# Patient Record
Sex: MALE | Race: WHITE | NOT HISPANIC OR LATINO | Employment: OTHER | URBAN - METROPOLITAN AREA
[De-identification: names, ages, dates, MRNs, and addresses within clinical notes are randomized per-mention and may not be internally consistent; named-entity substitution may affect disease eponyms.]

---

## 2017-02-06 ENCOUNTER — TELEPHONE (OUTPATIENT)
Dept: OPHTHALMOLOGY | Facility: CLINIC | Age: 67
End: 2017-02-06

## 2017-03-06 ENCOUNTER — INITIAL CONSULT (OUTPATIENT)
Dept: OPHTHALMOLOGY | Facility: CLINIC | Age: 67
End: 2017-03-06
Payer: MEDICARE

## 2017-03-06 DIAGNOSIS — H35.379: ICD-10-CM

## 2017-03-06 DIAGNOSIS — H43.813 POSTERIOR VITREOUS DETACHMENT, BOTH EYES: ICD-10-CM

## 2017-03-06 DIAGNOSIS — H35.89 RETINAL NERVE FIBER BUNDLE DEFECTS: ICD-10-CM

## 2017-03-06 DIAGNOSIS — H35.373 EPIRETINAL MEMBRANE, BOTH EYES: Primary | ICD-10-CM

## 2017-03-06 PROCEDURE — 92225 PR SPECIAL EYE EXAM, INITIAL: CPT | Mod: 50,S$GLB,, | Performed by: OPHTHALMOLOGY

## 2017-03-06 PROCEDURE — 99999 PR PBB SHADOW E&M-EST. PATIENT-LVL I: CPT | Mod: PBBFAC,,, | Performed by: OPHTHALMOLOGY

## 2017-03-06 PROCEDURE — 92014 COMPRE OPH EXAM EST PT 1/>: CPT | Mod: S$GLB,,, | Performed by: OPHTHALMOLOGY

## 2017-03-06 PROCEDURE — 92134 CPTRZ OPH DX IMG PST SGM RTA: CPT | Mod: S$GLB,,, | Performed by: OPHTHALMOLOGY

## 2017-03-06 NOTE — MR AVS SNAPSHOT
[place] - Ophthalmology  [address] [facility] [address]  [facility] [address]  Phone: [phone]  Fax: [phone]                  Zeenat Mancia   3/6/2017 9:30 AM   Initial consult    Description:  Male : 1950   Provider:  DANAY Fuentes MD   Department:  [place] - Ophthalmology           Reason for Visit     Eye Problem           Diagnoses this Visit        Comments    Epiretinal membrane, both eyes    -  Primary     Macular puckering of retina, unspecified laterality         Retinal nerve fiber bundle defects         Posterior vitreous detachment, both eyes                To Do List           Goals (5 Years of Data)     None      Follow-Up and Disposition     Return in about 1 year (around 3/6/2018).      [facility]     [facility] Nurse Care Line -  Assistance  Registered nurses in the [facility] Center provide clinical advisement, health education, appointment booking, and other advisory services.  Call for this free service at [phone].             Medications           Message regarding Medications     Verify the changes and/or additions to your medication regime listed below are the same as discussed with your clinician today.  If any of these changes or additions are incorrect, please notify your healthcare provider.             Verify that the below list of medications is an accurate representation of the medications you are currently taking.  If none reported, the list may be blank. If incorrect, please contact your healthcare provider. Carry this list with you in case of emergency.           Current Medications     amlodipine (NORVASC) 5 MG tablet Take 1 tablet (5 mg total) by mouth once daily.    aspirin 81 MG Chew Take 81 mg by mouth. 1 Tablet, Chewable Oral Every day    atenolol (TENORMIN) 100 MG tablet Take 1 tablet (100 mg total) by mouth once daily.    benazepril (LOTENSIN) 40 MG tablet Take 1 tablet (40 mg total) by mouth once daily.    doxazosin (CARDURA) 2  MG tablet Take 1 tablet (2 mg total) by mouth every evening.    finasteride (PROSCAR) 5 mg tablet Take 1 tablet (5 mg total) by mouth once daily.    hydrochlorothiazide (HYDRODIURIL) 25 MG tablet Take 1 tablet (25 mg total) by mouth once daily.    multivitamin-minerals-lutein (CENTRUM SILVER) Tab Take by mouth. 1 Tablet Oral Every day    naproxen (NAPROSYN) 500 MG tablet TAKE 1 TABLET ONE TIME DAILY    tadalafil (CIALIS) 20 MG Tab Use one tablet every 36 hours           Clinical Reference Information           Allergies as of 3/6/2017     No Known Allergies      Immunizations Administered on Date of Encounter - 3/6/2017     None      Orders Placed During Today's Visit      Normal Orders This Visit    Posterior Segment OCT Retina-Both eyes       Language Assistance Services     ATTENTION: Language assistance services are available, free of charge. Please call [phone].      ATENCIÓN: Si lesvia chavez, tiene a diane disposición servicios gratuitos de asistencia lingüística. Llame al [phone].     DIMPLE Ý: N?u b?n nói Ti?ng Vi?t, có các d?ch v? h? tr? ngôn ng? mi?n phí dành cho b?n. G?i s? [phone].         [facility] complies with applicable Federal civil rights laws and does not discriminate on the basis of race, color, national origin, age, disability, or sex.

## 2017-03-06 NOTE — LETTER
March 6, 2017      YUE Chandler, OD  [address] [facility] [address] [place] [address]           [place] - Ophthalmology  [address] [facility] [address] [place] [address]  Phone: [phone]  Fax: [phone]          Patient: Zeenat Mancia   MR Number: [id number]   YOB: 1950   Date of Visit: 3/6/2017       Dear Dr. YUE Chandler:    Thank you for referring Zeenat Mancia to me for evaluation. Attached you will find relevant portions of my assessment and plan of care.    If you have questions, please do not hesitate to call me. I look forward to following Zeenat Mancia along with you.    Sincerely,    DANAY Fuentes MD    Enclosure  CC:  No Recipients    If you would like to receive this communication electronically, please contact [email or web address] or [phone] to request more information on [employer] Link access.    For providers and/or their staff who would like to refer a patient to [facility], please contact us through our one-stop-shop provider referral line, [facility], at [phone].    If you feel you have received this communication in error or would no longer like to receive these types of communications, please e-mail [email or web address]

## 2017-03-06 NOTE — PROGRESS NOTES
Pt states he notices a blurry spot centrally OD since before  the cataract   surgery.  Was also having headaches, but that has resolved.         S/P PC IOL OD 5/15 Dr. Dangelo (distance)  S/P PC IOL OS 7 yrs ago DANAY Roberson  (near)        OCT - OD mild MP ERM  OS - non central ERM      A/P    1. ERM OU  Mild - non central    2. PVD OU    3. PCIOL OU      1 yr OCT

## 2017-04-24 ENCOUNTER — PATIENT MESSAGE (OUTPATIENT)
Dept: FAMILY MEDICINE | Facility: CLINIC | Age: 67
End: 2017-04-24

## 2017-05-18 ENCOUNTER — OFFICE VISIT (OUTPATIENT)
Dept: FAMILY MEDICINE | Facility: CLINIC | Age: 67
End: 2017-05-18
Payer: MEDICARE

## 2017-05-18 ENCOUNTER — HOSPITAL ENCOUNTER (OUTPATIENT)
Dept: RADIOLOGY | Facility: HOSPITAL | Age: 67
Discharge: HOME | End: 2017-05-18
Attending: FAMILY MEDICINE
Payer: MEDICARE

## 2017-05-18 VITALS
OXYGEN SATURATION: 97 % | DIASTOLIC BLOOD PRESSURE: 60 MMHG | BODY MASS INDEX: 38.37 KG/M2 | TEMPERATURE: 98 F | SYSTOLIC BLOOD PRESSURE: 111 MMHG | HEART RATE: 62 BPM | WEIGHT: 298.94 LBS | HEIGHT: 74 IN

## 2017-05-18 DIAGNOSIS — J44.1 CHRONIC OBSTRUCTIVE PULMONARY DISEASE WITH ACUTE EXACERBATION: ICD-10-CM

## 2017-05-18 DIAGNOSIS — J40 BRONCHITIS: Primary | ICD-10-CM

## 2017-05-18 DIAGNOSIS — J40 BRONCHITIS: ICD-10-CM

## 2017-05-18 DIAGNOSIS — J18.9 PNEUMONIA OF RIGHT LOWER LOBE DUE TO INFECTIOUS ORGANISM: ICD-10-CM

## 2017-05-18 PROCEDURE — 99214 OFFICE O/P EST MOD 30 MIN: CPT | Mod: S$GLB,,, | Performed by: PHYSICIAN ASSISTANT

## 2017-05-18 PROCEDURE — 3074F SYST BP LT 130 MM HG: CPT | Mod: S$GLB,,, | Performed by: PHYSICIAN ASSISTANT

## 2017-05-18 PROCEDURE — 71020 XR CHEST PA AND LATERAL: CPT | Mod: 26,,, | Performed by: RADIOLOGY

## 2017-05-18 PROCEDURE — 71020 XR CHEST PA AND LATERAL: CPT | Mod: TC,PO

## 2017-05-18 PROCEDURE — 99999 PR PBB SHADOW E&M-EST. PATIENT-LVL IV: CPT | Mod: PBBFAC,,, | Performed by: PHYSICIAN ASSISTANT

## 2017-05-18 PROCEDURE — 1126F AMNT PAIN NOTED NONE PRSNT: CPT | Mod: S$GLB,,, | Performed by: PHYSICIAN ASSISTANT

## 2017-05-18 PROCEDURE — 1159F MED LIST DOCD IN RCRD: CPT | Mod: S$GLB,,, | Performed by: PHYSICIAN ASSISTANT

## 2017-05-18 PROCEDURE — 1160F RVW MEDS BY RX/DR IN RCRD: CPT | Mod: S$GLB,,, | Performed by: PHYSICIAN ASSISTANT

## 2017-05-18 PROCEDURE — 3078F DIAST BP <80 MM HG: CPT | Mod: S$GLB,,, | Performed by: PHYSICIAN ASSISTANT

## 2017-05-18 PROCEDURE — 99499 UNLISTED E&M SERVICE: CPT | Mod: S$GLB,,, | Performed by: PHYSICIAN ASSISTANT

## 2017-05-18 RX ORDER — ALBUTEROL SULFATE 90 UG/1
2 AEROSOL, METERED RESPIRATORY (INHALATION) EVERY 4 HOURS PRN
Qty: 1 INHALER | Refills: 5 | Status: SHIPPED | OUTPATIENT
Start: 2017-05-18 | End: 2017-07-26

## 2017-05-18 RX ORDER — DOXYCYCLINE 100 MG/1
100 CAPSULE ORAL 2 TIMES DAILY
Qty: 20 CAPSULE | Refills: 0 | Status: SHIPPED | OUTPATIENT
Start: 2017-05-18 | End: 2017-05-28

## 2017-05-18 NOTE — PROGRESS NOTES
Subjective:       Patient ID: Zeenat Mancia is a 67 y.o. male.    Chief Complaint: Cough (2 wks)    HPI   Cough and wheeze x 3 wks  Cough is productive with purulent phlegm  No fever  Pt has not smoked in many yrs.  Review of Systems   Constitutional: Positive for fatigue. Negative for activity change, appetite change, chills, diaphoresis, fever and unexpected weight change.   HENT: Positive for congestion and postnasal drip. Negative for sinus pressure and sore throat.    Eyes: Negative.    Respiratory: Positive for cough and wheezing. Negative for shortness of breath.    Cardiovascular: Negative.  Negative for chest pain, palpitations and leg swelling.   Gastrointestinal: Negative.    Endocrine: Negative.    Genitourinary: Negative.    Musculoskeletal: Negative.    Skin: Negative.  Negative for rash.       Objective:      Physical Exam   Constitutional: He appears well-developed and well-nourished. No distress.   HENT:   Head: Normocephalic and atraumatic.   Right Ear: External ear normal.   Left Ear: External ear normal.   Nose: Nose normal.   Mouth/Throat: Oropharynx is clear and moist. No oropharyngeal exudate.   Sinuses non tender  Mucus clear   Eyes: Conjunctivae are normal. No scleral icterus.   Neck: Normal range of motion. Neck supple. No tracheal deviation present. No thyromegaly present.   Cardiovascular: Normal rate, regular rhythm, normal heart sounds and intact distal pulses.  Exam reveals no gallop and no friction rub.    No murmur heard.  Pulmonary/Chest: Effort normal. No respiratory distress. He has wheezes. He has no rales.   Few crackles RLL with occ. wheeze   Musculoskeletal: He exhibits edema.   Lymphadenopathy:     He has no cervical adenopathy.   Skin: Skin is warm and dry. No rash noted.   Vitals reviewed.      Assessment:         Plan:       Zeenat was seen today for cough.    Diagnoses and all orders for this visit:    Bronchitis  -     X-Ray Chest PA And Lateral; Future  -     doxycycline  (MONODOX) 100 MG capsule; Take 1 capsule (100 mg total) by mouth 2 (two) times daily.  -     albuterol 90 mcg/actuation inhaler; Inhale 2 puffs into the lungs every 4 (four) hours as needed for Wheezing.    Pneumonia of right lower lobe due to infectious organism  -     X-Ray Chest PA And Lateral; Future  -     doxycycline (MONODOX) 100 MG capsule; Take 1 capsule (100 mg total) by mouth 2 (two) times daily.  -     albuterol 90 mcg/actuation inhaler; Inhale 2 puffs into the lungs every 4 (four) hours as needed for Wheezing.    Chronic obstructive pulmonary disease with acute exacerbation  -     X-Ray Chest PA And Lateral; Future  -     doxycycline (MONODOX) 100 MG capsule; Take 1 capsule (100 mg total) by mouth 2 (two) times daily.  -     albuterol 90 mcg/actuation inhaler; Inhale 2 puffs into the lungs every 4 (four) hours as needed for Wheezing.    discussed otc's

## 2017-05-18 NOTE — MR AVS SNAPSHOT
[facility]  [address]  [facility] [address]  Phone: [phone]  Fax: [phone]                  Zeenat Mancia   2017 8:40 AM   Office Visit    Description:  Male : 1950   Provider:  Slava Clemons PA-C   Department:  [facility]           Reason for Visit     Cough           Diagnoses this Visit        Comments    Bronchitis    -  Primary     Pneumonia of right lower lobe due to infectious organism         Chronic obstructive pulmonary disease with acute exacerbation                To Do List           Goals (5 Years of Data)     None       These Medications        Disp Refills Start End    doxycycline (MONODOX) 100 MG capsule 20 capsule 0 2017    Take 1 capsule (100 mg total) by mouth 2 (two) times daily. - Oral    Pharmacy: [facility] [address] [place] [address] [address] AT [facility] [place] [address] & [facility] Ph #: [phone]       albuterol 90 mcg/actuation inhaler 1 Inhaler 5 2017     Inhale 2 puffs into the lungs every 4 (four) hours as needed for Wheezing. - Inhalation    Pharmacy: [facility] [address] [place] [address] [address] AT [place] [address] & [facility] Ph #: [phone]         [facility] On Call     [facility] Care Line - 24/ Assistance  Unless otherwise directed by your provider, please contact [facility], our nurse care line that is available for 24/ assistance.     Registered nurses in the [facility] provide: appointment scheduling, clinical advisement, health education, and other advisory services.  Call: [phone] (toll free)               Medications           Message regarding Medications     Verify the changes and/or additions to your medication regime listed below are the same as discussed with your clinician today.  If any of these changes or additions are incorrect, please notify your healthcare provider.        START taking these NEW  "medications        Refills    doxycycline (MONODOX) 100 MG capsule 0    Sig: Take 1 capsule (100 mg total) by mouth 2 (two) times daily.    Class: Normal    Route: Oral    albuterol 90 mcg/actuation inhaler 5    Sig: Inhale 2 puffs into the lungs every 4 (four) hours as needed for Wheezing.    Class: Normal    Route: Inhalation           Verify that the below list of medications is an accurate representation of the medications you are currently taking.  If none reported, the list may be blank. If incorrect, please contact your healthcare provider. Carry this list with you in case of emergency.           Current Medications     amlodipine (NORVASC) 5 MG tablet Take 1 tablet (5 mg total) by mouth once daily.    aspirin 81 MG Chew Take 81 mg by mouth. 1 Tablet, Chewable Oral Every day    atenolol (TENORMIN) 100 MG tablet Take 1 tablet (100 mg total) by mouth once daily.    benazepril (LOTENSIN) 40 MG tablet Take 1 tablet (40 mg total) by mouth once daily.    doxazosin (CARDURA) 2 MG tablet Take 1 tablet (2 mg total) by mouth every evening.    finasteride (PROSCAR) 5 mg tablet Take 1 tablet (5 mg total) by mouth once daily.    hydrochlorothiazide (HYDRODIURIL) 25 MG tablet Take 1 tablet (25 mg total) by mouth once daily.    multivitamin-minerals-lutein (CENTRUM SILVER) Tab Take by mouth. 1 Tablet Oral Every day    naproxen (NAPROSYN) 500 MG tablet TAKE 1 TABLET ONE TIME DAILY    tadalafil (CIALIS) 20 MG Tab Use one tablet every 36 hours    albuterol 90 mcg/actuation inhaler Inhale 2 puffs into the lungs every 4 (four) hours as needed for Wheezing.    doxycycline (MONODOX) 100 MG capsule Take 1 capsule (100 mg total) by mouth 2 (two) times daily.           Clinical Reference Information           Your Vitals Were     BP Pulse Temp Height Weight SpO2    111/60 62 97.8 °F (36.6 °C) (Oral) 6' 2" (1.88 m) 135.6 kg (298 lb 15.1 oz) 97%    BMI                38.38 kg/m2          Blood Pressure          Most Recent Value    BP  " 111/60      Allergies as of 5/18/2017     No Known Allergies      Immunizations Administered on Date of Encounter - 5/18/2017     None      Orders Placed During Today's Visit     Future Labs/Procedures Expected by Expires    X-Ray Chest PA And Lateral  5/18/2017 5/18/2018      Language Assistance Services     ATTENTION: Language assistance services are available, free of charge. Please call [phone].      ATENCIÓN: Si habla español, tiene a diane disposición servicios gratuitos de asistencia lingüística. Llame al [phone].     CHÚ Ý: N?u b?n nói Ti?ng Vi?t, có các d?ch v? h? tr? ngôn ng? mi?n phí dành cho b?n. G?i s? [phone].         [facility] complies with applicable Federal civil rights laws and does not discriminate on the basis of race, color, national origin, age, disability, or sex.

## 2017-05-31 ENCOUNTER — HOSPITAL ENCOUNTER (OUTPATIENT)
Dept: RADIOLOGY | Facility: HOSPITAL | Age: 67
Discharge: HOME | End: 2017-05-31
Attending: FAMILY MEDICINE
Payer: MEDICARE

## 2017-05-31 ENCOUNTER — TELEPHONE (OUTPATIENT)
Dept: FAMILY MEDICINE | Facility: CLINIC | Age: 67
End: 2017-05-31

## 2017-05-31 ENCOUNTER — OFFICE VISIT (OUTPATIENT)
Dept: FAMILY MEDICINE | Facility: CLINIC | Age: 67
End: 2017-05-31
Payer: MEDICARE

## 2017-05-31 VITALS
RESPIRATION RATE: 22 BRPM | HEIGHT: 74 IN | OXYGEN SATURATION: 96 % | BODY MASS INDEX: 38.73 KG/M2 | WEIGHT: 301.81 LBS | DIASTOLIC BLOOD PRESSURE: 70 MMHG | SYSTOLIC BLOOD PRESSURE: 118 MMHG | HEART RATE: 61 BPM

## 2017-05-31 DIAGNOSIS — J42 CHRONIC BRONCHITIS, UNSPECIFIED CHRONIC BRONCHITIS TYPE: Primary | ICD-10-CM

## 2017-05-31 DIAGNOSIS — J42 CHRONIC BRONCHITIS, UNSPECIFIED CHRONIC BRONCHITIS TYPE: ICD-10-CM

## 2017-05-31 PROCEDURE — 1126F AMNT PAIN NOTED NONE PRSNT: CPT | Mod: S$GLB,,, | Performed by: FAMILY MEDICINE

## 2017-05-31 PROCEDURE — 71020 XR CHEST PA AND LATERAL: CPT | Mod: 26,,, | Performed by: RADIOLOGY

## 2017-05-31 PROCEDURE — 71020 XR CHEST PA AND LATERAL: CPT | Mod: TC,PO

## 2017-05-31 PROCEDURE — 1159F MED LIST DOCD IN RCRD: CPT | Mod: S$GLB,,, | Performed by: FAMILY MEDICINE

## 2017-05-31 PROCEDURE — 99999 PR PBB SHADOW E&M-EST. PATIENT-LVL III: CPT | Mod: PBBFAC,,, | Performed by: FAMILY MEDICINE

## 2017-05-31 PROCEDURE — 99499 UNLISTED E&M SERVICE: CPT | Mod: S$GLB,,, | Performed by: FAMILY MEDICINE

## 2017-05-31 PROCEDURE — 99214 OFFICE O/P EST MOD 30 MIN: CPT | Mod: S$GLB,,, | Performed by: FAMILY MEDICINE

## 2017-05-31 RX ORDER — AZITHROMYCIN 250 MG/1
TABLET, FILM COATED ORAL
Qty: 6 TABLET | Refills: 0 | Status: SHIPPED | OUTPATIENT
Start: 2017-05-31 | End: 2017-07-26 | Stop reason: ALTCHOICE

## 2017-05-31 RX ORDER — PREDNISONE 10 MG/1
TABLET ORAL
Qty: 20 TABLET | Refills: 0 | Status: SHIPPED | OUTPATIENT
Start: 2017-05-31 | End: 2017-07-26 | Stop reason: ALTCHOICE

## 2017-05-31 NOTE — TELEPHONE ENCOUNTER
----- Message from Shauna Overton RT sent at 5/30/2017  8:17 AM CDT -----  Contact: pt    pt , requesting a call back soon, he would like to explain how is not feeling better even though he was on antibiotics and breathing treatment (still coughing) , thanks.

## 2017-05-31 NOTE — PROGRESS NOTES
Subjective:       Patient ID: Zeenat Mancia is a 67 y.o. male.    Chief Complaint: Cough (productive with green sputum); Posttussive emesis; and Fatigue    HPI     Reports cold like sxs 1 month ago and now c/o persistent productive cough with green phlegm, wheeze and fatigue x 3 weeks. No fever. Saw np approx 2 weeks ago and given doxy and proair. cxray 2 weeks ago was wnl.       Review of Systems      Review of Systems   Constitutional: Negative for fever and chills.   HENT: Negative for hearing loss and neck stiffness.    Eyes: Negative for redness and itching.   Respiratory: Negative for  choking.    Cardiovascular: Negative for chest pain and leg swelling.  Abdomen: Negative for abdominal pain and blood in stool.   Genitourinary: Negative for dysuria and flank pain.   Musculoskeletal: Negative for back pain and gait problem.   Neurological: Negative for light-headedness and headaches.   Hematological: Negative for adenopathy.   Psychiatric/Behavioral: Negative for behavioral problems.     Objective:      Physical Exam   HENT:   Head: Atraumatic.   Eyes: Conjunctivae are normal. Pupils are equal, round, and reactive to light.   Neck: Normal range of motion.   Cardiovascular: Normal rate and regular rhythm.    No murmur heard.  Pulmonary/Chest: Effort normal. He has wheezes.   Lymphadenopathy:     He has no cervical adenopathy.       Assessment:       1. Chronic bronchitis, unspecified chronic bronchitis type        Plan:       Chronic bronchitis, unspecified chronic bronchitis type  -     Culture, Respiratory with Gram Stain; Future; Expected date: 05/31/2017  -     X-Ray Chest PA And Lateral; Future; Expected date: 05/31/2017    Other orders  -     azithromycin (ZITHROMAX Z-MAURI) 250 MG tablet; Take 2 tabs daily for first day, then 1 tab daily for next 4 days.  Dispense: 6 tablet; Refill: 0  -     predniSONE (DELTASONE) 10 MG tablet; Take 4 tabs daily for 2 days then Take 3 tabs daily for 2 days thenTake 2 tabs  daily for 2 days then Take 1 tab daily for 2 days then stop  Dispense: 20 tablet; Refill: 0      Plan:  See orders  Start zpak and prednisone taper

## 2017-05-31 NOTE — TELEPHONE ENCOUNTER
Spoke to patient, he informed me that he has an appointment to come in today for his cough and congestion.

## 2017-06-12 ENCOUNTER — PATIENT MESSAGE (OUTPATIENT)
Dept: FAMILY MEDICINE | Facility: CLINIC | Age: 67
End: 2017-06-12

## 2017-07-25 ENCOUNTER — PATIENT MESSAGE (OUTPATIENT)
Dept: FAMILY MEDICINE | Facility: CLINIC | Age: 67
End: 2017-07-25

## 2017-07-25 DIAGNOSIS — Z12.5 ENCOUNTER FOR SCREENING FOR MALIGNANT NEOPLASM OF PROSTATE: ICD-10-CM

## 2017-07-25 DIAGNOSIS — Z13.6 ENCOUNTER FOR LIPID SCREENING FOR CARDIOVASCULAR DISEASE: ICD-10-CM

## 2017-07-25 DIAGNOSIS — Z13.220 ENCOUNTER FOR LIPID SCREENING FOR CARDIOVASCULAR DISEASE: ICD-10-CM

## 2017-07-25 DIAGNOSIS — Z00.00 ROUTINE HEALTH MAINTENANCE: Primary | ICD-10-CM

## 2017-07-26 ENCOUNTER — OFFICE VISIT (OUTPATIENT)
Dept: FAMILY MEDICINE | Facility: CLINIC | Age: 67
End: 2017-07-26
Payer: MEDICARE

## 2017-07-26 VITALS
DIASTOLIC BLOOD PRESSURE: 76 MMHG | HEART RATE: 57 BPM | HEIGHT: 74 IN | WEIGHT: 305.56 LBS | BODY MASS INDEX: 39.21 KG/M2 | SYSTOLIC BLOOD PRESSURE: 133 MMHG

## 2017-07-26 DIAGNOSIS — G47.33 OSA (OBSTRUCTIVE SLEEP APNEA): ICD-10-CM

## 2017-07-26 DIAGNOSIS — Z96.1 PSEUDOPHAKIA OF BOTH EYES: ICD-10-CM

## 2017-07-26 DIAGNOSIS — N52.9 ERECTILE DYSFUNCTION, UNSPECIFIED ERECTILE DYSFUNCTION TYPE: ICD-10-CM

## 2017-07-26 DIAGNOSIS — E78.5 HYPERLIPIDEMIA, UNSPECIFIED HYPERLIPIDEMIA TYPE: ICD-10-CM

## 2017-07-26 DIAGNOSIS — H43.813 POSTERIOR VITREOUS DETACHMENT, BOTH EYES: ICD-10-CM

## 2017-07-26 DIAGNOSIS — Z00.00 ENCOUNTER FOR PREVENTIVE HEALTH EXAMINATION: Primary | ICD-10-CM

## 2017-07-26 DIAGNOSIS — I27.9 PULMONARY HEART DISEASE: ICD-10-CM

## 2017-07-26 DIAGNOSIS — I77.1 TORTUOUS AORTA: ICD-10-CM

## 2017-07-26 DIAGNOSIS — E66.01 MORBID OBESITY DUE TO EXCESS CALORIES: ICD-10-CM

## 2017-07-26 DIAGNOSIS — I10 ESSENTIAL HYPERTENSION: ICD-10-CM

## 2017-07-26 DIAGNOSIS — N40.1 BENIGN NON-NODULAR PROSTATIC HYPERPLASIA WITH LOWER URINARY TRACT SYMPTOMS: ICD-10-CM

## 2017-07-26 DIAGNOSIS — H52.7 REFRACTIVE ERROR: ICD-10-CM

## 2017-07-26 DIAGNOSIS — I77.9 LEFT-SIDED CAROTID ARTERY DISEASE: ICD-10-CM

## 2017-07-26 DIAGNOSIS — H35.373 EPIRETINAL MEMBRANE, BOTH EYES: ICD-10-CM

## 2017-07-26 PROCEDURE — G0439 PPPS, SUBSEQ VISIT: HCPCS | Mod: S$GLB,,, | Performed by: NURSE PRACTITIONER

## 2017-07-26 PROCEDURE — 99499 UNLISTED E&M SERVICE: CPT | Mod: S$GLB,,, | Performed by: NURSE PRACTITIONER

## 2017-07-26 PROCEDURE — 99999 PR PBB SHADOW E&M-EST. PATIENT-LVL IV: CPT | Mod: PBBFAC,,, | Performed by: NURSE PRACTITIONER

## 2017-07-26 NOTE — PROGRESS NOTES
"Zeenat Mancia presented for a  Medicare AWV and comprehensive Health Risk Assessment today. The following components were reviewed and updated:    · Medical history  · Family History  · Social history  · Allergies and Current Medications  · Health Risk Assessment  · Health Maintenance  · Care Team     ** See Completed Assessments for Annual Wellness Visit within the encounter summary.**       The following assessments were completed:  · Living Situation  · CAGE  · Depression Screening  · Timed Get Up and Go  · Whisper Test  · Cognitive Function Screening  · Nutrition Screening  · ADL Screening  · PAQ Screening    Vitals:    07/26/17 1416   BP: 133/76   BP Location: Left arm   Patient Position: Sitting   BP Method: Automatic   Pulse: (!) 57   Weight: (!) 138.6 kg (305 lb 8.9 oz)   Height: 6' 2" (1.88 m)     Body mass index is 39.23 kg/m².  Physical Exam   Constitutional: He appears well-developed and well-nourished. No distress.   HENT:   Head: Normocephalic and atraumatic.   Eyes: Conjunctivae are normal.   Cardiovascular: Normal rate.    Pulmonary/Chest: Effort normal.   Neurological: He is alert.   Skin: Skin is warm and dry.   Psychiatric: He has a normal mood and affect. His behavior is normal. Judgment and thought content normal.         Diagnoses and health risks identified today and associated recommendations/orders:    1. Encounter for preventive health examination  Recommend yearly HRA visit    2. Tortuous aorta  BP controlled  Followed by Leonel Johnson MD .   CXR 5/18/17    3. Left-sided carotid artery disease  Recommend f/u carotid US  Followed by Leonel Johnson MD .   [facility] 12/29/14    4. Pulmonary heart disease  Continue to monitor with echo  Followed by Leonel Johnson MD .   PA pressure of 39 Echo 7/26/11    5. JACKELYN (obstructive sleep apnea)  Stable.   Wears cpap at night  Followed by Leonel Johnson MD .       6. Essential hypertension  Stable.   Taking acei, ccb  Followed by Leonel BUENO" MD Elizabeth .       7. Benign non-nodular prostatic hyperplasia with lower urinary tract symptoms  Taking doxazosin and proscar  Followed by Leonel Johnson MD .       8. Hyperlipidemia, unspecified hyperlipidemia type  Stable. Currently wnl  Continue to monitor  Followed by Leonel Johnson MD .       9. Morbid obesity due to excess calories  Encourage increase in daily activity, engage in daily exercise.  Recommend healthy food choices and smaller portions.   Followed by Leonel Johnson MD .       10. Epiretinal membrane, both eyes  Stable.     Followed by Ramesh.       11. Posterior vitreous detachment, both eyes  Stable.     Followed by Ramesh.       12. Pseudophakia of both eyes  Stable.     Followed by Ramesh.       13. Refractive error  Stable.     Followed by Ramesh.       14. Erectile dysfunction, unspecified erectile dysfunction type  Stable.   Controlled on current medications.  Followed by Leonel Johnson MD .       Written rx for tdap provided.   Provided Zeenat with a 5-10 year written screening schedule and personal prevention plan. Recommendations were developed using the USPSTF age appropriate recommendations. Education, counseling, and referrals were provided as needed. After Visit Summary printed and given to patient which includes a list of additional screenings\tests needed.    Return in about 1 year (around 7/26/2018).    Galilea West NP

## 2017-07-26 NOTE — PATIENT INSTRUCTIONS
Counseling and Referral of Other Preventative  (Italic type indicates deductible and co-insurance are waived)    Patient Name: Zeenat Mancia  Today's Date: 7/26/2017      SERVICE LIMITATIONS RECOMMENDATION    Vaccines    · Pneumococcal (once after 65)    · Influenza (annually)    · Hepatitis B (if medium/high risk)    · Prevnar 13      Hepatitis B medium/high risk factors:       - End-stage renal disease       - Hemophiliacs who received Factor VII or         IX concentrates       - Clients of institutions for the mentally             retarded       - Persons who live in the same house as          a HepB carrier       - Homosexual men       - Illicit injectable drug abusers     Pneumococcal: Done, no repeat necessary     Influenza: N/A     Hepatitis B: N/A     Prevnar 13: Done, no repeat necessary    Prostate cancer screening (annually to age 75)     Prostate specific antigen (PSA) Shared decision making with Provider. Sometimes a co-pay may be required if the patient decides to have this test. The USPSTF no longer recommends prostate cancer screening routinely in medicine: not to follow    Colorectal cancer screening (to age 75)    · Fecal occult blood test (annual)  · Flexible sigmoidoscopy (5y)  · Screening colonoscopy (10y)  · Barium enema   Last done 2009, recommend to repeat every 10  years    Diabetes self-management training (no USPSTF recommendations)  Requires referral by treating physician for patient with diabetes or renal disease. 10 hours of initial DSMT sessions of no less than 30 minutes each in a continuous 12-month period. 2 hours of follow-up DSMT in subsequent years.  N/A    Glaucoma screening (no USPSTF recommendation)  Diabetes mellitus, family history   , age 50 or over    American, age 65 or over  Last done 2016, recommend to repeat every 1  years    Medical nutrition therapy for diabetes or renal disease (no recommended schedule)  Requires referral by treating  physician for patient with diabetes or renal disease or kidney transplant within the past 3 years.  Can be provided in same year as diabetes self-management training (DSMT), and CMS recommends medical nutrition therapy take place after DSMT. Up to 3 hours for initial year and 2 hours in subsequent years.  N/A    Cardiovascular screening blood tests (every 5 years)  · Fasting lipid panel  Order as a panel if possible  Done this year, repeat every year    Diabetes screening tests (at least every 3 years, Medicare covers annually or at 6-month intervals for prediabetic patients)  · Fasting blood sugar (FBS) or glucose tolerance test (GTT)  Patient must be diagnosed with one of the following:       - Hypertension       - Dyslipidemia       - Obesity (BMI 30kg/m2)       - Previous elevated impaired FBS or GTT       ... or any two of the following:       - Overweight (BMI 25 but <30)       - Family history of diabetes       - Age 65 or older       - History of gestational diabetes or birth of baby weighing more than 9 pounds  Done this year, repeat every year    Abdominal aortic aneurysm screening (once)  · Sonogram   Limited to patients who meet one of the following criteria:       - Men who are 65-75 years old and have smoked more than 100 cigarette in their lifetime       - Anyone with a family history of abdominal aortic aneurysm       - Anyone recommended for screening by the USPSTF  N/A done      HIV screening (annually for increased risk patients)  · HIV-1 and HIV-2 by EIA, or SUSAN, rapid antibody test or oral mucosa transudate  Patients must be at increased risk for HIV infection per USPSTF guidelines or pregnant. Tests covered annually for patient at increased risk or as requested by the patient. Pregnant patients may receive up to 3 tests during pregnancy.  Risks discussed, screening is not recommended    Smoking cessation counseling (up to 8 sessions per year)  Patients must be asymptomatic of tobacco-related  conditions to receive as a preventative service.  Non-smoker    Subsequent annual wellness visit  At least 12 months since last AWV  Return in one year     The following information is provided to all patients.  This information is to help you find resources for any of the problems found today that may be affecting your health:                Living healthy guide: www.[facility].[place].[facility]      Understanding Diabetes: www.diabetes.org      Eating healthy: www.cdc.gov/healthyweight      CDC home safety checklist: www.cdc.gov/steadi/patient.html      Agency on Aging: www.goea.[place].[facility]      Alcoholics anonymous (AA): www.aa.org      Physical Activity: www.krystian.nih.gov/ie2joio      Tobacco use: www.quitwithusla.org

## 2017-08-11 ENCOUNTER — PATIENT MESSAGE (OUTPATIENT)
Dept: FAMILY MEDICINE | Facility: CLINIC | Age: 67
End: 2017-08-11

## 2017-08-11 DIAGNOSIS — G47.33 OSA (OBSTRUCTIVE SLEEP APNEA): Primary | ICD-10-CM

## 2017-08-14 ENCOUNTER — PATIENT MESSAGE (OUTPATIENT)
Dept: FAMILY MEDICINE | Facility: CLINIC | Age: 67
End: 2017-08-14

## 2017-08-21 ENCOUNTER — LAB VISIT (OUTPATIENT)
Dept: LAB | Facility: HOSPITAL | Age: 67
End: 2017-08-21
Attending: FAMILY MEDICINE
Payer: MEDICARE

## 2017-08-21 DIAGNOSIS — Z12.5 ENCOUNTER FOR SCREENING FOR MALIGNANT NEOPLASM OF PROSTATE: ICD-10-CM

## 2017-08-21 DIAGNOSIS — Z00.00 ROUTINE HEALTH MAINTENANCE: ICD-10-CM

## 2017-08-21 DIAGNOSIS — Z13.220 ENCOUNTER FOR LIPID SCREENING FOR CARDIOVASCULAR DISEASE: ICD-10-CM

## 2017-08-21 DIAGNOSIS — Z13.6 ENCOUNTER FOR LIPID SCREENING FOR CARDIOVASCULAR DISEASE: ICD-10-CM

## 2017-08-21 LAB
ALBUMIN SERPL BCP-MCNC: 3.9 G/DL
ALP SERPL-CCNC: 56 U/L
ALT SERPL W/O P-5'-P-CCNC: 17 U/L
ANION GAP SERPL CALC-SCNC: 10 MMOL/L
AST SERPL-CCNC: 15 U/L
BILIRUB SERPL-MCNC: 0.6 MG/DL
BUN SERPL-MCNC: 19 MG/DL
CALCIUM SERPL-MCNC: 9.8 MG/DL
CHLORIDE SERPL-SCNC: 102 MMOL/L
CHOLEST/HDLC SERPL: 5.1 {RATIO}
CO2 SERPL-SCNC: 27 MMOL/L
COMPLEXED PSA SERPL-MCNC: 0.44 NG/ML
CREAT SERPL-MCNC: 1.4 MG/DL
EST. GFR  (AFRICAN AMERICAN): 59.7 ML/MIN/1.73 M^2
EST. GFR  (NON AFRICAN AMERICAN): 51.6 ML/MIN/1.73 M^2
GLUCOSE SERPL-MCNC: 93 MG/DL
HDL/CHOLESTEROL RATIO: 19.7 %
HDLC SERPL-MCNC: 183 MG/DL
HDLC SERPL-MCNC: 36 MG/DL
LDLC SERPL CALC-MCNC: 109 MG/DL
NONHDLC SERPL-MCNC: 147 MG/DL
POTASSIUM SERPL-SCNC: 4.8 MMOL/L
PROT SERPL-MCNC: 7.5 G/DL
SODIUM SERPL-SCNC: 139 MMOL/L
TRIGL SERPL-MCNC: 190 MG/DL

## 2017-08-21 PROCEDURE — 80053 COMPREHEN METABOLIC PANEL: CPT

## 2017-08-21 PROCEDURE — 84153 ASSAY OF PSA TOTAL: CPT

## 2017-08-21 PROCEDURE — 80061 LIPID PANEL: CPT

## 2017-08-21 PROCEDURE — 36415 COLL VENOUS BLD VENIPUNCTURE: CPT | Mod: PO

## 2017-09-01 ENCOUNTER — OFFICE VISIT (OUTPATIENT)
Dept: FAMILY MEDICINE | Facility: CLINIC | Age: 67
End: 2017-09-01
Payer: MEDICARE

## 2017-09-01 ENCOUNTER — HOSPITAL ENCOUNTER (OUTPATIENT)
Dept: RADIOLOGY | Facility: HOSPITAL | Age: 67
Discharge: HOME | End: 2017-09-01
Attending: FAMILY MEDICINE
Payer: MEDICARE

## 2017-09-01 VITALS
SYSTOLIC BLOOD PRESSURE: 110 MMHG | WEIGHT: 298.31 LBS | RESPIRATION RATE: 18 BRPM | HEART RATE: 62 BPM | HEIGHT: 74 IN | BODY MASS INDEX: 38.28 KG/M2 | DIASTOLIC BLOOD PRESSURE: 68 MMHG

## 2017-09-01 DIAGNOSIS — G47.33 OSA (OBSTRUCTIVE SLEEP APNEA): ICD-10-CM

## 2017-09-01 DIAGNOSIS — R06.2 WHEEZING: ICD-10-CM

## 2017-09-01 DIAGNOSIS — I10 ESSENTIAL HYPERTENSION: ICD-10-CM

## 2017-09-01 DIAGNOSIS — E78.5 HYPERLIPIDEMIA, UNSPECIFIED HYPERLIPIDEMIA TYPE: ICD-10-CM

## 2017-09-01 DIAGNOSIS — Z00.00 ROUTINE HEALTH MAINTENANCE: Primary | ICD-10-CM

## 2017-09-01 DIAGNOSIS — E66.01 MORBID OBESITY DUE TO EXCESS CALORIES: ICD-10-CM

## 2017-09-01 PROCEDURE — 71020 XR CHEST PA AND LATERAL: CPT | Mod: TC,PO

## 2017-09-01 PROCEDURE — 99999 PR PBB SHADOW E&M-EST. PATIENT-LVL III: CPT | Mod: PBBFAC,,, | Performed by: FAMILY MEDICINE

## 2017-09-01 PROCEDURE — 99499 UNLISTED E&M SERVICE: CPT | Mod: S$GLB,,, | Performed by: FAMILY MEDICINE

## 2017-09-01 PROCEDURE — 99397 PER PM REEVAL EST PAT 65+ YR: CPT | Mod: S$GLB,,, | Performed by: FAMILY MEDICINE

## 2017-09-01 PROCEDURE — 71020 XR CHEST PA AND LATERAL: CPT | Mod: 26,,, | Performed by: RADIOLOGY

## 2017-09-01 RX ORDER — ATENOLOL 100 MG/1
100 TABLET ORAL DAILY
Qty: 90 TABLET | Refills: 3 | Status: SHIPPED | OUTPATIENT
Start: 2017-09-01 | End: 2017-11-09 | Stop reason: SDUPTHER

## 2017-09-01 RX ORDER — HYDROCHLOROTHIAZIDE 12.5 MG/1
12.5 TABLET ORAL DAILY
Qty: 90 TABLET | Refills: 3 | Status: SHIPPED | OUTPATIENT
Start: 2017-09-01 | End: 2018-01-09 | Stop reason: SDUPTHER

## 2017-09-01 NOTE — PROGRESS NOTES
HPI  Zeenat Mancia is a 67 y.o. male with multiple medical diagnoses as listed in the medical history and problem list that presents for Annual Exam (hm due: tetanus, flu)  .      HPI  Here today for routine health maintenance.    PAST MEDICAL HISTORY:  Past Medical History:   Diagnosis Date    Arthritis     Dyslipidemia     Hyperlipidemia     Hypertension     Obesity     Sleep apnea     Unspecified disorder of kidney and ureter     kidney stones       PAST SURGICAL HISTORY:  Past Surgical History:   Procedure Laterality Date    APPENDECTOMY      CATARACT EXTRACTION  2009    OS    CATARACT EXTRACTION W/  INTRAOCULAR LENS IMPLANT Right 6/3/2015    sn60wf 14.5d//    EYE SURGERY      cataract    TONSILLECTOMY         SOCIAL HISTORY:  Social History     Social History    Marital status:      Spouse name: N/A    Number of children: N/A    Years of education: N/A     Occupational History    Not on file.     Social History Main Topics    Smoking status: Former Smoker    Smokeless tobacco: Never Used    Alcohol use Yes      Comment: 1 beer a week    Drug use: No    Sexual activity: Not on file     Other Topics Concern    Not on file     Social History Narrative    No narrative on file       FAMILY HISTORY:  Family History   Problem Relation Age of Onset    Arthritis Mother     Heart disease Mother     Blindness Mother      OS due to injury    Coronary artery disease Father     Heart disease Father     Cataracts Father     Amblyopia Neg Hx     Cancer Neg Hx     Diabetes Neg Hx     Glaucoma Neg Hx     Hypertension Neg Hx     Macular degeneration Neg Hx     Retinal detachment Neg Hx     Strabismus Neg Hx     Stroke Neg Hx     Thyroid disease Neg Hx        ALLERGIES AND MEDICATIONS: updated and reviewed.  Review of patient's allergies indicates:  No Known Allergies  Current Outpatient Prescriptions   Medication Sig Dispense Refill    amlodipine (NORVASC) 5 MG tablet Take 1 tablet  "(5 mg total) by mouth once daily. 90 tablet 3    aspirin 81 MG Chew Take 81 mg by mouth. 1 Tablet, Chewable Oral Every day      atenolol (TENORMIN) 100 MG tablet Take 1 tablet (100 mg total) by mouth once daily. 90 tablet 3    benazepril (LOTENSIN) 40 MG tablet Take 1 tablet (40 mg total) by mouth once daily. 90 tablet 3    doxazosin (CARDURA) 2 MG tablet Take 1 tablet (2 mg total) by mouth every evening. 90 tablet 3    finasteride (PROSCAR) 5 mg tablet Take 1 tablet (5 mg total) by mouth once daily. 90 tablet 3    hydrochlorothiazide (HYDRODIURIL) 12.5 MG Tab Take 1 tablet (12.5 mg total) by mouth once daily. 90 tablet 3    multivitamin-minerals-lutein (CENTRUM SILVER) Tab Take by mouth. 1 Tablet Oral Every day      tadalafil (CIALIS) 20 MG Tab Use one tablet every 36 hours 10 tablet 11     No current facility-administered medications for this visit.        ROS  Review of Systems   Constitutional: Negative for activity change and unexpected weight change.   HENT: Negative for hearing loss, rhinorrhea and trouble swallowing.    Eyes: Negative for discharge and visual disturbance.   Respiratory: Positive for wheezing. Negative for chest tightness.    Cardiovascular: Negative for chest pain and palpitations.   Gastrointestinal: Negative for blood in stool, constipation, diarrhea and vomiting.   Endocrine: Negative for polydipsia and polyuria.   Genitourinary: Negative for difficulty urinating, hematuria and urgency.   Musculoskeletal: Negative for arthralgias, joint swelling and neck pain.   Neurological: Positive for weakness. Negative for headaches.   Psychiatric/Behavioral: Negative for dysphoric mood.       Physical Exam  Vitals:    09/01/17 0739   BP: 110/68   Pulse: 62   Resp: 18    Body mass index is 38.3 kg/m².  Weight: 135.3 kg (298 lb 4.5 oz)   Height: 6' 2" (188 cm)     Physical Exam   Constitutional: He is oriented to person, place, and time. He appears well-developed and well-nourished. No " distress.   HENT:   Head: Normocephalic and atraumatic.   Right Ear: External ear normal.   Left Ear: External ear normal.   Eyes: Conjunctivae and EOM are normal. Pupils are equal, round, and reactive to light. No scleral icterus.   Neck: Normal range of motion. Neck supple. No JVD present. No thyromegaly present.   Cardiovascular: Normal rate, regular rhythm and intact distal pulses.  Exam reveals no gallop and no friction rub.    No murmur heard.  Pulmonary/Chest: Effort normal. He has wheezes. He has no rales.   Abdominal: Soft. Bowel sounds are normal. He exhibits no distension and no mass. There is no tenderness.   Musculoskeletal: Normal range of motion. He exhibits no edema or tenderness.   Lymphadenopathy:     He has no cervical adenopathy.   Neurological: He is alert and oriented to person, place, and time. No cranial nerve deficit. Coordination normal.   Skin: Skin is warm and dry. No rash noted.   Psychiatric: He has a normal mood and affect.   Vitals reviewed.    Results for orders placed or performed in visit on 08/21/17   Lipid panel   Result Value Ref Range    Cholesterol 183 120 - 199 mg/dL    Triglycerides 190 (H) 30 - 150 mg/dL    HDL 36 (L) 40 - 75 mg/dL    LDL Cholesterol 109.0 63.0 - 159.0 mg/dL    HDL/Chol Ratio 19.7 (L) 20.0 - 50.0 %    Total Cholesterol/HDL Ratio 5.1 (H) 2.0 - 5.0    Non-HDL Cholesterol 147 mg/dL   Comprehensive metabolic panel   Result Value Ref Range    Sodium 139 136 - 145 mmol/L    Potassium 4.8 3.5 - 5.1 mmol/L    Chloride 102 95 - 110 mmol/L    CO2 27 23 - 29 mmol/L    Glucose 93 70 - 110 mg/dL    BUN, Bld 19 8 - 23 mg/dL    Creatinine 1.4 0.5 - 1.4 mg/dL    Calcium 9.8 8.7 - 10.5 mg/dL    Total Protein 7.5 6.0 - 8.4 g/dL    Albumin 3.9 3.5 - 5.2 g/dL    Total Bilirubin 0.6 0.1 - 1.0 mg/dL    Alkaline Phosphatase 56 55 - 135 U/L    AST 15 10 - 40 U/L    ALT 17 10 - 44 U/L    Anion Gap 10 8 - 16 mmol/L    eGFR if African American 59.7 (A) >60 mL/min/1.73 m^2    eGFR if  non  51.6 (A) >60 mL/min/1.73 m^2   PSA, Screening   Result Value Ref Range    PSA, SCREEN 0.44 0.00 - 4.00 ng/mL      Health Maintenance       Date Due Completion Date    TETANUS VACCINE 05/06/1968 ---    Influenza Vaccine 08/01/2017 11/18/2015    Colonoscopy 06/11/2019 6/11/2009 (Done)    Override on 6/11/2009: Done    Lipid Panel 08/21/2022 8/21/2017          Assessment & Plan    Routine health maintenance  -     Case request GI: COLONOSCOPY  - Health maintenance reviewed  - Diet and exercise education.    Morbid obesity due to excess calories with Essential hypertension and Hyperlipidemia, unspecified hyperlipidemia type  -     DECREASE hydrochlorothiazide (HYDRODIURIL) 12.5 MG Tab; Take 1 tablet (12.5 mg total) by mouth once daily.  Dispense: 90 tablet; Refill: 3  - Continue current therapy  - Serial blood pressure monitoring  - Diet and exercise education.    JACKELYN (obstructive sleep apnea)  - Continue current therapy    Wheezing  -     Complete PFT with bronchodilator; Future  -     PULSE OXIMETRY WITH REST - PULM; Future  -     X-Ray Chest PA And Lateral; Future; Expected date: 09/01/2017    Other orders  -     atenolol (TENORMIN) 100 MG tablet; Take 1 tablet (100 mg total) by mouth once daily.  Dispense: 90 tablet; Refill: 3        Return in about 1 year (around 9/1/2018).

## 2017-09-14 RX ORDER — BENAZEPRIL HYDROCHLORIDE 40 MG/1
TABLET ORAL
Qty: 90 TABLET | Refills: 3 | Status: SHIPPED | OUTPATIENT
Start: 2017-09-14 | End: 2018-01-09 | Stop reason: SDUPTHER

## 2017-09-14 RX ORDER — FINASTERIDE 5 MG/1
TABLET, FILM COATED ORAL
Qty: 90 TABLET | Refills: 3 | Status: SHIPPED | OUTPATIENT
Start: 2017-09-14 | End: 2018-04-11 | Stop reason: SDUPTHER

## 2017-10-10 ENCOUNTER — TELEPHONE (OUTPATIENT)
Dept: CARDIOLOGY | Facility: CLINIC | Age: 67
End: 2017-10-10

## 2017-10-10 ENCOUNTER — ANESTHESIA EVENT (OUTPATIENT)
Dept: ENDOSCOPY | Facility: HOSPITAL | Age: 67
End: 2017-10-10
Payer: MEDICARE

## 2017-10-10 ENCOUNTER — ANESTHESIA (OUTPATIENT)
Dept: ENDOSCOPY | Facility: HOSPITAL | Age: 67
End: 2017-10-10
Payer: MEDICARE

## 2017-10-10 ENCOUNTER — SURGERY (OUTPATIENT)
Age: 67
End: 2017-10-10

## 2017-10-10 ENCOUNTER — HOSPITAL ENCOUNTER (OUTPATIENT)
Facility: HOSPITAL | Age: 67
Discharge: HOME | End: 2017-10-10
Attending: FAMILY MEDICINE | Admitting: FAMILY MEDICINE
Payer: MEDICARE

## 2017-10-10 VITALS
SYSTOLIC BLOOD PRESSURE: 127 MMHG | HEART RATE: 60 BPM | OXYGEN SATURATION: 100 % | BODY MASS INDEX: 37.86 KG/M2 | TEMPERATURE: 97 F | HEIGHT: 74 IN | RESPIRATION RATE: 16 BRPM | DIASTOLIC BLOOD PRESSURE: 85 MMHG | WEIGHT: 295 LBS

## 2017-10-10 VITALS — RESPIRATION RATE: 15 BRPM

## 2017-10-10 DIAGNOSIS — Z12.11 COLON CANCER SCREENING: ICD-10-CM

## 2017-10-10 PROCEDURE — 45385 COLONOSCOPY W/LESION REMOVAL: CPT | Mod: PT,,, | Performed by: INTERNAL MEDICINE

## 2017-10-10 PROCEDURE — D9220A PRA ANESTHESIA: Mod: ANES,,, | Performed by: ANESTHESIOLOGY

## 2017-10-10 PROCEDURE — 37000009 HC ANESTHESIA EA ADD 15 MINS: Mod: PO | Performed by: INTERNAL MEDICINE

## 2017-10-10 PROCEDURE — 88305 TISSUE EXAM BY PATHOLOGIST: CPT | Performed by: PATHOLOGY

## 2017-10-10 PROCEDURE — 27201089 HC SNARE, DISP (ANY): Mod: PO | Performed by: INTERNAL MEDICINE

## 2017-10-10 PROCEDURE — 45385 COLONOSCOPY W/LESION REMOVAL: CPT | Mod: PO | Performed by: INTERNAL MEDICINE

## 2017-10-10 PROCEDURE — 27201028 HC NEEDLE, SCLERO: Mod: PO | Performed by: INTERNAL MEDICINE

## 2017-10-10 PROCEDURE — 45381 COLONOSCOPY SUBMUCOUS NJX: CPT | Mod: PO | Performed by: INTERNAL MEDICINE

## 2017-10-10 PROCEDURE — 88305 TISSUE EXAM BY PATHOLOGIST: CPT | Mod: 26,,, | Performed by: PATHOLOGY

## 2017-10-10 PROCEDURE — 37000008 HC ANESTHESIA 1ST 15 MINUTES: Mod: PO | Performed by: INTERNAL MEDICINE

## 2017-10-10 PROCEDURE — D9220A PRA ANESTHESIA: Mod: CRNA,,, | Performed by: NURSE ANESTHETIST, CERTIFIED REGISTERED

## 2017-10-10 PROCEDURE — 63600175 PHARM REV CODE 636 W HCPCS: Mod: PO | Performed by: NURSE ANESTHETIST, CERTIFIED REGISTERED

## 2017-10-10 PROCEDURE — 45381 COLONOSCOPY SUBMUCOUS NJX: CPT | Mod: ,,, | Performed by: INTERNAL MEDICINE

## 2017-10-10 PROCEDURE — 25000003 PHARM REV CODE 250: Mod: PO | Performed by: INTERNAL MEDICINE

## 2017-10-10 RX ORDER — PROPOFOL 10 MG/ML
VIAL (ML) INTRAVENOUS
Status: DISCONTINUED | OUTPATIENT
Start: 2017-10-10 | End: 2017-10-10

## 2017-10-10 RX ORDER — SODIUM CHLORIDE, SODIUM LACTATE, POTASSIUM CHLORIDE, CALCIUM CHLORIDE 600; 310; 30; 20 MG/100ML; MG/100ML; MG/100ML; MG/100ML
INJECTION, SOLUTION INTRAVENOUS CONTINUOUS
Status: DISCONTINUED | OUTPATIENT
Start: 2017-10-11 | End: 2017-10-10 | Stop reason: HOSPADM

## 2017-10-10 RX ORDER — LIDOCAINE HCL/PF 100 MG/5ML
SYRINGE (ML) INTRAVENOUS
Status: DISCONTINUED | OUTPATIENT
Start: 2017-10-10 | End: 2017-10-10

## 2017-10-10 RX ADMIN — PROPOFOL 25 MG: 10 INJECTION, EMULSION INTRAVENOUS at 08:10

## 2017-10-10 RX ADMIN — PROPOFOL 50 MG: 10 INJECTION, EMULSION INTRAVENOUS at 08:10

## 2017-10-10 RX ADMIN — PROPOFOL 100 MG: 10 INJECTION, EMULSION INTRAVENOUS at 08:10

## 2017-10-10 RX ADMIN — SODIUM CHLORIDE, SODIUM LACTATE, POTASSIUM CHLORIDE, AND CALCIUM CHLORIDE: .6; .31; .03; .02 INJECTION, SOLUTION INTRAVENOUS at 08:10

## 2017-10-10 RX ADMIN — LIDOCAINE HYDROCHLORIDE 100 MG: 20 INJECTION, SOLUTION INTRAVENOUS at 08:10

## 2017-10-10 NOTE — PROGRESS NOTES
During the procedure the patient developed a-fib, no history of a-fib per patient. ECG from 10/14 shows sinus bradycardia. 12 lead today shows a-fib with slow rate, 45-60 bpm. Determined this is new onset a-fib, consulted cardiology, plan is to send patient home with appointment in the am.  Ok, to discharge from PACU.

## 2017-10-10 NOTE — H&P
History & Physical - Short Stay  Gastroenterology      SUBJECTIVE:     Procedure: Colonoscopy    Chief Complaint/Indication for Procedure: Screening    PTA Medications   Medication Sig    amlodipine (NORVASC) 5 MG tablet Take 1 tablet (5 mg total) by mouth once daily.    aspirin 81 MG Chew Take 81 mg by mouth. 1 Tablet, Chewable Oral Every day    atenolol (TENORMIN) 100 MG tablet Take 1 tablet (100 mg total) by mouth once daily.    benazepril (LOTENSIN) 40 MG tablet TAKE 1 TABLET ONE TIME DAILY    doxazosin (CARDURA) 2 MG tablet Take 1 tablet (2 mg total) by mouth every evening.    finasteride (PROSCAR) 5 mg tablet TAKE 1 TABLET ONE TIME DAILY    hydrochlorothiazide (HYDRODIURIL) 12.5 MG Tab Take 1 tablet (12.5 mg total) by mouth once daily.    multivitamin-minerals-lutein (CENTRUM SILVER) Tab Take by mouth. 1 Tablet Oral Every day    tadalafil (CIALIS) 20 MG Tab Use one tablet every 36 hours       Review of patient's allergies indicates:  No Known Allergies     Past Medical History:   Diagnosis Date    Arthritis     Dyslipidemia     Hyperlipidemia     Hypertension     Obesity     Sleep apnea     uses CPAP    Unspecified disorder of kidney and ureter     kidney stones     Past Surgical History:   Procedure Laterality Date    APPENDECTOMY      CATARACT EXTRACTION  2009    OS    CATARACT EXTRACTION W/  INTRAOCULAR LENS IMPLANT Right 6/3/2015    sn60wf 14.5d//    EYE SURGERY      cataract    TONSILLECTOMY       Family History   Problem Relation Age of Onset    Arthritis Mother     Heart disease Mother     Blindness Mother      OS due to injury    Coronary artery disease Father     Heart disease Father     Cataracts Father     Amblyopia Neg Hx     Cancer Neg Hx     Diabetes Neg Hx     Glaucoma Neg Hx     Hypertension Neg Hx     Macular degeneration Neg Hx     Retinal detachment Neg Hx     Strabismus Neg Hx     Stroke Neg Hx     Thyroid disease Neg Hx      Social History   Substance  Use Topics    Smoking status: Former Smoker     Start date: 10/5/1997    Smokeless tobacco: Never Used    Alcohol use Yes      Comment: 1 beer a week         OBJECTIVE:     Vital Signs (Most Recent)       Physical Exam                                                        GENERAL:  Comfortable, in no acute distress.                                 HEENT EXAM:  Nonicteric.  No adenopathy.  Oropharynx is clear.               NECK:  Supple.                                                               LUNGS:  Clear.                                                               CARDIAC:  Regular rate and rhythm.  S1, S2.  No murmur.                      ABDOMEN:  Soft, positive bowel sounds, nontender.  No hepatosplenomegaly or masses.  No rebound or guarding.                                             EXTREMITIES:  No edema.     MENTAL STATUS:  Normal, alert and oriented.      ASSESSMENT/PLAN:     Assessment: Colorectal cancer screening    Plan: Colonoscopy    Anesthesia Plan: General    ASA Grade: ASA 2 - Patient with mild systemic disease with no functional limitations    MALLAMPATI SCORE:  I (soft palate, uvula, fauces, and tonsillar pillars visible)

## 2017-10-10 NOTE — ANESTHESIA POSTPROCEDURE EVALUATION
"Anesthesia Post Evaluation    Patient: Zeenat Mancia    Procedure(s) Performed: Procedure(s) (LRB):  COLONOSCOPY (N/A)    Final Anesthesia Type: general  Patient location during evaluation: PACU  Patient participation: Yes- Able to Participate  Level of consciousness: awake and alert and oriented  Post-procedure vital signs: reviewed and stable  Pain management: adequate  Airway patency: patent  PONV status at discharge: No PONV  Anesthetic complications: no      Cardiovascular status: blood pressure returned to baseline  Respiratory status: unassisted, spontaneous ventilation and room air  Hydration status: euvolemic  Follow-up not needed.        Visit Vitals  /85   Pulse 60   Temp 36.2 °C (97.2 °F) (Skin)   Resp 16   Ht 6' 2" (1.88 m)   Wt 133.8 kg (295 lb)   SpO2 100%   BMI 37.88 kg/m²       Pain/Jose Score: Pain Assessment Performed: Yes (10/10/2017 10:00 AM)  Presence of Pain: denies (10/10/2017  9:25 AM)  Jose Score: 10 (10/10/2017 10:00 AM)      "

## 2017-10-10 NOTE — TRANSFER OF CARE
"Anesthesia Transfer of Care Note    Patient: Zeenat Mancia    Procedure(s) Performed: Procedure(s) (LRB):  COLONOSCOPY (N/A)    Patient location: PACU    Anesthesia Type: general    Transport from OR: Transported from OR on room air with adequate spontaneous ventilation    Post pain: adequate analgesia    Post assessment: no apparent anesthetic complications    Post vital signs: stable    Level of consciousness: sedated    Nausea/Vomiting: no nausea/vomiting    Complications: none    Transfer of care protocol was followed      Last vitals:   Visit Vitals  /74 (BP Location: Right arm, Patient Position: Lying)   Pulse (!) 55   Temp 36.2 °C (97.2 °F) (Skin)   Resp 18   Ht 6' 2" (1.88 m)   Wt 133.8 kg (295 lb)   SpO2 96%   BMI 37.88 kg/m²     "

## 2017-10-10 NOTE — DISCHARGE INSTRUCTIONS
Recovery After Procedural Sedation (Adult)  You have been given medicine by vein to make you sleep during your surgery. This may have included both a pain medicine and sleeping medicine. Most of the effects have worn off. But you may still have some drowsiness for the next 6 to 8 hours.  Home care  Follow these guidelines when you get home:  · For the next 8 hours, you should be watched by a responsible adult. This person should make sure your condition is not getting worse.  · Don't drink any alcohol for the next 24 hours.  · Don't drive, operate dangerous machinery, or make important business or personal decisions during the next 24 hours.  Note: Your healthcare provider may tell you not to take any medicine by mouth for pain or sleep in the next 4 hours. These medicines may react with the medicines you were given in the hospital. This could cause a much stronger response than usual.  Follow-up care  Follow up with your healthcare provider if you are not alert and back to your usual level of activity within 12 hours.  When to seek medical advice  Call your healthcare provider right away if any of these occur:  · Drowsiness gets worse  · Weakness or dizziness gets worse  · Repeated vomiting  · You can't be awakened   Date Last Reviewed: 10/18/2016  © 0176-4281 [employer]. [address] [place] [address], [place] [address]. All rights reserved. This information is not intended as a substitute for professional medical care. Always follow your healthcare professional's instructions.        Understanding Atrial Fibrillation    An arrhythmia is any problem with the speed or pattern of the heartbeat. Atrial fibrillation (AFib) is a common type of arrhythmia. It causes fast, chaotic electrical signals in the atria. This leads to poor functioning of the heart. It also affects how much blood your heart can pump out to the body.  Afib may occur once in a while and go away on its own. Or it may continue for longer  periods and need treatment.  AFib can lead to serious problems, such as stroke. Your healthcare provider will need to monitor and manage it.  What happens during atrial fibrillation?   The heart has an electrical system that sends signals to control the heartbeat. As signals move through the heart, they tell the hearts upper chambers (atria) and lower chambers (ventricles) when to squeeze (contract) and relax. This lets blood move through the heart and out to the body and lungs.  With AFib, the atria receive abnormal signals. This causes them to contract in a fast and irregular way, and out of sync with the ventricles. When this happens, the atria also have a harder time moving blood into the ventricles. Blood may then pool in the atria, which increases the risk for blood clots and stroke. The ventricles also may contract too quickly and irregularly. As a result, they may not pump blood to the body and lungs as well as they should. This can weaken the heart muscle over time and cause heart failure.  What causes atrial fibrillation?  AFib is more common in older adults. It has many possible causes including:  · Coronary artery disease  · Heart valve disease  · Heart attack  · Heart surgery  · High blood pressure  · Thyroid disease  · Diabetes  · Lung disease  · Sleep apnea  · Heavy alcohol use  In some cases of AFib, doctors do not know the cause.  What are the symptoms of atrial fibrillation?  AFib may or may not cause symptoms. If symptoms do occur, they may include:  · A fast, pounding, irregular heartbeat  · Shortness of breath  · Tiredness  · Dizziness or fainting  · Chest pain  How is atrial fibrillation treated?  Treatments for AFib can include any of the options below.  · Medicines. You may be prescribed:  ¨ Heart rate medicines to help slow down the heartbeat  ¨ Heart rhythm medicines to help the heart beat more regularly  ¨ Anti-clotting medicines to help reduce the risk for blood clots and  stroke  · Electrical cardioversion. Your healthcare provider uses special pads or paddles to send one or more brief electrical shocks to the heart. This can help reset the heartbeat to normal.  · Ablation. Long, thin tubes called catheters are threaded through a blood vessel to the heart. There, the catheters send out hot or cold energy to the areas causing the abnormal signals. This energy destroys the problem tissue or cells. This improves the chances that your heart will stay in normal rhythm without using medicines. If your heart rate and rhythm cant be controlled, you may need ablation and a pacemaker. These will help control the heart rate and regularity of the heartbeat.  · Surgery. During surgery, your healthcare provider may use different methods to create scar tissue in the areas of the heart causing the abnormal signals. The scar tissue disrupts the abnormal signals and may stop AFib from occurring.  What are the complications of atrial fibrillation?  These can include:  · Blood clots  · Stroke  · Heart failure. This problem occurs when the heart muscle weakens so much that it can no longer pump blood well.  When should I call my healthcare provider?  Call your healthcare provider right away if you have any of these:  · Symptoms that dont get better with treatment, or get worse  · New symptoms   Date Last Reviewed: 5/1/2016  © 4101-9922 [employer]. [address] [place] [address], [place] [address]. All rights reserved. This information is not intended as a substitute for professional medical care. Always follow your healthcare professional's instructions.        What Is Atrial Flutter/Atrial Fibrillation?      The heart has its own electrical system. This system makes the signals that start each heartbeat. The heartbeat begins in 1 of the 2 upper chambers of the heart (atria). A problem can make the atria beat faster than normal. The atria may beat fast but still evenly. This problem is called  atrial flutter. If the atria beat very fast and also unevenly, it is called atrial fibrillation (AFib).  Causes of Atrial Flutter and Atrial Fibrillation  Causes of these problems can include:  · Previous heart attack  · High blood pressure  · Thyroid problems  In many cases, the cause is unknown.  When the Atria Beat Too Fast  The atria may beat fast only once in a while. This is called a paroxysmal heart rhythm problem. If they beat fast all the time, it is a chronic problem.  Atrial Flutter  With atrial flutter, electrical signals travel around and around inside the atria. These circling signals make the atria beat too fast:  · Atrial flutter can cause symptoms similar to AFib. It can also lead to the even faster, uneven rhythms of AFib.  Atrial Fibrillation (AFib)  With AFib, cells in the atria send extra electrical signals. These extra signals make the atria beat very fast. They also beat unevenly:  · The atria beat so fast and unevenly that they may quiver instead of charmaine. If the atria dont contract, they dont move enough blood into the 2 lower chambers of the heart (ventricles). This can cause you to feel dizzy or weak.  · Blood that doesnt keep moving can pool and form clots in the atria. These clots can move into other parts of the body and cause serious problems such as a stroke.   Symptoms of Atrial Flutter and AFib  These symptoms include the following:  · Palpitations (a fluttering, fast heartbeat)  · Weakness or tiredness  · Shortness of breath  · Chest pain or tightness  · Dizziness or lightheadedness  · Fainting spells   Date Last Reviewed: 2/26/2014  © 9213-6905 [employer]. [address] [place] [address], [place] [address]. All rights reserved. This information is not intended as a substitute for professional medical care. Always follow your healthcare professional's instructions.

## 2017-10-10 NOTE — DISCHARGE SUMMARY
Discharge Note  Short Stay      SUMMARY     Admit Date: 10/10/2017    Attending Physician: Leonel Johnson MD     Discharge Physician: Leonel Johnson MD    Discharge Date: 10/10/2017 8:57 AM    Final Diagnosis: Routine health maintenance [Z00.00]    Disposition: HOME OR SELF CARE    Patient Instructions:   Current Discharge Medication List      CONTINUE these medications which have NOT CHANGED    Details   amlodipine (NORVASC) 5 MG tablet Take 1 tablet (5 mg total) by mouth once daily.  Qty: 90 tablet, Refills: 3      aspirin 81 MG Chew Take 81 mg by mouth. 1 Tablet, Chewable Oral Every day      atenolol (TENORMIN) 100 MG tablet Take 1 tablet (100 mg total) by mouth once daily.  Qty: 90 tablet, Refills: 3      benazepril (LOTENSIN) 40 MG tablet TAKE 1 TABLET ONE TIME DAILY  Qty: 90 tablet, Refills: 3      doxazosin (CARDURA) 2 MG tablet Take 1 tablet (2 mg total) by mouth every evening.  Qty: 90 tablet, Refills: 3      finasteride (PROSCAR) 5 mg tablet TAKE 1 TABLET ONE TIME DAILY  Qty: 90 tablet, Refills: 3      hydrochlorothiazide (HYDRODIURIL) 12.5 MG Tab Take 1 tablet (12.5 mg total) by mouth once daily.  Qty: 90 tablet, Refills: 3    Associated Diagnoses: Essential hypertension      multivitamin-minerals-lutein (CENTRUM SILVER) Tab Take by mouth. 1 Tablet Oral Every day      tadalafil (CIALIS) 20 MG Tab Use one tablet every 36 hours  Qty: 10 tablet, Refills: 11             Discharge Procedure Orders (must include Diet, Follow-up, Activity)    Follow Up:  Follow up with PCP as previously scheduled  Resume routine diet.  Activity as tolerated.    No driving day of procedure.

## 2017-10-10 NOTE — ANESTHESIA PREPROCEDURE EVALUATION
10/10/2017  Zeenat Mancia is a 67 y.o., male.    Pre-op Assessment    I have reviewed the Patient Summary Reports.     I have reviewed the Nursing Notes.   I have reviewed the Medications.     Review of Systems  Anesthesia Hx:  No problems with previous Anesthesia    Social:  Non-Smoker Ex tobacco    Hematology/Oncology:     Oncology Normal     EENT/Dental:EENT/Dental Normal   Cardiovascular:   Hypertension, well controlled    Pulmonary:   Sleep Apnea, CPAP    Renal/:  Renal/ Normal     Hepatic/GI:  Hepatic/GI Normal    Musculoskeletal:  Musculoskeletal Normal    Neurological:  Neurology Normal    Endocrine:  Endocrine Normal    Dermatological:  Skin Normal    Psych:  Psychiatric Normal           Physical Exam  General:  Morbid Obesity    Airway/Jaw/Neck:  Airway Findings: Mouth Opening: Normal Tongue: Large  General Airway Assessment: Adult, Average, Possible difficult intubation  Mallampati: III  Improves to II with phonation.  TM Distance: 4 - 6 cm        Eyes/Ears/Nose:  Eyes/Ears/Nose Findings:    Dental:  DENTAL FINDINGS: Normal   Chest/Lungs:  Chest/Lungs Findings: Clear to auscultation, Normal Respiratory Rate     Heart/Vascular:  Heart Findings: Normal Heart murmur: negative Vascular Findings: Normal    Abdomen:  Abdomen Findings: Normal    Musculoskeletal:  Musculoskeletal Findings: Normal   Skin:  Skin Findings: Normal    Mental Status:  Mental Status Findings:  Alert and Oriented, Cooperative         Anesthesia Plan  Type of Anesthesia, risks & benefits discussed:  Anesthesia Type:  MAC  Patient's Preference:   Intra-op Monitoring Plan:   Intra-op Monitoring Plan Comments:   Post Op Pain Control Plan:   Post Op Pain Control Plan Comments:   Induction:   IV  Beta Blocker:  Patient is on a Beta-Blocker and has received one dose within the past 24 hours (No further documentation required).        Informed Consent: Patient understands risks and agrees with Anesthesia plan.  Questions answered. Anesthesia consent signed with patient.  ASA Score: 3     Day of Surgery Review of History & Physical: I have interviewed and examined the patient. I have reviewed the patient's H&P dated:  There are no significant changes.  H&P update referred to the surgeon.         Ready For Surgery From Anesthesia Perspective.

## 2017-10-10 NOTE — PROGRESS NOTES
12 lead EKG done and given to cardiology. Dr. Fuentes staff (Mikie) called and spoke with me. Pt can be discharged home if stable. Pt denies chest pain, SOB. I reviewed this information  with Dr. Fulton. Pt discharged with wife and educated about going to ER if sudden chest pain, SOB or any new symptoms.

## 2017-10-10 NOTE — PLAN OF CARE
Vss, raissa po fluids, denies pain, ambulates easily. IV removed, catheter intact. Discharge instructions provided and states understanding. States ready to go home.  Discharged from facility .

## 2017-10-10 NOTE — TELEPHONE ENCOUNTER
Natanael from out patient surgery [email or web address], stated patient needed a cardiology to review patient ekg and advise. Dr. Fuentes reviewed patient EKG and stated if patient feeling ok he can go home and return tomorrow for a visit to consult. Spoke to Jennie JON Stated patient not having any symptoms Informed her of Dr. Fuentes advice and made patient an appointment to see Dr. Fuentes for 10:00 am tomorrow, informed her to give patient instructions to go to the ER if patient starts to get symptoms of SOB, CHEST PAINS/PRESSURE, or any symptoms of a heart attack. Jennie verbalized understanding.

## 2017-10-10 NOTE — TRANSFER OF CARE
"Anesthesia Transfer of Care Note    Patient: Zeenat Mancia    Procedure(s) Performed: Procedure(s) (LRB):  COLONOSCOPY (N/A)    Patient location: PACU    Anesthesia Type: general    Transport from OR: Transported from OR on room air with adequate spontaneous ventilation    Post pain: adequate analgesia    Post assessment: no apparent anesthetic complications    Post vital signs: stable    Level of consciousness: awake    Complications: none    Transfer of care protocol was followed      Last vitals:   Visit Vitals  /74 (BP Location: Right arm, Patient Position: Lying)   Pulse (!) 55   Temp 36.2 °C (97.2 °F) (Skin)   Resp 18   Ht 6' 2" (1.88 m)   Wt 133.8 kg (295 lb)   SpO2 96%   BMI 37.88 kg/m²     "

## 2017-10-11 ENCOUNTER — OFFICE VISIT (OUTPATIENT)
Dept: CARDIOLOGY | Facility: CLINIC | Age: 67
End: 2017-10-11
Payer: MEDICARE

## 2017-10-11 ENCOUNTER — LAB VISIT (OUTPATIENT)
Dept: LAB | Facility: HOSPITAL | Age: 67
End: 2017-10-11
Attending: INTERNAL MEDICINE
Payer: MEDICARE

## 2017-10-11 ENCOUNTER — PATIENT MESSAGE (OUTPATIENT)
Dept: FAMILY MEDICINE | Facility: CLINIC | Age: 67
End: 2017-10-11

## 2017-10-11 VITALS
WEIGHT: 305.75 LBS | SYSTOLIC BLOOD PRESSURE: 138 MMHG | HEART RATE: 71 BPM | DIASTOLIC BLOOD PRESSURE: 81 MMHG | BODY MASS INDEX: 39.24 KG/M2 | HEIGHT: 74 IN

## 2017-10-11 DIAGNOSIS — I48.19 PERSISTENT ATRIAL FIBRILLATION: ICD-10-CM

## 2017-10-11 DIAGNOSIS — E78.5 HYPERLIPIDEMIA, UNSPECIFIED HYPERLIPIDEMIA TYPE: ICD-10-CM

## 2017-10-11 DIAGNOSIS — I10 ESSENTIAL HYPERTENSION: ICD-10-CM

## 2017-10-11 DIAGNOSIS — G47.33 OSA (OBSTRUCTIVE SLEEP APNEA): ICD-10-CM

## 2017-10-11 DIAGNOSIS — E66.01 MORBID OBESITY DUE TO EXCESS CALORIES: ICD-10-CM

## 2017-10-11 DIAGNOSIS — I27.9 PULMONARY HEART DISEASE: ICD-10-CM

## 2017-10-11 LAB — TSH SERPL DL<=0.005 MIU/L-ACNC: 1.22 UIU/ML

## 2017-10-11 PROCEDURE — 99499 UNLISTED E&M SERVICE: CPT | Mod: S$GLB,,, | Performed by: INTERNAL MEDICINE

## 2017-10-11 PROCEDURE — 99204 OFFICE O/P NEW MOD 45 MIN: CPT | Mod: S$GLB,,, | Performed by: INTERNAL MEDICINE

## 2017-10-11 PROCEDURE — 99999 PR PBB SHADOW E&M-EST. PATIENT-LVL III: CPT | Mod: PBBFAC,,, | Performed by: INTERNAL MEDICINE

## 2017-10-11 PROCEDURE — 36415 COLL VENOUS BLD VENIPUNCTURE: CPT | Mod: PO

## 2017-10-11 PROCEDURE — 84443 ASSAY THYROID STIM HORMONE: CPT

## 2017-10-11 NOTE — PROGRESS NOTES
Subjective:    Patient ID:  Zeenat Mancia is a 67 y.o. male who presents for evaluation of Atrial Fibrillation (onset a fib during colonoscopy); Hypertension; Hyperlipidemia; and Sleep Apnea      Pt with several medical issues had a colonoscopy yesterday and was found to be in atrial fibrillation. Pt was and is unaware of an irregular rhythm. He reports that for the past several months to a year he has been more fatigued, SOB and had been attributing his gradual decline to age and weight gain. He has sleep apnea and uses CPAP but gets poor sleep due to needing to get up several times to urinate. He denies chest pain.         Review of Systems   Constitution: Positive for malaise/fatigue. Negative for weight gain and weight loss.   HENT: Negative.    Eyes: Negative.    Cardiovascular: Positive for dyspnea on exertion. Negative for chest pain, claudication, cyanosis, irregular heartbeat, leg swelling, near-syncope, orthopnea (no PND), palpitations and syncope.   Respiratory: Positive for shortness of breath. Negative for cough, hemoptysis and snoring.    Endocrine: Negative.    Skin: Negative.    Musculoskeletal: Negative for joint pain, muscle cramps, muscle weakness and myalgias.   Gastrointestinal: Negative for diarrhea, hematemesis, nausea and vomiting.   Genitourinary: Negative.    Neurological: Negative for dizziness, focal weakness, light-headedness, loss of balance, numbness, paresthesias and seizures.   Psychiatric/Behavioral: Negative.         Objective:    Physical Exam   Constitutional: He is oriented to person, place, and time. He appears well-developed and well-nourished.   HENT:   Mouth/Throat: Oropharynx is clear and moist.   Eyes: Pupils are equal, round, and reactive to light.   Neck: Normal range of motion. No thyromegaly present.   Cardiovascular: Normal rate, S1 normal, S2 normal, normal heart sounds, intact distal pulses and normal pulses.  An irregularly irregular rhythm present.  No  extrasystoles are present. PMI is not displaced.  Exam reveals no friction rub.    No murmur heard.  Pulmonary/Chest: Effort normal and breath sounds normal. He has no wheezes. He has no rales. He exhibits no tenderness.   Abdominal: Soft. Bowel sounds are normal. He exhibits no distension and no mass. There is no tenderness.   Musculoskeletal: Normal range of motion. He exhibits edema (trace).   Neurological: He is alert and oriented to person, place, and time.   Skin: Skin is warm and dry.   Vitals reviewed.      Test(s) Reviewed  I have reviewed the following in detail:  [] Stress test   [] Angiography   [] Echocardiogram   [x] Labs   [x] Other:  ECG       Assessment:       1. Persistent atrial fibrillation    2. Essential hypertension    3. Hyperlipidemia, unspecified hyperlipidemia type    4. Morbid obesity due to excess calories    5. JACKELYN (obstructive sleep apnea)    6. Pulmonary heart disease         Plan:       We had a long discussion as concerns the nature and causes of A-fib, current Meds and the need for anticoagulation for stroke prevention. Will discuss strategy of rhythm control vs continued rate control with chronic anticoagulation next visit.   Add eliquis 5 mg bid  Echo  SPECT stress  TSH  F/u 1 month

## 2017-10-12 ENCOUNTER — PATIENT MESSAGE (OUTPATIENT)
Dept: CARDIOLOGY | Facility: CLINIC | Age: 67
End: 2017-10-12

## 2017-10-17 ENCOUNTER — CLINICAL SUPPORT (OUTPATIENT)
Dept: CARDIOLOGY | Facility: CLINIC | Age: 67
End: 2017-10-17
Payer: MEDICARE

## 2017-10-17 DIAGNOSIS — I10 ESSENTIAL HYPERTENSION: ICD-10-CM

## 2017-10-17 DIAGNOSIS — I10 HYPERTENSION, UNSPECIFIED TYPE: ICD-10-CM

## 2017-10-17 DIAGNOSIS — G47.33 OSA (OBSTRUCTIVE SLEEP APNEA): ICD-10-CM

## 2017-10-17 DIAGNOSIS — I48.19 PERSISTENT ATRIAL FIBRILLATION: Primary | ICD-10-CM

## 2017-10-17 DIAGNOSIS — I27.9 PULMONARY HEART DISEASE: ICD-10-CM

## 2017-10-17 DIAGNOSIS — E66.01 MORBID OBESITY DUE TO EXCESS CALORIES: ICD-10-CM

## 2017-10-17 DIAGNOSIS — E78.5 HYPERLIPIDEMIA, UNSPECIFIED HYPERLIPIDEMIA TYPE: ICD-10-CM

## 2017-10-17 DIAGNOSIS — I48.19 PERSISTENT ATRIAL FIBRILLATION: ICD-10-CM

## 2017-10-17 LAB
DIASTOLIC DYSFUNCTION: NO
ESTIMATED PA SYSTOLIC PRESSURE: 26.23
MITRAL VALVE REGURGITATION: NORMAL
RETIRED EF AND QEF - SEE NOTES: 65 (ref 55–65)
TRICUSPID VALVE REGURGITATION: NORMAL

## 2017-10-17 PROCEDURE — 93325 DOPPLER ECHO COLOR FLOW MAPG: CPT | Mod: ,,, | Performed by: INTERNAL MEDICINE

## 2017-10-17 PROCEDURE — 93308 TTE F-UP OR LMTD: CPT | Mod: S$GLB,,, | Performed by: INTERNAL MEDICINE

## 2017-10-18 ENCOUNTER — TELEPHONE (OUTPATIENT)
Dept: CARDIOLOGY | Facility: CLINIC | Age: 67
End: 2017-10-18

## 2017-10-18 NOTE — TELEPHONE ENCOUNTER
Test(s) Reviewed  I have reviewed the following in detail:  [] Stress test   [] Angiography   [x] Echocardiogram   [x] Labs   [] Other:       Call Pt and tell him tests are ok  We can discuss in detail at next appt.

## 2017-10-19 ENCOUNTER — PATIENT MESSAGE (OUTPATIENT)
Dept: CARDIOLOGY | Facility: CLINIC | Age: 67
End: 2017-10-19

## 2017-10-21 ENCOUNTER — PATIENT MESSAGE (OUTPATIENT)
Dept: FAMILY MEDICINE | Facility: CLINIC | Age: 67
End: 2017-10-21

## 2017-10-21 ENCOUNTER — PATIENT MESSAGE (OUTPATIENT)
Dept: CARDIOLOGY | Facility: CLINIC | Age: 67
End: 2017-10-21

## 2017-10-23 RX ORDER — DOXAZOSIN 2 MG/1
TABLET ORAL
Qty: 90 TABLET | Refills: 3 | Status: SHIPPED | OUTPATIENT
Start: 2017-10-23 | End: 2017-11-09 | Stop reason: SDUPTHER

## 2017-10-23 NOTE — TELEPHONE ENCOUNTER
Is patient a candidate in best practice?  Please order if appropriate for patient; nurse can not order.

## 2017-11-03 ENCOUNTER — HOSPITAL ENCOUNTER (OUTPATIENT)
Dept: RADIOLOGY | Facility: HOSPITAL | Age: 67
Discharge: HOME | End: 2017-11-03
Attending: INTERNAL MEDICINE
Payer: MEDICARE

## 2017-11-03 ENCOUNTER — CLINICAL SUPPORT (OUTPATIENT)
Dept: CARDIOLOGY | Facility: CLINIC | Age: 67
End: 2017-11-03
Payer: MEDICARE

## 2017-11-03 DIAGNOSIS — I27.9 PULMONARY HEART DISEASE: ICD-10-CM

## 2017-11-03 DIAGNOSIS — I10 ESSENTIAL HYPERTENSION: ICD-10-CM

## 2017-11-03 DIAGNOSIS — E66.01 MORBID OBESITY DUE TO EXCESS CALORIES: ICD-10-CM

## 2017-11-03 DIAGNOSIS — G47.33 OSA (OBSTRUCTIVE SLEEP APNEA): ICD-10-CM

## 2017-11-03 DIAGNOSIS — E78.5 HYPERLIPIDEMIA, UNSPECIFIED HYPERLIPIDEMIA TYPE: ICD-10-CM

## 2017-11-03 DIAGNOSIS — I48.19 PERSISTENT ATRIAL FIBRILLATION: ICD-10-CM

## 2017-11-03 LAB — DIASTOLIC DYSFUNCTION: NO

## 2017-11-03 PROCEDURE — 93015 CV STRESS TEST SUPVJ I&R: CPT | Mod: S$GLB,,, | Performed by: INTERNAL MEDICINE

## 2017-11-03 PROCEDURE — 78452 HT MUSCLE IMAGE SPECT MULT: CPT | Mod: 26,,, | Performed by: INTERNAL MEDICINE

## 2017-11-06 ENCOUNTER — TELEPHONE (OUTPATIENT)
Dept: CARDIOLOGY | Facility: CLINIC | Age: 67
End: 2017-11-06

## 2017-11-06 NOTE — TELEPHONE ENCOUNTER
Test(s) Reviewed  I have reviewed the following in detail:  [x] Stress test   [] Angiography   [] Echocardiogram   [] Labs   [] Other:       SPECT stress is normal  Has appt. 11/07

## 2017-11-07 ENCOUNTER — OFFICE VISIT (OUTPATIENT)
Dept: CARDIOLOGY | Facility: CLINIC | Age: 67
End: 2017-11-07
Payer: MEDICARE

## 2017-11-07 VITALS
HEIGHT: 74 IN | SYSTOLIC BLOOD PRESSURE: 130 MMHG | DIASTOLIC BLOOD PRESSURE: 86 MMHG | WEIGHT: 307.13 LBS | BODY MASS INDEX: 39.42 KG/M2 | HEART RATE: 68 BPM

## 2017-11-07 DIAGNOSIS — E66.01 MORBID OBESITY DUE TO EXCESS CALORIES: ICD-10-CM

## 2017-11-07 DIAGNOSIS — I10 ESSENTIAL HYPERTENSION: ICD-10-CM

## 2017-11-07 DIAGNOSIS — G47.33 OSA (OBSTRUCTIVE SLEEP APNEA): ICD-10-CM

## 2017-11-07 DIAGNOSIS — I48.19 PERSISTENT ATRIAL FIBRILLATION: ICD-10-CM

## 2017-11-07 PROCEDURE — 99499 UNLISTED E&M SERVICE: CPT | Mod: S$GLB,,, | Performed by: INTERNAL MEDICINE

## 2017-11-07 PROCEDURE — 99999 PR PBB SHADOW E&M-EST. PATIENT-LVL III: CPT | Mod: PBBFAC,,, | Performed by: INTERNAL MEDICINE

## 2017-11-07 PROCEDURE — 99214 OFFICE O/P EST MOD 30 MIN: CPT | Mod: S$GLB,,, | Performed by: INTERNAL MEDICINE

## 2017-11-07 NOTE — PROGRESS NOTES
Subjective:    Patient ID:  Zeenat Mancia is a 67 y.o. male who presents for follow-up of Hyperlipidemia (follow up ); Hypertension; and Atrial Fibrillation      Pt reports no new issues since last visit. Does not feel the AF. He is here to review tests.         Review of Systems   Constitution: Negative for weight gain and weight loss.   HENT: Negative.    Eyes: Negative.    Cardiovascular: Negative for chest pain, claudication, cyanosis, dyspnea on exertion, irregular heartbeat, leg swelling, near-syncope, orthopnea (no PND), palpitations and syncope.   Respiratory: Negative for cough, hemoptysis, shortness of breath and snoring.    Endocrine: Negative.    Skin: Negative.    Musculoskeletal: Negative for joint pain, muscle cramps, muscle weakness and myalgias.   Gastrointestinal: Negative for diarrhea, hematemesis, nausea and vomiting.   Genitourinary: Negative.    Neurological: Negative for dizziness, focal weakness, light-headedness, loss of balance, numbness, paresthesias and seizures.   Psychiatric/Behavioral: Negative.         Objective:    Physical Exam   Constitutional: He is oriented to person, place, and time. He appears well-developed and well-nourished.   HENT:   Mouth/Throat: Oropharynx is clear and moist.   Eyes: Pupils are equal, round, and reactive to light.   Neck: Normal range of motion. No thyromegaly present.   Cardiovascular: Normal rate, S1 normal, S2 normal, normal heart sounds, intact distal pulses and normal pulses.  An irregularly irregular rhythm present.  No extrasystoles are present. PMI is not displaced.  Exam reveals no friction rub.    No murmur heard.  Pulmonary/Chest: Effort normal and breath sounds normal. He has no wheezes. He has no rales. He exhibits no tenderness.   Abdominal: Soft. Bowel sounds are normal. He exhibits no distension and no mass. There is no tenderness.   Musculoskeletal: Normal range of motion. He exhibits no edema.   Neurological: He is alert and oriented to  person, place, and time.   Skin: Skin is warm and dry.   Vitals reviewed.      Test(s) Reviewed  I have reviewed the following in detail:  [x] Stress test   [] Angiography   [x] Echocardiogram   [x] Labs   [] Other:         Assessment:       1. Persistent atrial fibrillation    2. Essential hypertension    3. Morbid obesity due to excess calories    4. JACKELYN (obstructive sleep apnea)         Plan:       Echo, SPECT Stress unremarkable.  We discussed rhythm vs rate control strategy and as he is presently doing well and tolerating his meds, he wishes to continue with rate control and anticoagulation.  We discussed getting back to exercising  If he decides to want an attempt at DCCV and sinus rhythm he will let us know  F/u 3 months

## 2017-11-09 RX ORDER — AMLODIPINE BESYLATE 5 MG/1
TABLET ORAL
Qty: 90 TABLET | Refills: 3 | Status: SHIPPED | OUTPATIENT
Start: 2017-11-09 | End: 2018-01-09 | Stop reason: SDUPTHER

## 2017-11-09 RX ORDER — ATENOLOL 100 MG/1
TABLET ORAL
Qty: 90 TABLET | Refills: 3 | Status: SHIPPED | OUTPATIENT
Start: 2017-11-09 | End: 2018-01-09 | Stop reason: SDUPTHER

## 2017-11-09 RX ORDER — DOXAZOSIN 2 MG/1
TABLET ORAL
Qty: 90 TABLET | Refills: 3 | Status: SHIPPED | OUTPATIENT
Start: 2017-11-09 | End: 2018-04-11 | Stop reason: SDUPTHER

## 2017-11-30 ENCOUNTER — OFFICE VISIT (OUTPATIENT)
Dept: FAMILY MEDICINE | Facility: CLINIC | Age: 67
End: 2017-11-30
Payer: MEDICARE

## 2017-11-30 ENCOUNTER — PATIENT MESSAGE (OUTPATIENT)
Dept: FAMILY MEDICINE | Facility: CLINIC | Age: 67
End: 2017-11-30

## 2017-11-30 VITALS
DIASTOLIC BLOOD PRESSURE: 76 MMHG | BODY MASS INDEX: 39.81 KG/M2 | TEMPERATURE: 98 F | HEIGHT: 74 IN | HEART RATE: 63 BPM | SYSTOLIC BLOOD PRESSURE: 122 MMHG | OXYGEN SATURATION: 97 % | WEIGHT: 310.19 LBS | RESPIRATION RATE: 19 BRPM

## 2017-11-30 DIAGNOSIS — J40 BRONCHITIS: Primary | ICD-10-CM

## 2017-11-30 DIAGNOSIS — J45.21 MILD INTERMITTENT REACTIVE AIRWAY DISEASE WITH ACUTE EXACERBATION: ICD-10-CM

## 2017-11-30 PROCEDURE — 99214 OFFICE O/P EST MOD 30 MIN: CPT | Mod: S$GLB,,, | Performed by: PHYSICIAN ASSISTANT

## 2017-11-30 PROCEDURE — 99499 UNLISTED E&M SERVICE: CPT | Mod: S$GLB,,, | Performed by: PHYSICIAN ASSISTANT

## 2017-11-30 PROCEDURE — 99999 PR PBB SHADOW E&M-EST. PATIENT-LVL IV: CPT | Mod: PBBFAC,,, | Performed by: PHYSICIAN ASSISTANT

## 2017-11-30 RX ORDER — AZITHROMYCIN 250 MG/1
TABLET, FILM COATED ORAL
Qty: 6 TABLET | Refills: 0 | Status: SHIPPED | OUTPATIENT
Start: 2017-11-30 | End: 2017-12-05

## 2017-11-30 RX ORDER — METHYLPREDNISOLONE 4 MG/1
TABLET ORAL
Qty: 1 PACKAGE | Refills: 0 | Status: SHIPPED | OUTPATIENT
Start: 2017-11-30 | End: 2018-02-06

## 2017-11-30 RX ORDER — ALBUTEROL SULFATE 90 UG/1
2 AEROSOL, METERED RESPIRATORY (INHALATION) EVERY 4 HOURS PRN
Qty: 1 INHALER | Refills: 5 | Status: SHIPPED | OUTPATIENT
Start: 2017-11-30 | End: 2018-02-06 | Stop reason: CLARIF

## 2017-11-30 NOTE — PROGRESS NOTES
Subjective:       Patient ID: Zeenat Mancia is a 67 y.o. male.    Chief Complaint: Cough; Chest Congestion; Wheezing; and Sore Throat    HPI   Sx x 2 wks  Past hx of bronchitis  Review of Systems   Constitutional: Positive for activity change and fatigue. Negative for appetite change, chills, diaphoresis, fever and unexpected weight change.   HENT: Positive for congestion, postnasal drip and sore throat. Negative for sinus pain and sinus pressure.    Eyes: Negative.    Respiratory: Positive for cough and wheezing. Negative for shortness of breath.    Cardiovascular: Negative.  Negative for chest pain and leg swelling.   Gastrointestinal: Negative.    Endocrine: Negative.    Genitourinary: Negative.    Musculoskeletal: Negative.    Skin: Negative.  Negative for rash.       Objective:      Physical Exam   Constitutional: He appears well-developed and well-nourished. No distress.   HENT:   Head: Normocephalic and atraumatic.   Right Ear: External ear normal.   Left Ear: External ear normal.   Nose: Nose normal.   Mouth/Throat: Oropharynx is clear and moist. No oropharyngeal exudate.   Sinuses non tender  Mucus clear   Eyes: Conjunctivae are normal. No scleral icterus.   Neck: Normal range of motion. Neck supple. No tracheal deviation present. No thyromegaly present.   Cardiovascular: Normal rate, regular rhythm, normal heart sounds and intact distal pulses.  Exam reveals no gallop and no friction rub.    No murmur heard.  Pulmonary/Chest: Effort normal. No respiratory distress. He has wheezes. He has no rales.   Scattered wheeze and rhonchi bilat   Musculoskeletal: He exhibits no edema.   Lymphadenopathy:     He has no cervical adenopathy.   Skin: Skin is warm and dry. No rash noted.   Vitals reviewed.      Assessment:       1. Bronchitis    2. Mild intermittent reactive airway disease with acute exacerbation        Plan:       Zeenat was seen today for cough, chest congestion, wheezing and sore throat.    Diagnoses and  all orders for this visit:    Bronchitis  -     albuterol 90 mcg/actuation inhaler; Inhale 2 puffs into the lungs every 4 (four) hours as needed for Wheezing.  -     azithromycin (Z-MAURI) 250 MG tablet; Take 2 tablets by mouth on day 1; Take 1 tablet by mouth on days 2-5  -     methylPREDNISolone (MEDROL DOSEPACK) 4 mg tablet; use as directed    Mild intermittent reactive airway disease with acute exacerbation  -     albuterol 90 mcg/actuation inhaler; Inhale 2 puffs into the lungs every 4 (four) hours as needed for Wheezing.  -     azithromycin (Z-MAURI) 250 MG tablet; Take 2 tablets by mouth on day 1; Take 1 tablet by mouth on days 2-5  -     methylPREDNISolone (MEDROL DOSEPACK) 4 mg tablet; use as directed    discusses otc's

## 2017-12-27 ENCOUNTER — PATIENT MESSAGE (OUTPATIENT)
Dept: FAMILY MEDICINE | Facility: CLINIC | Age: 67
End: 2017-12-27

## 2017-12-28 ENCOUNTER — PATIENT MESSAGE (OUTPATIENT)
Dept: FAMILY MEDICINE | Facility: CLINIC | Age: 67
End: 2017-12-28

## 2018-01-09 DIAGNOSIS — I10 ESSENTIAL HYPERTENSION: ICD-10-CM

## 2018-01-10 RX ORDER — HYDROCHLOROTHIAZIDE 12.5 MG/1
12.5 TABLET ORAL DAILY
Qty: 90 TABLET | Refills: 2 | Status: ON HOLD | OUTPATIENT
Start: 2018-01-10 | End: 2018-05-17

## 2018-01-10 RX ORDER — AMLODIPINE BESYLATE 5 MG/1
TABLET ORAL
Qty: 90 TABLET | Refills: 2 | Status: ON HOLD | OUTPATIENT
Start: 2018-01-10 | End: 2018-05-17

## 2018-01-10 RX ORDER — BENAZEPRIL HYDROCHLORIDE 40 MG/1
TABLET ORAL
Qty: 90 TABLET | Refills: 2 | Status: ON HOLD | OUTPATIENT
Start: 2018-01-10 | End: 2018-05-17

## 2018-01-10 RX ORDER — ATENOLOL 100 MG/1
TABLET ORAL
Qty: 90 TABLET | Refills: 2 | Status: ON HOLD | OUTPATIENT
Start: 2018-01-10 | End: 2018-05-17

## 2018-01-29 ENCOUNTER — PATIENT MESSAGE (OUTPATIENT)
Dept: FAMILY MEDICINE | Facility: CLINIC | Age: 68
End: 2018-01-29

## 2018-02-03 ENCOUNTER — PATIENT MESSAGE (OUTPATIENT)
Dept: FAMILY MEDICINE | Facility: CLINIC | Age: 68
End: 2018-02-03

## 2018-02-06 ENCOUNTER — OFFICE VISIT (OUTPATIENT)
Dept: CARDIOLOGY | Facility: CLINIC | Age: 68
End: 2018-02-06
Payer: MEDICARE

## 2018-02-06 VITALS — WEIGHT: 307.13 LBS | BODY MASS INDEX: 39.42 KG/M2 | HEIGHT: 74 IN

## 2018-02-06 DIAGNOSIS — G47.33 OSA (OBSTRUCTIVE SLEEP APNEA): ICD-10-CM

## 2018-02-06 DIAGNOSIS — I48.20 CHRONIC ATRIAL FIBRILLATION: Primary | ICD-10-CM

## 2018-02-06 DIAGNOSIS — E78.5 HYPERLIPIDEMIA, UNSPECIFIED HYPERLIPIDEMIA TYPE: ICD-10-CM

## 2018-02-06 DIAGNOSIS — I10 ESSENTIAL HYPERTENSION: ICD-10-CM

## 2018-02-06 DIAGNOSIS — E66.01 MORBID OBESITY DUE TO EXCESS CALORIES: ICD-10-CM

## 2018-02-06 PROBLEM — I27.9 PULMONARY HEART DISEASE: Status: RESOLVED | Noted: 2017-07-26 | Resolved: 2018-02-06

## 2018-02-06 PROCEDURE — 1159F MED LIST DOCD IN RCRD: CPT | Mod: S$GLB,,, | Performed by: INTERNAL MEDICINE

## 2018-02-06 PROCEDURE — 1126F AMNT PAIN NOTED NONE PRSNT: CPT | Mod: S$GLB,,, | Performed by: INTERNAL MEDICINE

## 2018-02-06 PROCEDURE — 99213 OFFICE O/P EST LOW 20 MIN: CPT | Mod: S$GLB,,, | Performed by: INTERNAL MEDICINE

## 2018-02-06 PROCEDURE — 99499 UNLISTED E&M SERVICE: CPT | Mod: S$GLB,,, | Performed by: INTERNAL MEDICINE

## 2018-02-06 PROCEDURE — 99999 PR PBB SHADOW E&M-EST. PATIENT-LVL II: CPT | Mod: PBBFAC,,, | Performed by: INTERNAL MEDICINE

## 2018-02-06 PROCEDURE — 3008F BODY MASS INDEX DOCD: CPT | Mod: S$GLB,,, | Performed by: INTERNAL MEDICINE

## 2018-02-06 NOTE — PROGRESS NOTES
Subjective:    Patient ID:  Zeenat Mancia is a 67 y.o. male who presents for follow-up of Atrial Fibrillation (3 month follow up); Hyperlipidemia; and Hypertension      Mr. Mancia reports since last visit is doing well with no new problems or new symptoms. He denies any chest pain, SOB, PND, orthopnea. He denies any palpitations, dizziness, syncope or pre-syncope. He denies rapid or sustained rhythms. He denies claudication, lower extremity edema. He denies exertional symptoms. Still does not feel the irregular rhythm.        Review of Systems   Constitution: Negative for weight gain and weight loss.   HENT: Negative.    Eyes: Negative.    Cardiovascular: Negative for chest pain, claudication, cyanosis, dyspnea on exertion, irregular heartbeat, leg swelling, near-syncope, orthopnea (no PND), palpitations and syncope.   Respiratory: Negative for cough, hemoptysis, shortness of breath and snoring.    Endocrine: Negative.    Skin: Negative.    Musculoskeletal: Negative for joint pain, muscle cramps, muscle weakness and myalgias.   Gastrointestinal: Negative for diarrhea, hematemesis, nausea and vomiting.   Genitourinary: Negative.    Neurological: Negative for dizziness, focal weakness, light-headedness, loss of balance, numbness, paresthesias and seizures.   Psychiatric/Behavioral: Negative.         Objective:    Physical Exam   Constitutional: He is oriented to person, place, and time. He appears well-developed and well-nourished.   HENT:   Mouth/Throat: Oropharynx is clear and moist.   Eyes: Pupils are equal, round, and reactive to light.   Neck: Normal range of motion. No thyromegaly present.   Cardiovascular: Normal rate, regular rhythm, S1 normal, S2 normal, normal heart sounds, intact distal pulses and normal pulses.   No extrasystoles are present. PMI is not displaced.  Exam reveals no friction rub.    No murmur heard.  Pulmonary/Chest: Effort normal and breath sounds normal. He has no wheezes. He has no rales.  He exhibits no tenderness.   Abdominal: Soft. Bowel sounds are normal. He exhibits no distension and no mass. There is no tenderness.   Musculoskeletal: Normal range of motion. He exhibits no edema.   Neurological: He is alert and oriented to person, place, and time.   Skin: Skin is warm and dry.   Vitals reviewed.      Test(s) Reviewed  I have reviewed the following in detail:  [x] Stress test   [] Angiography   [x] Echocardiogram   [] Labs   [] Other:         Assessment:       1. Chronic atrial fibrillation    2. Essential hypertension    3. Hyperlipidemia, unspecified hyperlipidemia type    4. JACKELYN (obstructive sleep apnea)    5. Morbid obesity due to excess calories         Plan:       Pt doing well  Cardiac status stable  Continue present Rx  F/u 6 months

## 2018-03-29 ENCOUNTER — TELEPHONE (OUTPATIENT)
Dept: FAMILY MEDICINE | Facility: CLINIC | Age: 68
End: 2018-03-29

## 2018-03-29 NOTE — TELEPHONE ENCOUNTER
----- Message from Gabi Reza sent at 3/29/2018 12:42 PM CDT -----  Contact: [phone]  Patient is returning nurse's phone call.  Please call patient back at [phone].

## 2018-04-11 ENCOUNTER — PATIENT MESSAGE (OUTPATIENT)
Dept: CARDIOLOGY | Facility: CLINIC | Age: 68
End: 2018-04-11

## 2018-04-11 RX ORDER — DOXAZOSIN 2 MG/1
2 TABLET ORAL NIGHTLY
Qty: 90 TABLET | Refills: 2 | Status: SHIPPED | OUTPATIENT
Start: 2018-04-11 | End: 2018-11-26 | Stop reason: SDUPTHER

## 2018-04-11 RX ORDER — FINASTERIDE 5 MG/1
5 TABLET, FILM COATED ORAL DAILY
Qty: 90 TABLET | Refills: 2 | Status: SHIPPED | OUTPATIENT
Start: 2018-04-11 | End: 2018-11-26 | Stop reason: SDUPTHER

## 2018-05-14 ENCOUNTER — PATIENT MESSAGE (OUTPATIENT)
Dept: FAMILY MEDICINE | Facility: CLINIC | Age: 68
End: 2018-05-14

## 2018-05-14 PROBLEM — K92.2 GASTROINTESTINAL HEMORRHAGE: Status: ACTIVE | Noted: 2018-05-14

## 2018-05-14 NOTE — TELEPHONE ENCOUNTER
Spoke to pt. Pt states for the past few days, he has noticed a good amount of blood in his stool and lack of energy. Pt states that it is more blood than when he had hemorrhoids. Advised pt to go to the ER so that they can run the necessary tests to make sure nothing major is going on. Pt verbalized understanding.

## 2018-05-15 ENCOUNTER — PATIENT MESSAGE (OUTPATIENT)
Dept: CARDIOLOGY | Facility: CLINIC | Age: 68
End: 2018-05-15

## 2018-05-15 ENCOUNTER — PATIENT MESSAGE (OUTPATIENT)
Dept: FAMILY MEDICINE | Facility: CLINIC | Age: 68
End: 2018-05-15

## 2018-05-17 ENCOUNTER — PATIENT MESSAGE (OUTPATIENT)
Dept: GASTROENTEROLOGY | Facility: CLINIC | Age: 68
End: 2018-05-17

## 2018-05-17 DIAGNOSIS — K21.9 GASTROESOPHAGEAL REFLUX DISEASE, ESOPHAGITIS PRESENCE NOT SPECIFIED: Primary | ICD-10-CM

## 2018-05-17 RX ORDER — PANTOPRAZOLE SODIUM 40 MG/1
40 TABLET, DELAYED RELEASE ORAL DAILY
Qty: 90 TABLET | Refills: 3 | Status: SHIPPED | OUTPATIENT
Start: 2018-05-17 | End: 2018-08-09 | Stop reason: SDUPTHER

## 2018-05-23 ENCOUNTER — PATIENT MESSAGE (OUTPATIENT)
Dept: GASTROENTEROLOGY | Facility: CLINIC | Age: 68
End: 2018-05-23

## 2018-05-23 DIAGNOSIS — K92.2 GASTROINTESTINAL HEMORRHAGE, UNSPECIFIED GASTROINTESTINAL HEMORRHAGE TYPE: Primary | ICD-10-CM

## 2018-05-24 ENCOUNTER — LAB VISIT (OUTPATIENT)
Dept: LAB | Facility: HOSPITAL | Age: 68
End: 2018-05-24
Attending: INTERNAL MEDICINE
Payer: MEDICARE

## 2018-05-24 DIAGNOSIS — K92.2 GASTROINTESTINAL HEMORRHAGE, UNSPECIFIED GASTROINTESTINAL HEMORRHAGE TYPE: ICD-10-CM

## 2018-05-24 LAB
BASOPHILS # BLD AUTO: 0.04 K/UL
BASOPHILS NFR BLD: 0.7 %
DIFFERENTIAL METHOD: ABNORMAL
EOSINOPHIL # BLD AUTO: 0.8 K/UL
EOSINOPHIL NFR BLD: 13.7 %
ERYTHROCYTE [DISTWIDTH] IN BLOOD BY AUTOMATED COUNT: 14.4 %
HCT VFR BLD AUTO: 36 %
HGB BLD-MCNC: 11.5 G/DL
IMM GRANULOCYTES # BLD AUTO: 0.02 K/UL
IMM GRANULOCYTES NFR BLD AUTO: 0.3 %
LYMPHOCYTES # BLD AUTO: 1.3 K/UL
LYMPHOCYTES NFR BLD: 20.9 %
MCH RBC QN AUTO: 29.9 PG
MCHC RBC AUTO-ENTMCNC: 31.9 G/DL
MCV RBC AUTO: 94 FL
MONOCYTES # BLD AUTO: 0.8 K/UL
MONOCYTES NFR BLD: 13.3 %
NEUTROPHILS # BLD AUTO: 3.1 K/UL
NEUTROPHILS NFR BLD: 51.1 %
NRBC BLD-RTO: 0 /100 WBC
PLATELET # BLD AUTO: 213 K/UL
PMV BLD AUTO: 10 FL
RBC # BLD AUTO: 3.85 M/UL
WBC # BLD AUTO: 6.08 K/UL

## 2018-05-24 PROCEDURE — 36415 COLL VENOUS BLD VENIPUNCTURE: CPT | Mod: PO

## 2018-05-24 PROCEDURE — 85025 COMPLETE CBC W/AUTO DIFF WBC: CPT

## 2018-05-25 ENCOUNTER — PATIENT MESSAGE (OUTPATIENT)
Dept: GASTROENTEROLOGY | Facility: CLINIC | Age: 68
End: 2018-05-25

## 2018-05-29 ENCOUNTER — OFFICE VISIT (OUTPATIENT)
Dept: CARDIOLOGY | Facility: CLINIC | Age: 68
End: 2018-05-29
Payer: MEDICARE

## 2018-05-29 VITALS
HEIGHT: 74 IN | SYSTOLIC BLOOD PRESSURE: 122 MMHG | DIASTOLIC BLOOD PRESSURE: 81 MMHG | WEIGHT: 302.69 LBS | BODY MASS INDEX: 38.85 KG/M2 | HEART RATE: 82 BPM

## 2018-05-29 DIAGNOSIS — I10 ESSENTIAL HYPERTENSION: ICD-10-CM

## 2018-05-29 DIAGNOSIS — K62.5 RECTAL BLEED: ICD-10-CM

## 2018-05-29 DIAGNOSIS — I48.20 CHRONIC ATRIAL FIBRILLATION: Primary | ICD-10-CM

## 2018-05-29 PROCEDURE — 99214 OFFICE O/P EST MOD 30 MIN: CPT | Mod: S$GLB,,, | Performed by: INTERNAL MEDICINE

## 2018-05-29 PROCEDURE — 99999 PR PBB SHADOW E&M-EST. PATIENT-LVL III: CPT | Mod: PBBFAC,,, | Performed by: INTERNAL MEDICINE

## 2018-05-29 PROCEDURE — 3074F SYST BP LT 130 MM HG: CPT | Mod: S$GLB,,, | Performed by: INTERNAL MEDICINE

## 2018-05-29 PROCEDURE — 3079F DIAST BP 80-89 MM HG: CPT | Mod: S$GLB,,, | Performed by: INTERNAL MEDICINE

## 2018-05-29 NOTE — PROGRESS NOTES
Subjective:    Patient ID:  Zeenat Mancia is a 68 y.o. male who presents for follow-up of Atrial Fibrillation (hosp dc- anemia- hold bp meds sbp<120); Hypertension; and Hyperlipidemia      Pt here for hospital f/u after an episode of rectal bleeding. No active source found but diverticula seen. eliquis held for about 5 days. He has not had a recurrence. He sees GI next week for f/u. His BP has been a bit low and a few meds have been held for now.         Review of Systems   Constitution: Positive for weakness and malaise/fatigue. Negative for weight gain and weight loss.   HENT: Negative.    Eyes: Negative.    Cardiovascular: Negative for chest pain, claudication, cyanosis, dyspnea on exertion, irregular heartbeat, leg swelling, near-syncope, orthopnea (no PND), palpitations and syncope.   Respiratory: Negative for cough, hemoptysis, shortness of breath and snoring.    Endocrine: Negative.    Skin: Negative.    Musculoskeletal: Negative for joint pain, muscle cramps, muscle weakness and myalgias.   Gastrointestinal: Negative for diarrhea, hematemesis, nausea and vomiting.   Genitourinary: Negative.    Neurological: Positive for dizziness (occasional). Negative for focal weakness, light-headedness, loss of balance, numbness, paresthesias and seizures.   Psychiatric/Behavioral: Negative.         Objective:    Physical Exam   Constitutional: He is oriented to person, place, and time. He appears well-developed and well-nourished.   HENT:   Mouth/Throat: Oropharynx is clear and moist.   Eyes: Pupils are equal, round, and reactive to light.   Neck: Normal range of motion. No thyromegaly present.   Cardiovascular: Normal rate, S1 normal, S2 normal, normal heart sounds, intact distal pulses and normal pulses.  An irregularly irregular rhythm present.  No extrasystoles are present. PMI is not displaced.  Exam reveals no friction rub.    No murmur heard.  Pulmonary/Chest: Effort normal and breath sounds normal. He has no  wheezes. He has no rales. He exhibits no tenderness.   Abdominal: Soft. Bowel sounds are normal. He exhibits no distension and no mass. There is no tenderness.   Musculoskeletal: Normal range of motion. He exhibits no edema.   Neurological: He is alert and oriented to person, place, and time.   Skin: Skin is warm and dry.   Vitals reviewed.        Assessment:       1. Chronic atrial fibrillation    2. Essential hypertension    3. Rectal bleed         Plan:       May hold the asa  Continue holding amlodipine and benazepril for low BP  F/u with GI  F/u here in 1 month

## 2018-05-31 ENCOUNTER — OFFICE VISIT (OUTPATIENT)
Dept: GASTROENTEROLOGY | Facility: CLINIC | Age: 68
End: 2018-05-31
Payer: MEDICARE

## 2018-05-31 ENCOUNTER — LAB VISIT (OUTPATIENT)
Dept: LAB | Facility: HOSPITAL | Age: 68
End: 2018-05-31
Attending: NURSE PRACTITIONER
Payer: MEDICARE

## 2018-05-31 ENCOUNTER — TELEPHONE (OUTPATIENT)
Dept: GASTROENTEROLOGY | Facility: CLINIC | Age: 68
End: 2018-05-31

## 2018-05-31 VITALS
HEART RATE: 81 BPM | WEIGHT: 302.69 LBS | DIASTOLIC BLOOD PRESSURE: 71 MMHG | HEIGHT: 74 IN | SYSTOLIC BLOOD PRESSURE: 112 MMHG | BODY MASS INDEX: 38.85 KG/M2

## 2018-05-31 DIAGNOSIS — Z87.11 HISTORY OF GASTRIC ULCER: ICD-10-CM

## 2018-05-31 DIAGNOSIS — D64.9 ANEMIA, UNSPECIFIED TYPE: Primary | ICD-10-CM

## 2018-05-31 DIAGNOSIS — Z87.19 HISTORY OF RECTAL BLEEDING: ICD-10-CM

## 2018-05-31 DIAGNOSIS — Z87.19 HISTORY OF DIVERTICULOSIS: ICD-10-CM

## 2018-05-31 DIAGNOSIS — D64.9 ANEMIA, UNSPECIFIED TYPE: ICD-10-CM

## 2018-05-31 LAB
BASOPHILS # BLD AUTO: 0.05 K/UL
BASOPHILS NFR BLD: 0.7 %
DIFFERENTIAL METHOD: ABNORMAL
EOSINOPHIL # BLD AUTO: 0.8 K/UL
EOSINOPHIL NFR BLD: 11.2 %
ERYTHROCYTE [DISTWIDTH] IN BLOOD BY AUTOMATED COUNT: 14.4 %
FERRITIN SERPL-MCNC: 13 NG/ML
HCT VFR BLD AUTO: 38.6 %
HGB BLD-MCNC: 11.8 G/DL
IMM GRANULOCYTES # BLD AUTO: 0.02 K/UL
IMM GRANULOCYTES NFR BLD AUTO: 0.3 %
IRON SERPL-MCNC: 23 UG/DL
LYMPHOCYTES # BLD AUTO: 1.2 K/UL
LYMPHOCYTES NFR BLD: 17.3 %
MCH RBC QN AUTO: 28.9 PG
MCHC RBC AUTO-ENTMCNC: 30.6 G/DL
MCV RBC AUTO: 94 FL
MONOCYTES # BLD AUTO: 0.7 K/UL
MONOCYTES NFR BLD: 10.8 %
NEUTROPHILS # BLD AUTO: 4 K/UL
NEUTROPHILS NFR BLD: 59.7 %
NRBC BLD-RTO: 0 /100 WBC
PLATELET # BLD AUTO: 267 K/UL
PMV BLD AUTO: 10 FL
RBC # BLD AUTO: 4.09 M/UL
SATURATED IRON: 6 %
TOTAL IRON BINDING CAPACITY: 414 UG/DL
TRANSFERRIN SERPL-MCNC: 280 MG/DL
WBC # BLD AUTO: 6.76 K/UL

## 2018-05-31 PROCEDURE — 85025 COMPLETE CBC W/AUTO DIFF WBC: CPT

## 2018-05-31 PROCEDURE — 3078F DIAST BP <80 MM HG: CPT | Mod: S$GLB,,, | Performed by: NURSE PRACTITIONER

## 2018-05-31 PROCEDURE — 83540 ASSAY OF IRON: CPT

## 2018-05-31 PROCEDURE — 99999 PR PBB SHADOW E&M-EST. PATIENT-LVL IV: CPT | Mod: PBBFAC,,, | Performed by: NURSE PRACTITIONER

## 2018-05-31 PROCEDURE — 82728 ASSAY OF FERRITIN: CPT

## 2018-05-31 PROCEDURE — 36415 COLL VENOUS BLD VENIPUNCTURE: CPT | Mod: PO

## 2018-05-31 PROCEDURE — 99214 OFFICE O/P EST MOD 30 MIN: CPT | Mod: S$GLB,,, | Performed by: NURSE PRACTITIONER

## 2018-05-31 PROCEDURE — 3074F SYST BP LT 130 MM HG: CPT | Mod: S$GLB,,, | Performed by: NURSE PRACTITIONER

## 2018-05-31 NOTE — PATIENT INSTRUCTIONS
Anemia  Anemia is a condition that occurs when your body does not have enough healthy red blood cells (RBCs). RBCs are the parts of your blood that carry oxygen throughout your body. A protein called hemoglobin allows your RBCs to absorb and release oxygen. Without enough RBCs or hemoglobin, your body doesn't get enough oxygen. Symptoms of anemia may then occur.    What are the symptoms of anemia?  Some people with anemia have no symptoms. But most people have symptoms that range from mild to severe. These can include:  · Tiredness (fatigue)  · Weakness  · Pale skin  · Shortness of breath  · Dizziness or fainting  · Rapid heartbeat  · Trouble doing normal amounts of activity  · Jaundice (yellowing of your eyes, skin, or mouth; dark urine)  What causes anemia?  Anemia can occur when your body:  · Loses too much blood  · Does not make enough RBCs  · Destroys your RBCs at a faster rate than it can replace them  · Does not make a normal amount of hemoglobin in your RBCs  These problems can occur for many reasons, including:  · A condition that you are born with (congenital or inherited), such as sickle cell disease or thalassemia  · Heavy bleeding for any reason, including injury, surgery, childbirth, or even heavy menstrual periods  · Being low in certain nutrients, such as iron, folate, or vitamin B12, possibly from a poor diet or a condition like celiac disease or Crohn's disease  · Certain chronic conditions like diabetes, arthritis, or kidney disease  · Certain chronic infections like tuberculosis or HIV  · Exposure to certain medicines, such as those used for chemotherapy  There are different types of anemia. Your healthcare provider can tell you more about the type of anemia you have and what may have caused it.  How is anemia diagnosed?  To diagnose anemia, your healthcare provider orders blood tests. These can include:  · Complete blood cell count (CBC). This test measures the amounts of the different types  of blood cells.  · Blood smear. This test checks the size and shape of your blood cells. To do the test, a drop of your blood is viewed under a microscope. A stain is used to make the blood cells easier to see.  · Iron studies. These tests measure the amount of iron in your blood. Your body needs iron to make hemoglobin in your RBCs.  · Vitamin B12 and folate studies. These tests check for some of the components that help give RBCs a normal size and shape.  · Reticulocyte count. This test measures the amount of new RBCs that your bone marrow makes.  · Hemoglobin electrophoresis. This test checks for problems with your hemoglobin in RBCs.  How is anemia treated?  Treatment for anemia is based on the type of anemia, its cause, and the severity of your symptoms. Treatments may include:  · Diet changes. This involves increasing the amount of certain nutrients in your diet, such as iron, vitamin B12, or folate. Your healthcare provider may also prescribe nutrient supplements.  · Medicines. Certain medicines treat the cause of your anemia. Others help build new RBCs or relieve symptoms. If a medicine is the cause of your anemia, you may need to stop or change it.  · Blood transfusions. Replacing some of your blood can increase the number of healthy RBCs in your body.  · Surgery. In some cases, your doctor may do surgery to treat the underlying cause of anemia. If you need surgery, your healthcare provider will explain the procedure and outline the risks and benefits for you.  What are the long-term concerns?  If you have a certain type of anemia, you can expect a full recovery after treatment. If you have other types of anemia (especially a type you're born with), you will need to manage it for life. Your doctor can tell you more.  Date Last Reviewed: 12/1/2016  © 3251-9959 [employer]. [address] [place] [address], [place] [address]. All rights reserved. This information is not intended as a substitute for  professional medical care. Always follow your healthcare professional's instructions.        When You Have Gastrointestinal (GI) Bleeding    Blood in your vomit or stool can be a sign of gastrointestinal (GI) bleeding. GI bleeding can be scary. But the cause may not be serious. You should always see a doctor if GI bleeding occurs.  The GI tract  The GI tract is the path through which food travels in the body. Food passes from the mouth down the esophagus (the tube from the mouth to the stomach). Food begins to break down in the stomach. It then moves through the duodenum, the first part of the small intestine. Nutrients are absorbed as food travels through the small intestine. What is left passes into the colon (large intestine) as waste. The colon removes water from the waste. Waste continues from the colon to the rectum (where stool is stored). Waste then leaves the body through the anus.  Causes of GI bleeding  GI bleeding can be caused by many different problems. Some of the more common causes include:  · Swollen veins in the anus (hemorrhoids)  · Swollen veins in the esophagus (varices)  · Sore on the lining of the GI tract (ulcer)  · Cuts or scrapes in the mouth or throat  · Infection caused by germs such as bacteria or parasites  · Food allergies, such as milk allergy in young children  · Medicines  · Inflammation of the GI tract (gastritis or esophagitis)  · Colitis (Crohn's disease or ulcerative colitis)  · Cancer (tumors or polyps)  · Abnormal pouches in the colon (diverticula)  · Tears in the esophagus or anus  · Nosebleed  · Abnormal blood vessels in the GI tract (angiodysplasia)  Diagnosing the cause of blood in stool  If blood is coming out in your stool, you may have a lower GI tract problem or a very fast upper GI tract bleed. Bleeding from the GI tract can be bright red. Or it may look dark and tarry. Tests may also find blood in your stool that cant be seen with the eye (occult blood). To find out  the cause, tests that may be ordered include:  · Blood tests. A blood sample is taken and sent to a lab for exam.  · Hemoccult test. Checks a stool sample for blood.  · Stool culture. Checks a stool sample for bacteria or parasites.  · X-ray, ultrasound, or CT scan. Imaging tests that take pictures of the digestive tract.  · Colonoscopy or sigmoidoscopy. This test uses a flexible tube with a tiny camera. The tube is inserted through your anus into your rectum to see the inside of your colon. Your provider can also take a tiny tissue sample (biopsy) and treat a bleeding source  Diagnosing the cause of blood in vomit  If you are vomiting blood or something that looks like coffee grounds, you may have an upper GI tract problem. To find the cause, tests that may be done include:  · Upper Endoscopy. A flexible tube with a tiny camera is inserted through your mouth and throat to see inside your upper GI tract. This lets your provider take a tiny tissue sample (biopsy) and treat a bleeding source.  · Nasogastric lavage. This can tell if you have upper GI or lower GI bleeding.  · X-ray, ultrasound, or CT scan. Imaging tests that take pictures of your digestive tract.  · Upper GI series. X-rays of the upper part of your GI tract taken from inside your body.  · Enteroscopy. This sends a flexible tube or a small, swallowed capsule camera into your small intestine.  When to call your healthcare provider  Call your healthcare provider right away if you have any of the following:  · Bleeding from your mouth or anus that can't be stopped  · Fever of 100.4°F (38.0°) or higher  · Bleeding along with feeling lightheaded or dizzy  · Signs of fluid loss (dehydration). These include a dry, sticky mouth, decreased urine output; and very dark urine.  · Belly (abdominal) pain   Date Last Reviewed: 7/1/2016  © 0156-9756 [employer]. [address] [place] [address], [place] [address]. All rights reserved. This information is not  intended as a substitute for professional medical care. Always follow your healthcare professional's instructions.        Diverticulosis    Diverticulosis means that small pouches have formed in the wall of your large intestine (colon). Most often, this problem causes no symptoms and is common as people age. But the pouches in the colon are at risk of becoming infected. When this happens, the condition is called diverticulitis. Although most people with diverticulosis never develop diverticulitis, it is still not uncommon. Rectal bleeding can also occur and in less common situations, a type of colon inflammation called colitis.  While most people do not have symptoms, some people with diverticulosis may have:  · Abdominal cramps and pain  · Bloating  · Constipation  · Change in bowel habits  Causes  The exact cause of diverticulosis (and diverticulitis) has not been proved, but a few things are associated with the condition:  · Low-fiber diet  · Constipation  · Lack of exercise  Your healthcare provider will talk with you about how to manage your condition. Diet changes may be all that are needed to help control diverticulosis and prevent progression to diverticulitis. If you develop diverticulitis, you will likely need other treatments.  Home care  You may be told to take fiber supplements daily. Fiber adds bulk to the stool so that it passes through the colon more easily. Stool softeners may be recommended. You may also be given medications for pain relief. Be sure to take all medications as directed.  In the past, people were told to avoid corn, nuts, and seeds. This is no longer necessary.  Follow these guidelines when caring for yourself at home:  · Eat unprocessed foods that are high in fiber. Whole grains, fruits, and vegetables are good choices.  · Drink 6 to 8 glasses of water every day unless your healthcare provider has you limit how much fluid you should have.  · Watch for changes in your bowel  movements. Tell your provider if you notice any changes.  · Begin an exercise program. Ask your provider how to get started. Generally, walking is the best.  · Get plenty of rest and sleep.  Follow-up care  Follow up with your healthcare provider, or as advised. Regular visits may be needed to check on your health. Sometimes special procedures such as colonoscopy, are needed after an episode of diverticulitis or blooding. Be sure to keep all your appointments.  If a stool sample was taken, or cultures were done, you should be told if they are positive, or if your treatment needs to be changed. You can call as directed for the results.  If X-rays were done, a radiologist will look at them. You will be told if there is a change in your treatment.  If antibiotics were prescribed, be sure to finish them all.  When to seek medical advice  Call your healthcare provider right away if any of these occur:  · Fever of 100.4°F (38°C) or higher, or as directed by your healthcare provider  · Severe cramps in the lower left side of the abdomen or pain that is getting worse  · Tenderness in the lower left side of the abdomen or worsening pain throughout the abdomen  · Diarrhea or constipation that doesn't get better within 24 hours  · Nausea and vomiting  · Bleeding from the rectum  Call 911  Call emergency services if any of the following occur:  · Trouble breathing  · Confusion  · Very drowsy or trouble awakening  · Fainting or loss of consciousness  · Rapid heart rate  · Chest pain  Date Last Reviewed: 12/30/2015  © 0363-7298 [employer]. [address] [place] [address], [place] [id number]. All rights reserved. This information is not intended as a substitute for professional medical care. Always follow your healthcare professional's instructions.

## 2018-05-31 NOTE — TELEPHONE ENCOUNTER
Pt scheduled for Egd 07/02/18 at 8:30, will need to stop Eliquis for 3 days prior. Asking if this is OK?

## 2018-05-31 NOTE — PROGRESS NOTES
"Subjective:       Patient ID: Zeenat Mancia is a 68 y.o. male Body mass index is 38.86 kg/m².    Chief Complaint: Follow-up (doing OK)    This patient is new to me.  Established patient of Dr. Houston.    Reviewed hospital discharge summary: "Admission Date: 5/14/2018  Hospital Length of Stay: 0 days  Discharge Date and Time: 5/17/2018 12:02 PM  Attending Physician: No att. providers found   Discharging Provider: Haven Chapin MD  Primary Care Provider: Leonel Johnson MD        HPI:   Mr. Adilene Mancia is a 68-year-old obese  male with PMH significant for HTN, atrial fibrillation on Eliquis, BPH presents to the ED complaining of rectal bleed for the past 2 days, associated with a mild dizziness. Denies abdominal pain. Colonoscopy done last year, was unremarkable per patient. Denies hematemesis. In the ED hemoglobin 14.5, hematocrit 44.1. Electrolytes are unremarkable. Blood pressure stable at 112/71. CT abdomen shows no evidence of colitis or diverticulitis. Stool hemoccult was positive in the ED. Patient started on Protonix. Patient is on naproxen 500 mg by mouth daily. Patient admitted as observation basis for trending H&H and GI evaluation.      Procedure(s) (LRB):  COLONOSCOPY (N/A)  ESOPHAGOGASTRODUODENOSCOPY (EGD) (N/A)       Hospital Course:   Patient with intermittent episodes of bloody stools. Eliquis on hold. EGD on 5/17 shows erosive gastritis. Colonoscopy showed no significant source of bleeding however did show some fresh blood. After discussion with GI, further observation as inpatient was warranted given evidence of blood without a source. Hemoglobin remained stable overnight and patient was able to be discharged home following morning. GI recommended holding Eliquis for a total of 5 days. He can resume aspirin therapy tomorrow. Recommended to follow a high-fiber diet for diverticulosis. Protonix added as he had erosive gastritis on EGD. He'll need to follow-up biopsies in GI clinic. " "Blood pressure was marginal however improving throughout his stay. He is instructed to continue atenolol and given BP parameters for remaining blood pressure medications. Recommended to hold HCTZ for a week."      Anemia   Presents for initial (initial to me) visit. Symptoms include bruises/bleeds easily (on anticoagulants), light-headedness (occasional, especially if he stands up too quick) and malaise/fatigue. There has been no abdominal pain, anorexia, fever, leg swelling, pallor, palpitations, pica or weight loss. Signs of blood loss that are present include hematochezia (bright red blood a few days after hospital discharge, has resolved over the past week). Signs of blood loss that are not present include hematemesis and melena. There is no history of alcohol abuse (reports rare alcohol use), cancer, chronic liver disease, chronic renal disease, clotting disorder, heart failure, hypothyroidism, inflammatory bowel disease, recent illness, recent surgery or recent trauma. (History peptic ulcers) Procedure history includes colonoscopy, EGD and FOBT.     Review of Systems   Constitutional: Positive for malaise/fatigue. Negative for appetite change, chills, fatigue, fever, unexpected weight change and weight loss.   HENT: Negative for sore throat and trouble swallowing.    Respiratory: Negative for cough, choking and shortness of breath.    Cardiovascular: Negative for chest pain and palpitations.   Gastrointestinal: Positive for anal bleeding (has resolved) and hematochezia (bright red blood a few days after hospital discharge, has resolved over the past week). Negative for abdominal pain, anorexia, blood in stool, constipation, diarrhea, hematemesis, melena, nausea, rectal pain and vomiting.   Genitourinary: Negative for difficulty urinating, dysuria, flank pain and hematuria.   Skin: Negative for pallor.   Neurological: Positive for light-headedness (occasional, especially if he stands up too quick). Negative for " weakness.   Hematological: Bruises/bleeds easily (on anticoagulants).       Past Medical History:   Diagnosis Date    Arthritis     Colon polyp     Diverticulosis     Dyslipidemia     Hepatic steatosis     Hyperlipidemia     Hypertension     Obesity     Sleep apnea     uses CPAP    Unspecified disorder of kidney and ureter     kidney stones     Past Surgical History:   Procedure Laterality Date    APPENDECTOMY      CATARACT EXTRACTION  2009    OS    CATARACT EXTRACTION W/  INTRAOCULAR LENS IMPLANT Right 6/3/2015    [id number] 14.5d//    COLONOSCOPY N/A 10/10/2017    Procedure: COLONOSCOPY;  Surgeon: Jameson Houston MD;  Location: [facility] ENDO;  Service: Endoscopy;  Laterality: N/A; repeat in 5 years for surveillance    COLONOSCOPY N/A 5/16/2018    Procedure: COLONOSCOPY;  Surgeon: Jameson Houston MD;  Location: [facility] ENDO;  Service: Endoscopy;  Laterality: N/A;    EYE SURGERY      cataract    TONSILLECTOMY      UPPER GASTROINTESTINAL ENDOSCOPY  05/16/2018    Dr. Houston     Family History   Problem Relation Age of Onset    Arthritis Mother     Heart disease Mother     Blindness Mother         OS due to injury    Coronary artery disease Father     Heart disease Father     Cataracts Father     Amblyopia Neg Hx     Cancer Neg Hx     Diabetes Neg Hx     Glaucoma Neg Hx     Hypertension Neg Hx     Macular degeneration Neg Hx     Retinal detachment Neg Hx     Strabismus Neg Hx     Stroke Neg Hx     Thyroid disease Neg Hx     Colon cancer Neg Hx     Colon polyps Neg Hx     Crohn's disease Neg Hx     Ulcerative colitis Neg Hx     Esophageal cancer Neg Hx     Stomach cancer Neg Hx      Wt Readings from Last 10 Encounters:   05/31/18 (!) 137.3 kg (302 lb 11.1 oz)   05/29/18 (!) 137.3 kg (302 lb 11.1 oz)   05/17/18 135.6 kg (298 lb 15.1 oz)   02/06/18 (!) 139.3 kg (307 lb 1.6 oz)   11/30/17 (!) 140.7 kg (310 lb 3 oz)   11/07/17 (!) 139.3 kg (307 lb 1.6 oz)   10/11/17 (!) 138.7 kg (305  lb 12.5 oz)   10/05/17 133.8 kg (295 lb)   09/01/17 135.3 kg (298 lb 4.5 oz)   07/26/17 (!) 138.6 kg (305 lb 8.9 oz)     Lab Results   Component Value Date    WBC 6.08 05/24/2018    HGB 11.5 (L) 05/24/2018    HCT 36.0 (L) 05/24/2018    MCV 94 05/24/2018     05/24/2018     Lab Results   Component Value Date    OCCULTBLOOD Positive (A) 05/14/2018     CMP  Sodium   Date Value Ref Range Status   05/15/2018 141 136 - 145 mmol/L Final     Potassium   Date Value Ref Range Status   05/15/2018 3.8 3.5 - 5.1 mmol/L Final     Chloride   Date Value Ref Range Status   05/15/2018 103 95 - 110 mmol/L Final     CO2   Date Value Ref Range Status   05/15/2018 29 22 - 31 mmol/L Final     Glucose   Date Value Ref Range Status   05/15/2018 104 70 - 110 mg/dL Final     Comment:     The ADA recommends the following guidelines for fasting glucose:  Normal:       less than 100 mg/dL  Prediabetes:  100 mg/dL to 125 mg/dL  Diabetes:     126 mg/dL or higher       BUN, Bld   Date Value Ref Range Status   05/15/2018 19 9 - 21 mg/dL Final     Creatinine   Date Value Ref Range Status   05/15/2018 0.79 0.50 - 1.40 mg/dL Final     Calcium   Date Value Ref Range Status   05/15/2018 8.3 (L) 8.4 - 10.2 mg/dL Final     Total Protein   Date Value Ref Range Status   05/14/2018 6.4 6.0 - 8.4 g/dL Final     Albumin   Date Value Ref Range Status   05/14/2018 3.9 3.5 - 5.2 g/dL Final     Total Bilirubin   Date Value Ref Range Status   05/14/2018 0.4 0.2 - 1.3 mg/dL Final     Alkaline Phosphatase   Date Value Ref Range Status   05/14/2018 42 38 - 145 U/L Final     AST   Date Value Ref Range Status   05/14/2018 22 17 - 59 U/L Final     ALT   Date Value Ref Range Status   05/14/2018 35 10 - 44 U/L Final     Anion Gap   Date Value Ref Range Status   05/15/2018 9 8 - 16 mmol/L Final     eGFR if    Date Value Ref Range Status   05/15/2018 >60 >60 mL/min/1.73 m^2 Final     eGFR if non    Date Value Ref Range Status   05/15/2018  ">60 >60 mL/min/1.73 m^2 Final     Comment:     Calculation used to obtain the estimated glomerular filtration  rate (eGFR) is the CKD-EPI equation.        Lab Results   Component Value Date    TSH 1.219 10/11/2017     Reviewed prior medical records including radiology report of 5/14/18 ct abdomen pelvis & endoscopy history (see surgical history).    5/16/18 EGD was reviewed and procedure report states:   " Findings:       The examined esophagus was normal.       Few non-bleeding superficial small gastric ulcers with no stigmata        of bleeding were found in the gastric antrum.       Inflammation with erosions was found in the gastric antrum. Biopsies        were taken with a cold forceps for histology.       Inflammation was found in the duodenal bulb.  Impression:           - Normal esophagus.                        - Non-bleeding gastric ulcers with no stigmata of                         bleeding.                        - Erosive gastritis. Biopsied.                        - Duodenitis.  Recommendation:       - Await pathology results.                        - [facility]. ".  Biopsy results:   "STOMACH BIOPSY:  -  NO PATHOLOGIC DIAGNOSIS.     MICROSCOPIC EXAMINATION:  Sections of gastric mucosa are morphologically unremarkable. There is   no evidence of Helicobacter organisms.  "    5/16/18 Colonoscopy was reviewed and procedure report states:   " Findings:       Multiple diverticula were found in the sigmoid colon and descending        colon. Fresh blood throughout colon. No active bleeding.       External hemorrhoids were found.       The exam was otherwise without abnormality to cecum.  Impression:           - Preparation of the colon was fair.                        - Diverticulosis in the sigmoid colon and in the                         descending colon.                        - External hemorrhoids.                        - The examination was otherwise normal. No " "active                         bleeding site visualized.                        - No specimens collected.  Recommendation:       - Repeat colonoscopy in 5 years for surveillance.                        - Clear liquid diet.                        - If evidence of significant re-bleed, suggest                         tagged RBC scan.                        - Return patient to [facility] for ongoing care. ".    Objective:      Physical Exam   Constitutional: He is oriented to person, place, and time. He appears well-developed and well-nourished. No distress.   HENT:   Mouth/Throat: Oropharynx is clear and moist and mucous membranes are normal. No oral lesions. No oropharyngeal exudate.   Eyes: Conjunctivae are normal. Pupils are equal, round, and reactive to light. No scleral icterus.   Cardiovascular: Normal rate.    Pulmonary/Chest: Effort normal and breath sounds normal. No respiratory distress. He has no wheezes.   Abdominal: Soft. Normal appearance and bowel sounds are normal. He exhibits no distension, no abdominal bruit and no mass. There is no hepatosplenomegaly. There is no tenderness. There is no rigidity, no rebound, no guarding, no tenderness at McBurney's point and negative Ware's sign. No hernia.   Neurological: He is alert and oriented to person, place, and time.   Skin: Skin is warm and dry. No rash noted. He is not diaphoretic. No erythema. No pallor.   Non-jaundiced   Psychiatric: He has a normal mood and affect. His behavior is normal. Judgment and thought content normal.   Nursing note and vitals reviewed.      Assessment:       1. Anemia, unspecified type    2. History of gastric ulcer    3. History of diverticulosis    4. History of rectal bleeding        Plan:       Anemia, unspecified type  -     CBC auto differential; Future; Expected date: 05/31/2018  -     Iron and TIBC; Future; Expected date: 05/31/2018  -     Ferritin; Future; Expected date: 05/31/2018  - schedule EGD to be done on or " after 6/27/18, discussed procedure with patient, patient verbalized understanding  - discussed with patient the different ways that anemia occurs: blood loss (such as from the gi tract), the body is not making enough, or the body is breaking down the rbcs too quickly; recommend EGD to further evaluate gi tract for possible blood loss and pending results of endoscopies, possible UGI with Small Bowel Follow Through/video capsule study  -follow-up with PCP and/or hematology for continued evaluation and management    History of gastric ulcer  - schedule EGD to be done on or after 6/27/18, discussed procedure with patient, patient verbalized understanding  - continue protonix 40 mg once daily as directed, - take in the morning 30-60 minutes before breakfast, discussed about possible long term use of medication (prefer to use lowest effective dose or discontinuing if possible) and discussed the risks & benefits with taking a reflux medication long term, and to take OTC calcium and vitamin d supplements as directed (such as Citracal +D), pt verbalized understanding  -discussed about the different types of medications used to treat reflux and how to use them, antacids can be used PRN for breakthrough heartburn symptoms by reducing stomach acid that is already produced, H2 blockers (zantac) work by limiting the amount acid production, & PPI's work to block acid production and are taken daily, patient verbalized understanding.  -Educated patient on lifestyle modifications to help control/reduce reflux/abdominal pain including: avoid large meals, avoid eating within 2-3 hours of bedtime (avoid late night eating & lying down soon after eating), elevate head of bed if nocturnal symptoms are present, smoking cessation (if current smoker), & weight loss (if overweight).   -Educated to avoid known foods which trigger reflux symptoms & to minimize/avoid high-fat foods, chocolate, caffeine, citrus, alcohol, & tomato  products.  -Advised to avoid use of NSAID's, since they can cause GI upset, bleeding, and/or ulcers.     History of diverticulosis  - continue metamucil as directed  - discussed the diagnosis of diverticulosis and diverticulitis, & to prevent diverticulitis, high fiber diet is recommended.  -Recommended high fiber diet. Recommended daily exercise, adequate water intake (six 8-oz glasses of water daily), and high fiber diet. OTC fiber supplements are recommended if diet does not reach daily fiber goal (25 grams daily), such as Metamucil, Citrucel, or FiberCon (take as directed, separate from other oral medications by >2 hours).  - Advised to avoid/minimize popcorn, corn, seeds, and nuts.    History of rectal bleeding  -     CBC auto differential; Future; Expected date: 05/31/2018  -     Iron and TIBC; Future; Expected date: 05/31/2018  -     Ferritin; Future; Expected date: 05/31/2018    Follow-up in about 1 month (around 6/30/2018), or if symptoms worsen or fail to improve.      If no improvement in symptoms or symptoms worsen, call/follow-up at clinic or go to ER.

## 2018-06-01 ENCOUNTER — PATIENT MESSAGE (OUTPATIENT)
Dept: GASTROENTEROLOGY | Facility: CLINIC | Age: 68
End: 2018-06-01

## 2018-06-03 ENCOUNTER — PATIENT MESSAGE (OUTPATIENT)
Dept: FAMILY MEDICINE | Facility: CLINIC | Age: 68
End: 2018-06-03

## 2018-06-04 ENCOUNTER — TELEPHONE (OUTPATIENT)
Dept: GASTROENTEROLOGY | Facility: CLINIC | Age: 68
End: 2018-06-04

## 2018-06-04 ENCOUNTER — PATIENT MESSAGE (OUTPATIENT)
Dept: FAMILY MEDICINE | Facility: CLINIC | Age: 68
End: 2018-06-04

## 2018-06-04 DIAGNOSIS — D50.9 IRON DEFICIENCY ANEMIA, UNSPECIFIED IRON DEFICIENCY ANEMIA TYPE: Primary | ICD-10-CM

## 2018-06-04 DIAGNOSIS — E61.1 LOW IRON: Primary | ICD-10-CM

## 2018-06-04 RX ORDER — FERROUS SULFATE 325(65) MG
325 TABLET, DELAYED RELEASE (ENTERIC COATED) ORAL 2 TIMES DAILY
Qty: 60 TABLET | Refills: 0 | COMMUNITY
Start: 2018-06-04 | End: 2018-07-04

## 2018-06-04 NOTE — TELEPHONE ENCOUNTER
Please call to inform & review the results with the patient- the blood work showed mild anemia remains with slight improvement. Iron levels were low. Recommend taking OTC iron supplement, such as ferrous sulfate 325 mg (65 FE) BID and follow-up with PCP/hematology for continued evaluation and management of this finding.  Continue with previous recommendations. If no improvement in symptoms or symptoms worsen, call/follow-up at clinic or go to ER.  Please release results to patient's mychart once you have discussed results and recommendations with patient.  Thanks,  Makenna YAP

## 2018-06-08 ENCOUNTER — TELEPHONE (OUTPATIENT)
Dept: INFUSION THERAPY | Facility: HOSPITAL | Age: 68
End: 2018-06-08

## 2018-06-08 ENCOUNTER — TELEPHONE (OUTPATIENT)
Dept: GASTROENTEROLOGY | Facility: CLINIC | Age: 68
End: 2018-06-08

## 2018-06-08 ENCOUNTER — OFFICE VISIT (OUTPATIENT)
Dept: HEMATOLOGY/ONCOLOGY | Facility: CLINIC | Age: 68
End: 2018-06-08
Payer: MEDICARE

## 2018-06-08 VITALS
HEIGHT: 74 IN | DIASTOLIC BLOOD PRESSURE: 73 MMHG | RESPIRATION RATE: 20 BRPM | HEART RATE: 62 BPM | OXYGEN SATURATION: 98 % | SYSTOLIC BLOOD PRESSURE: 131 MMHG | BODY MASS INDEX: 39.75 KG/M2 | TEMPERATURE: 98 F | WEIGHT: 309.75 LBS

## 2018-06-08 DIAGNOSIS — D62 ACUTE BLOOD LOSS ANEMIA: ICD-10-CM

## 2018-06-08 DIAGNOSIS — D50.0 IRON DEFICIENCY ANEMIA DUE TO CHRONIC BLOOD LOSS: Primary | ICD-10-CM

## 2018-06-08 PROCEDURE — 99204 OFFICE O/P NEW MOD 45 MIN: CPT | Mod: S$GLB,,, | Performed by: NURSE PRACTITIONER

## 2018-06-08 PROCEDURE — 3075F SYST BP GE 130 - 139MM HG: CPT | Mod: S$GLB,,, | Performed by: NURSE PRACTITIONER

## 2018-06-08 PROCEDURE — 3078F DIAST BP <80 MM HG: CPT | Mod: S$GLB,,, | Performed by: NURSE PRACTITIONER

## 2018-06-08 PROCEDURE — 99999 PR PBB SHADOW E&M-EST. PATIENT-LVL IV: CPT | Mod: PBBFAC,,, | Performed by: NURSE PRACTITIONER

## 2018-06-08 RX ORDER — HEPARIN 100 UNIT/ML
5 SYRINGE INTRAVENOUS
Status: CANCELLED | OUTPATIENT
Start: 2018-06-15

## 2018-06-08 RX ORDER — SODIUM CHLORIDE 9 MG/ML
INJECTION, SOLUTION INTRAVENOUS CONTINUOUS
Status: CANCELLED | OUTPATIENT
Start: 2018-06-15

## 2018-06-08 RX ORDER — SODIUM CHLORIDE 0.9 % (FLUSH) 0.9 %
10 SYRINGE (ML) INJECTION
Status: CANCELLED | OUTPATIENT
Start: 2018-06-15

## 2018-06-08 NOTE — TELEPHONE ENCOUNTER
Pt scheduled for EGD on 07/02/18. Needs to stop Eliquis for 2 days prior to procedure, is this OK?

## 2018-06-08 NOTE — PROGRESS NOTES
NEW PATIENT NOTE    INITIAL DIAGNOSIS:  Iron deficiency anemia related to chronic blood loss.    SECONDARY DIAGNOSES:  1.  Rectal bleeding.  2.  Nonbleeding gastric ulcers.  3.  Erosive gastritis.  4.  Diverticulosis.  5.  External hemorrhoids.  6.  Atrial fibrillation.  7.  Dyslipidemia:  8.  Arthritis.  9.  Hypertension.  10.  Sleep apnea requiring CPAP.  11.  Obesity.    HISTORY OF PRESENT ILLNESS:  This is a 68-year-old white gentleman known to   Dr. Johnson in Family Medicine for a diagnosis of iron deficiency anemia related to   recent GI blood loss.  The patient was also recently diagnosed with atrial fib   and placed on Eliquis.  The patient presents to the clinic today with complaints   of fatigue and dizziness.  He denies any headaches, blurred vision, chest pain,   irregular heartbeat, bleeding, cold hands or feet, pica, etc.  Recent labs   indicate the patient was at a normal hemoglobin (15 ) prior to his bleed and is   presently at 11.8.  Iron studies revealed a low serum iron, saturated iron, and   ferritin.  The patient was placed on oral iron by GI.  Due to the patient's   symptomatic condition and diagnosis of gastric ulcers and PPI use, we will   proceed with IV iron for treatment.  No other new complaints or pertinent   findings on a 14-point review of systems.    SURGERIES:  Cataract surgery, tonsillectomy, and appendectomy.    ALLERGIES:  No known allergies.    MEDICATIONS:  Norvasc 5 mg daily, hold for systolic pressure less than 120,   apixaban 5 mg b.i.d., atenolol 100 mg daily, Lotensin 40 mg daily, Cardura 2 mg   q.  evening, ferrous sulfate 325 b.i.d., Proscar 5 mg daily, HCTZ 12.5 mg daily,   multivitamin one daily, Protonix 40 mg daily, Metamucil p.r.n.    FAMILY AND SOCIAL HISTORY:  The patient is  and lives with his wife in   [place], [place].  He is retired.  His parents are .  Mother had a   history of arthritis and heart disease.  Father had a history of cataracts,    coronary artery disease.  The patient is a former smoker, uses alcohol socially   and denies the use of illicit drugs.    PHYSICAL EXAMINATION:  GENERAL:  Well-developed, well-nourished, obese, white gentleman in no acute   distress.  Alert and oriented x4.  VITAL SIGNS:  Height 74 inches, weight 309.7 pounds, /73, pulse 62 and   regular, respirations 20, and temp 97.6.  HEENT:  Normocephalic, atraumatic.  Oral mucosa pink and moist.  Lips without   lesions.  Tongue midline.  Oropharynx clear.  Nonicteric sclerae.   NECK:  Supple, no adenopathy.  No carotid bruits, thyromegaly or thyroid nodule.  HEART:  Regular rate and rhythm without murmur, gallop or rub.                LUNGS:  Clear to auscultation bilaterally.  Normal respiratory effort.       ABDOMEN:  Soft, nontender, nondistended with positive normoactive bowel sounds,   no hepatosplenomegaly.    EXTREMITIES:  No cyanosis, clubbing or edema.  Distal pulses are intact.          AXILLAE AND GROIN:  No palpable pathologic lymphadenopathy is appreciated.        SKIN:  Intact/turgor normal                                                       NEUROLOGIC:  Cranial nerves II-XII grossly intact.  Motor:  Good muscle bulk and   tone.  Strength/sensory 5/5 throughout.  Gait stable.     LABORATORY:  CBC dated 05/31/2018, WBCs 6.76, hemoglobin 11.8, hematocrit 38.6,   MCV 94, platelets 267, differential remarkable for 17.3% lymphs, 11.2%   eosinophils, serum iron 23, transferrin 280, TIBC 414, saturated iron 6, and   ferritin 13.    IMPRESSION:  Iron deficiency anemia due to blood loss.    PLAN:  1.  As noted, we will proceed with Injectafer weekly x2 and have the patient   return in three weeks post-second infusion with interval CBC for reevaluation.  2.  The patient will receive Injectafer #1 on 06/18/2018 at 2:30 and #2 on   06/25/2018 at 2:30.  3.  The patient will call the office for any worsening signs and symptoms of   anemia.  4.  The patient may  discontinue oral iron.    Assessment/plan reviewed and approved by Dr. Austin.      SIMRAN/NORMA  dd: 06/08/2018 15:12:31 (CDT)  td: 06/09/2018 09:44:41 (CDT)  Doc ID   #[id number]  Job ID #[id number]    CC:

## 2018-06-08 NOTE — LETTER
June 8, 2018      Leonel Johnson MD  [address] [facility] [address]  [facility] [address]           [facility] [address]  [facility] [address]  Phone: [phone]  Fax: [phone]          Patient: Zeenat Mancia   MR Number: [id number]   YOB: 1950   Date of Visit: 6/8/2018       Dear Dr. Leonel Johnson:    Thank you for referring Zeenat Mancia to me for evaluation. Attached you will find relevant portions of my assessment and plan of care.    If you have questions, please do not hesitate to call me. I look forward to following Zeenat Mancia along with you.    Sincerely,    Dallin Alva, NP    Enclosure  CC:  No Recipients    If you would like to receive this communication electronically, please contact [email or web address] or [phone] to request more information on [employer] access.    For providers and/or their staff who would like to refer a patient to [facility], please contact us through our one-stop-shop provider referral line, [facility], at [phone].    If you feel you have received this communication in error or would no longer like to receive these types of communications, please e-mail [email or web address]

## 2018-06-08 NOTE — TELEPHONE ENCOUNTER
[6/8/2018 2:45 PM] Dallin Alva:   I have a patient, mr. Mancia MR#[id number] who needs dates for Injectafer; has [employer]BS [place] Medicare/[place]bs of [place] [employer] [facility]  [6/8/2018 2:49 PM] Kinza Brown:    06/18/18 2:30 and 06/25/18 2:30

## 2018-06-09 ENCOUNTER — PATIENT MESSAGE (OUTPATIENT)
Dept: HEMATOLOGY/ONCOLOGY | Facility: CLINIC | Age: 68
End: 2018-06-09

## 2018-06-11 ENCOUNTER — TELEPHONE (OUTPATIENT)
Dept: CARDIOLOGY | Facility: CLINIC | Age: 68
End: 2018-06-11

## 2018-06-15 ENCOUNTER — TELEPHONE (OUTPATIENT)
Dept: FAMILY MEDICINE | Facility: CLINIC | Age: 68
End: 2018-06-15

## 2018-06-15 ENCOUNTER — PATIENT MESSAGE (OUTPATIENT)
Dept: HEMATOLOGY/ONCOLOGY | Facility: CLINIC | Age: 68
End: 2018-06-15

## 2018-06-15 ENCOUNTER — TELEPHONE (OUTPATIENT)
Dept: HEMATOLOGY/ONCOLOGY | Facility: CLINIC | Age: 68
End: 2018-06-15

## 2018-06-15 DIAGNOSIS — D50.0 IRON DEFICIENCY ANEMIA DUE TO CHRONIC BLOOD LOSS: Primary | ICD-10-CM

## 2018-06-15 RX ORDER — IRON,CARBONYL/ASCORBIC ACID 100-250 MG
TABLET ORAL
Qty: 90 TABLET | Refills: 0 | Status: SHIPPED | OUTPATIENT
Start: 2018-06-15 | End: 2018-07-18 | Stop reason: ALTCHOICE

## 2018-06-15 NOTE — TELEPHONE ENCOUNTER
----- Message from Saloni Mancia sent at 6/15/2018  1:41 PM CDT -----  Asking to discuss iron infusion / advised by [facility] that his insurance denied ... Please call [employer] / [place] / [phone] ext  [phone] for an appeal / pt is feeling worse/ more run down ... Call [phone] to advise

## 2018-06-15 NOTE — PROGRESS NOTES
Notified by Elizabeth Coyne RN that patient's insurance will not cover IV iron infusions.  Placed call to patient to inform him of status.  ICAR-C ordered & sent to [facility] in [place] per patient request.  Patient disappointed as he feels very exhausted.  F/U in 3 months with CBC.

## 2018-06-15 NOTE — TELEPHONE ENCOUNTER
----- Message from Renée White sent at 6/15/2018 12:48 PM CDT -----  Contact: Patient  Type:  Patient Returning Call    Who Called: Patient  Who Left Message for Patient:  EDGARDO  Does the patient know what this is regarding?: EDGARDO  Best Call Back Number:   Additional Information: Calling to speak with the Nurse about a procedure not covered by his Insurance.

## 2018-06-18 ENCOUNTER — INFUSION (OUTPATIENT)
Dept: INFUSION THERAPY | Facility: HOSPITAL | Age: 68
End: 2018-06-18
Attending: NURSE PRACTITIONER
Payer: MEDICARE

## 2018-06-18 VITALS
BODY MASS INDEX: 39.8 KG/M2 | HEART RATE: 77 BPM | HEIGHT: 74 IN | OXYGEN SATURATION: 99 % | RESPIRATION RATE: 18 BRPM | DIASTOLIC BLOOD PRESSURE: 79 MMHG | WEIGHT: 310.13 LBS | SYSTOLIC BLOOD PRESSURE: 140 MMHG | TEMPERATURE: 98 F

## 2018-06-18 DIAGNOSIS — D50.0 IRON DEFICIENCY ANEMIA DUE TO CHRONIC BLOOD LOSS: Primary | ICD-10-CM

## 2018-06-18 PROCEDURE — 63600175 PHARM REV CODE 636 W HCPCS: Mod: TB,PN | Performed by: NURSE PRACTITIONER

## 2018-06-18 PROCEDURE — 96365 THER/PROPH/DIAG IV INF INIT: CPT | Mod: PN

## 2018-06-18 PROCEDURE — 25000003 PHARM REV CODE 250: Mod: PN | Performed by: NURSE PRACTITIONER

## 2018-06-18 RX ORDER — HEPARIN 100 UNIT/ML
5 SYRINGE INTRAVENOUS
Status: CANCELLED | OUTPATIENT
Start: 2018-06-18

## 2018-06-18 RX ORDER — SODIUM CHLORIDE 9 MG/ML
INJECTION, SOLUTION INTRAVENOUS
Status: COMPLETED | OUTPATIENT
Start: 2018-06-18 | End: 2018-06-18

## 2018-06-18 RX ORDER — SODIUM CHLORIDE 0.9 % (FLUSH) 0.9 %
10 SYRINGE (ML) INJECTION
Status: CANCELLED | OUTPATIENT
Start: 2018-06-18

## 2018-06-18 RX ORDER — SODIUM CHLORIDE 0.9 % (FLUSH) 0.9 %
10 SYRINGE (ML) INJECTION
Status: DISCONTINUED | OUTPATIENT
Start: 2018-06-18 | End: 2018-06-18 | Stop reason: HOSPADM

## 2018-06-18 RX ORDER — HEPARIN 100 UNIT/ML
5 SYRINGE INTRAVENOUS
Status: DISCONTINUED | OUTPATIENT
Start: 2018-06-18 | End: 2018-06-18 | Stop reason: HOSPADM

## 2018-06-18 RX ORDER — SODIUM CHLORIDE 9 MG/ML
INJECTION, SOLUTION INTRAVENOUS CONTINUOUS
Status: CANCELLED | OUTPATIENT
Start: 2018-06-18

## 2018-06-18 RX ADMIN — FERRIC CARBOXYMALTOSE INJECTION 750 MG: 50 INJECTION, SOLUTION INTRAVENOUS at 03:06

## 2018-06-18 RX ADMIN — SODIUM CHLORIDE: 900 INJECTION, SOLUTION INTRAVENOUS at 03:06

## 2018-06-25 ENCOUNTER — PATIENT MESSAGE (OUTPATIENT)
Dept: HEMATOLOGY/ONCOLOGY | Facility: CLINIC | Age: 68
End: 2018-06-25

## 2018-06-25 ENCOUNTER — INFUSION (OUTPATIENT)
Dept: INFUSION THERAPY | Facility: HOSPITAL | Age: 68
End: 2018-06-25
Attending: NURSE PRACTITIONER
Payer: MEDICARE

## 2018-06-25 VITALS
OXYGEN SATURATION: 99 % | SYSTOLIC BLOOD PRESSURE: 121 MMHG | RESPIRATION RATE: 16 BRPM | WEIGHT: 309.5 LBS | BODY MASS INDEX: 39.72 KG/M2 | DIASTOLIC BLOOD PRESSURE: 79 MMHG | HEART RATE: 69 BPM | HEIGHT: 74 IN | TEMPERATURE: 98 F

## 2018-06-25 DIAGNOSIS — D50.0 IRON DEFICIENCY ANEMIA DUE TO CHRONIC BLOOD LOSS: Primary | ICD-10-CM

## 2018-06-25 PROCEDURE — 63600175 PHARM REV CODE 636 W HCPCS: Mod: TB,PN | Performed by: NURSE PRACTITIONER

## 2018-06-25 PROCEDURE — 96365 THER/PROPH/DIAG IV INF INIT: CPT | Mod: PN

## 2018-06-25 PROCEDURE — 25000003 PHARM REV CODE 250: Mod: PN | Performed by: NURSE PRACTITIONER

## 2018-06-25 PROCEDURE — A4216 STERILE WATER/SALINE, 10 ML: HCPCS | Mod: PN | Performed by: NURSE PRACTITIONER

## 2018-06-25 RX ORDER — SODIUM CHLORIDE 9 MG/ML
INJECTION, SOLUTION INTRAVENOUS CONTINUOUS
Status: CANCELLED | OUTPATIENT
Start: 2018-06-25

## 2018-06-25 RX ORDER — SODIUM CHLORIDE 0.9 % (FLUSH) 0.9 %
10 SYRINGE (ML) INJECTION
Status: DISCONTINUED | OUTPATIENT
Start: 2018-06-25 | End: 2018-06-25 | Stop reason: HOSPADM

## 2018-06-25 RX ORDER — HEPARIN 100 UNIT/ML
5 SYRINGE INTRAVENOUS
Status: CANCELLED | OUTPATIENT
Start: 2018-06-25

## 2018-06-25 RX ORDER — SODIUM CHLORIDE 0.9 % (FLUSH) 0.9 %
10 SYRINGE (ML) INJECTION
Status: CANCELLED | OUTPATIENT
Start: 2018-06-25

## 2018-06-25 RX ADMIN — Medication 10 ML: at 04:06

## 2018-06-25 RX ADMIN — Medication 10 ML: at 03:06

## 2018-06-25 RX ADMIN — SODIUM CHLORIDE: 9 INJECTION, SOLUTION INTRAVENOUS at 03:06

## 2018-06-25 RX ADMIN — FERRIC CARBOXYMALTOSE INJECTION 750 MG: 50 INJECTION, SOLUTION INTRAVENOUS at 03:06

## 2018-06-26 ENCOUNTER — PATIENT MESSAGE (OUTPATIENT)
Dept: ENDOSCOPY | Facility: HOSPITAL | Age: 68
End: 2018-06-26

## 2018-06-26 NOTE — PLAN OF CARE
Problem: Patient Care Overview  Goal: Plan of Care Review  Outcome: Ongoing (interventions implemented as appropriate)  Adequate for discharge.   Pt tolerated Injectafer infusion without noted distress.  Reviewed upcoming appointments.  All questions answered. Advised to call MD with any questions or concerns.   Ambulated from infusion center independently with wife.

## 2018-06-29 ENCOUNTER — ANESTHESIA EVENT (OUTPATIENT)
Dept: ENDOSCOPY | Facility: HOSPITAL | Age: 68
End: 2018-06-29
Payer: MEDICARE

## 2018-06-29 ENCOUNTER — PATIENT MESSAGE (OUTPATIENT)
Dept: ADMINISTRATIVE | Facility: OTHER | Age: 68
End: 2018-06-29

## 2018-06-29 NOTE — OR NURSING
Jaspreet notified that pt took Eliquis this am.  Instructed pt to hold Eliquis 6/30 and 7/1.  Pt agreed. NM

## 2018-06-30 ENCOUNTER — PATIENT MESSAGE (OUTPATIENT)
Dept: ADMINISTRATIVE | Facility: OTHER | Age: 68
End: 2018-06-30

## 2018-07-02 ENCOUNTER — HOSPITAL ENCOUNTER (OUTPATIENT)
Facility: HOSPITAL | Age: 68
Discharge: HOME | End: 2018-07-02
Attending: INTERNAL MEDICINE | Admitting: INTERNAL MEDICINE
Payer: MEDICARE

## 2018-07-02 ENCOUNTER — ANESTHESIA (OUTPATIENT)
Dept: ENDOSCOPY | Facility: HOSPITAL | Age: 68
End: 2018-07-02
Payer: MEDICARE

## 2018-07-02 ENCOUNTER — SURGERY (OUTPATIENT)
Age: 68
End: 2018-07-02

## 2018-07-02 VITALS
RESPIRATION RATE: 16 BRPM | WEIGHT: 300 LBS | BODY MASS INDEX: 38.5 KG/M2 | SYSTOLIC BLOOD PRESSURE: 130 MMHG | OXYGEN SATURATION: 98 % | HEART RATE: 70 BPM | DIASTOLIC BLOOD PRESSURE: 80 MMHG | TEMPERATURE: 98 F | HEIGHT: 74 IN

## 2018-07-02 DIAGNOSIS — D50.9 IDA (IRON DEFICIENCY ANEMIA): ICD-10-CM

## 2018-07-02 PROCEDURE — 37000009 HC ANESTHESIA EA ADD 15 MINS: Mod: PO | Performed by: INTERNAL MEDICINE

## 2018-07-02 PROCEDURE — 43239 EGD BIOPSY SINGLE/MULTIPLE: CPT | Mod: PO | Performed by: INTERNAL MEDICINE

## 2018-07-02 PROCEDURE — 88305 TISSUE EXAM BY PATHOLOGIST: CPT | Mod: 26,,, | Performed by: PATHOLOGY

## 2018-07-02 PROCEDURE — D9220A PRA ANESTHESIA: Mod: ANES,,, | Performed by: ANESTHESIOLOGY

## 2018-07-02 PROCEDURE — 37000008 HC ANESTHESIA 1ST 15 MINUTES: Mod: PO | Performed by: INTERNAL MEDICINE

## 2018-07-02 PROCEDURE — D9220A PRA ANESTHESIA: Mod: CRNA,,, | Performed by: NURSE ANESTHETIST, CERTIFIED REGISTERED

## 2018-07-02 PROCEDURE — 27201012 HC FORCEPS, HOT/COLD, DISP: Mod: PO | Performed by: INTERNAL MEDICINE

## 2018-07-02 PROCEDURE — 63600175 PHARM REV CODE 636 W HCPCS: Mod: PO | Performed by: NURSE ANESTHETIST, CERTIFIED REGISTERED

## 2018-07-02 PROCEDURE — 88305 TISSUE EXAM BY PATHOLOGIST: CPT | Mod: 59 | Performed by: PATHOLOGY

## 2018-07-02 PROCEDURE — 43239 EGD BIOPSY SINGLE/MULTIPLE: CPT | Mod: ,,, | Performed by: INTERNAL MEDICINE

## 2018-07-02 PROCEDURE — 25000003 PHARM REV CODE 250: Mod: PO | Performed by: INTERNAL MEDICINE

## 2018-07-02 RX ORDER — SODIUM CHLORIDE, SODIUM LACTATE, POTASSIUM CHLORIDE, CALCIUM CHLORIDE 600; 310; 30; 20 MG/100ML; MG/100ML; MG/100ML; MG/100ML
INJECTION, SOLUTION INTRAVENOUS CONTINUOUS
Status: DISCONTINUED | OUTPATIENT
Start: 2018-07-02 | End: 2018-07-02 | Stop reason: HOSPADM

## 2018-07-02 RX ORDER — LIDOCAINE HCL/PF 100 MG/5ML
SYRINGE (ML) INTRAVENOUS
Status: DISCONTINUED | OUTPATIENT
Start: 2018-07-02 | End: 2018-07-02

## 2018-07-02 RX ORDER — LIDOCAINE HYDROCHLORIDE 10 MG/ML
1 INJECTION, SOLUTION EPIDURAL; INFILTRATION; INTRACAUDAL; PERINEURAL ONCE
Status: DISCONTINUED | OUTPATIENT
Start: 2018-07-02 | End: 2018-07-02 | Stop reason: HOSPADM

## 2018-07-02 RX ORDER — PROPOFOL 10 MG/ML
VIAL (ML) INTRAVENOUS
Status: DISCONTINUED | OUTPATIENT
Start: 2018-07-02 | End: 2018-07-02

## 2018-07-02 RX ADMIN — SODIUM CHLORIDE, SODIUM LACTATE, POTASSIUM CHLORIDE, AND CALCIUM CHLORIDE: 600; 310; 30; 20 INJECTION, SOLUTION INTRAVENOUS at 08:07

## 2018-07-02 RX ADMIN — PROPOFOL 50 MG: 10 INJECTION, EMULSION INTRAVENOUS at 09:07

## 2018-07-02 RX ADMIN — LIDOCAINE HYDROCHLORIDE 100 MG: 20 INJECTION, SOLUTION INTRAVENOUS at 09:07

## 2018-07-02 RX ADMIN — PROPOFOL 25 MG: 10 INJECTION, EMULSION INTRAVENOUS at 09:07

## 2018-07-02 RX ADMIN — PROPOFOL 150 MG: 10 INJECTION, EMULSION INTRAVENOUS at 09:07

## 2018-07-02 NOTE — OR NURSING
Pt. Had half cup of black coffee @ 6-6:30. Per Dr. Sexton pt. Cannot go to procedure until 8:30. Pt. Aware of plan of care.

## 2018-07-02 NOTE — DISCHARGE SUMMARY
Discharge Note  Short Stay      SUMMARY     Admit Date: 7/2/2018    Attending Physician: Jameson Houston MD     Discharge Physician: Jameson Houston MD    Discharge Date: 7/2/2018 9:13 AM    Final Diagnosis: Anemia, unspecified type [D64.9]  Rectal bleed [K62.5]    Disposition: HOME OR SELF CARE    Patient Instructions:   Current Discharge Medication List      CONTINUE these medications which have NOT CHANGED    Details   amLODIPine (NORVASC) 5 MG tablet TAKE 1 TABLET ONE TIME DAILY. Hold for systolic blood pressure less than 120  Qty: 90 tablet, Refills: 2      atenolol (TENORMIN) 100 MG tablet TAKE 1 TABLET ONE TIME DAILY.  Qty: 90 tablet, Refills: 2      benazepril (LOTENSIN) 40 MG tablet TAKE 1 TABLET ONE TIME DAILY. Hold for systolic blood pressure less than 120  Qty: 90 tablet, Refills: 2      doxazosin (CARDURA) 2 MG tablet Take 1 tablet (2 mg total) by mouth every evening.  Qty: 90 tablet, Refills: 2      ferrous sulfate 325 (65 FE) MG EC tablet Take 1 tablet (325 mg total) by mouth 2 (two) times daily.  Qty: 60 tablet, Refills: 0    Associated Diagnoses: Low iron      finasteride (PROSCAR) 5 mg tablet Take 1 tablet (5 mg total) by mouth once daily.  Qty: 90 tablet, Refills: 2      hydroCHLOROthiazide (HYDRODIURIL) 12.5 MG Tab Take 1 tablet (12.5 mg total) by mouth once daily.  Qty: 90 tablet, Refills: 2    Associated Diagnoses: Essential hypertension      iron-vitamin C 100-250 mg, ICAR-C, 100-250 mg Tab Take one tablet in the morning with an acidic drink before breakfast.  Qty: 90 tablet, Refills: 0    Associated Diagnoses: Iron deficiency anemia due to chronic blood loss      multivitamin-minerals-lutein (CENTRUM SILVER) Tab Take by mouth. 1 Tablet Oral Every day      pantoprazole (PROTONIX) 40 MG tablet Take 1 tablet (40 mg total) by mouth once daily.  Qty: 90 tablet, Refills: 3    Associated Diagnoses: Gastroesophageal reflux disease, esophagitis presence not specified      psyllium (METAMUCIL)  powder Take 1 packet by mouth once daily.      apixaban 5 mg Tab Take 1 tablet (5 mg total) by mouth 2 (two) times daily.  Qty: 180 tablet, Refills: 3             Discharge Procedure Orders (must include Diet, Follow-up, Activity)    Follow Up:  Follow up with PCP as previously scheduled  Resume routine diet.  Activity as tolerated.    No driving day of procedure.

## 2018-07-02 NOTE — H&P
History & Physical - Short Stay  Gastroenterology      SUBJECTIVE:     Procedure: EGD    Chief Complaint/Indication for Procedure: Iron Deficiency Anemia and Gastric ulcer    PTA Medications   Medication Sig    amLODIPine (NORVASC) 5 MG tablet TAKE 1 TABLET ONE TIME DAILY. Hold for systolic blood pressure less than 120    atenolol (TENORMIN) 100 MG tablet TAKE 1 TABLET ONE TIME DAILY.    benazepril (LOTENSIN) 40 MG tablet TAKE 1 TABLET ONE TIME DAILY. Hold for systolic blood pressure less than 120    doxazosin (CARDURA) 2 MG tablet Take 1 tablet (2 mg total) by mouth every evening.    ferrous sulfate 325 (65 FE) MG EC tablet Take 1 tablet (325 mg total) by mouth 2 (two) times daily.    finasteride (PROSCAR) 5 mg tablet Take 1 tablet (5 mg total) by mouth once daily.    hydroCHLOROthiazide (HYDRODIURIL) 12.5 MG Tab Take 1 tablet (12.5 mg total) by mouth once daily.    iron-vitamin C 100-250 mg, ICAR-C, 100-250 mg Tab Take one tablet in the morning with an acidic drink before breakfast.    multivitamin-minerals-lutein (CENTRUM SILVER) Tab Take by mouth. 1 Tablet Oral Every day    pantoprazole (PROTONIX) 40 MG tablet Take 1 tablet (40 mg total) by mouth once daily.    psyllium (METAMUCIL) powder Take 1 packet by mouth once daily.    apixaban 5 mg Tab Take 1 tablet (5 mg total) by mouth 2 (two) times daily.       Review of patient's allergies indicates:  No Known Allergies     Past Medical History:   Diagnosis Date    A-fib     Arthritis     Colon polyp     Diverticulosis     Dyslipidemia     GERD (gastroesophageal reflux disease)     on Protonix    Hepatic steatosis     Hyperlipidemia     Hypertension     Irregular heart beat     Multiple gastric ulcers 05/16/2018    gi scope per Dr. Houston    Obesity     Sleep apnea     uses CPAP    Unspecified disorder of kidney and ureter     kidney stones     Past Surgical History:   Procedure Laterality Date    APPENDECTOMY      CATARACT EXTRACTION   2009    OS    CATARACT EXTRACTION W/  INTRAOCULAR LENS IMPLANT Right 6/3/2015    [id number] 14.5d//    COLONOSCOPY N/A 10/10/2017    Procedure: COLONOSCOPY;  Surgeon: Jameson Houston MD;  Location: [facility] ENDO;  Service: Endoscopy;  Laterality: N/A; repeat in 5 years for surveillance    COLONOSCOPY N/A 5/16/2018    Procedure: COLONOSCOPY;  Surgeon: Jameson Houston MD;  [email or web address];  Service: Endoscopy;  Laterality: N/A;    EYE SURGERY      cataract    TONSILLECTOMY      UPPER GASTROINTESTINAL ENDOSCOPY  05/16/2018    Dr. Houston     Family History   Problem Relation Age of Onset    Arthritis Mother     Heart disease Mother     Blindness Mother         OS due to injury    Coronary artery disease Father     Heart disease Father     Cataracts Father     Amblyopia Neg Hx     Cancer Neg Hx     Diabetes Neg Hx     Glaucoma Neg Hx     Hypertension Neg Hx     Macular degeneration Neg Hx     Retinal detachment Neg Hx     Strabismus Neg Hx     Stroke Neg Hx     Thyroid disease Neg Hx     Colon cancer Neg Hx     Colon polyps Neg Hx     Crohn's disease Neg Hx     Ulcerative colitis Neg Hx     Esophageal cancer Neg Hx     Stomach cancer Neg Hx      Social History   Substance Use Topics    Smoking status: Former Smoker     Start date: 10/5/1997    Smokeless tobacco: Never Used    Alcohol use Yes      Comment: 1 beer every 2 weeks         OBJECTIVE:     Vital Signs (Most Recent)       Physical Exam:                                                       GENERAL:  Comfortable, in no acute distress.                                 HEENT EXAM:  Nonicteric.  No adenopathy.  Oropharynx is clear.               NECK:  Supple.                                                               LUNGS:  Clear.                                                               CARDIAC:  Regular rate and rhythm.  S1, S2.  No murmur.                      ABDOMEN:  Soft, positive bowel sounds, nontender.  No  hepatosplenomegaly or masses.  No rebound or guarding.                                             EXTREMITIES:  No edema.     MENTAL STATUS:  Normal, alert and oriented.      ASSESSMENT/PLAN:     Assessment: Iron Deficiency Anemia and Gastric ulcer    Plan: EGD    Anesthesia Plan: General    ASA Grade: ASA 2 - Patient with mild systemic disease with no functional limitations    MALLAMPATI SCORE:  I (soft palate, uvula, fauces, and tonsillar pillars visible)

## 2018-07-02 NOTE — TRANSFER OF CARE
"Anesthesia Transfer of Care Note    Patient: Zeenat Mancia    Procedure(s) Performed: Procedure(s) (LRB):  EGD (ESOPHAGOGASTRODUODENOSCOPY) (N/A)    Patient location: PACU    Anesthesia Type: general    Transport from OR: Transported from OR on room air with adequate spontaneous ventilation    Post pain: adequate analgesia    Post assessment: no apparent anesthetic complications    Post vital signs: stable    Level of consciousness: awake    Nausea/Vomiting: no nausea/vomiting    Complications: none    Transfer of care protocol was followed      Last vitals:   Visit Vitals  BP (!) 154/82 (BP Location: Right arm, Patient Position: Lying)   Pulse (!) 59   Temp 36.7 °C (98.1 °F) (Skin)   Resp 17   Ht 6' 2" (1.88 m)   Wt 136.1 kg (300 lb)   SpO2 98%   BMI 38.52 kg/m²     "

## 2018-07-02 NOTE — PLAN OF CARE
Vss, raissa po fluids, denies pain, ambulates easily. IV removed, catheter intact. Discharge instructions provided and states understanding. States ready to go home.  Discharged from facility with family.

## 2018-07-02 NOTE — ANESTHESIA POSTPROCEDURE EVALUATION
"Anesthesia Post Evaluation    Patient: Zeenat Mancia    Procedure(s) Performed: Procedure(s) (LRB):  EGD (ESOPHAGOGASTRODUODENOSCOPY) (N/A)    Final Anesthesia Type: general  Patient location during evaluation: PACU  Patient participation: Yes- Able to Participate  Level of consciousness: awake and alert and oriented  Post-procedure vital signs: reviewed and stable  Pain management: adequate  Airway patency: patent  PONV status at discharge: No PONV  Anesthetic complications: no      Cardiovascular status: blood pressure returned to baseline and stable  Respiratory status: unassisted and spontaneous ventilation  Hydration status: euvolemic  Follow-up not needed.        Visit Vitals  /80   Pulse 70   Temp 36.7 °C (98.1 °F) (Skin)   Resp 16   Ht 6' 2" (1.88 m)   Wt 136.1 kg (300 lb)   SpO2 98%   BMI 38.52 kg/m²       Pain/Jose Score: Pain Assessment Performed: Yes (7/2/2018  9:15 AM)  Presence of Pain: denies (7/2/2018  8:20 AM)  Jose Score: 10 (7/2/2018  9:46 AM)      "

## 2018-07-02 NOTE — [employer] PATIENT INSTRUCTIONS
Discharge Summary/Instructions after an Endoscopic Procedure  Patient Name: Zeenat aMncia  Patient MRN: [id number]  Patient YOB: 1950 Monday, July 02, 2018  Jameson Houston MD  RESTRICTIONS:  During your procedure today, you received medications for sedation.  These   medications may affect your judgment, balance and coordination.  Therefore,   for 24 hours, you have the following restrictions:   - DO NOT drive a car, operate machinery, make legal/financial decisions,   sign important papers or drink alcohol.    ACTIVITY:  Today: no heavy lifting, straining or running due to procedural   sedation/anesthesia.  The following day: return to full activity including work.  DIET:  Eat and drink normally unless instructed otherwise.     TREATMENT FOR COMMON SIDE EFFECTS:  - Mild abdominal pain, nausea, belching, bloating or excessive gas:  rest,   eat lightly and use a heating pad.  - Sore Throat: treat with throat lozenges and/or gargle with warm salt   water.  - Because air was used during the procedure, expelling large amounts of air   from your rectum or belching is normal.  - If a bowel prep was taken, you may not have a bowel movement for 1-3 days.    This is normal.  SYMPTOMS TO WATCH FOR AND REPORT TO YOUR PHYSICIAN:  1. Abdominal pain or bloating, other than gas cramps.  2. Chest pain.  3. Back pain.  4. Signs of infection such as: chills or fever occurring within 24 hours   after the procedure.  5. Rectal bleeding, which would show as bright red, maroon, or black stools.   (A tablespoon of blood from the rectum is not serious, especially if   hemorrhoids are present.)  6. Vomiting.  7. Weakness or dizziness.  GO DIRECTLY TO THE NEAREST EMERGENCY ROOM IF YOU HAVE ANY OF THE FOLLOWING:      Difficulty breathing              Chills and/or fever over 101 F   Persistent vomiting and/or vomiting blood   Severe abdominal pain   Severe chest pain   Black, tarry stools   Bleeding- more than one  tablespoon   Any other symptom or condition that you feel may need urgent attention  Your doctor recommends these additional instructions:  If any biopsies were taken, your doctors clinic will contact you in 1 to 2   weeks with any results.  - Await pathology results.   - Continue present medications.   - Discharge patient to home (ambulatory).  For questions, problems or results please call your physician - Jameson Houston MD at Work: [phone].  EMERGENCY PHONE NUMBER: [phone], LAB RESULTS: [phone]  IF A COMPLICATION OR EMERGENCY SITUATION ARISES AND YOU ARE UNABLE TO REACH   YOUR PHYSICIAN - GO DIRECTLY TO THE EMERGENCY ROOM.  ___________________________________________  Nurse Signature  ___________________________________________  Patient/Designated Responsible Party Signature  Jameson Houston MD  7/2/2018 9:12:37 AM  This report has been verified and signed electronically.  [employer]

## 2018-07-02 NOTE — ANESTHESIA PREPROCEDURE EVALUATION
07/02/2018  Zeenat Mancia is a 68 y.o., male.    Anesthesia Evaluation    I have reviewed the Patient Summary Reports.    I have reviewed the Nursing Notes.   I have reviewed the Medications.     Review of Systems  Anesthesia Hx:  No problems with previous Anesthesia    Social:  Former Smoker    Cardiovascular:   Hypertension, well controlled Dysrhythmias (on Eliquis) atrial fibrillation hyperlipidemia    Pulmonary:   Sleep Apnea    Renal/:   Chronic Renal Disease    Hepatic/GI:   PUD, GERD, well controlled Liver Disease,    Musculoskeletal:   Arthritis     Neurological:  Neurology Normal    Endocrine:  Endocrine Normal        Physical Exam  General:  Well nourished    Airway/Jaw/Neck:  Airway Findings: Mouth Opening: Normal Tongue: Normal  General Airway Assessment: Adult  Oropharynx Findings:  Mallampati: II  Jaw/Neck Findings:  Neck ROM: Normal ROM     Eyes/Ears/Nose:  Eyes/Ears/Nose Findings:    Dental:  Dental Findings:   Chest/Lungs:  Chest/Lungs Findings: Normal Respiratory Rate     Heart/Vascular:  Heart Findings: Rate: Normal  Rhythm: Regular Rhythm        Mental Status:  Mental Status Findings:  Cooperative, Alert and Oriented         Anesthesia Plan  Type of Anesthesia, risks & benefits discussed:  Anesthesia Type:  general  Patient's Preference:   Intra-op Monitoring Plan:   Intra-op Monitoring Plan Comments:   Post Op Pain Control Plan: multimodal analgesia  Post Op Pain Control Plan Comments:   Induction:   IV  Beta Blocker:  Patient is on a Beta-Blocker and has received one dose within the past 24 hours (No further documentation required).       Informed Consent: Patient understands risks and agrees with Anesthesia plan.  Questions answered. Anesthesia consent signed with patient.  ASA Score: 4     Day of Surgery Review of History & Physical:  There are no significant changes.   H&P completed  by Anesthesiologist.   Anesthesia Plan Notes: Pt NPO since 6:00 - 6:30 - black coffee small amount.        Ready For Surgery From Anesthesia Perspective.        none

## 2018-07-03 ENCOUNTER — PATIENT MESSAGE (OUTPATIENT)
Dept: ADMINISTRATIVE | Facility: OTHER | Age: 68
End: 2018-07-03

## 2018-07-07 ENCOUNTER — PATIENT MESSAGE (OUTPATIENT)
Dept: FAMILY MEDICINE | Facility: CLINIC | Age: 68
End: 2018-07-07

## 2018-07-10 ENCOUNTER — PATIENT OUTREACH (OUTPATIENT)
Dept: OTHER | Facility: OTHER | Age: 68
End: 2018-07-10

## 2018-07-10 NOTE — LETTER
Iona Beach PharmD  [address] [place] [address]     Dear Zeenat Mancia,    Welcome to the [facility]!           My name is Iona Beach PharmD and I am your dedicated Digital Medicine clinician.  As an expert in medication management, I will help ensure that the medications you are taking continue to provide you with the intended benefits.        I am Sanchez Cueto and I will be your health  for the duration of the program.  My  job is to help you identify lifestyle changes to improve your blood pressure control.  We will talk about nutrition, exercise, and other ways that you may be able to adjust your current habits to better your health. Together, we will work to improve your overall health and encourage you to meet your goals for a healthier lifestyle.    What we expect from YOU:    You will need to take blood pressure readings multiple times a week and no less than one reading per week.   It is important that you take your measurements at different times during the day, when possible.     What you should expect from your Digital Medicine Care Team:   We will provide you with education about high blood pressure, including lifestyle changes that could help you to control your blood pressure.   We will review your weekly readings and provide you with monthly blood pressure progress reports after you have been in the program for more than 30 days.   We will send monthly progress reports on your blood pressure control to your physician so they can follow along with your progress as well.    You will be able to reach me by phone at [phone] or through your [facility] account by clicking my name under Care Team on the right side of the home screen.    I look forward to working with you to achieve your blood pressure goals!    Sincerely,    Iona Beach PharmD  Your personal clinician    Please visit [email or web address] to  learn more about high blood pressure and what you can do lower your blood pressure.                                                                                           Zeenat Mancia  [address] [address] [address]

## 2018-07-10 NOTE — PROGRESS NOTES
Last 5 Patient Entered Readings                                      Current 30 Day Average: 135/84     Recent Readings 7/10/2018 7/9/2018 7/8/2018 7/7/2018 7/6/2018    SBP (mmHg) 122 128 142 139 134    DBP (mmHg) 86 78 90 86 75    Pulse 58 54 55 58 49        Mr. Zeenat Mancia is a 68 y.o. male who is newly enrolled in the [facility]pert[facility].     The following information was reviewed/updated:  Preferred pharmacy   [id number] - [facility] [address] [address] AT [facility] [address] [facility] [address]  [address] [address] [address]  Phone: [phone] Fax: [phone]    [facility] - [place] [id number] [facility]  [address] [facility] [address]  Phone: [phone] Fax: [phone]    [facility]  [address] [facility] [address]  Phone: [phone] Fax: [phone]    [facility] - [place], [place] - [address] AT [facility] [address]  [address]  [facility] [address]  Phone: [phone] Fax: [phone]      Patient prefers a 90 days supply    HYPERTENSION    Explained that we expect patient to obtain several blood pressures per week at random times of day.   Our goal is to get  BP to consistently below 130/80mmHg and make the process convenient so patient can avoid extra trips to the office. Getting your blood pressure below 130/80mmHg (definition of control) will reduce your risk for heart attack, kidney failure, stroke and death (as well as kidney failure, eye disease, & dementia).     Patient is not meeting the goal already. Current BP average 135/84 mmHg.  When asked what the patient thinks is causing BP to be elevated, he states: uses salt, lack of energy    Discussed appropriate BP measuring technique:  Before taking your blood pressure, find a quiet place. You will need to listen for your heartbeat.  Roll up the sleeve on your left arm or remove any  tight-sleeved clothing, if needed. (It's best to take your blood pressure from your left arm if you are right-handed.You can use the other arm if you have been told by your health care provider to do so.)  Rest in a chair next to a table for 5 to 10 minutes. (Your left arm should rest comfortably at heart level.)  Sit up straight with your back against the chair, legs uncrossed and on the ground.  Rest your forearm on the table with the palm of your hand facing up.  You should not talk, read the newspaper, or watch television during this process.    Last 5 Patient Entered Readings                                      Current 30 Day Average: 135/84     Recent Readings 7/10/2018 7/9/2018 7/8/2018 7/7/2018 7/6/2018    SBP (mmHg) 122 128 142 139 134    DBP (mmHg) 86 78 90 86 75    Pulse 58 54 55 58 49           Instructed patient not to allow anyone else to use phone and BP cuff or glucometer.   I'm not available for emergencies. Patient will call [facility] on-call ([phone] or [phone]) or [phone] if needed.     Patient and I agreed that he will continue to monitor blood pressure, blood glucose, and sodium intake and continue to remain adherent to medications.     I will plan to follow-up with the patient in 2 weeks.   Emailed [facility] to [facility] webpages and my contact information in case he has any questions.        Lifestyle Assessment:    Diet: Recommended <2,000mg of sodium per day.  Patient admits to salting his food.  Patient states that his wife does not cook with salt, so he finds himself salting his food.  Informed patient of salt-free seasonings.  Patient expressed interest in resource being sent to email.  Will email salt free seasonings.     Exercise: Patient states that he walks his dog twice a day for 30 minutes each time.  Patient states that he walks about a mile a day.  Patient states that he recently purchased a recumbent bike for his house.  Patient states that he  has low iron levels and reports no energy for the past couple of weeks.  Patient states he received infusions and reports that he is getting his energy back.    Alcohol/Tobacco: Patient reports that he rarely drinks, maybe one beer every couple of weeks.  Patient reports no tobacco use.    Medication: Patient reports no complaints with medication.    Patient states that he was sitting in his recliner taking his readings.  Informed patient about proper technique.  Encouraged patient to find kitchen table chair where he can plant both feet on the group and rest his back against the chair.  Informed patient to rest his arm on the table with palm facing up and rest for 5-10 minutes before taking a reading.

## 2018-07-12 ENCOUNTER — TELEPHONE (OUTPATIENT)
Dept: HEMATOLOGY/ONCOLOGY | Facility: CLINIC | Age: 68
End: 2018-07-12

## 2018-07-12 NOTE — TELEPHONE ENCOUNTER
Needed afternoon appt for infusion patient on 7/18/18,called Mr. Mancia and he gladly changed from afternoon lab and follow up to the morning to 8:15 am lab and 9 am follow up. Mr. Mancia voiced understanding and appreciation.

## 2018-07-18 ENCOUNTER — LAB VISIT (OUTPATIENT)
Dept: LAB | Facility: HOSPITAL | Age: 68
End: 2018-07-18
Attending: NURSE PRACTITIONER
Payer: MEDICARE

## 2018-07-18 ENCOUNTER — OFFICE VISIT (OUTPATIENT)
Dept: HEMATOLOGY/ONCOLOGY | Facility: CLINIC | Age: 68
End: 2018-07-18
Payer: MEDICARE

## 2018-07-18 VITALS
DIASTOLIC BLOOD PRESSURE: 71 MMHG | WEIGHT: 302.5 LBS | RESPIRATION RATE: 20 BRPM | TEMPERATURE: 98 F | HEIGHT: 74 IN | HEART RATE: 56 BPM | SYSTOLIC BLOOD PRESSURE: 113 MMHG | BODY MASS INDEX: 38.82 KG/M2

## 2018-07-18 DIAGNOSIS — D50.0 IRON DEFICIENCY ANEMIA DUE TO CHRONIC BLOOD LOSS: ICD-10-CM

## 2018-07-18 DIAGNOSIS — D50.9 IRON DEFICIENCY ANEMIA, UNSPECIFIED IRON DEFICIENCY ANEMIA TYPE: Primary | ICD-10-CM

## 2018-07-18 LAB
BASOPHILS # BLD AUTO: 0.05 K/UL
BASOPHILS NFR BLD: 0.7 %
DIFFERENTIAL METHOD: ABNORMAL
EOSINOPHIL # BLD AUTO: 0.4 K/UL
EOSINOPHIL NFR BLD: 6.4 %
ERYTHROCYTE [DISTWIDTH] IN BLOOD BY AUTOMATED COUNT: 20 %
HCT VFR BLD AUTO: 51.3 %
HGB BLD-MCNC: 16.4 G/DL
LYMPHOCYTES # BLD AUTO: 1 K/UL
LYMPHOCYTES NFR BLD: 15.4 %
MCH RBC QN AUTO: 28.5 PG
MCHC RBC AUTO-ENTMCNC: 32 G/DL
MCV RBC AUTO: 89 FL
MONOCYTES # BLD AUTO: 0.8 K/UL
MONOCYTES NFR BLD: 11.6 %
NEUTROPHILS # BLD AUTO: 4.4 K/UL
NEUTROPHILS NFR BLD: 65.9 %
NRBC BLD-RTO: 0 /100 WBC
PLATELET # BLD AUTO: 166 K/UL
PMV BLD AUTO: 9.9 FL
RBC # BLD AUTO: 5.76 M/UL
WBC # BLD AUTO: 6.71 K/UL

## 2018-07-18 PROCEDURE — 85025 COMPLETE CBC W/AUTO DIFF WBC: CPT

## 2018-07-18 PROCEDURE — 36415 COLL VENOUS BLD VENIPUNCTURE: CPT | Mod: PN

## 2018-07-18 PROCEDURE — 99213 OFFICE O/P EST LOW 20 MIN: CPT | Mod: S$GLB,,, | Performed by: NURSE PRACTITIONER

## 2018-07-18 PROCEDURE — 99999 PR PBB SHADOW E&M-EST. PATIENT-LVL IV: CPT | Mod: PBBFAC,,, | Performed by: NURSE PRACTITIONER

## 2018-07-18 PROCEDURE — 3078F DIAST BP <80 MM HG: CPT | Mod: S$GLB,,, | Performed by: NURSE PRACTITIONER

## 2018-07-18 PROCEDURE — 85025 COMPLETE CBC W/AUTO DIFF WBC: CPT | Mod: PN

## 2018-07-18 PROCEDURE — 3074F SYST BP LT 130 MM HG: CPT | Mod: S$GLB,,, | Performed by: NURSE PRACTITIONER

## 2018-07-18 NOTE — PROGRESS NOTES
HISTORY OF PRESENT ILLNESS:  This is a 68-year-old white gentleman known to   Dr. Johnson in Family Medicine for a diagnosis of iron deficiency anemia related to   recent GI blood loss.  The patient was also recently diagnosed with atrial fib   and placed on Eliquis.  The patient presented with complaints of fatigue and dizziness.    He received 2 infusions of Injectafer and presents to the clinic 3 weeks later for   reevaluation.  He denies any headaches, blurred vision, fatigue, dizziniess, chest pain,   irregular heartbeat, bleeding, cold hands or feet, pica, etc. No other new complaints   or pertinent findings on a 10-point review of systems.    PHYSICAL EXAMINATION:  GENERAL:  Well-developed, well-nourished, obese, white gentleman in no acute   distress.  Alert and oriented x4.  VITAL SIGNS:  Weight:  Loss of 7 pounds in 5 weeks  Wt Readings from Last 3 Encounters:   07/18/18 (!) 137.2 kg (302 lb 7.5 oz)   06/29/18 136.1 kg (300 lb)   06/25/18 (!) 140.4 kg (309 lb 8 oz)     Temp Readings from Last 3 Encounters:   07/18/18 97.9 °F (36.6 °C) (Oral)   07/02/18 98.1 °F (36.7 °C) (Skin)   06/25/18 98.2 °F (36.8 °C)     BP Readings from Last 3 Encounters:   07/18/18 113/71   07/02/18 130/80   06/25/18 121/79     Pulse Readings from Last 3 Encounters:   07/18/18 (!) 56   07/02/18 70   06/25/18 69       HEENT:  Normocephalic, atraumatic.  Oral mucosa pink and moist.  Lips without   lesions.  Tongue midline.  Oropharynx clear.  Nonicteric sclerae.   NECK:  Supple, no adenopathy.  No carotid bruits, thyromegaly or thyroid nodule.  HEART:  Regular rate and rhythm without murmur, gallop or rub.                LUNGS:  Clear to auscultation bilaterally.  Normal respiratory effort.       ABDOMEN:  Soft, nontender, nondistended with positive normoactive bowel sounds,   no hepatosplenomegaly.    EXTREMITIES:  No cyanosis, clubbing or edema.  Distal pulses are intact.            LABORATORY:    Lab Results   Component Value Date     WBC 6.71 07/18/2018    HGB 16.4 07/18/2018    HCT 51.3 07/18/2018    MCV 89 07/18/2018     07/18/2018     Hgb improved from 11.8 to 16.4    IMPRESSION:  Iron deficiency anemia due to blood loss - S/P Injectafer with excellent results    PLAN:  1.  Return in three months with interval CBC for reevaluation.  2.  The patient will call the office for any worsening signs and symptoms of anemia.      Assessment/plan reviewed and approved by Dr. Austin.

## 2018-07-26 ENCOUNTER — PATIENT OUTREACH (OUTPATIENT)
Dept: OTHER | Facility: OTHER | Age: 68
End: 2018-07-26

## 2018-07-26 NOTE — PROGRESS NOTES
"Last 5 Patient Entered Readings                                      Current 30 Day Average: 131/86     Recent Readings 7/26/2018 7/26/2018 7/23/2018 7/23/2018 7/22/2018    SBP (mmHg) 133 141 133 147 129    DBP (mmHg) 87 86 94 96 82    Pulse 70 75 86 83 60        Patient returned call.    Digital Medicine: Health  Follow Up    Lifestyle Modifications:    1.Dietary Modifications (Sodium intake <2,000mg/day, food labels, dining out): Patient states that he and his wife bought the salt-free seasonings and said they were ok.  States he is a "salt fanatic" but said the salt free seasonings were good.  Asked patient if he would be interested in receiving recipes.  Patient agreed.  Emailed quick and easy low sodium recipes.    2.Physical Activity: deferred    3.Medication Therapy: Patient has been compliant with the medication regimen.    4.Patient has the following medication side effects/concerns: none.  (Frequency/Alleviating factors/Precipitating factors, etc.)     Follow up with Mr. Zeenat Mancia completed. No further questions or concerns. Will continue follow up to achieve health goals.    "

## 2018-07-26 NOTE — PROGRESS NOTES
Last 5 Patient Entered Readings                                      Current 30 Day Average: 131/86     Recent Readings 7/26/2018 7/26/2018 7/23/2018 7/23/2018 7/22/2018    SBP (mmHg) 133 141 133 147 129    DBP (mmHg) 87 86 94 96 82    Pulse 70 75 86 83 60          07/26: LVM.  Will call back on 08/02

## 2018-07-30 ENCOUNTER — PATIENT MESSAGE (OUTPATIENT)
Dept: GASTROENTEROLOGY | Facility: CLINIC | Age: 68
End: 2018-07-30

## 2018-07-30 ENCOUNTER — PATIENT OUTREACH (OUTPATIENT)
Dept: OTHER | Facility: OTHER | Age: 68
End: 2018-07-30

## 2018-07-30 RX ORDER — CETIRIZINE HYDROCHLORIDE 10 MG/1
10 TABLET ORAL DAILY
COMMUNITY
End: 2019-04-18

## 2018-07-30 NOTE — PROGRESS NOTES
MrAnge Mancia is a 68 y.o. male who is newly enrolled in the [facility].     The following information was reviewed/updated:  Preferred pharmacy   [facility] - [facility] [address]  [address]  [facility] [address]  Phone: [phone] Fax: [phone]    [facility] - [facility] [address]  [address] [address]  Phone: [phone] Fax: [phone]    [facility] [place] [address] [place] [address]  Phone: [phone] Fax: [phone]    [facility] - [place], [place] - [address] AT [facility] [address]  [address]  [facility] [address]  Phone: [phone] Fax: [phone]    Patient prefers a 90 days supply  Review of patient's allergies indicates:  No Known Allergies  Current Outpatient Prescriptions on File Prior to Visit   Medication Sig Dispense Refill    amLODIPine (NORVASC) 5 MG tablet TAKE 1 TABLET ONE TIME DAILY. Hold for systolic blood pressure less than 120 90 tablet 2    apixaban 5 mg Tab Take 1 tablet (5 mg total) by mouth 2 (two) times daily. 180 tablet 3    atenolol (TENORMIN) 100 MG tablet TAKE 1 TABLET ONE TIME DAILY. 90 tablet 2    benazepril (LOTENSIN) 40 MG tablet TAKE 1 TABLET ONE TIME DAILY. Hold for systolic blood pressure less than 120 90 tablet 2    doxazosin (CARDURA) 2 MG tablet Take 1 tablet (2 mg total) by mouth every evening. 90 tablet 2    finasteride (PROSCAR) 5 mg tablet Take 1 tablet (5 mg total) by mouth once daily. 90 tablet 2    hydroCHLOROthiazide (HYDRODIURIL) 12.5 MG Tab Take 1 tablet (12.5 mg total) by mouth once daily. 90 tablet 2    multivitamin-minerals-lutein (CENTRUM SILVER) Tab Take by mouth. 1 Tablet Oral Every day      pantoprazole (PROTONIX) 40 MG tablet Take 1 tablet (40 mg total) by mouth once daily. 90 tablet 3    psyllium (METAMUCIL)  "powder Take 1 packet by mouth once daily.       No current facility-administered medications on file prior to visit.        IF DEPRESSED    Responses to the depression screening suggest patient is having depressive symptoms. Patient is not followed for this and is not interested in referral. States that it was at the time of the questionnaire. Fatigued however, it was due to iron anemia. Received iron infusions and fatigue and "low mood" has resolved.     Leonel Johnson MD indicated he would like to see patient for further evaluation.     IF JACKELYN screen positive    JACKELYN screening results for this patient suggest a high likelihood of sleep apnea, which can contribute to hypertension. Patient has been previously diagnosed with sleep apnea and is not interested in referral at this time. Mr. Mancia reports consistent CPAP use at least 8 per night. JACKELYN is managed effectively with CPAP use.     Leonel Johnson MD indicated he would like to have patient  referred to a specialist for further evaluation.        Explained that we expect patient to obtain several blood pressures per week at random times of day.   Our goal is to get  BP to consistently below 130/80 mmHg and make the process convenient so patient can avoid extra trips to the office. Getting your blood pressure below 130/80 mmHg (definition of control) will reduce your risk for heart attack, kidney failure, stroke and death (as well as kidney failure, eye disease, & dementia).     Patient is not meeting the goal already.   When asked what the patient thinks is causing BP to be elevated, he states: it depends on the time of day. He endorses adherence to to his medications every morning at 5 or 6 AM and denies missed doses.     Last 5 Patient Entered Readings                                      Current 30 Day Average: 131/85     Recent Readings 7/29/2018 7/29/2018 7/28/2018 7/27/2018 7/26/2018    SBP (mmHg) 137 138 121 122 133    DBP (mmHg) 87 87 83 66 87    " Pulse 61 60 62 59 70        Instructed patient not to allow anyone else to use phone and BP cuff.   I'm not available for emergencies. Patient will call [facility] on-call ([phone] or [phone]) or [phone] if needed.     Discussed appropriate BP measuring technique:  Before taking your blood pressure  Find a quiet place. You will need to listen for your heartbeat.  Roll up the sleeve on your left arm or remove any tight-sleeved clothing, if needed. (It's best to take your blood pressure from your left arm if you are right-handed.You can use the other arm if you have been told by your health care provider to do so.)  Rest in a chair next to a table for 5 to 10 minutes. (Your left arm should rest comfortably at heart level.)  Sit up straight with your back against the chair, legs uncrossed and on the ground.  Rest your forearm on the table with the palm of your hand facing up.  You should not talk, read the newspaper, or watch television during this process.      Patient and I agreed that he will continue to monitor blood pressure and sodium intake, and continue to remain adherent to medications.     I will plan to follow-up with the patient in 2 to 3 weeks.   Emailed patient link to [facility] webpage and my contact information in case he has any questions.     Completed clinical pharmacist introduction with Mr. Mancia. Per 30-day average patient's blood pressure is slightly above goal at 131/85 mmHg. Patient is not fully following a low sodium diet however, he is slowly working to implement lower sodium items (see HC note). Endorses adherence to medications in the morning and most spikes later in the afternoon/evening. Patient has corrected technique as discussed with HC. Switch amlodipine to evening and continue reducing sodium intake. If blood pressure elevated at next follow-up will consider dose adjustment. Patient agreed with plan.

## 2018-07-31 ENCOUNTER — PATIENT MESSAGE (OUTPATIENT)
Dept: GASTROENTEROLOGY | Facility: CLINIC | Age: 68
End: 2018-07-31

## 2018-07-31 DIAGNOSIS — K59.00 CONSTIPATION, UNSPECIFIED CONSTIPATION TYPE: Primary | ICD-10-CM

## 2018-07-31 RX ORDER — POLYETHYLENE GLYCOL 3350 17 G/17G
17 POWDER, FOR SOLUTION ORAL DAILY
COMMUNITY
End: 2018-07-31 | Stop reason: SDUPTHER

## 2018-07-31 RX ORDER — POLYETHYLENE GLYCOL 3350 17 G/17G
17 POWDER, FOR SOLUTION ORAL DAILY
Qty: 1530 G | Refills: 3 | Status: SHIPPED | OUTPATIENT
Start: 2018-07-31 | End: 2018-12-21

## 2018-08-08 ENCOUNTER — PES CALL (OUTPATIENT)
Dept: ADMINISTRATIVE | Facility: CLINIC | Age: 68
End: 2018-08-08

## 2018-08-09 ENCOUNTER — PATIENT MESSAGE (OUTPATIENT)
Dept: GASTROENTEROLOGY | Facility: CLINIC | Age: 68
End: 2018-08-09

## 2018-08-09 DIAGNOSIS — K21.9 GASTROESOPHAGEAL REFLUX DISEASE, ESOPHAGITIS PRESENCE NOT SPECIFIED: ICD-10-CM

## 2018-08-09 RX ORDER — PANTOPRAZOLE SODIUM 40 MG/1
40 TABLET, DELAYED RELEASE ORAL DAILY
Qty: 90 TABLET | Refills: 3 | Status: SHIPPED | OUTPATIENT
Start: 2018-08-09 | End: 2019-07-03 | Stop reason: SDUPTHER

## 2018-08-27 ENCOUNTER — PATIENT OUTREACH (OUTPATIENT)
Dept: OTHER | Facility: OTHER | Age: 68
End: 2018-08-27

## 2018-08-27 NOTE — PROGRESS NOTES
Last 5 Patient Entered Readings                                      Current 30 Day Average: 129/82     Recent Readings 8/26/2018 8/26/2018 8/22/2018 8/20/2018 8/19/2018    SBP (mmHg) 138 142 124 116 135    DBP (mmHg) 82 82 84 81 72    Pulse 56 57 49 60 71          08/27: LVM.  Will call back on 09/04

## 2018-09-05 NOTE — PROGRESS NOTES
Last 5 Patient Entered Readings                                      Current 30 Day Average: 131/83     Recent Readings 9/3/2018 9/2/2018 9/2/2018 9/1/2018 9/1/2018    SBP (mmHg) 158 139 143 140 152    DBP (mmHg) 98 89 87 85 92    Pulse 54 55 58 57 57          09/05: LVM.  Sent MyChart.  Will follow up in two weeks.

## 2018-09-19 NOTE — PROGRESS NOTES
Last 5 Patient Entered Readings                                      Current 30 Day Average: 130/84     Recent Readings 9/18/2018 9/17/2018 9/15/2018 9/14/2018 9/12/2018    SBP (mmHg) 114 115 120 138 130    DBP (mmHg) 79 73 79 86 93    Pulse 64 56 57 57 59          Digital Medicine: Health  Follow Up    Lifestyle Modifications:    1.Dietary Modifications (Sodium intake <2,000mg/day, food labels, dining out): Reports no change in diet.    2.Physical Activity: Reports no change in PA.    3.Medication Therapy: Patient has been compliant with the medication regimen.    4.Patient has the following medication side effects/concerns: none.  (Frequency/Alleviating factors/Precipitating factors, etc.)     Follow up with . Zeenat Mancia completed. No further questions or concerns. Will continue to follow up to achieve health goals.    Reports he has noticed pressure is lower in the afternoons than the mornings.

## 2018-09-26 ENCOUNTER — PATIENT OUTREACH (OUTPATIENT)
Dept: OTHER | Facility: OTHER | Age: 68
End: 2018-09-26

## 2018-09-26 NOTE — PROGRESS NOTES
HPI:   Called patient for follow-up. States that he feels well about blood pressure readings since last encounter. He endorses adherence to medication regimen as prescribed and has no complaints at this time.     Last 5 Patient Entered Readings                                      Current 30 Day Average: 121/81     Recent Readings 10/25/2018 10/24/2018 10/23/2018 10/22/2018 10/22/2018    SBP (mmHg) 118 121 122 141 141    DBP (mmHg) 72 86 79 88 81    Pulse 56 46 59 64 66        Patient denies s/s of hypotension (lightheadedness, dizziness, nausea, fatigue) associated with low readings. Instructed patient to inform me if this occurs, patient confirms understanding.    Patient denies s/s of hypertension (SOB, CP, severe headaches, changes in vision) associated with high readings. Instructed patient to go to the ED if BP >180/110 and accompanied by hypertensive s/s, patient confirms understanding.    Assessment:  Per 30-day average blood pressure is slightly above goal (DBP +1) however, overall reading are much improved since last encounter.     Plan:  Continue current medication regimen.   Patients health , Sanchez Cueto, will be following up every 3-4 weeks.   I will continue to monitor regularly and will follow-up in 12 weeks, sooner if blood pressure begins to trend upward or downward.     Current medication regimen:  Hypertension Medications             amLODIPine (NORVASC) 5 MG tablet TAKE 1 TABLET ONE TIME DAILY. Hold for systolic blood pressure less than 120    atenolol (TENORMIN) 100 MG tablet TAKE 1 TABLET ONE TIME DAILY.    benazepril (LOTENSIN) 40 MG tablet TAKE 1 TABLET ONE TIME DAILY. Hold for systolic blood pressure less than 120    doxazosin (CARDURA) 2 MG tablet Take 1 tablet (2 mg total) by mouth every evening.    hydroCHLOROthiazide (HYDRODIURIL) 12.5 MG Tab Take 1 tablet (12.5 mg total) by mouth once daily.        Patient has my contact information and knows to call with any concerns or clinical  changes.

## 2018-10-17 NOTE — PROGRESS NOTES
Last 5 Patient Entered Readings                                      Current 30 Day Average: 123/80     Recent Readings 10/16/2018 10/13/2018 10/11/2018 10/11/2018 10/10/2018    SBP (mmHg) 126 116 106 105 143    DBP (mmHg) 83 88 72 72 84    Pulse 57 55 63 55 79          10/17: Pharmacist has open encounter.  Will push call back 2 weeks.  Patient at goal.

## 2018-10-18 ENCOUNTER — OFFICE VISIT (OUTPATIENT)
Dept: HEMATOLOGY/ONCOLOGY | Facility: CLINIC | Age: 68
End: 2018-10-18
Payer: MEDICARE

## 2018-10-18 ENCOUNTER — LAB VISIT (OUTPATIENT)
Dept: LAB | Facility: HOSPITAL | Age: 68
End: 2018-10-18
Attending: NURSE PRACTITIONER
Payer: MEDICARE

## 2018-10-18 VITALS
BODY MASS INDEX: 36.42 KG/M2 | WEIGHT: 283.75 LBS | RESPIRATION RATE: 18 BRPM | HEART RATE: 57 BPM | SYSTOLIC BLOOD PRESSURE: 96 MMHG | TEMPERATURE: 99 F | DIASTOLIC BLOOD PRESSURE: 67 MMHG | HEIGHT: 74 IN

## 2018-10-18 DIAGNOSIS — D50.0 IRON DEFICIENCY ANEMIA DUE TO CHRONIC BLOOD LOSS: Primary | ICD-10-CM

## 2018-10-18 DIAGNOSIS — D50.9 IRON DEFICIENCY ANEMIA, UNSPECIFIED IRON DEFICIENCY ANEMIA TYPE: ICD-10-CM

## 2018-10-18 DIAGNOSIS — I48.20 CHRONIC ATRIAL FIBRILLATION: ICD-10-CM

## 2018-10-18 LAB
BASOPHILS # BLD AUTO: 0.03 K/UL
BASOPHILS NFR BLD: 0.5 %
DIFFERENTIAL METHOD: ABNORMAL
EOSINOPHIL # BLD AUTO: 0.3 K/UL
EOSINOPHIL NFR BLD: 4.2 %
ERYTHROCYTE [DISTWIDTH] IN BLOOD BY AUTOMATED COUNT: 15.1 %
HCT VFR BLD AUTO: 53.7 %
HGB BLD-MCNC: 18 G/DL
LYMPHOCYTES # BLD AUTO: 1 K/UL
LYMPHOCYTES NFR BLD: 16.1 %
MCH RBC QN AUTO: 30 PG
MCHC RBC AUTO-ENTMCNC: 33.5 G/DL
MCV RBC AUTO: 89 FL
MONOCYTES # BLD AUTO: 0.7 K/UL
MONOCYTES NFR BLD: 11.4 %
NEUTROPHILS # BLD AUTO: 4.2 K/UL
NEUTROPHILS NFR BLD: 67.8 %
NRBC BLD-RTO: 0 /100 WBC
PLATELET # BLD AUTO: 175 K/UL
PMV BLD AUTO: 9.8 FL
RBC # BLD AUTO: 6.01 M/UL
WBC # BLD AUTO: 6.21 K/UL

## 2018-10-18 PROCEDURE — 99214 OFFICE O/P EST MOD 30 MIN: CPT | Mod: PBBFAC,PN | Performed by: NURSE PRACTITIONER

## 2018-10-18 PROCEDURE — 99999 PR PBB SHADOW E&M-EST. PATIENT-LVL IV: CPT | Mod: PBBFAC,,, | Performed by: NURSE PRACTITIONER

## 2018-10-18 PROCEDURE — 3074F SYST BP LT 130 MM HG: CPT | Mod: ,,, | Performed by: NURSE PRACTITIONER

## 2018-10-18 PROCEDURE — 1101F PT FALLS ASSESS-DOCD LE1/YR: CPT | Mod: ,,, | Performed by: NURSE PRACTITIONER

## 2018-10-18 PROCEDURE — 85025 COMPLETE CBC W/AUTO DIFF WBC: CPT | Mod: PN

## 2018-10-18 PROCEDURE — 85025 COMPLETE CBC W/AUTO DIFF WBC: CPT

## 2018-10-18 PROCEDURE — 99213 OFFICE O/P EST LOW 20 MIN: CPT | Mod: S$PBB,,, | Performed by: NURSE PRACTITIONER

## 2018-10-18 PROCEDURE — 3078F DIAST BP <80 MM HG: CPT | Mod: ,,, | Performed by: NURSE PRACTITIONER

## 2018-10-18 PROCEDURE — 36415 COLL VENOUS BLD VENIPUNCTURE: CPT | Mod: PN

## 2018-11-01 ENCOUNTER — PATIENT OUTREACH (OUTPATIENT)
Dept: OTHER | Facility: OTHER | Age: 68
End: 2018-11-01

## 2018-11-01 NOTE — PROGRESS NOTES
Last 5 Patient Entered Readings                                      Current 30 Day Average: 122/81     Recent Readings 10/31/2018 10/30/2018 10/29/2018 10/28/2018 10/26/2018    SBP (mmHg) 123 124 113 136 140    DBP (mmHg) 84 79 79 87 92    Pulse 62 57 58 60 66          11/1: LVM.  Will call in 2 weeks.  Patient close to goal.

## 2018-11-07 ENCOUNTER — PES CALL (OUTPATIENT)
Dept: ADMINISTRATIVE | Facility: CLINIC | Age: 68
End: 2018-11-07

## 2018-11-15 NOTE — PROGRESS NOTES
Last 5 Patient Entered Readings                                      Current 30 Day Average: 125/82     Recent Readings 11/14/2018 11/13/2018 11/12/2018 11/12/2018 11/11/2018    SBP (mmHg) 130 142 138 151 144    DBP (mmHg) 87 89 84 93 79    Pulse 62 75 63 71 72          Digital Medicine: Health  Follow Up    Lifestyle Modifications:    1.Dietary Modifications (Sodium intake <2,000mg/day, food labels, dining out): Reports he went on vacation last week.    2.Physical Activity: deferred    3.Medication Therapy: Patient has been compliant with the medication regimen.    4.Patient has the following medication side effects/concerns: none  (Frequency/Alleviating factors/Precipitating factors, etc.)     Follow up with Mr. Epperson LEXX Mancia completed. No further questions or concerns. Will continue to follow up to achieve health goals.    States he has a cold. Taking mucinex because that is what the doctor recommended he take last time he was feeling like this.

## 2018-11-26 DIAGNOSIS — I10 ESSENTIAL HYPERTENSION: ICD-10-CM

## 2018-11-26 RX ORDER — BENAZEPRIL HYDROCHLORIDE 40 MG/1
TABLET ORAL
Qty: 90 TABLET | Refills: 0 | Status: SHIPPED | OUTPATIENT
Start: 2018-11-26 | End: 2019-01-23 | Stop reason: ALTCHOICE

## 2018-11-26 RX ORDER — DOXAZOSIN 2 MG/1
TABLET ORAL
Qty: 90 TABLET | Refills: 0 | Status: SHIPPED | OUTPATIENT
Start: 2018-11-26 | End: 2019-04-27 | Stop reason: SDUPTHER

## 2018-11-26 RX ORDER — FINASTERIDE 5 MG/1
TABLET, FILM COATED ORAL
Qty: 90 TABLET | Refills: 0 | Status: SHIPPED | OUTPATIENT
Start: 2018-11-26 | End: 2019-04-27 | Stop reason: SDUPTHER

## 2018-11-26 RX ORDER — AMLODIPINE BESYLATE 5 MG/1
TABLET ORAL
Qty: 90 TABLET | Refills: 0 | Status: SHIPPED | OUTPATIENT
Start: 2018-11-26 | End: 2019-03-07 | Stop reason: SDUPTHER

## 2018-11-26 RX ORDER — ATENOLOL 100 MG/1
TABLET ORAL
Qty: 90 TABLET | Refills: 0 | Status: SHIPPED | OUTPATIENT
Start: 2018-11-26 | End: 2019-03-22 | Stop reason: SDUPTHER

## 2018-11-26 RX ORDER — HYDROCHLOROTHIAZIDE 12.5 MG/1
TABLET ORAL
Qty: 90 TABLET | Refills: 0 | Status: SHIPPED | OUTPATIENT
Start: 2018-11-26 | End: 2019-04-27 | Stop reason: SDUPTHER

## 2018-12-14 ENCOUNTER — PATIENT OUTREACH (OUTPATIENT)
Dept: OTHER | Facility: OTHER | Age: 68
End: 2018-12-14

## 2018-12-14 ENCOUNTER — PATIENT MESSAGE (OUTPATIENT)
Dept: FAMILY MEDICINE | Facility: CLINIC | Age: 68
End: 2018-12-14

## 2018-12-14 DIAGNOSIS — I10 HYPERTENSION, UNSPECIFIED TYPE: Primary | ICD-10-CM

## 2018-12-14 NOTE — PROGRESS NOTES
Last 5 Patient Entered Readings                                      Current 30 Day Average: 128/84     Recent Readings 12/11/2018 12/10/2018 12/8/2018 12/8/2018 12/5/2018    SBP (mmHg) 122 124 121 140 126    DBP (mmHg) 79 76 78 86 73    Pulse 69 60 62 62 59          12/14: Patient reports he is happy with readings.  States he is not doing anything different, no change in diet or exercise.  No questions or concerns at this time.  Patient on downward trend.

## 2018-12-20 ENCOUNTER — LAB VISIT (OUTPATIENT)
Dept: LAB | Facility: HOSPITAL | Age: 68
End: 2018-12-20
Attending: FAMILY MEDICINE
Payer: MEDICARE

## 2018-12-20 DIAGNOSIS — I10 HYPERTENSION, UNSPECIFIED TYPE: ICD-10-CM

## 2018-12-20 LAB
ALBUMIN SERPL BCP-MCNC: 4.1 G/DL
ALP SERPL-CCNC: 77 U/L
ALT SERPL W/O P-5'-P-CCNC: 19 U/L
ANION GAP SERPL CALC-SCNC: 10 MMOL/L
AST SERPL-CCNC: 16 U/L
BILIRUB SERPL-MCNC: 1.1 MG/DL
BUN SERPL-MCNC: 18 MG/DL
CALCIUM SERPL-MCNC: 9.7 MG/DL
CHLORIDE SERPL-SCNC: 102 MMOL/L
CHOLEST SERPL-MCNC: 199 MG/DL
CHOLEST/HDLC SERPL: 4.2 {RATIO}
CO2 SERPL-SCNC: 30 MMOL/L
CREAT SERPL-MCNC: 1.1 MG/DL
EST. GFR  (AFRICAN AMERICAN): >60 ML/MIN/1.73 M^2
EST. GFR  (NON AFRICAN AMERICAN): >60 ML/MIN/1.73 M^2
GLUCOSE SERPL-MCNC: 103 MG/DL
HDLC SERPL-MCNC: 47 MG/DL
HDLC SERPL: 23.6 %
LDLC SERPL CALC-MCNC: 125.6 MG/DL
NONHDLC SERPL-MCNC: 152 MG/DL
POTASSIUM SERPL-SCNC: 4.2 MMOL/L
PROT SERPL-MCNC: 7.7 G/DL
SODIUM SERPL-SCNC: 142 MMOL/L
TRIGL SERPL-MCNC: 132 MG/DL

## 2018-12-20 PROCEDURE — 36415 COLL VENOUS BLD VENIPUNCTURE: CPT | Mod: PO

## 2018-12-20 PROCEDURE — 80061 LIPID PANEL: CPT

## 2018-12-20 PROCEDURE — 80053 COMPREHEN METABOLIC PANEL: CPT

## 2018-12-21 ENCOUNTER — OFFICE VISIT (OUTPATIENT)
Dept: FAMILY MEDICINE | Facility: CLINIC | Age: 68
End: 2018-12-21
Payer: MEDICARE

## 2018-12-21 VITALS
DIASTOLIC BLOOD PRESSURE: 62 MMHG | WEIGHT: 288.38 LBS | BODY MASS INDEX: 37.01 KG/M2 | SYSTOLIC BLOOD PRESSURE: 108 MMHG | TEMPERATURE: 99 F | HEART RATE: 57 BPM | RESPIRATION RATE: 16 BRPM | OXYGEN SATURATION: 97 % | HEIGHT: 74 IN

## 2018-12-21 DIAGNOSIS — I48.20 CHRONIC ATRIAL FIBRILLATION: ICD-10-CM

## 2018-12-21 DIAGNOSIS — I77.1 TORTUOUS AORTA: ICD-10-CM

## 2018-12-21 DIAGNOSIS — Z00.00 ROUTINE HEALTH MAINTENANCE: Primary | ICD-10-CM

## 2018-12-21 DIAGNOSIS — I10 HYPERTENSION, UNSPECIFIED TYPE: ICD-10-CM

## 2018-12-21 PROCEDURE — 99397 PER PM REEVAL EST PAT 65+ YR: CPT | Mod: S$GLB,,, | Performed by: FAMILY MEDICINE

## 2018-12-21 PROCEDURE — 99999 PR PBB SHADOW E&M-EST. PATIENT-LVL III: CPT | Mod: PBBFAC,,, | Performed by: FAMILY MEDICINE

## 2018-12-21 PROCEDURE — 3078F DIAST BP <80 MM HG: CPT | Mod: S$GLB,,, | Performed by: FAMILY MEDICINE

## 2018-12-21 PROCEDURE — 3074F SYST BP LT 130 MM HG: CPT | Mod: S$GLB,,, | Performed by: FAMILY MEDICINE

## 2018-12-21 NOTE — PROGRESS NOTES
HPI  Zeenat Mancia is a 68 y.o. male with multiple medical diagnoses as listed in the medical history and problem list that presents for Annual Exam  .      HPI  Here today for routine health maintenance.    PAST MEDICAL HISTORY:  Past Medical History:   Diagnosis Date    A-fib     Arthritis     Colon polyp     Diverticulosis     Dyslipidemia     GERD (gastroesophageal reflux disease)     on Protonix    Hepatic steatosis     Hyperlipidemia     Hypertension     Irregular heart beat     Multiple gastric ulcers 05/16/2018    gi scope per Dr. Houston    Obesity     Sleep apnea     uses CPAP    Unspecified disorder of kidney and ureter     kidney stones       PAST SURGICAL HISTORY:  Past Surgical History:   Procedure Laterality Date    APPENDECTOMY      CATARACT EXTRACTION  2009    OS    CATARACT EXTRACTION W/  INTRAOCULAR LENS IMPLANT Right 6/3/2015    [id number] 14.5d//    COLONOSCOPY N/A 5/16/2018    [facility]    COLONOSCOPY N/A 10/10/2017    [facility]    EGD (ESOPHAGOGASTRODUODENOSCOPY) N/A 7/2/2018    [facility]    ESOPHAGOGASTRODUODENOSCOPY (EGD) N/A 5/16/2018    [facility]    EYE SURGERY      cataract    phaco/pciol Right 6/3/2015    [facility]    TONSILLECTOMY      UPPER GASTROINTESTINAL ENDOSCOPY  05/16/2018    Dr. Houston       SOCIAL HISTORY:  Social History     Socioeconomic History    Marital status:      Spouse name: Not on file    Number of children: Not on file    Years of education: Not on file    Highest education level: Not on file   Social Needs    Financial resource strain: Not on file    Food insecurity - worry: Not on file    Food insecurity - inability: Not on file    Transportation needs - medical: Not on file    Transportation needs - non-medical: Not on file    Occupational History    Not on file   Tobacco Use    Smoking status: Former Smoker     Start date: 10/5/1997    Smokeless tobacco: Never Used   Substance and Sexual Activity    Alcohol use: Yes     Comment: 1 beer every 2 weeks    Drug use: No    Sexual activity: Not on file   Other Topics Concern    Not on file   Social History Narrative    Not on file       FAMILY HISTORY:  Family History   Problem Relation Age of Onset    Arthritis Mother     Heart disease Mother     Blindness Mother         OS due to injury    Coronary artery disease Father     Heart disease Father     Cataracts Father     Amblyopia Neg Hx     Cancer Neg Hx     Diabetes Neg Hx     Glaucoma Neg Hx     Hypertension Neg Hx     Macular degeneration Neg Hx     Retinal detachment Neg Hx     Strabismus Neg Hx     Stroke Neg Hx     Thyroid disease Neg Hx     Colon cancer Neg Hx     Colon polyps Neg Hx     Crohn's disease Neg Hx     Ulcerative colitis Neg Hx     Esophageal cancer Neg Hx     Stomach cancer Neg Hx        ALLERGIES AND MEDICATIONS: updated and reviewed.  Review of patient's allergies indicates:  No Known Allergies  Current Outpatient Medications   Medication Sig Dispense Refill    amLODIPine (NORVASC) 5 MG tablet TAKE 1 TABLET DAILY 90 tablet 0    apixaban 5 mg Tab Take 1 tablet (5 mg total) by mouth 2 (two) times daily. 180 tablet 3    atenolol (TENORMIN) 100 MG tablet TAKE 1 TABLET DAILY 90 tablet 0    benazepril (LOTENSIN) 40 MG tablet TAKE 1 TABLET DAILY 90 tablet 0    cetirizine (ZYRTEC) 10 MG tablet Take 10 mg by mouth once daily.      doxazosin (CARDURA) 2 MG tablet TAKE 1 TABLET EVERY EVENING 90 tablet 0    finasteride (PROSCAR) 5 mg tablet TAKE 1 TABLET DAILY 90 tablet 0    hydroCHLOROthiazide (HYDRODIURIL) 12.5 MG Tab TAKE 1 TABLET DAILY 90 tablet 0    multivitamin-minerals-lutein (CENTRUM SILVER) Tab Take by mouth. 1 Tablet Oral Every day      pantoprazole (PROTONIX) 40 MG tablet Take 1  "tablet (40 mg total) by mouth once daily. 90 tablet 3    psyllium (METAMUCIL) powder Take 1 packet by mouth once daily.       No current facility-administered medications for this visit.        ROS  Review of Systems   Constitutional: Negative for appetite change and fatigue.   HENT: Negative for ear pain and hearing loss.    Eyes: Negative for visual disturbance.   Respiratory: Negative for cough, chest tightness and shortness of breath.    Cardiovascular: Negative for chest pain and palpitations (seeing Cardiology).   Gastrointestinal: Positive for constipation (using Metamucil). Negative for abdominal pain, blood in stool, diarrhea, nausea and vomiting.   Genitourinary: Negative for difficulty urinating.   Musculoskeletal: Negative for back pain, joint swelling, neck pain and neck stiffness.   Skin: Negative.    Neurological: Negative for weakness and headaches.   Psychiatric/Behavioral: Negative for dysphoric mood.       Physical Exam  Vitals:    12/21/18 0931   BP: 108/62   Pulse: (!) 57   Resp: 16   Temp: 98.6 °F (37 °C)    Body mass index is 37.02 kg/m².  Weight: 130.8 kg (288 lb 5.8 oz)   Height: 6' 2" (188 cm)     Physical Exam   Constitutional: He is oriented to person, place, and time. He appears well-developed and well-nourished. No distress.   HENT:   Head: Normocephalic and atraumatic.   Right Ear: External ear normal.   Left Ear: External ear normal.   Eyes: Conjunctivae and EOM are normal. Pupils are equal, round, and reactive to light. No scleral icterus.   Neck: Normal range of motion. Neck supple. No JVD present. No thyromegaly present.   Cardiovascular: Normal rate, regular rhythm and intact distal pulses. Exam reveals no gallop and no friction rub.   No murmur heard.  Pulmonary/Chest: Effort normal and breath sounds normal. He has no wheezes. He has no rales.   Abdominal: Soft. Bowel sounds are normal. He exhibits no distension and no mass. There is no tenderness.   Musculoskeletal: Normal " range of motion. He exhibits no edema or tenderness.   Lymphadenopathy:     He has no cervical adenopathy.   Neurological: He is alert and oriented to person, place, and time. No cranial nerve deficit. Coordination normal.   Skin: Skin is warm and dry. No rash noted.   Psychiatric: He has a normal mood and affect.   Vitals reviewed.    Results for orders placed or performed in visit on 12/20/18   Comprehensive metabolic panel   Result Value Ref Range    Sodium 142 136 - 145 mmol/L    Potassium 4.2 3.5 - 5.1 mmol/L    Chloride 102 95 - 110 mmol/L    CO2 30 (H) 23 - 29 mmol/L    Glucose 103 70 - 110 mg/dL    BUN, Bld 18 8 - 23 mg/dL    Creatinine 1.1 0.5 - 1.4 mg/dL    Calcium 9.7 8.7 - 10.5 mg/dL    Total Protein 7.7 6.0 - 8.4 g/dL    Albumin 4.1 3.5 - 5.2 g/dL    Total Bilirubin 1.1 (H) 0.1 - 1.0 mg/dL    Alkaline Phosphatase 77 55 - 135 U/L    AST 16 10 - 40 U/L    ALT 19 10 - 44 U/L    Anion Gap 10 8 - 16 mmol/L    eGFR if African American >60.0 >60 mL/min/1.73 m^2    eGFR if non African American >60.0 >60 mL/min/1.73 m^2   Lipid panel   Result Value Ref Range    Cholesterol 199 120 - 199 mg/dL    Triglycerides 132 30 - 150 mg/dL    HDL 47 40 - 75 mg/dL    LDL Cholesterol 125.6 63.0 - 159.0 mg/dL    HDL/Chol Ratio 23.6 20.0 - 50.0 %    Total Cholesterol/HDL Ratio 4.2 2.0 - 5.0    Non-HDL Cholesterol 152 mg/dL         Health Maintenance       Date Due Completion Date    TETANUS VACCINE 05/06/1968 ---    Lipid Panel 12/20/2023 12/20/2018    Colonoscopy 05/16/2028 5/16/2018    Override on 7/15/2008: Done          Assessment & Plan    Routine health maintenance  - Health maintenance reviewed  - Diet and exercise education.     Hypertension, unspecified type with Chronic atrial fibrillation and Tortuous aorta  - Continue current therapy  - Serial blood pressure monitoring  - Diet and exercise education.  - Continue Cardiology      Follow-up in about 6 months (around 6/21/2019).

## 2019-01-03 ENCOUNTER — PATIENT MESSAGE (OUTPATIENT)
Dept: FAMILY MEDICINE | Facility: CLINIC | Age: 69
End: 2019-01-03

## 2019-01-03 DIAGNOSIS — G47.30 SLEEP APNEA, UNSPECIFIED TYPE: Primary | ICD-10-CM

## 2019-01-07 DIAGNOSIS — G47.33 OSA (OBSTRUCTIVE SLEEP APNEA): Primary | ICD-10-CM

## 2019-01-15 ENCOUNTER — OFFICE VISIT (OUTPATIENT)
Dept: FAMILY MEDICINE | Facility: CLINIC | Age: 69
End: 2019-01-15
Payer: MEDICARE

## 2019-01-15 VITALS
BODY MASS INDEX: 37.83 KG/M2 | DIASTOLIC BLOOD PRESSURE: 86 MMHG | TEMPERATURE: 98 F | SYSTOLIC BLOOD PRESSURE: 138 MMHG | HEART RATE: 70 BPM | WEIGHT: 294.75 LBS | HEIGHT: 74 IN

## 2019-01-15 DIAGNOSIS — R10.11 RUQ ABDOMINAL PAIN: Primary | ICD-10-CM

## 2019-01-15 PROCEDURE — 3079F PR MOST RECENT DIASTOLIC BLOOD PRESSURE 80-89 MM HG: ICD-10-PCS | Mod: S$GLB,,, | Performed by: PHYSICIAN ASSISTANT

## 2019-01-15 PROCEDURE — 99999 PR PBB SHADOW E&M-EST. PATIENT-LVL IV: CPT | Mod: PBBFAC,,, | Performed by: PHYSICIAN ASSISTANT

## 2019-01-15 PROCEDURE — 1101F PT FALLS ASSESS-DOCD LE1/YR: CPT | Mod: S$GLB,,, | Performed by: PHYSICIAN ASSISTANT

## 2019-01-15 PROCEDURE — 3075F SYST BP GE 130 - 139MM HG: CPT | Mod: S$GLB,,, | Performed by: PHYSICIAN ASSISTANT

## 2019-01-15 PROCEDURE — 1101F PR PT FALLS ASSESS DOC 0-1 FALLS W/OUT INJ PAST YR: ICD-10-PCS | Mod: S$GLB,,, | Performed by: PHYSICIAN ASSISTANT

## 2019-01-15 PROCEDURE — 99214 OFFICE O/P EST MOD 30 MIN: CPT | Mod: S$GLB,,, | Performed by: PHYSICIAN ASSISTANT

## 2019-01-15 PROCEDURE — 3079F DIAST BP 80-89 MM HG: CPT | Mod: S$GLB,,, | Performed by: PHYSICIAN ASSISTANT

## 2019-01-15 PROCEDURE — 99214 PR OFFICE/OUTPT VISIT, EST, LEVL IV, 30-39 MIN: ICD-10-PCS | Mod: S$GLB,,, | Performed by: PHYSICIAN ASSISTANT

## 2019-01-15 PROCEDURE — 99999 PR PBB SHADOW E&M-EST. PATIENT-LVL IV: ICD-10-PCS | Mod: PBBFAC,,, | Performed by: PHYSICIAN ASSISTANT

## 2019-01-15 PROCEDURE — 3075F PR MOST RECENT SYSTOLIC BLOOD PRESS GE 130-139MM HG: ICD-10-PCS | Mod: S$GLB,,, | Performed by: PHYSICIAN ASSISTANT

## 2019-01-15 NOTE — PROGRESS NOTES
Subjective:       Patient ID: Zeenat Mancia is a 68 y.o. male    Chief Complaint: Abdominal Pain (R side that radiates to back since yesterday) and Fatigue    HPI  The patient is new to me, PCP is Dr. Johnson.  He presents today complaining of right-sided abdominal pain that radiates to the back and is associated with fatigue and belching.  He denies any fever or chills, the pain began after he ate pizza yesterday and has gotten gradually better the pain is intermittent and when it comes on it is a constant pain.  He was able to eat some chicken noodle soup for lunch around 11 without any increase in pain.  He has a history of kidney stones, appendectomy as a child and peptic ulcer disease that is currently treated with Protonix 40 mg daily.    Review of Systems   Constitutional: Negative for chills and fever.   HENT: Negative for congestion.    Eyes: Negative for visual disturbance.   Gastrointestinal: Positive for abdominal pain. Negative for diarrhea, nausea and vomiting.        Belching   Genitourinary: Negative for decreased urine volume, difficulty urinating, discharge, dysuria, flank pain, frequency, hematuria and testicular pain.   Musculoskeletal: Negative for arthralgias.   Skin: Negative for rash.   Neurological: Negative for headaches.   Psychiatric/Behavioral: Negative for sleep disturbance.        Objective:   Physical Exam   Constitutional: He is oriented to person, place, and time. He appears well-developed and well-nourished. No distress.   HENT:   Head: Normocephalic and atraumatic.   Right Ear: External ear normal.   Left Ear: External ear normal.   Nose: Nose normal.   Mouth/Throat: Oropharynx is clear and moist.   Eyes: Conjunctivae and EOM are normal. Pupils are equal, round, and reactive to light.   Neck: Normal range of motion. Neck supple. No JVD present.   Cardiovascular: Normal rate, regular rhythm and normal heart sounds. Exam reveals no gallop and no friction rub.   No murmur  heard.  Pulmonary/Chest: Effort normal and breath sounds normal. No respiratory distress. He has no wheezes. He has no rales.   Abdominal: Soft. Bowel sounds are normal. He exhibits no distension and no mass. There is tenderness (Right upper quadrant tenderness, no guarding). There is no rebound and no guarding. No hernia.   No CVA tenderness bilaterally   Musculoskeletal: Normal range of motion. He exhibits no edema.   Neurological: He is alert and oriented to person, place, and time.   Skin: Skin is warm and dry.   Psychiatric: He has a normal mood and affect. His behavior is normal. Judgment and thought content normal.        Assessment:       1. RUQ abdominal pain  Amylase    Lipase    US Abdomen Limited    CBC auto differential    Comprehensive metabolic panel    Urinalysis        Plan:       RUQ abdominal pain  -     Amylase; Future; Expected date: 01/15/2019  -     Lipase; Future; Expected date: 01/15/2019  -     US Abdomen Limited; Future; Expected date: 01/15/2019  -     CBC auto differential; Future; Expected date: 01/15/2019  -     Comprehensive metabolic panel; Future; Expected date: 01/15/2019  -     Urinalysis; Future; Expected date: 01/15/2019  - advised patient to eat a low-fat diet

## 2019-01-17 ENCOUNTER — HOSPITAL ENCOUNTER (OUTPATIENT)
Dept: RADIOLOGY | Facility: HOSPITAL | Age: 69
Discharge: HOME | End: 2019-01-17
Attending: PHYSICIAN ASSISTANT
Payer: MEDICARE

## 2019-01-17 DIAGNOSIS — R10.11 RUQ ABDOMINAL PAIN: ICD-10-CM

## 2019-01-17 PROCEDURE — 76705 US ABDOMEN LIMITED: ICD-10-PCS | Mod: 26,,, | Performed by: RADIOLOGY

## 2019-01-17 PROCEDURE — 76705 ECHO EXAM OF ABDOMEN: CPT | Mod: TC,PO

## 2019-01-17 PROCEDURE — 76705 ECHO EXAM OF ABDOMEN: CPT | Mod: 26,,, | Performed by: RADIOLOGY

## 2019-01-18 ENCOUNTER — PATIENT MESSAGE (OUTPATIENT)
Dept: FAMILY MEDICINE | Facility: CLINIC | Age: 69
End: 2019-01-18

## 2019-01-18 DIAGNOSIS — K82.4 GALLBLADDER POLYP: Primary | ICD-10-CM

## 2019-01-21 ENCOUNTER — PATIENT MESSAGE (OUTPATIENT)
Dept: FAMILY MEDICINE | Facility: CLINIC | Age: 69
End: 2019-01-21

## 2019-01-22 ENCOUNTER — PATIENT MESSAGE (OUTPATIENT)
Dept: ADMINISTRATIVE | Facility: OTHER | Age: 69
End: 2019-01-22

## 2019-01-22 ENCOUNTER — PATIENT OUTREACH (OUTPATIENT)
Dept: OTHER | Facility: OTHER | Age: 69
End: 2019-01-22

## 2019-01-22 ENCOUNTER — PATIENT MESSAGE (OUTPATIENT)
Dept: FAMILY MEDICINE | Facility: CLINIC | Age: 69
End: 2019-01-22

## 2019-01-22 DIAGNOSIS — I10 ESSENTIAL HYPERTENSION: Primary | ICD-10-CM

## 2019-01-22 NOTE — PROGRESS NOTES
HPI:  Called patient for follow-up. He endorses adherence to medication regimen with no complaints. Patient denies any changes in medication or diet and is unsure if why blood pressure has trended upward.    Last 5 Patient Entered Readings                                      Current 30 Day Average: 134/89     Recent Readings 1/21/2019 1/18/2019 1/15/2019 1/12/2019 1/12/2019    SBP (mmHg) 140 137 127 147 143    DBP (mmHg) 95 94 78 87 107    Pulse 57 63 67 60 60        Patient denies s/s of hypotension (lightheadedness, dizziness, nausea, fatigue) associated with low readings. Instructed patient to inform me if this occurs, patient confirms understanding.    Patient denies s/s of hypertension (SOB, CP, severe headaches, changes in vision) associated with high readings. Instructed patient to go to the ED if BP >180/110 and accompanied by hypertensive s/s, patient confirms understanding.    Assessment:  Patient's current 30-day average is not goal of <130/80 mmHg based on ACC/AHA HTN guidelines. Recommend changing ACEI due to possible increased risk of developing lung cancer however, patient refused. He would prefer to discuss with PCP.     Plan:  Continue current regimen.  Note summarizing conversation and recommendation sent to patient via [employer] as requested.   Patients health , Sanchez Cueto, will be following up every 3-4 weeks.   I will continue to monitor regularly and will follow-up in 4 weeks, sooner if blood pressure begins to trend upward or downward.     Current medication regimen:  Hypertension Medications             amLODIPine (NORVASC) 5 MG tablet TAKE 1 TABLET DAILY    atenolol (TENORMIN) 100 MG tablet TAKE 1 TABLET DAILY    benazepril (LOTENSIN) 40 MG tablet TAKE 1 TABLET DAILY    doxazosin (CARDURA) 2 MG tablet TAKE 1 TABLET EVERY EVENING    hydroCHLOROthiazide (HYDRODIURIL) 12.5 MG Tab TAKE 1 TABLET DAILY        Patient has my contact information and knows to call with any concerns or clinical  changes.

## 2019-01-23 ENCOUNTER — PATIENT MESSAGE (OUTPATIENT)
Dept: OTHER | Facility: OTHER | Age: 69
End: 2019-01-23

## 2019-01-23 RX ORDER — IRBESARTAN 150 MG/1
150 TABLET ORAL DAILY
Qty: 90 TABLET | Refills: 1 | Status: SHIPPED | OUTPATIENT
Start: 2019-01-23 | End: 2019-02-13 | Stop reason: ALTCHOICE

## 2019-01-23 NOTE — PROGRESS NOTES
Last 5 Patient Entered Readings                                      Current 30 Day Average: 134/89     Recent Readings 1/22/2019 1/22/2019 1/21/2019 1/18/2019 1/15/2019    SBP (mmHg) 129 153 140 137 127    DBP (mmHg) 94 100 95 94 78    Pulse 61 67 57 63 67          1/23/19: Patient sent message requesting alternative therapy to ACEI. Sent prescription for irbesartan 150 mg once daily to patient's preferred pharmacy.

## 2019-01-30 ENCOUNTER — PATIENT OUTREACH (OUTPATIENT)
Dept: OTHER | Facility: OTHER | Age: 69
End: 2019-01-30

## 2019-01-30 ENCOUNTER — OFFICE VISIT (OUTPATIENT)
Dept: SURGERY | Facility: CLINIC | Age: 69
End: 2019-01-30
Payer: MEDICARE

## 2019-01-30 VITALS
HEART RATE: 80 BPM | WEIGHT: 301.5 LBS | TEMPERATURE: 97 F | SYSTOLIC BLOOD PRESSURE: 122 MMHG | HEIGHT: 74 IN | DIASTOLIC BLOOD PRESSURE: 80 MMHG | BODY MASS INDEX: 38.69 KG/M2

## 2019-01-30 DIAGNOSIS — K82.4 GALLBLADDER POLYP: Primary | ICD-10-CM

## 2019-01-30 PROCEDURE — 99203 PR OFFICE/OUTPT VISIT, NEW, LEVL III, 30-44 MIN: ICD-10-PCS | Mod: S$GLB,,, | Performed by: SURGERY

## 2019-01-30 PROCEDURE — 3074F SYST BP LT 130 MM HG: CPT | Mod: S$GLB,,, | Performed by: SURGERY

## 2019-01-30 PROCEDURE — 99203 OFFICE O/P NEW LOW 30 MIN: CPT | Mod: S$GLB,,, | Performed by: SURGERY

## 2019-01-30 PROCEDURE — 1101F PR PT FALLS ASSESS DOC 0-1 FALLS W/OUT INJ PAST YR: ICD-10-PCS | Mod: S$GLB,,, | Performed by: SURGERY

## 2019-01-30 PROCEDURE — 3074F PR MOST RECENT SYSTOLIC BLOOD PRESSURE < 130 MM HG: ICD-10-PCS | Mod: S$GLB,,, | Performed by: SURGERY

## 2019-01-30 PROCEDURE — 3079F PR MOST RECENT DIASTOLIC BLOOD PRESSURE 80-89 MM HG: ICD-10-PCS | Mod: S$GLB,,, | Performed by: SURGERY

## 2019-01-30 PROCEDURE — 1101F PT FALLS ASSESS-DOCD LE1/YR: CPT | Mod: S$GLB,,, | Performed by: SURGERY

## 2019-01-30 PROCEDURE — 99999 PR PBB SHADOW E&M-EST. PATIENT-LVL III: CPT | Mod: PBBFAC,,, | Performed by: SURGERY

## 2019-01-30 PROCEDURE — 99999 PR PBB SHADOW E&M-EST. PATIENT-LVL III: ICD-10-PCS | Mod: PBBFAC,,, | Performed by: SURGERY

## 2019-01-30 PROCEDURE — 3079F DIAST BP 80-89 MM HG: CPT | Mod: S$GLB,,, | Performed by: SURGERY

## 2019-01-30 NOTE — LETTER
January 31, 2019      Leonel Johnson MD  [address] [facility] [address]  [facility] [address]           [facility]  [address] [facility] [address] [place] [address]  Phone: [phone]          Patient: Zeenat Mancia   MR Number: [id number]   YOB: 1950   Date of Visit: 1/30/2019       Dear Dr. Leonel Johnson:    Thank you for referring Zeenat Mancia to me for evaluation. Attached you will find relevant portions of my assessment and plan of care.    If you have questions, please do not hesitate to call me. I look forward to following Zeenat Mancia along with you.    Sincerely,    Linus Hernandez MD    Enclosure  CC:  No Recipients    If you would like to receive this communication electronically, please contact [email or web address] or [phone] to request more information on [employer] access.    For providers and/or their staff who would like to refer a patient to [facility], please contact us through our one-stop-shop provider referral line, Ivanna Luna, at [phone].    If you feel you have received this communication in error or would no longer like to receive these types of communications, please e-mail [email or web address]

## 2019-01-30 NOTE — PROGRESS NOTES
"Last 5 Patient Entered Readings                                      Current 30 Day Average: 137/91     Recent Readings 1/29/2019 1/25/2019 1/24/2019 1/23/2019 1/23/2019    SBP (mmHg) 133 130 161 145 143    DBP (mmHg) 84 80 103 92 100    Pulse 59 59 78 63 62          Digital Medicine: Health  Follow Up    Lifestyle Modifications:    1.Dietary Modifications (Sodium intake <2,000mg/day, food labels, dining out): Patient reports he has gotten off his diet recently.  States he knows "the right things to do, but just has to do them."    2.Physical Activity: deferred    3.Medication Therapy: Patient has been compliant with the medication regimen.    4.Patient has the following medication side effects/concerns: none  (Frequency/Alleviating factors/Precipitating factors, etc.)     Follow up with Mr. Zeenat Mancia completed. No further questions or concerns. Will continue to follow up to achieve health goals.    "

## 2019-01-31 NOTE — PROGRESS NOTES
Subjective:       Patient ID: Zeenat Mancia is a 68 y.o. male.    Chief Complaint: Consult (Gallbladder polyp)    HPI   67 yo M presented referred to me for evaluation of gallbladder polyp.  Pt notes that he was having some mild abdominal pain for a few days.  He notes that pain was minimal and did radiate around to his back.  He denies any association with dietary intake.  No fever/chills.  No n/v.  Pt had never had pain of this nature.  Pt had an US secondary to this demonstrating a small gallbladder polyp.  Pt has had no further pain or discomfort.   Pt has had no significant abdominal surgery.  PMhsx is significant for sleep apnea, HTN  Review of Systems   Constitutional: Negative for activity change, appetite change, diaphoresis, fever and unexpected weight change.   HENT: Negative for congestion and ear pain.    Respiratory: Negative for cough, chest tightness and wheezing.    Cardiovascular: Negative for chest pain and palpitations.   Gastrointestinal: Negative for abdominal distention, abdominal pain, anal bleeding, blood in stool, constipation, diarrhea, nausea, rectal pain and vomiting.   Genitourinary: Negative for difficulty urinating, dysuria and hematuria.   Musculoskeletal: Negative for back pain and gait problem.   Skin: Negative for color change and wound.   Neurological: Negative for tremors and seizures.   Hematological: Negative for adenopathy. Does not bruise/bleed easily.   Psychiatric/Behavioral: Negative for agitation and decreased concentration.       Objective:      Physical Exam   Constitutional: He is oriented to person, place, and time. He appears well-developed and well-nourished.   HENT:   Head: Normocephalic and atraumatic.   Eyes: Pupils are equal, round, and reactive to light.   Neck: Normal range of motion. No tracheal deviation present. No thyromegaly present.   Cardiovascular: Normal rate, regular rhythm and normal heart sounds. Exam reveals no gallop and no friction rub.   No  murmur heard.  Pulmonary/Chest: Effort normal and breath sounds normal. He has no wheezes. He exhibits no tenderness.   Abdominal: He exhibits no distension and no mass. There is no tenderness. There is no rebound and no guarding.   Musculoskeletal: Normal range of motion.   Neurological: He is alert and oriented to person, place, and time. Coordination normal.   Skin: Skin is warm.   Psychiatric: He has a normal mood and affect.   Vitals reviewed.        Lab Results   Component Value Date    WBC 9.33 01/17/2019    HGB 16.0 01/17/2019    HCT 51.1 01/17/2019    MCV 94 01/17/2019     01/17/2019     CMP  Sodium   Date Value Ref Range Status   01/17/2019 141 136 - 145 mmol/L Final     Potassium   Date Value Ref Range Status   01/17/2019 3.4 (L) 3.5 - 5.1 mmol/L Final     Chloride   Date Value Ref Range Status   01/17/2019 107 95 - 110 mmol/L Final     CO2   Date Value Ref Range Status   01/17/2019 25 23 - 29 mmol/L Final     Glucose   Date Value Ref Range Status   01/17/2019 101 70 - 110 mg/dL Final     BUN, Bld   Date Value Ref Range Status   01/17/2019 19 8 - 23 mg/dL Final     Creatinine   Date Value Ref Range Status   01/17/2019 1.0 0.5 - 1.4 mg/dL Final     Calcium   Date Value Ref Range Status   01/17/2019 9.6 8.7 - 10.5 mg/dL Final     Total Protein   Date Value Ref Range Status   01/17/2019 7.2 6.0 - 8.4 g/dL Final     Albumin   Date Value Ref Range Status   01/17/2019 3.5 3.5 - 5.2 g/dL Final     Total Bilirubin   Date Value Ref Range Status   01/17/2019 1.0 0.1 - 1.0 mg/dL Final     Comment:     For infants and newborns, interpretation of results should be based  on gestational age, weight and in agreement with clinical  observations.  Premature Infant recommended reference ranges:  Up to 24 hours.............<8.0 mg/dL  Up to 48 hours............<12.0 mg/dL  3-5 days..................<15.0 mg/dL  6-29 days.................<15.0 mg/dL       Alkaline Phosphatase   Date Value Ref Range Status   01/17/2019  56 55 - 135 U/L Final     AST   Date Value Ref Range Status   01/17/2019 15 10 - 40 U/L Final     ALT   Date Value Ref Range Status   01/17/2019 16 10 - 44 U/L Final     Anion Gap   Date Value Ref Range Status   01/17/2019 9 8 - 16 mmol/L Final     eGFR if    Date Value Ref Range Status   01/17/2019 >60.0 >60 mL/min/1.73 m^2 Final     eGFR if non    Date Value Ref Range Status   01/17/2019 >60.0 >60 mL/min/1.73 m^2 Final     Comment:     Calculation used to obtain the estimated glomerular filtration  rate (eGFR) is the CKD-EPI equation.          US: 1/15    The liver is normal in size and overall echogenicity.  A simple cyst measuring 3.9 cm is present in the lateral segment of the left hepatic lobe which is unchanged.  No solid hepatic masses are present.  A 2 mm gallbladder polyp is present.  The bile ducts are not dilated.  Common bile duct diameter is 4 mm.  Doppler of the main portal vein confirms a normal waveform and direction of flow.  The pancreas is normal in size and appearance.  Two simple cysts are present in the mid right kidney; including a 3.7 cm cyst and a 2.2 cm cyst.  These are unchanged.  The right kidney is otherwise unremarkable and measures 15.1 cm in length.  No ascites is present.  Assessment:     Gallbladder polyp  No diagnosis found.    Plan:       D/w pt. I have explained to pt that in absence of symptoms and given small size of polyp I would recommend conservative mgmt.  D/w pt to RTC should he have pain or discomfort.  Pt wishes to avoid surgery if possible he will RTC prn

## 2019-02-13 ENCOUNTER — PATIENT MESSAGE (OUTPATIENT)
Dept: OTHER | Facility: OTHER | Age: 69
End: 2019-02-13

## 2019-02-13 DIAGNOSIS — I77.1 TORTUOUS AORTA: Primary | ICD-10-CM

## 2019-02-13 DIAGNOSIS — I10 ESSENTIAL HYPERTENSION: ICD-10-CM

## 2019-02-13 RX ORDER — VALSARTAN 160 MG/1
160 TABLET ORAL DAILY
Qty: 90 TABLET | Refills: 1 | Status: SHIPPED | OUTPATIENT
Start: 2019-02-13 | End: 2019-06-07 | Stop reason: DRUGHIGH

## 2019-02-19 ENCOUNTER — PATIENT OUTREACH (OUTPATIENT)
Dept: OTHER | Facility: OTHER | Age: 69
End: 2019-02-19

## 2019-02-19 NOTE — PROGRESS NOTES
HPI:  Called patient for follow-up. Patient has not started valsartan and continues to take benazepril daily. States that valsartan has been mailed however, it has not arrived.      Last 5 Patient Entered Readings                                      Current 30 Day Average: 137/91     Recent Readings 2/19/2019 2/14/2019 2/12/2019 2/8/2019 2/6/2019    SBP (mmHg) 123 137 128 136 126    DBP (mmHg) 88 87 82 90 84    Pulse 57 61 58 64 58        Patient denies s/s of hypotension (lightheadedness, dizziness, nausea, fatigue) associated with low readings. Instructed patient to inform me if this occurs, patient confirms understanding.    Patient denies s/s of hypertension (SOB, CP, severe headaches, changes in vision) associated with high readings. Instructed patient to go to the ED if BP >180/110 and accompanied by hypertensive s/s, patient confirms understanding.    Assessment:  Patient's current 30-day average is slightly above goal of <130/80 mmHg.     Plan:  Stop benazepril and start valsartan when medication arrives.   Patients health , Sanchez Cueto, will be following up every 3-4 weeks.   I will continue to monitor regularly and will follow-up in 3 weeks, sooner if blood pressure begins to trend upward or downward.     Current medication regimen:  Hypertension Medications             amLODIPine (NORVASC) 5 MG tablet TAKE 1 TABLET DAILY    atenolol (TENORMIN) 100 MG tablet TAKE 1 TABLET DAILY    doxazosin (CARDURA) 2 MG tablet TAKE 1 TABLET EVERY EVENING    hydroCHLOROthiazide (HYDRODIURIL) 12.5 MG Tab TAKE 1 TABLET DAILY    valsartan (DIOVAN) 160 MG tablet Take 1 tablet (160 mg total) by mouth once daily.        Patient has my contact information and knows to call with any concerns or clinical changes.

## 2019-02-27 ENCOUNTER — PATIENT OUTREACH (OUTPATIENT)
Dept: OTHER | Facility: OTHER | Age: 69
End: 2019-02-27

## 2019-02-27 NOTE — PROGRESS NOTES
Last 5 Patient Entered Readings                                      Current 30 Day Average: 133/88     Recent Readings 2/26/2019 2/26/2019 2/21/2019 2/20/2019 2/20/2019    SBP (mmHg) 120 136 140 118 126    DBP (mmHg) 80 90 97 83 91    Pulse 80 56 61 73 72        2/27: LVM.  Will call in 2 weeks.

## 2019-03-08 RX ORDER — AMLODIPINE BESYLATE 5 MG/1
TABLET ORAL
Qty: 90 TABLET | Refills: 3 | Status: SHIPPED | OUTPATIENT
Start: 2019-03-08 | End: 2020-03-09

## 2019-03-13 NOTE — PROGRESS NOTES
Last 5 Patient Entered Readings                                      Current 30 Day Average: 131/86     Recent Readings 3/11/2019 3/7/2019 3/7/2019 3/7/2019 3/4/2019    SBP (mmHg) 126 128 122 136 159    DBP (mmHg) 84 83 87 83 85    Pulse 70 59 72 66 51        3/13: LVM.  Will call in 2 weeks.

## 2019-03-14 ENCOUNTER — PATIENT OUTREACH (OUTPATIENT)
Dept: OTHER | Facility: OTHER | Age: 69
End: 2019-03-14

## 2019-03-14 NOTE — PROGRESS NOTES
"HPI:  Called Mr. Zeenat Mancia for hypertension follow-up. Patient started valsartan last month however, he is unsure of date. Tolerating well and feels that his blood pressure is "pretty good most of the time". Asked patient about multiple elevated readings above 150/90 mmHg and he is unsure of cause. States that he has made no changes in diet or lifestyle and reports feeling fine. He repeated readings on some days and they were much closer to goal. It has been awhile since he charged his blood pressure cuff and I advised his to charge it tonight and every 3 weeks going forward.     Last 5 Patient Entered Readings                                      Current 30 Day Average: 135/86     Recent Readings 4/5/2019 4/4/2019 4/3/2019 4/2/2019 4/2/2019    SBP (mmHg) 125 135 132 152 163    DBP (mmHg) 82 83 87 95 95    Pulse 62 62 81 58 59        Patient denies s/s of hypotension (lightheadedness, dizziness, nausea, fatigue) associated with low readings. Instructed patient to inform me if this occurs, patient confirms understanding.    Patient denies s/s of hypertension (SOB, CP, severe headaches, changes in vision) associated with high readings. Instructed patient to go to the ED if BP >180/110 and accompanied by hypertensive s/s, patient confirms understanding.    Assessment:  Patient's current 30-day average is not at goal of <130/80 mmHg however, readings appear to be improving.     Plan:  Continue current regimen.  Patients health , Sanchez Cueto, will be following up every 3-4 weeks.   I will continue to monitor regularly and will follow-up in 4 weeks, sooner if blood pressure begins to trend upward or downward.     Current medication regimen:  Hypertension Medications             amLODIPine (NORVASC) 5 MG tablet TAKE 1 TABLET DAILY    atenolol (TENORMIN) 100 MG tablet TAKE 1 TABLET DAILY    doxazosin (CARDURA) 2 MG tablet TAKE 1 TABLET EVERY EVENING    hydroCHLOROthiazide (HYDRODIURIL) 12.5 MG Tab TAKE 1 TABLET " DAILY    valsartan (DIOVAN) 160 MG tablet Take 1 tablet (160 mg total) by mouth once daily.        Patient has my contact information and knows to call with any concerns or clinical changes.

## 2019-03-24 RX ORDER — ATENOLOL 100 MG/1
TABLET ORAL
Qty: 90 TABLET | Refills: 2 | Status: SHIPPED | OUTPATIENT
Start: 2019-03-24 | End: 2019-11-27 | Stop reason: SDUPTHER

## 2019-03-29 NOTE — PROGRESS NOTES
Last 5 Patient Entered Readings                                      Current 30 Day Average: 137/86     Recent Readings 3/22/2019 3/20/2019 3/20/2019 3/20/2019 3/16/2019    SBP (mmHg) 118 135 142 150 163    DBP (mmHg) 72 95 91 80 90    Pulse 63 59 57 53 72          3/29: LVM.  Sent MyChart.  Will call in 2 weeks.

## 2019-04-12 NOTE — PROGRESS NOTES
Last 5 Patient Entered Readings                                      Current 30 Day Average: 138/90     Recent Readings 4/11/2019 4/11/2019 4/11/2019 4/10/2019 4/10/2019    SBP (mmHg) 141 136 149 148 158    DBP (mmHg) 98 88 98 90 101    Pulse 72 76 69 72 76        Digital Medicine: Health  Follow Up    Lifestyle Modifications:    1.Dietary Modifications (Sodium intake <2,000mg/day, food labels, dining out): deferred    2.Physical Activity:  States he has not gotten back to exercise but plans to get started.  Rep[place] [facility]  Goal is to go 2-3x/week.  Reports he likes the [employer] circuit machines.  Reports his wife does the silver sneakers classes, so he may join her.  Set [employer]     3.Medication Therapy: Patient has been compliant with the medication regimen.def    4.Patient has the following medication side effects/concerns: none  (Frequency/Alleviating factors/Precipitating factors, etc.)     Follow up with Mr. Zeenat Mancia completed. No further questions or concerns. Will continue to follow up to achieve health goals.    Occasionally higher,

## 2019-04-18 ENCOUNTER — LAB VISIT (OUTPATIENT)
Dept: LAB | Facility: HOSPITAL | Age: 69
End: 2019-04-18
Attending: NURSE PRACTITIONER
Payer: MEDICARE

## 2019-04-18 ENCOUNTER — OFFICE VISIT (OUTPATIENT)
Dept: HEMATOLOGY/ONCOLOGY | Facility: CLINIC | Age: 69
End: 2019-04-18
Payer: MEDICARE

## 2019-04-18 VITALS
HEIGHT: 74 IN | BODY MASS INDEX: 39.66 KG/M2 | DIASTOLIC BLOOD PRESSURE: 89 MMHG | SYSTOLIC BLOOD PRESSURE: 139 MMHG | RESPIRATION RATE: 18 BRPM | HEART RATE: 49 BPM | WEIGHT: 309.06 LBS | TEMPERATURE: 98 F

## 2019-04-18 DIAGNOSIS — D50.9 IRON DEFICIENCY ANEMIA, UNSPECIFIED IRON DEFICIENCY ANEMIA TYPE: Primary | ICD-10-CM

## 2019-04-18 DIAGNOSIS — D50.0 IRON DEFICIENCY ANEMIA DUE TO CHRONIC BLOOD LOSS: ICD-10-CM

## 2019-04-18 LAB
BASOPHILS # BLD AUTO: 0.03 K/UL (ref 0–0.2)
BASOPHILS NFR BLD: 0.4 % (ref 0–1.9)
DIFFERENTIAL METHOD: ABNORMAL
EOSINOPHIL # BLD AUTO: 0.4 K/UL (ref 0–0.5)
EOSINOPHIL NFR BLD: 6 % (ref 0–8)
ERYTHROCYTE [DISTWIDTH] IN BLOOD BY AUTOMATED COUNT: 14.1 % (ref 11.5–14.5)
HCT VFR BLD AUTO: 53.8 % (ref 40–54)
HGB BLD-MCNC: 17.6 G/DL (ref 14–18)
IMM GRANULOCYTES # BLD AUTO: 0.05 K/UL (ref 0–0.04)
IMM GRANULOCYTES NFR BLD AUTO: 0.7 % (ref 0–0.5)
LYMPHOCYTES # BLD AUTO: 1.3 K/UL (ref 1–4.8)
LYMPHOCYTES NFR BLD: 17.5 % (ref 18–48)
MCH RBC QN AUTO: 30.5 PG (ref 27–31)
MCHC RBC AUTO-ENTMCNC: 32.7 G/DL (ref 32–36)
MCV RBC AUTO: 93 FL (ref 82–98)
MONOCYTES # BLD AUTO: 0.9 K/UL (ref 0.3–1)
MONOCYTES NFR BLD: 11.8 % (ref 4–15)
NEUTROPHILS # BLD AUTO: 4.6 K/UL (ref 1.8–7.7)
NEUTROPHILS NFR BLD: 63.6 % (ref 38–73)
NRBC BLD-RTO: 0 /100 WBC
PLATELET # BLD AUTO: 182 K/UL (ref 150–350)
PMV BLD AUTO: 9.7 FL (ref 9.2–12.9)
RBC # BLD AUTO: 5.77 M/UL (ref 4.6–6.2)
WBC # BLD AUTO: 7.19 K/UL (ref 3.9–12.7)

## 2019-04-18 PROCEDURE — 99499 UNLISTED E&M SERVICE: CPT | Mod: S$GLB,,, | Performed by: NURSE PRACTITIONER

## 2019-04-18 PROCEDURE — 99999 PR PBB SHADOW E&M-EST. PATIENT-LVL IV: CPT | Mod: PBBFAC,,, | Performed by: NURSE PRACTITIONER

## 2019-04-18 PROCEDURE — 99213 PR OFFICE/OUTPT VISIT, EST, LEVL III, 20-29 MIN: ICD-10-PCS | Mod: S$GLB,,, | Performed by: NURSE PRACTITIONER

## 2019-04-18 PROCEDURE — 3075F SYST BP GE 130 - 139MM HG: CPT | Mod: S$GLB,,, | Performed by: NURSE PRACTITIONER

## 2019-04-18 PROCEDURE — 99999 PR PBB SHADOW E&M-EST. PATIENT-LVL IV: ICD-10-PCS | Mod: PBBFAC,,, | Performed by: NURSE PRACTITIONER

## 2019-04-18 PROCEDURE — 3079F DIAST BP 80-89 MM HG: CPT | Mod: S$GLB,,, | Performed by: NURSE PRACTITIONER

## 2019-04-18 PROCEDURE — 36415 COLL VENOUS BLD VENIPUNCTURE: CPT | Mod: PN

## 2019-04-18 PROCEDURE — 3075F PR MOST RECENT SYSTOLIC BLOOD PRESS GE 130-139MM HG: ICD-10-PCS | Mod: S$GLB,,, | Performed by: NURSE PRACTITIONER

## 2019-04-18 PROCEDURE — 99213 OFFICE O/P EST LOW 20 MIN: CPT | Mod: S$GLB,,, | Performed by: NURSE PRACTITIONER

## 2019-04-18 PROCEDURE — 3079F PR MOST RECENT DIASTOLIC BLOOD PRESSURE 80-89 MM HG: ICD-10-PCS | Mod: S$GLB,,, | Performed by: NURSE PRACTITIONER

## 2019-04-18 PROCEDURE — 1101F PT FALLS ASSESS-DOCD LE1/YR: CPT | Mod: S$GLB,,, | Performed by: NURSE PRACTITIONER

## 2019-04-18 PROCEDURE — 1101F PR PT FALLS ASSESS DOC 0-1 FALLS W/OUT INJ PAST YR: ICD-10-PCS | Mod: S$GLB,,, | Performed by: NURSE PRACTITIONER

## 2019-04-18 PROCEDURE — 85025 COMPLETE CBC W/AUTO DIFF WBC: CPT | Mod: PN

## 2019-04-18 PROCEDURE — 99499 RISK ADDL DX/OHS AUDIT: ICD-10-PCS | Mod: S$GLB,,, | Performed by: NURSE PRACTITIONER

## 2019-04-18 PROCEDURE — 85025 COMPLETE CBC W/AUTO DIFF WBC: CPT

## 2019-04-18 NOTE — PROGRESS NOTES
HISTORY OF PRESENT ILLNESS:  This is a 68-year-old white gentleman known to   Dr. Johnson in Family Medicine for a diagnosis of iron deficiency anemia related to   recent GI blood loss.  The patient was also recently diagnosed with atrial fib   and placed on Eliquis.  The patient presented with complaints of fatigue and dizziness.    He received 2 infusions of Injectafer in June/2018.      He presents to the clinic 6 months post evaluation.  He denies any headaches,   blurred vision, dizziniess, chest pain, bleeding, cold hands or feet, pica, etc.   He states he becomes SOB with exertion.  No other new complaints or pertinent   findings on a 10-point review of systems.    PHYSICAL EXAMINATION:  GENERAL:  Well-developed, well-nourished, obese, white gentleman in no acute   distress.  Alert and oriented x4.  VITAL SIGNS:  Weight:  Gain of 25 pounds in 6 months   Wt Readings from Last 3 Encounters:   04/18/19 (!) 140.2 kg (309 lb 1.4 oz)   01/30/19 (!) 136.8 kg (301 lb 8 oz)   01/15/19 133.7 kg (294 lb 12.1 oz)     Temp Readings from Last 3 Encounters:   04/18/19 98.2 °F (36.8 °C)   01/30/19 96.9 °F (36.1 °C) (Oral)   01/15/19 98.3 °F (36.8 °C) (Oral)     BP Readings from Last 3 Encounters:   04/18/19 139/89   01/30/19 122/80   01/15/19 138/86     Pulse Readings from Last 3 Encounters:   04/18/19 (!) 49   01/30/19 80   01/15/19 70     HEENT:  Normocephalic, atraumatic.  Oral mucosa pink and moist.  Lips without   lesions.  Tongue midline.  Oropharynx clear.  Nonicteric sclerae. Cyanotic lips & nose.  NECK:  Supple, no adenopathy.  No carotid bruits, thyromegaly or thyroid nodule.  HEART:  Irregular rate and rhythm without murmur, gallop or rub.                LUNGS:  Clear to auscultation bilaterally.  Normal respiratory effort.       ABDOMEN:  Soft, nontender, nondistended with positive normoactive bowel sounds,   no hepatosplenomegaly.    EXTREMITIES:  No cyanosis, clubbing or edema.  Distal pulses are intact.             LABORATORY:    Lab Results   Component Value Date    WBC 7.19 04/18/2019    HGB 17.6 04/18/2019    HCT 53.8 04/18/2019    MCV 93 04/18/2019     04/18/2019     Hgb improved from 17.6 (18.0, 16.4, 11.8)    IMPRESSION:    1.  Iron deficiency anemia due to blood loss - S/P Injectafer, stable.  2.  AFib - on Eliquis  3.  Cardiomegaly  4.  Hx of smoking    PLAN:  1.  Return in 9 months with interval CBC for reevaluation.  2.  The patient will call the office for any worsening signs and symptoms of anemia.      Assessment/plan reviewed and approved by Dr. Luna.

## 2019-04-26 NOTE — TELEPHONE ENCOUNTER
BG goal 110-140    Continue IV insulin infusion protocol  Requires intensive BG monitoring while on protocol     Discharge planning: JOSÉ MANUEL       I attempted to reach pt again to schedule his HRA appt. No answer; left message.

## 2019-04-27 DIAGNOSIS — I10 ESSENTIAL HYPERTENSION: ICD-10-CM

## 2019-04-28 RX ORDER — DOXAZOSIN 2 MG/1
TABLET ORAL
Qty: 90 TABLET | Refills: 2 | Status: SHIPPED | OUTPATIENT
Start: 2019-04-28 | End: 2020-03-04 | Stop reason: SDUPTHER

## 2019-04-28 RX ORDER — HYDROCHLOROTHIAZIDE 12.5 MG/1
TABLET ORAL
Qty: 90 TABLET | Refills: 2 | Status: SHIPPED | OUTPATIENT
Start: 2019-04-28 | End: 2019-08-23 | Stop reason: ALTCHOICE

## 2019-04-28 RX ORDER — FINASTERIDE 5 MG/1
TABLET, FILM COATED ORAL
Qty: 90 TABLET | Refills: 2 | Status: SHIPPED | OUTPATIENT
Start: 2019-04-28 | End: 2020-01-28

## 2019-05-10 ENCOUNTER — PATIENT OUTREACH (OUTPATIENT)
Dept: OTHER | Facility: OTHER | Age: 69
End: 2019-05-10

## 2019-05-10 DIAGNOSIS — I10 ESSENTIAL HYPERTENSION: Primary | ICD-10-CM

## 2019-05-10 NOTE — PROGRESS NOTES
HPI:  Called Mr. Zeenat Mancia for hypertension follow-up. Patient reports adherence to medication regimen with no complaints. He feels that readings are higher secondary to medication adjustment.m     Last 5 Patient Entered Readings                                      Current 30 Day Average: 138/89     Recent Readings 6/6/2019 6/4/2019 6/4/2019 6/2/2019 6/1/2019    SBP (mmHg) 126 138 152 137 152    DBP (mmHg) 76 89 95 85 83    Pulse 59 60 79 57 58          Patient denies s/s of hypotension (lightheadedness, dizziness, nausea, fatigue) associated with low readings. Instructed patient to inform me if this occurs, patient confirms understanding.    Patient denies s/s of hypertension (SOB, CP, severe headaches, changes in vision) associated with high readings. Instructed patient to go to the ED if BP >180/110 and accompanied by hypertensive s/s, patient confirms understanding.    Assessment:  Patient's current 30-day average is not at goal of <130/80 mmHg.    Plan:  Increase valsartan and continue other medications as prescribed.   Patients health , Sanchez Cueto, will be following up every 3-4 weeks.   I will continue to monitor regularly and will follow-up in 2 to 3 weeks, sooner if blood pressure begins to trend upward or downward.     Current medication regimen:  Hypertension Medications             amLODIPine (NORVASC) 5 MG tablet TAKE 1 TABLET DAILY    atenolol (TENORMIN) 100 MG tablet TAKE 1 TABLET DAILY    doxazosin (CARDURA) 2 MG tablet TAKE 1 TABLET EVERY EVENING    hydroCHLOROthiazide (HYDRODIURIL) 12.5 MG Tab TAKE 1 TABLET DAILY    valsartan (DIOVAN) 320 MG tablet Take 1 tablet (320 mg total) by mouth once daily.          Patient has my contact information and knows to call with any concerns or clinical changes.

## 2019-05-30 ENCOUNTER — TELEPHONE (OUTPATIENT)
Dept: FAMILY MEDICINE | Facility: CLINIC | Age: 69
End: 2019-05-30

## 2019-05-30 NOTE — TELEPHONE ENCOUNTER
----- Message from Saloni Mancia sent at 5/30/2019  9:43 AM CDT -----  Type:  Sooner Apoointment Request    Caller is requesting a sooner appointment.     Name of Caller:  Self When is the first available appointment?  N/a   Symptoms:  Pt is trying to reschedule an appt to follow up with Dr Johnson / will be out of town the week of 06/26 th   Best Call Back Number:  [phone] (home)     Additional Information:  States Dr wants to see him

## 2019-05-31 ENCOUNTER — PATIENT OUTREACH (OUTPATIENT)
Dept: OTHER | Facility: OTHER | Age: 69
End: 2019-05-31

## 2019-05-31 NOTE — PROGRESS NOTES
"Last 5 Patient Entered Readings                                      Current 30 Day Average: 137/90     Recent Readings 5/30/2019 5/30/2019 5/28/2019 5/28/2019 5/23/2019    SBP (mmHg) 148 137 143 148 140    DBP (mmHg) 87 90 81 101 86    Pulse 60 65 58 71 61        Patient reports his readings have been higher than normal.  Reports he is unsure if it is change in medication or recent weight gain.  Encouraged patient to continue focusing on lifestyle goals, like going to the [employer].    Digital Medicine: Health  Follow Up    Lifestyle Modifications:    1.Dietary Modifications (Sodium intake <2,000mg/day, food labels, dining out): deferred    2.Physical Activity: Reports he and his wife joined the [employer], but have not been going like they should.  States they have a lot of "commitments" coming up, but will try to go.  Reminded patient of [facility] of going 2-3x/week.    3.Medication Therapy: Patient has been compliant with the medication regimen.    4.Patient has the following medication side effects/concerns: none  (Frequency/Alleviating factors/Precipitating factors, etc.)     Follow up with Mr. Zeenat Mancia completed. No further questions or concerns. Will continue to follow up to achieve health goals.  "

## 2019-06-03 RX ORDER — APIXABAN 5 MG/1
TABLET, FILM COATED ORAL
Qty: 180 TABLET | Refills: 3 | Status: SHIPPED | OUTPATIENT
Start: 2019-06-03 | End: 2020-03-10 | Stop reason: SDUPTHER

## 2019-06-07 RX ORDER — VALSARTAN 320 MG/1
320 TABLET ORAL DAILY
Qty: 90 TABLET | Refills: 1 | Status: SHIPPED | OUTPATIENT
Start: 2019-06-07 | End: 2019-07-08 | Stop reason: SDUPTHER

## 2019-06-28 ENCOUNTER — PATIENT OUTREACH (OUTPATIENT)
Dept: OTHER | Facility: OTHER | Age: 69
End: 2019-06-28

## 2019-06-28 NOTE — PROGRESS NOTES
HPI:  Called Mr. Zeenat Mancia for hypertension digital medicine follow-up. Encounter very brief as patient is traveling. Tolerating increased dose with no complaints however, he does not see difference in readings. Requested call back after next week to review in detail.     Last 5 Patient Entered Readings                                      Current 30 Day Average: 140/87     Recent Readings 6/23/2019 6/19/2019 6/18/2019 6/18/2019 6/17/2019    SBP (mmHg) 147 131 140 163 133    DBP (mmHg) 93 86 85 90 86    Pulse 63 50 66 61 57          Patient denies s/s of hypotension (lightheadedness, dizziness, nausea, fatigue) associated with low readings. Instructed patient to inform me if this occurs, patient confirms understanding.    Patient denies s/s of hypertension (SOB, CP, severe headaches, changes in vision) associated with high readings. Instructed patient to go to the ED if BP >180/110 and accompanied by hypertensive s/s, patient confirms understanding.    Assessment:  Patient's current 30-day average is not at goal of <130/80 mmHg.       Plan:  Continue current regimen.  Will place patient on Hiatus for 1 week.   Patients health , Sanchez Cueto, will follow-up as scheduled.    I will continue to monitor regularly and will follow-up in 2 weeks, sooner if blood pressure begins to trend upward or downward.     Current medication regimen:  Hypertension Medications             amLODIPine (NORVASC) 5 MG tablet TAKE 1 TABLET DAILY    atenolol (TENORMIN) 100 MG tablet TAKE 1 TABLET DAILY    doxazosin (CARDURA) 2 MG tablet TAKE 1 TABLET EVERY EVENING    hydroCHLOROthiazide (HYDRODIURIL) 12.5 MG Tab TAKE 1 TABLET DAILY    valsartan (DIOVAN) 320 MG tablet Take 1 tablet (320 mg total) by mouth once daily.        Patient has my contact information and knows to call with any concerns or clinical changes.

## 2019-07-03 DIAGNOSIS — K21.9 GASTROESOPHAGEAL REFLUX DISEASE, ESOPHAGITIS PRESENCE NOT SPECIFIED: ICD-10-CM

## 2019-07-03 RX ORDER — PANTOPRAZOLE SODIUM 40 MG/1
TABLET, DELAYED RELEASE ORAL
Qty: 90 TABLET | Refills: 3 | Status: SHIPPED | OUTPATIENT
Start: 2019-07-03 | End: 2020-03-10 | Stop reason: SDUPTHER

## 2019-07-08 ENCOUNTER — PATIENT MESSAGE (OUTPATIENT)
Dept: ADMINISTRATIVE | Facility: OTHER | Age: 69
End: 2019-07-08

## 2019-07-08 ENCOUNTER — PATIENT MESSAGE (OUTPATIENT)
Dept: FAMILY MEDICINE | Facility: CLINIC | Age: 69
End: 2019-07-08

## 2019-07-08 DIAGNOSIS — I10 HYPERTENSION, UNSPECIFIED TYPE: ICD-10-CM

## 2019-07-08 DIAGNOSIS — E78.5 HYPERLIPIDEMIA, UNSPECIFIED HYPERLIPIDEMIA TYPE: Primary | ICD-10-CM

## 2019-07-08 DIAGNOSIS — I10 ESSENTIAL HYPERTENSION: ICD-10-CM

## 2019-07-08 RX ORDER — VALSARTAN 320 MG/1
320 TABLET ORAL DAILY
Qty: 90 TABLET | Refills: 1 | Status: SHIPPED | OUTPATIENT
Start: 2019-07-08 | End: 2019-07-08 | Stop reason: SDUPTHER

## 2019-07-08 RX ORDER — VALSARTAN 320 MG/1
320 TABLET ORAL DAILY
Qty: 90 TABLET | Refills: 1 | Status: SHIPPED | OUTPATIENT
Start: 2019-07-08 | End: 2019-07-22 | Stop reason: SDUPTHER

## 2019-07-08 NOTE — PROGRESS NOTES
Last 5 Patient Entered Readings                                      Current 30 Day Average: 135/87     Recent Readings 7/8/2019 7/8/2019 7/2/2019 7/2/2019 6/30/2019    SBP (mmHg) 150 147 134 149 133    DBP (mmHg) 101 101 84 101 93    Pulse 77 64 70 85 79          7/8: Patient messaged PharmD this week.  Will push call back 2 weeks.

## 2019-07-08 NOTE — TELEPHONE ENCOUNTER
Patient would like to know if you would like additional labs prior to upcoming appointment on July 17th.       Please advise.

## 2019-07-11 ENCOUNTER — PATIENT OUTREACH (OUTPATIENT)
Dept: OTHER | Facility: OTHER | Age: 69
End: 2019-07-11

## 2019-07-11 DIAGNOSIS — I10 ESSENTIAL HYPERTENSION: ICD-10-CM

## 2019-07-11 NOTE — PROGRESS NOTES
HPI:  Called Mr. Zeenat Mancia for hypertension digital medicine follow-up. Taking increased dose of valsartan however, concerned that BP has not improved. Reviewed technique for taking readings and no discrepancies however, patient has not charged device.     Last 5 Patient Entered Readings                                      Current 30 Day Average: 138/90     Recent Readings 7/8/2019 7/8/2019 7/2/2019 7/2/2019 6/30/2019    SBP (mmHg) 150 147 134 149 133    DBP (mmHg) 101 101 84 101 93    Pulse 77 64 70 85 79          Patient denies s/s of hypotension (lightheadedness, dizziness, nausea, fatigue) associated with low readings. Instructed patient to inform me if this occurs, patient confirms understanding.    Patient denies s/s of hypertension (SOB, CP, severe headaches, changes in vision) associated with high readings. Instructed patient to go to the ED if BP >180/110 and accompanied by hypertensive s/s, patient confirms understanding.    Assessment:  Patient's current 30-day average is not at goal of <130/80 mmHg.       Plan:  Continue current regimen.  Charge BP cuff and continue frequent monitoring.  Will increase thiazide if readings remain above goal.   Patients health , Sanchez Cueto, will follow-up as scheduled.    I will continue to monitor regularly and will follow-up in 3 weeks, sooner if blood pressure begins to trend upward or downward.     Current medication regimen:  Hypertension Medications             amLODIPine (NORVASC) 5 MG tablet TAKE 1 TABLET DAILY    atenolol (TENORMIN) 100 MG tablet TAKE 1 TABLET DAILY    doxazosin (CARDURA) 2 MG tablet TAKE 1 TABLET EVERY EVENING    hydroCHLOROthiazide (HYDRODIURIL) 12.5 MG Tab TAKE 1 TABLET DAILY    valsartan (DIOVAN) 320 MG tablet Take 1 tablet (320 mg total) by mouth once daily.        Patient has my contact information and knows to call with any concerns or clinical changes.

## 2019-07-14 ENCOUNTER — PATIENT MESSAGE (OUTPATIENT)
Dept: ADMINISTRATIVE | Facility: OTHER | Age: 69
End: 2019-07-14

## 2019-07-14 DIAGNOSIS — I10 ESSENTIAL HYPERTENSION: Primary | ICD-10-CM

## 2019-07-15 DIAGNOSIS — I10 ESSENTIAL HYPERTENSION: ICD-10-CM

## 2019-07-15 RX ORDER — VALSARTAN 320 MG/1
320 TABLET ORAL DAILY
Qty: 14 TABLET | Refills: 0 | Status: SHIPPED | OUTPATIENT
Start: 2019-07-15 | End: 2019-07-15

## 2019-07-15 RX ORDER — VALSARTAN 320 MG/1
320 TABLET ORAL DAILY
Qty: 14 TABLET | Refills: 0 | Status: SHIPPED | OUTPATIENT
Start: 2019-07-15 | End: 2019-07-18

## 2019-07-16 ENCOUNTER — LAB VISIT (OUTPATIENT)
Dept: LAB | Facility: HOSPITAL | Age: 69
End: 2019-07-16
Attending: FAMILY MEDICINE
Payer: MEDICARE

## 2019-07-16 DIAGNOSIS — E78.5 HYPERLIPIDEMIA, UNSPECIFIED HYPERLIPIDEMIA TYPE: ICD-10-CM

## 2019-07-16 DIAGNOSIS — I10 HYPERTENSION, UNSPECIFIED TYPE: ICD-10-CM

## 2019-07-16 LAB
ALBUMIN SERPL BCP-MCNC: 4 G/DL (ref 3.5–5.2)
ALP SERPL-CCNC: 68 U/L (ref 55–135)
ALT SERPL W/O P-5'-P-CCNC: 17 U/L (ref 10–44)
ANION GAP SERPL CALC-SCNC: 10 MMOL/L (ref 8–16)
AST SERPL-CCNC: 15 U/L (ref 10–40)
BILIRUB SERPL-MCNC: 0.7 MG/DL (ref 0.1–1)
BUN SERPL-MCNC: 21 MG/DL (ref 8–23)
CALCIUM SERPL-MCNC: 9.8 MG/DL (ref 8.7–10.5)
CHLORIDE SERPL-SCNC: 104 MMOL/L (ref 95–110)
CHOLEST SERPL-MCNC: 178 MG/DL (ref 120–199)
CHOLEST/HDLC SERPL: 4 {RATIO} (ref 2–5)
CO2 SERPL-SCNC: 31 MMOL/L (ref 23–29)
CREAT SERPL-MCNC: 1.2 MG/DL (ref 0.5–1.4)
EST. GFR  (AFRICAN AMERICAN): >60 ML/MIN/1.73 M^2
EST. GFR  (NON AFRICAN AMERICAN): >60 ML/MIN/1.73 M^2
GLUCOSE SERPL-MCNC: 110 MG/DL (ref 70–110)
HDLC SERPL-MCNC: 44 MG/DL (ref 40–75)
HDLC SERPL: 24.7 % (ref 20–50)
LDLC SERPL CALC-MCNC: 107.6 MG/DL (ref 63–159)
NONHDLC SERPL-MCNC: 134 MG/DL
POTASSIUM SERPL-SCNC: 4.4 MMOL/L (ref 3.5–5.1)
PROT SERPL-MCNC: 7.5 G/DL (ref 6–8.4)
SODIUM SERPL-SCNC: 145 MMOL/L (ref 136–145)
TRIGL SERPL-MCNC: 132 MG/DL (ref 30–150)

## 2019-07-16 PROCEDURE — 80061 LIPID PANEL: CPT

## 2019-07-16 PROCEDURE — 36415 COLL VENOUS BLD VENIPUNCTURE: CPT | Mod: PO

## 2019-07-16 PROCEDURE — 80053 COMPREHEN METABOLIC PANEL: CPT

## 2019-07-17 ENCOUNTER — OFFICE VISIT (OUTPATIENT)
Dept: FAMILY MEDICINE | Facility: CLINIC | Age: 69
End: 2019-07-17
Payer: MEDICARE

## 2019-07-17 VITALS
WEIGHT: 315 LBS | BODY MASS INDEX: 40.43 KG/M2 | HEIGHT: 74 IN | SYSTOLIC BLOOD PRESSURE: 130 MMHG | HEART RATE: 58 BPM | DIASTOLIC BLOOD PRESSURE: 92 MMHG | OXYGEN SATURATION: 95 %

## 2019-07-17 DIAGNOSIS — I77.1 TORTUOUS AORTA: ICD-10-CM

## 2019-07-17 DIAGNOSIS — E66.01 MORBID OBESITY DUE TO EXCESS CALORIES: ICD-10-CM

## 2019-07-17 DIAGNOSIS — I48.20 CHRONIC ATRIAL FIBRILLATION: ICD-10-CM

## 2019-07-17 DIAGNOSIS — I10 ESSENTIAL HYPERTENSION: Primary | ICD-10-CM

## 2019-07-17 PROCEDURE — 3080F PR MOST RECENT DIASTOLIC BLOOD PRESSURE >= 90 MM HG: ICD-10-PCS | Mod: S$GLB,,, | Performed by: FAMILY MEDICINE

## 2019-07-17 PROCEDURE — 99999 PR PBB SHADOW E&M-EST. PATIENT-LVL III: CPT | Mod: PBBFAC,,, | Performed by: FAMILY MEDICINE

## 2019-07-17 PROCEDURE — 99214 OFFICE O/P EST MOD 30 MIN: CPT | Mod: S$GLB,,, | Performed by: FAMILY MEDICINE

## 2019-07-17 PROCEDURE — 99999 PR PBB SHADOW E&M-EST. PATIENT-LVL III: ICD-10-PCS | Mod: PBBFAC,,, | Performed by: FAMILY MEDICINE

## 2019-07-17 PROCEDURE — 99499 RISK ADDL DX/OHS AUDIT: ICD-10-PCS | Mod: S$GLB,,, | Performed by: FAMILY MEDICINE

## 2019-07-17 PROCEDURE — 99214 PR OFFICE/OUTPT VISIT, EST, LEVL IV, 30-39 MIN: ICD-10-PCS | Mod: S$GLB,,, | Performed by: FAMILY MEDICINE

## 2019-07-17 PROCEDURE — 3075F PR MOST RECENT SYSTOLIC BLOOD PRESS GE 130-139MM HG: ICD-10-PCS | Mod: S$GLB,,, | Performed by: FAMILY MEDICINE

## 2019-07-17 PROCEDURE — 1101F PR PT FALLS ASSESS DOC 0-1 FALLS W/OUT INJ PAST YR: ICD-10-PCS | Mod: S$GLB,,, | Performed by: FAMILY MEDICINE

## 2019-07-17 PROCEDURE — 3075F SYST BP GE 130 - 139MM HG: CPT | Mod: S$GLB,,, | Performed by: FAMILY MEDICINE

## 2019-07-17 PROCEDURE — 99499 UNLISTED E&M SERVICE: CPT | Mod: S$GLB,,, | Performed by: FAMILY MEDICINE

## 2019-07-17 PROCEDURE — 1101F PT FALLS ASSESS-DOCD LE1/YR: CPT | Mod: S$GLB,,, | Performed by: FAMILY MEDICINE

## 2019-07-17 PROCEDURE — 3080F DIAST BP >= 90 MM HG: CPT | Mod: S$GLB,,, | Performed by: FAMILY MEDICINE

## 2019-07-17 NOTE — PROGRESS NOTES
Subjective:       Patient ID: Zeenat Mancia is a 69 y.o. male    Chief Complaint: Hypertension; Fatigue; and Dizziness    Hypertension   This is a chronic problem. The problem is uncontrolled. Pertinent negatives include no chest pain, headaches, neck pain, orthopnea, palpitations, PND or shortness of breath. (Occasional lightheadedness when standing.  Associated fatigue) Past treatments include angiotensin blockers, alpha 1 blockers, beta blockers, calcium channel blockers and diuretics. The current treatment provides moderate improvement. There are no compliance problems.        Review of Systems   Constitutional: Positive for fatigue and unexpected weight change (increased ). Negative for activity change.   HENT: Negative for hearing loss, rhinorrhea and trouble swallowing.    Eyes: Negative for discharge and visual disturbance.   Respiratory: Positive for wheezing. Negative for chest tightness and shortness of breath.    Cardiovascular: Negative for chest pain, palpitations, orthopnea and PND.   Gastrointestinal: Positive for constipation. Negative for blood in stool, diarrhea and vomiting.   Endocrine: Positive for polyuria. Negative for polydipsia.   Genitourinary: Positive for urgency. Negative for difficulty urinating and hematuria.   Musculoskeletal: Positive for arthralgias. Negative for joint swelling and neck pain.   Neurological: Negative for weakness and headaches.   Psychiatric/Behavioral: Negative for confusion and dysphoric mood.        Objective:   Physical Exam   Constitutional: He is oriented to person, place, and time. He appears well-developed and well-nourished. No distress.   HENT:   Head: Normocephalic and atraumatic.   Eyes: Pupils are equal, round, and reactive to light. EOM are normal.   Neck: Neck supple.   Cardiovascular: Normal rate, regular rhythm and normal heart sounds. Exam reveals no gallop and no friction rub.   No murmur heard.  Pulmonary/Chest: Effort normal and breath sounds  normal. He has no wheezes. He has no rales.   Neurological: He is alert and oriented to person, place, and time.   Vitals reviewed.    Results for orders placed or performed in visit on 07/16/19   Comprehensive metabolic panel   Result Value Ref Range    Sodium 145 136 - 145 mmol/L    Potassium 4.4 3.5 - 5.1 mmol/L    Chloride 104 95 - 110 mmol/L    CO2 31 (H) 23 - 29 mmol/L    Glucose 110 70 - 110 mg/dL    BUN, Bld 21 8 - 23 mg/dL    Creatinine 1.2 0.5 - 1.4 mg/dL    Calcium 9.8 8.7 - 10.5 mg/dL    Total Protein 7.5 6.0 - 8.4 g/dL    Albumin 4.0 3.5 - 5.2 g/dL    Total Bilirubin 0.7 0.1 - 1.0 mg/dL    Alkaline Phosphatase 68 55 - 135 U/L    AST 15 10 - 40 U/L    ALT 17 10 - 44 U/L    Anion Gap 10 8 - 16 mmol/L    eGFR if African American >60.0 >60 mL/min/1.73 m^2    eGFR if non African American >60.0 >60 mL/min/1.73 m^2   Lipid panel   Result Value Ref Range    Cholesterol 178 120 - 199 mg/dL    Triglycerides 132 30 - 150 mg/dL    HDL 44 40 - 75 mg/dL    LDL Cholesterol 107.6 63.0 - 159.0 mg/dL    Hdl/Cholesterol Ratio 24.7 20.0 - 50.0 %    Total Cholesterol/HDL Ratio 4.0 2.0 - 5.0    Non-HDL Cholesterol 134 mg/dL         Assessment:       1. Essential hypertension     2. Tortuous aorta     3. Chronic atrial fibrillation  Ambulatory consult to Cardiology   4. Morbid obesity due to excess calories          Plan:       Essential hypertension with Tortuous aorta  - Continue current therapy  - Serial blood pressure monitoring  - Diet and exercise education.    Chronic atrial fibrillation with Morbid obesity due to excess calories  -     Ambulatory consult to Cardiology  - Continue current therapy

## 2019-07-18 ENCOUNTER — OFFICE VISIT (OUTPATIENT)
Dept: CARDIOLOGY | Facility: CLINIC | Age: 69
End: 2019-07-18
Payer: MEDICARE

## 2019-07-18 VITALS
HEART RATE: 70 BPM | WEIGHT: 315 LBS | HEIGHT: 74 IN | DIASTOLIC BLOOD PRESSURE: 78 MMHG | BODY MASS INDEX: 40.43 KG/M2 | SYSTOLIC BLOOD PRESSURE: 132 MMHG

## 2019-07-18 DIAGNOSIS — E66.01 MORBID OBESITY DUE TO EXCESS CALORIES: ICD-10-CM

## 2019-07-18 DIAGNOSIS — I10 ESSENTIAL HYPERTENSION: ICD-10-CM

## 2019-07-18 DIAGNOSIS — I48.20 CHRONIC ATRIAL FIBRILLATION: Primary | ICD-10-CM

## 2019-07-18 DIAGNOSIS — I65.22 STENOSIS OF LEFT CAROTID ARTERY: ICD-10-CM

## 2019-07-18 PROCEDURE — 3075F PR MOST RECENT SYSTOLIC BLOOD PRESS GE 130-139MM HG: ICD-10-PCS | Mod: S$GLB,,, | Performed by: INTERNAL MEDICINE

## 2019-07-18 PROCEDURE — 99214 OFFICE O/P EST MOD 30 MIN: CPT | Mod: S$GLB,,, | Performed by: INTERNAL MEDICINE

## 2019-07-18 PROCEDURE — 1100F PR PT FALLS ASSESS DOC 2+ FALLS/FALL W/INJURY/YR: ICD-10-PCS | Mod: S$GLB,,, | Performed by: INTERNAL MEDICINE

## 2019-07-18 PROCEDURE — 3288F PR FALLS RISK ASSESSMENT DOCUMENTED: ICD-10-PCS | Mod: S$GLB,,, | Performed by: INTERNAL MEDICINE

## 2019-07-18 PROCEDURE — 3075F SYST BP GE 130 - 139MM HG: CPT | Mod: S$GLB,,, | Performed by: INTERNAL MEDICINE

## 2019-07-18 PROCEDURE — 3078F PR MOST RECENT DIASTOLIC BLOOD PRESSURE < 80 MM HG: ICD-10-PCS | Mod: S$GLB,,, | Performed by: INTERNAL MEDICINE

## 2019-07-18 PROCEDURE — 3288F FALL RISK ASSESSMENT DOCD: CPT | Mod: S$GLB,,, | Performed by: INTERNAL MEDICINE

## 2019-07-18 PROCEDURE — 3078F DIAST BP <80 MM HG: CPT | Mod: S$GLB,,, | Performed by: INTERNAL MEDICINE

## 2019-07-18 PROCEDURE — 99999 PR PBB SHADOW E&M-EST. PATIENT-LVL III: ICD-10-PCS | Mod: PBBFAC,,, | Performed by: INTERNAL MEDICINE

## 2019-07-18 PROCEDURE — 1100F PTFALLS ASSESS-DOCD GE2>/YR: CPT | Mod: S$GLB,,, | Performed by: INTERNAL MEDICINE

## 2019-07-18 PROCEDURE — 99999 PR PBB SHADOW E&M-EST. PATIENT-LVL III: CPT | Mod: PBBFAC,,, | Performed by: INTERNAL MEDICINE

## 2019-07-18 PROCEDURE — 99214 PR OFFICE/OUTPT VISIT, EST, LEVL IV, 30-39 MIN: ICD-10-PCS | Mod: S$GLB,,, | Performed by: INTERNAL MEDICINE

## 2019-07-18 NOTE — PROGRESS NOTES
Subjective:    Patient ID:  Zeenat Mancia is a 69 y.o. male who presents for follow-up of Atrial Fibrillation (follow up per Dr. Yost); Hyperlipidemia; Shortness of Breath; and Dizziness      Pt here for f/u. He had not seen us since 05/2018. Since last visit he has gained 15#. Reports occasional lightheadedness when standing. He denies any palpitations, syncope or pre-syncope. He denies rapid or sustained rhythms. Does not feel the AF. He denies any chest pain, SOB, PND, orthopnea.       Review of Systems   Constitution: Positive for weight gain. Negative for weight loss.   HENT: Negative.    Eyes: Negative.    Cardiovascular: Negative for chest pain, claudication, cyanosis, dyspnea on exertion, irregular heartbeat, leg swelling, near-syncope, orthopnea (no PND), palpitations and syncope.   Respiratory: Negative for cough, hemoptysis, shortness of breath and snoring.    Endocrine: Negative.    Skin: Negative.    Musculoskeletal: Negative for joint pain, muscle cramps, muscle weakness and myalgias.   Gastrointestinal: Negative for diarrhea, hematemesis, nausea and vomiting.   Genitourinary: Negative.    Neurological: Positive for light-headedness. Negative for dizziness, focal weakness, loss of balance, numbness, paresthesias and seizures.   Psychiatric/Behavioral: Negative.         Objective:    Physical Exam   Constitutional: He appears well-developed and well-nourished.   HENT:   Mouth/Throat: Oropharynx is clear and moist.   Eyes: Pupils are equal, round, and reactive to light.   Neck: Normal range of motion. No thyromegaly present.   Cardiovascular: Normal rate, S1 normal, S2 normal, normal heart sounds, intact distal pulses and normal pulses. An irregularly irregular rhythm present.  No extrasystoles are present. PMI is not displaced. Exam reveals no friction rub.   No murmur heard.  Pulmonary/Chest: Effort normal and breath sounds normal. He has no wheezes. He has no rales. He exhibits no tenderness.    Abdominal: Soft. Bowel sounds are normal. He exhibits no distension and no mass. There is no tenderness.   Musculoskeletal: Normal range of motion. He exhibits edema (1-2+).   Skin: Skin is warm and dry.   Vitals reviewed.      Test(s) Reviewed  I have reviewed the following in detail:  [x] Stress test   [] Angiography   [x] Echocardiogram   [x] Labs   [] Other:         Assessment:       1. Chronic atrial fibrillation    2. Essential hypertension    3. Stenosis of left carotid artery    4. Morbid obesity due to excess calories         Plan:       We discussed diet, exercise and weight loss  We discussed reducing sodium in the diet  Continue present Rx  Echo  Carotid US  F/u at least yearly

## 2019-07-18 NOTE — LETTER
July 18, 2019      Leonel Johnson MD  [address] [facility] [address] [place] [address]           [facility]  [address] [facility] [address] [place] [address]  Phone: [phone]          Patient: Zeenat Mancia   MR Number: [id number]   YOB: 1950   Date of Visit: 7/18/2019       Dear Dr. Leonel Johnson:    Thank you for referring Zeenat Mancia to me for evaluation. Attached you will find relevant portions of my assessment and plan of care.    If you have questions, please do not hesitate to call me. I look forward to following Zeenat Mancia along with you.    Sincerely,    Puneet Fuentse MD    Enclosure  CC:  No Recipients    If you would like to receive this communication electronically, please contact [email or web address] or [phone] to request more information on ReplySend Link access.    For providers and/or their staff who would like to refer a patient to [facility], please contact us through our one-stop-shop provider referral line, [facility], at [phone].    If you feel you have received this communication in error or would no longer like to receive these types of communications, please e-mail [email or web address]

## 2019-07-22 ENCOUNTER — PATIENT OUTREACH (OUTPATIENT)
Dept: OTHER | Facility: OTHER | Age: 69
End: 2019-07-22

## 2019-07-22 RX ORDER — VALSARTAN 320 MG/1
320 TABLET ORAL DAILY
Qty: 90 TABLET | Refills: 1 | Status: SHIPPED | OUTPATIENT
Start: 2019-07-22 | End: 2020-03-09 | Stop reason: SDUPTHER

## 2019-07-22 NOTE — PROGRESS NOTES
Last 5 Patient Entered Readings                                      Current 30 Day Average: 140/94     Recent Readings 7/20/2019 7/20/2019 7/20/2019 7/17/2019 7/17/2019    SBP (mmHg) 138 152 158 130 123    DBP (mmHg) 93 97 84 89 90    Pulse 61 60 66 58 79        Patient reports he already saw Dr. Johnson and Cardiologist last week.  States he has been feeling tired and dizzy so he is going to do some tests this week.  No questions or concerns at this time.    Digital Medicine: Health  Follow Up    Lifestyle Modifications:    1.Dietary Modifications (Sodium intake <2,000mg/day, food labels, dining out): deferred    2.Physical Activity: deferred    3.Medication Therapy: Patient has been compliant with the medication regimen.    4.Patient has the following medication side effects/concerns: none  (Frequency/Alleviating factors/Precipitating factors, etc.)     Follow up with Mr. Epperson LEXX Mancia completed. No further questions or concerns. Will continue to follow up to achieve health goals.

## 2019-07-31 ENCOUNTER — CLINICAL SUPPORT (OUTPATIENT)
Dept: CARDIOLOGY | Facility: CLINIC | Age: 69
End: 2019-07-31
Attending: INTERNAL MEDICINE
Payer: MEDICARE

## 2019-07-31 VITALS
DIASTOLIC BLOOD PRESSURE: 70 MMHG | HEART RATE: 65 BPM | SYSTOLIC BLOOD PRESSURE: 140 MMHG | WEIGHT: 315 LBS | BODY MASS INDEX: 40.43 KG/M2 | HEIGHT: 74 IN

## 2019-07-31 DIAGNOSIS — I48.20 CHRONIC ATRIAL FIBRILLATION: ICD-10-CM

## 2019-07-31 DIAGNOSIS — I65.22 STENOSIS OF LEFT CAROTID ARTERY: ICD-10-CM

## 2019-07-31 DIAGNOSIS — I10 ESSENTIAL HYPERTENSION: ICD-10-CM

## 2019-07-31 DIAGNOSIS — E66.01 MORBID OBESITY DUE TO EXCESS CALORIES: ICD-10-CM

## 2019-07-31 PROCEDURE — 99999 PR PBB SHADOW E&M-EST. PATIENT-LVL II: ICD-10-PCS | Mod: PBBFAC,,,

## 2019-07-31 PROCEDURE — 99999 PR PBB SHADOW E&M-EST. PATIENT-LVL II: CPT | Mod: PBBFAC,,,

## 2019-07-31 PROCEDURE — 93306 TRANSTHORACIC ECHO (TTE) COMPLETE (CUPID ONLY): ICD-10-PCS | Mod: S$GLB,,, | Performed by: INTERNAL MEDICINE

## 2019-07-31 PROCEDURE — 93880 CV US DOPPLER CAROTID (CUPID ONLY): ICD-10-PCS | Mod: S$GLB,,, | Performed by: INTERNAL MEDICINE

## 2019-07-31 PROCEDURE — 99999 PR PBB SHADOW E&M-EST. PATIENT-LVL I: ICD-10-PCS | Mod: PBBFAC,,,

## 2019-07-31 PROCEDURE — 93880 EXTRACRANIAL BILAT STUDY: CPT | Mod: S$GLB,,, | Performed by: INTERNAL MEDICINE

## 2019-07-31 PROCEDURE — 93306 TTE W/DOPPLER COMPLETE: CPT | Mod: S$GLB,,, | Performed by: INTERNAL MEDICINE

## 2019-07-31 PROCEDURE — 99999 PR PBB SHADOW E&M-EST. PATIENT-LVL I: CPT | Mod: PBBFAC,,,

## 2019-08-01 LAB
LEFT ARM DIASTOLIC BLOOD PRESSURE: 70 MMHG
LEFT ARM SYSTOLIC BLOOD PRESSURE: 140 MMHG
LEFT CBA DIAS: 10 CM/S
LEFT CBA SYS: 37 CM/S
LEFT CCA DIST DIAS: 14 CM/S
LEFT CCA DIST SYS: 43 CM/S
LEFT CCA MID DIAS: 12 CM/S
LEFT CCA MID SYS: 42 CM/S
LEFT CCA PROX DIAS: 11 CM/S
LEFT CCA PROX SYS: 65 CM/S
LEFT ECA DIAS: 9 CM/S
LEFT ECA SYS: 63 CM/S
LEFT ICA DIST DIAS: 12 CM/S
LEFT ICA DIST SYS: 37 CM/S
LEFT ICA MID DIAS: 14 CM/S
LEFT ICA MID SYS: 37 CM/S
LEFT ICA PROX DIAS: 7 CM/S
LEFT ICA PROX SYS: 29 CM/S
LEFT VERTEBRAL DIAS: 14 CM/S
LEFT VERTEBRAL SYS: 37 CM/S
OHS CV CAROTID RIGHT ICA EDV HIGHEST: 14
OHS CV CAROTID ULTRASOUND LEFT ICA/CCA RATIO: 0.57
OHS CV CAROTID ULTRASOUND RIGHT ICA/CCA RATIO: 1.04
OHS CV PV CAROTID LEFT HIGHEST CCA: 65
OHS CV PV CAROTID LEFT HIGHEST ICA: 37
OHS CV PV CAROTID RIGHT HIGHEST CCA: 46
OHS CV PV CAROTID RIGHT HIGHEST ICA: 48
OHS CV US CAROTID LEFT HIGHEST EDV: 14
RIGHT ARM DIASTOLIC BLOOD PRESSURE: 70 MMHG
RIGHT ARM SYSTOLIC BLOOD PRESSURE: 140 MMHG
RIGHT CBA DIAS: 6 CM/S
RIGHT CBA SYS: 28 CM/S
RIGHT CCA DIST DIAS: 6 CM/S
RIGHT CCA DIST SYS: 30 CM/S
RIGHT CCA MID DIAS: 8 CM/S
RIGHT CCA MID SYS: 45 CM/S
RIGHT CCA PROX DIAS: 3 CM/S
RIGHT CCA PROX SYS: 46 CM/S
RIGHT ECA DIAS: 4 CM/S
RIGHT ECA SYS: 47 CM/S
RIGHT ICA MID DIAS: 8 CM/S
RIGHT ICA MID SYS: 31 CM/S
RIGHT ICA PROX DIAS: 14 CM/S
RIGHT ICA PROX SYS: 48 CM/S
RIGHT VERTEBRAL DIAS: 8 CM/S
RIGHT VERTEBRAL SYS: 26 CM/S

## 2019-08-02 ENCOUNTER — PATIENT MESSAGE (OUTPATIENT)
Dept: CARDIOLOGY | Facility: CLINIC | Age: 69
End: 2019-08-02

## 2019-08-02 ENCOUNTER — TELEPHONE (OUTPATIENT)
Dept: CARDIOLOGY | Facility: CLINIC | Age: 69
End: 2019-08-02

## 2019-08-02 LAB
ASCENDING AORTA: 3.75 CM
AV INDEX (PROSTH): 0.77
AV MEAN GRADIENT: 5 MMHG
AV PEAK GRADIENT: 9 MMHG
AV VALVE AREA: 2.48 CM2
AV VELOCITY RATIO: 0.75
BSA FOR ECHO PROCEDURE: 2.74 M2
CV ECHO LV RWT: 0.51 CM
DOP CALC AO PEAK VEL: 1.47 M/S
DOP CALC AO VTI: 26.7 CM
DOP CALC LVOT AREA: 3.2 CM2
DOP CALC LVOT DIAMETER: 2.03 CM
DOP CALC LVOT PEAK VEL: 1.1 M/S
DOP CALC LVOT STROKE VOLUME: 66.32 CM3
DOP CALCLVOT PEAK VEL VTI: 20.5 CM
ECHO LV POSTERIOR WALL: 1.15 CM (ref 0.6–1.1)
FRACTIONAL SHORTENING: 33 % (ref 28–44)
INTERVENTRICULAR SEPTUM: 1.13 CM (ref 0.6–1.1)
IVRT: 0.06 MSEC
LA MAJOR: 5.51 CM
LA MINOR: 5.78 CM
LA WIDTH: 4.59 CM
LEFT ATRIUM SIZE: 4.67 CM
LEFT ATRIUM VOLUME INDEX: 38.9 ML/M2
LEFT ATRIUM VOLUME: 102.79 CM3
LEFT INTERNAL DIMENSION IN SYSTOLE: 3.01 CM (ref 2.1–4)
LEFT VENTRICLE DIASTOLIC VOLUME INDEX: 35.16 ML/M2
LEFT VENTRICLE DIASTOLIC VOLUME: 92.94 ML
LEFT VENTRICLE MASS INDEX: 70 G/M2
LEFT VENTRICLE SYSTOLIC VOLUME INDEX: 13.3 ML/M2
LEFT VENTRICLE SYSTOLIC VOLUME: 35.15 ML
LEFT VENTRICULAR INTERNAL DIMENSION IN DIASTOLE: 4.51 CM (ref 3.5–6)
LEFT VENTRICULAR MASS: 184.73 G
PISA TR MAX VEL: 2.67 M/S
RA MAJOR: 4.93 CM
RA PRESSURE: 3 MMHG
RA WIDTH: 2.83 CM
RIGHT VENTRICULAR END-DIASTOLIC DIMENSION: 3.96 CM
RV TISSUE DOPPLER FREE WALL SYSTOLIC VELOCITY 1 (APICAL 4 CHAMBER VIEW): 10.28 CM/S
SINUS: 3.67 CM
STJ: 3.73 CM
TR MAX PG: 29 MMHG
TRICUSPID ANNULAR PLANE SYSTOLIC EXCURSION: 2.04 CM
TV REST PULMONARY ARTERY PRESSURE: 32 MMHG

## 2019-08-02 NOTE — TELEPHONE ENCOUNTER
Test(s) Reviewed  I have reviewed the following in detail:  [] Stress test   [] Angiography   [x] Echocardiogram   [] Labs   [x] Other:  Carotid US     Called Pt with results  R carotid artery not able to be visualized adequately  We discussed doing a CTA of neck and he agrees    Will schedule and call with appt time

## 2019-08-05 ENCOUNTER — PATIENT OUTREACH (OUTPATIENT)
Dept: OTHER | Facility: OTHER | Age: 69
End: 2019-08-05

## 2019-08-05 DIAGNOSIS — I77.9 CAROTID ARTERY DISEASE, UNSPECIFIED LATERALITY, UNSPECIFIED TYPE: Primary | ICD-10-CM

## 2019-08-05 DIAGNOSIS — I10 ESSENTIAL HYPERTENSION: Primary | ICD-10-CM

## 2019-08-05 NOTE — PROGRESS NOTES
HPI:  Called Mr. Zeenat Mancia for hypertension digital medicine follow-up. Patient reports adherence to medication regimen with no complaints. Attributes elevated BP to increase in weight. Not readily exercising due to fatigue however, is willing to start to increase activity slowly.     Last 5 Patient Entered Readings                                      Current 30 Day Average: 142/92     Recent Readings 8/22/2019 8/22/2019 8/22/2019 8/12/2019 8/12/2019    SBP (mmHg) 157 154 149 124 122    DBP (mmHg) 92 100 95 80 80    Pulse 71 76 72 59 60          Patient denies s/s of hypotension (lightheadedness, dizziness, nausea, fatigue) associated with low readings. Instructed patient to inform me if this occurs, patient confirms understanding.    Patient denies s/s of hypertension (SOB, CP, severe headaches, changes in vision) associated with high readings. Instructed patient to go to the ED if BP >180/110 and accompanied by hypertensive s/s, patient confirms understanding.    Assessment:  Patient's current 30-day average is not at goal of <130/80 mmHg.       Plan:  Increase hydrochlorothiazide to 25 mg. Continue other medications as prescribed.   Patients health , Sanchez Cueto, will follow-up as scheduled.    I will continue to monitor regularly and will follow-up in 2 weeks, sooner if blood pressure begins to trend upward or downward.     Current medication regimen:  Hypertension Medications             amLODIPine (NORVASC) 5 MG tablet TAKE 1 TABLET DAILY    atenolol (TENORMIN) 100 MG tablet TAKE 1 TABLET DAILY    doxazosin (CARDURA) 2 MG tablet TAKE 1 TABLET EVERY EVENING    hydroCHLOROthiazide (HYDRODIURIL) 25 MG tablet Take 1 tablet (25 mg total) by mouth once daily.    valsartan (DIOVAN) 320 MG tablet Take 1 tablet (320 mg total) by mouth once daily.        Patient has my contact information and knows to call with any concerns or clinical changes.

## 2019-08-07 ENCOUNTER — PATIENT MESSAGE (OUTPATIENT)
Dept: CARDIOLOGY | Facility: CLINIC | Age: 69
End: 2019-08-07

## 2019-08-12 ENCOUNTER — HOSPITAL ENCOUNTER (OUTPATIENT)
Dept: RADIOLOGY | Facility: HOSPITAL | Age: 69
Discharge: HOME | End: 2019-08-12
Attending: INTERNAL MEDICINE
Payer: MEDICARE

## 2019-08-12 DIAGNOSIS — I77.9 CAROTID ARTERY DISEASE, UNSPECIFIED LATERALITY, UNSPECIFIED TYPE: ICD-10-CM

## 2019-08-12 PROCEDURE — 70498 CTA NECK: ICD-10-PCS | Mod: 26,,, | Performed by: RADIOLOGY

## 2019-08-12 PROCEDURE — 70498 CT ANGIOGRAPHY NECK: CPT | Mod: TC,PO

## 2019-08-12 PROCEDURE — 70498 CT ANGIOGRAPHY NECK: CPT | Mod: 26,,, | Performed by: RADIOLOGY

## 2019-08-12 PROCEDURE — 25500020 PHARM REV CODE 255: Mod: PO | Performed by: INTERNAL MEDICINE

## 2019-08-12 RX ADMIN — IOHEXOL 100 ML: 350 INJECTION, SOLUTION INTRAVENOUS at 02:08

## 2019-08-13 ENCOUNTER — PATIENT OUTREACH (OUTPATIENT)
Dept: OTHER | Facility: OTHER | Age: 69
End: 2019-08-13

## 2019-08-13 NOTE — PROGRESS NOTES
Last 5 Patient Entered Readings                                      Current 30 Day Average: 139/92     Recent Readings 8/12/2019 8/12/2019 8/12/2019 8/11/2019 8/11/2019    SBP (mmHg) 124 122 132 137 146    DBP (mmHg) 80 80 94 93 100    Pulse 59 60 62 59 69        Brief encounter.  Patient reports everything is fine.    Digital Medicine: Health  Follow Up    Lifestyle Modifications:    1.Dietary Modifications (Sodium intake <2,000mg/day, food labels, dining out): Reports no change in diet    2.Physical Activity: Reports no change in PA.    3.Medication Therapy: Patient has been compliant with the medication regimen.    4.Patient has the following medication side effects/concerns: none  (Frequency/Alleviating factors/Precipitating factors, etc.)     Follow up with Mr. Zeenat Mancia completed. No further questions or concerns. Will continue to follow up to achieve health goals.

## 2019-08-15 ENCOUNTER — PATIENT MESSAGE (OUTPATIENT)
Dept: CARDIOLOGY | Facility: CLINIC | Age: 69
End: 2019-08-15

## 2019-08-21 ENCOUNTER — PATIENT MESSAGE (OUTPATIENT)
Dept: CARDIOLOGY | Facility: CLINIC | Age: 69
End: 2019-08-21

## 2019-08-21 ENCOUNTER — TELEPHONE (OUTPATIENT)
Dept: CARDIOLOGY | Facility: CLINIC | Age: 69
End: 2019-08-21

## 2019-08-21 NOTE — TELEPHONE ENCOUNTER
Test(s) Reviewed  I have reviewed the following in detail:  [] Stress test   [] Angiography   [] Echocardiogram   [] Labs   [x] Other:  CTA neck     Call Pt and tell him tests are ok  No carotid blockage

## 2019-08-23 RX ORDER — HYDROCHLOROTHIAZIDE 25 MG/1
25 TABLET ORAL DAILY
Qty: 90 TABLET | Refills: 1 | Status: SHIPPED | OUTPATIENT
Start: 2019-08-23 | End: 2020-03-13

## 2019-08-26 ENCOUNTER — PATIENT MESSAGE (OUTPATIENT)
Dept: FAMILY MEDICINE | Facility: CLINIC | Age: 69
End: 2019-08-26

## 2019-08-29 ENCOUNTER — TELEPHONE (OUTPATIENT)
Dept: FAMILY MEDICINE | Facility: CLINIC | Age: 69
End: 2019-08-29

## 2019-08-29 NOTE — TELEPHONE ENCOUNTER
----- Message from Simin Vasquez sent at 8/29/2019 10:18 AM CDT -----  Type: Needs Medical Advice    Who Called:  Patient  Best Call Back Number: [phone]  Additional Information: Patient requesting to speak with nurse concerning TDAP vaccine and getting tested for Pertussis/has question/please call patient to advise.

## 2019-08-29 NOTE — TELEPHONE ENCOUNTER
As long as he is asymptomatic he should go ahead and get the vaccine.  If he starts to have any symptoms of illness such as fever, cough, body aches that he should wait until he is well to be vaccinated.

## 2019-08-29 NOTE — TELEPHONE ENCOUNTER
Contacted patient and informed of recommendation per Tracee Roach to receive vaccine if asymptomatic or wait if showing symptoms to receive vaccine. Patient verbalized understanding.

## 2019-08-29 NOTE — TELEPHONE ENCOUNTER
Patient states his daughter in law tested positive for pertussis. Wants to know if he should get tested before getting vaccine. Patient states he is not showing any symptoms. Please advise.

## 2019-09-06 ENCOUNTER — PATIENT OUTREACH (OUTPATIENT)
Dept: OTHER | Facility: OTHER | Age: 69
End: 2019-09-06

## 2019-09-06 DIAGNOSIS — I10 ESSENTIAL HYPERTENSION: Primary | ICD-10-CM

## 2019-09-06 NOTE — PROGRESS NOTES
"HPI:  Called Mr. Zeenat Mancia for hypertension digital medicine follow-up. Encounter very brief. Tolerating increased dose of hydrochlorothiazide. Patient states that he has not made any changes to sodium intake because he "loves salt" attempted to discuss further and patient had to take another call.     Last 5 Patient Entered Readings                                      Current 30 Day Average: 138/91     Recent Readings 8/31/2019 8/31/2019 8/29/2019 8/29/2019 8/29/2019    SBP (mmHg) 132 143 135 132 143    DBP (mmHg) 92 91 91 88 95    Pulse 83 83 76 79 80          Patient denies s/s of hypotension (lightheadedness, dizziness, nausea, fatigue) associated with low readings. Instructed patient to inform me if this occurs, patient confirms understanding.    Patient denies s/s of hypertension (SOB, CP, severe headaches, changes in vision) associated with high readings. Instructed patient to go to the ED if BP >180/110 and accompanied by hypertensive s/s, patient confirms understanding.    Assessment:  Patient's current 30-day average is not at goal of <130/80 mmHg.       Plan:  Continue current regimen.  Dietary changes deferred for next HC follow-up.  Repeat BMP.   Patients health , Sanchez Cueto, will follow-up as scheduled.    I will continue to monitor regularly and will follow-up in 3 weeks, sooner if blood pressure begins to trend upward or downward.     Current medication regimen:  Hypertension Medications             amLODIPine (NORVASC) 5 MG tablet TAKE 1 TABLET DAILY    atenolol (TENORMIN) 100 MG tablet TAKE 1 TABLET DAILY    doxazosin (CARDURA) 2 MG tablet TAKE 1 TABLET EVERY EVENING    hydroCHLOROthiazide (HYDRODIURIL) 25 MG tablet Take 1 tablet (25 mg total) by mouth once daily.    valsartan (DIOVAN) 320 MG tablet Take 1 tablet (320 mg total) by mouth once daily.        Patient has my contact information and knows to call with any concerns or clinical changes.      "

## 2019-09-13 ENCOUNTER — PATIENT OUTREACH (OUTPATIENT)
Dept: OTHER | Facility: OTHER | Age: 69
End: 2019-09-13

## 2019-09-13 NOTE — PROGRESS NOTES
Digital Medicine: Health  Follow-Up    Reports he is unsure why readings vary when he takes back to back readings.  Reviewed proper technique.  States he is working on portion control.    The history is provided by the patient.     Follow Up  Follow-up reason(s): reading review and routine education      Readings are trending down   Routine Education Topics: eating patterns            Diet:   Patient reports eating or drinking the following: fresh vegetables and meatHe has the following dietary restrictions: low carbHe has meals prepared by family.    Patient lets family grocery shop.      Reports he is working on cut back on food intake.  States his wife does most of the cooking at home.  Reports he is eating more protein and vegetables and cutting back on sweets.  Encouraged patient to continue monitoring sodium intake.    Intervention(s): portion control, low sodium diet education and reducing sodium intake    Assigning the following patient goals: reduce portions, reduce sugar and maintain low sodium diet      SDOH    INTERVENTION(S)  recommended diet modifications and reviewed monitoring technique    PLAN  patient verbalizes understanding      There are no preventive care reminders to display for this patient.    Last 5 Patient Entered Readings                                      Current 30 Day Average: 137/91     Recent Readings 9/10/2019 9/10/2019 9/9/2019 9/9/2019 9/9/2019    SBP (mmHg) 143 149 125 129 117    DBP (mmHg) 89 102 79 90 80    Pulse 56 62 70 60 63

## 2019-10-18 ENCOUNTER — PATIENT OUTREACH (OUTPATIENT)
Dept: OTHER | Facility: OTHER | Age: 69
End: 2019-10-18

## 2019-10-18 NOTE — PROGRESS NOTES
Digital Medicine: Health  Follow-Up    Patient reports they are traveling right now, so encounter was brief.    The history is provided by the patient.     Follow Up  Follow-up reason(s): reading review    Reports he is unsure about elevated BP reading on 10/14 of 140/104 mmHg.  States he took his BP again and it came back down to 132/84.  States he charged his BP cuff right after BP readings.      Assessment/Plan    SDOH    INTERVENTION(S)  reviewed monitoring technique and encouragement/support    PLAN  patient verbalizes understanding    Encouraged patient to rest 5-10min in between BP readings.      There are no preventive care reminders to display for this patient.    Last 5 Patient Entered Readings                                      Current 30 Day Average: 132/90     Recent Readings 10/14/2019 10/14/2019 10/14/2019 10/10/2019 10/4/2019    SBP (mmHg) 132 138 140 127 135    DBP (mmHg) 84 85 104 89 83    Pulse 60 72 68 58 77

## 2019-11-15 ENCOUNTER — PATIENT OUTREACH (OUTPATIENT)
Dept: OTHER | Facility: OTHER | Age: 69
End: 2019-11-15

## 2019-11-15 ENCOUNTER — PES CALL (OUTPATIENT)
Dept: ADMINISTRATIVE | Facility: CLINIC | Age: 69
End: 2019-11-15

## 2019-11-27 RX ORDER — ATENOLOL 100 MG/1
TABLET ORAL
Qty: 90 TABLET | Refills: 2 | Status: SHIPPED | OUTPATIENT
Start: 2019-11-27 | End: 2020-03-09 | Stop reason: SDUPTHER

## 2019-11-27 NOTE — PROGRESS NOTES
Refill Authorization Note     is requesting a refill authorization.    Brief assessment and rationale for refill: APPROVE: prr   Name and strength of medication: ATENOLOL TABS 100MG            Medication reconciliation completed: No              How patient will take medication: t1t po qd           Blood Pressure Readings: <139/89mmHg 12 months Heart Rate: > 50bpm (BB/NDHP-CCBS)   BP Readings from Last 3 Encounters:   07/31/19 (!) 140/70   07/18/19 132/78   07/17/19 (!) 130/92    Pulse Readings from Last 3 Encounters:   07/31/19 65   07/18/19 70   07/17/19 (!) 58        Hypertension Digital Medicine Data Values will display if enrolled in Digital Medicine   Last 5 Patient Entered Readings                                      Current 30 Day Average: 130/90     Recent Readings 11/21/2019 11/14/2019 11/11/2019 11/11/2019 11/7/2019    SBP (mmHg) 135 120 131 137 133    DBP (mmHg) 76 89 82 93 84    Pulse 83 55 64 64 59           Appointments     Date Provider   Last Visit   7/17/2019 Leonel Johnson MD   Next Visit   Visit date not found Leonel Johnson MD

## 2019-11-29 ENCOUNTER — PES CALL (OUTPATIENT)
Dept: ADMINISTRATIVE | Facility: CLINIC | Age: 69
End: 2019-11-29

## 2019-12-02 ENCOUNTER — OFFICE VISIT (OUTPATIENT)
Dept: FAMILY MEDICINE | Facility: CLINIC | Age: 69
End: 2019-12-02
Payer: MEDICARE

## 2019-12-02 VITALS
HEIGHT: 74 IN | OXYGEN SATURATION: 95 % | DIASTOLIC BLOOD PRESSURE: 86 MMHG | SYSTOLIC BLOOD PRESSURE: 130 MMHG | BODY MASS INDEX: 40.43 KG/M2 | HEART RATE: 60 BPM | WEIGHT: 315 LBS

## 2019-12-02 DIAGNOSIS — I48.20 CHRONIC ATRIAL FIBRILLATION: ICD-10-CM

## 2019-12-02 DIAGNOSIS — I65.22 STENOSIS OF LEFT CAROTID ARTERY: ICD-10-CM

## 2019-12-02 DIAGNOSIS — I77.1 TORTUOUS AORTA: ICD-10-CM

## 2019-12-02 DIAGNOSIS — Z00.00 ENCOUNTER FOR PREVENTIVE HEALTH EXAMINATION: Primary | ICD-10-CM

## 2019-12-02 DIAGNOSIS — I10 ESSENTIAL HYPERTENSION: ICD-10-CM

## 2019-12-02 DIAGNOSIS — E78.5 HYPERLIPIDEMIA, UNSPECIFIED HYPERLIPIDEMIA TYPE: ICD-10-CM

## 2019-12-02 DIAGNOSIS — E66.01 MORBID OBESITY DUE TO EXCESS CALORIES: ICD-10-CM

## 2019-12-02 PROBLEM — K92.2 GASTROINTESTINAL HEMORRHAGE: Status: RESOLVED | Noted: 2018-05-14 | Resolved: 2019-12-02

## 2019-12-02 PROCEDURE — 3075F SYST BP GE 130 - 139MM HG: CPT | Mod: S$GLB,,, | Performed by: NURSE PRACTITIONER

## 2019-12-02 PROCEDURE — G0439 PR MEDICARE ANNUAL WELLNESS SUBSEQUENT VISIT: ICD-10-PCS | Mod: S$GLB,,, | Performed by: NURSE PRACTITIONER

## 2019-12-02 PROCEDURE — G0439 PPPS, SUBSEQ VISIT: HCPCS | Mod: S$GLB,,, | Performed by: NURSE PRACTITIONER

## 2019-12-02 PROCEDURE — 99999 PR PBB SHADOW E&M-EST. PATIENT-LVL IV: ICD-10-PCS | Mod: PBBFAC,,, | Performed by: NURSE PRACTITIONER

## 2019-12-02 PROCEDURE — 3079F PR MOST RECENT DIASTOLIC BLOOD PRESSURE 80-89 MM HG: ICD-10-PCS | Mod: S$GLB,,, | Performed by: NURSE PRACTITIONER

## 2019-12-02 PROCEDURE — 3075F PR MOST RECENT SYSTOLIC BLOOD PRESS GE 130-139MM HG: ICD-10-PCS | Mod: S$GLB,,, | Performed by: NURSE PRACTITIONER

## 2019-12-02 PROCEDURE — 3079F DIAST BP 80-89 MM HG: CPT | Mod: S$GLB,,, | Performed by: NURSE PRACTITIONER

## 2019-12-02 PROCEDURE — 99999 PR PBB SHADOW E&M-EST. PATIENT-LVL IV: CPT | Mod: PBBFAC,,, | Performed by: NURSE PRACTITIONER

## 2019-12-02 NOTE — PATIENT INSTRUCTIONS
Counseling and Referral of Other Preventative  (Italic type indicates deductible and co-insurance are waived)    Patient Name: Zeenat Mancia  Today's Date: 12/2/2019    Health Maintenance       Date Due Completion Date    TETANUS VACCINE 05/06/1968 ---    Shingles Vaccine (2 of 3) 10/12/2015 8/17/2015    Lipid Panel 07/16/2024 7/16/2019    Colonoscopy 05/16/2028 5/16/2018    Override on 7/15/2008: Done        No orders of the defined types were placed in this encounter.    The following information is provided to all patients.  This information is to help you find resources for any of the problems found today that may be affecting your health:                Living healthy guide: www.[facility].[place].gov      Understanding Diabetes: www.diabetes.org      Eating healthy: www.cdc.gov/healthyweight      CDC home safety checklist: www.cdc.gov/steadi/patient.html      Agency on Aging: www.goea.[place].[facility]      Alcoholics anonymous (AA): www.aa.org      Physical Activity: www.krystian.nih.gov/ep9nzes      Tobacco use: www.quitwithusla.org

## 2019-12-02 NOTE — PROGRESS NOTES
"Zeenat Mancia presented for a  Medicare AWV and comprehensive Health Risk Assessment today. The following components were reviewed and updated:    · Medical history  · Family History  · Social history  · Allergies and Current Medications  · Health Risk Assessment  · Health Maintenance  · Care Team     ** See Completed Assessments for Annual Wellness Visit within the encounter summary.**       The following assessments were completed:  · Living Situation  · CAGE  · Depression Screening  · Timed Get Up and Go  · Whisper Test  · Cognitive Function Screening          · Nutrition Screening  · ADL Screening  · PAQ Screening    Vitals:    12/02/19 0913   BP: 130/86   BP Location: Left arm   Patient Position: Sitting   BP Method: Large (Manual)   Pulse: 60   SpO2: 95%   Weight: (!) 148.6 kg (327 lb 9.7 oz)   Height: 6' 2" (1.88 m)     Body mass index is 42.06 kg/m².  Physical Exam   Constitutional: He is oriented to person, place, and time. He appears well-developed and well-nourished. No distress.   HENT:   Head: Normocephalic and atraumatic.   Eyes: Pupils are equal, round, and reactive to light. EOM are normal.   Neck: Neck supple.   Cardiovascular: Normal rate, regular rhythm and normal heart sounds. Exam reveals no gallop and no friction rub.   No murmur heard.  Pulmonary/Chest: Effort normal and breath sounds normal. He has no wheezes. He has no rales.   Neurological: He is alert and oriented to person, place, and time.   Vitals reviewed.        Diagnoses and health risks identified today and associated recommendations/orders:    1. Encounter for preventive health examination  Reviewed health maintenance and provided recommendations   Written rx for shingrix provided    2. Hyperlipidemia, unspecified hyperlipidemia type  Continue to monitor  Followed by Leonel Johnson MD .      3. Essential hypertension  Continue to monitor  Followed by Leonel Johnson MD .      4. Tortuous aorta  Continue to monitor  Followed by " Leonel Johnson MD   BP controlled.      5. Stenosis of left carotid artery  Continue to monitor  Followed by Gina.      6. Chronic atrial fibrillation  Continue to monitor  Followed by Gina  .Taking eliquis      7. Morbid obesity due to excess calories  Continue to monitor  Followed by Leonel Johnson MD   Encourage healthy food choices and exercise.        Provided Zeenat with a 5-10 year written screening schedule and personal prevention plan. Recommendations were developed using the USPSTF age appropriate recommendations. Education, counseling, and referrals were provided as needed. After Visit Summary printed and given to patient which includes a list of additional screenings\tests needed.    Follow up in about 1 year (around 12/2/2020).    Galilea West NP  I offered to discuss end of life issues, including information on how to make advance directives that the patient could use to name someone who would make medical decisions on their behalf if they became too ill to make themselves.    ___Patient declined  _X_Patient is interested, I provided paper work and offered to discuss.

## 2019-12-12 ENCOUNTER — PATIENT OUTREACH (OUTPATIENT)
Dept: OTHER | Facility: OTHER | Age: 69
End: 2019-12-12

## 2019-12-12 NOTE — PROGRESS NOTES
Digital Medicine: Clinician Follow-Up    Called patient for digital medicine follow-up. Says that he is doing well and the he knows that his blood pressure is a little high. Attributes elevated blood pressure to his weight gain and says that he is working to getting the weight off. Asked patient about increasing amlodipine but he refused.     The history is provided by the patient. No  was used.     Follow Up  Follow-up reason(s): reading review              Assessment:  Patient's current 30-day average (135/85 mmHg) is slightly above goal of <130/80 mmHg.       Plan:  Continue current regimen.  Patients health , Sanchez Cueto, will follow-up as scheduled.    I will continue to monitor regularly and will follow-up in 1-2 months, sooner if blood pressure begins to trend upward or downward.      Patient has my contact information and knows to call with any concerns or clinical changes.            There are no preventive care reminders to display for this patient.    Last 5 Patient Entered Readings                                      Current 30 Day Average: 135/85     Recent Readings 12/10/2019 12/10/2019 11/28/2019 11/21/2019 11/14/2019    SBP (mmHg) 148 152 138 135 120    DBP (mmHg) 86 92 81 76 89    Pulse 63 65 58 83 55             Hypertension Medications             amLODIPine (NORVASC) 5 MG tablet TAKE 1 TABLET DAILY    atenolol (TENORMIN) 100 MG tablet TAKE 1 TABLET DAILY    doxazosin (CARDURA) 2 MG tablet TAKE 1 TABLET EVERY EVENING    hydroCHLOROthiazide (HYDRODIURIL) 25 MG tablet Take 1 tablet (25 mg total) by mouth once daily.    valsartan (DIOVAN) 320 MG tablet Take 1 tablet (320 mg total) by mouth once daily.                 Screenings

## 2019-12-13 ENCOUNTER — PATIENT MESSAGE (OUTPATIENT)
Dept: FAMILY MEDICINE | Facility: CLINIC | Age: 69
End: 2019-12-13

## 2020-01-07 ENCOUNTER — PATIENT MESSAGE (OUTPATIENT)
Dept: FAMILY MEDICINE | Facility: CLINIC | Age: 70
End: 2020-01-07

## 2020-01-27 NOTE — PROGRESS NOTES
"Digital Medicine: Health  Follow-Up    Patient reports more fatigue lately.  States he is working with sleep medicine to find a mask that works for him.  States he is unsure about decline in BP.  No changes.  States he has gained weight, so he is "surprised" his BP is trending down.  States he can work on increasing water intake, as he does not drink much now.  States he feels a little dizzy when get goes to stand up.  Patient is going on a cruise for 2 weeks from Feb 9th-23rd.  Placed on hiatus.    The history is provided by the patient.     Follow Up  Follow-up reason(s): reading review      Readings are trending down       INTERVENTION(S)  recommended diet modifications, encouragement/support and denied questions    PLAN  patient verbalizes understanding    Encouraged patient to contact sleep medicine about a different mask.      There are no preventive care reminders to display for this patient.    Last 5 Patient Entered Readings                                      Current 30 Day Average: 138/88     Recent Readings 1/24/2020 1/18/2020 1/18/2020 1/11/2020 1/3/2020    SBP (mmHg) 128 133 127 143 150    DBP (mmHg) 85 84 91 95 90    Pulse 61 63 59 82 76                      Diet Screening       Intervention(s): increasing water intake      SDOH  " no known allergies

## 2020-01-28 RX ORDER — FINASTERIDE 5 MG/1
TABLET, FILM COATED ORAL
Qty: 90 TABLET | Refills: 1 | Status: SHIPPED | OUTPATIENT
Start: 2020-01-28 | End: 2020-03-09 | Stop reason: SDUPTHER

## 2020-01-28 NOTE — PROGRESS NOTES
Refill Authorization Note     is requesting a refill authorization.    Brief assessment and rationale for refill: APPROVE: prr                                         Comments:     Requested Prescriptions   Signed Prescriptions Disp Refills    finasteride (PROSCAR) 5 mg tablet 90 tablet 1     Sig: TAKE 1 TABLET DAILY       Urology: 5-alpha Reductase Inhibitors Passed - 1/27/2020  9:01 AM        Passed - Patient is at least 18 years old        Passed - Office visit in past 12 months or future 90 days     Recent Outpatient Visits            1 month ago Encounter for preventive health examination    [facility] Galilea West NP    6 months ago Chronic atrial fibrillation    [facility] Puneet Fuentes MD    6 months ago Essential hypertension    [facility] Leonel Johnson MD    9 months ago Iron deficiency anemia, unspecified iron deficiency anemia type    [facility] Dallin Alva NP    12 months ago Gallbladder polyp    [facility] Linus Hernandez MD

## 2020-01-30 ENCOUNTER — PATIENT MESSAGE (OUTPATIENT)
Dept: HEMATOLOGY/ONCOLOGY | Facility: CLINIC | Age: 70
End: 2020-01-30

## 2020-02-02 ENCOUNTER — PATIENT MESSAGE (OUTPATIENT)
Dept: ADMINISTRATIVE | Facility: OTHER | Age: 70
End: 2020-02-02

## 2020-02-03 ENCOUNTER — LAB VISIT (OUTPATIENT)
Dept: LAB | Facility: HOSPITAL | Age: 70
End: 2020-02-03
Attending: NURSE PRACTITIONER
Payer: MEDICARE

## 2020-02-03 ENCOUNTER — OFFICE VISIT (OUTPATIENT)
Dept: HEMATOLOGY/ONCOLOGY | Facility: CLINIC | Age: 70
End: 2020-02-03
Payer: MEDICARE

## 2020-02-03 VITALS
SYSTOLIC BLOOD PRESSURE: 142 MMHG | HEIGHT: 74 IN | DIASTOLIC BLOOD PRESSURE: 85 MMHG | HEART RATE: 60 BPM | WEIGHT: 315 LBS | BODY MASS INDEX: 40.43 KG/M2 | RESPIRATION RATE: 18 BRPM | TEMPERATURE: 99 F

## 2020-02-03 DIAGNOSIS — D50.9 IRON DEFICIENCY ANEMIA, UNSPECIFIED IRON DEFICIENCY ANEMIA TYPE: ICD-10-CM

## 2020-02-03 DIAGNOSIS — D50.0 IRON DEFICIENCY ANEMIA DUE TO CHRONIC BLOOD LOSS: Primary | ICD-10-CM

## 2020-02-03 LAB
BASOPHILS # BLD AUTO: 0.04 K/UL (ref 0–0.2)
BASOPHILS NFR BLD: 0.4 % (ref 0–1.9)
DIFFERENTIAL METHOD: ABNORMAL
EOSINOPHIL # BLD AUTO: 0.3 K/UL (ref 0–0.5)
EOSINOPHIL NFR BLD: 3.1 % (ref 0–8)
ERYTHROCYTE [DISTWIDTH] IN BLOOD BY AUTOMATED COUNT: 13.5 % (ref 11.5–14.5)
HCT VFR BLD AUTO: 53.8 % (ref 40–54)
HGB BLD-MCNC: 17.2 G/DL (ref 14–18)
IMM GRANULOCYTES # BLD AUTO: 0.02 K/UL (ref 0–0.04)
IMM GRANULOCYTES NFR BLD AUTO: 0.2 % (ref 0–0.5)
LYMPHOCYTES # BLD AUTO: 1.5 K/UL (ref 1–4.8)
LYMPHOCYTES NFR BLD: 16 % (ref 18–48)
MCH RBC QN AUTO: 29.4 PG (ref 27–31)
MCHC RBC AUTO-ENTMCNC: 32 G/DL (ref 32–36)
MCV RBC AUTO: 92 FL (ref 82–98)
MONOCYTES # BLD AUTO: 1.1 K/UL (ref 0.3–1)
MONOCYTES NFR BLD: 11.5 % (ref 4–15)
NEUTROPHILS # BLD AUTO: 6.4 K/UL (ref 1.8–7.7)
NEUTROPHILS NFR BLD: 68.8 % (ref 38–73)
NRBC BLD-RTO: 0 /100 WBC
PLATELET # BLD AUTO: 192 K/UL (ref 150–350)
PMV BLD AUTO: 9.8 FL (ref 9.2–12.9)
RBC # BLD AUTO: 5.85 M/UL (ref 4.6–6.2)
WBC # BLD AUTO: 9.33 K/UL (ref 3.9–12.7)

## 2020-02-03 PROCEDURE — 36415 COLL VENOUS BLD VENIPUNCTURE: CPT | Mod: PN

## 2020-02-03 PROCEDURE — 99999 PR PBB SHADOW E&M-EST. PATIENT-LVL IV: ICD-10-PCS | Mod: PBBFAC,,, | Performed by: NURSE PRACTITIONER

## 2020-02-03 PROCEDURE — 99213 OFFICE O/P EST LOW 20 MIN: CPT | Mod: S$GLB,,, | Performed by: NURSE PRACTITIONER

## 2020-02-03 PROCEDURE — 1159F PR MEDICATION LIST DOCUMENTED IN MEDICAL RECORD: ICD-10-PCS | Mod: S$GLB,,, | Performed by: NURSE PRACTITIONER

## 2020-02-03 PROCEDURE — 1159F MED LIST DOCD IN RCRD: CPT | Mod: S$GLB,,, | Performed by: NURSE PRACTITIONER

## 2020-02-03 PROCEDURE — 3288F PR FALLS RISK ASSESSMENT DOCUMENTED: ICD-10-PCS | Mod: S$GLB,,, | Performed by: NURSE PRACTITIONER

## 2020-02-03 PROCEDURE — 3077F SYST BP >= 140 MM HG: CPT | Mod: S$GLB,,, | Performed by: NURSE PRACTITIONER

## 2020-02-03 PROCEDURE — 85025 COMPLETE CBC W/AUTO DIFF WBC: CPT | Mod: PN

## 2020-02-03 PROCEDURE — 3079F DIAST BP 80-89 MM HG: CPT | Mod: S$GLB,,, | Performed by: NURSE PRACTITIONER

## 2020-02-03 PROCEDURE — 99499 UNLISTED E&M SERVICE: CPT | Mod: S$GLB,,, | Performed by: NURSE PRACTITIONER

## 2020-02-03 PROCEDURE — 3079F PR MOST RECENT DIASTOLIC BLOOD PRESSURE 80-89 MM HG: ICD-10-PCS | Mod: S$GLB,,, | Performed by: NURSE PRACTITIONER

## 2020-02-03 PROCEDURE — 85025 COMPLETE CBC W/AUTO DIFF WBC: CPT

## 2020-02-03 PROCEDURE — 3288F FALL RISK ASSESSMENT DOCD: CPT | Mod: S$GLB,,, | Performed by: NURSE PRACTITIONER

## 2020-02-03 PROCEDURE — 99213 PR OFFICE/OUTPT VISIT, EST, LEVL III, 20-29 MIN: ICD-10-PCS | Mod: S$GLB,,, | Performed by: NURSE PRACTITIONER

## 2020-02-03 PROCEDURE — 1126F PR PAIN SEVERITY QUANTIFIED, NO PAIN PRESENT: ICD-10-PCS | Mod: S$GLB,,, | Performed by: NURSE PRACTITIONER

## 2020-02-03 PROCEDURE — 1126F AMNT PAIN NOTED NONE PRSNT: CPT | Mod: S$GLB,,, | Performed by: NURSE PRACTITIONER

## 2020-02-03 PROCEDURE — 1100F PTFALLS ASSESS-DOCD GE2>/YR: CPT | Mod: S$GLB,,, | Performed by: NURSE PRACTITIONER

## 2020-02-03 PROCEDURE — 3077F PR MOST RECENT SYSTOLIC BLOOD PRESSURE >= 140 MM HG: ICD-10-PCS | Mod: S$GLB,,, | Performed by: NURSE PRACTITIONER

## 2020-02-03 PROCEDURE — 99499 RISK ADDL DX/OHS AUDIT: ICD-10-PCS | Mod: S$GLB,,, | Performed by: NURSE PRACTITIONER

## 2020-02-03 PROCEDURE — 1100F PR PT FALLS ASSESS DOC 2+ FALLS/FALL W/INJURY/YR: ICD-10-PCS | Mod: S$GLB,,, | Performed by: NURSE PRACTITIONER

## 2020-02-03 PROCEDURE — 99999 PR PBB SHADOW E&M-EST. PATIENT-LVL IV: CPT | Mod: PBBFAC,,, | Performed by: NURSE PRACTITIONER

## 2020-02-03 RX ORDER — ESCITALOPRAM OXALATE 10 MG/1
10 TABLET ORAL DAILY
Status: ON HOLD | COMMUNITY
End: 2021-04-19 | Stop reason: CLARIF

## 2020-02-03 NOTE — PROGRESS NOTES
HISTORY OF PRESENT ILLNESS:  This is a 69-year-old white gentleman known to Dr. Johnson in Family Medicine for a diagnosis of iron deficiency anemia related to recent GI blood loss.  He received 2 infusions of Injectafer in June/2018.      He presents to the clinic for interval evaluation.  He denies any headaches, blurred vision, dizziniess, chest pain, bleeding, cold hands or feet, pica, etc.  No other new complaints or pertinent findings on a 10-point review of systems.    PHYSICAL EXAMINATION:  GENERAL:  Well-developed, well-nourished, obese, white gentleman in no acute distress.  Alert and oriented x4.  VITAL SIGNS:  Weight:  Gain of 18 pounds in 9 months   Wt Readings from Last 3 Encounters:   02/03/20 (!) 148.4 kg (327 lb 2.6 oz)   12/02/19 (!) 148.6 kg (327 lb 9.7 oz)   07/31/19 (!) 143.8 kg (317 lb)     Temp Readings from Last 3 Encounters:   02/03/20 98.5 °F (36.9 °C)   04/18/19 98.2 °F (36.8 °C)   01/30/19 96.9 °F (36.1 °C) (Oral)     BP Readings from Last 3 Encounters:   02/03/20 (!) 142/85   12/02/19 130/86   07/31/19 (!) 140/70     Pulse Readings from Last 3 Encounters:   02/03/20 60   12/02/19 60   07/31/19 65     HEENT:  Normocephalic, atraumatic.  Oral mucosa pink and moist.  Lips without lesions.  Tongue midline.  Oropharynx clear.  Nonicteric sclerae.   NECK:  Supple, no adenopathy.  No carotid bruits, thyromegaly or thyroid nodule.  HEART:  Irregular rate and rhythm without murmur, gallop or rub.                LUNGS:  Clear to auscultation bilaterally.  Normal respiratory effort.       ABDOMEN:  Soft, nontender, nondistended with positive normoactive bowel sounds, no hepatosplenomegaly.    EXTREMITIES:  No cyanosis, clubbing or edema.  Distal pulses are intact.            LABORATORY:    Lab Results   Component Value Date    WBC 9.33 02/03/2020    RBC 5.85 02/03/2020    HGB 17.2 02/03/2020    HCT 53.8 02/03/2020    MCV 92 02/03/2020    MCH 29.4 02/03/2020    MCHC 32.0 02/03/2020    RDW 13.5  02/03/2020     02/03/2020    MPV 9.8 02/03/2020    GRAN 6.4 02/03/2020    GRAN 68.8 02/03/2020    LYMPH 1.5 02/03/2020    LYMPH 16.0 (L) 02/03/2020    MONO 1.1 (H) 02/03/2020    MONO 11.5 02/03/2020    EOS 0.3 02/03/2020    BASO 0.04 02/03/2020    EOSINOPHIL 3.1 02/03/2020    BASOPHIL 0.4 02/03/2020     Hgb improved from 17.2 (17.6, 18.0, 16.4, 11.8)    IMPRESSION:    1.  Iron deficiency anemia due to blood loss - S/P Injectafer, stable.  2.  AFib - on Eliquis  3.  Cardiomegaly  4.  Hx of smoking    PLAN:  1.  Return to clinic prn.  2.  Call the office for any worsening signs and symptoms of anemia.      Assessment/plan reviewed and approved by Dr. Munroe.

## 2020-02-05 ENCOUNTER — PATIENT OUTREACH (OUTPATIENT)
Dept: OTHER | Facility: OTHER | Age: 70
End: 2020-02-05

## 2020-02-05 DIAGNOSIS — I10 ESSENTIAL HYPERTENSION: Primary | ICD-10-CM

## 2020-03-04 ENCOUNTER — PATIENT MESSAGE (OUTPATIENT)
Dept: ADMINISTRATIVE | Facility: OTHER | Age: 70
End: 2020-03-04

## 2020-03-04 ENCOUNTER — PATIENT MESSAGE (OUTPATIENT)
Dept: FAMILY MEDICINE | Facility: CLINIC | Age: 70
End: 2020-03-04

## 2020-03-04 RX ORDER — DOXAZOSIN 2 MG/1
2 TABLET ORAL NIGHTLY
Qty: 90 TABLET | Refills: 1 | Status: SHIPPED | OUTPATIENT
Start: 2020-03-04 | End: 2020-03-09 | Stop reason: SDUPTHER

## 2020-03-04 NOTE — TELEPHONE ENCOUNTER
Please see mychart message   Problem: Falls - Risk of:  Goal: Will remain free from falls  Will remain free from falls   Outcome: Met This Shift    Goal: Absence of physical injury  Absence of physical injury   Outcome: Met This Shift      Problem: Pain:  Goal: Pain level will decrease  Pain level will decrease   Outcome: Met This Shift    Goal: Control of acute pain  Control of acute pain   Outcome: Met This Shift    Goal: Control of chronic pain  Control of chronic pain   Outcome: Met This Shift

## 2020-03-09 DIAGNOSIS — I10 ESSENTIAL HYPERTENSION: ICD-10-CM

## 2020-03-09 NOTE — PROGRESS NOTES
Refill Authorization Note     is requesting a refill authorization.    Brief assessment and rationale for refill: REVIEW: hospital encounter     Medication-related problems identified: Non-adherence - intentional    Medication Therapy Plan: Hospital encounter(08/19-Radiology, carotid artery disease); 37% adherence; Appt. today with Dr. Johnson, review    Medication reconciliation completed: No   Pharmacist Review Requested: Yes                     Comments:   Requested Prescriptions   Pending Prescriptions Disp Refills    amLODIPine (NORVASC) 5 MG tablet [Pharmacy Med Name: AMLODIPINE BESYLATE TABS 5MG] 90 tablet 1     Sig: TAKE 1 TABLET DAILY       Cardiovascular:  Calcium Channel Blockers Failed - 3/9/2020 10:50 AM        Failed - Last BP in normal range within 360 days.     BP Readings from Last 3 Encounters:   02/03/20 (!) 142/85   12/02/19 130/86   07/31/19 (!) 140/70              Passed - Patient is at least 18 years old        Passed - Office visit in past 12 months or future 90 days.     Recent Outpatient Visits            1 month ago Iron deficiency anemia due to chronic blood loss    [facility] Dallin Alva NP    3 months ago Encounter for preventive health examination    [facility] Galilea West NP    7 months ago Chronic atrial fibrillation    [facility] Puneet Fuentes MD    7 months ago Essential hypertension    [facility] Leonel Johnson MD    10 months ago Iron deficiency anemia, unspecified iron deficiency anemia type    [facility] Dallin Alva NP                     Appointments  past 12m or future 3m with PCP    Date Provider   Last Visit   7/17/2019 Leonel Johnson MD   Next Visit   3/9/2020 Leonel Johnson MD   .  ED visits in past 90 days: 0       Note composed:10:57 AM 03/09/2020

## 2020-03-10 DIAGNOSIS — K21.9 GASTROESOPHAGEAL REFLUX DISEASE, ESOPHAGITIS PRESENCE NOT SPECIFIED: ICD-10-CM

## 2020-03-10 DIAGNOSIS — I10 ESSENTIAL HYPERTENSION: ICD-10-CM

## 2020-03-10 RX ORDER — AMLODIPINE BESYLATE 5 MG/1
TABLET ORAL
Qty: 90 TABLET | Refills: 1 | Status: SHIPPED | OUTPATIENT
Start: 2020-03-10 | End: 2020-03-13 | Stop reason: SDUPTHER

## 2020-03-10 RX ORDER — PANTOPRAZOLE SODIUM 40 MG/1
40 TABLET, DELAYED RELEASE ORAL DAILY
Qty: 90 TABLET | Refills: 3 | Status: SHIPPED | OUTPATIENT
Start: 2020-03-10 | End: 2021-03-05

## 2020-03-13 RX ORDER — DOXAZOSIN 2 MG/1
2 TABLET ORAL NIGHTLY
Qty: 90 TABLET | Refills: 1 | Status: SHIPPED | OUTPATIENT
Start: 2020-03-13 | End: 2020-06-05 | Stop reason: SDUPTHER

## 2020-03-13 RX ORDER — VALSARTAN 320 MG/1
TABLET ORAL
Qty: 90 TABLET | Refills: 3 | OUTPATIENT
Start: 2020-03-13

## 2020-03-13 RX ORDER — HYDROCHLOROTHIAZIDE 25 MG/1
25 TABLET ORAL DAILY
Qty: 90 TABLET | Refills: 0 | Status: SHIPPED | OUTPATIENT
Start: 2020-03-13 | End: 2020-03-13 | Stop reason: SDUPTHER

## 2020-03-13 RX ORDER — HYDROCHLOROTHIAZIDE 25 MG/1
25 TABLET ORAL DAILY
Qty: 90 TABLET | Refills: 0 | Status: SHIPPED | OUTPATIENT
Start: 2020-03-13 | End: 2020-06-29

## 2020-03-13 RX ORDER — VALSARTAN 320 MG/1
320 TABLET ORAL DAILY
Qty: 90 TABLET | Refills: 1 | Status: SHIPPED | OUTPATIENT
Start: 2020-03-13 | End: 2020-09-22

## 2020-03-13 RX ORDER — AMLODIPINE BESYLATE 5 MG/1
5 TABLET ORAL DAILY
Qty: 90 TABLET | Refills: 1 | Status: SHIPPED | OUTPATIENT
Start: 2020-03-13 | End: 2020-11-06

## 2020-03-13 RX ORDER — FINASTERIDE 5 MG/1
5 TABLET, FILM COATED ORAL DAILY
Qty: 90 TABLET | Refills: 1 | Status: SHIPPED | OUTPATIENT
Start: 2020-03-13 | End: 2020-06-28 | Stop reason: SDUPTHER

## 2020-03-13 RX ORDER — ATENOLOL 100 MG/1
100 TABLET ORAL DAILY
Qty: 90 TABLET | Refills: 1 | Status: SHIPPED | OUTPATIENT
Start: 2020-03-13 | End: 2021-04-07 | Stop reason: SDUPTHER

## 2020-03-13 NOTE — PROGRESS NOTES
Refill Authorization Note     is requesting a refill authorization.    Brief assessment and rationale for refill: Quick DC: handled in prev encounter                                         Comments:   Last Prescribed Info:      Ordering Encounter Report     Associated Reports   View Encounter            Note composed:4:24 PM 03/13/2020

## 2020-03-13 NOTE — TELEPHONE ENCOUNTER
Please schedule patient for Labs (BMP)    Please also check with your provider if any further labs need to be added and scheduled together.    Thanks !

## 2020-03-13 NOTE — PROGRESS NOTES
Refill Authorization Note     is requesting a refill authorization.    Brief assessment and rationale for refill: APPROVE: needs appt/labs      Medication-related problems identified:   Requires appointment  Requires labs    Medication Therapy Plan: bp elevated at LOV(2/20); needs appt(bp check); NTBS(BMP); approve 3 more     Medication reconciliation completed: No                         Comments:   Requested Prescriptions   Pending Prescriptions Disp Refills    hydroCHLOROthiazide (HYDRODIURIL) 25 MG tablet [Pharmacy Med Name: HYDROCHLOROTHIAZIDE TAB 25MG] 90 tablet 0     Sig: TAKE 1 TABLET DAILY (PLEASE DISCONTINUE HCTZ 12.5 MG)       Cardiovascular: Diuretics - Thiazide Failed - 3/9/2020  4:14 PM        Failed - Last BP in normal range within 360 days.     BP Readings from Last 3 Encounters:   02/03/20 (!) 142/85   12/02/19 130/86   07/31/19 (!) 140/70              Failed - Cr is 1.3 or below and within 180 days     Creatinine   Date Value Ref Range Status   07/16/2019 1.2 0.5 - 1.4 mg/dL Final   01/17/2019 1.0 0.5 - 1.4 mg/dL Final   12/20/2018 1.1 0.5 - 1.4 mg/dL Final              Failed - K in normal range and within 180 days     Potassium   Date Value Ref Range Status   07/16/2019 4.4 3.5 - 5.1 mmol/L Final   01/17/2019 3.4 (L) 3.5 - 5.1 mmol/L Final   12/20/2018 4.2 3.5 - 5.1 mmol/L Final              Failed - Na is between 130 and 148 and within 180 days     Sodium   Date Value Ref Range Status   07/16/2019 145 136 - 145 mmol/L Final   01/17/2019 141 136 - 145 mmol/L Final   12/20/2018 142 136 - 145 mmol/L Final              Failed - eGFR within 180 days     eGFR if non    Date Value Ref Range Status   07/16/2019 >60.0 >60 mL/min/1.73 m^2 Final     Comment:     Calculation used to obtain the estimated glomerular filtration  rate (eGFR) is the CKD-EPI equation.      01/17/2019 >60.0 >60 mL/min/1.73 m^2 Final     Comment:     Calculation used to obtain the estimated glomerular  filtration  rate (eGFR) is the CKD-EPI equation.      12/20/2018 >60.0 >60 mL/min/1.73 m^2 Final     Comment:     Calculation used to obtain the estimated glomerular filtration  rate (eGFR) is the CKD-EPI equation.        eGFR if    Date Value Ref Range Status   07/16/2019 >60.0 >60 mL/min/1.73 m^2 Final   01/17/2019 >60.0 >60 mL/min/1.73 m^2 Final   12/20/2018 >60.0 >60 mL/min/1.73 m^2 Final              Passed - Patient is at least 18 years old        Passed - Office visit in past 12 months or future 90 days.     Recent Outpatient Visits            1 month ago Iron deficiency anemia due to chronic blood loss    [facility] Dallin Alva NP    3 months ago Encounter for preventive health examination    [facility] Galilea West NP    7 months ago Chronic atrial fibrillation    [facility] Puneet Fuentes MD    8 months ago Essential hypertension    [facility] Leonel Johnson MD    11 months ago Iron deficiency anemia, unspecified iron deficiency anemia type    [facility] Dallin Alva NP                    Passed - Ca in normal range and within 360 days     Calcium   Date Value Ref Range Status   07/16/2019 9.8 8.7 - 10.5 mg/dL Final   01/17/2019 9.6 8.7 - 10.5 mg/dL Final   12/20/2018 9.7 8.7 - 10.5 mg/dL Final               Appointments  past 12m or future 3m with PCP    Date Provider   Last Visit   7/17/2019 Leonel Johnson MD   Next Visit   3/9/2020 Leonel Johnson MD   .  ED visits in past 90 days: 0       Note composed:7:05 AM 03/13/2020

## 2020-03-13 NOTE — PROGRESS NOTES
Refill Authorization Note     is requesting a refill authorization.    Brief assessment and rationale for refill: APPROVE: needs labs     Medication-related problems identified: Requires labs    Medication Therapy Plan: Alternate pharmacy requested; NTBS(BMP)                              Comments:     Requested Prescriptions   Signed Prescriptions Disp Refills    amLODIPine (NORVASC) 5 MG tablet 90 tablet 1     Sig: Take 1 tablet (5 mg total) by mouth once daily.       Cardiovascular:  Calcium Channel Blockers Failed - 3/9/2020  7:07 PM        Failed - Last BP in normal range within 360 days.     BP Readings from Last 3 Encounters:   02/03/20 (!) 142/85   12/02/19 130/86   07/31/19 (!) 140/70              Passed - Patient is at least 18 years old        Passed - Office visit in past 12 months or future 90 days.     Recent Outpatient Visits            1 month ago Iron deficiency anemia due to chronic blood loss    [facility] Dallin Alva NP    3 months ago Encounter for preventive health examination    [facility] Galilea West NP    7 months ago Chronic atrial fibrillation    [facility] Puneet Fuentes MD    8 months ago Essential hypertension    [facility] Leonel Johnson MD    11 months ago Iron deficiency anemia, unspecified iron deficiency anemia type    [facility] Dallin Alva NP                   valsartan (DIOVAN) 320 MG tablet 90 tablet 1     Sig: Take 1 tablet (320 mg total) by mouth once daily.       Cardiovascular:  Angiotensin Receptor Blockers Failed - 3/9/2020  7:07 PM        Failed - Last BP in normal range within 360 days.     BP Readings from Last 3 Encounters:   02/03/20 (!) 142/85   12/02/19 130/86   07/31/19 (!) 140/70              Passed - Patient is at least 18 years old        Passed - Office visit in past 12 months or future 90 days.     Recent Outpatient Visits             1 month ago Iron deficiency anemia due to chronic blood loss    [facility] Dallin Alva NP    3 months ago Encounter for preventive health examination    [facility] [place] NP    7 months ago Chronic atrial fibrillation    [facility] Puneet Fuentes MD    8 months ago Essential hypertension    [facility] Leonel Johnson MD    11 months ago Iron deficiency anemia, unspecified iron deficiency anemia type    [facility] Dallin Alva NP                    Passed - Cr is 1.3 or below and within 360 days     Creatinine   Date Value Ref Range Status   07/16/2019 1.2 0.5 - 1.4 mg/dL Final   01/17/2019 1.0 0.5 - 1.4 mg/dL Final   12/20/2018 1.1 0.5 - 1.4 mg/dL Final              Passed - K in normal range and within 360 days     Potassium   Date Value Ref Range Status   07/16/2019 4.4 3.5 - 5.1 mmol/L Final   01/17/2019 3.4 (L) 3.5 - 5.1 mmol/L Final   12/20/2018 4.2 3.5 - 5.1 mmol/L Final              Passed - eGFR within 360 days     eGFR if non    Date Value Ref Range Status   07/16/2019 >60.0 >60 mL/min/1.73 m^2 Final     Comment:     Calculation used to obtain the estimated glomerular filtration  rate (eGFR) is the CKD-EPI equation.      01/17/2019 >60.0 >60 mL/min/1.73 m^2 Final     Comment:     Calculation used to obtain the estimated glomerular filtration  rate (eGFR) is the CKD-EPI equation.      12/20/2018 >60.0 >60 mL/min/1.73 m^2 Final     Comment:     Calculation used to obtain the estimated glomerular filtration  rate (eGFR) is the CKD-EPI equation.        eGFR if    Date Value Ref Range Status   07/16/2019 >60.0 >60 mL/min/1.73 m^2 Final   01/17/2019 >60.0 >60 mL/min/1.73 m^2 Final   12/20/2018 >60.0 >60 mL/min/1.73 m^2 Final             hydroCHLOROthiazide (HYDRODIURIL) 25 MG tablet 90 tablet 0     Sig: Take 1 tablet (25 mg total) by mouth  once daily.       Cardiovascular: Diuretics - Thiazide Failed - 3/9/2020  7:07 PM        Failed - Last BP in normal range within 360 days.     BP Readings from Last 3 Encounters:   02/03/20 (!) 142/85   12/02/19 130/86   07/31/19 (!) 140/70              Failed - Cr is 1.3 or below and within 180 days     Creatinine   Date Value Ref Range Status   07/16/2019 1.2 0.5 - 1.4 mg/dL Final   01/17/2019 1.0 0.5 - 1.4 mg/dL Final   12/20/2018 1.1 0.5 - 1.4 mg/dL Final              Failed - K in normal range and within 180 days     Potassium   Date Value Ref Range Status   07/16/2019 4.4 3.5 - 5.1 mmol/L Final   01/17/2019 3.4 (L) 3.5 - 5.1 mmol/L Final   12/20/2018 4.2 3.5 - 5.1 mmol/L Final              Failed - Na is between 130 and 148 and within 180 days     Sodium   Date Value Ref Range Status   07/16/2019 145 136 - 145 mmol/L Final   01/17/2019 141 136 - 145 mmol/L Final   12/20/2018 142 136 - 145 mmol/L Final              Failed - eGFR within 180 days     eGFR if non    Date Value Ref Range Status   07/16/2019 >60.0 >60 mL/min/1.73 m^2 Final     Comment:     Calculation used to obtain the estimated glomerular filtration  rate (eGFR) is the CKD-EPI equation.      01/17/2019 >60.0 >60 mL/min/1.73 m^2 Final     Comment:     Calculation used to obtain the estimated glomerular filtration  rate (eGFR) is the CKD-EPI equation.      12/20/2018 >60.0 >60 mL/min/1.73 m^2 Final     Comment:     Calculation used to obtain the estimated glomerular filtration  rate (eGFR) is the CKD-EPI equation.        eGFR if    Date Value Ref Range Status   07/16/2019 >60.0 >60 mL/min/1.73 m^2 Final   01/17/2019 >60.0 >60 mL/min/1.73 m^2 Final   12/20/2018 >60.0 >60 mL/min/1.73 m^2 Final              Passed - Patient is at least 18 years old        Passed - Office visit in past 12 months or future 90 days.     Recent Outpatient Visits            1 month ago Iron deficiency anemia due to chronic blood loss     [facility] Dallin Alva NP    3 months ago Encounter for preventive health examination    [facility] [place] NP    7 months ago Chronic atrial fibrillation    [facility] Puneet Fuentes MD    8 months ago Essential hypertension    [facility] Leonel Johnson MD    11 months ago Iron deficiency anemia, unspecified iron deficiency anemia type    [facility] Dallin Alva NP                    Passed - Ca in normal range and within 360 days     Calcium   Date Value Ref Range Status   07/16/2019 9.8 8.7 - 10.5 mg/dL Final   01/17/2019 9.6 8.7 - 10.5 mg/dL Final   12/20/2018 9.7 8.7 - 10.5 mg/dL Final             atenoloL (TENORMIN) 100 MG tablet 90 tablet 1     Sig: Take 1 tablet (100 mg total) by mouth once daily.       Cardiovascular:  Beta Blockers Failed - 3/9/2020  7:07 PM        Failed - Last BP in normal range within 360 days.     BP Readings from Last 3 Encounters:   02/03/20 (!) 142/85   12/02/19 130/86   07/31/19 (!) 140/70              Passed - Patient is at least 18 years old        Passed - Last Heart Rate in normal range within 360 days.     Pulse Readings from Last 3 Encounters:   02/03/20 60   12/02/19 60   07/31/19 65             Passed - Office visit in past 12 months or future 90 days.     Recent Outpatient Visits            1 month ago Iron deficiency anemia due to chronic blood loss    [facility] Dallin Alva NP    3 months ago Encounter for preventive health examination    [facility] [place] NP    7 months ago Chronic atrial fibrillation    [facility] Puneet Fuentes MD    8 months ago Essential hypertension    [facility] Leonel Johnson MD    11 months ago Iron deficiency anemia, unspecified iron deficiency anemia type    [facility] Dallin MCADAMS  KATIE Alva                   finasteride (PROSCAR) 5 mg tablet 90 tablet 1     Sig: Take 1 tablet (5 mg total) by mouth once daily.       Urology: 5-alpha Reductase Inhibitors Passed - 3/9/2020  7:07 PM        Passed - Patient is at least 18 years old        Passed - Office visit in past 12 months or future 90 days.     Recent Outpatient Visits            1 month ago Iron deficiency anemia due to chronic blood loss    [facility] Dallin Alva NP    3 months ago Encounter for preventive health examination    [facility] Galilea West NP    7 months ago Chronic atrial fibrillation    [facility] Puneet Fuentes MD    8 months ago Essential hypertension    [facility] Leonel Johnson MD    11 months ago Iron deficiency anemia, unspecified iron deficiency anemia type    [facility] Dallin Alva NP                    Passed - Prostate Cancer is not on problem list       doxazosin (CARDURA) 2 MG tablet 90 tablet 1     Sig: Take 1 tablet (2 mg total) by mouth every evening.       Cardiovascular:  Alpha Blockers Failed - 3/9/2020  7:07 PM        Failed - Last BP in normal range within 360 days.     BP Readings from Last 3 Encounters:   02/03/20 (!) 142/85   12/02/19 130/86   07/31/19 (!) 140/70              Passed - Patient is at least 18 years old        Passed - Office visit in past 12 months or future 90 days.     Recent Outpatient Visits            1 month ago Iron deficiency anemia due to chronic blood loss    [facility] Dallin Alva NP    3 months ago Encounter for preventive health examination    [facility] [place]a [place] NP    7 months ago Chronic atrial fibrillation    [facility] Puneet Fuentes MD    8 months ago Essential hypertension    [facility] Leonel Johnson MD    11 months ago Iron deficiency  anemia, unspecified iron deficiency anemia type    [facility] Dallin Alva NP                     Appointments  past 12m or future 3m with PCP    Date Provider   Last Visit   7/17/2019 Leonel Johnson MD   Next Visit   3/10/2020 Leonel Johnson MD   .  ED visits in past 90 days: 0       Note composed:11:04 AM 03/13/2020

## 2020-03-16 ENCOUNTER — PATIENT MESSAGE (OUTPATIENT)
Dept: ADMINISTRATIVE | Facility: OTHER | Age: 70
End: 2020-03-16

## 2020-03-18 DIAGNOSIS — I10 ESSENTIAL HYPERTENSION: ICD-10-CM

## 2020-03-20 NOTE — PROGRESS NOTES
Digital Medicine: Clinician Follow-Up    Called Mr. Zeenat Mancia for hypertension digital medicine follow-up. Blood pressure is trending down and patient says that he is more active at home. Out of valsartan and waiting for medication to be sent through mail order.    Patient denies s/s of hypotension (lightheadedness, dizziness, nausea, fatigue) associated with low readings. Instructed patient to inform me if this occurs, patient confirms understanding.    Patient denies s/s of hypertension (SOB, CP, severe headaches, changes in vision) associated with high readings. Instructed patient to go to the ED if BP >180/110 and accompanied by hypertensive s/s, patient confirms understanding.        The history is provided by the patient. No  was used.     Follow Up  Follow-up reason(s): reading review              Assessment:  Patient's current 30-day average is not at goal of <130/80 mmHg but, trending down.       Plan:  Restart valsartan as soon as possible.   Patient will contact me if medication does not arrive over the weekend as he does not want to pay cash.  Patient's health , Sanchez Cueto, will follow-up as scheduled.    I will continue to monitor regularly and will follow-up in 6 weeks, sooner if blood pressure begins to trend upward or downward.     Patient has my contact information and knows to call with any concerns or clinical changes.              There are no preventive care reminders to display for this patient.    Last 5 Patient Entered Readings                                      Current 30 Day Average: 137/87     Recent Readings 3/13/2020 3/13/2020 3/5/2020 3/5/2020 3/1/2020    SBP (mmHg) 130 120 135 145 142    DBP (mmHg) 77 80 93 92 85    Pulse 60 53 66 59 60             Hypertension Medications             amLODIPine (NORVASC) 5 MG tablet Take 1 tablet (5 mg total) by mouth once daily.    atenoloL (TENORMIN) 100 MG tablet Take 1 tablet (100 mg total) by mouth once daily.     doxazosin (CARDURA) 2 MG tablet Take 1 tablet (2 mg total) by mouth every evening.    hydroCHLOROthiazide (HYDRODIURIL) 25 MG tablet Take 1 tablet (25 mg total) by mouth once daily.    valsartan (DIOVAN) 320 MG tablet Take 1 tablet (320 mg total) by mouth once daily.                 Screenings

## 2020-03-21 ENCOUNTER — PATIENT MESSAGE (OUTPATIENT)
Dept: ADMINISTRATIVE | Facility: OTHER | Age: 70
End: 2020-03-21

## 2020-03-22 DIAGNOSIS — I10 ESSENTIAL HYPERTENSION: ICD-10-CM

## 2020-03-22 RX ORDER — HYDROCHLOROTHIAZIDE 25 MG/1
25 TABLET ORAL DAILY
Qty: 90 TABLET | Refills: 0 | Status: CANCELLED | OUTPATIENT
Start: 2020-03-22 | End: 2021-03-22

## 2020-03-25 RX ORDER — DOXAZOSIN 2 MG/1
2 TABLET ORAL NIGHTLY
Qty: 90 TABLET | Refills: 1 | OUTPATIENT
Start: 2020-03-25

## 2020-03-26 NOTE — PROGRESS NOTES
Refill Authorization Note     is requesting a refill authorization.    Brief assessment and rationale for refill: QUICK DC: Duplicate                                         Comments:   Pended Medication(s)   Requested Prescriptions     Pending Prescriptions Disp Refills    doxazosin (CARDURA) 2 MG tablet 90 tablet 1     Sig: Take 1 tablet (2 mg total) by mouth every evening.        Duplicate Pended Encounter(s)/ Last Prescribed Details:    Ordering User:  YULISA SULLIVAN            Diagnosis Association: Essential hypertension (I10)      Original Order:  doxazosin (CARDURA) 2 MG tablet [[id number]]      Pharmacy:  [facility] - [address] JULIANA #:  --     Pharmacy Comments:  --          Fill quantity remaining:  -- Fill quantity used:  -- Next fill due: --       Outpatient Medication Detail      Disp Refills Start End    doxazosin (CARDURA) 2 MG tablet 90 tablet 1 3/13/2020     Sig - Route: Take 1 tablet (2 mg total) by mouth every evening. - Oral    Sent to pharmacy as: doxazosin (CARDURA) 2 MG tablet    Notes to Pharmacy: Please inactivate all prior scripts with same name and strength including on holds.     E-Prescribing Status: Receipt confirmed by pharmacy (3/13/2020 11:03 AM CDT)    Ordering Encounter Report     Associated Reports   View Encounter          Note composed:9:48 PM 03/25/2020

## 2020-04-07 ENCOUNTER — PATIENT OUTREACH (OUTPATIENT)
Dept: OTHER | Facility: OTHER | Age: 70
End: 2020-04-07

## 2020-04-07 NOTE — PROGRESS NOTES
Digital Medicine: Health  Follow-Up    The history is provided by the patient.     Follow Up  Follow-up reason(s): routine education      Routine Education Topics: physical activity        INTERVENTION(S)  recommend physical activity, encouragement/support and denied questions    PLAN  patient verbalizes understanding and continue monitoring      There are no preventive care reminders to display for this patient.    Last 5 Patient Entered Readings                                      Current 30 Day Average: 132/82     Recent Readings 4/4/2020 4/3/2020 3/27/2020 3/21/2020 3/13/2020    SBP (mmHg) 133 136 129 130 130    DBP (mmHg) 78 91 79 88 77    Pulse 62 72 79 78 60                      Physical Activity Screening   When asked if exercising, patient responded: yes    Patient participates in the following activities: walking    Reports [place]  States their family has come over to visit, but still keeping their distance due to COVID 19.    Intervention(s): exercise ideas       SDOH

## 2020-04-13 ENCOUNTER — PATIENT MESSAGE (OUTPATIENT)
Dept: FAMILY MEDICINE | Facility: CLINIC | Age: 70
End: 2020-04-13

## 2020-04-13 DIAGNOSIS — G47.33 OSA (OBSTRUCTIVE SLEEP APNEA): Primary | ICD-10-CM

## 2020-05-06 ENCOUNTER — PATIENT MESSAGE (OUTPATIENT)
Dept: ADMINISTRATIVE | Facility: HOSPITAL | Age: 70
End: 2020-05-06

## 2020-05-24 ENCOUNTER — PATIENT MESSAGE (OUTPATIENT)
Dept: INTERNAL MEDICINE | Facility: CLINIC | Age: 70
End: 2020-05-24

## 2020-05-26 ENCOUNTER — PATIENT MESSAGE (OUTPATIENT)
Dept: INTERNAL MEDICINE | Facility: CLINIC | Age: 70
End: 2020-05-26

## 2020-06-02 ENCOUNTER — PATIENT OUTREACH (OUTPATIENT)
Dept: OTHER | Facility: OTHER | Age: 70
End: 2020-06-02

## 2020-06-02 NOTE — PROGRESS NOTES
Digital Medicine: Health  Follow-Up    Brief encounter due to patient leaving the house.  Reports everything is going well.  No questions or concerns today.  Reports he has a doctor appt on Friday with a new doctor since he moved to [place].    The history is provided by the patient.     Follow Up  Follow-up reason(s): routine education          INTERVENTION(S)  encouragement/support and denied questions    PLAN  patient verbalizes understanding and continue monitoring      There are no preventive care reminders to display for this patient.    Last 5 Patient Entered Readings                                      Current 30 Day Average: 132/84     Recent Readings 5/26/2020 5/18/2020 5/16/2020 5/11/2020 4/28/2020    SBP (mmHg) 131 136 132 127 127    DBP (mmHg) 77 88 86 85 84    Pulse 57 60 75 65 66                  Screenings    SDOH

## 2020-06-04 NOTE — PROGRESS NOTES
Subjective:       Patient ID: Zeenat Mancia is a 70 y.o. male.    Chief Complaint:   Annual Exam and Establish Care      HPI    #Last visit/Previous Provider  This patient is new to me  Previously seen by Leonel Johnson MD  Last visit was 4/13/2020  Last CPE was         #Interim Hx    No urgent care or ED/hospitalization    #Concerns Today      Vertigo/Dizziness-Notes intermittent episodes from sitting to standing last a few seconds.         #Chronic Problems      HTN  Complication: no CVA/CKD/CAD  Last labs :   Medication: adherent to   - amlodine 5mg daily  - Atenolol 100mg dialy  - HCTZ 25 mg   - Valsartan 320   Home Bps;   Last 5 Patient Entered Readings                                      Current 30 Day Average: 132/84     Recent Readings 6/4/2020 5/26/2020 5/18/2020 5/16/2020 5/11/2020    SBP (mmHg) 134 131 136 132 127    DBP (mmHg) 86 77 88 86 85    Pulse 62 57 60 75 65        Symptoms: denies CP, SOB, changes in urination, sudden weakness, numbness    HLD- not on choleterol med     JCAKELYN- on cpap     BPH - on doxazosin    Afib#[id number]   On eliquids  Followed by cards       Hematochezia   Every 3day will have BRBPR  Noted in Last couple of months    colonscoy in 5/2018 showed diverticulosis and external hemorrid   Recall in 5 years     CLIFFORD  Followed by heme /onc   Previously on Eloxx Maintenance   Topic Date Due    TETANUS VACCINE  05/06/1968    Colonoscopy  05/16/2023    Lipid Panel  06/05/2025    Hepatitis C Screening  Completed    Pneumococcal Vaccine (65+ Low/Medium Risk)  Completed    Abdominal Aortic Aneurysm Screening  Completed           Review of Systems   Constitutional: Positive for activity change and unexpected weight change.   HENT: Negative for hearing loss, rhinorrhea and trouble swallowing.    Eyes: Negative for discharge and visual disturbance.   Respiratory: Positive for wheezing. Negative for chest tightness.    Cardiovascular: Negative for chest pain and  "palpitations.   Gastrointestinal: Positive for blood in stool. Negative for constipation, diarrhea and vomiting.   Endocrine: Negative for polydipsia and polyuria.   Genitourinary: Positive for urgency. Negative for difficulty urinating and hematuria.   Musculoskeletal: Positive for neck pain. Negative for joint swelling.   Neurological: Negative for headaches.   Psychiatric/Behavioral: Negative for confusion and dysphoric mood.       Objective:   /74 (BP Location: Left arm, Patient Position: Sitting, BP Method: Medium (Manual))   Pulse 64   Temp 98 °F (36.7 °C) (Temporal)   Ht 6' 2" (1.88 m)   Wt (!) 145.2 kg (320 lb 1.7 oz)   SpO2 98%   BMI 41.10 kg/m²          Physical Exam   Constitutional: He appears well-developed and well-nourished. No distress.   HENT:   Head: Normocephalic.   Mouth/Throat: Oropharynx is clear and moist. No oropharyngeal exudate.   Eyes: Pupils are equal, round, and reactive to light. Conjunctivae are normal. Right eye exhibits no discharge. Left eye exhibits no discharge.   Cardiovascular: Normal rate, regular rhythm and normal heart sounds. Exam reveals no gallop and no friction rub.   No murmur heard.  Pulmonary/Chest: Effort normal. No respiratory distress.   Abdominal: Soft. He exhibits no distension.   Genitourinary:   Genitourinary Comments: Obvious mildly prolapse internl hemrroid nonbleeding    Musculoskeletal:   Tace edema of  Lower extremity    Neurological: He is alert.   Skin: Skin is warm. He is not diaphoretic.   Psychiatric: He has a normal mood and affect.   Nursing note and vitals reviewed.            Lab Results   Component Value Date    TSH 1.219 10/11/2017         STD    Lab Results   Component Value Date    HEPCAB Negative 08/26/2015             PSA  PSA, SCREEN (ng/mL)   Date Value   08/21/2017 0.44     Results for orders placed in visit on 07/31/19   Transthoracic echo (TTE) 2D with Color Flow    Narrative · Normal left ventricular systolic function. The " estimated ejection   fraction is 65%  · Normal LV diastolic function.  · Mild left atrial enlargement.  · Normal right ventricular systolic function.  · Mild mitral regurgitation.  · Mild tricuspid regurgitation.  · Normal central venous pressure (3 mm Hg).  · The estimated PA systolic pressure is 32 mm Hg            Assessment:       1. Hematochezia    2. Iron deficiency anemia due to chronic blood loss    3. Essential hypertension    4. Hyperlipidemia, unspecified hyperlipidemia type    5. Leg swelling          New: stable  New: Uncontrolled  New : worsening  New: Improved    Chronic stable  Chonic : uncontrolled  Chronic : worsening  Chronic : improved    Plan:             Zeenat was seen today for annual exam and establish care.    Diagnoses and all orders for this visit:    Hematochezia  New: Uncontrolled  Signs, symptoms consistent with hemrroids  Recent CRC screening that negative  Will check fe studies and consider GI for EGD/COlon if decrease  Otherwise likely refer to CRS   I provided instruction on supportive care measures    reviewed signs and symptoms that should prompt return to provider or evaluation in the ED  -     Cancel: Ambulatory referral/consult to Gastroenterology; Future    Iron deficiency anemia due to chronic blood loss  As above  -     CBC auto differential; Future  -     Iron and TIBC; Future  -     Ferritin; Future  -     Reticulocytes; Future    Essential hypertension  New: controlled  -- Patient is at goal today  -- Labs are reviewed and wnl  -- antihypertensive regimen   -     doxazosin (CARDURA) 4 MG tablet; Take 1 tablet (4 mg total) by mouth every evening.  -     Basic metabolic panel; Future  -     Hepatic function panel; Future    Hyperlipidemia, unspecified hyperlipidemia type  -     Lipid Panel; Future    Leg swelling  New ;uncontrolled  -     Brain Natriuretic Peptide; Future  -     URINALYSIS  -     Protein / creatinine ratio, urine  -     Protein / creatinine ratio, urine;  Future  -     URINALYSIS; Future          Future Appointments   Date Time Provider Department Center   9/8/2020 10:20 AM Lewis Cantor III, MD [facility]           Medication List with Changes/Refills   Current Medications    AMLODIPINE (NORVASC) 5 MG TABLET    Take 1 tablet (5 mg total) by mouth once daily.    APIXABAN (ELIQUIS) 5 MG TAB    Take 1 tablet (5 mg total) by mouth 2 (two) times daily.    ATENOLOL (TENORMIN) 100 MG TABLET    Take 1 tablet (100 mg total) by mouth once daily.    ESCITALOPRAM OXALATE (LEXAPRO) 10 MG TABLET    Take 10 mg by mouth once daily.    ESZOPICLONE (LUNESTA) 2 MG TAB    eszopiclone 2 mg tablet   Take 1 tablet every day by oral route.    FINASTERIDE (PROSCAR) 5 MG TABLET    Take 1 tablet (5 mg total) by mouth once daily.    HYDROCHLOROTHIAZIDE (HYDRODIURIL) 25 MG TABLET    Take 1 tablet (25 mg total) by mouth once daily.    MULTIVITAMIN-MINERALS-LUTEIN (CENTRUM SILVER) TAB    Take by mouth. 1 Tablet Oral Every day    PANTOPRAZOLE (PROTONIX) 40 MG TABLET    Take 1 tablet (40 mg total) by mouth once daily.    VALSARTAN (DIOVAN) 320 MG TABLET    Take 1 tablet (320 mg total) by mouth once daily.   Changed and/or Refilled Medications    Modified Medication Previous Medication    DOXAZOSIN (CARDURA) 4 MG TABLET doxazosin (CARDURA) 2 MG tablet       Take 1 tablet (4 mg total) by mouth every evening.    Take 1 tablet (2 mg total) by mouth every evening.     Disclaimer:  This note has been generated using voice-recognition software. There may be grammatical or spelling errors that have been missed during proof-reading

## 2020-06-05 ENCOUNTER — LAB VISIT (OUTPATIENT)
Dept: LAB | Facility: HOSPITAL | Age: 70
End: 2020-06-05
Attending: INTERNAL MEDICINE
Payer: MEDICARE

## 2020-06-05 ENCOUNTER — OFFICE VISIT (OUTPATIENT)
Dept: INTERNAL MEDICINE | Facility: CLINIC | Age: 70
End: 2020-06-05
Payer: MEDICARE

## 2020-06-05 ENCOUNTER — PATIENT MESSAGE (OUTPATIENT)
Dept: PHARMACY | Facility: CLINIC | Age: 70
End: 2020-06-05

## 2020-06-05 VITALS
HEIGHT: 74 IN | DIASTOLIC BLOOD PRESSURE: 74 MMHG | WEIGHT: 315 LBS | BODY MASS INDEX: 40.43 KG/M2 | TEMPERATURE: 98 F | SYSTOLIC BLOOD PRESSURE: 128 MMHG | OXYGEN SATURATION: 98 % | HEART RATE: 64 BPM

## 2020-06-05 DIAGNOSIS — I48.20 CHRONIC ATRIAL FIBRILLATION: ICD-10-CM

## 2020-06-05 DIAGNOSIS — M79.89 LEG SWELLING: ICD-10-CM

## 2020-06-05 DIAGNOSIS — E78.5 HYPERLIPIDEMIA, UNSPECIFIED HYPERLIPIDEMIA TYPE: ICD-10-CM

## 2020-06-05 DIAGNOSIS — D50.0 IRON DEFICIENCY ANEMIA DUE TO CHRONIC BLOOD LOSS: ICD-10-CM

## 2020-06-05 DIAGNOSIS — K92.1 HEMATOCHEZIA: Primary | ICD-10-CM

## 2020-06-05 DIAGNOSIS — I10 ESSENTIAL HYPERTENSION: ICD-10-CM

## 2020-06-05 LAB
BILIRUB UR QL STRIP: NEGATIVE
CLARITY UR REFRACT.AUTO: CLEAR
COLOR UR AUTO: YELLOW
GLUCOSE UR QL STRIP: NEGATIVE
HGB UR QL STRIP: NEGATIVE
KETONES UR QL STRIP: NEGATIVE
LEUKOCYTE ESTERASE UR QL STRIP: NEGATIVE
NITRITE UR QL STRIP: NEGATIVE
PH UR STRIP: 5 [PH] (ref 5–8)
PROT UR QL STRIP: NEGATIVE
SP GR UR STRIP: 1.01 (ref 1–1.03)
URN SPEC COLLECT METH UR: NORMAL

## 2020-06-05 PROCEDURE — 1100F PR PT FALLS ASSESS DOC 2+ FALLS/FALL W/INJURY/YR: ICD-10-PCS | Mod: S$GLB,,, | Performed by: INTERNAL MEDICINE

## 2020-06-05 PROCEDURE — 3078F PR MOST RECENT DIASTOLIC BLOOD PRESSURE < 80 MM HG: ICD-10-PCS | Mod: S$GLB,,, | Performed by: INTERNAL MEDICINE

## 2020-06-05 PROCEDURE — 3074F SYST BP LT 130 MM HG: CPT | Mod: S$GLB,,, | Performed by: INTERNAL MEDICINE

## 2020-06-05 PROCEDURE — 81003 URINALYSIS AUTO W/O SCOPE: CPT

## 2020-06-05 PROCEDURE — 82570 ASSAY OF URINE CREATININE: CPT

## 2020-06-05 PROCEDURE — 99214 PR OFFICE/OUTPT VISIT, EST, LEVL IV, 30-39 MIN: ICD-10-PCS | Mod: S$GLB,,, | Performed by: INTERNAL MEDICINE

## 2020-06-05 PROCEDURE — 1126F PR PAIN SEVERITY QUANTIFIED, NO PAIN PRESENT: ICD-10-PCS | Mod: S$GLB,,, | Performed by: INTERNAL MEDICINE

## 2020-06-05 PROCEDURE — 1126F AMNT PAIN NOTED NONE PRSNT: CPT | Mod: S$GLB,,, | Performed by: INTERNAL MEDICINE

## 2020-06-05 PROCEDURE — 3288F PR FALLS RISK ASSESSMENT DOCUMENTED: ICD-10-PCS | Mod: S$GLB,,, | Performed by: INTERNAL MEDICINE

## 2020-06-05 PROCEDURE — 3288F FALL RISK ASSESSMENT DOCD: CPT | Mod: S$GLB,,, | Performed by: INTERNAL MEDICINE

## 2020-06-05 PROCEDURE — 3074F PR MOST RECENT SYSTOLIC BLOOD PRESSURE < 130 MM HG: ICD-10-PCS | Mod: S$GLB,,, | Performed by: INTERNAL MEDICINE

## 2020-06-05 PROCEDURE — 1159F PR MEDICATION LIST DOCUMENTED IN MEDICAL RECORD: ICD-10-PCS | Mod: S$GLB,,, | Performed by: INTERNAL MEDICINE

## 2020-06-05 PROCEDURE — 99214 OFFICE O/P EST MOD 30 MIN: CPT | Mod: S$GLB,,, | Performed by: INTERNAL MEDICINE

## 2020-06-05 PROCEDURE — 99999 PR PBB SHADOW E&M-EST. PATIENT-LVL IV: CPT | Mod: PBBFAC,,, | Performed by: INTERNAL MEDICINE

## 2020-06-05 PROCEDURE — 1159F MED LIST DOCD IN RCRD: CPT | Mod: S$GLB,,, | Performed by: INTERNAL MEDICINE

## 2020-06-05 PROCEDURE — 3078F DIAST BP <80 MM HG: CPT | Mod: S$GLB,,, | Performed by: INTERNAL MEDICINE

## 2020-06-05 PROCEDURE — 1100F PTFALLS ASSESS-DOCD GE2>/YR: CPT | Mod: S$GLB,,, | Performed by: INTERNAL MEDICINE

## 2020-06-05 PROCEDURE — 99999 PR PBB SHADOW E&M-EST. PATIENT-LVL IV: ICD-10-PCS | Mod: PBBFAC,,, | Performed by: INTERNAL MEDICINE

## 2020-06-05 RX ORDER — DOXAZOSIN 4 MG/1
4 TABLET ORAL NIGHTLY
Qty: 90 TABLET | Refills: 0 | Status: SHIPPED | OUTPATIENT
Start: 2020-06-05 | End: 2020-07-16 | Stop reason: SDUPTHER

## 2020-06-05 RX ORDER — ESZOPICLONE 2 MG/1
TABLET, FILM COATED ORAL
COMMUNITY
End: 2021-02-03 | Stop reason: RX

## 2020-06-06 LAB
CREAT UR-MCNC: 124 MG/DL (ref 23–375)
PROT UR-MCNC: 10 MG/DL (ref 0–15)
PROT/CREAT UR: 0.08 MG/G{CREAT} (ref 0–0.2)

## 2020-06-11 ENCOUNTER — PATIENT MESSAGE (OUTPATIENT)
Dept: INTERNAL MEDICINE | Facility: CLINIC | Age: 70
End: 2020-06-11

## 2020-06-16 ENCOUNTER — PATIENT MESSAGE (OUTPATIENT)
Dept: FAMILY MEDICINE | Facility: CLINIC | Age: 70
End: 2020-06-16

## 2020-06-16 DIAGNOSIS — K64.9 HEMORRHOIDS, UNSPECIFIED HEMORRHOID TYPE: Primary | ICD-10-CM

## 2020-06-19 ENCOUNTER — TELEPHONE (OUTPATIENT)
Dept: ADMINISTRATIVE | Facility: OTHER | Age: 70
End: 2020-06-19

## 2020-06-28 DIAGNOSIS — I10 ESSENTIAL HYPERTENSION: ICD-10-CM

## 2020-06-28 RX ORDER — ATENOLOL 100 MG/1
100 TABLET ORAL DAILY
Qty: 90 TABLET | Refills: 1 | Status: CANCELLED | OUTPATIENT
Start: 2020-06-28

## 2020-06-28 RX ORDER — VALSARTAN 320 MG/1
320 TABLET ORAL DAILY
Qty: 90 TABLET | Refills: 1 | Status: CANCELLED | OUTPATIENT
Start: 2020-06-28 | End: 2021-06-28

## 2020-06-28 RX ORDER — AMLODIPINE BESYLATE 5 MG/1
5 TABLET ORAL DAILY
Qty: 90 TABLET | Refills: 1 | Status: CANCELLED | OUTPATIENT
Start: 2020-06-28

## 2020-06-29 RX ORDER — HYDROCHLOROTHIAZIDE 25 MG/1
TABLET ORAL
Qty: 90 TABLET | Refills: 0 | Status: SHIPPED | OUTPATIENT
Start: 2020-06-29 | End: 2020-06-29

## 2020-06-29 RX ORDER — FINASTERIDE 5 MG/1
5 TABLET, FILM COATED ORAL DAILY
Qty: 90 TABLET | Refills: 1 | Status: SHIPPED | OUTPATIENT
Start: 2020-06-29 | End: 2020-12-23

## 2020-06-29 RX ORDER — HYDROCHLOROTHIAZIDE 25 MG/1
25 TABLET ORAL DAILY
Qty: 90 TABLET | Refills: 0 | Status: SHIPPED | OUTPATIENT
Start: 2020-06-29 | End: 2020-09-10

## 2020-06-29 NOTE — PROGRESS NOTES
Refill Routing Note   Medication(s) are not appropriate for processing by [facility]:       - Non-participating provider           Medication reconciliation completed: No      Automatic Epic Generated Protocol Data:    Requested Prescriptions   Pending Prescriptions Disp Refills    hydroCHLOROthiazide (HYDRODIURIL) 25 MG tablet [Pharmacy Med Name: HYDROCHLOROTHIAZIDE TAB 25MG] 90 tablet 3     Sig: TAKE 1 TABLET DAILY       Cardiovascular: Diuretics - Thiazide Passed - 6/28/2020  6:38 AM        Passed - Patient is at least 18 years old        Passed - Last BP in normal range within 360 days.     BP Readings from Last 3 Encounters:   06/05/20 128/74   02/03/20 (!) 142/85   12/02/19 130/86              Passed - Office visit in past 12 months or future 90 days.     Recent Outpatient Visits            3 weeks ago Hematochezia    [facility] Lewis Cantor III, MD    4 months ago Iron deficiency anemia due to chronic blood loss    [facility] Dallin Alva NP    7 months ago Encounter for preventive health examination    [facility] Galilea West NP    11 months ago Chronic atrial fibrillation    [facility] Puneet Fuentes MD    11 months ago Essential hypertension    [facility] Leonel Johnson MD          Future Appointments              In 2 months Lewis Cantor III, MD [facility], [place]                Passed - Ca in normal range and within 360 days     Calcium   Date Value Ref Range Status   06/05/2020 9.6 8.7 - 10.5 mg/dL Final   07/16/2019 9.8 8.7 - 10.5 mg/dL Final   01/17/2019 9.6 8.7 - 10.5 mg/dL Final              Passed - Cr is 1.3 or below and within 180 days     Creatinine   Date Value Ref Range Status   06/05/2020 1.2 0.5 - 1.4 mg/dL Final   07/16/2019 1.2 0.5 - 1.4 mg/dL Final   01/17/2019 1.0 0.5 - 1.4 mg/dL Final              Passed - K in normal range and within  180 days     Potassium   Date Value Ref Range Status   06/05/2020 4.0 3.5 - 5.1 mmol/L Final   07/16/2019 4.4 3.5 - 5.1 mmol/L Final   01/17/2019 3.4 (L) 3.5 - 5.1 mmol/L Final              Passed - Na is between 130 and 148 and within 180 days     Sodium   Date Value Ref Range Status   06/05/2020 143 136 - 145 mmol/L Final   07/16/2019 145 136 - 145 mmol/L Final   01/17/2019 141 136 - 145 mmol/L Final              Passed - eGFR within 180 days     eGFR if non    Date Value Ref Range Status   06/05/2020 >60.0 >60 mL/min/1.73 m^2 Final     Comment:     Calculation used to obtain the estimated glomerular filtration  rate (eGFR) is the CKD-EPI equation.      07/16/2019 >60.0 >60 mL/min/1.73 m^2 Final     Comment:     Calculation used to obtain the estimated glomerular filtration  rate (eGFR) is the CKD-EPI equation.      01/17/2019 >60.0 >60 mL/min/1.73 m^2 Final     Comment:     Calculation used to obtain the estimated glomerular filtration  rate (eGFR) is the CKD-EPI equation.        eGFR if    Date Value Ref Range Status   06/05/2020 >60.0 >60 mL/min/1.73 m^2 Final   07/16/2019 >60.0 >60 mL/min/1.73 m^2 Final   01/17/2019 >60.0 >60 mL/min/1.73 m^2 Final                    Appointments  past 12m or future 3m with PCP    Date Provider   Last Visit   6/5/2020 Lewis Cantor III, MD   Next Visit   6/28/2020 Lewis Cantor III, MD   ED visits in past 90 days: 0     Note composed:4:13 PM 06/29/2020

## 2020-06-29 NOTE — PROGRESS NOTES
Refill Routing Note   Medication(s) are not appropriate for processing by [facility]:       - Non-participating provider           Medication reconciliation completed: No      Automatic Epic Generated Protocol Data:    Requested Prescriptions   Pending Prescriptions Disp Refills    hydroCHLOROthiazide (HYDRODIURIL) 25 MG tablet 90 tablet 0     Sig: Take 1 tablet (25 mg total) by mouth once daily.       Cardiovascular: Diuretics - Thiazide Passed - 6/29/2020 12:00 PM        Passed - Patient is at least 18 years old        Passed - Last BP in normal range within 360 days.     BP Readings from Last 3 Encounters:   06/05/20 128/74   02/03/20 (!) 142/85   12/02/19 130/86              Passed - Office visit in past 12 months or future 90 days.     Recent Outpatient Visits            3 weeks ago Hematochezia    [facility] Lewis Cantor III, MD    4 months ago Iron deficiency anemia due to chronic blood loss    [facility] Dallin Alva NP    7 months ago Encounter for preventive health examination    [facility] [place]a [place] NP    11 months ago Chronic atrial fibrillation    [place] [facility]iology Puneet Fuentes MD    11 months ago Essential hypertension    [facility] Leonel Johnson MD          Future Appointments              In 2 months Lewis Cantor III, MD [facility], [place]                Passed - Ca in normal range and within 360 days     Calcium   Date Value Ref Range Status   06/05/2020 9.6 8.7 - 10.5 mg/dL Final   07/16/2019 9.8 8.7 - 10.5 mg/dL Final   01/17/2019 9.6 8.7 - 10.5 mg/dL Final              Passed - Cr is 1.3 or below and within 180 days     Creatinine   Date Value Ref Range Status   06/05/2020 1.2 0.5 - 1.4 mg/dL Final   07/16/2019 1.2 0.5 - 1.4 mg/dL Final   01/17/2019 1.0 0.5 - 1.4 mg/dL Final              Passed - K in normal range and within 180 days     Potassium    Date Value Ref Range Status   06/05/2020 4.0 3.5 - 5.1 mmol/L Final   07/16/2019 4.4 3.5 - 5.1 mmol/L Final   01/17/2019 3.4 (L) 3.5 - 5.1 mmol/L Final              Passed - Na is between 130 and 148 and within 180 days     Sodium   Date Value Ref Range Status   06/05/2020 143 136 - 145 mmol/L Final   07/16/2019 145 136 - 145 mmol/L Final   01/17/2019 141 136 - 145 mmol/L Final              Passed - eGFR within 180 days     eGFR if non    Date Value Ref Range Status   06/05/2020 >60.0 >60 mL/min/1.73 m^2 Final     Comment:     Calculation used to obtain the estimated glomerular filtration  rate (eGFR) is the CKD-EPI equation.      07/16/2019 >60.0 >60 mL/min/1.73 m^2 Final     Comment:     Calculation used to obtain the estimated glomerular filtration  rate (eGFR) is the CKD-EPI equation.      01/17/2019 >60.0 >60 mL/min/1.73 m^2 Final     Comment:     Calculation used to obtain the estimated glomerular filtration  rate (eGFR) is the CKD-EPI equation.        eGFR if    Date Value Ref Range Status   06/05/2020 >60.0 >60 mL/min/1.73 m^2 Final   07/16/2019 >60.0 >60 mL/min/1.73 m^2 Final   01/17/2019 >60.0 >60 mL/min/1.73 m^2 Final                finasteride (PROSCAR) 5 mg tablet 90 tablet 1     Sig: Take 1 tablet (5 mg total) by mouth once daily.       Urology: 5-alpha Reductase Inhibitors Passed - 6/29/2020 12:00 PM        Passed - Patient is at least 18 years old        Passed - Office visit in past 12 months or future 90 days.     Recent Outpatient Visits            3 weeks ago Hematochezia    [place] [facility] Lewis Cantor III, MD    4 months ago Iron deficiency anemia due to chronic blood loss    [facility] Dallin Alva NP    7 months ago Encounter for preventive health examination    [facility] Galilea West NP    11 months ago Chronic atrial fibrillation    [facility] Puneet Fuentes MD    11  months ago Essential hypertension    [place] [facility] Leonel Johnson MD          Future Appointments              In 2 months MD Taisha Archuleta III - Internal Medicine, [place]                Passed - Prostate Cancer is not on problem list              Appointments  past 12m or future 3m with PCP    Date Provider   Last Visit   6/5/2020 Lewis Cantor III, MD   Next Visit   6/28/2020 Lewis Cantor III, MD   ED visits in past 90 days: 0     Note composed:4:15 PM 06/29/2020

## 2020-07-16 DIAGNOSIS — I10 ESSENTIAL HYPERTENSION: ICD-10-CM

## 2020-07-16 RX ORDER — DOXAZOSIN 4 MG/1
4 TABLET ORAL NIGHTLY
Qty: 90 TABLET | Refills: 0 | Status: SHIPPED | OUTPATIENT
Start: 2020-07-16 | End: 2020-09-21 | Stop reason: SDUPTHER

## 2020-07-16 NOTE — TELEPHONE ENCOUNTER
----- Message from Muna Bui sent at 7/16/2020 10:18 AM CDT -----  Regarding: refill  Patient is following up on a refill request for doxazosin (CARDURA) 4 MG tablet. [facility], Abdoulaye Mcghee. He says Dr. Cantor increased his dosage. He also needs his lab results. Please call and advise.

## 2020-07-23 ENCOUNTER — PATIENT OUTREACH (OUTPATIENT)
Dept: OTHER | Facility: OTHER | Age: 70
End: 2020-07-23

## 2020-07-23 NOTE — PROGRESS NOTES
Digital Medicine: Health  Follow-Up    The history is provided by the patient.             Reason for review: Blood pressure not at goal        Topics Covered on Call: meal planning, physical activity and Diet          Diet-Change      Dietary Improvements:    Dietary Indiscretions:Patient reports he has gained weight since COVID 19 quarantine. States he has been sitting at home and eating. Reports his wife bakes a lot, so states she needs to cut back on that. States they are trying to cut back on portions.        Intervention(s): portion control and meal planning    Additional diet details: Encouraged patient to try to use smaller plates to reduce portion size.    Physical Activity-no change to routine  No change to exercise routine.       Additional physical activity details: States he has not been walking as much. Reports he walks his dogs 2x/day.      Medication Adherence-Medication Adherence not addressed.      Substance, Sleep, Stress-Not assessed    PLAN  Provided patient education:    Patient verbalizes understanding. Patient did not express questions or concerns and patient has contact information if needed.    Explained the importance of self-monitoring and medication adherence. Encouraged the patient to communicate with their health  for lifestyle modifications to help improve or maintain a healthy lifestyle.        There are no preventive care reminders to display for this patient.    Last 5 Patient Entered Readings                                      Current 30 Day Average: 132/85     Recent Readings 7/18/2020 7/11/2020 7/9/2020 7/6/2020 7/6/2020    SBP (mmHg) 126 137 144 138 146    DBP (mmHg) 83 88 91 79 80    Pulse 63 64 48 50 62

## 2020-08-18 ENCOUNTER — LAB VISIT (OUTPATIENT)
Dept: LAB | Facility: HOSPITAL | Age: 70
End: 2020-08-18
Payer: MEDICARE

## 2020-08-18 ENCOUNTER — OFFICE VISIT (OUTPATIENT)
Dept: SURGERY | Facility: CLINIC | Age: 70
End: 2020-08-18
Payer: MEDICARE

## 2020-08-18 ENCOUNTER — PATIENT OUTREACH (OUTPATIENT)
Dept: ADMINISTRATIVE | Facility: OTHER | Age: 70
End: 2020-08-18

## 2020-08-18 VITALS
WEIGHT: 315 LBS | HEART RATE: 64 BPM | DIASTOLIC BLOOD PRESSURE: 79 MMHG | BODY MASS INDEX: 40.43 KG/M2 | SYSTOLIC BLOOD PRESSURE: 125 MMHG | HEIGHT: 74 IN

## 2020-08-18 DIAGNOSIS — E66.01 MORBID OBESITY DUE TO EXCESS CALORIES: ICD-10-CM

## 2020-08-18 DIAGNOSIS — K64.8 BLEEDING INTERNAL HEMORRHOIDS: Primary | ICD-10-CM

## 2020-08-18 DIAGNOSIS — K64.8 BLEEDING INTERNAL HEMORRHOIDS: ICD-10-CM

## 2020-08-18 DIAGNOSIS — I48.20 CHRONIC ATRIAL FIBRILLATION: ICD-10-CM

## 2020-08-18 LAB
BASOPHILS # BLD AUTO: 0.04 K/UL (ref 0–0.2)
BASOPHILS NFR BLD: 0.5 % (ref 0–1.9)
DIFFERENTIAL METHOD: ABNORMAL
EOSINOPHIL # BLD AUTO: 0.4 K/UL (ref 0–0.5)
EOSINOPHIL NFR BLD: 4.7 % (ref 0–8)
ERYTHROCYTE [DISTWIDTH] IN BLOOD BY AUTOMATED COUNT: 14.2 % (ref 11.5–14.5)
HCT VFR BLD AUTO: 49 % (ref 40–54)
HGB BLD-MCNC: 15.8 G/DL (ref 14–18)
IMM GRANULOCYTES # BLD AUTO: 0.02 K/UL (ref 0–0.04)
IMM GRANULOCYTES NFR BLD AUTO: 0.2 % (ref 0–0.5)
LYMPHOCYTES # BLD AUTO: 1.4 K/UL (ref 1–4.8)
LYMPHOCYTES NFR BLD: 17.5 % (ref 18–48)
MCH RBC QN AUTO: 28.8 PG (ref 27–31)
MCHC RBC AUTO-ENTMCNC: 32.2 G/DL (ref 32–36)
MCV RBC AUTO: 89 FL (ref 82–98)
MONOCYTES # BLD AUTO: 0.9 K/UL (ref 0.3–1)
MONOCYTES NFR BLD: 11.2 % (ref 4–15)
NEUTROPHILS # BLD AUTO: 5.3 K/UL (ref 1.8–7.7)
NEUTROPHILS NFR BLD: 65.9 % (ref 38–73)
NRBC BLD-RTO: 0 /100 WBC
PLATELET # BLD AUTO: 212 K/UL (ref 150–350)
PMV BLD AUTO: 10 FL (ref 9.2–12.9)
RBC # BLD AUTO: 5.48 M/UL (ref 4.6–6.2)
WBC # BLD AUTO: 8.1 K/UL (ref 3.9–12.7)

## 2020-08-18 PROCEDURE — 99999 PR PBB SHADOW E&M-EST. PATIENT-LVL IV: CPT | Mod: PBBFAC,,, | Performed by: NURSE PRACTITIONER

## 2020-08-18 PROCEDURE — 1101F PT FALLS ASSESS-DOCD LE1/YR: CPT | Mod: S$GLB,,, | Performed by: NURSE PRACTITIONER

## 2020-08-18 PROCEDURE — 36415 COLL VENOUS BLD VENIPUNCTURE: CPT

## 2020-08-18 PROCEDURE — 3074F PR MOST RECENT SYSTOLIC BLOOD PRESSURE < 130 MM HG: ICD-10-PCS | Mod: S$GLB,,, | Performed by: NURSE PRACTITIONER

## 2020-08-18 PROCEDURE — 3078F PR MOST RECENT DIASTOLIC BLOOD PRESSURE < 80 MM HG: ICD-10-PCS | Mod: S$GLB,,, | Performed by: NURSE PRACTITIONER

## 2020-08-18 PROCEDURE — 1159F MED LIST DOCD IN RCRD: CPT | Mod: S$GLB,,, | Performed by: NURSE PRACTITIONER

## 2020-08-18 PROCEDURE — 1126F AMNT PAIN NOTED NONE PRSNT: CPT | Mod: S$GLB,,, | Performed by: NURSE PRACTITIONER

## 2020-08-18 PROCEDURE — 99999 PR PBB SHADOW E&M-EST. PATIENT-LVL IV: ICD-10-PCS | Mod: PBBFAC,,, | Performed by: NURSE PRACTITIONER

## 2020-08-18 PROCEDURE — 46600 PR DIAG2STIC A2SCOPY: ICD-10-PCS | Mod: S$GLB,,, | Performed by: NURSE PRACTITIONER

## 2020-08-18 PROCEDURE — 46600 DIAGNOSTIC ANOSCOPY SPX: CPT | Mod: S$GLB,,, | Performed by: NURSE PRACTITIONER

## 2020-08-18 PROCEDURE — 3078F DIAST BP <80 MM HG: CPT | Mod: S$GLB,,, | Performed by: NURSE PRACTITIONER

## 2020-08-18 PROCEDURE — 3008F PR BODY MASS INDEX (BMI) DOCUMENTED: ICD-10-PCS | Mod: S$GLB,,, | Performed by: NURSE PRACTITIONER

## 2020-08-18 PROCEDURE — 3074F SYST BP LT 130 MM HG: CPT | Mod: S$GLB,,, | Performed by: NURSE PRACTITIONER

## 2020-08-18 PROCEDURE — 85025 COMPLETE CBC W/AUTO DIFF WBC: CPT

## 2020-08-18 PROCEDURE — 3008F BODY MASS INDEX DOCD: CPT | Mod: S$GLB,,, | Performed by: NURSE PRACTITIONER

## 2020-08-18 PROCEDURE — 99204 PR OFFICE/OUTPT VISIT, NEW, LEVL IV, 45-59 MIN: ICD-10-PCS | Mod: 25,S$GLB,, | Performed by: NURSE PRACTITIONER

## 2020-08-18 PROCEDURE — 1159F PR MEDICATION LIST DOCUMENTED IN MEDICAL RECORD: ICD-10-PCS | Mod: S$GLB,,, | Performed by: NURSE PRACTITIONER

## 2020-08-18 PROCEDURE — 1101F PR PT FALLS ASSESS DOC 0-1 FALLS W/OUT INJ PAST YR: ICD-10-PCS | Mod: S$GLB,,, | Performed by: NURSE PRACTITIONER

## 2020-08-18 PROCEDURE — 1126F PR PAIN SEVERITY QUANTIFIED, NO PAIN PRESENT: ICD-10-PCS | Mod: S$GLB,,, | Performed by: NURSE PRACTITIONER

## 2020-08-18 PROCEDURE — 99204 OFFICE O/P NEW MOD 45 MIN: CPT | Mod: 25,S$GLB,, | Performed by: NURSE PRACTITIONER

## 2020-08-18 RX ORDER — HYDROCORTISONE ACETATE 25 MG/1
25 SUPPOSITORY RECTAL 2 TIMES DAILY
Qty: 20 SUPPOSITORY | Refills: 0 | Status: SHIPPED | OUTPATIENT
Start: 2020-08-18 | End: 2020-08-28

## 2020-08-18 NOTE — PROGRESS NOTES
LINKS immunization registry updated  Care Everywhere updated  Health Maintenance updated  Chart reviewed for overdue Proactive [facility] ([facility]) health maintenance testing (CRS, Breast Ca, Diabetic Eye Exam)   Orders entered:N/A

## 2020-08-18 NOTE — PROGRESS NOTES
Patient ID:  Zeenat Mancia is a 70 y.o. male     Chief Complaint: No chief complaint on file.       HPI:  Zeenat Mancia presents to colon and rectal surgery with a complaint of rectal bleeding intermittently for several months. Bright red blood during and after bowel movements. He denies rectal pain, abdominal pain, unexplained weight loss, change in bowel habits.  He has a daily soft BM. Taking fiber and stool softeners daily. He does feel more fatigued and SOB. He attributes this to recently gaining weight after a cruise then quarantine.     He was admitted in 2018 for rectal bleeding and anemia.  Does not appear that the source was identified at that time    On eliquis for afib    [id number]  Findings:        Multiple diverticula were found in the sigmoid colon and descending        colon. Fresh blood throughout colon. No active bleeding.        External hemorrhoids were found.        The exam was otherwise without abnormality to cecum.   Impression:           - Preparation of the colon was fair.                         - Diverticulosis in the sigmoid colon and in the                         descending colon.                         - External hemorrhoids.                         - The examination was otherwise normal. No active                         bleeding site visualized.                         - No specimens collected.   Recommendation:       - Repeat colonoscopy in 5 years for surveillance    ROS:     Review of Systems   Constitutional: Negative for appetite change, chills, fatigue, fever and unexpected weight change.   Respiratory: Negative for shortness of breath.    Cardiovascular: Negative for chest pain.   Gastrointestinal: Positive for blood in stool. Negative for abdominal distention, abdominal pain, anal bleeding, constipation, diarrhea, nausea, rectal pain and vomiting.       Review of patient's allergies indicates:  No Known Allergies  Past Medical History:   Diagnosis Date    A-fib      Arthritis     Colon polyp     Diverticulosis     Dyslipidemia     GERD (gastroesophageal reflux disease)     on Protonix    Hepatic steatosis     Hyperlipidemia     Hypertension     Irregular heart beat     Multiple gastric ulcers 05/16/2018    gi scope per Dr. Houston    Obesity     Sleep apnea     uses CPAP    Unspecified disorder of kidney and ureter     kidney stones     Past Surgical History:   Procedure Laterality Date    APPENDECTOMY      CATARACT EXTRACTION  2009    OS    CATARACT EXTRACTION W/  INTRAOCULAR LENS IMPLANT Right 6/3/2015    [id number] 14.5d//    COLONOSCOPY N/A 10/10/2017    Procedure: COLONOSCOPY;  Surgeon: Jameson Houston MD;  Location: [facility];  Service: Endoscopy;  Laterality: N/A; repeat in 5 years for surveillance    COLONOSCOPY N/A 5/16/2018    Procedure: COLONOSCOPY;  Surgeon: Jameson Houston MD;  Location: [facility];  Service: Endoscopy;  Laterality: N/A;    ESOPHAGOGASTRODUODENOSCOPY N/A 7/2/2018    Procedure: EGD (ESOPHAGOGASTRODUODENOSCOPY);  Surgeon: Jameson Houston MD;  Location: [facility];  Service: Endoscopy;  Laterality: N/A;    EYE SURGERY      cataract    TONSILLECTOMY      UPPER GASTROINTESTINAL ENDOSCOPY  05/16/2018    Dr. Houston     Family History   Problem Relation Age of Onset    Arthritis Mother     Heart disease Mother     Blindness Mother         OS due to injury    Coronary artery disease Father     Heart disease Father     Cataracts Father     Amblyopia Neg Hx     Cancer Neg Hx     Diabetes Neg Hx     Glaucoma Neg Hx     Hypertension Neg Hx     Macular degeneration Neg Hx     Retinal detachment Neg Hx     Strabismus Neg Hx     Stroke Neg Hx     Thyroid disease Neg Hx     Colon cancer Neg Hx     Colon polyps Neg Hx     Crohn's disease Neg Hx     Ulcerative colitis Neg Hx     Esophageal cancer Neg Hx     Stomach cancer Neg Hx      Current Outpatient Medications on File Prior to Visit   Medication Sig     amLODIPine (NORVASC) 5 MG tablet Take 1 tablet (5 mg total) by mouth once daily.    apixaban (ELIQUIS) 5 mg Tab Take 1 tablet (5 mg total) by mouth 2 (two) times daily.    atenoloL (TENORMIN) 100 MG tablet Take 1 tablet (100 mg total) by mouth once daily.    doxazosin (CARDURA) 4 MG tablet Take 1 tablet (4 mg total) by mouth every evening.    escitalopram oxalate (LEXAPRO) 10 MG tablet Take 10 mg by mouth once daily.    eszopiclone (LUNESTA) 2 MG Tab eszopiclone 2 mg tablet   Take 1 tablet every day by oral route.    finasteride (PROSCAR) 5 mg tablet Take 1 tablet (5 mg total) by mouth once daily.    hydroCHLOROthiazide (HYDRODIURIL) 25 MG tablet Take 1 tablet (25 mg total) by mouth once daily.    multivitamin-minerals-lutein (CENTRUM SILVER) Tab Take by mouth. 1 Tablet Oral Every day    pantoprazole (PROTONIX) 40 MG tablet Take 1 tablet (40 mg total) by mouth once daily.    valsartan (DIOVAN) 320 MG tablet Take 1 tablet (320 mg total) by mouth once daily.     No current facility-administered medications on file prior to visit.        PE:  Vitals:    08/18/20 1004   BP: 125/79   Pulse: 64     Physical Exam   Constitutional: He is oriented to person, place, and time and well-developed, well-nourished, and in no distress. No distress.   HENT:   Head: Normocephalic and atraumatic.   Pulmonary/Chest: Effort normal. No respiratory distress.   Abdominal: Soft. He exhibits no distension. There is no abdominal tenderness.   Genitourinary:    Genitourinary Comments: Normal perianal skin. eversion of anus revealed no abnormality or fissure, ROLF revealed no masses, blood or stool in vault, normal sphincter tone, anoscopy revealed grade II internal hemorrhoids with stigmata of bleeding     Musculoskeletal: Normal range of motion.   Neurological: He is alert and oriented to person, place, and time. GCS score is 15.   Skin: Skin is warm and dry. No rash noted. No erythema.   Psychiatric: Affect normal.        Impression:  Encounter Diagnosis   Name Primary?    Bleeding internal hemorrhoids        PLAN:  Diagnoses and all orders for this visit:    Bleeding internal hemorrhoids  -     Ambulatory referral/consult to Colorectal Surgery  -     CBC auto differential; Future    Other orders  -     hydrocortisone (ANUSOL-HC) 25 mg suppository; Place 1 suppository (25 mg total) rectally 2 (two) times daily. for 10 days    unable to band hemorrhoids due to anticoagulation  continue fiber supplement plus stool softener  CBC today due to fatigue/SOB  anusol supp  RTC 4 weeks. If bleeding still on going would consider touching base with cards about holding anticoagulation for rubber band ligation.

## 2020-08-18 NOTE — LETTER
August 18, 2020      Lewis Cantor III, MD  [address] [place] [address]           [facility] [address]th Fl  [address]  [facility] [address]  Phone: [phone]          Patient: Zeenat Mancia   MR Number: [id number]   YOB: 1950   Date of Visit: 8/18/2020       Dear Dr. Lewis Cantor III:    Thank you for referring Zeenat Mancia to me for evaluation. Attached you will find relevant portions of my assessment and plan of care.    If you have questions, please do not hesitate to call me. I look forward to following Zeenat Mancia along with you.    Sincerely,    Lore Cates, NP    Enclosure  CC:  No Recipients    If you would like to receive this communication electronically, please contact [email or web address] or [phone] to request more information on [employer] access.    For providers and/or their staff who would like to refer a patient to [facility], please contact us through our one-stop-shop provider referral line, Ivanna Luna, at [phone].    If you feel you have received this communication in error or would no longer like to receive these types of communications, please e-mail [email or web address]

## 2020-09-08 ENCOUNTER — OFFICE VISIT (OUTPATIENT)
Dept: INTERNAL MEDICINE | Facility: CLINIC | Age: 70
End: 2020-09-08
Payer: MEDICARE

## 2020-09-08 VITALS
OXYGEN SATURATION: 97 % | WEIGHT: 315 LBS | DIASTOLIC BLOOD PRESSURE: 68 MMHG | HEIGHT: 74 IN | HEART RATE: 63 BPM | SYSTOLIC BLOOD PRESSURE: 122 MMHG | BODY MASS INDEX: 40.43 KG/M2 | TEMPERATURE: 97 F

## 2020-09-08 DIAGNOSIS — G47.33 OSA (OBSTRUCTIVE SLEEP APNEA): ICD-10-CM

## 2020-09-08 DIAGNOSIS — I10 ESSENTIAL HYPERTENSION: Primary | ICD-10-CM

## 2020-09-08 DIAGNOSIS — R53.83 FATIGUE, UNSPECIFIED TYPE: ICD-10-CM

## 2020-09-08 DIAGNOSIS — Z23 NEED FOR VACCINATION: ICD-10-CM

## 2020-09-08 DIAGNOSIS — K92.1 HEMATOCHEZIA: ICD-10-CM

## 2020-09-08 PROCEDURE — 3074F PR MOST RECENT SYSTOLIC BLOOD PRESSURE < 130 MM HG: ICD-10-PCS | Mod: S$GLB,,, | Performed by: INTERNAL MEDICINE

## 2020-09-08 PROCEDURE — G0008 ADMIN INFLUENZA VIRUS VAC: HCPCS | Mod: S$GLB,,, | Performed by: INTERNAL MEDICINE

## 2020-09-08 PROCEDURE — 90694 FLU VACCINE - QUADRIVALENT - ADJUVANTED: ICD-10-PCS | Mod: S$GLB,,, | Performed by: INTERNAL MEDICINE

## 2020-09-08 PROCEDURE — 1159F PR MEDICATION LIST DOCUMENTED IN MEDICAL RECORD: ICD-10-PCS | Mod: S$GLB,,, | Performed by: INTERNAL MEDICINE

## 2020-09-08 PROCEDURE — 99214 PR OFFICE/OUTPT VISIT, EST, LEVL IV, 30-39 MIN: ICD-10-PCS | Mod: 25,S$GLB,, | Performed by: INTERNAL MEDICINE

## 2020-09-08 PROCEDURE — 3288F FALL RISK ASSESSMENT DOCD: CPT | Mod: S$GLB,,, | Performed by: INTERNAL MEDICINE

## 2020-09-08 PROCEDURE — 1126F AMNT PAIN NOTED NONE PRSNT: CPT | Mod: S$GLB,,, | Performed by: INTERNAL MEDICINE

## 2020-09-08 PROCEDURE — 1100F PR PT FALLS ASSESS DOC 2+ FALLS/FALL W/INJURY/YR: ICD-10-PCS | Mod: S$GLB,,, | Performed by: INTERNAL MEDICINE

## 2020-09-08 PROCEDURE — 3078F DIAST BP <80 MM HG: CPT | Mod: S$GLB,,, | Performed by: INTERNAL MEDICINE

## 2020-09-08 PROCEDURE — 1100F PTFALLS ASSESS-DOCD GE2>/YR: CPT | Mod: S$GLB,,, | Performed by: INTERNAL MEDICINE

## 2020-09-08 PROCEDURE — 1159F MED LIST DOCD IN RCRD: CPT | Mod: S$GLB,,, | Performed by: INTERNAL MEDICINE

## 2020-09-08 PROCEDURE — 3008F PR BODY MASS INDEX (BMI) DOCUMENTED: ICD-10-PCS | Mod: S$GLB,,, | Performed by: INTERNAL MEDICINE

## 2020-09-08 PROCEDURE — 1126F PR PAIN SEVERITY QUANTIFIED, NO PAIN PRESENT: ICD-10-PCS | Mod: S$GLB,,, | Performed by: INTERNAL MEDICINE

## 2020-09-08 PROCEDURE — 3288F PR FALLS RISK ASSESSMENT DOCUMENTED: ICD-10-PCS | Mod: S$GLB,,, | Performed by: INTERNAL MEDICINE

## 2020-09-08 PROCEDURE — 3008F BODY MASS INDEX DOCD: CPT | Mod: S$GLB,,, | Performed by: INTERNAL MEDICINE

## 2020-09-08 PROCEDURE — 3074F SYST BP LT 130 MM HG: CPT | Mod: S$GLB,,, | Performed by: INTERNAL MEDICINE

## 2020-09-08 PROCEDURE — 3078F PR MOST RECENT DIASTOLIC BLOOD PRESSURE < 80 MM HG: ICD-10-PCS | Mod: S$GLB,,, | Performed by: INTERNAL MEDICINE

## 2020-09-08 PROCEDURE — 99999 PR PBB SHADOW E&M-EST. PATIENT-LVL V: ICD-10-PCS | Mod: PBBFAC,,, | Performed by: INTERNAL MEDICINE

## 2020-09-08 PROCEDURE — 90694 VACC AIIV4 NO PRSRV 0.5ML IM: CPT | Mod: S$GLB,,, | Performed by: INTERNAL MEDICINE

## 2020-09-08 PROCEDURE — 99214 OFFICE O/P EST MOD 30 MIN: CPT | Mod: 25,S$GLB,, | Performed by: INTERNAL MEDICINE

## 2020-09-08 PROCEDURE — 99999 PR PBB SHADOW E&M-EST. PATIENT-LVL V: CPT | Mod: PBBFAC,,, | Performed by: INTERNAL MEDICINE

## 2020-09-08 PROCEDURE — G0008 PR ADMIN INFLUENZA VIRUS VAC: ICD-10-PCS | Mod: S$GLB,,, | Performed by: INTERNAL MEDICINE

## 2020-09-08 NOTE — PROGRESS NOTES
Subjective:       Patient ID: Zeenat Mancia is a 70 y.o. male.    Chief Complaint:   Follow-up and Hypertension      HPI      #Interim Hx    8/18/20-  CRS- unable to band b/c of AC - RTC 4 weeks    #Concerns Today    Patient continues to have some fatigue symptoms.  He reports that he has had fatigue symptoms for the last 3 months or so.  He has noted increasing symptoms since moving from the [place] to the [facility].  He states he just does not have any energy.  He denies any chest pain or shortness of breath.  He does not have any numbness or weakness of the extremities.  He reports that the rectal bleeding has stopped.  He has not had any fevers chills or night sweats.  He is up-to-date on his cancer screening.  His screening for anemia and and heart failure were negative.        #Chronic Problems      HTN  Complication: no CVA/CKD/CAD  Last labs :   Medication: adherent to   - amlodine 5mg daily  - Atenolol 100mg dialy  - HCTZ 25 mg   - Valsartan 320   Home Bps;     Last 5 Patient Entered Readings                                      Current 30 Day Average: 128/79     Recent Readings 8/31/2020 8/26/2020 8/26/2020 8/17/2020 8/13/2020    SBP (mmHg) 118 119 127 137 136    DBP (mmHg) 73 76 87 74 86    Pulse 54 70 69 82 86                HLD- not on choleterol med     JACKELYN- on cpap     BPH - on doxazosin    Afib#[email or web address]   On eliquids  Followed by cards       Hematochezia   Providers; seen by CRS  Plan; medical management if persistent d/w cards holding AC for banding  Every 3day will have BRBPR  Noted in Last couple of months    colonscoy in 5/2018 showed diverticulosis and external hemorrid   Recall in 5 years     CLIFFORD  Followed by heme /onc   Previously on injectafer  Lab Results   Component Value Date    WBC 8.10 08/18/2020    HGB 15.8 08/18/2020    HCT 49.0 08/18/2020    MCV 89 08/18/2020     08/18/2020         Health Maintenance Due   Topic Date Due    Shingles Vaccine (2 of 3) 10/12/2015    Influenza Vaccine  "(1) 08/01/2020     Health Maintenance Topics with due status: Not Due       Topic Last Completion Date    Colorectal Cancer Screening 05/16/2018    TETANUS VACCINE 08/29/2019    Lipid Panel 06/05/2020               Review of Systems   Constitutional: Positive for activity change and unexpected weight change.   HENT: Negative for hearing loss, rhinorrhea and trouble swallowing.    Eyes: Negative for discharge and visual disturbance.   Respiratory: Positive for wheezing. Negative for chest tightness.    Cardiovascular: Negative for chest pain and palpitations.   Gastrointestinal: Positive for blood in stool. Negative for constipation, diarrhea and vomiting.   Endocrine: Negative for polydipsia and polyuria.   Genitourinary: Positive for urgency. Negative for difficulty urinating and hematuria.   Musculoskeletal: Positive for neck pain. Negative for joint swelling.   Neurological: Negative for headaches.   Psychiatric/Behavioral: Negative for confusion and dysphoric mood.       Objective:   /68 (BP Location: Right arm, Patient Position: Sitting, BP Method: Large (Manual))   Pulse 63   Temp 97 °F (36.1 °C) (Temporal)   Ht 6' 2" (1.88 m)   Wt (!) 147.6 kg (325 lb 6.4 oz)   SpO2 97%   BMI 41.78 kg/m²          Physical Exam  Vitals signs and nursing note reviewed.   Constitutional:       General: He is not in acute distress.     Appearance: He is well-developed. He is not diaphoretic.   HENT:      Head: Normocephalic.      Mouth/Throat:      Pharynx: No oropharyngeal exudate.   Eyes:      General:         Right eye: No discharge.         Left eye: No discharge.      Conjunctiva/sclera: Conjunctivae normal.      Pupils: Pupils are equal, round, and reactive to light.   Cardiovascular:      Rate and Rhythm: Normal rate and regular rhythm.      Heart sounds: Normal heart sounds. No murmur. No friction rub. No gallop.    Pulmonary:      Effort: Pulmonary effort is normal. No respiratory distress.   Abdominal:      " General: There is no distension.      Palpations: Abdomen is soft.   Genitourinary:     Comments: Obvious mildly prolapse internl hemrroid nonbleeding   Musculoskeletal:      Comments: Tace edema of  Lower extremity    Skin:     General: Skin is warm.   Neurological:      Mental Status: He is alert.             ASCVD  The 10-year ASCVD risk score (Ish AUSTIN Jr., et al., 2013) is: 18.7%    Values used to calculate the score:      Age: 70 years      Sex: Male      Is Non- : No      Diabetic: No      Tobacco smoker: No      Systolic Blood Pressure: 122 mmHg      Is BP treated: Yes      HDL Cholesterol: 38 mg/dL      Total Cholesterol: 146 mg/dL    Basic Labs  CMP  Sodium   Date Value Ref Range Status   06/05/2020 143 136 - 145 mmol/L Final     Potassium   Date Value Ref Range Status   06/05/2020 4.0 3.5 - 5.1 mmol/L Final     Chloride   Date Value Ref Range Status   06/05/2020 105 95 - 110 mmol/L Final     CO2   Date Value Ref Range Status   06/05/2020 30 (H) 23 - 29 mmol/L Final     Glucose   Date Value Ref Range Status   06/05/2020 102 70 - 110 mg/dL Final     BUN, Bld   Date Value Ref Range Status   06/05/2020 14 8 - 23 mg/dL Final     Creatinine   Date Value Ref Range Status   06/05/2020 1.2 0.5 - 1.4 mg/dL Final     Calcium   Date Value Ref Range Status   06/05/2020 9.6 8.7 - 10.5 mg/dL Final     Total Protein   Date Value Ref Range Status   06/05/2020 7.5 6.0 - 8.4 g/dL Final     Albumin   Date Value Ref Range Status   06/05/2020 4.3 3.5 - 5.2 g/dL Final     Total Bilirubin   Date Value Ref Range Status   06/05/2020 0.7 0.1 - 1.0 mg/dL Final     Comment:     For infants and newborns, interpretation of results should be based  on gestational age, weight and in agreement with clinical  observations.  Premature Infant recommended reference ranges:  Up to 24 hours.............<8.0 mg/dL  Up to 48 hours............<12.0 mg/dL  3-5 days..................<15.0 mg/dL  6-29 days.................<15.0  mg/dL       Alkaline Phosphatase   Date Value Ref Range Status   06/05/2020 62 55 - 135 U/L Final     AST   Date Value Ref Range Status   06/05/2020 23 10 - 40 U/L Final     ALT   Date Value Ref Range Status   06/05/2020 26 10 - 44 U/L Final     Anion Gap   Date Value Ref Range Status   06/05/2020 8 8 - 16 mmol/L Final     eGFR if    Date Value Ref Range Status   06/05/2020 >60.0 >60 mL/min/1.73 m^2 Final     eGFR if non    Date Value Ref Range Status   06/05/2020 >60.0 >60 mL/min/1.73 m^2 Final     Comment:     Calculation used to obtain the estimated glomerular filtration  rate (eGFR) is the CKD-EPI equation.        Lab Results   Component Value Date    WBC 8.10 08/18/2020    HGB 15.8 08/18/2020    HCT 49.0 08/18/2020    MCV 89 08/18/2020     08/18/2020       Lab Results   Component Value Date    TSH 1.219 10/11/2017         STD    Lab Results   Component Value Date    HEPCAB Negative 08/26/2015       Lipids  Lab Results   Component Value Date    CHOL 146 06/05/2020     Lab Results   Component Value Date    HDL 38 (L) 06/05/2020     Lab Results   Component Value Date    LDLCALC 79.8 06/05/2020     Lab Results   Component Value Date    TRIG 141 06/05/2020     Lab Results   Component Value Date    CHOLHDL 26.0 06/05/2020         PSA  PSA, SCREEN (ng/mL)   Date Value   08/21/2017 0.44         Results for orders placed in visit on 07/31/19   Transthoracic echo (TTE) 2D with Color Flow    Narrative · Normal left ventricular systolic function. The estimated ejection   fraction is 65%  · Normal LV diastolic function.  · Mild left atrial enlargement.  · Normal right ventricular systolic function.  · Mild mitral regurgitation.  · Mild tricuspid regurgitation.  · Normal central venous pressure (3 mm Hg).  · The estimated PA systolic pressure is 32 mm Hg            Assessment:       1. Need for vaccination    2. JACKELYN (obstructive sleep apnea)    3. Hematochezia    4. Essential  hypertension    5. Fatigue, unspecified type              Plan:             Zeenat was seen today for follow-up and hypertension.    Diagnoses and all orders for this visit:    Essential hypertension  Chronic; stable  -- Patient is at goal today  -- Labs are reviewed and wnl  -- antihypertensive regimen will cont as below  --  return for BP follow up 3 months  -     Basic metabolic panel; Future    JACKELYN (obstructive sleep apnea)  Chronic; stable  -     Ambulatory referral/consult to Sleep Disorders; Future    Need for vaccination  -- I reviewed the patient vaccine history and provided the risk benefits and side effects of the vaccine.   -- I provided the patient a VIS sheet on the vaccine.   -     Influenza - Quadrivalent (Adjuvanted)    Hematochezia  Chronic; resolved  Continue to monitor clincially    Fatigue, unspecified type  -     TSH; Future              Future Appointments   Date Time Provider Department Center   12/11/2020 10:00 AM Lewis Cantor III, MD [facility]           Medication List with Changes/Refills   Current Medications    AMLODIPINE (NORVASC) 5 MG TABLET    Take 1 tablet (5 mg total) by mouth once daily.    APIXABAN (ELIQUIS) 5 MG TAB    Take 1 tablet (5 mg total) by mouth 2 (two) times daily.    ATENOLOL (TENORMIN) 100 MG TABLET    Take 1 tablet (100 mg total) by mouth once daily.    DOXAZOSIN (CARDURA) 4 MG TABLET    Take 1 tablet (4 mg total) by mouth every evening.    ESCITALOPRAM OXALATE (LEXAPRO) 10 MG TABLET    Take 10 mg by mouth once daily.    ESZOPICLONE (LUNESTA) 2 MG TAB    eszopiclone 2 mg tablet   Take 1 tablet every day by oral route.    FINASTERIDE (PROSCAR) 5 MG TABLET    Take 1 tablet (5 mg total) by mouth once daily.    HYDROCHLOROTHIAZIDE (HYDRODIURIL) 25 MG TABLET    Take 1 tablet (25 mg total) by mouth once daily.    MULTIVITAMIN-MINERALS-LUTEIN (CENTRUM SILVER) TAB    Take by mouth. 1 Tablet Oral Every day    PANTOPRAZOLE (PROTONIX) 40 MG TABLET    Take 1 tablet (40  mg total) by mouth once daily.    VALSARTAN (DIOVAN) 320 MG TABLET    Take 1 tablet (320 mg total) by mouth once daily.     Disclaimer:  This note has been generated using voice-recognition software. There may be grammatical or spelling errors that have been missed during proof-reading

## 2020-09-09 DIAGNOSIS — I10 ESSENTIAL HYPERTENSION: ICD-10-CM

## 2020-09-10 ENCOUNTER — PATIENT OUTREACH (OUTPATIENT)
Dept: OTHER | Facility: OTHER | Age: 70
End: 2020-09-10

## 2020-09-10 RX ORDER — HYDROCHLOROTHIAZIDE 25 MG/1
TABLET ORAL
Qty: 90 TABLET | Refills: 1 | Status: SHIPPED | OUTPATIENT
Start: 2020-09-10 | End: 2021-03-08

## 2020-09-10 NOTE — PROGRESS NOTES
Refill Routing Note     Medication(s) are not appropriate for processing by [facility]:    Non Participating Provider in Refill Center     Appointments  past 12m or future 3m with PCP    Date Provider   Last Visit   9/8/2020 Lewis Cantor III, MD   Next Visit   12/11/2020 Lewis Cantor III, MD           Automatic Epic Protocol Generated Data:    Requested Prescriptions   Pending Prescriptions Disp Refills    hydroCHLOROthiazide (HYDRODIURIL) 25 MG tablet [Pharmacy Med Name: HYDROCHLOROTHIAZIDE TABS 25MG] 90 tablet 3     Sig: TAKE 1 TABLET DAILY       Diuretics Protocol Passed - 9/9/2020 12:03 PM        Passed - Visit with authorizing provider in past 12 months or upcoming 90 days         Passed - Blood Pressure below 139/89 on file in past 12 months      BP Readings from Last 3 Encounters:   09/08/20 122/68   08/18/20 125/79   06/05/20 128/74             Passed - Serum Potassium between 3.5 to 5.2 on file in the past 6 months     Lab Results   Component Value Date    K 4.0 06/05/2020    K 4.4 07/16/2019    K 3.4 (L) 01/17/2019                  Passed - Serum Creatinine less than 1.4 on file in the past 6 months     Lab Results   Component Value Date    CREATININE 1.2 06/05/2020    CREATININE 1.2 07/16/2019    CREATININE 1.0 01/17/2019     Lab Results   Component Value Date    EGFRNONAA >60.0 06/05/2020    EGFRNONAA >60.0 07/16/2019    EGFRNONAA >60.0 01/17/2019               Passed - Serum Sodium between 130 to 148 on file in the past 6 months      Lab Results   Component Value Date     06/05/2020                        Note composed:10:10 AM 09/10/2020

## 2020-09-11 ENCOUNTER — OFFICE VISIT (OUTPATIENT)
Dept: SLEEP MEDICINE | Facility: CLINIC | Age: 70
End: 2020-09-11
Payer: MEDICARE

## 2020-09-11 VITALS
DIASTOLIC BLOOD PRESSURE: 71 MMHG | WEIGHT: 315 LBS | HEART RATE: 65 BPM | SYSTOLIC BLOOD PRESSURE: 108 MMHG | BODY MASS INDEX: 40.43 KG/M2 | HEIGHT: 74 IN

## 2020-09-11 DIAGNOSIS — G47.33 OSA (OBSTRUCTIVE SLEEP APNEA): ICD-10-CM

## 2020-09-11 PROCEDURE — 1101F PT FALLS ASSESS-DOCD LE1/YR: CPT | Mod: S$GLB,,, | Performed by: NURSE PRACTITIONER

## 2020-09-11 PROCEDURE — 3074F SYST BP LT 130 MM HG: CPT | Mod: S$GLB,,, | Performed by: NURSE PRACTITIONER

## 2020-09-11 PROCEDURE — 99204 OFFICE O/P NEW MOD 45 MIN: CPT | Mod: S$GLB,,, | Performed by: NURSE PRACTITIONER

## 2020-09-11 PROCEDURE — 3008F PR BODY MASS INDEX (BMI) DOCUMENTED: ICD-10-PCS | Mod: S$GLB,,, | Performed by: NURSE PRACTITIONER

## 2020-09-11 PROCEDURE — 99999 PR PBB SHADOW E&M-EST. PATIENT-LVL IV: CPT | Mod: PBBFAC,,, | Performed by: NURSE PRACTITIONER

## 2020-09-11 PROCEDURE — 3074F PR MOST RECENT SYSTOLIC BLOOD PRESSURE < 130 MM HG: ICD-10-PCS | Mod: S$GLB,,, | Performed by: NURSE PRACTITIONER

## 2020-09-11 PROCEDURE — 1159F MED LIST DOCD IN RCRD: CPT | Mod: S$GLB,,, | Performed by: NURSE PRACTITIONER

## 2020-09-11 PROCEDURE — 3078F PR MOST RECENT DIASTOLIC BLOOD PRESSURE < 80 MM HG: ICD-10-PCS | Mod: S$GLB,,, | Performed by: NURSE PRACTITIONER

## 2020-09-11 PROCEDURE — 3008F BODY MASS INDEX DOCD: CPT | Mod: S$GLB,,, | Performed by: NURSE PRACTITIONER

## 2020-09-11 PROCEDURE — 1126F PR PAIN SEVERITY QUANTIFIED, NO PAIN PRESENT: ICD-10-PCS | Mod: S$GLB,,, | Performed by: NURSE PRACTITIONER

## 2020-09-11 PROCEDURE — 1101F PR PT FALLS ASSESS DOC 0-1 FALLS W/OUT INJ PAST YR: ICD-10-PCS | Mod: S$GLB,,, | Performed by: NURSE PRACTITIONER

## 2020-09-11 PROCEDURE — 99204 PR OFFICE/OUTPT VISIT, NEW, LEVL IV, 45-59 MIN: ICD-10-PCS | Mod: S$GLB,,, | Performed by: NURSE PRACTITIONER

## 2020-09-11 PROCEDURE — 3078F DIAST BP <80 MM HG: CPT | Mod: S$GLB,,, | Performed by: NURSE PRACTITIONER

## 2020-09-11 PROCEDURE — 99999 PR PBB SHADOW E&M-EST. PATIENT-LVL IV: ICD-10-PCS | Mod: PBBFAC,,, | Performed by: NURSE PRACTITIONER

## 2020-09-11 PROCEDURE — 1159F PR MEDICATION LIST DOCUMENTED IN MEDICAL RECORD: ICD-10-PCS | Mod: S$GLB,,, | Performed by: NURSE PRACTITIONER

## 2020-09-11 PROCEDURE — 1126F AMNT PAIN NOTED NONE PRSNT: CPT | Mod: S$GLB,,, | Performed by: NURSE PRACTITIONER

## 2020-09-11 NOTE — LETTER
September 11, 2020      Lewis Cantor III, MD  [address]  [place] [address]           [facility]  [address]  [place] [address]  Phone: [phone]  Fax: [phone]          Patient: Zeenat Mancia   MR Number: [id number]   YOB: 1950   Date of Visit: 9/11/2020       Dear Dr. Lewis Cantor III:    Thank you for referring Zeenat Mancia to me for evaluation. Attached you will find relevant portions of my assessment and plan of care.    If you have questions, please do not hesitate to call me. I look forward to following Zeenat Mancia along with you.    Sincerely,    Lore Greer, NP    Enclosure  CC:  No Recipients    If you would like to receive this communication electronically, please contact [email or web address] or [phone] to request more information on [employer] Link access.    For providers and/or their staff who would like to refer a patient to [facility], please contact us through our one-stop-shop provider referral line, [facility], at [phone].    If you feel you have received this communication in error or would no longer like to receive these types of communications, please e-mail [email or web address]

## 2020-09-11 NOTE — PROGRESS NOTES
Zeenat Mancia  was seen as a new patient, referred by Dr. Devaughn SALAZAR , for the management of obstructive sleep apnea.     CHIEF COMPLAINT: Snoring, excessive daytime sleepiness    HISTORY OF PRESENT ILLNESS:Zeenat Mancia a 70 y.o. male presents for the management of obstructive sleep apnea. Last seen by Dr. Busby [facility] 2013.  He was diagnosed with sleep apnea by study done 2011. After bipap titration study 2015 he has been using therapy better. Actually not suing bipap or autobipap at all but rather cpap 16cm. Mask leaks so has to cinch mask/disrupts his sleep. Been taking lunesta 2mg past several mos but still waking up.Tired/fatigue. has gained 50# this year/more sedentary. Using F20  mask Using qhs.   Interrogation- filter dirty dk blue on, ag 8.7h/nigth. 30/30d>4hr. 99% mask fit. AHI 3.1    REv'd his sleep studies and other doc sleep records with him today  AFib- on eliquis  BP stable    Denies symptoms of restless legs or kicking during sleep.       EPWORTH SLEEPINESS SCALE 9/11/2020   Sitting and reading 2   Watching TV 3   Sitting, inactive in a public place (e.g. a theatre or a meeting) 2   As a passenger in a car for an hour without a break 2   Lying down to rest in the afternoon when circumstances permit 3   Sitting and talking to someone 1   Sitting quietly after a lunch without alcohol 2   In a car, while stopped for a few minutes in traffic 0   Total score 15     Sleep Clinic New Patient 9/11/2020   What time do you go to bed on a week day? (Give a range) 7pm - 8pm   What time do you go to bed on a day off? (Give a range) 7pm- 8pm   How long does it take you to fall asleep? (Give a range) 1 hour   On average, how many times per night do you wake up? 3-4   How long does it take you to fall back into sleep? (Give a range) 1/2 hour - 1 hour   What time do you wake up to start your day on a week day? (Give a range) 4:30- 5:30   What time do you wake up to start your day on a day off? (Give a range) Same  "  What time do you get out of bed? (Give a range) Right away   On average, how many hours do you sleep? Not sure   On average, how many naps do you take per day? 1--recliner falls asleep   Rate your sleep quality from 0 to 5 (0-poor, 5-great). 1   Have you experienced:  Weight gain?   How much weight have you lost or gained (in lbs.) in the last year? Close to 50 lbs   On average, how many times per night do you go to the bathroom?  3-4   Have you ever had a sleep study/CPAP machine/surgery for sleep apnea? Yes   Have you ever had a CPAP machine for sleep apnea? Yes   Have you ever had surgery for sleep apnea? No     FAMILY HISTORY: No known sleep disorders.   SOCIAL HISTORY: , retired, selling home now in [place]    REVIEW OF SYSTEMS:  Sleep related symptoms as per [facility]  Sleep Clinic ROS  9/11/2020   Difficulty breathing through the nose?  Sometimes   Sore throat or dry mouth in the morning? Yes   Irregular or very fast heart beat?  Yes   Shortness of breath?  Sometimes   Acid reflux? Sometimes   Body aches and pains?  Yes   Morning headaches? No   Dizziness? Yes   Mood changes?  Yes   Do you exercise?  Sometimes   Do you feel like moving your legs a lot?  No     Otherwise, a balance of 10 systems reviewed is negative    PSG 8/6/11: +JACKELYN, AHI 58.9, low sat 88%, split, titrated to improvement at 11 cm in side position (incl REM), but through 16 cm while supine without effective pressure determined; wt 299 lbs  Titration 9/19/11: Titrated through 17 cm, mouth leak, required full mask, ongoing events at all pressures, but improvement at 17 cm.  Titration study 2015: 24/18cm effective/FFM (302#)      PHYSICAL EXAM:   /71 (BP Location: Left arm, Patient Position: Sitting, BP Method: Medium (Automatic))   Pulse 65   Ht 6' 2" (1.88 m)   Wt (!) 147.4 kg (325 lb)   BMI 41.73 kg/m²   GENERAL: morbid obese body habitus, well groomed HEENT: Conjunctivae are non-erythematous; Nose is symmetrical  SKIN: On " face and neck: No abrasions, no rashes, no lesions  RESPIRATORY: Normal chest expansion and non-labored breathing at rest.   NEURO/PSYCH: Oriented to time, place and person. Normal attention span and concentration. Affect is full. Mood is normal.       ASSESSMENT:     JACKELYN, severe. Remains adherent with cpap but disrupted sleep/has to cinch mask due to leaks using cpap 16cm.  AHI<5./   He has medical comorbidities of permanent atrial fibrillation, morbid obesity, hypertension. Warrants further investigation for untreated sleep apnea.     PLAN:   1.stop cpap and begin autobipap max ipap 16cm/min epap 11cm, ps 2-4cm. [facility] DME supplies  2. Discussed etiology of JACKELYN and potential ramifications of untreated JACKELYN, including stroke, heart disease, HTN.  3. The patient was advised to abstain from driving should he feel sleepy or drowsy.   4. Trial remfresh 1-2 caps, consider mask liner. Ok to continue lunesta 2mg but delay bedtime/help consolidate sleep  5. rtc 4 wks, notify me next week of ahi/90% tile using autobipap    Thank you for allowing me the opportunity to participate in the care of your patient

## 2020-09-21 DIAGNOSIS — I10 ESSENTIAL HYPERTENSION: ICD-10-CM

## 2020-09-21 RX ORDER — DOXAZOSIN 4 MG/1
4 TABLET ORAL NIGHTLY
Qty: 90 TABLET | Refills: 0 | Status: SHIPPED | OUTPATIENT
Start: 2020-09-21 | End: 2020-12-23

## 2020-09-21 NOTE — TELEPHONE ENCOUNTER
Encounter details (provider/department) have been updated by OR staff.   Of note, HF details may not display.     As of this time CDM: Does not populate or display     Updated: Provider    Of note, CDM will not display. PCP not live with [facility].    Will resend refill request encounter to  Centralized Refill Staff Pool.     [facility]     Note composed:11:02 AM 09/21/2020

## 2020-09-22 RX ORDER — VALSARTAN 320 MG/1
TABLET ORAL
Qty: 90 TABLET | Refills: 0 | Status: SHIPPED | OUTPATIENT
Start: 2020-09-22 | End: 2020-12-23 | Stop reason: SDUPTHER

## 2020-09-22 NOTE — PROGRESS NOTES
Refill Routing Note   Medication(s) are not appropriate for processing by [facility]:       - Non-participating provider           Medication reconciliation completed: No      Automatic Epic Generated Protocol Data:        Requested Prescriptions   Pending Prescriptions Disp Refills    valsartan (DIOVAN) 320 MG tablet [Pharmacy Med Name: VALSARTAN TABS 320MG] 90 tablet 3     Sig: TAKE 1 TABLET DAILY       ARB Protocol Passed - 9/21/2020 11:02 AM        Passed - Serum Creatinine less than 1.4 on file in the past 12 months     Lab Results   Component Value Date    CREATININE 1.2 06/05/2020    CREATININE 1.2 07/16/2019    CREATININE 1.0 01/17/2019     Lab Results   Component Value Date    EGFRNONAA >60.0 06/05/2020    EGFRNONAA >60.0 07/16/2019    EGFRNONAA >60.0 01/17/2019               Passed - Visit with authorizing provider in past 12 months or upcoming 90 days         Passed - Serum Potassium less than 5.2 on file in the past 12 months     Lab Results   Component Value Date    K 4.0 06/05/2020    K 4.4 07/16/2019    K 3.4 (L) 01/17/2019                  Passed - Blood Pressure below 139/89 on file in past 12 months      BP Readings from Last 3 Encounters:   09/11/20 108/71   09/08/20 122/68   08/18/20 125/79                   Appointments  past 12m or future 3m with PCP    Date Provider   Last Visit   9/8/2020 Lewis Cantor III, MD   Next Visit   9/21/2020 Lewis Cantor III, MD   ED visits in past 90 days: 0     Note composed:11:15 AM 09/22/2020

## 2020-10-10 ENCOUNTER — PATIENT MESSAGE (OUTPATIENT)
Dept: INTERNAL MEDICINE | Facility: CLINIC | Age: 70
End: 2020-10-10

## 2020-10-10 DIAGNOSIS — N40.0 BENIGN PROSTATIC HYPERPLASIA, UNSPECIFIED WHETHER LOWER URINARY TRACT SYMPTOMS PRESENT: Primary | ICD-10-CM

## 2020-10-12 ENCOUNTER — PATIENT MESSAGE (OUTPATIENT)
Dept: ADMINISTRATIVE | Facility: OTHER | Age: 70
End: 2020-10-12

## 2020-10-12 ENCOUNTER — PATIENT MESSAGE (OUTPATIENT)
Dept: INTERNAL MEDICINE | Facility: CLINIC | Age: 70
End: 2020-10-12

## 2020-10-15 NOTE — PROGRESS NOTES
"Digital Medicine: Health  Follow-Up    The history is provided by the patient.             Reason for review: Blood pressure not at goal        Topics Covered on Call: Diet    Additional Follow-up details: Following up about BP readings on downward trend.  Reports he has an appt coming up with Urologist since he wakes up in the middle of the night to use the restroom.            Diet-no change to diet    No change to diet.  Patient reports eating or drinking the following: Patient reports he is "overweight" and needs to "do something about it." States he knows what he needs to do. States he and his wife have been overeating due to COVID. Encouraged patient focus on small changes during this time.    Intervention(s): portion control      Physical Activity-Not assessed    Medication Adherence-Medication Adherence not addressed.      Substance, Sleep, Stress-Not assessed      Provided patient education.       Addressed patient questions and patient has my contact information if needed prior to next outreach. Patient verbalizes understanding.      Explained the importance of self-monitoring and medication adherence. Encouraged the patient to communicate with their health  for lifestyle modifications to help improve or maintain a healthy lifestyle.               There are no preventive care reminders to display for this patient.      Last 5 Patient Entered Readings                                      Current 30 Day Average: 133/83     Recent Readings 10/11/2020 10/4/2020 9/23/2020 9/23/2020 9/10/2020    SBP (mmHg) 122 136 142 146 120    DBP (mmHg) 83 79 85 85 80    Pulse 79 63 86 64 68               "

## 2020-10-26 ENCOUNTER — OFFICE VISIT (OUTPATIENT)
Dept: UROLOGY | Facility: CLINIC | Age: 70
End: 2020-10-26
Payer: MEDICARE

## 2020-10-26 VITALS
TEMPERATURE: 98 F | BODY MASS INDEX: 42.1 KG/M2 | SYSTOLIC BLOOD PRESSURE: 112 MMHG | WEIGHT: 315 LBS | HEART RATE: 77 BPM | DIASTOLIC BLOOD PRESSURE: 76 MMHG

## 2020-10-26 DIAGNOSIS — R35.1 NOCTURIA: ICD-10-CM

## 2020-10-26 DIAGNOSIS — R39.15 URINARY URGENCY: ICD-10-CM

## 2020-10-26 DIAGNOSIS — N40.0 BENIGN PROSTATIC HYPERPLASIA, UNSPECIFIED WHETHER LOWER URINARY TRACT SYMPTOMS PRESENT: Primary | ICD-10-CM

## 2020-10-26 LAB
BILIRUB SERPL-MCNC: NORMAL MG/DL
BLOOD URINE, POC: NORMAL
CLARITY, POC UA: NORMAL
COLOR, POC UA: YELLOW
GLUCOSE UR QL STRIP: NORMAL
KETONES UR QL STRIP: NORMAL
LEUKOCYTE ESTERASE URINE, POC: NORMAL
NITRITE, POC UA: NORMAL
PH, POC UA: 5
POC RESIDUAL URINE VOLUME: 0 ML (ref 0–100)
PROTEIN, POC: NORMAL
SPECIFIC GRAVITY, POC UA: 1.01
UROBILINOGEN, POC UA: NORMAL

## 2020-10-26 PROCEDURE — 81002 URINALYSIS NONAUTO W/O SCOPE: CPT | Mod: S$GLB,,, | Performed by: STUDENT IN AN ORGANIZED HEALTH CARE EDUCATION/TRAINING PROGRAM

## 2020-10-26 PROCEDURE — 99204 OFFICE O/P NEW MOD 45 MIN: CPT | Mod: 25,S$GLB,, | Performed by: STUDENT IN AN ORGANIZED HEALTH CARE EDUCATION/TRAINING PROGRAM

## 2020-10-26 PROCEDURE — 3078F PR MOST RECENT DIASTOLIC BLOOD PRESSURE < 80 MM HG: ICD-10-PCS | Mod: S$GLB,,, | Performed by: STUDENT IN AN ORGANIZED HEALTH CARE EDUCATION/TRAINING PROGRAM

## 2020-10-26 PROCEDURE — 1101F PR PT FALLS ASSESS DOC 0-1 FALLS W/OUT INJ PAST YR: ICD-10-PCS | Mod: S$GLB,,, | Performed by: STUDENT IN AN ORGANIZED HEALTH CARE EDUCATION/TRAINING PROGRAM

## 2020-10-26 PROCEDURE — 3074F PR MOST RECENT SYSTOLIC BLOOD PRESSURE < 130 MM HG: ICD-10-PCS | Mod: S$GLB,,, | Performed by: STUDENT IN AN ORGANIZED HEALTH CARE EDUCATION/TRAINING PROGRAM

## 2020-10-26 PROCEDURE — 3008F BODY MASS INDEX DOCD: CPT | Mod: S$GLB,,, | Performed by: STUDENT IN AN ORGANIZED HEALTH CARE EDUCATION/TRAINING PROGRAM

## 2020-10-26 PROCEDURE — 3008F PR BODY MASS INDEX (BMI) DOCUMENTED: ICD-10-PCS | Mod: S$GLB,,, | Performed by: STUDENT IN AN ORGANIZED HEALTH CARE EDUCATION/TRAINING PROGRAM

## 2020-10-26 PROCEDURE — 51798 US URINE CAPACITY MEASURE: CPT | Mod: S$GLB,,, | Performed by: STUDENT IN AN ORGANIZED HEALTH CARE EDUCATION/TRAINING PROGRAM

## 2020-10-26 PROCEDURE — 99999 PR PBB SHADOW E&M-EST. PATIENT-LVL V: CPT | Mod: PBBFAC,,, | Performed by: STUDENT IN AN ORGANIZED HEALTH CARE EDUCATION/TRAINING PROGRAM

## 2020-10-26 PROCEDURE — 1126F PR PAIN SEVERITY QUANTIFIED, NO PAIN PRESENT: ICD-10-PCS | Mod: S$GLB,,, | Performed by: STUDENT IN AN ORGANIZED HEALTH CARE EDUCATION/TRAINING PROGRAM

## 2020-10-26 PROCEDURE — 99999 PR PBB SHADOW E&M-EST. PATIENT-LVL V: ICD-10-PCS | Mod: PBBFAC,,, | Performed by: STUDENT IN AN ORGANIZED HEALTH CARE EDUCATION/TRAINING PROGRAM

## 2020-10-26 PROCEDURE — 3074F SYST BP LT 130 MM HG: CPT | Mod: S$GLB,,, | Performed by: STUDENT IN AN ORGANIZED HEALTH CARE EDUCATION/TRAINING PROGRAM

## 2020-10-26 PROCEDURE — 99204 PR OFFICE/OUTPT VISIT, NEW, LEVL IV, 45-59 MIN: ICD-10-PCS | Mod: 25,S$GLB,, | Performed by: STUDENT IN AN ORGANIZED HEALTH CARE EDUCATION/TRAINING PROGRAM

## 2020-10-26 PROCEDURE — 1159F MED LIST DOCD IN RCRD: CPT | Mod: S$GLB,,, | Performed by: STUDENT IN AN ORGANIZED HEALTH CARE EDUCATION/TRAINING PROGRAM

## 2020-10-26 PROCEDURE — 3078F DIAST BP <80 MM HG: CPT | Mod: S$GLB,,, | Performed by: STUDENT IN AN ORGANIZED HEALTH CARE EDUCATION/TRAINING PROGRAM

## 2020-10-26 PROCEDURE — 1126F AMNT PAIN NOTED NONE PRSNT: CPT | Mod: S$GLB,,, | Performed by: STUDENT IN AN ORGANIZED HEALTH CARE EDUCATION/TRAINING PROGRAM

## 2020-10-26 PROCEDURE — 1101F PT FALLS ASSESS-DOCD LE1/YR: CPT | Mod: S$GLB,,, | Performed by: STUDENT IN AN ORGANIZED HEALTH CARE EDUCATION/TRAINING PROGRAM

## 2020-10-26 PROCEDURE — 81002 POCT URINE DIPSTICK WITHOUT MICROSCOPE: ICD-10-PCS | Mod: S$GLB,,, | Performed by: STUDENT IN AN ORGANIZED HEALTH CARE EDUCATION/TRAINING PROGRAM

## 2020-10-26 PROCEDURE — 1159F PR MEDICATION LIST DOCUMENTED IN MEDICAL RECORD: ICD-10-PCS | Mod: S$GLB,,, | Performed by: STUDENT IN AN ORGANIZED HEALTH CARE EDUCATION/TRAINING PROGRAM

## 2020-10-26 PROCEDURE — 51798 POCT BLADDER SCAN: ICD-10-PCS | Mod: S$GLB,,, | Performed by: STUDENT IN AN ORGANIZED HEALTH CARE EDUCATION/TRAINING PROGRAM

## 2020-10-26 RX ORDER — OXYBUTYNIN CHLORIDE 5 MG/1
5 TABLET ORAL NIGHTLY
Qty: 30 TABLET | Refills: 2 | Status: SHIPPED | OUTPATIENT
Start: 2020-10-26 | End: 2020-11-16

## 2020-10-26 NOTE — LETTER
October 26, 2020      Lewis Cantor III, MD  [address]  [place] [address]           [facility]  [address]  [place] [address]  Phone: [phone]          Patient: Zeenat Mancia   MR Number: [id number]   YOB: 1950   Date of Visit: 10/26/2020       Dear Dr. Lewis Cantor III:    Thank you for referring Zeenat Mancia to me for evaluation. Attached you will find relevant portions of my assessment and plan of care.    If you have questions, please do not hesitate to call me. I look forward to following Zeenat Mancia along with you.    Sincerely,    Laura Aleman MD    Enclosure  CC:  No Recipients    If you would like to receive this communication electronically, please contact [email or web address] or [phone] to request more information on [employer] Link access.    For providers and/or their staff who would like to refer a patient to [facility], please contact us through our one-stop-shop provider referral line, [facility] , at [phone].    If you feel you have received this communication in error or would no longer like to receive these types of communications, please e-mail [email or web address]

## 2020-10-26 NOTE — PROGRESS NOTES
Subjective:       Patient ID: Zeenat Mancia is a 70 y.o. male.    Chief Complaint: Benign Prostatic Hypertrophy  This is a 70 y.o.  male patient that is new to me.  The patient is referred to me by PCP Dr. Cantor for BPH/nocturia. He takes doxazosin and finasteride. He also takes known diuretic medications - atenolol and HCTZ. He has been taking finasteride for years. He notes doxazosin he was taking for a few years also. Dr. Cantor increased doxazosin and no changes. He notes the stream is weaker than before.     DTF - every 3 hours goes to urinate.   NTF 3-4x/night; wakes up with urge to urinate.   Hydration- unsweet ice tea and water. Dinner is around 5pm, bedtime is around 7-8pm. With dinner, he usually has some unsweet tea - instant diet tea.   Bowel movements- usually regular with a daily BM. Takes a stool softener and fiber.   He uses a CPAP at night.   Leg swelling - does have leg swelling.     Urinalysis 6/5/20 benign.   Uroflow - 21cc  PVR - 0cc     LAST PSA  Lab Results   Component Value Date    PSA 0.44 08/21/2017    PSA 1.1 06/03/2014    PSA 1.01 06/11/2013    PSA 1.35 09/07/2012       Lab Results   Component Value Date    CREATININE 1.2 06/05/2020      ---  Past Medical History:   Diagnosis Date    A-fib     Arthritis     Colon polyp     Diverticulosis     Dyslipidemia     GERD (gastroesophageal reflux disease)     on Protonix    Hepatic steatosis     Hyperlipidemia     Hypertension     Irregular heart beat     Multiple gastric ulcers 05/16/2018    gi scope per Dr. Houston    Obesity     Sleep apnea     uses CPAP    Unspecified disorder of kidney and ureter     kidney stones       Past Surgical History:   Procedure Laterality Date    APPENDECTOMY      CATARACT EXTRACTION  2009    OS    CATARACT EXTRACTION W/  INTRAOCULAR LENS IMPLANT Right 6/3/2015    [id number] 14.5d//    COLONOSCOPY N/A 10/10/2017    Procedure: COLONOSCOPY;  Surgeon: Jameson Houston MD;  Location[facility];  Service:  Endoscopy;  Laterality: N/A; repeat in 5 years for surveillance    COLONOSCOPY N/A 5/16/2018    Procedure: COLONOSCOPY;  Surgeon: Jameson Houston MD;  Location: [facility] ENDO;  Service: Endoscopy;  Laterality: N/A;    ESOPHAGOGASTRODUODENOSCOPY N/A 7/2/2018    Procedure: EGD (ESOPHAGOGASTRODUODENOSCOPY);  Surgeon: Jameson Houston MD;  Location: [facility] ENDO;  Service: Endoscopy;  Laterality: N/A;    EYE SURGERY      cataract    TONSILLECTOMY      UPPER GASTROINTESTINAL ENDOSCOPY  05/16/2018    Dr. Houston       Family History   Problem Relation Age of Onset    Arthritis Mother     Heart disease Mother     Blindness Mother         OS due to injury    Coronary artery disease Father     Heart disease Father     Cataracts Father     Amblyopia Neg Hx     Cancer Neg Hx     Diabetes Neg Hx     Glaucoma Neg Hx     Hypertension Neg Hx     Macular degeneration Neg Hx     Retinal detachment Neg Hx     Strabismus Neg Hx     Stroke Neg Hx     Thyroid disease Neg Hx     Colon cancer Neg Hx     Colon polyps Neg Hx     Crohn's disease Neg Hx     Ulcerative colitis Neg Hx     Esophageal cancer Neg Hx     Stomach cancer Neg Hx        Social History     Tobacco Use    Smoking status: Former Smoker     Start date: 10/5/1997    Smokeless tobacco: Never Used    Tobacco comment: quit in 1980s    Substance Use Topics    Alcohol use: Yes     Frequency: Monthly or less     Drinks per session: 1 or 2     Binge frequency: Never     Comment: 1 beer every 2 weeks    Drug use: No       Current Outpatient Medications on File Prior to Visit   Medication Sig Dispense Refill    amLODIPine (NORVASC) 5 MG tablet Take 1 tablet (5 mg total) by mouth once daily. 90 tablet 1    apixaban (ELIQUIS) 5 mg Tab Take 1 tablet (5 mg total) by mouth 2 (two) times daily. 180 tablet 3    atenoloL (TENORMIN) 100 MG tablet Take 1 tablet (100 mg total) by mouth once daily. 90 tablet 1    doxazosin (CARDURA) 4 MG tablet Take 1 tablet  (4 mg total) by mouth every evening. 90 tablet 0    eszopiclone (LUNESTA) 2 MG Tab eszopiclone 2 mg tablet   Take 1 tablet every day by oral route.      finasteride (PROSCAR) 5 mg tablet Take 1 tablet (5 mg total) by mouth once daily. 90 tablet 1    hydroCHLOROthiazide (HYDRODIURIL) 25 MG tablet TAKE 1 TABLET DAILY 90 tablet 1    MEN'S MULTI-VITAMIN ORAL Take by mouth.      multivitamin-minerals-lutein (CENTRUM SILVER) Tab Take by mouth. 1 Tablet Oral Every day      pantoprazole (PROTONIX) 40 MG tablet Take 1 tablet (40 mg total) by mouth once daily. 90 tablet 3    valsartan (DIOVAN) 320 MG tablet TAKE 1 TABLET DAILY 90 tablet 0    escitalopram oxalate (LEXAPRO) 10 MG tablet Take 10 mg by mouth once daily.       No current facility-administered medications on file prior to visit.        Review of patient's allergies indicates:  No Known Allergies    Review of Systems   Constitutional: Negative for chills.   HENT: Negative for congestion.    Eyes: Negative for visual disturbance.   Respiratory: Negative for shortness of breath.    Cardiovascular: Negative for chest pain.   Gastrointestinal: Negative for abdominal distention.   Musculoskeletal: Negative for gait problem.   Skin: Negative for color change.   Neurological: Negative for dizziness.   Psychiatric/Behavioral: Negative for agitation.       Objective:      Physical Exam  Constitutional:       Appearance: He is well-developed.   HENT:      Head: Normocephalic.   Eyes:      Pupils: Pupils are equal, round, and reactive to light.   Neck:      Musculoskeletal: Normal range of motion.   Pulmonary:      Effort: Pulmonary effort is normal.   Abdominal:      Palpations: Abdomen is soft.   Musculoskeletal: Normal range of motion.   Skin:     General: Skin is warm and dry.   Neurological:      Mental Status: He is alert.         Assessment:       1. Benign prostatic hyperplasia, unspecified whether lower urinary tract symptoms present    2. Nocturia    3.  Urinary urgency        Plan:           Plan:   1. Recommend decrease hydration 3 hours before bedtime (as much as possible).   2. Avoid bladder irritants including but not limited to caffeine, alcohol, smoking, spicy foods, acidic foods, tomato-based products, citrus, artificial sweeteners, chocolate, coffee or tea. This can lead to more urinary urgency/frequency.   3. Counseled pt that he does take 2 diuretic BP mediations with known SE of urinary urgency/frequency - hydrochlorothiazide and atenolol.   4. Elevate legs throughout the day.   5. Will have pt switch doxazosin to AMs for 2 weeks before starting oxybutynin. If this helps then he does not need to start oxybutynin.  6. Oxybutynin 5mg nightly.   Of note right at the end of the visit, pt was asking me the same questions a few times in a row despite me just talking out loud while I was typing his AVS. Unclear if it was just confusion or short term memory loss. Will continue to monitor for this. Will CC Dr. Cantor.         Benign prostatic hyperplasia, unspecified whether lower urinary tract symptoms present  -     Ambulatory referral/consult to Urology  -     POCT Bladder Scan  -     POCT URINE DIPSTICK WITHOUT MICROSCOPE  -     Uroflowmetry; Future  -     oxybutynin (DITROPAN) 5 MG Tab; Take 1 tablet (5 mg total) by mouth every evening.  Dispense: 30 tablet; Refill: 2    Nocturia  -     POCT Bladder Scan  -     POCT URINE DIPSTICK WITHOUT MICROSCOPE  -     Uroflowmetry; Future  -     oxybutynin (DITROPAN) 5 MG Tab; Take 1 tablet (5 mg total) by mouth every evening.  Dispense: 30 tablet; Refill: 2    Urinary urgency  -     oxybutynin (DITROPAN) 5 MG Tab; Take 1 tablet (5 mg total) by mouth every evening.  Dispense: 30 tablet; Refill: 2

## 2020-10-26 NOTE — PATIENT INSTRUCTIONS
1. Recommend decrease hydration 3 hours before bedtime (as much as possible).   2. Avoid bladder irritants including but not limited to caffeine, alcohol, smoking, spicy foods, acidic foods, tomato-based products, citrus, artificial sweeteners, chocolate, coffee or tea. This can lead to more urinary urgency/frequency.   3. Counseled pt that he does take 2 diuretic BP mediations with known SE of urinary urgency/frequency - hydrochlorothiazide and atenolol.   4. Elevate legs throughout the day.   5. Can switch doxazosin to mornings try this for about 2 weeks before starting oxybutynin.   6. Oxybutynin is an overactive bladder medication, take nightly. Try to take for about a few weeks in a row.

## 2020-11-06 RX ORDER — AMLODIPINE BESYLATE 5 MG/1
TABLET ORAL
Qty: 90 TABLET | Refills: 3 | Status: SHIPPED | OUTPATIENT
Start: 2020-11-06 | End: 2021-03-22 | Stop reason: SDUPTHER

## 2020-11-06 NOTE — TELEPHONE ENCOUNTER
No new care gaps identified.  Powered by [employer]. Reference number: [id number]. 11/06/2020 5:42:26 PM   CST

## 2020-11-15 ENCOUNTER — PATIENT MESSAGE (OUTPATIENT)
Dept: UROLOGY | Facility: CLINIC | Age: 70
End: 2020-11-15

## 2020-11-16 ENCOUNTER — PATIENT MESSAGE (OUTPATIENT)
Dept: UROLOGY | Facility: CLINIC | Age: 70
End: 2020-11-16

## 2020-11-16 DIAGNOSIS — R35.0 URINARY FREQUENCY: ICD-10-CM

## 2020-11-16 DIAGNOSIS — R39.15 URINARY URGENCY: ICD-10-CM

## 2020-11-16 DIAGNOSIS — R35.1 NOCTURIA: ICD-10-CM

## 2020-11-16 DIAGNOSIS — R35.1 NOCTURIA: Primary | ICD-10-CM

## 2020-11-16 RX ORDER — OXYBUTYNIN CHLORIDE 10 MG/1
10 TABLET, EXTENDED RELEASE ORAL DAILY
Qty: 30 TABLET | Refills: 3 | Status: SHIPPED | OUTPATIENT
Start: 2020-11-16 | End: 2020-12-07

## 2020-11-16 RX ORDER — OXYBUTYNIN CHLORIDE 10 MG/1
10 TABLET, EXTENDED RELEASE ORAL DAILY
Qty: 30 TABLET | Refills: 3 | Status: SHIPPED | OUTPATIENT
Start: 2020-11-16 | End: 2020-11-16

## 2020-11-20 ENCOUNTER — PES CALL (OUTPATIENT)
Dept: ADMINISTRATIVE | Facility: CLINIC | Age: 70
End: 2020-11-20

## 2020-11-24 ENCOUNTER — PATIENT OUTREACH (OUTPATIENT)
Dept: OTHER | Facility: OTHER | Age: 70
End: 2020-11-24

## 2020-11-24 NOTE — PROGRESS NOTES
Digital Medicine: Clinician Follow-Up    Called patient for digital medicine follow up. He is doing well. Blood pressure has been stable and very close to goal based on home readings. In office readings are slightly lower and at goal. Patient is currently on vacation and will be out of town for the next 2 weeks.     The history is provided by the patient.   Follow-up reason(s): routine follow up.     Hypertension    Patient's blood pressure is stable.   Patient is not experiencing signs/symptoms of hypotension.  Patient is not experiencing signs/symptoms of hypertension.            Last 5 Patient Entered Readings                                      Current 30 Day Average: 132/82     Recent Readings 11/14/2020 10/31/2020 10/20/2020 10/20/2020 10/20/2020    SBP (mmHg) 130 134 126 134 124    DBP (mmHg) 88 75 79 86 73    Pulse 74 69 76 76 64                 Depression Screening  Did not address depression screening.    Sleep Apnea Screening    Did not address sleep apnea screening.     Medication Affordability Screening  Did not address medication affordability screening.     Medication Adherence-Medication adherence was assessed.          ASSESSMENT(S)  Patients BP average is 132/82 mmHg, which is above goal. Patient's BP goal is less than or equal to 130/80.     Overall average is very close to goal.       Hypertension Plan  Continue current therapy.  Patient placed on Hiatus until 12/8/2020.     Addressed patient questions and patient has my contact information if needed prior to next outreach. Patient verbalizes understanding.             There are no preventive care reminders to display for this patient.  There are no preventive care reminders to display for this patient.      Hypertension Medications             amLODIPine (NORVASC) 5 MG tablet TAKE 1 TABLET DAILY    atenoloL (TENORMIN) 100 MG tablet Take 1 tablet (100 mg total) by mouth once daily.    doxazosin (CARDURA) 4 MG tablet Take 1 tablet (4 mg total)  by mouth every evening.    hydroCHLOROthiazide (HYDRODIURIL) 25 MG tablet TAKE 1 TABLET DAILY    valsartan (DIOVAN) 320 MG tablet TAKE 1 TABLET DAILY

## 2020-11-25 ENCOUNTER — PATIENT MESSAGE (OUTPATIENT)
Dept: INTERNAL MEDICINE | Facility: CLINIC | Age: 70
End: 2020-11-25

## 2020-11-25 DIAGNOSIS — K80.20 CALCULUS OF GALLBLADDER WITHOUT CHOLECYSTITIS WITHOUT OBSTRUCTION: Primary | ICD-10-CM

## 2020-11-27 ENCOUNTER — TELEPHONE (OUTPATIENT)
Dept: ADMINISTRATIVE | Facility: OTHER | Age: 70
End: 2020-11-27

## 2020-12-01 ENCOUNTER — OFFICE VISIT (OUTPATIENT)
Dept: SURGERY | Facility: CLINIC | Age: 70
End: 2020-12-01
Payer: MEDICARE

## 2020-12-01 VITALS
SYSTOLIC BLOOD PRESSURE: 137 MMHG | WEIGHT: 315 LBS | BODY MASS INDEX: 40.43 KG/M2 | HEART RATE: 78 BPM | HEIGHT: 74 IN | DIASTOLIC BLOOD PRESSURE: 84 MMHG

## 2020-12-01 DIAGNOSIS — K80.20 CALCULUS OF GALLBLADDER WITHOUT CHOLECYSTITIS WITHOUT OBSTRUCTION: ICD-10-CM

## 2020-12-01 DIAGNOSIS — Z76.89 ENCOUNTER TO ESTABLISH CARE: Primary | ICD-10-CM

## 2020-12-01 PROCEDURE — 1159F PR MEDICATION LIST DOCUMENTED IN MEDICAL RECORD: ICD-10-PCS | Mod: S$GLB,,, | Performed by: STUDENT IN AN ORGANIZED HEALTH CARE EDUCATION/TRAINING PROGRAM

## 2020-12-01 PROCEDURE — 3075F SYST BP GE 130 - 139MM HG: CPT | Mod: S$GLB,,, | Performed by: STUDENT IN AN ORGANIZED HEALTH CARE EDUCATION/TRAINING PROGRAM

## 2020-12-01 PROCEDURE — 99213 OFFICE O/P EST LOW 20 MIN: CPT | Mod: S$GLB,,, | Performed by: STUDENT IN AN ORGANIZED HEALTH CARE EDUCATION/TRAINING PROGRAM

## 2020-12-01 PROCEDURE — 1101F PT FALLS ASSESS-DOCD LE1/YR: CPT | Mod: S$GLB,,, | Performed by: STUDENT IN AN ORGANIZED HEALTH CARE EDUCATION/TRAINING PROGRAM

## 2020-12-01 PROCEDURE — 3288F PR FALLS RISK ASSESSMENT DOCUMENTED: ICD-10-PCS | Mod: S$GLB,,, | Performed by: STUDENT IN AN ORGANIZED HEALTH CARE EDUCATION/TRAINING PROGRAM

## 2020-12-01 PROCEDURE — 3008F BODY MASS INDEX DOCD: CPT | Mod: S$GLB,,, | Performed by: STUDENT IN AN ORGANIZED HEALTH CARE EDUCATION/TRAINING PROGRAM

## 2020-12-01 PROCEDURE — 3075F PR MOST RECENT SYSTOLIC BLOOD PRESS GE 130-139MM HG: ICD-10-PCS | Mod: S$GLB,,, | Performed by: STUDENT IN AN ORGANIZED HEALTH CARE EDUCATION/TRAINING PROGRAM

## 2020-12-01 PROCEDURE — 1126F AMNT PAIN NOTED NONE PRSNT: CPT | Mod: S$GLB,,, | Performed by: STUDENT IN AN ORGANIZED HEALTH CARE EDUCATION/TRAINING PROGRAM

## 2020-12-01 PROCEDURE — 3288F FALL RISK ASSESSMENT DOCD: CPT | Mod: S$GLB,,, | Performed by: STUDENT IN AN ORGANIZED HEALTH CARE EDUCATION/TRAINING PROGRAM

## 2020-12-01 PROCEDURE — 1126F PR PAIN SEVERITY QUANTIFIED, NO PAIN PRESENT: ICD-10-PCS | Mod: S$GLB,,, | Performed by: STUDENT IN AN ORGANIZED HEALTH CARE EDUCATION/TRAINING PROGRAM

## 2020-12-01 PROCEDURE — 99213 PR OFFICE/OUTPT VISIT, EST, LEVL III, 20-29 MIN: ICD-10-PCS | Mod: S$GLB,,, | Performed by: STUDENT IN AN ORGANIZED HEALTH CARE EDUCATION/TRAINING PROGRAM

## 2020-12-01 PROCEDURE — 1101F PR PT FALLS ASSESS DOC 0-1 FALLS W/OUT INJ PAST YR: ICD-10-PCS | Mod: S$GLB,,, | Performed by: STUDENT IN AN ORGANIZED HEALTH CARE EDUCATION/TRAINING PROGRAM

## 2020-12-01 PROCEDURE — 3079F DIAST BP 80-89 MM HG: CPT | Mod: S$GLB,,, | Performed by: STUDENT IN AN ORGANIZED HEALTH CARE EDUCATION/TRAINING PROGRAM

## 2020-12-01 PROCEDURE — 1159F MED LIST DOCD IN RCRD: CPT | Mod: S$GLB,,, | Performed by: STUDENT IN AN ORGANIZED HEALTH CARE EDUCATION/TRAINING PROGRAM

## 2020-12-01 PROCEDURE — 99999 PR PBB SHADOW E&M-EST. PATIENT-LVL V: CPT | Mod: PBBFAC,,, | Performed by: STUDENT IN AN ORGANIZED HEALTH CARE EDUCATION/TRAINING PROGRAM

## 2020-12-01 PROCEDURE — 99999 PR PBB SHADOW E&M-EST. PATIENT-LVL V: ICD-10-PCS | Mod: PBBFAC,,, | Performed by: STUDENT IN AN ORGANIZED HEALTH CARE EDUCATION/TRAINING PROGRAM

## 2020-12-01 PROCEDURE — 3008F PR BODY MASS INDEX (BMI) DOCUMENTED: ICD-10-PCS | Mod: S$GLB,,, | Performed by: STUDENT IN AN ORGANIZED HEALTH CARE EDUCATION/TRAINING PROGRAM

## 2020-12-01 PROCEDURE — 3079F PR MOST RECENT DIASTOLIC BLOOD PRESSURE 80-89 MM HG: ICD-10-PCS | Mod: S$GLB,,, | Performed by: STUDENT IN AN ORGANIZED HEALTH CARE EDUCATION/TRAINING PROGRAM

## 2020-12-01 NOTE — LETTER
December 4, 2020      Lewis Cantor III, MD  [address] [facility]  [place] [address]           [facility]  [address]  [facility] [address]  Phone: [phone]          Patient: Zeenat Mancia   MR Number: [id number]   YOB: 1950   Date of Visit: 12/1/2020       Dear Dr. Lewis Cantor III:    Thank you for referring Zeenat Mancia to me for evaluation. Attached you will find relevant portions of my assessment and plan of care.    If you have questions, please do not hesitate to call me. I look forward to following eZenat Mancia along with you.    Sincerely,    Pal Cheney MD    Enclosure  CC:  No Recipients    If you would like to receive this communication electronically, please contact [email or web address] or [phone] to request more information on [employer] Link access.    For providers and/or their staff who would like to refer a patient to [facility], please contact us through our one-stop-shop provider referral line, [facility], at [phone].    If you feel you have received this communication in error or would no longer like to receive these types of communications, please e-mail [email or web address]

## 2020-12-02 NOTE — PROGRESS NOTES
Cardiology Clinic note    Subjective:   Patient ID:  Zeenat Mancia is a 70 y.o. male who has been referred by Dr Cheney for Afib    HPI:   Pre-Op Risk Assessment:  Procedure planned: GB removal  RCRI 1 (surgery)  METs <4    Afib: No palpitations, irregular heart beats, syncope or near syncope    HTN: On medications    Obesity: Working on weight loss    SH Tobacco Quit 1990  FH Father had CABG early 60s    Patient Active Problem List    Diagnosis Date Noted    CLIFFORD (iron deficiency anemia) 07/02/2018    Iron deficiency anemia due to chronic blood loss 06/08/2018    Rectal bleed     Acute blood loss anemia     Chronic atrial fibrillation 10/11/2017    Colon cancer screening 10/10/2017    Tortuous aorta 07/26/2017     CXR 5/18/17      Left-sided carotid artery disease 07/26/2017     Cspine 12/29/14      Epiretinal membrane, both eyes 03/06/2017    Posterior vitreous detachment, both eyes 03/06/2017    Essential hypertension 02/16/2016    Morbid obesity due to excess calories 02/16/2016    Benign non-nodular prostatic hyperplasia with lower urinary tract symptoms 02/16/2016    Pseudophakia of both eyes 02/16/2016    Refractive error 02/16/2016    JACKELYN (obstructive sleep apnea) 01/17/2013    Erectile dysfunction 09/12/2012    Hyperlipidemia        Patient's Medications   New Prescriptions    ATORVASTATIN (LIPITOR) 20 MG TABLET    Take 1 tablet (20 mg total) by mouth once daily.    OPW TEST CLAIM - DO NOT FILL    OPW test claim. Do not fill.   Previous Medications    AMLODIPINE (NORVASC) 5 MG TABLET    TAKE 1 TABLET DAILY    APIXABAN (ELIQUIS) 5 MG TAB    Take 1 tablet (5 mg total) by mouth 2 (two) times daily.    ATENOLOL (TENORMIN) 100 MG TABLET    Take 1 tablet (100 mg total) by mouth once daily.    DOXAZOSIN (CARDURA) 4 MG TABLET    Take 1 tablet (4 mg total) by mouth every evening.    ESCITALOPRAM OXALATE (LEXAPRO) 10 MG TABLET    Take 10 mg by mouth once daily.    ESZOPICLONE (LUNESTA) 2 MG TAB     eszopiclone 2 mg tablet   Take 1 tablet every day by oral route.    FINASTERIDE (PROSCAR) 5 MG TABLET    Take 1 tablet (5 mg total) by mouth once daily.    HYDROCHLOROTHIAZIDE (HYDRODIURIL) 25 MG TABLET    TAKE 1 TABLET DAILY    MEN'S MULTI-VITAMIN ORAL    Take by mouth.    MULTIVITAMIN-MINERALS-LUTEIN (CENTRUM SILVER) TAB    Take by mouth. 1 Tablet Oral Every day    OXYBUTYNIN (DITROPAN-XL) 10 MG 24 HR TABLET    Take 1 tablet (10 mg total) by mouth once daily.    PANTOPRAZOLE (PROTONIX) 40 MG TABLET    Take 1 tablet (40 mg total) by mouth once daily.    VALSARTAN (DIOVAN) 320 MG TABLET    TAKE 1 TABLET DAILY   Modified Medications    No medications on file   Discontinued Medications    No medications on file        Review of Systems   Constitution: Negative for fever.   HENT: Negative for nosebleeds.    Cardiovascular: Negative for chest pain, dyspnea on exertion, irregular heartbeat, near-syncope, orthopnea, palpitations and syncope. PND: Wears BiPAP for JACKELYN.        As above   Respiratory: Negative for hemoptysis.    Hematologic/Lymphatic: Negative for bleeding problem.   Musculoskeletal: Negative for arthritis.   Gastrointestinal: Negative for hematochezia (Has internal hemorrhoids).   Genitourinary: Negative for hematuria.   Neurological: Negative for seizures.   Allergic/Immunologic: Negative for environmental allergies.         Objective:   Vitals  Vitals:    12/03/20 0955   BP: 119/79   Pulse: 75   SpO2: 97%   Weight: (!) 152 kg (335 lb 1.6 oz)          Physical Exam   Constitutional: He appears well-developed. No distress.   Eyes: Conjunctivae are normal.   Neck: Normal range of motion. JVD: Unable to appreciate d/t habitus.   Cardiovascular: Normal rate. An irregularly irregular rhythm present. Exam reveals no gallop and no friction rub.   No murmur heard.  Pulmonary/Chest: Effort normal and breath sounds normal. No respiratory distress.   Abdominal: Soft. Bowel sounds are normal. There is no abdominal  "tenderness.   Musculoskeletal:         General: Edema: Mild LE edema b/l.   Neurological: He is alert.   Skin: Skin is warm. He is not diaphoretic.           Assessment:     1. Chronic atrial fibrillation    2. Essential hypertension    3. Morbid obesity    4. JACKELYN (obstructive sleep apnea)    5. Aortic atherosclerosis    6. Encounter to establish care    7. A-fib    8. Encounter for pre-operative cardiovascular clearance        Plan:   Zeenat Mancia is a 70 y.o. male h/o Afib, HTN, obesity, JACKELYN    - EKG personally reviewed. My interpretation:  12/3/20: Afib, LAD. Possible inferior and anterior infarct  - Echo 2019: LVEF 65%, normal diastology. Normal RVSF. Mild MR, TR. PASP 32, CVP 3. Mild LAE  - Reg Nuc Stress 2017: Negative for ischemia (EKG, Nuc)    Pre-Op Risk Assessment  Procedure planned: GB removal (potential)  RCRI 1 (surgery)  METs <4  PET stress    Atrial Fibrillation  - Never had CV  - CHADS2-VASc 2 (HTN, Age x1)  - HASBLED 1 (Age)  - Apixaban 5 mg bid (Age < 80 y, Wt > 60 kg, Cr <1.5)  Estimated body mass index is 43.02 kg/m² as calculated from the following:    Height as of 12/1/20: 6' 2" (1.88 m).    Weight as of this encounter: 152 kg (335 lb 1.6 oz).  70 y.o.  Lab Results   Component Value Date    CREATININE 1.2 06/05/2020    AST 23 06/05/2020    BILITOT 0.7 06/05/2020   - BB  - Ran charge for apixaban at [facility] on patient request $108.94  - Discussed CV, EP referral- deferred for now    HTN  BP well controlled  Amlodipine, atenolol, doxazosin, HCTZ, valsartan    Aortic atherosclerosis  Noted on CT Abd 2018  Recommend statin    HLD  Lab Results   Component Value Date    LDLCALC 79.8 06/05/2020   Statin as above    Obesity  Estimated body mass index is 43.02 kg/m² as calculated from the following:    Height as of 12/1/20: 6' 2" (1.88 m).    Weight as of this encounter: 152 kg (335 lb 1.6 oz).  Discussed diet and exercise    JACKELYN  On BiPAP    Continue with current medical plan and " lifestyle changes.    Orders Placed This Encounter   Procedures    Cardiac PET Scan Stress     Standing Status:   Future     Standing Expiration Date:   12/3/2021     Order Specific Question:   Which medicaton for the stress procedure?     Answer:   Dipyridamole    IN OFFICE EKG 12-LEAD (to Muse)     Order Specific Question:   Diagnosis     Answer:   A-fib [[id number]]       Follow up as scheduled  Return sooner for concerns or questions. If symptoms persist go to the ED    He expressed verbal understanding and agreed with the plan      Judy Jerome MD  Interventional Cardiology  [facility]  Phone: [phone]

## 2020-12-03 ENCOUNTER — OFFICE VISIT (OUTPATIENT)
Dept: CARDIOLOGY | Facility: CLINIC | Age: 70
End: 2020-12-03
Payer: MEDICARE

## 2020-12-03 ENCOUNTER — PATIENT MESSAGE (OUTPATIENT)
Dept: SURGERY | Facility: CLINIC | Age: 70
End: 2020-12-03

## 2020-12-03 VITALS
WEIGHT: 315 LBS | BODY MASS INDEX: 43.02 KG/M2 | SYSTOLIC BLOOD PRESSURE: 119 MMHG | DIASTOLIC BLOOD PRESSURE: 79 MMHG | HEART RATE: 75 BPM | OXYGEN SATURATION: 97 %

## 2020-12-03 DIAGNOSIS — G47.33 OSA (OBSTRUCTIVE SLEEP APNEA): ICD-10-CM

## 2020-12-03 DIAGNOSIS — I10 ESSENTIAL HYPERTENSION: ICD-10-CM

## 2020-12-03 DIAGNOSIS — Z76.89 ENCOUNTER TO ESTABLISH CARE: ICD-10-CM

## 2020-12-03 DIAGNOSIS — I70.0 AORTIC ATHEROSCLEROSIS: ICD-10-CM

## 2020-12-03 DIAGNOSIS — I48.91 A-FIB: ICD-10-CM

## 2020-12-03 DIAGNOSIS — Z01.810 ENCOUNTER FOR PRE-OPERATIVE CARDIOVASCULAR CLEARANCE: ICD-10-CM

## 2020-12-03 DIAGNOSIS — I48.20 CHRONIC ATRIAL FIBRILLATION: Primary | ICD-10-CM

## 2020-12-03 DIAGNOSIS — E66.01 MORBID OBESITY: ICD-10-CM

## 2020-12-03 PROCEDURE — 1159F MED LIST DOCD IN RCRD: CPT | Mod: S$GLB,,, | Performed by: INTERNAL MEDICINE

## 2020-12-03 PROCEDURE — 3008F PR BODY MASS INDEX (BMI) DOCUMENTED: ICD-10-PCS | Mod: S$GLB,,, | Performed by: INTERNAL MEDICINE

## 2020-12-03 PROCEDURE — 99999 PR PBB SHADOW E&M-EST. PATIENT-LVL IV: ICD-10-PCS | Mod: PBBFAC,,, | Performed by: INTERNAL MEDICINE

## 2020-12-03 PROCEDURE — 3078F DIAST BP <80 MM HG: CPT | Mod: S$GLB,,, | Performed by: INTERNAL MEDICINE

## 2020-12-03 PROCEDURE — 1159F PR MEDICATION LIST DOCUMENTED IN MEDICAL RECORD: ICD-10-PCS | Mod: S$GLB,,, | Performed by: INTERNAL MEDICINE

## 2020-12-03 PROCEDURE — 93010 EKG 12-LEAD: ICD-10-PCS | Mod: S$GLB,,, | Performed by: INTERNAL MEDICINE

## 2020-12-03 PROCEDURE — 99205 PR OFFICE/OUTPT VISIT, NEW, LEVL V, 60-74 MIN: ICD-10-PCS | Mod: S$GLB,,, | Performed by: INTERNAL MEDICINE

## 2020-12-03 PROCEDURE — 93010 ELECTROCARDIOGRAM REPORT: CPT | Mod: S$GLB,,, | Performed by: INTERNAL MEDICINE

## 2020-12-03 PROCEDURE — 99999 PR PBB SHADOW E&M-EST. PATIENT-LVL IV: CPT | Mod: PBBFAC,,, | Performed by: INTERNAL MEDICINE

## 2020-12-03 PROCEDURE — 93005 EKG 12-LEAD: ICD-10-PCS | Mod: S$GLB,,, | Performed by: INTERNAL MEDICINE

## 2020-12-03 PROCEDURE — 99205 OFFICE O/P NEW HI 60 MIN: CPT | Mod: S$GLB,,, | Performed by: INTERNAL MEDICINE

## 2020-12-03 PROCEDURE — 3008F BODY MASS INDEX DOCD: CPT | Mod: S$GLB,,, | Performed by: INTERNAL MEDICINE

## 2020-12-03 PROCEDURE — 1126F AMNT PAIN NOTED NONE PRSNT: CPT | Mod: S$GLB,,, | Performed by: INTERNAL MEDICINE

## 2020-12-03 PROCEDURE — 3074F PR MOST RECENT SYSTOLIC BLOOD PRESSURE < 130 MM HG: ICD-10-PCS | Mod: S$GLB,,, | Performed by: INTERNAL MEDICINE

## 2020-12-03 PROCEDURE — 1126F PR PAIN SEVERITY QUANTIFIED, NO PAIN PRESENT: ICD-10-PCS | Mod: S$GLB,,, | Performed by: INTERNAL MEDICINE

## 2020-12-03 PROCEDURE — 3074F SYST BP LT 130 MM HG: CPT | Mod: S$GLB,,, | Performed by: INTERNAL MEDICINE

## 2020-12-03 PROCEDURE — 3078F PR MOST RECENT DIASTOLIC BLOOD PRESSURE < 80 MM HG: ICD-10-PCS | Mod: S$GLB,,, | Performed by: INTERNAL MEDICINE

## 2020-12-03 PROCEDURE — 93005 ELECTROCARDIOGRAM TRACING: CPT | Mod: S$GLB,,, | Performed by: INTERNAL MEDICINE

## 2020-12-03 RX ORDER — ATORVASTATIN CALCIUM 20 MG/1
20 TABLET, FILM COATED ORAL DAILY
Qty: 90 TABLET | Refills: 3 | Status: SHIPPED | OUTPATIENT
Start: 2020-12-03 | End: 2021-01-08 | Stop reason: SDUPTHER

## 2020-12-03 NOTE — LETTER
December 3, 2020      Pal Cheney MD  [address]  [address]  [facility] [address]           [place] [facility] Card[facility]  [address]  [facility] [address]  Phone: [phone]          Patient: Zeenat Mancia   MR Number: [id number]   YOB: 1950   Date of Visit: 12/3/2020       Dear Dr. Pal Cheney:    Thank you for referring Zeenat Mancia to me for evaluation. Attached you will find relevant portions of my assessment and plan of care.    If you have questions, please do not hesitate to call me. I look forward to following Zeenat Mancia along with you.    Sincerely,    Judy Jerome MD    Enclosure  CC:  No Recipients    If you would like to receive this communication electronically, please contact [email or web address] or [phone] to request more information on [facility] Link access.    For providers and/or their staff who would like to refer a patient to [facility], please contact us through our one-stop-shop provider referral line, [facility], at [phone].    If you feel you have received this communication in error or would no longer like to receive these types of communications, please e-mail [email or web address]

## 2020-12-04 NOTE — PROGRESS NOTES
Patient ID: Zeenat Mancia is a 70 y.o. male.    Chief Complaint: No chief complaint on file.      HPI:  70M with RUQ pain, severe, radiated to back last week in [place]. Lasted for several hours and went to ED. CT showed gallstones. Pain improved and was discharged. Never had before that. Pain has resolved, denies pain now. On eliquis for afib for about a year. Was found to be anemic about 2 years ago by pcp. Underwent EGD which showed barretts, cscope showed sigmoid diverticulosis. After sedation was found to be in afib. Started on eliquis. Asymptomatic.     Review of Systems   Constitutional: Negative for chills, diaphoresis and fever.   HENT: Negative for trouble swallowing.    Respiratory: Negative for cough, shortness of breath, wheezing and stridor.    Cardiovascular: Negative for chest pain and palpitations.   Gastrointestinal: Positive for abdominal pain. Negative for abdominal distention, blood in stool, diarrhea, nausea and vomiting.   Endocrine: Negative for cold intolerance and heat intolerance.   Genitourinary: Negative for difficulty urinating.   Musculoskeletal: Negative for back pain.   Skin: Negative for rash.   Allergic/Immunologic: Negative for immunocompromised state.   Neurological: Negative for dizziness, syncope and numbness.   Hematological: Negative for adenopathy.   Psychiatric/Behavioral: Negative for agitation.       Current Outpatient Medications   Medication Sig Dispense Refill    amLODIPine (NORVASC) 5 MG tablet TAKE 1 TABLET DAILY 90 tablet 3    apixaban (ELIQUIS) 5 mg Tab Take 1 tablet (5 mg total) by mouth 2 (two) times daily. 180 tablet 3    atenoloL (TENORMIN) 100 MG tablet Take 1 tablet (100 mg total) by mouth once daily. 90 tablet 1    doxazosin (CARDURA) 4 MG tablet Take 1 tablet (4 mg total) by mouth every evening. 90 tablet 0    escitalopram oxalate (LEXAPRO) 10 MG tablet Take 10 mg by mouth once daily.      eszopiclone (LUNESTA) 2 MG Tab eszopiclone 2 mg tablet    Take 1 tablet every day by oral route.      finasteride (PROSCAR) 5 mg tablet Take 1 tablet (5 mg total) by mouth once daily. 90 tablet 1    hydroCHLOROthiazide (HYDRODIURIL) 25 MG tablet TAKE 1 TABLET DAILY 90 tablet 1    MEN'S MULTI-VITAMIN ORAL Take by mouth.      multivitamin-minerals-lutein (CENTRUM SILVER) Tab Take by mouth. 1 Tablet Oral Every day      oxybutynin (DITROPAN-XL) 10 MG 24 hr tablet Take 1 tablet (10 mg total) by mouth once daily. 30 tablet 3    pantoprazole (PROTONIX) 40 MG tablet Take 1 tablet (40 mg total) by mouth once daily. 90 tablet 3    valsartan (DIOVAN) 320 MG tablet TAKE 1 TABLET DAILY 90 tablet 0    OPW TEST CLAIM - DO NOT FILL OPW test claim. Do not fill. 30 tablet 0    atorvastatin (LIPITOR) 20 MG tablet Take 1 tablet (20 mg total) by mouth once daily. 90 tablet 3     No current facility-administered medications for this visit.        Review of patient's allergies indicates:  No Known Allergies    Past Medical History:   Diagnosis Date    A-fib     Arthritis     Colon polyp     Diverticulosis     Dyslipidemia     GERD (gastroesophageal reflux disease)     on Protonix    Hepatic steatosis     Hyperlipidemia     Hypertension     Irregular heart beat     Multiple gastric ulcers 05/16/2018    gi scope per Dr. Houston    Obesity     Sleep apnea     uses CPAP    Unspecified disorder of kidney and ureter     kidney stones       Past Surgical History:   Procedure Laterality Date    APPENDECTOMY      CATARACT EXTRACTION  2009    OS    CATARACT EXTRACTION W/  INTRAOCULAR LENS IMPLANT Right 6/3/2015    sn60wf 14.5d//    COLONOSCOPY N/A 10/10/2017    Procedure: COLONOSCOPY;  Surgeon: Jameson Houston MD;  Location: [facility];  Service: Endoscopy;  Laterality: N/A; repeat in 5 years for surveillance    COLONOSCOPY N/A 5/16/2018    Procedure: COLONOSCOPY;  Surgeon: Jameson Houston MD;  Location: [facility];  Service: Endoscopy;  Laterality: N/A;     ESOPHAGOGASTRODUODENOSCOPY N/A 7/2/2018    Procedure: EGD (ESOPHAGOGASTRODUODENOSCOPY);  Surgeon: Jameson Houston MD;  Location: [facility];  Service: Endoscopy;  Laterality: N/A;    EYE SURGERY      cataract    TONSILLECTOMY      UPPER GASTROINTESTINAL ENDOSCOPY  05/16/2018    Dr. Houston       Family History   Problem Relation Age of Onset    Arthritis Mother     Heart disease Mother     Blindness Mother         OS due to injury    Coronary artery disease Father     Heart disease Father     Cataracts Father     Amblyopia Neg Hx     Cancer Neg Hx     Diabetes Neg Hx     Glaucoma Neg Hx     Hypertension Neg Hx     Macular degeneration Neg Hx     Retinal detachment Neg Hx     Strabismus Neg Hx     Stroke Neg Hx     Thyroid disease Neg Hx     Colon cancer Neg Hx     Colon polyps Neg Hx     Crohn's disease Neg Hx     Ulcerative colitis Neg Hx     Esophageal cancer Neg Hx     Stomach cancer Neg Hx        Social History     Socioeconomic History    Marital status:      Spouse name: Not on file    Number of children: 3    Years of education: Not on file    Highest education level: Not on file   Occupational History    Not on file   Social Needs    Financial resource strain: Not very hard    Food insecurity     Worry: Never true     Inability: Never true    Transportation needs     Medical: No     Non-medical: No   Tobacco Use    Smoking status: Former Smoker     Start date: 10/5/1997    Smokeless tobacco: Never Used    Tobacco comment: quit in 1980s    Substance and Sexual Activity    Alcohol use: Yes     Frequency: Monthly or less     Drinks per session: 1 or 2     Binge frequency: Never     Comment: 1 beer every 2 weeks    Drug use: No    Sexual activity: Not on file   Lifestyle    Physical activity     Days per week: 7 days     Minutes per session: 30 min    Stress: Only a little   Relationships    Social connections     Talks on phone: Twice a week     Gets  together: Once a week     Attends Alevism service: Not on file     Active member of club or organization: Yes     Attends meetings of clubs or organizations: More than 4 times per year     Relationship status:    Other Topics Concern    Not on file   Social History Narrative    Patient is originally from [place]         School at         College/university ; [employer]        Working ; retired,  plants            47 years         Children    Son x3         Lives with  Kerri Ann/Exericse    Updates    9/8/20- moved from [facility]         Vitals:    12/01/20 1412   BP: 137/84   Pulse: 78       Physical Exam  Constitutional:       General: He is not in acute distress.  HENT:      Head: Normocephalic and atraumatic.   Eyes:      General: No scleral icterus.  Cardiovascular:      Rate and Rhythm: Normal rate.   Pulmonary:      Effort: Pulmonary effort is normal. No respiratory distress.      Breath sounds: No stridor.   Abdominal:      Palpations: Abdomen is soft.      Tenderness: There is no abdominal tenderness.   Lymphadenopathy:      Cervical: No cervical adenopathy.   Skin:     General: Skin is warm.      Findings: No erythema.   Neurological:      Mental Status: He is alert and oriented to person, place, and time.   Psychiatric:         Behavior: Behavior normal.     Body mass index is 43.26 kg/m².    US from jan 2019 shows 2mm gb polyp  CT unable to load imagines from [place].    Assessment & Plan:   70M with possible biliary colic  Needs cards follow up  Will try to load images. May repeat US if unable to get images. No report available either

## 2020-12-05 ENCOUNTER — PATIENT MESSAGE (OUTPATIENT)
Dept: INTERNAL MEDICINE | Facility: CLINIC | Age: 70
End: 2020-12-05

## 2020-12-07 ENCOUNTER — OFFICE VISIT (OUTPATIENT)
Dept: UROLOGY | Facility: CLINIC | Age: 70
End: 2020-12-07
Payer: MEDICARE

## 2020-12-07 VITALS — SYSTOLIC BLOOD PRESSURE: 132 MMHG | HEART RATE: 74 BPM | DIASTOLIC BLOOD PRESSURE: 79 MMHG

## 2020-12-07 DIAGNOSIS — N40.0 BENIGN PROSTATIC HYPERPLASIA, UNSPECIFIED WHETHER LOWER URINARY TRACT SYMPTOMS PRESENT: Primary | ICD-10-CM

## 2020-12-07 DIAGNOSIS — R39.15 URINARY URGENCY: ICD-10-CM

## 2020-12-07 DIAGNOSIS — R35.0 URINE FREQUENCY: ICD-10-CM

## 2020-12-07 DIAGNOSIS — R35.1 NOCTURIA: ICD-10-CM

## 2020-12-07 LAB — POC RESIDUAL URINE VOLUME: 100 ML (ref 0–100)

## 2020-12-07 PROCEDURE — 51798 POCT BLADDER SCAN: ICD-10-PCS | Mod: S$GLB,,, | Performed by: STUDENT IN AN ORGANIZED HEALTH CARE EDUCATION/TRAINING PROGRAM

## 2020-12-07 PROCEDURE — 3078F PR MOST RECENT DIASTOLIC BLOOD PRESSURE < 80 MM HG: ICD-10-PCS | Mod: S$GLB,,, | Performed by: STUDENT IN AN ORGANIZED HEALTH CARE EDUCATION/TRAINING PROGRAM

## 2020-12-07 PROCEDURE — 1159F PR MEDICATION LIST DOCUMENTED IN MEDICAL RECORD: ICD-10-PCS | Mod: S$GLB,,, | Performed by: STUDENT IN AN ORGANIZED HEALTH CARE EDUCATION/TRAINING PROGRAM

## 2020-12-07 PROCEDURE — 3075F PR MOST RECENT SYSTOLIC BLOOD PRESS GE 130-139MM HG: ICD-10-PCS | Mod: S$GLB,,, | Performed by: STUDENT IN AN ORGANIZED HEALTH CARE EDUCATION/TRAINING PROGRAM

## 2020-12-07 PROCEDURE — 1126F PR PAIN SEVERITY QUANTIFIED, NO PAIN PRESENT: ICD-10-PCS | Mod: S$GLB,,, | Performed by: STUDENT IN AN ORGANIZED HEALTH CARE EDUCATION/TRAINING PROGRAM

## 2020-12-07 PROCEDURE — 99214 PR OFFICE/OUTPT VISIT, EST, LEVL IV, 30-39 MIN: ICD-10-PCS | Mod: 25,S$GLB,, | Performed by: STUDENT IN AN ORGANIZED HEALTH CARE EDUCATION/TRAINING PROGRAM

## 2020-12-07 PROCEDURE — 99999 PR PBB SHADOW E&M-EST. PATIENT-LVL III: ICD-10-PCS | Mod: PBBFAC,,, | Performed by: STUDENT IN AN ORGANIZED HEALTH CARE EDUCATION/TRAINING PROGRAM

## 2020-12-07 PROCEDURE — 1159F MED LIST DOCD IN RCRD: CPT | Mod: S$GLB,,, | Performed by: STUDENT IN AN ORGANIZED HEALTH CARE EDUCATION/TRAINING PROGRAM

## 2020-12-07 PROCEDURE — 3075F SYST BP GE 130 - 139MM HG: CPT | Mod: S$GLB,,, | Performed by: STUDENT IN AN ORGANIZED HEALTH CARE EDUCATION/TRAINING PROGRAM

## 2020-12-07 PROCEDURE — 3078F DIAST BP <80 MM HG: CPT | Mod: S$GLB,,, | Performed by: STUDENT IN AN ORGANIZED HEALTH CARE EDUCATION/TRAINING PROGRAM

## 2020-12-07 PROCEDURE — 99999 PR PBB SHADOW E&M-EST. PATIENT-LVL III: CPT | Mod: PBBFAC,,, | Performed by: STUDENT IN AN ORGANIZED HEALTH CARE EDUCATION/TRAINING PROGRAM

## 2020-12-07 PROCEDURE — 1126F AMNT PAIN NOTED NONE PRSNT: CPT | Mod: S$GLB,,, | Performed by: STUDENT IN AN ORGANIZED HEALTH CARE EDUCATION/TRAINING PROGRAM

## 2020-12-07 PROCEDURE — 99214 OFFICE O/P EST MOD 30 MIN: CPT | Mod: 25,S$GLB,, | Performed by: STUDENT IN AN ORGANIZED HEALTH CARE EDUCATION/TRAINING PROGRAM

## 2020-12-07 PROCEDURE — 51798 US URINE CAPACITY MEASURE: CPT | Mod: S$GLB,,, | Performed by: STUDENT IN AN ORGANIZED HEALTH CARE EDUCATION/TRAINING PROGRAM

## 2020-12-07 RX ORDER — OXYBUTYNIN CHLORIDE 15 MG/1
15 TABLET, EXTENDED RELEASE ORAL DAILY
Qty: 30 TABLET | Refills: 11 | Status: SHIPPED | OUTPATIENT
Start: 2020-12-07 | End: 2021-01-06 | Stop reason: SDUPTHER

## 2020-12-07 NOTE — PROGRESS NOTES
Subjective:       Patient ID: Zeenat Mancia is a 70 y.o. male.    Chief Complaint: Follow-up  This is a 70 y.o.  male patient that is an established patient of mine.  The patient is referred to me by PCP Dr. Cantor for BPH/nocturia. He takes doxazosin and finasteride. He also takes known diuretic medications - atenolol and HCTZ. He has been taking finasteride for years. He notes doxazosin he was taking for a few years also. Dr. Cantor increased doxazosin and no changes. He notes the stream is weaker than before.     DTF - every 3 hours goes to urinate.   NTF 3-4x/night; wakes up with urge to urinate.   Hydration- unsweet ice tea and water. Dinner is around 5pm, bedtime is around 7-8pm. With dinner, he usually has some unsweet tea - instant diet tea.   Bowel movements- usually regular with a daily BM. Takes a stool softener and fiber.   He uses a CPAP at night.   Leg swelling - does have leg swelling.     Urinalysis 6/5/20 benign.   Uroflow - 21cc  PVR - 0cc    12/7/20  We continued doxazosin and finasteride. I started him on oxybutynin on 10/26/20, portal message from him on 11/16/20 and we increased to 10mg XL. Here today for f/u. He last urinated about 25 minutes ago, 100cc pvr. No significant change in urinary patterns. When he was on vacation in [place] experienced RUQ pain and was told it might be a gallbladder issue. No improvement in nocturia 3-4x while on oxybutynin 10mg XL.    LAST PSA  Lab Results   Component Value Date    PSA 0.44 08/21/2017    PSA 1.1 06/03/2014    PSA 1.01 06/11/2013    PSA 1.35 09/07/2012       Lab Results   Component Value Date    CREATININE 1.2 06/05/2020        Past Medical History:   Diagnosis Date    A-fib     Arthritis     Colon polyp     Diverticulosis     Dyslipidemia     GERD (gastroesophageal reflux disease)     on Protonix    Hepatic steatosis     Hyperlipidemia     Hypertension     Irregular heart beat     Multiple gastric ulcers 05/16/2018    gi scope per   Rosemarie    Obesity     Sleep apnea     uses CPAP    Unspecified disorder of kidney and ureter     kidney stones       Past Surgical History:   Procedure Laterality Date    APPENDECTOMY      CATARACT EXTRACTION  2009    OS    CATARACT EXTRACTION W/  INTRAOCULAR LENS IMPLANT Right 6/3/2015    sn60wf 14.5d//    COLONOSCOPY N/A 10/10/2017    Procedure: COLONOSCOPY;  Surgeon: Jameson Houston MD;  Location: [facility];  Service: Endoscopy;  Laterality: N/A; repeat in 5 years for surveillance    COLONOSCOPY N/A 5/16/2018    Procedure: COLONOSCOPY;  Surgeon: Jameson Houston MD;  Location[facility];  Service: Endoscopy;  Laterality: N/A;    ESOPHAGOGASTRODUODENOSCOPY N/A 7/2/2018    Procedure: EGD (ESOPHAGOGASTRODUODENOSCOPY);  Surgeon: Jameson Houston MD;  Location: [facility];  Service: Endoscopy;  Laterality: N/A;    EYE SURGERY      cataract    TONSILLECTOMY      UPPER GASTROINTESTINAL ENDOSCOPY  05/16/2018    Dr. Houston       Family History   Problem Relation Age of Onset    Arthritis Mother     Heart disease Mother     Blindness Mother         OS due to injury    Coronary artery disease Father     Heart disease Father     Cataracts Father     Amblyopia Neg Hx     Cancer Neg Hx     Diabetes Neg Hx     Glaucoma Neg Hx     Hypertension Neg Hx     Macular degeneration Neg Hx     Retinal detachment Neg Hx     Strabismus Neg Hx     Stroke Neg Hx     Thyroid disease Neg Hx     Colon cancer Neg Hx     Colon polyps Neg Hx     Crohn's disease Neg Hx     Ulcerative colitis Neg Hx     Esophageal cancer Neg Hx     Stomach cancer Neg Hx        Social History     Tobacco Use    Smoking status: Former Smoker     Start date: 10/5/1997    Smokeless tobacco: Never Used    Tobacco comment: quit in 1980s    Substance Use Topics    Alcohol use: Yes     Frequency: Monthly or less     Drinks per session: 1 or 2     Binge frequency: Never     Comment: 1 beer every 2 weeks    Drug use: No        Current Outpatient Medications on File Prior to Visit   Medication Sig Dispense Refill    amLODIPine (NORVASC) 5 MG tablet TAKE 1 TABLET DAILY 90 tablet 3    apixaban (ELIQUIS) 5 mg Tab Take 1 tablet (5 mg total) by mouth 2 (two) times daily. 180 tablet 3    atenoloL (TENORMIN) 100 MG tablet Take 1 tablet (100 mg total) by mouth once daily. 90 tablet 1    atorvastatin (LIPITOR) 20 MG tablet Take 1 tablet (20 mg total) by mouth once daily. 90 tablet 3    doxazosin (CARDURA) 4 MG tablet Take 1 tablet (4 mg total) by mouth every evening. 90 tablet 0    escitalopram oxalate (LEXAPRO) 10 MG tablet Take 10 mg by mouth once daily.      eszopiclone (LUNESTA) 2 MG Tab eszopiclone 2 mg tablet   Take 1 tablet every day by oral route.      finasteride (PROSCAR) 5 mg tablet Take 1 tablet (5 mg total) by mouth once daily. 90 tablet 1    hydroCHLOROthiazide (HYDRODIURIL) 25 MG tablet TAKE 1 TABLET DAILY 90 tablet 1    multivitamin-minerals-lutein (CENTRUM SILVER) Tab Take by mouth. 1 Tablet Oral Every day      pantoprazole (PROTONIX) 40 MG tablet Take 1 tablet (40 mg total) by mouth once daily. 90 tablet 3    valsartan (DIOVAN) 320 MG tablet TAKE 1 TABLET DAILY 90 tablet 0    [DISCONTINUED] oxybutynin (DITROPAN-XL) 10 MG 24 hr tablet Take 1 tablet (10 mg total) by mouth once daily. 30 tablet 3    OPW TEST CLAIM - DO NOT FILL OPW test claim. Do not fill. 30 tablet 0    MEN'S MULTI-VITAMIN ORAL Take by mouth.       No current facility-administered medications on file prior to visit.        Review of patient's allergies indicates:  No Known Allergies    Review of Systems   Constitutional: Negative for chills.   HENT: Negative for congestion.    Eyes: Negative for visual disturbance.   Respiratory: Negative for shortness of breath.    Cardiovascular: Negative for chest pain.   Gastrointestinal: Negative for abdominal distention.   Musculoskeletal: Negative for gait problem.   Skin: Negative for color change.    Neurological: Negative for dizziness.   Psychiatric/Behavioral: Negative for agitation.       Objective:      Physical Exam  Constitutional:       Appearance: He is well-developed.   HENT:      Head: Normocephalic.   Eyes:      Pupils: Pupils are equal, round, and reactive to light.   Neck:      Musculoskeletal: Normal range of motion.   Pulmonary:      Effort: Pulmonary effort is normal.   Abdominal:      Palpations: Abdomen is soft.   Musculoskeletal: Normal range of motion.   Skin:     General: Skin is warm and dry.   Neurological:      Mental Status: He is alert.         Assessment:       1. Benign prostatic hyperplasia, unspecified whether lower urinary tract symptoms present    2. Nocturia    3. Urinary urgency    4. Urine frequency        Plan:       Plan:  1. I will message Dr. Cheney if he can review Ct scan about gallbladder issues.  2. Increase oxybutynin to 15mg XL.  3. Elevate legs.  4. Pt will message me via portal in about 1 month, if no improvement can try to get another OAB medication approved.    Benign prostatic hyperplasia, unspecified whether lower urinary tract symptoms present  -     POCT Bladder Scan  -     oxybutynin (DITROPAN XL) 15 MG TR24; Take 1 tablet (15 mg total) by mouth once daily.  Dispense: 30 tablet; Refill: 11    Nocturia  -     oxybutynin (DITROPAN XL) 15 MG TR24; Take 1 tablet (15 mg total) by mouth once daily.  Dispense: 30 tablet; Refill: 11    Urinary urgency    Urine frequency

## 2020-12-09 ENCOUNTER — PATIENT MESSAGE (OUTPATIENT)
Dept: CARDIOLOGY | Facility: CLINIC | Age: 70
End: 2020-12-09

## 2020-12-11 ENCOUNTER — PATIENT MESSAGE (OUTPATIENT)
Dept: CARDIOLOGY | Facility: CLINIC | Age: 70
End: 2020-12-11

## 2020-12-11 ENCOUNTER — PATIENT MESSAGE (OUTPATIENT)
Dept: INTERNAL MEDICINE | Facility: CLINIC | Age: 70
End: 2020-12-11

## 2020-12-22 ENCOUNTER — TELEPHONE (OUTPATIENT)
Dept: CARDIOLOGY | Facility: CLINIC | Age: 70
End: 2020-12-22

## 2020-12-22 ENCOUNTER — PATIENT OUTREACH (OUTPATIENT)
Dept: OTHER | Facility: OTHER | Age: 70
End: 2020-12-22

## 2020-12-23 ENCOUNTER — PATIENT MESSAGE (OUTPATIENT)
Dept: CARDIOLOGY | Facility: CLINIC | Age: 70
End: 2020-12-23

## 2020-12-23 DIAGNOSIS — I10 ESSENTIAL HYPERTENSION: ICD-10-CM

## 2020-12-23 RX ORDER — VALSARTAN 320 MG/1
TABLET ORAL
Qty: 90 TABLET | Refills: 3 | Status: SHIPPED | OUTPATIENT
Start: 2020-12-23 | End: 2021-04-07 | Stop reason: SDUPTHER

## 2020-12-23 RX ORDER — ATENOLOL 100 MG/1
TABLET ORAL
Qty: 90 TABLET | Refills: 3 | Status: SHIPPED | OUTPATIENT
Start: 2020-12-23 | End: 2021-02-23 | Stop reason: SDUPTHER

## 2020-12-24 ENCOUNTER — PATIENT MESSAGE (OUTPATIENT)
Dept: CARDIOLOGY | Facility: CLINIC | Age: 70
End: 2020-12-24

## 2020-12-24 ENCOUNTER — HOSPITAL ENCOUNTER (OUTPATIENT)
Dept: CARDIOLOGY | Facility: HOSPITAL | Age: 70
Discharge: HOME | End: 2020-12-24
Attending: INTERNAL MEDICINE
Payer: MEDICARE

## 2020-12-24 VITALS — SYSTOLIC BLOOD PRESSURE: 132 MMHG | HEART RATE: 62 BPM | DIASTOLIC BLOOD PRESSURE: 62 MMHG

## 2020-12-24 DIAGNOSIS — Z01.810 ENCOUNTER FOR PRE-OPERATIVE CARDIOVASCULAR CLEARANCE: ICD-10-CM

## 2020-12-24 LAB
CFR FLOW - ANTERIOR: 1.27
CFR FLOW - INFERIOR: 1.13
CFR FLOW - LATERAL: 1.1
CFR FLOW - MAX: 1.88
CFR FLOW - MIN: 0.73
CFR FLOW - SEPTAL: 1.32
CFR FLOW - WHOLE HEART: 1.21
CV PHARM DOSE: 60 MG
CV STRESS BASE HR: 63 BPM
DIASTOLIC BLOOD PRESSURE: 53 MMHG
END DIASTOLIC INDEX-HIGH: 170 ML/M2
END SYSTOLIC INDEX-HIGH: 70 ML/M2
OHS CV CPX 85 PERCENT MAX PREDICTED HEART RATE MALE: 128
OHS CV CPX MAX PREDICTED HEART RATE: 150
OHS CV CPX PATIENT IS FEMALE: 0
OHS CV CPX PATIENT IS MALE: 1
OHS CV CPX PEAK DIASTOLIC BLOOD PRESSURE: 58 MMHG
OHS CV CPX PEAK HEAR RATE: 50 BPM
OHS CV CPX PEAK RATE PRESSURE PRODUCT: 5950
OHS CV CPX PEAK SYSTOLIC BLOOD PRESSURE: 119 MMHG
OHS CV CPX PERCENT MAX PREDICTED HEART RATE ACHIEVED: 33
OHS CV CPX RATE PRESSURE PRODUCT PRESENTING: 7308
REST FLOW - ANTERIOR: 0.52 CC/MIN/G
REST FLOW - INFERIOR: 0.48 CC/MIN/G
REST FLOW - LATERAL: 0.54 CC/MIN/G
REST FLOW - MAX: 0.68 CC/MIN/G
REST FLOW - MIN: 0.3 CC/MIN/G
REST FLOW - SEPTAL: 0.39 CC/MIN/G
REST FLOW - WHOLE HEART: 0.48 CC/MIN/G
RETIRED EF AND QEF - SEE NOTES: 51 %
STRESS FLOW - ANTERIOR: 0.65 CC/MIN/G
STRESS FLOW - INFERIOR: 0.53 CC/MIN/G
STRESS FLOW - LATERAL: 0.59 CC/MIN/G
STRESS FLOW - MAX: 0.89 CC/MIN/G
STRESS FLOW - MIN: 0.4 CC/MIN/G
STRESS FLOW - SEPTAL: 0.51 CC/MIN/G
STRESS FLOW - WHOLE HEART: 0.57 CC/MIN/G
SYSTOLIC BLOOD PRESSURE: 116 MMHG

## 2020-12-24 PROCEDURE — 93016 CARDIAC PET SCAN STRESS (CUPID ONLY): ICD-10-PCS | Mod: ,,, | Performed by: INTERNAL MEDICINE

## 2020-12-24 PROCEDURE — 93018 CARDIAC PET SCAN STRESS (CUPID ONLY): ICD-10-PCS | Mod: ,,, | Performed by: INTERNAL MEDICINE

## 2020-12-24 PROCEDURE — 78434 AQMBF PET REST & RX STRESS: CPT | Mod: 26,,, | Performed by: INTERNAL MEDICINE

## 2020-12-24 PROCEDURE — 78434 AQMBF PET REST & RX STRESS: CPT

## 2020-12-24 PROCEDURE — 63600175 PHARM REV CODE 636 W HCPCS: Performed by: INTERNAL MEDICINE

## 2020-12-24 PROCEDURE — 78492 MYOCRD IMG PET MLT RST&STRS: CPT | Mod: 26,,, | Performed by: INTERNAL MEDICINE

## 2020-12-24 PROCEDURE — 93016 CV STRESS TEST SUPVJ ONLY: CPT | Mod: ,,, | Performed by: INTERNAL MEDICINE

## 2020-12-24 PROCEDURE — 78492 CARDIAC PET SCAN STRESS (CUPID ONLY): ICD-10-PCS | Mod: 26,,, | Performed by: INTERNAL MEDICINE

## 2020-12-24 PROCEDURE — 78434 CARDIAC PET SCAN STRESS (CUPID ONLY): ICD-10-PCS | Mod: 26,,, | Performed by: INTERNAL MEDICINE

## 2020-12-24 PROCEDURE — 93018 CV STRESS TEST I&R ONLY: CPT | Mod: ,,, | Performed by: INTERNAL MEDICINE

## 2020-12-24 RX ORDER — DIPYRIDAMOLE 5 MG/ML
60 INJECTION INTRAVENOUS ONCE
Status: COMPLETED | OUTPATIENT
Start: 2020-12-24 | End: 2020-12-24

## 2020-12-24 RX ADMIN — DIPYRIDAMOLE 60 MG: 5 INJECTION INTRAVENOUS at 08:12

## 2020-12-29 ENCOUNTER — OFFICE VISIT (OUTPATIENT)
Dept: CARDIOLOGY | Facility: CLINIC | Age: 70
End: 2020-12-29
Payer: MEDICARE

## 2020-12-29 VITALS
SYSTOLIC BLOOD PRESSURE: 122 MMHG | DIASTOLIC BLOOD PRESSURE: 78 MMHG | HEART RATE: 62 BPM | BODY MASS INDEX: 41.89 KG/M2 | WEIGHT: 315 LBS | OXYGEN SATURATION: 98 %

## 2020-12-29 DIAGNOSIS — Z01.818 PRE-OP EVALUATION: Primary | ICD-10-CM

## 2020-12-29 DIAGNOSIS — R94.39 ABNORMAL CARDIOVASCULAR STRESS TEST: Primary | ICD-10-CM

## 2020-12-29 PROCEDURE — 99499 UNLISTED E&M SERVICE: CPT | Mod: S$GLB,,, | Performed by: INTERNAL MEDICINE

## 2020-12-29 PROCEDURE — 99999 PR PBB SHADOW E&M-EST. PATIENT-LVL III: ICD-10-PCS | Mod: PBBFAC,,, | Performed by: INTERNAL MEDICINE

## 2020-12-29 PROCEDURE — 3008F PR BODY MASS INDEX (BMI) DOCUMENTED: ICD-10-PCS | Mod: S$GLB,,, | Performed by: INTERNAL MEDICINE

## 2020-12-29 PROCEDURE — 99499 NO LOS: ICD-10-PCS | Mod: S$GLB,,, | Performed by: INTERNAL MEDICINE

## 2020-12-29 PROCEDURE — 3008F BODY MASS INDEX DOCD: CPT | Mod: S$GLB,,, | Performed by: INTERNAL MEDICINE

## 2020-12-29 PROCEDURE — 1126F PR PAIN SEVERITY QUANTIFIED, NO PAIN PRESENT: ICD-10-PCS | Mod: S$GLB,,, | Performed by: INTERNAL MEDICINE

## 2020-12-29 PROCEDURE — 99999 PR PBB SHADOW E&M-EST. PATIENT-LVL III: CPT | Mod: PBBFAC,,, | Performed by: INTERNAL MEDICINE

## 2020-12-29 PROCEDURE — 1126F AMNT PAIN NOTED NONE PRSNT: CPT | Mod: S$GLB,,, | Performed by: INTERNAL MEDICINE

## 2020-12-29 RX ORDER — SODIUM CHLORIDE 0.9 % (FLUSH) 0.9 %
10 SYRINGE (ML) INJECTION
Status: DISCONTINUED | OUTPATIENT
Start: 2020-12-29 | End: 2021-04-15

## 2020-12-29 NOTE — PROGRESS NOTES
Vitals:    12/29/20 1151   BP: 122/78   Pulse: 62     Discussed results of PET stress. Plan for coronary angiogram    Risks, benefits and alternatives of cardiac catheterization and possible intervention were discussed with the patient. All questions were answered and informed consent obtained.     No allergy to contrast.    Lab Results   Component Value Date    CREATININE 1.2 06/05/2020    INR 1.3 05/14/2018       Discussed with wife present during this visit

## 2021-01-01 ENCOUNTER — LAB VISIT (OUTPATIENT)
Dept: INTERNAL MEDICINE | Facility: CLINIC | Age: 71
End: 2021-01-01
Payer: MEDICARE

## 2021-01-01 DIAGNOSIS — Z01.818 PRE-OP EVALUATION: ICD-10-CM

## 2021-01-01 LAB — SARS-COV-2 RNA RESP QL NAA+PROBE: NOT DETECTED

## 2021-01-01 PROCEDURE — U0003 INFECTIOUS AGENT DETECTION BY NUCLEIC ACID (DNA OR RNA); SEVERE ACUTE RESPIRATORY SYNDROME CORONAVIRUS 2 (SARS-COV-2) (CORONAVIRUS DISEASE [COVID-19]), AMPLIFIED PROBE TECHNIQUE, MAKING USE OF HIGH THROUGHPUT TECHNOLOGIES AS DESCRIBED BY CMS-2020-01-R: HCPCS

## 2021-01-02 ENCOUNTER — LAB VISIT (OUTPATIENT)
Dept: LAB | Facility: HOSPITAL | Age: 71
End: 2021-01-02
Attending: INTERNAL MEDICINE
Payer: MEDICARE

## 2021-01-02 DIAGNOSIS — Z01.818 PRE-OP EVALUATION: ICD-10-CM

## 2021-01-02 LAB
ANION GAP SERPL CALC-SCNC: 9 MMOL/L (ref 8–16)
BUN SERPL-MCNC: 15 MG/DL (ref 8–23)
CALCIUM SERPL-MCNC: 8.8 MG/DL (ref 8.7–10.5)
CHLORIDE SERPL-SCNC: 105 MMOL/L (ref 95–110)
CO2 SERPL-SCNC: 28 MMOL/L (ref 23–29)
CREAT SERPL-MCNC: 1.1 MG/DL (ref 0.5–1.4)
ERYTHROCYTE [DISTWIDTH] IN BLOOD BY AUTOMATED COUNT: 14.7 % (ref 11.5–14.5)
EST. GFR  (AFRICAN AMERICAN): >60 ML/MIN/1.73 M^2
EST. GFR  (NON AFRICAN AMERICAN): >60 ML/MIN/1.73 M^2
GLUCOSE SERPL-MCNC: 112 MG/DL (ref 70–110)
HCT VFR BLD AUTO: 45.4 % (ref 40–54)
HGB BLD-MCNC: 14.4 G/DL (ref 14–18)
MCH RBC QN AUTO: 26.4 PG (ref 27–31)
MCHC RBC AUTO-ENTMCNC: 31.7 G/DL (ref 32–36)
MCV RBC AUTO: 83 FL (ref 82–98)
PLATELET # BLD AUTO: 222 K/UL (ref 150–350)
PMV BLD AUTO: 9.8 FL (ref 9.2–12.9)
POTASSIUM SERPL-SCNC: 3.5 MMOL/L (ref 3.5–5.1)
RBC # BLD AUTO: 5.45 M/UL (ref 4.6–6.2)
SODIUM SERPL-SCNC: 142 MMOL/L (ref 136–145)
WBC # BLD AUTO: 8.21 K/UL (ref 3.9–12.7)

## 2021-01-02 PROCEDURE — 36415 COLL VENOUS BLD VENIPUNCTURE: CPT

## 2021-01-02 PROCEDURE — 85027 COMPLETE CBC AUTOMATED: CPT

## 2021-01-02 PROCEDURE — 80048 BASIC METABOLIC PNL TOTAL CA: CPT

## 2021-01-04 ENCOUNTER — HOSPITAL ENCOUNTER (OUTPATIENT)
Facility: HOSPITAL | Age: 71
Discharge: HOME | End: 2021-01-04
Attending: INTERNAL MEDICINE | Admitting: INTERNAL MEDICINE
Payer: MEDICARE

## 2021-01-04 ENCOUNTER — PATIENT MESSAGE (OUTPATIENT)
Dept: CARDIOLOGY | Facility: HOSPITAL | Age: 71
End: 2021-01-04

## 2021-01-04 VITALS
TEMPERATURE: 98 F | BODY MASS INDEX: 40.43 KG/M2 | SYSTOLIC BLOOD PRESSURE: 135 MMHG | DIASTOLIC BLOOD PRESSURE: 82 MMHG | HEART RATE: 66 BPM | HEIGHT: 74 IN | OXYGEN SATURATION: 95 % | RESPIRATION RATE: 15 BRPM | WEIGHT: 315 LBS

## 2021-01-04 DIAGNOSIS — R94.39 ABNORMAL CARDIOVASCULAR STRESS TEST: ICD-10-CM

## 2021-01-04 DIAGNOSIS — R94.39 ABNORMAL STRESS TEST: ICD-10-CM

## 2021-01-04 PROBLEM — I25.10 NONOBSTRUCTIVE ATHEROSCLEROSIS OF CORONARY ARTERY: Status: ACTIVE | Noted: 2021-01-04

## 2021-01-04 LAB — CATH EF QUANTITATIVE: 60 %

## 2021-01-04 PROCEDURE — 93005 ELECTROCARDIOGRAM TRACING: CPT

## 2021-01-04 PROCEDURE — 99152 PR MOD CONSCIOUS SEDATION, SAME PHYS, 5+ YRS, FIRST 15 MIN: ICD-10-PCS | Mod: ,,, | Performed by: INTERNAL MEDICINE

## 2021-01-04 PROCEDURE — 99152 MOD SED SAME PHYS/QHP 5/>YRS: CPT | Performed by: INTERNAL MEDICINE

## 2021-01-04 PROCEDURE — 93458 PR CATH PLACE/CORON ANGIO, IMG SUPER/INTERP,W LEFT HEART VENTRICULOGRAPHY: ICD-10-PCS | Mod: 26,,, | Performed by: INTERNAL MEDICINE

## 2021-01-04 PROCEDURE — 63600175 PHARM REV CODE 636 W HCPCS: Performed by: INTERNAL MEDICINE

## 2021-01-04 PROCEDURE — 93010 ELECTROCARDIOGRAM REPORT: CPT | Mod: ,,, | Performed by: INTERNAL MEDICINE

## 2021-01-04 PROCEDURE — C1894 INTRO/SHEATH, NON-LASER: HCPCS | Performed by: INTERNAL MEDICINE

## 2021-01-04 PROCEDURE — 93458 L HRT ARTERY/VENTRICLE ANGIO: CPT | Mod: 26,,, | Performed by: INTERNAL MEDICINE

## 2021-01-04 PROCEDURE — C1769 GUIDE WIRE: HCPCS | Performed by: INTERNAL MEDICINE

## 2021-01-04 PROCEDURE — 25000003 PHARM REV CODE 250: Performed by: INTERNAL MEDICINE

## 2021-01-04 PROCEDURE — 99153 MOD SED SAME PHYS/QHP EA: CPT | Performed by: INTERNAL MEDICINE

## 2021-01-04 PROCEDURE — 25500020 PHARM REV CODE 255: Performed by: INTERNAL MEDICINE

## 2021-01-04 PROCEDURE — C1887 CATHETER, GUIDING: HCPCS | Performed by: INTERNAL MEDICINE

## 2021-01-04 PROCEDURE — 93458 L HRT ARTERY/VENTRICLE ANGIO: CPT | Performed by: INTERNAL MEDICINE

## 2021-01-04 PROCEDURE — 99152 MOD SED SAME PHYS/QHP 5/>YRS: CPT | Mod: ,,, | Performed by: INTERNAL MEDICINE

## 2021-01-04 PROCEDURE — 93010 EKG 12-LEAD: ICD-10-PCS | Mod: ,,, | Performed by: INTERNAL MEDICINE

## 2021-01-04 RX ORDER — HEPARIN SODIUM 200 [USP'U]/100ML
INJECTION, SOLUTION INTRAVENOUS
Status: DISCONTINUED | OUTPATIENT
Start: 2021-01-04 | End: 2021-01-04 | Stop reason: HOSPADM

## 2021-01-04 RX ORDER — DIPHENHYDRAMINE HYDROCHLORIDE 50 MG/ML
INJECTION INTRAMUSCULAR; INTRAVENOUS
Status: DISCONTINUED | OUTPATIENT
Start: 2021-01-04 | End: 2021-01-04 | Stop reason: HOSPADM

## 2021-01-04 RX ORDER — ACETAMINOPHEN 325 MG/1
650 TABLET ORAL EVERY 4 HOURS PRN
Status: DISCONTINUED | OUTPATIENT
Start: 2021-01-04 | End: 2021-01-04 | Stop reason: HOSPADM

## 2021-01-04 RX ORDER — FENTANYL CITRATE 50 UG/ML
INJECTION, SOLUTION INTRAMUSCULAR; INTRAVENOUS
Status: DISCONTINUED | OUTPATIENT
Start: 2021-01-04 | End: 2021-01-04 | Stop reason: HOSPADM

## 2021-01-04 RX ORDER — ONDANSETRON 4 MG/1
8 TABLET, ORALLY DISINTEGRATING ORAL EVERY 8 HOURS PRN
Status: DISCONTINUED | OUTPATIENT
Start: 2021-01-04 | End: 2021-01-04 | Stop reason: HOSPADM

## 2021-01-04 RX ORDER — MIDAZOLAM HYDROCHLORIDE 1 MG/ML
INJECTION INTRAMUSCULAR; INTRAVENOUS
Status: DISCONTINUED | OUTPATIENT
Start: 2021-01-04 | End: 2021-01-04 | Stop reason: HOSPADM

## 2021-01-04 RX ORDER — HEPARIN SODIUM 1000 [USP'U]/ML
INJECTION, SOLUTION INTRAVENOUS; SUBCUTANEOUS
Status: DISCONTINUED | OUTPATIENT
Start: 2021-01-04 | End: 2021-01-04 | Stop reason: HOSPADM

## 2021-01-04 RX ORDER — IODIXANOL 320 MG/ML
INJECTION, SOLUTION INTRAVASCULAR
Status: DISCONTINUED | OUTPATIENT
Start: 2021-01-04 | End: 2021-01-04 | Stop reason: HOSPADM

## 2021-01-04 RX ORDER — VERAPAMIL HYDROCHLORIDE 2.5 MG/ML
INJECTION, SOLUTION INTRAVENOUS
Status: DISCONTINUED | OUTPATIENT
Start: 2021-01-04 | End: 2021-01-04 | Stop reason: HOSPADM

## 2021-01-05 ENCOUNTER — IMMUNIZATION (OUTPATIENT)
Dept: INTERNAL MEDICINE | Facility: CLINIC | Age: 71
End: 2021-01-05
Payer: MEDICARE

## 2021-01-05 DIAGNOSIS — Z23 NEED FOR VACCINATION: ICD-10-CM

## 2021-01-05 PROCEDURE — 91300 COVID-19, MRNA, LNP-S, PF, 30 MCG/0.3 ML DOSE VACCINE: CPT | Mod: PBBFAC | Performed by: FAMILY MEDICINE

## 2021-01-06 DIAGNOSIS — N40.0 BENIGN PROSTATIC HYPERPLASIA, UNSPECIFIED WHETHER LOWER URINARY TRACT SYMPTOMS PRESENT: ICD-10-CM

## 2021-01-06 DIAGNOSIS — R35.1 NOCTURIA: ICD-10-CM

## 2021-01-07 RX ORDER — OXYBUTYNIN CHLORIDE 15 MG/1
15 TABLET, EXTENDED RELEASE ORAL DAILY
Qty: 30 TABLET | Refills: 11 | Status: SHIPPED | OUTPATIENT
Start: 2021-01-07 | End: 2021-04-06

## 2021-01-11 ENCOUNTER — OFFICE VISIT (OUTPATIENT)
Dept: CARDIOLOGY | Facility: CLINIC | Age: 71
End: 2021-01-11
Payer: MEDICARE

## 2021-01-11 VITALS
DIASTOLIC BLOOD PRESSURE: 65 MMHG | HEART RATE: 73 BPM | OXYGEN SATURATION: 97 % | BODY MASS INDEX: 42.74 KG/M2 | WEIGHT: 315 LBS | SYSTOLIC BLOOD PRESSURE: 108 MMHG

## 2021-01-11 DIAGNOSIS — Z86.39 HX OF OBESITY: ICD-10-CM

## 2021-01-11 DIAGNOSIS — Z01.818 PRE-OP EVALUATION: Primary | ICD-10-CM

## 2021-01-11 DIAGNOSIS — I48.91 ATRIAL FIBRILLATION, UNSPECIFIED TYPE: ICD-10-CM

## 2021-01-11 DIAGNOSIS — E78.5 HYPERLIPIDEMIA, UNSPECIFIED HYPERLIPIDEMIA TYPE: ICD-10-CM

## 2021-01-11 DIAGNOSIS — G47.33 OSA (OBSTRUCTIVE SLEEP APNEA): ICD-10-CM

## 2021-01-11 DIAGNOSIS — Z01.810 ENCOUNTER FOR PRE-OPERATIVE CARDIOVASCULAR CLEARANCE: ICD-10-CM

## 2021-01-11 DIAGNOSIS — E66.01 MORBID OBESITY: ICD-10-CM

## 2021-01-11 DIAGNOSIS — I10 ESSENTIAL HYPERTENSION: ICD-10-CM

## 2021-01-11 PROCEDURE — 3008F BODY MASS INDEX DOCD: CPT | Mod: CPTII,S$GLB,, | Performed by: INTERNAL MEDICINE

## 2021-01-11 PROCEDURE — 1126F PR PAIN SEVERITY QUANTIFIED, NO PAIN PRESENT: ICD-10-PCS | Mod: S$GLB,,, | Performed by: INTERNAL MEDICINE

## 2021-01-11 PROCEDURE — 1126F AMNT PAIN NOTED NONE PRSNT: CPT | Mod: S$GLB,,, | Performed by: INTERNAL MEDICINE

## 2021-01-11 PROCEDURE — 3078F DIAST BP <80 MM HG: CPT | Mod: CPTII,S$GLB,, | Performed by: INTERNAL MEDICINE

## 2021-01-11 PROCEDURE — 3074F SYST BP LT 130 MM HG: CPT | Mod: CPTII,S$GLB,, | Performed by: INTERNAL MEDICINE

## 2021-01-11 PROCEDURE — 3078F PR MOST RECENT DIASTOLIC BLOOD PRESSURE < 80 MM HG: ICD-10-PCS | Mod: CPTII,S$GLB,, | Performed by: INTERNAL MEDICINE

## 2021-01-11 PROCEDURE — 1159F MED LIST DOCD IN RCRD: CPT | Mod: S$GLB,,, | Performed by: INTERNAL MEDICINE

## 2021-01-11 PROCEDURE — 99999 PR PBB SHADOW E&M-EST. PATIENT-LVL III: ICD-10-PCS | Mod: PBBFAC,,, | Performed by: INTERNAL MEDICINE

## 2021-01-11 PROCEDURE — 99499 UNLISTED E&M SERVICE: CPT | Mod: S$GLB,,, | Performed by: INTERNAL MEDICINE

## 2021-01-11 PROCEDURE — 1159F PR MEDICATION LIST DOCUMENTED IN MEDICAL RECORD: ICD-10-PCS | Mod: S$GLB,,, | Performed by: INTERNAL MEDICINE

## 2021-01-11 PROCEDURE — 3008F PR BODY MASS INDEX (BMI) DOCUMENTED: ICD-10-PCS | Mod: CPTII,S$GLB,, | Performed by: INTERNAL MEDICINE

## 2021-01-11 PROCEDURE — 3074F PR MOST RECENT SYSTOLIC BLOOD PRESSURE < 130 MM HG: ICD-10-PCS | Mod: CPTII,S$GLB,, | Performed by: INTERNAL MEDICINE

## 2021-01-11 PROCEDURE — 99499 RISK ADDL DX/OHS AUDIT: ICD-10-PCS | Mod: S$GLB,,, | Performed by: INTERNAL MEDICINE

## 2021-01-11 PROCEDURE — 99999 PR PBB SHADOW E&M-EST. PATIENT-LVL III: CPT | Mod: PBBFAC,,, | Performed by: INTERNAL MEDICINE

## 2021-01-11 PROCEDURE — 99214 PR OFFICE/OUTPT VISIT, EST, LEVL IV, 30-39 MIN: ICD-10-PCS | Mod: S$GLB,,, | Performed by: INTERNAL MEDICINE

## 2021-01-11 PROCEDURE — 99214 OFFICE O/P EST MOD 30 MIN: CPT | Mod: S$GLB,,, | Performed by: INTERNAL MEDICINE

## 2021-01-11 RX ORDER — ATORVASTATIN CALCIUM 20 MG/1
20 TABLET, FILM COATED ORAL DAILY
Qty: 90 TABLET | Refills: 3 | Status: SHIPPED | OUTPATIENT
Start: 2021-01-11 | End: 2021-01-29 | Stop reason: CLARIF

## 2021-01-13 ENCOUNTER — PATIENT MESSAGE (OUTPATIENT)
Dept: SURGERY | Facility: CLINIC | Age: 71
End: 2021-01-13

## 2021-01-13 ENCOUNTER — TELEPHONE (OUTPATIENT)
Dept: SURGERY | Facility: CLINIC | Age: 71
End: 2021-01-13

## 2021-01-13 DIAGNOSIS — R10.9 ABDOMINAL PAIN, UNSPECIFIED ABDOMINAL LOCATION: Primary | ICD-10-CM

## 2021-01-15 ENCOUNTER — LAB VISIT (OUTPATIENT)
Dept: LAB | Facility: HOSPITAL | Age: 71
End: 2021-01-15
Attending: INTERNAL MEDICINE
Payer: MEDICARE

## 2021-01-15 DIAGNOSIS — Z01.810 ENCOUNTER FOR PRE-OPERATIVE CARDIOVASCULAR CLEARANCE: ICD-10-CM

## 2021-01-15 DIAGNOSIS — Z86.39 HX OF OBESITY: ICD-10-CM

## 2021-01-15 LAB
ESTIMATED AVG GLUCOSE: 120 MG/DL (ref 68–131)
HBA1C MFR BLD HPLC: 5.8 % (ref 4–5.6)

## 2021-01-15 PROCEDURE — 36415 COLL VENOUS BLD VENIPUNCTURE: CPT

## 2021-01-15 PROCEDURE — 83036 HEMOGLOBIN GLYCOSYLATED A1C: CPT

## 2021-01-17 ENCOUNTER — PATIENT MESSAGE (OUTPATIENT)
Dept: CARDIOLOGY | Facility: CLINIC | Age: 71
End: 2021-01-17

## 2021-01-19 ENCOUNTER — HOSPITAL ENCOUNTER (OUTPATIENT)
Dept: RADIOLOGY | Facility: HOSPITAL | Age: 71
Discharge: HOME | End: 2021-01-19
Attending: STUDENT IN AN ORGANIZED HEALTH CARE EDUCATION/TRAINING PROGRAM
Payer: MEDICARE

## 2021-01-19 DIAGNOSIS — Z01.818 PREOP TESTING: Primary | ICD-10-CM

## 2021-01-19 DIAGNOSIS — R10.9 ABDOMINAL PAIN, UNSPECIFIED ABDOMINAL LOCATION: ICD-10-CM

## 2021-01-19 DIAGNOSIS — K80.50 BILIARY COLIC: Primary | ICD-10-CM

## 2021-01-19 PROCEDURE — 76705 ECHO EXAM OF ABDOMEN: CPT | Mod: 26,,, | Performed by: RADIOLOGY

## 2021-01-19 PROCEDURE — 76705 US ABDOMEN LIMITED_GALLBLADDER: ICD-10-PCS | Mod: 26,,, | Performed by: RADIOLOGY

## 2021-01-19 PROCEDURE — 76705 ECHO EXAM OF ABDOMEN: CPT | Mod: TC

## 2021-01-21 ENCOUNTER — OFFICE VISIT (OUTPATIENT)
Dept: SLEEP MEDICINE | Facility: CLINIC | Age: 71
End: 2021-01-21
Payer: MEDICARE

## 2021-01-21 VITALS
BODY MASS INDEX: 40.43 KG/M2 | WEIGHT: 315 LBS | HEART RATE: 43 BPM | SYSTOLIC BLOOD PRESSURE: 98 MMHG | DIASTOLIC BLOOD PRESSURE: 68 MMHG | HEIGHT: 74 IN

## 2021-01-21 DIAGNOSIS — I10 ESSENTIAL HYPERTENSION: ICD-10-CM

## 2021-01-21 DIAGNOSIS — G47.09 OTHER INSOMNIA: ICD-10-CM

## 2021-01-21 DIAGNOSIS — G47.33 OBSTRUCTIVE SLEEP APNEA: Primary | ICD-10-CM

## 2021-01-21 DIAGNOSIS — G47.33 OSA (OBSTRUCTIVE SLEEP APNEA): ICD-10-CM

## 2021-01-21 PROCEDURE — 3008F PR BODY MASS INDEX (BMI) DOCUMENTED: ICD-10-PCS | Mod: CPTII,S$GLB,, | Performed by: NURSE PRACTITIONER

## 2021-01-21 PROCEDURE — 1101F PR PT FALLS ASSESS DOC 0-1 FALLS W/OUT INJ PAST YR: ICD-10-PCS | Mod: CPTII,S$GLB,, | Performed by: NURSE PRACTITIONER

## 2021-01-21 PROCEDURE — 3074F SYST BP LT 130 MM HG: CPT | Mod: CPTII,S$GLB,, | Performed by: NURSE PRACTITIONER

## 2021-01-21 PROCEDURE — 3288F FALL RISK ASSESSMENT DOCD: CPT | Mod: CPTII,S$GLB,, | Performed by: NURSE PRACTITIONER

## 2021-01-21 PROCEDURE — 1159F PR MEDICATION LIST DOCUMENTED IN MEDICAL RECORD: ICD-10-PCS | Mod: S$GLB,,, | Performed by: NURSE PRACTITIONER

## 2021-01-21 PROCEDURE — 3288F PR FALLS RISK ASSESSMENT DOCUMENTED: ICD-10-PCS | Mod: CPTII,S$GLB,, | Performed by: NURSE PRACTITIONER

## 2021-01-21 PROCEDURE — 3078F PR MOST RECENT DIASTOLIC BLOOD PRESSURE < 80 MM HG: ICD-10-PCS | Mod: CPTII,S$GLB,, | Performed by: NURSE PRACTITIONER

## 2021-01-21 PROCEDURE — 3074F PR MOST RECENT SYSTOLIC BLOOD PRESSURE < 130 MM HG: ICD-10-PCS | Mod: CPTII,S$GLB,, | Performed by: NURSE PRACTITIONER

## 2021-01-21 PROCEDURE — 3078F DIAST BP <80 MM HG: CPT | Mod: CPTII,S$GLB,, | Performed by: NURSE PRACTITIONER

## 2021-01-21 PROCEDURE — 1101F PT FALLS ASSESS-DOCD LE1/YR: CPT | Mod: CPTII,S$GLB,, | Performed by: NURSE PRACTITIONER

## 2021-01-21 PROCEDURE — 3008F BODY MASS INDEX DOCD: CPT | Mod: CPTII,S$GLB,, | Performed by: NURSE PRACTITIONER

## 2021-01-21 PROCEDURE — 99214 PR OFFICE/OUTPT VISIT, EST, LEVL IV, 30-39 MIN: ICD-10-PCS | Mod: S$GLB,,, | Performed by: NURSE PRACTITIONER

## 2021-01-21 PROCEDURE — 99999 PR PBB SHADOW E&M-EST. PATIENT-LVL III: ICD-10-PCS | Mod: PBBFAC,,, | Performed by: NURSE PRACTITIONER

## 2021-01-21 PROCEDURE — 99214 OFFICE O/P EST MOD 30 MIN: CPT | Mod: S$GLB,,, | Performed by: NURSE PRACTITIONER

## 2021-01-21 PROCEDURE — 1126F PR PAIN SEVERITY QUANTIFIED, NO PAIN PRESENT: ICD-10-PCS | Mod: S$GLB,,, | Performed by: NURSE PRACTITIONER

## 2021-01-21 PROCEDURE — 99999 PR PBB SHADOW E&M-EST. PATIENT-LVL III: CPT | Mod: PBBFAC,,, | Performed by: NURSE PRACTITIONER

## 2021-01-21 PROCEDURE — 1159F MED LIST DOCD IN RCRD: CPT | Mod: S$GLB,,, | Performed by: NURSE PRACTITIONER

## 2021-01-21 PROCEDURE — 1126F AMNT PAIN NOTED NONE PRSNT: CPT | Mod: S$GLB,,, | Performed by: NURSE PRACTITIONER

## 2021-01-26 ENCOUNTER — IMMUNIZATION (OUTPATIENT)
Dept: INTERNAL MEDICINE | Facility: CLINIC | Age: 71
End: 2021-01-26
Payer: MEDICARE

## 2021-01-26 DIAGNOSIS — Z23 NEED FOR VACCINATION: Primary | ICD-10-CM

## 2021-01-26 PROCEDURE — 0002A COVID-19, MRNA, LNP-S, PF, 30 MCG/0.3 ML DOSE VACCINE: CPT | Mod: PBBFAC | Performed by: FAMILY MEDICINE

## 2021-01-26 PROCEDURE — 91300 COVID-19, MRNA, LNP-S, PF, 30 MCG/0.3 ML DOSE VACCINE: CPT | Mod: PBBFAC | Performed by: FAMILY MEDICINE

## 2021-01-29 ENCOUNTER — ANESTHESIA EVENT (OUTPATIENT)
Dept: SURGERY | Facility: HOSPITAL | Age: 71
End: 2021-01-29
Payer: MEDICARE

## 2021-01-29 ENCOUNTER — HOSPITAL ENCOUNTER (OUTPATIENT)
Dept: PREADMISSION TESTING | Facility: HOSPITAL | Age: 71
Discharge: HOME | End: 2021-01-29
Attending: STUDENT IN AN ORGANIZED HEALTH CARE EDUCATION/TRAINING PROGRAM
Payer: MEDICARE

## 2021-01-29 VITALS
TEMPERATURE: 98 F | RESPIRATION RATE: 18 BRPM | HEIGHT: 73 IN | WEIGHT: 315 LBS | OXYGEN SATURATION: 95 % | BODY MASS INDEX: 41.75 KG/M2 | SYSTOLIC BLOOD PRESSURE: 104 MMHG | HEART RATE: 60 BPM | DIASTOLIC BLOOD PRESSURE: 55 MMHG

## 2021-01-29 RX ORDER — SODIUM CHLORIDE, SODIUM LACTATE, POTASSIUM CHLORIDE, CALCIUM CHLORIDE 600; 310; 30; 20 MG/100ML; MG/100ML; MG/100ML; MG/100ML
INJECTION, SOLUTION INTRAVENOUS CONTINUOUS
Status: CANCELLED | OUTPATIENT
Start: 2021-01-29

## 2021-01-29 RX ORDER — LIDOCAINE HYDROCHLORIDE 10 MG/ML
1 INJECTION, SOLUTION EPIDURAL; INFILTRATION; INTRACAUDAL; PERINEURAL ONCE
Status: CANCELLED | OUTPATIENT
Start: 2021-01-29 | End: 2021-01-29

## 2021-01-30 ENCOUNTER — PATIENT MESSAGE (OUTPATIENT)
Dept: INTERNAL MEDICINE | Facility: CLINIC | Age: 71
End: 2021-01-30

## 2021-02-01 ENCOUNTER — LAB VISIT (OUTPATIENT)
Dept: FAMILY MEDICINE | Facility: CLINIC | Age: 71
End: 2021-02-01
Payer: MEDICARE

## 2021-02-01 DIAGNOSIS — Z01.818 PREOP TESTING: ICD-10-CM

## 2021-02-01 PROCEDURE — U0003 INFECTIOUS AGENT DETECTION BY NUCLEIC ACID (DNA OR RNA); SEVERE ACUTE RESPIRATORY SYNDROME CORONAVIRUS 2 (SARS-COV-2) (CORONAVIRUS DISEASE [COVID-19]), AMPLIFIED PROBE TECHNIQUE, MAKING USE OF HIGH THROUGHPUT TECHNOLOGIES AS DESCRIBED BY CMS-2020-01-R: HCPCS

## 2021-02-02 LAB — SARS-COV-2 RNA RESP QL NAA+PROBE: NOT DETECTED

## 2021-02-03 ENCOUNTER — PATIENT MESSAGE (OUTPATIENT)
Dept: SLEEP MEDICINE | Facility: CLINIC | Age: 71
End: 2021-02-03

## 2021-02-03 DIAGNOSIS — G47.00 INSOMNIA, UNSPECIFIED TYPE: Primary | ICD-10-CM

## 2021-02-03 RX ORDER — ZOLPIDEM TARTRATE 5 MG/1
5 TABLET ORAL NIGHTLY PRN
Qty: 30 TABLET | Refills: 3 | Status: SHIPPED | OUTPATIENT
Start: 2021-02-03 | End: 2021-06-10 | Stop reason: SDUPTHER

## 2021-02-04 ENCOUNTER — ANESTHESIA (OUTPATIENT)
Dept: SURGERY | Facility: HOSPITAL | Age: 71
End: 2021-02-04
Payer: MEDICARE

## 2021-02-04 ENCOUNTER — HOSPITAL ENCOUNTER (OUTPATIENT)
Facility: HOSPITAL | Age: 71
Discharge: HOME | End: 2021-02-04
Attending: STUDENT IN AN ORGANIZED HEALTH CARE EDUCATION/TRAINING PROGRAM | Admitting: STUDENT IN AN ORGANIZED HEALTH CARE EDUCATION/TRAINING PROGRAM
Payer: MEDICARE

## 2021-02-04 VITALS
OXYGEN SATURATION: 96 % | RESPIRATION RATE: 20 BRPM | WEIGHT: 315 LBS | SYSTOLIC BLOOD PRESSURE: 138 MMHG | DIASTOLIC BLOOD PRESSURE: 84 MMHG | BODY MASS INDEX: 40.43 KG/M2 | HEART RATE: 77 BPM | TEMPERATURE: 99 F | HEIGHT: 74 IN

## 2021-02-04 DIAGNOSIS — K80.50 BILIARY COLIC: Primary | ICD-10-CM

## 2021-02-04 PROCEDURE — 63600175 PHARM REV CODE 636 W HCPCS: Performed by: STUDENT IN AN ORGANIZED HEALTH CARE EDUCATION/TRAINING PROGRAM

## 2021-02-04 PROCEDURE — 25000003 PHARM REV CODE 250: Performed by: ANESTHESIOLOGY

## 2021-02-04 PROCEDURE — 63600175 PHARM REV CODE 636 W HCPCS: Performed by: NURSE PRACTITIONER

## 2021-02-04 PROCEDURE — 47562 LAPAROSCOPIC CHOLECYSTECTOMY: CPT | Mod: ,,, | Performed by: STUDENT IN AN ORGANIZED HEALTH CARE EDUCATION/TRAINING PROGRAM

## 2021-02-04 PROCEDURE — 71000015 HC POSTOP RECOV 1ST HR: Performed by: STUDENT IN AN ORGANIZED HEALTH CARE EDUCATION/TRAINING PROGRAM

## 2021-02-04 PROCEDURE — 27201423 OPTIME MED/SURG SUP & DEVICES STERILE SUPPLY: Performed by: STUDENT IN AN ORGANIZED HEALTH CARE EDUCATION/TRAINING PROGRAM

## 2021-02-04 PROCEDURE — 36000711: Performed by: STUDENT IN AN ORGANIZED HEALTH CARE EDUCATION/TRAINING PROGRAM

## 2021-02-04 PROCEDURE — 36000710: Performed by: STUDENT IN AN ORGANIZED HEALTH CARE EDUCATION/TRAINING PROGRAM

## 2021-02-04 PROCEDURE — 37000008 HC ANESTHESIA 1ST 15 MINUTES: Performed by: STUDENT IN AN ORGANIZED HEALTH CARE EDUCATION/TRAINING PROGRAM

## 2021-02-04 PROCEDURE — 88304 PR  SURG PATH,LEVEL III: ICD-10-PCS | Mod: 26,,, | Performed by: PATHOLOGY

## 2021-02-04 PROCEDURE — 88304 TISSUE EXAM BY PATHOLOGIST: CPT | Mod: 26,,, | Performed by: PATHOLOGY

## 2021-02-04 PROCEDURE — 63600175 PHARM REV CODE 636 W HCPCS: Performed by: ANESTHESIOLOGY

## 2021-02-04 PROCEDURE — 71000016 HC POSTOP RECOV ADDL HR: Performed by: STUDENT IN AN ORGANIZED HEALTH CARE EDUCATION/TRAINING PROGRAM

## 2021-02-04 PROCEDURE — 63600175 PHARM REV CODE 636 W HCPCS: Performed by: NURSE ANESTHETIST, CERTIFIED REGISTERED

## 2021-02-04 PROCEDURE — 71000033 HC RECOVERY, INTIAL HOUR: Performed by: STUDENT IN AN ORGANIZED HEALTH CARE EDUCATION/TRAINING PROGRAM

## 2021-02-04 PROCEDURE — 47562 PR LAP,CHOLECYSTECTOMY: ICD-10-PCS | Mod: ,,, | Performed by: STUDENT IN AN ORGANIZED HEALTH CARE EDUCATION/TRAINING PROGRAM

## 2021-02-04 PROCEDURE — 71000039 HC RECOVERY, EACH ADD'L HOUR: Performed by: STUDENT IN AN ORGANIZED HEALTH CARE EDUCATION/TRAINING PROGRAM

## 2021-02-04 PROCEDURE — P9045 ALBUMIN (HUMAN), 5%, 250 ML: HCPCS | Mod: JG | Performed by: NURSE ANESTHETIST, CERTIFIED REGISTERED

## 2021-02-04 PROCEDURE — 25000003 PHARM REV CODE 250: Performed by: NURSE ANESTHETIST, CERTIFIED REGISTERED

## 2021-02-04 PROCEDURE — 37000009 HC ANESTHESIA EA ADD 15 MINS: Performed by: STUDENT IN AN ORGANIZED HEALTH CARE EDUCATION/TRAINING PROGRAM

## 2021-02-04 PROCEDURE — 88304 TISSUE EXAM BY PATHOLOGIST: CPT | Performed by: PATHOLOGY

## 2021-02-04 RX ORDER — SODIUM CHLORIDE, SODIUM LACTATE, POTASSIUM CHLORIDE, CALCIUM CHLORIDE 600; 310; 30; 20 MG/100ML; MG/100ML; MG/100ML; MG/100ML
INJECTION, SOLUTION INTRAVENOUS CONTINUOUS
Status: DISCONTINUED | OUTPATIENT
Start: 2021-02-04 | End: 2021-02-04 | Stop reason: HOSPADM

## 2021-02-04 RX ORDER — CEFAZOLIN SODIUM 2 G/50ML
2 SOLUTION INTRAVENOUS
Status: COMPLETED | OUTPATIENT
Start: 2021-02-04 | End: 2021-02-04

## 2021-02-04 RX ORDER — FENTANYL CITRATE 50 UG/ML
INJECTION, SOLUTION INTRAMUSCULAR; INTRAVENOUS
Status: DISCONTINUED | OUTPATIENT
Start: 2021-02-04 | End: 2021-02-04

## 2021-02-04 RX ORDER — PROPOFOL 10 MG/ML
VIAL (ML) INTRAVENOUS
Status: DISCONTINUED | OUTPATIENT
Start: 2021-02-04 | End: 2021-02-04

## 2021-02-04 RX ORDER — DEXAMETHASONE SODIUM PHOSPHATE 4 MG/ML
INJECTION, SOLUTION INTRA-ARTICULAR; INTRALESIONAL; INTRAMUSCULAR; INTRAVENOUS; SOFT TISSUE
Status: DISCONTINUED | OUTPATIENT
Start: 2021-02-04 | End: 2021-02-04

## 2021-02-04 RX ORDER — ROCURONIUM BROMIDE 10 MG/ML
INJECTION, SOLUTION INTRAVENOUS
Status: DISCONTINUED | OUTPATIENT
Start: 2021-02-04 | End: 2021-02-04

## 2021-02-04 RX ORDER — PHENYLEPHRINE HYDROCHLORIDE 10 MG/ML
INJECTION INTRAVENOUS
Status: DISCONTINUED | OUTPATIENT
Start: 2021-02-04 | End: 2021-02-04

## 2021-02-04 RX ORDER — ONDANSETRON 2 MG/ML
INJECTION INTRAMUSCULAR; INTRAVENOUS
Status: DISCONTINUED | OUTPATIENT
Start: 2021-02-04 | End: 2021-02-04

## 2021-02-04 RX ORDER — SUCCINYLCHOLINE CHLORIDE 20 MG/ML
INJECTION INTRAMUSCULAR; INTRAVENOUS
Status: DISCONTINUED | OUTPATIENT
Start: 2021-02-04 | End: 2021-02-04

## 2021-02-04 RX ORDER — HYDROCODONE BITARTRATE AND ACETAMINOPHEN 5; 325 MG/1; MG/1
1 TABLET ORAL EVERY 4 HOURS PRN
Qty: 10 TABLET | Refills: 0 | Status: SHIPPED | OUTPATIENT
Start: 2021-02-04 | End: 2021-02-07 | Stop reason: SDUPTHER

## 2021-02-04 RX ORDER — ONDANSETRON 2 MG/ML
4 INJECTION INTRAMUSCULAR; INTRAVENOUS DAILY PRN
Status: DISCONTINUED | OUTPATIENT
Start: 2021-02-04 | End: 2021-02-04 | Stop reason: HOSPADM

## 2021-02-04 RX ORDER — LIDOCAINE HYDROCHLORIDE 10 MG/ML
1 INJECTION, SOLUTION EPIDURAL; INFILTRATION; INTRACAUDAL; PERINEURAL ONCE
Status: DISCONTINUED | OUTPATIENT
Start: 2021-02-04 | End: 2021-02-04 | Stop reason: HOSPADM

## 2021-02-04 RX ORDER — OXYCODONE HYDROCHLORIDE 5 MG/1
5 TABLET ORAL
Status: DISCONTINUED | OUTPATIENT
Start: 2021-02-04 | End: 2021-02-04 | Stop reason: HOSPADM

## 2021-02-04 RX ORDER — ACETAMINOPHEN 10 MG/ML
INJECTION, SOLUTION INTRAVENOUS
Status: DISCONTINUED | OUTPATIENT
Start: 2021-02-04 | End: 2021-02-04

## 2021-02-04 RX ORDER — ALBUMIN HUMAN 50 G/1000ML
SOLUTION INTRAVENOUS CONTINUOUS PRN
Status: DISCONTINUED | OUTPATIENT
Start: 2021-02-04 | End: 2021-02-04

## 2021-02-04 RX ORDER — AMOXICILLIN 250 MG
1 CAPSULE ORAL 2 TIMES DAILY
COMMUNITY
Start: 2021-02-04 | End: 2021-09-09

## 2021-02-04 RX ORDER — HYDROCODONE BITARTRATE AND ACETAMINOPHEN 5; 325 MG/1; MG/1
1 TABLET ORAL EVERY 4 HOURS PRN
Status: DISCONTINUED | OUTPATIENT
Start: 2021-02-04 | End: 2021-02-04 | Stop reason: HOSPADM

## 2021-02-04 RX ORDER — LIDOCAINE HYDROCHLORIDE 20 MG/ML
INJECTION INTRAVENOUS
Status: DISCONTINUED | OUTPATIENT
Start: 2021-02-04 | End: 2021-02-04

## 2021-02-04 RX ORDER — NEOSTIGMINE METHYLSULFATE 1 MG/ML
INJECTION, SOLUTION INTRAVENOUS
Status: DISCONTINUED | OUTPATIENT
Start: 2021-02-04 | End: 2021-02-04

## 2021-02-04 RX ORDER — BUPIVACAINE HYDROCHLORIDE 5 MG/ML
INJECTION, SOLUTION PERINEURAL
Status: DISCONTINUED | OUTPATIENT
Start: 2021-02-04 | End: 2021-02-04 | Stop reason: HOSPADM

## 2021-02-04 RX ORDER — HYDROMORPHONE HYDROCHLORIDE 2 MG/ML
0.5 INJECTION, SOLUTION INTRAMUSCULAR; INTRAVENOUS; SUBCUTANEOUS EVERY 5 MIN PRN
Status: COMPLETED | OUTPATIENT
Start: 2021-02-04 | End: 2021-02-04

## 2021-02-04 RX ORDER — KETOROLAC TROMETHAMINE 30 MG/ML
30 INJECTION, SOLUTION INTRAMUSCULAR; INTRAVENOUS ONCE
Status: COMPLETED | OUTPATIENT
Start: 2021-02-04 | End: 2021-02-04

## 2021-02-04 RX ADMIN — PHENYLEPHRINE HYDROCHLORIDE 200 MCG: 10 INJECTION INTRAVENOUS at 07:02

## 2021-02-04 RX ADMIN — ONDANSETRON 4 MG: 2 INJECTION, SOLUTION INTRAMUSCULAR; INTRAVENOUS at 08:02

## 2021-02-04 RX ADMIN — SODIUM CHLORIDE, SODIUM LACTATE, POTASSIUM CHLORIDE, AND CALCIUM CHLORIDE: .6; .31; .03; .02 INJECTION, SOLUTION INTRAVENOUS at 06:02

## 2021-02-04 RX ADMIN — FENTANYL CITRATE 25 MCG: 50 INJECTION, SOLUTION INTRAMUSCULAR; INTRAVENOUS at 07:02

## 2021-02-04 RX ADMIN — HYDROMORPHONE HYDROCHLORIDE 0.5 MG: 2 INJECTION, SOLUTION INTRAMUSCULAR; INTRAVENOUS; SUBCUTANEOUS at 09:02

## 2021-02-04 RX ADMIN — FENTANYL CITRATE 50 MCG: 50 INJECTION, SOLUTION INTRAMUSCULAR; INTRAVENOUS at 06:02

## 2021-02-04 RX ADMIN — NEOSTIGMINE METHYLSULFATE 5 MG: 1 INJECTION INTRAVENOUS at 08:02

## 2021-02-04 RX ADMIN — OXYCODONE HYDROCHLORIDE 5 MG: 5 TABLET ORAL at 09:02

## 2021-02-04 RX ADMIN — PHENYLEPHRINE HYDROCHLORIDE 100 MCG: 10 INJECTION INTRAVENOUS at 07:02

## 2021-02-04 RX ADMIN — HYDROMORPHONE HYDROCHLORIDE 0.5 MG: 2 INJECTION, SOLUTION INTRAMUSCULAR; INTRAVENOUS; SUBCUTANEOUS at 10:02

## 2021-02-04 RX ADMIN — ROCURONIUM BROMIDE 5 MG: 10 INJECTION, SOLUTION INTRAVENOUS at 07:02

## 2021-02-04 RX ADMIN — ONDANSETRON 4 MG: 2 INJECTION INTRAMUSCULAR; INTRAVENOUS at 11:02

## 2021-02-04 RX ADMIN — PROMETHAZINE HYDROCHLORIDE 6.25 MG: 25 INJECTION INTRAMUSCULAR; INTRAVENOUS at 12:02

## 2021-02-04 RX ADMIN — SUCCINYLCHOLINE CHLORIDE 140 MG: 20 INJECTION, SOLUTION INTRAMUSCULAR; INTRAVENOUS at 07:02

## 2021-02-04 RX ADMIN — ACETAMINOPHEN 1000 MG: 10 INJECTION, SOLUTION INTRAVENOUS at 07:02

## 2021-02-04 RX ADMIN — FENTANYL CITRATE 100 MCG: 50 INJECTION, SOLUTION INTRAMUSCULAR; INTRAVENOUS at 07:02

## 2021-02-04 RX ADMIN — CEFAZOLIN SODIUM 2 G: 2 SOLUTION INTRAVENOUS at 07:02

## 2021-02-04 RX ADMIN — SODIUM CHLORIDE, SODIUM LACTATE, POTASSIUM CHLORIDE, AND CALCIUM CHLORIDE: .6; .31; .03; .02 INJECTION, SOLUTION INTRAVENOUS at 08:02

## 2021-02-04 RX ADMIN — PROPOFOL 200 MG: 10 INJECTION, EMULSION INTRAVENOUS at 07:02

## 2021-02-04 RX ADMIN — DEXAMETHASONE SODIUM PHOSPHATE 12 MG: 4 INJECTION, SOLUTION INTRA-ARTICULAR; INTRALESIONAL; INTRAMUSCULAR; INTRAVENOUS; SOFT TISSUE at 07:02

## 2021-02-04 RX ADMIN — FENTANYL CITRATE 25 MCG: 50 INJECTION, SOLUTION INTRAMUSCULAR; INTRAVENOUS at 08:02

## 2021-02-04 RX ADMIN — GLYCOPYRROLATE 0.8 MG: 0.2 INJECTION, SOLUTION INTRAMUSCULAR; INTRAVITREAL at 08:02

## 2021-02-04 RX ADMIN — KETOROLAC TROMETHAMINE 30 MG: 30 INJECTION, SOLUTION INTRAMUSCULAR at 10:02

## 2021-02-04 RX ADMIN — GLYCOPYRROLATE 0.2 MG: 0.2 INJECTION, SOLUTION INTRAMUSCULAR; INTRAVITREAL at 07:02

## 2021-02-04 RX ADMIN — ROCURONIUM BROMIDE 10 MG: 10 INJECTION, SOLUTION INTRAVENOUS at 07:02

## 2021-02-04 RX ADMIN — ROCURONIUM BROMIDE 25 MG: 10 INJECTION, SOLUTION INTRAVENOUS at 07:02

## 2021-02-04 RX ADMIN — ALBUMIN (HUMAN): 12.5 SOLUTION INTRAVENOUS at 07:02

## 2021-02-04 RX ADMIN — CALCIUM CHLORIDE 0.5 G: 100 INJECTION, SOLUTION INTRAVENOUS at 07:02

## 2021-02-04 RX ADMIN — LIDOCAINE HYDROCHLORIDE 100 MG: 20 INJECTION, SOLUTION INTRAVENOUS at 07:02

## 2021-02-05 ENCOUNTER — OFFICE VISIT (OUTPATIENT)
Dept: SURGERY | Facility: CLINIC | Age: 71
End: 2021-02-05
Payer: MEDICARE

## 2021-02-05 ENCOUNTER — PATIENT MESSAGE (OUTPATIENT)
Dept: SURGERY | Facility: CLINIC | Age: 71
End: 2021-02-05

## 2021-02-05 ENCOUNTER — TELEPHONE (OUTPATIENT)
Dept: UROLOGY | Facility: CLINIC | Age: 71
End: 2021-02-05

## 2021-02-05 VITALS
BODY MASS INDEX: 40.43 KG/M2 | DIASTOLIC BLOOD PRESSURE: 71 MMHG | HEIGHT: 74 IN | SYSTOLIC BLOOD PRESSURE: 98 MMHG | WEIGHT: 315 LBS | HEART RATE: 82 BPM

## 2021-02-05 DIAGNOSIS — Z90.49 S/P LAPAROSCOPIC CHOLECYSTECTOMY: Primary | ICD-10-CM

## 2021-02-05 PROCEDURE — 1101F PR PT FALLS ASSESS DOC 0-1 FALLS W/OUT INJ PAST YR: ICD-10-PCS | Mod: CPTII,S$GLB,, | Performed by: STUDENT IN AN ORGANIZED HEALTH CARE EDUCATION/TRAINING PROGRAM

## 2021-02-05 PROCEDURE — 99999 PR PBB SHADOW E&M-EST. PATIENT-LVL III: CPT | Mod: PBBFAC,,, | Performed by: STUDENT IN AN ORGANIZED HEALTH CARE EDUCATION/TRAINING PROGRAM

## 2021-02-05 PROCEDURE — 3008F BODY MASS INDEX DOCD: CPT | Mod: CPTII,S$GLB,, | Performed by: STUDENT IN AN ORGANIZED HEALTH CARE EDUCATION/TRAINING PROGRAM

## 2021-02-05 PROCEDURE — 3288F PR FALLS RISK ASSESSMENT DOCUMENTED: ICD-10-PCS | Mod: CPTII,S$GLB,, | Performed by: STUDENT IN AN ORGANIZED HEALTH CARE EDUCATION/TRAINING PROGRAM

## 2021-02-05 PROCEDURE — 99024 POSTOP FOLLOW-UP VISIT: CPT | Mod: S$GLB,,, | Performed by: STUDENT IN AN ORGANIZED HEALTH CARE EDUCATION/TRAINING PROGRAM

## 2021-02-05 PROCEDURE — 1101F PT FALLS ASSESS-DOCD LE1/YR: CPT | Mod: CPTII,S$GLB,, | Performed by: STUDENT IN AN ORGANIZED HEALTH CARE EDUCATION/TRAINING PROGRAM

## 2021-02-05 PROCEDURE — 3288F FALL RISK ASSESSMENT DOCD: CPT | Mod: CPTII,S$GLB,, | Performed by: STUDENT IN AN ORGANIZED HEALTH CARE EDUCATION/TRAINING PROGRAM

## 2021-02-05 PROCEDURE — 99999 PR PBB SHADOW E&M-EST. PATIENT-LVL III: ICD-10-PCS | Mod: PBBFAC,,, | Performed by: STUDENT IN AN ORGANIZED HEALTH CARE EDUCATION/TRAINING PROGRAM

## 2021-02-05 PROCEDURE — 1125F AMNT PAIN NOTED PAIN PRSNT: CPT | Mod: S$GLB,,, | Performed by: STUDENT IN AN ORGANIZED HEALTH CARE EDUCATION/TRAINING PROGRAM

## 2021-02-05 PROCEDURE — 3008F PR BODY MASS INDEX (BMI) DOCUMENTED: ICD-10-PCS | Mod: CPTII,S$GLB,, | Performed by: STUDENT IN AN ORGANIZED HEALTH CARE EDUCATION/TRAINING PROGRAM

## 2021-02-05 PROCEDURE — 1125F PR PAIN SEVERITY QUANTIFIED, PAIN PRESENT: ICD-10-PCS | Mod: S$GLB,,, | Performed by: STUDENT IN AN ORGANIZED HEALTH CARE EDUCATION/TRAINING PROGRAM

## 2021-02-05 PROCEDURE — 99024 PR POST-OP FOLLOW-UP VISIT: ICD-10-PCS | Mod: S$GLB,,, | Performed by: STUDENT IN AN ORGANIZED HEALTH CARE EDUCATION/TRAINING PROGRAM

## 2021-02-05 RX ORDER — ONDANSETRON HYDROCHLORIDE 8 MG/1
8 TABLET, FILM COATED ORAL EVERY 8 HOURS PRN
Qty: 20 TABLET | Refills: 1 | Status: SHIPPED | OUTPATIENT
Start: 2021-02-05 | End: 2021-02-07 | Stop reason: SDUPTHER

## 2021-02-07 RX ORDER — ONDANSETRON HYDROCHLORIDE 8 MG/1
8 TABLET, FILM COATED ORAL EVERY 8 HOURS PRN
Qty: 20 TABLET | Refills: 1 | Status: SHIPPED | OUTPATIENT
Start: 2021-02-07 | End: 2021-05-06

## 2021-02-07 RX ORDER — HYDROCODONE BITARTRATE AND ACETAMINOPHEN 5; 325 MG/1; MG/1
1 TABLET ORAL EVERY 4 HOURS PRN
Qty: 10 TABLET | Refills: 0 | Status: SHIPPED | OUTPATIENT
Start: 2021-02-07 | End: 2021-05-06

## 2021-02-08 ENCOUNTER — TELEPHONE (OUTPATIENT)
Dept: UROLOGY | Facility: CLINIC | Age: 71
End: 2021-02-08

## 2021-02-08 LAB
FINAL PATHOLOGIC DIAGNOSIS: NORMAL
GROSS: NORMAL
Lab: NORMAL

## 2021-02-10 ENCOUNTER — TELEPHONE (OUTPATIENT)
Dept: UROLOGY | Facility: CLINIC | Age: 71
End: 2021-02-10

## 2021-02-11 ENCOUNTER — OFFICE VISIT (OUTPATIENT)
Dept: UROLOGY | Facility: CLINIC | Age: 71
End: 2021-02-11
Payer: MEDICARE

## 2021-02-11 ENCOUNTER — TELEPHONE (OUTPATIENT)
Dept: UROLOGY | Facility: CLINIC | Age: 71
End: 2021-02-11

## 2021-02-11 VITALS — DIASTOLIC BLOOD PRESSURE: 75 MMHG | HEART RATE: 83 BPM | SYSTOLIC BLOOD PRESSURE: 116 MMHG

## 2021-02-11 DIAGNOSIS — N13.8 BPH WITH OBSTRUCTION/LOWER URINARY TRACT SYMPTOMS: ICD-10-CM

## 2021-02-11 DIAGNOSIS — R33.9 URINARY RETENTION: Primary | ICD-10-CM

## 2021-02-11 DIAGNOSIS — N40.1 BPH WITH OBSTRUCTION/LOWER URINARY TRACT SYMPTOMS: ICD-10-CM

## 2021-02-11 PROCEDURE — 3074F SYST BP LT 130 MM HG: CPT | Mod: CPTII,S$GLB,, | Performed by: NURSE PRACTITIONER

## 2021-02-11 PROCEDURE — 99213 OFFICE O/P EST LOW 20 MIN: CPT | Mod: 25,S$GLB,, | Performed by: NURSE PRACTITIONER

## 2021-02-11 PROCEDURE — 51700 PR IRRIGATION, BLADDER: ICD-10-PCS | Mod: S$GLB,,, | Performed by: NURSE PRACTITIONER

## 2021-02-11 PROCEDURE — 3074F PR MOST RECENT SYSTOLIC BLOOD PRESSURE < 130 MM HG: ICD-10-PCS | Mod: CPTII,S$GLB,, | Performed by: NURSE PRACTITIONER

## 2021-02-11 PROCEDURE — 99999 PR PBB SHADOW E&M-EST. PATIENT-LVL IV: CPT | Mod: PBBFAC,,, | Performed by: NURSE PRACTITIONER

## 2021-02-11 PROCEDURE — 99999 PR PBB SHADOW E&M-EST. PATIENT-LVL IV: ICD-10-PCS | Mod: PBBFAC,,, | Performed by: NURSE PRACTITIONER

## 2021-02-11 PROCEDURE — 99213 PR OFFICE/OUTPT VISIT, EST, LEVL III, 20-29 MIN: ICD-10-PCS | Mod: 25,S$GLB,, | Performed by: NURSE PRACTITIONER

## 2021-02-11 PROCEDURE — 1159F PR MEDICATION LIST DOCUMENTED IN MEDICAL RECORD: ICD-10-PCS | Mod: S$GLB,,, | Performed by: NURSE PRACTITIONER

## 2021-02-11 PROCEDURE — 1125F AMNT PAIN NOTED PAIN PRSNT: CPT | Mod: S$GLB,,, | Performed by: NURSE PRACTITIONER

## 2021-02-11 PROCEDURE — 1159F MED LIST DOCD IN RCRD: CPT | Mod: S$GLB,,, | Performed by: NURSE PRACTITIONER

## 2021-02-11 PROCEDURE — 3078F PR MOST RECENT DIASTOLIC BLOOD PRESSURE < 80 MM HG: ICD-10-PCS | Mod: CPTII,S$GLB,, | Performed by: NURSE PRACTITIONER

## 2021-02-11 PROCEDURE — 3078F DIAST BP <80 MM HG: CPT | Mod: CPTII,S$GLB,, | Performed by: NURSE PRACTITIONER

## 2021-02-11 PROCEDURE — 1125F PR PAIN SEVERITY QUANTIFIED, PAIN PRESENT: ICD-10-PCS | Mod: S$GLB,,, | Performed by: NURSE PRACTITIONER

## 2021-02-11 PROCEDURE — 51700 IRRIGATION OF BLADDER: CPT | Mod: S$GLB,,, | Performed by: NURSE PRACTITIONER

## 2021-02-15 ENCOUNTER — PATIENT MESSAGE (OUTPATIENT)
Dept: UROLOGY | Facility: CLINIC | Age: 71
End: 2021-02-15

## 2021-02-17 ENCOUNTER — OFFICE VISIT (OUTPATIENT)
Dept: SURGERY | Facility: CLINIC | Age: 71
End: 2021-02-17
Payer: MEDICARE

## 2021-02-17 VITALS
SYSTOLIC BLOOD PRESSURE: 121 MMHG | WEIGHT: 315 LBS | BODY MASS INDEX: 40.43 KG/M2 | HEIGHT: 74 IN | HEART RATE: 60 BPM | DIASTOLIC BLOOD PRESSURE: 69 MMHG

## 2021-02-17 DIAGNOSIS — Z90.49 S/P LAPAROSCOPIC CHOLECYSTECTOMY: Primary | ICD-10-CM

## 2021-02-17 PROCEDURE — 99999 PR PBB SHADOW E&M-EST. PATIENT-LVL IV: CPT | Mod: PBBFAC,,, | Performed by: STUDENT IN AN ORGANIZED HEALTH CARE EDUCATION/TRAINING PROGRAM

## 2021-02-17 PROCEDURE — 1126F AMNT PAIN NOTED NONE PRSNT: CPT | Mod: S$GLB,,, | Performed by: STUDENT IN AN ORGANIZED HEALTH CARE EDUCATION/TRAINING PROGRAM

## 2021-02-17 PROCEDURE — 99024 POSTOP FOLLOW-UP VISIT: CPT | Mod: S$GLB,,, | Performed by: STUDENT IN AN ORGANIZED HEALTH CARE EDUCATION/TRAINING PROGRAM

## 2021-02-17 PROCEDURE — 99024 PR POST-OP FOLLOW-UP VISIT: ICD-10-PCS | Mod: S$GLB,,, | Performed by: STUDENT IN AN ORGANIZED HEALTH CARE EDUCATION/TRAINING PROGRAM

## 2021-02-17 PROCEDURE — 3008F BODY MASS INDEX DOCD: CPT | Mod: CPTII,S$GLB,, | Performed by: STUDENT IN AN ORGANIZED HEALTH CARE EDUCATION/TRAINING PROGRAM

## 2021-02-17 PROCEDURE — 99999 PR PBB SHADOW E&M-EST. PATIENT-LVL IV: ICD-10-PCS | Mod: PBBFAC,,, | Performed by: STUDENT IN AN ORGANIZED HEALTH CARE EDUCATION/TRAINING PROGRAM

## 2021-02-17 PROCEDURE — 1101F PR PT FALLS ASSESS DOC 0-1 FALLS W/OUT INJ PAST YR: ICD-10-PCS | Mod: CPTII,S$GLB,, | Performed by: STUDENT IN AN ORGANIZED HEALTH CARE EDUCATION/TRAINING PROGRAM

## 2021-02-17 PROCEDURE — 1126F PR PAIN SEVERITY QUANTIFIED, NO PAIN PRESENT: ICD-10-PCS | Mod: S$GLB,,, | Performed by: STUDENT IN AN ORGANIZED HEALTH CARE EDUCATION/TRAINING PROGRAM

## 2021-02-17 PROCEDURE — 3288F PR FALLS RISK ASSESSMENT DOCUMENTED: ICD-10-PCS | Mod: CPTII,S$GLB,, | Performed by: STUDENT IN AN ORGANIZED HEALTH CARE EDUCATION/TRAINING PROGRAM

## 2021-02-17 PROCEDURE — 3008F PR BODY MASS INDEX (BMI) DOCUMENTED: ICD-10-PCS | Mod: CPTII,S$GLB,, | Performed by: STUDENT IN AN ORGANIZED HEALTH CARE EDUCATION/TRAINING PROGRAM

## 2021-02-17 PROCEDURE — 1101F PT FALLS ASSESS-DOCD LE1/YR: CPT | Mod: CPTII,S$GLB,, | Performed by: STUDENT IN AN ORGANIZED HEALTH CARE EDUCATION/TRAINING PROGRAM

## 2021-02-17 PROCEDURE — 3288F FALL RISK ASSESSMENT DOCD: CPT | Mod: CPTII,S$GLB,, | Performed by: STUDENT IN AN ORGANIZED HEALTH CARE EDUCATION/TRAINING PROGRAM

## 2021-02-23 ENCOUNTER — OFFICE VISIT (OUTPATIENT)
Dept: UROLOGY | Facility: CLINIC | Age: 71
End: 2021-02-23
Payer: MEDICARE

## 2021-02-23 VITALS
TEMPERATURE: 98 F | SYSTOLIC BLOOD PRESSURE: 101 MMHG | BODY MASS INDEX: 41.44 KG/M2 | HEART RATE: 58 BPM | HEIGHT: 74 IN | DIASTOLIC BLOOD PRESSURE: 70 MMHG

## 2021-02-23 DIAGNOSIS — N13.8 BPH WITH OBSTRUCTION/LOWER URINARY TRACT SYMPTOMS: ICD-10-CM

## 2021-02-23 DIAGNOSIS — N40.1 BPH WITH OBSTRUCTION/LOWER URINARY TRACT SYMPTOMS: ICD-10-CM

## 2021-02-23 DIAGNOSIS — R35.0 URINARY FREQUENCY: Primary | ICD-10-CM

## 2021-02-23 DIAGNOSIS — R30.0 DYSURIA: ICD-10-CM

## 2021-02-23 LAB
AMORPH CRY UR QL COMP ASSIST: ABNORMAL
BACTERIA #/AREA URNS AUTO: ABNORMAL /HPF
BILIRUB SERPL-MCNC: ABNORMAL MG/DL
BILIRUB UR QL STRIP: NEGATIVE
BLOOD URINE, POC: 250
CLARITY UR REFRACT.AUTO: ABNORMAL
CLARITY, POC UA: ABNORMAL
COLOR UR AUTO: ABNORMAL
COLOR, POC UA: YELLOW
GLUCOSE UR QL STRIP: ABNORMAL
GLUCOSE UR QL STRIP: NEGATIVE
HGB UR QL STRIP: NEGATIVE
HYALINE CASTS UR QL AUTO: 0 /LPF
KETONES UR QL STRIP: ABNORMAL
KETONES UR QL STRIP: NEGATIVE
LEUKOCYTE ESTERASE UR QL STRIP: ABNORMAL
LEUKOCYTE ESTERASE URINE, POC: ABNORMAL
MICROSCOPIC COMMENT: ABNORMAL
NITRITE UR QL STRIP: POSITIVE
NITRITE, POC UA: ABNORMAL
PH UR STRIP: >8 [PH] (ref 5–8)
PH, POC UA: 8
POC RESIDUAL URINE VOLUME: 2 ML (ref 0–100)
PROT UR QL STRIP: ABNORMAL
PROTEIN, POC: ABNORMAL
RBC #/AREA URNS AUTO: 0 /HPF (ref 0–4)
SP GR UR STRIP: 1.01 (ref 1–1.03)
SPECIFIC GRAVITY, POC UA: 1
URN SPEC COLLECT METH UR: ABNORMAL
UROBILINOGEN, POC UA: ABNORMAL
WBC #/AREA URNS AUTO: >100 /HPF (ref 0–5)

## 2021-02-23 PROCEDURE — 51798 US URINE CAPACITY MEASURE: CPT | Mod: S$GLB,,, | Performed by: NURSE PRACTITIONER

## 2021-02-23 PROCEDURE — 81002 POCT URINE DIPSTICK WITHOUT MICROSCOPE: ICD-10-PCS | Mod: S$GLB,,, | Performed by: NURSE PRACTITIONER

## 2021-02-23 PROCEDURE — 87088 URINE BACTERIA CULTURE: CPT

## 2021-02-23 PROCEDURE — 81002 URINALYSIS NONAUTO W/O SCOPE: CPT | Mod: S$GLB,,, | Performed by: NURSE PRACTITIONER

## 2021-02-23 PROCEDURE — 3074F SYST BP LT 130 MM HG: CPT | Mod: CPTII,S$GLB,, | Performed by: NURSE PRACTITIONER

## 2021-02-23 PROCEDURE — 87086 URINE CULTURE/COLONY COUNT: CPT

## 2021-02-23 PROCEDURE — 99999 PR PBB SHADOW E&M-EST. PATIENT-LVL IV: CPT | Mod: PBBFAC,,, | Performed by: NURSE PRACTITIONER

## 2021-02-23 PROCEDURE — 1159F PR MEDICATION LIST DOCUMENTED IN MEDICAL RECORD: ICD-10-PCS | Mod: S$GLB,,, | Performed by: NURSE PRACTITIONER

## 2021-02-23 PROCEDURE — 3078F DIAST BP <80 MM HG: CPT | Mod: CPTII,S$GLB,, | Performed by: NURSE PRACTITIONER

## 2021-02-23 PROCEDURE — 87186 SC STD MICRODIL/AGAR DIL: CPT

## 2021-02-23 PROCEDURE — 3078F PR MOST RECENT DIASTOLIC BLOOD PRESSURE < 80 MM HG: ICD-10-PCS | Mod: CPTII,S$GLB,, | Performed by: NURSE PRACTITIONER

## 2021-02-23 PROCEDURE — 87077 CULTURE AEROBIC IDENTIFY: CPT

## 2021-02-23 PROCEDURE — 81001 URINALYSIS AUTO W/SCOPE: CPT

## 2021-02-23 PROCEDURE — 51798 POCT BLADDER SCAN: ICD-10-PCS | Mod: S$GLB,,, | Performed by: NURSE PRACTITIONER

## 2021-02-23 PROCEDURE — 3288F FALL RISK ASSESSMENT DOCD: CPT | Mod: CPTII,S$GLB,, | Performed by: NURSE PRACTITIONER

## 2021-02-23 PROCEDURE — 3074F PR MOST RECENT SYSTOLIC BLOOD PRESSURE < 130 MM HG: ICD-10-PCS | Mod: CPTII,S$GLB,, | Performed by: NURSE PRACTITIONER

## 2021-02-23 PROCEDURE — 99999 PR PBB SHADOW E&M-EST. PATIENT-LVL IV: ICD-10-PCS | Mod: PBBFAC,,, | Performed by: NURSE PRACTITIONER

## 2021-02-23 PROCEDURE — 99214 OFFICE O/P EST MOD 30 MIN: CPT | Mod: 25,S$GLB,, | Performed by: NURSE PRACTITIONER

## 2021-02-23 PROCEDURE — 1101F PT FALLS ASSESS-DOCD LE1/YR: CPT | Mod: CPTII,S$GLB,, | Performed by: NURSE PRACTITIONER

## 2021-02-23 PROCEDURE — 3008F BODY MASS INDEX DOCD: CPT | Mod: CPTII,S$GLB,, | Performed by: NURSE PRACTITIONER

## 2021-02-23 PROCEDURE — 1159F MED LIST DOCD IN RCRD: CPT | Mod: S$GLB,,, | Performed by: NURSE PRACTITIONER

## 2021-02-23 PROCEDURE — 1125F PR PAIN SEVERITY QUANTIFIED, PAIN PRESENT: ICD-10-PCS | Mod: S$GLB,,, | Performed by: NURSE PRACTITIONER

## 2021-02-23 PROCEDURE — 3008F PR BODY MASS INDEX (BMI) DOCUMENTED: ICD-10-PCS | Mod: CPTII,S$GLB,, | Performed by: NURSE PRACTITIONER

## 2021-02-23 PROCEDURE — 3288F PR FALLS RISK ASSESSMENT DOCUMENTED: ICD-10-PCS | Mod: CPTII,S$GLB,, | Performed by: NURSE PRACTITIONER

## 2021-02-23 PROCEDURE — 99214 PR OFFICE/OUTPT VISIT, EST, LEVL IV, 30-39 MIN: ICD-10-PCS | Mod: 25,S$GLB,, | Performed by: NURSE PRACTITIONER

## 2021-02-23 PROCEDURE — 1125F AMNT PAIN NOTED PAIN PRSNT: CPT | Mod: S$GLB,,, | Performed by: NURSE PRACTITIONER

## 2021-02-23 PROCEDURE — 1101F PR PT FALLS ASSESS DOC 0-1 FALLS W/OUT INJ PAST YR: ICD-10-PCS | Mod: CPTII,S$GLB,, | Performed by: NURSE PRACTITIONER

## 2021-02-23 RX ORDER — AMOXICILLIN AND CLAVULANATE POTASSIUM 875; 125 MG/1; MG/1
1 TABLET, FILM COATED ORAL 2 TIMES DAILY
Qty: 14 TABLET | Refills: 0 | Status: SHIPPED | OUTPATIENT
Start: 2021-02-23 | End: 2021-03-02

## 2021-02-24 ENCOUNTER — PATIENT MESSAGE (OUTPATIENT)
Dept: UROLOGY | Facility: CLINIC | Age: 71
End: 2021-02-24

## 2021-02-26 LAB — BACTERIA UR CULT: ABNORMAL

## 2021-03-05 DIAGNOSIS — I10 ESSENTIAL HYPERTENSION: ICD-10-CM

## 2021-03-09 RX ORDER — HYDROCHLOROTHIAZIDE 25 MG/1
TABLET ORAL
Qty: 90 TABLET | Refills: 1 | Status: SHIPPED | OUTPATIENT
Start: 2021-03-09 | End: 2021-04-07 | Stop reason: SDUPTHER

## 2021-03-11 ENCOUNTER — PATIENT MESSAGE (OUTPATIENT)
Dept: CARDIOLOGY | Facility: CLINIC | Age: 71
End: 2021-03-11

## 2021-03-17 NOTE — PROGRESS NOTES
No readings. Contact deferred to await additional data.    Last 5 Patient Entered Readings                Current 30 Day Average:   Recent Readings 2/4/2021 2/4/2021 2/4/2021 2/1/2021 1/20/2021   SBP (mmHg) 127 104 108 115 113   DBP (mmHg) 85 69 86 73 75   Pulse 82 85 69 72 58

## 2021-03-20 ENCOUNTER — PATIENT MESSAGE (OUTPATIENT)
Dept: INTERNAL MEDICINE | Facility: CLINIC | Age: 71
End: 2021-03-20

## 2021-03-22 RX ORDER — AMLODIPINE BESYLATE 5 MG/1
5 TABLET ORAL DAILY
Qty: 90 TABLET | Refills: 3 | Status: SHIPPED | OUTPATIENT
Start: 2021-03-22 | End: 2021-04-07 | Stop reason: SDUPTHER

## 2021-04-03 ENCOUNTER — PATIENT MESSAGE (OUTPATIENT)
Dept: INTERNAL MEDICINE | Facility: CLINIC | Age: 71
End: 2021-04-03

## 2021-04-06 ENCOUNTER — PATIENT MESSAGE (OUTPATIENT)
Dept: UROLOGY | Facility: CLINIC | Age: 71
End: 2021-04-06

## 2021-04-06 ENCOUNTER — PATIENT MESSAGE (OUTPATIENT)
Dept: INTERNAL MEDICINE | Facility: CLINIC | Age: 71
End: 2021-04-06

## 2021-04-06 DIAGNOSIS — R39.15 URINARY URGENCY: Primary | ICD-10-CM

## 2021-04-06 DIAGNOSIS — R35.1 NOCTURIA: ICD-10-CM

## 2021-04-06 DIAGNOSIS — R35.0 URINE FREQUENCY: ICD-10-CM

## 2021-04-07 DIAGNOSIS — I10 ESSENTIAL HYPERTENSION: ICD-10-CM

## 2021-04-07 DIAGNOSIS — K21.9 GASTROESOPHAGEAL REFLUX DISEASE: ICD-10-CM

## 2021-04-07 RX ORDER — FINASTERIDE 5 MG/1
5 TABLET, FILM COATED ORAL DAILY
Qty: 90 TABLET | Refills: 1 | Status: SHIPPED | OUTPATIENT
Start: 2021-04-07 | End: 2021-09-02

## 2021-04-07 RX ORDER — AMLODIPINE BESYLATE 5 MG/1
5 TABLET ORAL DAILY
Qty: 90 TABLET | Refills: 0 | Status: SHIPPED | OUTPATIENT
Start: 2021-04-07 | End: 2022-04-07

## 2021-04-07 RX ORDER — ATENOLOL 100 MG/1
100 TABLET ORAL DAILY
Qty: 90 TABLET | Refills: 1 | Status: SHIPPED | OUTPATIENT
Start: 2021-04-07 | End: 2021-09-09 | Stop reason: SDUPTHER

## 2021-04-07 RX ORDER — VALSARTAN 320 MG/1
320 TABLET ORAL DAILY
Qty: 90 TABLET | Refills: 1 | Status: SHIPPED | OUTPATIENT
Start: 2021-04-07 | End: 2021-09-20

## 2021-04-07 RX ORDER — DOXAZOSIN 4 MG/1
4 TABLET ORAL NIGHTLY
Qty: 90 TABLET | Refills: 1 | Status: SHIPPED | OUTPATIENT
Start: 2021-04-07 | End: 2021-09-20

## 2021-04-07 RX ORDER — PANTOPRAZOLE SODIUM 40 MG/1
40 TABLET, DELAYED RELEASE ORAL DAILY
Qty: 90 TABLET | Refills: 1 | Status: SHIPPED | OUTPATIENT
Start: 2021-04-07 | End: 2021-10-04

## 2021-04-07 RX ORDER — HYDROCHLOROTHIAZIDE 25 MG/1
25 TABLET ORAL DAILY
Qty: 90 TABLET | Refills: 1 | Status: SHIPPED | OUTPATIENT
Start: 2021-04-07 | End: 2021-09-20

## 2021-04-12 ENCOUNTER — PATIENT MESSAGE (OUTPATIENT)
Dept: CARDIOLOGY | Facility: CLINIC | Age: 71
End: 2021-04-12

## 2021-04-12 ENCOUNTER — TELEPHONE (OUTPATIENT)
Dept: CARDIOLOGY | Facility: CLINIC | Age: 71
End: 2021-04-12

## 2021-04-15 ENCOUNTER — OFFICE VISIT (OUTPATIENT)
Dept: CARDIOLOGY | Facility: CLINIC | Age: 71
End: 2021-04-15
Payer: MEDICARE

## 2021-04-15 VITALS
SYSTOLIC BLOOD PRESSURE: 114 MMHG | BODY MASS INDEX: 40.43 KG/M2 | HEART RATE: 60 BPM | HEIGHT: 74 IN | DIASTOLIC BLOOD PRESSURE: 77 MMHG | OXYGEN SATURATION: 97 % | WEIGHT: 315 LBS

## 2021-04-15 DIAGNOSIS — G47.33 OSA (OBSTRUCTIVE SLEEP APNEA): ICD-10-CM

## 2021-04-15 DIAGNOSIS — I70.0 AORTIC ATHEROSCLEROSIS: ICD-10-CM

## 2021-04-15 DIAGNOSIS — E66.01 MORBID OBESITY: ICD-10-CM

## 2021-04-15 DIAGNOSIS — E78.5 HYPERLIPIDEMIA, UNSPECIFIED HYPERLIPIDEMIA TYPE: ICD-10-CM

## 2021-04-15 DIAGNOSIS — I48.91 ATRIAL FIBRILLATION, UNSPECIFIED TYPE: Primary | ICD-10-CM

## 2021-04-15 DIAGNOSIS — I10 ESSENTIAL HYPERTENSION: ICD-10-CM

## 2021-04-15 PROCEDURE — 1101F PR PT FALLS ASSESS DOC 0-1 FALLS W/OUT INJ PAST YR: ICD-10-PCS | Mod: CPTII,S$GLB,, | Performed by: INTERNAL MEDICINE

## 2021-04-15 PROCEDURE — 99999 PR PBB SHADOW E&M-EST. PATIENT-LVL III: CPT | Mod: PBBFAC,,, | Performed by: INTERNAL MEDICINE

## 2021-04-15 PROCEDURE — 3288F FALL RISK ASSESSMENT DOCD: CPT | Mod: CPTII,S$GLB,, | Performed by: INTERNAL MEDICINE

## 2021-04-15 PROCEDURE — 99499 RISK ADDL DX/OHS AUDIT: ICD-10-PCS | Mod: S$GLB,,, | Performed by: INTERNAL MEDICINE

## 2021-04-15 PROCEDURE — 99215 OFFICE O/P EST HI 40 MIN: CPT | Mod: 25,S$GLB,, | Performed by: INTERNAL MEDICINE

## 2021-04-15 PROCEDURE — 1159F MED LIST DOCD IN RCRD: CPT | Mod: S$GLB,,, | Performed by: INTERNAL MEDICINE

## 2021-04-15 PROCEDURE — 1101F PT FALLS ASSESS-DOCD LE1/YR: CPT | Mod: CPTII,S$GLB,, | Performed by: INTERNAL MEDICINE

## 2021-04-15 PROCEDURE — 1126F AMNT PAIN NOTED NONE PRSNT: CPT | Mod: S$GLB,,, | Performed by: INTERNAL MEDICINE

## 2021-04-15 PROCEDURE — 99215 PR OFFICE/OUTPT VISIT, EST, LEVL V, 40-54 MIN: ICD-10-PCS | Mod: 25,S$GLB,, | Performed by: INTERNAL MEDICINE

## 2021-04-15 PROCEDURE — 1126F PR PAIN SEVERITY QUANTIFIED, NO PAIN PRESENT: ICD-10-PCS | Mod: S$GLB,,, | Performed by: INTERNAL MEDICINE

## 2021-04-15 PROCEDURE — 93000 ELECTROCARDIOGRAM COMPLETE: CPT | Mod: S$GLB,,, | Performed by: INTERNAL MEDICINE

## 2021-04-15 PROCEDURE — 3288F PR FALLS RISK ASSESSMENT DOCUMENTED: ICD-10-PCS | Mod: CPTII,S$GLB,, | Performed by: INTERNAL MEDICINE

## 2021-04-15 PROCEDURE — 1159F PR MEDICATION LIST DOCUMENTED IN MEDICAL RECORD: ICD-10-PCS | Mod: S$GLB,,, | Performed by: INTERNAL MEDICINE

## 2021-04-15 PROCEDURE — 99999 PR PBB SHADOW E&M-EST. PATIENT-LVL III: ICD-10-PCS | Mod: PBBFAC,,, | Performed by: INTERNAL MEDICINE

## 2021-04-15 PROCEDURE — 93000 EKG 12-LEAD: ICD-10-PCS | Mod: S$GLB,,, | Performed by: INTERNAL MEDICINE

## 2021-04-15 PROCEDURE — 3008F PR BODY MASS INDEX (BMI) DOCUMENTED: ICD-10-PCS | Mod: CPTII,S$GLB,, | Performed by: INTERNAL MEDICINE

## 2021-04-15 PROCEDURE — 3008F BODY MASS INDEX DOCD: CPT | Mod: CPTII,S$GLB,, | Performed by: INTERNAL MEDICINE

## 2021-04-15 PROCEDURE — 99499 UNLISTED E&M SERVICE: CPT | Mod: S$GLB,,, | Performed by: INTERNAL MEDICINE

## 2021-04-15 RX ORDER — SODIUM CHLORIDE 0.9 % (FLUSH) 0.9 %
10 SYRINGE (ML) INJECTION
Status: DISCONTINUED | OUTPATIENT
Start: 2021-04-15 | End: 2021-05-31

## 2021-04-16 ENCOUNTER — LAB VISIT (OUTPATIENT)
Dept: LAB | Facility: HOSPITAL | Age: 71
End: 2021-04-16
Attending: INTERNAL MEDICINE
Payer: MEDICARE

## 2021-04-16 ENCOUNTER — LAB VISIT (OUTPATIENT)
Dept: FAMILY MEDICINE | Facility: CLINIC | Age: 71
End: 2021-04-16
Payer: MEDICARE

## 2021-04-16 DIAGNOSIS — Z01.818 PRE-OP TESTING: ICD-10-CM

## 2021-04-16 LAB
ANION GAP SERPL CALC-SCNC: 8 MMOL/L (ref 8–16)
BASOPHILS # BLD AUTO: 0.05 K/UL (ref 0–0.2)
BASOPHILS NFR BLD: 0.6 % (ref 0–1.9)
BUN SERPL-MCNC: 17 MG/DL (ref 8–23)
CALCIUM SERPL-MCNC: 8.9 MG/DL (ref 8.7–10.5)
CHLORIDE SERPL-SCNC: 103 MMOL/L (ref 95–110)
CO2 SERPL-SCNC: 29 MMOL/L (ref 23–29)
CREAT SERPL-MCNC: 1.1 MG/DL (ref 0.5–1.4)
DIFFERENTIAL METHOD: ABNORMAL
EOSINOPHIL # BLD AUTO: 0.4 K/UL (ref 0–0.5)
EOSINOPHIL NFR BLD: 4.8 % (ref 0–8)
ERYTHROCYTE [DISTWIDTH] IN BLOOD BY AUTOMATED COUNT: 15.8 % (ref 11.5–14.5)
EST. GFR  (AFRICAN AMERICAN): >60 ML/MIN/1.73 M^2
EST. GFR  (NON AFRICAN AMERICAN): >60 ML/MIN/1.73 M^2
GLUCOSE SERPL-MCNC: 112 MG/DL (ref 70–110)
HCT VFR BLD AUTO: 40.4 % (ref 40–54)
HGB BLD-MCNC: 12.2 G/DL (ref 14–18)
IMM GRANULOCYTES # BLD AUTO: 0.04 K/UL (ref 0–0.04)
IMM GRANULOCYTES NFR BLD AUTO: 0.5 % (ref 0–0.5)
LYMPHOCYTES # BLD AUTO: 1.2 K/UL (ref 1–4.8)
LYMPHOCYTES NFR BLD: 14.9 % (ref 18–48)
MCH RBC QN AUTO: 23.8 PG (ref 27–31)
MCHC RBC AUTO-ENTMCNC: 30.2 G/DL (ref 32–36)
MCV RBC AUTO: 79 FL (ref 82–98)
MONOCYTES # BLD AUTO: 1.1 K/UL (ref 0.3–1)
MONOCYTES NFR BLD: 13 % (ref 4–15)
NEUTROPHILS # BLD AUTO: 5.4 K/UL (ref 1.8–7.7)
NEUTROPHILS NFR BLD: 66.2 % (ref 38–73)
NRBC BLD-RTO: 0 /100 WBC
PLATELET # BLD AUTO: 262 K/UL (ref 150–450)
PMV BLD AUTO: 9 FL (ref 9.2–12.9)
POTASSIUM SERPL-SCNC: 3.7 MMOL/L (ref 3.5–5.1)
RBC # BLD AUTO: 5.13 M/UL (ref 4.6–6.2)
SODIUM SERPL-SCNC: 140 MMOL/L (ref 136–145)
WBC # BLD AUTO: 8.13 K/UL (ref 3.9–12.7)

## 2021-04-16 PROCEDURE — U0003 INFECTIOUS AGENT DETECTION BY NUCLEIC ACID (DNA OR RNA); SEVERE ACUTE RESPIRATORY SYNDROME CORONAVIRUS 2 (SARS-COV-2) (CORONAVIRUS DISEASE [COVID-19]), AMPLIFIED PROBE TECHNIQUE, MAKING USE OF HIGH THROUGHPUT TECHNOLOGIES AS DESCRIBED BY CMS-2020-01-R: HCPCS | Performed by: INTERNAL MEDICINE

## 2021-04-16 PROCEDURE — 80048 BASIC METABOLIC PNL TOTAL CA: CPT | Performed by: INTERNAL MEDICINE

## 2021-04-16 PROCEDURE — U0005 INFEC AGEN DETEC AMPLI PROBE: HCPCS | Performed by: INTERNAL MEDICINE

## 2021-04-16 PROCEDURE — 85025 COMPLETE CBC W/AUTO DIFF WBC: CPT | Performed by: INTERNAL MEDICINE

## 2021-04-16 PROCEDURE — 36415 COLL VENOUS BLD VENIPUNCTURE: CPT | Performed by: INTERNAL MEDICINE

## 2021-04-17 LAB — SARS-COV-2 RNA RESP QL NAA+PROBE: NOT DETECTED

## 2021-04-19 ENCOUNTER — PATIENT MESSAGE (OUTPATIENT)
Dept: CARDIOLOGY | Facility: HOSPITAL | Age: 71
End: 2021-04-19

## 2021-04-19 ENCOUNTER — ANESTHESIA (OUTPATIENT)
Dept: CARDIOLOGY | Facility: HOSPITAL | Age: 71
End: 2021-04-19
Payer: MEDICARE

## 2021-04-19 ENCOUNTER — HOSPITAL ENCOUNTER (OUTPATIENT)
Facility: HOSPITAL | Age: 71
Discharge: HOME | End: 2021-04-19
Attending: INTERNAL MEDICINE | Admitting: INTERNAL MEDICINE
Payer: MEDICARE

## 2021-04-19 ENCOUNTER — ANESTHESIA EVENT (OUTPATIENT)
Dept: CARDIOLOGY | Facility: HOSPITAL | Age: 71
End: 2021-04-19
Payer: MEDICARE

## 2021-04-19 VITALS
HEIGHT: 74 IN | WEIGHT: 315 LBS | BODY MASS INDEX: 40.43 KG/M2 | SYSTOLIC BLOOD PRESSURE: 129 MMHG | DIASTOLIC BLOOD PRESSURE: 81 MMHG | HEART RATE: 75 BPM | RESPIRATION RATE: 18 BRPM | OXYGEN SATURATION: 96 % | TEMPERATURE: 98 F

## 2021-04-19 DIAGNOSIS — I48.91 ATRIAL FIBRILLATION, UNSPECIFIED TYPE: Primary | ICD-10-CM

## 2021-04-19 DIAGNOSIS — I48.91 ATRIAL FIBRILLATION STATUS POST CARDIOVERSION: ICD-10-CM

## 2021-04-19 DIAGNOSIS — Z01.818 PRE-OP TESTING: Primary | ICD-10-CM

## 2021-04-19 DIAGNOSIS — I48.91 ATRIAL FIBRILLATION, UNSPECIFIED TYPE: ICD-10-CM

## 2021-04-19 PROCEDURE — 37000009 HC ANESTHESIA EA ADD 15 MINS: Performed by: INTERNAL MEDICINE

## 2021-04-19 PROCEDURE — 25000003 PHARM REV CODE 250: Performed by: NURSE ANESTHETIST, CERTIFIED REGISTERED

## 2021-04-19 PROCEDURE — 63600175 PHARM REV CODE 636 W HCPCS: Performed by: NURSE ANESTHETIST, CERTIFIED REGISTERED

## 2021-04-19 PROCEDURE — 93010 ELECTROCARDIOGRAM REPORT: CPT | Mod: ,,, | Performed by: INTERNAL MEDICINE

## 2021-04-19 PROCEDURE — 93005 ELECTROCARDIOGRAM TRACING: CPT

## 2021-04-19 PROCEDURE — 93010 EKG 12-LEAD: ICD-10-PCS | Mod: ,,, | Performed by: INTERNAL MEDICINE

## 2021-04-19 PROCEDURE — 92960 CARDIOVERSION ELECTRIC EXT: CPT | Mod: ,,, | Performed by: INTERNAL MEDICINE

## 2021-04-19 PROCEDURE — 37000008 HC ANESTHESIA 1ST 15 MINUTES: Performed by: INTERNAL MEDICINE

## 2021-04-19 PROCEDURE — 92960 PR CARDIOVERSION, ELECTIVE;EXTERN: ICD-10-PCS | Mod: ,,, | Performed by: INTERNAL MEDICINE

## 2021-04-19 RX ORDER — PROPOFOL 10 MG/ML
INJECTION, EMULSION INTRAVENOUS
Status: DISCONTINUED | OUTPATIENT
Start: 2021-04-19 | End: 2021-04-19

## 2021-04-19 RX ORDER — LIDOCAINE HCL/PF 100 MG/5ML
SYRINGE (ML) INTRAVENOUS
Status: DISCONTINUED | OUTPATIENT
Start: 2021-04-19 | End: 2021-04-19

## 2021-04-19 RX ADMIN — PROPOFOL 20 MG: 10 INJECTION, EMULSION INTRAVENOUS at 10:04

## 2021-04-19 RX ADMIN — PROPOFOL 30 MG: 10 INJECTION, EMULSION INTRAVENOUS at 10:04

## 2021-04-19 RX ADMIN — LIDOCAINE HYDROCHLORIDE 50 MG: 20 INJECTION, SOLUTION INTRAVENOUS at 10:04

## 2021-04-20 ENCOUNTER — PATIENT MESSAGE (OUTPATIENT)
Dept: UROLOGY | Facility: CLINIC | Age: 71
End: 2021-04-20

## 2021-04-20 DIAGNOSIS — R35.1 NOCTURIA: ICD-10-CM

## 2021-04-20 DIAGNOSIS — R39.15 URINARY URGENCY: ICD-10-CM

## 2021-04-20 DIAGNOSIS — I48.91 ATRIAL FIBRILLATION, UNSPECIFIED TYPE: Primary | ICD-10-CM

## 2021-04-20 DIAGNOSIS — R35.0 URINE FREQUENCY: ICD-10-CM

## 2021-04-20 LAB
BSA FOR ECHO PROCEDURE: 2.8 M2
EJECTION FRACTION: 65 %

## 2021-04-20 RX ORDER — MIRABEGRON 50 MG/1
50 TABLET, FILM COATED, EXTENDED RELEASE ORAL DAILY
Qty: 30 TABLET | Refills: 1 | Status: SHIPPED | OUTPATIENT
Start: 2021-04-20 | End: 2021-05-31

## 2021-05-05 ENCOUNTER — TELEPHONE (OUTPATIENT)
Dept: ELECTROPHYSIOLOGY | Facility: CLINIC | Age: 71
End: 2021-05-05

## 2021-05-06 ENCOUNTER — LAB VISIT (OUTPATIENT)
Dept: LAB | Facility: HOSPITAL | Age: 71
End: 2021-05-06
Attending: INTERNAL MEDICINE
Payer: MEDICARE

## 2021-05-06 ENCOUNTER — OFFICE VISIT (OUTPATIENT)
Dept: CARDIOLOGY | Facility: CLINIC | Age: 71
End: 2021-05-06
Payer: MEDICARE

## 2021-05-06 VITALS
BODY MASS INDEX: 40.43 KG/M2 | SYSTOLIC BLOOD PRESSURE: 93 MMHG | WEIGHT: 315 LBS | DIASTOLIC BLOOD PRESSURE: 64 MMHG | HEIGHT: 74 IN | HEART RATE: 80 BPM

## 2021-05-06 DIAGNOSIS — G47.33 OSA (OBSTRUCTIVE SLEEP APNEA): ICD-10-CM

## 2021-05-06 DIAGNOSIS — R53.83 FATIGUE, UNSPECIFIED TYPE: ICD-10-CM

## 2021-05-06 DIAGNOSIS — E66.01 MORBID OBESITY DUE TO EXCESS CALORIES: ICD-10-CM

## 2021-05-06 DIAGNOSIS — I48.20 CHRONIC ATRIAL FIBRILLATION: Primary | ICD-10-CM

## 2021-05-06 DIAGNOSIS — I48.91 ATRIAL FIBRILLATION, UNSPECIFIED TYPE: ICD-10-CM

## 2021-05-06 DIAGNOSIS — E78.00 PURE HYPERCHOLESTEROLEMIA: ICD-10-CM

## 2021-05-06 DIAGNOSIS — I10 ESSENTIAL HYPERTENSION: ICD-10-CM

## 2021-05-06 LAB — TSH SERPL DL<=0.005 MIU/L-ACNC: 1.13 UIU/ML (ref 0.4–4)

## 2021-05-06 PROCEDURE — 93010 RHYTHM STRIP: ICD-10-PCS | Mod: S$GLB,,, | Performed by: INTERNAL MEDICINE

## 2021-05-06 PROCEDURE — 99999 PR PBB SHADOW E&M-EST. PATIENT-LVL IV: ICD-10-PCS | Mod: PBBFAC,,, | Performed by: INTERNAL MEDICINE

## 2021-05-06 PROCEDURE — 99205 PR OFFICE/OUTPT VISIT, NEW, LEVL V, 60-74 MIN: ICD-10-PCS | Mod: S$GLB,,, | Performed by: INTERNAL MEDICINE

## 2021-05-06 PROCEDURE — 93010 ELECTROCARDIOGRAM REPORT: CPT | Mod: S$GLB,,, | Performed by: INTERNAL MEDICINE

## 2021-05-06 PROCEDURE — 1101F PT FALLS ASSESS-DOCD LE1/YR: CPT | Mod: CPTII,S$GLB,, | Performed by: INTERNAL MEDICINE

## 2021-05-06 PROCEDURE — 84443 ASSAY THYROID STIM HORMONE: CPT | Performed by: INTERNAL MEDICINE

## 2021-05-06 PROCEDURE — 1101F PR PT FALLS ASSESS DOC 0-1 FALLS W/OUT INJ PAST YR: ICD-10-PCS | Mod: CPTII,S$GLB,, | Performed by: INTERNAL MEDICINE

## 2021-05-06 PROCEDURE — 36415 COLL VENOUS BLD VENIPUNCTURE: CPT | Performed by: INTERNAL MEDICINE

## 2021-05-06 PROCEDURE — 93005 RHYTHM STRIP: ICD-10-PCS | Mod: S$GLB,,, | Performed by: INTERNAL MEDICINE

## 2021-05-06 PROCEDURE — 99999 PR PBB SHADOW E&M-EST. PATIENT-LVL IV: CPT | Mod: PBBFAC,,, | Performed by: INTERNAL MEDICINE

## 2021-05-06 PROCEDURE — 3288F PR FALLS RISK ASSESSMENT DOCUMENTED: ICD-10-PCS | Mod: CPTII,S$GLB,, | Performed by: INTERNAL MEDICINE

## 2021-05-06 PROCEDURE — 3008F PR BODY MASS INDEX (BMI) DOCUMENTED: ICD-10-PCS | Mod: CPTII,S$GLB,, | Performed by: INTERNAL MEDICINE

## 2021-05-06 PROCEDURE — 1159F MED LIST DOCD IN RCRD: CPT | Mod: S$GLB,,, | Performed by: INTERNAL MEDICINE

## 2021-05-06 PROCEDURE — 1126F AMNT PAIN NOTED NONE PRSNT: CPT | Mod: S$GLB,,, | Performed by: INTERNAL MEDICINE

## 2021-05-06 PROCEDURE — 3008F BODY MASS INDEX DOCD: CPT | Mod: CPTII,S$GLB,, | Performed by: INTERNAL MEDICINE

## 2021-05-06 PROCEDURE — 3288F FALL RISK ASSESSMENT DOCD: CPT | Mod: CPTII,S$GLB,, | Performed by: INTERNAL MEDICINE

## 2021-05-06 PROCEDURE — 93005 ELECTROCARDIOGRAM TRACING: CPT | Mod: S$GLB,,, | Performed by: INTERNAL MEDICINE

## 2021-05-06 PROCEDURE — 99205 OFFICE O/P NEW HI 60 MIN: CPT | Mod: S$GLB,,, | Performed by: INTERNAL MEDICINE

## 2021-05-06 PROCEDURE — 1159F PR MEDICATION LIST DOCUMENTED IN MEDICAL RECORD: ICD-10-PCS | Mod: S$GLB,,, | Performed by: INTERNAL MEDICINE

## 2021-05-06 PROCEDURE — 1126F PR PAIN SEVERITY QUANTIFIED, NO PAIN PRESENT: ICD-10-PCS | Mod: S$GLB,,, | Performed by: INTERNAL MEDICINE

## 2021-05-06 RX ORDER — OXYBUTYNIN CHLORIDE 10 MG/1
10 TABLET, EXTENDED RELEASE ORAL DAILY
COMMUNITY
Start: 2021-04-18 | End: 2021-05-31

## 2021-05-06 RX ORDER — ATORVASTATIN CALCIUM 20 MG/1
20 TABLET, FILM COATED ORAL DAILY
COMMUNITY
Start: 2021-03-02 | End: 2021-05-31

## 2021-05-07 ENCOUNTER — PATIENT MESSAGE (OUTPATIENT)
Dept: CARDIOLOGY | Facility: CLINIC | Age: 71
End: 2021-05-07

## 2021-05-10 ENCOUNTER — PATIENT MESSAGE (OUTPATIENT)
Dept: ELECTROPHYSIOLOGY | Facility: CLINIC | Age: 71
End: 2021-05-10

## 2021-05-10 DIAGNOSIS — I48.20 CHRONIC ATRIAL FIBRILLATION: Primary | ICD-10-CM

## 2021-05-10 DIAGNOSIS — I48.11 LONGSTANDING PERSISTENT ATRIAL FIBRILLATION: Primary | ICD-10-CM

## 2021-05-10 DIAGNOSIS — Z01.812 ENCOUNTER FOR PRE-OPERATIVE LABORATORY TESTING: ICD-10-CM

## 2021-05-11 ENCOUNTER — LAB VISIT (OUTPATIENT)
Dept: FAMILY MEDICINE | Facility: CLINIC | Age: 71
End: 2021-05-11
Payer: MEDICARE

## 2021-05-11 ENCOUNTER — TELEPHONE (OUTPATIENT)
Dept: ELECTROPHYSIOLOGY | Facility: CLINIC | Age: 71
End: 2021-05-11

## 2021-05-11 ENCOUNTER — LAB VISIT (OUTPATIENT)
Dept: LAB | Facility: HOSPITAL | Age: 71
End: 2021-05-11
Attending: INTERNAL MEDICINE
Payer: MEDICARE

## 2021-05-11 DIAGNOSIS — Z01.818 PRE-OP TESTING: ICD-10-CM

## 2021-05-11 DIAGNOSIS — I48.20 CHRONIC ATRIAL FIBRILLATION: ICD-10-CM

## 2021-05-11 DIAGNOSIS — Z01.812 ENCOUNTER FOR PRE-OPERATIVE LABORATORY TESTING: ICD-10-CM

## 2021-05-11 LAB
ANION GAP SERPL CALC-SCNC: 9 MMOL/L (ref 8–16)
APTT BLDCRRT: 29.1 SEC (ref 21–32)
BASOPHILS # BLD AUTO: 0.03 K/UL (ref 0–0.2)
BASOPHILS NFR BLD: 0.3 % (ref 0–1.9)
BUN SERPL-MCNC: 15 MG/DL (ref 8–23)
CALCIUM SERPL-MCNC: 9.3 MG/DL (ref 8.7–10.5)
CHLORIDE SERPL-SCNC: 104 MMOL/L (ref 95–110)
CO2 SERPL-SCNC: 27 MMOL/L (ref 23–29)
CREAT SERPL-MCNC: 1.3 MG/DL (ref 0.5–1.4)
DIFFERENTIAL METHOD: ABNORMAL
EOSINOPHIL # BLD AUTO: 0.1 K/UL (ref 0–0.5)
EOSINOPHIL NFR BLD: 1 % (ref 0–8)
ERYTHROCYTE [DISTWIDTH] IN BLOOD BY AUTOMATED COUNT: 16.3 % (ref 11.5–14.5)
EST. GFR  (AFRICAN AMERICAN): >60 ML/MIN/1.73 M^2
EST. GFR  (NON AFRICAN AMERICAN): 55 ML/MIN/1.73 M^2
GLUCOSE SERPL-MCNC: 122 MG/DL (ref 70–110)
HCT VFR BLD AUTO: 42.3 % (ref 40–54)
HGB BLD-MCNC: 12.6 G/DL (ref 14–18)
IMM GRANULOCYTES # BLD AUTO: 0.04 K/UL (ref 0–0.04)
IMM GRANULOCYTES NFR BLD AUTO: 0.4 % (ref 0–0.5)
INR PPP: 1.2 (ref 0.8–1.2)
LYMPHOCYTES # BLD AUTO: 0.7 K/UL (ref 1–4.8)
LYMPHOCYTES NFR BLD: 5.8 % (ref 18–48)
MCH RBC QN AUTO: 23.1 PG (ref 27–31)
MCHC RBC AUTO-ENTMCNC: 29.8 G/DL (ref 32–36)
MCV RBC AUTO: 78 FL (ref 82–98)
MONOCYTES # BLD AUTO: 1.1 K/UL (ref 0.3–1)
MONOCYTES NFR BLD: 9.9 % (ref 4–15)
NEUTROPHILS # BLD AUTO: 9.4 K/UL (ref 1.8–7.7)
NEUTROPHILS NFR BLD: 82.6 % (ref 38–73)
NRBC BLD-RTO: 0 /100 WBC
PLATELET # BLD AUTO: 262 K/UL (ref 150–450)
PMV BLD AUTO: 8.8 FL (ref 9.2–12.9)
POTASSIUM SERPL-SCNC: 4.2 MMOL/L (ref 3.5–5.1)
PROTHROMBIN TIME: 12.2 SEC (ref 9–12.5)
RBC # BLD AUTO: 5.45 M/UL (ref 4.6–6.2)
SODIUM SERPL-SCNC: 140 MMOL/L (ref 136–145)
WBC # BLD AUTO: 11.32 K/UL (ref 3.9–12.7)

## 2021-05-11 PROCEDURE — 80048 BASIC METABOLIC PNL TOTAL CA: CPT | Performed by: INTERNAL MEDICINE

## 2021-05-11 PROCEDURE — 85025 COMPLETE CBC W/AUTO DIFF WBC: CPT | Performed by: INTERNAL MEDICINE

## 2021-05-11 PROCEDURE — U0005 INFEC AGEN DETEC AMPLI PROBE: HCPCS | Performed by: INTERNAL MEDICINE

## 2021-05-11 PROCEDURE — U0003 INFECTIOUS AGENT DETECTION BY NUCLEIC ACID (DNA OR RNA); SEVERE ACUTE RESPIRATORY SYNDROME CORONAVIRUS 2 (SARS-COV-2) (CORONAVIRUS DISEASE [COVID-19]), AMPLIFIED PROBE TECHNIQUE, MAKING USE OF HIGH THROUGHPUT TECHNOLOGIES AS DESCRIBED BY CMS-2020-01-R: HCPCS | Performed by: INTERNAL MEDICINE

## 2021-05-11 PROCEDURE — 85610 PROTHROMBIN TIME: CPT | Performed by: INTERNAL MEDICINE

## 2021-05-11 PROCEDURE — 85730 THROMBOPLASTIN TIME PARTIAL: CPT | Performed by: INTERNAL MEDICINE

## 2021-05-11 PROCEDURE — 36415 COLL VENOUS BLD VENIPUNCTURE: CPT | Performed by: INTERNAL MEDICINE

## 2021-05-12 LAB — SARS-COV-2 RNA RESP QL NAA+PROBE: NOT DETECTED

## 2021-05-13 ENCOUNTER — TELEPHONE (OUTPATIENT)
Dept: ELECTROPHYSIOLOGY | Facility: CLINIC | Age: 71
End: 2021-05-13

## 2021-05-14 ENCOUNTER — HOSPITAL ENCOUNTER (OUTPATIENT)
Facility: HOSPITAL | Age: 71
Discharge: HOME | End: 2021-05-14
Attending: INTERNAL MEDICINE | Admitting: INTERNAL MEDICINE
Payer: MEDICARE

## 2021-05-14 ENCOUNTER — RESEARCH ENCOUNTER (OUTPATIENT)
Dept: RESEARCH | Facility: HOSPITAL | Age: 71
End: 2021-05-14

## 2021-05-14 ENCOUNTER — CLINICAL SUPPORT (OUTPATIENT)
Dept: CARDIOLOGY | Facility: HOSPITAL | Age: 71
End: 2021-05-14
Attending: INTERNAL MEDICINE
Payer: MEDICARE

## 2021-05-14 ENCOUNTER — HOSPITAL ENCOUNTER (OUTPATIENT)
Dept: CARDIOLOGY | Facility: HOSPITAL | Age: 71
Discharge: HOME | End: 2021-05-14
Attending: INTERNAL MEDICINE
Payer: MEDICARE

## 2021-05-14 ENCOUNTER — TELEPHONE (OUTPATIENT)
Dept: ELECTROPHYSIOLOGY | Facility: CLINIC | Age: 71
End: 2021-05-14

## 2021-05-14 ENCOUNTER — ANESTHESIA EVENT (OUTPATIENT)
Dept: MEDSURG UNIT | Facility: HOSPITAL | Age: 71
End: 2021-05-14
Payer: MEDICARE

## 2021-05-14 ENCOUNTER — TELEPHONE (OUTPATIENT)
Dept: RESEARCH | Facility: HOSPITAL | Age: 71
End: 2021-05-14

## 2021-05-14 ENCOUNTER — ANESTHESIA (OUTPATIENT)
Dept: MEDSURG UNIT | Facility: HOSPITAL | Age: 71
End: 2021-05-14
Payer: MEDICARE

## 2021-05-14 VITALS
DIASTOLIC BLOOD PRESSURE: 54 MMHG | HEIGHT: 74 IN | TEMPERATURE: 98 F | WEIGHT: 315 LBS | BODY MASS INDEX: 40.43 KG/M2 | RESPIRATION RATE: 18 BRPM | HEART RATE: 59 BPM | OXYGEN SATURATION: 96 % | SYSTOLIC BLOOD PRESSURE: 95 MMHG

## 2021-05-14 VITALS
HEIGHT: 74 IN | SYSTOLIC BLOOD PRESSURE: 106 MMHG | WEIGHT: 315 LBS | DIASTOLIC BLOOD PRESSURE: 61 MMHG | BODY MASS INDEX: 40.43 KG/M2

## 2021-05-14 DIAGNOSIS — I48.11 LONGSTANDING PERSISTENT ATRIAL FIBRILLATION: ICD-10-CM

## 2021-05-14 DIAGNOSIS — I48.20 CHRONIC ATRIAL FIBRILLATION: Primary | ICD-10-CM

## 2021-05-14 DIAGNOSIS — Z01.812 ENCOUNTER FOR PRE-OPERATIVE LABORATORY TESTING: ICD-10-CM

## 2021-05-14 DIAGNOSIS — I48.91 A-FIB: ICD-10-CM

## 2021-05-14 DIAGNOSIS — I48.91 ATRIAL FIBRILLATION: ICD-10-CM

## 2021-05-14 DIAGNOSIS — I48.20 CHRONIC ATRIAL FIBRILLATION: ICD-10-CM

## 2021-05-14 LAB
BSA FOR ECHO PROCEDURE: 2.79 M2
EJECTION FRACTION: 55 %

## 2021-05-14 PROCEDURE — 93325 TRANSESOPHAGEAL ECHO (TEE) (CUPID ONLY): ICD-10-PCS | Mod: 26,,, | Performed by: INTERNAL MEDICINE

## 2021-05-14 PROCEDURE — 93248 EXT ECG>7D<15D REV&INTERPJ: CPT | Mod: ,,, | Performed by: INTERNAL MEDICINE

## 2021-05-14 PROCEDURE — 93246 EXT ECG>7D<15D RECORDING: CPT

## 2021-05-14 PROCEDURE — 37000009 HC ANESTHESIA EA ADD 15 MINS: Performed by: STUDENT IN AN ORGANIZED HEALTH CARE EDUCATION/TRAINING PROGRAM

## 2021-05-14 PROCEDURE — 93005 ELECTROCARDIOGRAM TRACING: CPT | Mod: 59,Q1

## 2021-05-14 PROCEDURE — 93325 DOPPLER ECHO COLOR FLOW MAPG: CPT | Mod: 26,,, | Performed by: INTERNAL MEDICINE

## 2021-05-14 PROCEDURE — 93320 DOPPLER ECHO COMPLETE: CPT | Mod: 26,,, | Performed by: INTERNAL MEDICINE

## 2021-05-14 PROCEDURE — 93312 TRANSESOPHAGEAL ECHO (TEE) (CUPID ONLY): ICD-10-PCS | Mod: 26,,, | Performed by: INTERNAL MEDICINE

## 2021-05-14 PROCEDURE — 92960 CARDIOVERSION ELECTRIC EXT: CPT | Mod: ,,, | Performed by: STUDENT IN AN ORGANIZED HEALTH CARE EDUCATION/TRAINING PROGRAM

## 2021-05-14 PROCEDURE — 93247 EXT ECG>7D<15D SCAN A/R: CPT

## 2021-05-14 PROCEDURE — 93010 ELECTROCARDIOGRAM REPORT: CPT | Mod: ,,, | Performed by: INTERNAL MEDICINE

## 2021-05-14 PROCEDURE — 93010 EKG 12-LEAD: ICD-10-PCS | Mod: ,,, | Performed by: INTERNAL MEDICINE

## 2021-05-14 PROCEDURE — D9220A PRA ANESTHESIA: Mod: CRNA,,, | Performed by: NURSE ANESTHETIST, CERTIFIED REGISTERED

## 2021-05-14 PROCEDURE — 93248 CV CARDIAC MONITOR - 3-14 DAY ADULT (CUPID ONLY): ICD-10-PCS | Mod: ,,, | Performed by: INTERNAL MEDICINE

## 2021-05-14 PROCEDURE — 93312 ECHO TRANSESOPHAGEAL: CPT | Mod: 26,,, | Performed by: INTERNAL MEDICINE

## 2021-05-14 PROCEDURE — D9220A PRA ANESTHESIA: ICD-10-PCS | Mod: CRNA,,, | Performed by: NURSE ANESTHETIST, CERTIFIED REGISTERED

## 2021-05-14 PROCEDURE — 93005 ELECTROCARDIOGRAM TRACING: CPT

## 2021-05-14 PROCEDURE — 25000003 PHARM REV CODE 250: Mod: Q1 | Performed by: NURSE ANESTHETIST, CERTIFIED REGISTERED

## 2021-05-14 PROCEDURE — 63600175 PHARM REV CODE 636 W HCPCS: Performed by: NURSE ANESTHETIST, CERTIFIED REGISTERED

## 2021-05-14 PROCEDURE — 92960 PR CARDIOVERSION, ELECTIVE;EXTERN: ICD-10-PCS | Mod: ,,, | Performed by: STUDENT IN AN ORGANIZED HEALTH CARE EDUCATION/TRAINING PROGRAM

## 2021-05-14 PROCEDURE — 37000008 HC ANESTHESIA 1ST 15 MINUTES: Mod: Q1 | Performed by: STUDENT IN AN ORGANIZED HEALTH CARE EDUCATION/TRAINING PROGRAM

## 2021-05-14 PROCEDURE — D9220A PRA ANESTHESIA: Mod: ANES,,, | Performed by: ANESTHESIOLOGY

## 2021-05-14 PROCEDURE — 92960 CARDIOVERSION ELECTRIC EXT: CPT | Performed by: STUDENT IN AN ORGANIZED HEALTH CARE EDUCATION/TRAINING PROGRAM

## 2021-05-14 PROCEDURE — 93320 DOPPLER ECHO COMPLETE: CPT

## 2021-05-14 PROCEDURE — 93320 TRANSESOPHAGEAL ECHO (TEE) (CUPID ONLY): ICD-10-PCS | Mod: 26,,, | Performed by: INTERNAL MEDICINE

## 2021-05-14 PROCEDURE — D9220A PRA ANESTHESIA: ICD-10-PCS | Mod: ANES,,, | Performed by: ANESTHESIOLOGY

## 2021-05-14 PROCEDURE — 25000003 PHARM REV CODE 250: Performed by: NURSE PRACTITIONER

## 2021-05-14 RX ORDER — PROPOFOL 10 MG/ML
VIAL (ML) INTRAVENOUS CONTINUOUS PRN
Status: DISCONTINUED | OUTPATIENT
Start: 2021-05-14 | End: 2021-05-14

## 2021-05-14 RX ORDER — HYDROMORPHONE HYDROCHLORIDE 1 MG/ML
0.2 INJECTION, SOLUTION INTRAMUSCULAR; INTRAVENOUS; SUBCUTANEOUS EVERY 5 MIN PRN
Status: DISCONTINUED | OUTPATIENT
Start: 2021-05-14 | End: 2021-05-14 | Stop reason: HOSPADM

## 2021-05-14 RX ORDER — PHENYLEPHRINE HYDROCHLORIDE 10 MG/ML
INJECTION INTRAVENOUS
Status: DISCONTINUED | OUTPATIENT
Start: 2021-05-14 | End: 2021-05-14

## 2021-05-14 RX ORDER — DEXMEDETOMIDINE HYDROCHLORIDE 100 UG/ML
INJECTION, SOLUTION INTRAVENOUS
Status: DISCONTINUED | OUTPATIENT
Start: 2021-05-14 | End: 2021-05-14

## 2021-05-14 RX ORDER — EPHEDRINE SULFATE 50 MG/ML
INJECTION, SOLUTION INTRAVENOUS
Status: DISCONTINUED | OUTPATIENT
Start: 2021-05-14 | End: 2021-05-14

## 2021-05-14 RX ORDER — LIDOCAINE HYDROCHLORIDE 20 MG/ML
INJECTION, SOLUTION EPIDURAL; INFILTRATION; INTRACAUDAL; PERINEURAL
Status: DISCONTINUED | OUTPATIENT
Start: 2021-05-14 | End: 2021-05-14

## 2021-05-14 RX ORDER — FENTANYL CITRATE 50 UG/ML
25 INJECTION, SOLUTION INTRAMUSCULAR; INTRAVENOUS EVERY 5 MIN PRN
Status: DISCONTINUED | OUTPATIENT
Start: 2021-05-14 | End: 2021-05-14 | Stop reason: HOSPADM

## 2021-05-14 RX ORDER — FLECAINIDE ACETATE 100 MG/1
100 TABLET ORAL EVERY 12 HOURS
Qty: 60 TABLET | Refills: 11 | Status: ON HOLD | OUTPATIENT
Start: 2021-05-14 | End: 2021-06-02 | Stop reason: SDUPTHER

## 2021-05-14 RX ORDER — ONDANSETRON 2 MG/ML
4 INJECTION INTRAMUSCULAR; INTRAVENOUS ONCE AS NEEDED
Status: DISCONTINUED | OUTPATIENT
Start: 2021-05-14 | End: 2021-05-14 | Stop reason: HOSPADM

## 2021-05-14 RX ORDER — DIPHENHYDRAMINE HYDROCHLORIDE 50 MG/ML
25 INJECTION INTRAMUSCULAR; INTRAVENOUS EVERY 6 HOURS PRN
Status: DISCONTINUED | OUTPATIENT
Start: 2021-05-14 | End: 2021-05-14 | Stop reason: HOSPADM

## 2021-05-14 RX ADMIN — DEXMEDETOMIDINE HYDROCHLORIDE 8 MCG: 100 INJECTION, SOLUTION INTRAVENOUS at 09:05

## 2021-05-14 RX ADMIN — SODIUM CHLORIDE: 0.9 INJECTION, SOLUTION INTRAVENOUS at 09:05

## 2021-05-14 RX ADMIN — PHENYLEPHRINE HYDROCHLORIDE 50 MCG: 10 INJECTION, SOLUTION INTRAMUSCULAR; INTRAVENOUS; SUBCUTANEOUS at 10:05

## 2021-05-14 RX ADMIN — LIDOCAINE HYDROCHLORIDE 60 MG: 20 INJECTION, SOLUTION EPIDURAL; INFILTRATION; INTRACAUDAL at 09:05

## 2021-05-14 RX ADMIN — EPHEDRINE SULFATE 5 MG: 50 INJECTION INTRAVENOUS at 10:05

## 2021-05-14 RX ADMIN — PHENYLEPHRINE HYDROCHLORIDE 100 MCG: 10 INJECTION, SOLUTION INTRAMUSCULAR; INTRAVENOUS; SUBCUTANEOUS at 10:05

## 2021-05-14 RX ADMIN — PROPOFOL 75 MCG/KG/MIN: 10 INJECTION, EMULSION INTRAVENOUS at 09:05

## 2021-05-14 RX ADMIN — DEXMEDETOMIDINE HYDROCHLORIDE 4 MCG: 100 INJECTION, SOLUTION INTRAVENOUS at 09:05

## 2021-05-16 ENCOUNTER — PATIENT MESSAGE (OUTPATIENT)
Dept: CARDIOLOGY | Facility: CLINIC | Age: 71
End: 2021-05-16

## 2021-05-18 ENCOUNTER — OFFICE VISIT (OUTPATIENT)
Dept: UROLOGY | Facility: CLINIC | Age: 71
End: 2021-05-18
Payer: MEDICARE

## 2021-05-18 VITALS
OXYGEN SATURATION: 99 % | HEART RATE: 66 BPM | BODY MASS INDEX: 42.11 KG/M2 | SYSTOLIC BLOOD PRESSURE: 118 MMHG | HEIGHT: 74 IN | DIASTOLIC BLOOD PRESSURE: 77 MMHG

## 2021-05-18 DIAGNOSIS — R35.1 NOCTURIA: ICD-10-CM

## 2021-05-18 DIAGNOSIS — R35.0 URINARY FREQUENCY: Primary | ICD-10-CM

## 2021-05-18 PROCEDURE — 99999 PR PBB SHADOW E&M-EST. PATIENT-LVL IV: ICD-10-PCS | Mod: PBBFAC,,, | Performed by: NURSE PRACTITIONER

## 2021-05-18 PROCEDURE — 99213 PR OFFICE/OUTPT VISIT, EST, LEVL III, 20-29 MIN: ICD-10-PCS | Mod: S$GLB,,, | Performed by: NURSE PRACTITIONER

## 2021-05-18 PROCEDURE — 1159F PR MEDICATION LIST DOCUMENTED IN MEDICAL RECORD: ICD-10-PCS | Mod: S$GLB,,, | Performed by: NURSE PRACTITIONER

## 2021-05-18 PROCEDURE — 3288F PR FALLS RISK ASSESSMENT DOCUMENTED: ICD-10-PCS | Mod: CPTII,S$GLB,, | Performed by: NURSE PRACTITIONER

## 2021-05-18 PROCEDURE — 99213 OFFICE O/P EST LOW 20 MIN: CPT | Mod: S$GLB,,, | Performed by: NURSE PRACTITIONER

## 2021-05-18 PROCEDURE — 3008F PR BODY MASS INDEX (BMI) DOCUMENTED: ICD-10-PCS | Mod: CPTII,S$GLB,, | Performed by: NURSE PRACTITIONER

## 2021-05-18 PROCEDURE — 3008F BODY MASS INDEX DOCD: CPT | Mod: CPTII,S$GLB,, | Performed by: NURSE PRACTITIONER

## 2021-05-18 PROCEDURE — 51798 PR MEAS,POST-VOID RES,US,NON-IMAGING: ICD-10-PCS | Mod: S$GLB,,, | Performed by: NURSE PRACTITIONER

## 2021-05-18 PROCEDURE — 3288F FALL RISK ASSESSMENT DOCD: CPT | Mod: CPTII,S$GLB,, | Performed by: NURSE PRACTITIONER

## 2021-05-18 PROCEDURE — 99999 PR PBB SHADOW E&M-EST. PATIENT-LVL IV: CPT | Mod: PBBFAC,,, | Performed by: NURSE PRACTITIONER

## 2021-05-18 PROCEDURE — 1159F MED LIST DOCD IN RCRD: CPT | Mod: S$GLB,,, | Performed by: NURSE PRACTITIONER

## 2021-05-18 PROCEDURE — 1126F AMNT PAIN NOTED NONE PRSNT: CPT | Mod: S$GLB,,, | Performed by: NURSE PRACTITIONER

## 2021-05-18 PROCEDURE — 1101F PR PT FALLS ASSESS DOC 0-1 FALLS W/OUT INJ PAST YR: ICD-10-PCS | Mod: CPTII,S$GLB,, | Performed by: NURSE PRACTITIONER

## 2021-05-18 PROCEDURE — 1101F PT FALLS ASSESS-DOCD LE1/YR: CPT | Mod: CPTII,S$GLB,, | Performed by: NURSE PRACTITIONER

## 2021-05-18 PROCEDURE — 1126F PR PAIN SEVERITY QUANTIFIED, NO PAIN PRESENT: ICD-10-PCS | Mod: S$GLB,,, | Performed by: NURSE PRACTITIONER

## 2021-05-18 PROCEDURE — 51798 US URINE CAPACITY MEASURE: CPT | Mod: S$GLB,,, | Performed by: NURSE PRACTITIONER

## 2021-05-24 ENCOUNTER — PATIENT MESSAGE (OUTPATIENT)
Dept: UROLOGY | Facility: CLINIC | Age: 71
End: 2021-05-24

## 2021-05-25 ENCOUNTER — TELEPHONE (OUTPATIENT)
Dept: UROLOGY | Facility: CLINIC | Age: 71
End: 2021-05-25

## 2021-05-25 ENCOUNTER — PATIENT MESSAGE (OUTPATIENT)
Dept: UROLOGY | Facility: CLINIC | Age: 71
End: 2021-05-25

## 2021-05-25 ENCOUNTER — NURSE TRIAGE (OUTPATIENT)
Dept: ADMINISTRATIVE | Facility: CLINIC | Age: 71
End: 2021-05-25

## 2021-05-29 ENCOUNTER — PATIENT MESSAGE (OUTPATIENT)
Dept: UROLOGY | Facility: CLINIC | Age: 71
End: 2021-05-29

## 2021-05-31 ENCOUNTER — HOSPITAL ENCOUNTER (INPATIENT)
Facility: HOSPITAL | Age: 71
LOS: 2 days | Discharge: HOME | DRG: 378 | End: 2021-06-02
Attending: EMERGENCY MEDICINE | Admitting: INTERNAL MEDICINE
Payer: MEDICARE

## 2021-05-31 ENCOUNTER — TELEPHONE (OUTPATIENT)
Dept: INTERNAL MEDICINE | Facility: CLINIC | Age: 71
End: 2021-05-31

## 2021-05-31 DIAGNOSIS — R06.02 SOB (SHORTNESS OF BREATH): ICD-10-CM

## 2021-05-31 DIAGNOSIS — K92.2 GASTROINTESTINAL HEMORRHAGE, UNSPECIFIED GASTROINTESTINAL HEMORRHAGE TYPE: ICD-10-CM

## 2021-05-31 DIAGNOSIS — I48.92 ATRIAL FLUTTER, UNSPECIFIED TYPE: ICD-10-CM

## 2021-05-31 DIAGNOSIS — R06.02 SHORTNESS OF BREATH: ICD-10-CM

## 2021-05-31 DIAGNOSIS — D64.9 ANEMIA, UNSPECIFIED TYPE: ICD-10-CM

## 2021-05-31 DIAGNOSIS — K92.2 LOWER GI BLEED: Primary | ICD-10-CM

## 2021-05-31 PROBLEM — R06.09 DYSPNEA ON EXERTION: Status: ACTIVE | Noted: 2021-05-31

## 2021-05-31 LAB
ABO + RH BLD: NORMAL
ALBUMIN SERPL BCP-MCNC: 3.3 G/DL (ref 3.5–5.2)
ALP SERPL-CCNC: 114 U/L (ref 55–135)
ALT SERPL W/O P-5'-P-CCNC: 107 U/L (ref 10–44)
ANION GAP SERPL CALC-SCNC: 8 MMOL/L (ref 8–16)
AST SERPL-CCNC: 52 U/L (ref 10–40)
BASOPHILS # BLD AUTO: 0.03 K/UL (ref 0–0.2)
BASOPHILS # BLD AUTO: 0.03 K/UL (ref 0–0.2)
BASOPHILS NFR BLD: 0.5 % (ref 0–1.9)
BASOPHILS NFR BLD: 0.5 % (ref 0–1.9)
BILIRUB SERPL-MCNC: 0.7 MG/DL (ref 0.1–1)
BLD GP AB SCN CELLS X3 SERPL QL: NORMAL
BNP SERPL-MCNC: 283 PG/ML (ref 0–99)
BUN SERPL-MCNC: 16 MG/DL (ref 6–30)
BUN SERPL-MCNC: 17 MG/DL (ref 8–23)
CALCIUM SERPL-MCNC: 8.9 MG/DL (ref 8.7–10.5)
CHLORIDE SERPL-SCNC: 101 MMOL/L (ref 95–110)
CHLORIDE SERPL-SCNC: 103 MMOL/L (ref 95–110)
CO2 SERPL-SCNC: 27 MMOL/L (ref 23–29)
CREAT SERPL-MCNC: 1.4 MG/DL (ref 0.5–1.4)
CREAT SERPL-MCNC: 1.5 MG/DL (ref 0.5–1.4)
CTP QC/QA: YES
DIFFERENTIAL METHOD: ABNORMAL
DIFFERENTIAL METHOD: ABNORMAL
EOSINOPHIL # BLD AUTO: 0.2 K/UL (ref 0–0.5)
EOSINOPHIL # BLD AUTO: 0.2 K/UL (ref 0–0.5)
EOSINOPHIL NFR BLD: 2.4 % (ref 0–8)
EOSINOPHIL NFR BLD: 2.7 % (ref 0–8)
ERYTHROCYTE [DISTWIDTH] IN BLOOD BY AUTOMATED COUNT: 16.9 % (ref 11.5–14.5)
ERYTHROCYTE [DISTWIDTH] IN BLOOD BY AUTOMATED COUNT: 16.9 % (ref 11.5–14.5)
EST. GFR  (AFRICAN AMERICAN): 58 ML/MIN/1.73 M^2
EST. GFR  (NON AFRICAN AMERICAN): 50.2 ML/MIN/1.73 M^2
GLUCOSE SERPL-MCNC: 113 MG/DL (ref 70–110)
GLUCOSE SERPL-MCNC: 115 MG/DL (ref 70–110)
HCT VFR BLD AUTO: 34.8 % (ref 40–54)
HCT VFR BLD AUTO: 35.9 % (ref 40–54)
HCT VFR BLD CALC: 34 %PCV (ref 36–54)
HCV AB SERPL QL IA: NEGATIVE
HGB BLD-MCNC: 10.6 G/DL (ref 14–18)
HGB BLD-MCNC: 9.9 G/DL (ref 14–18)
IMM GRANULOCYTES # BLD AUTO: 0.02 K/UL (ref 0–0.04)
IMM GRANULOCYTES # BLD AUTO: 0.03 K/UL (ref 0–0.04)
IMM GRANULOCYTES NFR BLD AUTO: 0.3 % (ref 0–0.5)
IMM GRANULOCYTES NFR BLD AUTO: 0.5 % (ref 0–0.5)
LYMPHOCYTES # BLD AUTO: 0.8 K/UL (ref 1–4.8)
LYMPHOCYTES # BLD AUTO: 1 K/UL (ref 1–4.8)
LYMPHOCYTES NFR BLD: 11.7 % (ref 18–48)
LYMPHOCYTES NFR BLD: 16.2 % (ref 18–48)
MCH RBC QN AUTO: 22.2 PG (ref 27–31)
MCH RBC QN AUTO: 23.1 PG (ref 27–31)
MCHC RBC AUTO-ENTMCNC: 28.4 G/DL (ref 32–36)
MCHC RBC AUTO-ENTMCNC: 29.5 G/DL (ref 32–36)
MCV RBC AUTO: 78 FL (ref 82–98)
MCV RBC AUTO: 78 FL (ref 82–98)
MONOCYTES # BLD AUTO: 0.9 K/UL (ref 0.3–1)
MONOCYTES # BLD AUTO: 0.9 K/UL (ref 0.3–1)
MONOCYTES NFR BLD: 14.1 % (ref 4–15)
MONOCYTES NFR BLD: 14.3 % (ref 4–15)
NEUTROPHILS # BLD AUTO: 4.2 K/UL (ref 1.8–7.7)
NEUTROPHILS # BLD AUTO: 4.7 K/UL (ref 1.8–7.7)
NEUTROPHILS NFR BLD: 65.8 % (ref 38–73)
NEUTROPHILS NFR BLD: 71 % (ref 38–73)
NRBC BLD-RTO: 0 /100 WBC
NRBC BLD-RTO: 0 /100 WBC
PLATELET # BLD AUTO: 274 K/UL (ref 150–450)
PLATELET # BLD AUTO: 285 K/UL (ref 150–450)
PMV BLD AUTO: 9.4 FL (ref 9.2–12.9)
PMV BLD AUTO: 9.7 FL (ref 9.2–12.9)
POC IONIZED CALCIUM: 1.06 MMOL/L (ref 1.06–1.42)
POC TCO2 (MEASURED): 25 MMOL/L (ref 23–29)
POTASSIUM BLD-SCNC: 3.4 MMOL/L (ref 3.5–5.1)
POTASSIUM SERPL-SCNC: 3.5 MMOL/L (ref 3.5–5.1)
PROT SERPL-MCNC: 7 G/DL (ref 6–8.4)
RBC # BLD AUTO: 4.46 M/UL (ref 4.6–6.2)
RBC # BLD AUTO: 4.59 M/UL (ref 4.6–6.2)
SAMPLE: ABNORMAL
SARS-COV-2 RDRP RESP QL NAA+PROBE: NEGATIVE
SODIUM BLD-SCNC: 140 MMOL/L (ref 136–145)
SODIUM SERPL-SCNC: 138 MMOL/L (ref 136–145)
TROPONIN I SERPL DL<=0.01 NG/ML-MCNC: 0.01 NG/ML (ref 0–0.03)
WBC # BLD AUTO: 6.37 K/UL (ref 3.9–12.7)
WBC # BLD AUTO: 6.59 K/UL (ref 3.9–12.7)

## 2021-05-31 PROCEDURE — 80053 COMPREHEN METABOLIC PANEL: CPT | Performed by: STUDENT IN AN ORGANIZED HEALTH CARE EDUCATION/TRAINING PROGRAM

## 2021-05-31 PROCEDURE — 99284 EMERGENCY DEPT VISIT MOD MDM: CPT | Mod: GC,,, | Performed by: INTERNAL MEDICINE

## 2021-05-31 PROCEDURE — 11000001 HC ACUTE MED/SURG PRIVATE ROOM

## 2021-05-31 PROCEDURE — 84484 ASSAY OF TROPONIN QUANT: CPT | Performed by: STUDENT IN AN ORGANIZED HEALTH CARE EDUCATION/TRAINING PROGRAM

## 2021-05-31 PROCEDURE — U0002 COVID-19 LAB TEST NON-CDC: HCPCS | Performed by: STUDENT IN AN ORGANIZED HEALTH CARE EDUCATION/TRAINING PROGRAM

## 2021-05-31 PROCEDURE — 86900 BLOOD TYPING SEROLOGIC ABO: CPT | Performed by: STUDENT IN AN ORGANIZED HEALTH CARE EDUCATION/TRAINING PROGRAM

## 2021-05-31 PROCEDURE — C9113 INJ PANTOPRAZOLE SODIUM, VIA: HCPCS | Performed by: STUDENT IN AN ORGANIZED HEALTH CARE EDUCATION/TRAINING PROGRAM

## 2021-05-31 PROCEDURE — 99233 SBSQ HOSP IP/OBS HIGH 50: CPT | Mod: ,,, | Performed by: INTERNAL MEDICINE

## 2021-05-31 PROCEDURE — 94660 CPAP INITIATION&MGMT: CPT

## 2021-05-31 PROCEDURE — 93005 ELECTROCARDIOGRAM TRACING: CPT

## 2021-05-31 PROCEDURE — 99223 PR INITIAL HOSPITAL CARE,LEVL III: ICD-10-PCS | Mod: ,,, | Performed by: PHYSICIAN ASSISTANT

## 2021-05-31 PROCEDURE — 86803 HEPATITIS C AB TEST: CPT | Performed by: EMERGENCY MEDICINE

## 2021-05-31 PROCEDURE — G0378 HOSPITAL OBSERVATION PER HR: HCPCS

## 2021-05-31 PROCEDURE — 63600175 PHARM REV CODE 636 W HCPCS: Performed by: PHYSICIAN ASSISTANT

## 2021-05-31 PROCEDURE — 99285 EMERGENCY DEPT VISIT HI MDM: CPT | Mod: GC,CS,, | Performed by: EMERGENCY MEDICINE

## 2021-05-31 PROCEDURE — 96374 THER/PROPH/DIAG INJ IV PUSH: CPT

## 2021-05-31 PROCEDURE — 99285 PR EMERGENCY DEPT VISIT,LEVEL V: ICD-10-PCS | Mod: GC,CS,, | Performed by: EMERGENCY MEDICINE

## 2021-05-31 PROCEDURE — 25000003 PHARM REV CODE 250: Performed by: STUDENT IN AN ORGANIZED HEALTH CARE EDUCATION/TRAINING PROGRAM

## 2021-05-31 PROCEDURE — 99223 1ST HOSP IP/OBS HIGH 75: CPT | Mod: ,,, | Performed by: PHYSICIAN ASSISTANT

## 2021-05-31 PROCEDURE — 96375 TX/PRO/DX INJ NEW DRUG ADDON: CPT

## 2021-05-31 PROCEDURE — 25000003 PHARM REV CODE 250: Performed by: PHYSICIAN ASSISTANT

## 2021-05-31 PROCEDURE — 99284 PR EMERGENCY DEPT VISIT,LEVEL IV: ICD-10-PCS | Mod: GC,,, | Performed by: INTERNAL MEDICINE

## 2021-05-31 PROCEDURE — 93010 ELECTROCARDIOGRAM REPORT: CPT | Mod: ,,, | Performed by: INTERNAL MEDICINE

## 2021-05-31 PROCEDURE — 99285 EMERGENCY DEPT VISIT HI MDM: CPT | Mod: 25

## 2021-05-31 PROCEDURE — 99233 PR SUBSEQUENT HOSPITAL CARE,LEVL III: ICD-10-PCS | Mod: ,,, | Performed by: INTERNAL MEDICINE

## 2021-05-31 PROCEDURE — 63600175 PHARM REV CODE 636 W HCPCS: Performed by: STUDENT IN AN ORGANIZED HEALTH CARE EDUCATION/TRAINING PROGRAM

## 2021-05-31 PROCEDURE — C9113 INJ PANTOPRAZOLE SODIUM, VIA: HCPCS | Performed by: PHYSICIAN ASSISTANT

## 2021-05-31 PROCEDURE — 94761 N-INVAS EAR/PLS OXIMETRY MLT: CPT

## 2021-05-31 PROCEDURE — 93010 EKG 12-LEAD: ICD-10-PCS | Mod: 76,,, | Performed by: INTERNAL MEDICINE

## 2021-05-31 PROCEDURE — 85025 COMPLETE CBC W/AUTO DIFF WBC: CPT | Performed by: PHYSICIAN ASSISTANT

## 2021-05-31 PROCEDURE — 83880 ASSAY OF NATRIURETIC PEPTIDE: CPT | Performed by: STUDENT IN AN ORGANIZED HEALTH CARE EDUCATION/TRAINING PROGRAM

## 2021-05-31 PROCEDURE — 93010 ELECTROCARDIOGRAM REPORT: CPT | Mod: 76,,, | Performed by: INTERNAL MEDICINE

## 2021-05-31 PROCEDURE — 85025 COMPLETE CBC W/AUTO DIFF WBC: CPT | Mod: 91 | Performed by: STUDENT IN AN ORGANIZED HEALTH CARE EDUCATION/TRAINING PROGRAM

## 2021-05-31 PROCEDURE — 80047 BASIC METABLC PNL IONIZED CA: CPT

## 2021-05-31 RX ORDER — FUROSEMIDE 10 MG/ML
20 INJECTION INTRAMUSCULAR; INTRAVENOUS
Status: COMPLETED | OUTPATIENT
Start: 2021-05-31 | End: 2021-05-31

## 2021-05-31 RX ORDER — FINASTERIDE 5 MG/1
5 TABLET, FILM COATED ORAL DAILY
Status: DISCONTINUED | OUTPATIENT
Start: 2021-06-01 | End: 2021-06-02 | Stop reason: HOSPADM

## 2021-05-31 RX ORDER — ATENOLOL 50 MG/1
100 TABLET ORAL DAILY
Status: DISCONTINUED | OUTPATIENT
Start: 2021-06-01 | End: 2021-06-02 | Stop reason: HOSPADM

## 2021-05-31 RX ORDER — TALC
6 POWDER (GRAM) TOPICAL NIGHTLY PRN
Status: DISCONTINUED | OUTPATIENT
Start: 2021-05-31 | End: 2021-05-31

## 2021-05-31 RX ORDER — PANTOPRAZOLE SODIUM 40 MG/10ML
40 INJECTION, POWDER, LYOPHILIZED, FOR SOLUTION INTRAVENOUS
Status: COMPLETED | OUTPATIENT
Start: 2021-05-31 | End: 2021-05-31

## 2021-05-31 RX ORDER — PROMETHAZINE HYDROCHLORIDE 25 MG/1
25 SUPPOSITORY RECTAL EVERY 6 HOURS PRN
Status: DISCONTINUED | OUTPATIENT
Start: 2021-05-31 | End: 2021-06-02 | Stop reason: HOSPADM

## 2021-05-31 RX ORDER — ONDANSETRON 2 MG/ML
8 INJECTION INTRAMUSCULAR; INTRAVENOUS EVERY 8 HOURS PRN
Status: DISCONTINUED | OUTPATIENT
Start: 2021-05-31 | End: 2021-06-02 | Stop reason: HOSPADM

## 2021-05-31 RX ORDER — FLECAINIDE ACETATE 50 MG/1
100 TABLET ORAL EVERY 12 HOURS
Status: DISCONTINUED | OUTPATIENT
Start: 2021-05-31 | End: 2021-06-01

## 2021-05-31 RX ORDER — SODIUM CHLORIDE 9 MG/ML
INJECTION, SOLUTION INTRAVENOUS CONTINUOUS
Status: DISCONTINUED | OUTPATIENT
Start: 2021-05-31 | End: 2021-05-31

## 2021-05-31 RX ORDER — ACETAMINOPHEN 325 MG/1
650 TABLET ORAL EVERY 4 HOURS PRN
Status: DISCONTINUED | OUTPATIENT
Start: 2021-05-31 | End: 2021-06-02 | Stop reason: HOSPADM

## 2021-05-31 RX ORDER — TALC
6 POWDER (GRAM) TOPICAL NIGHTLY PRN
Status: DISCONTINUED | OUTPATIENT
Start: 2021-05-31 | End: 2021-06-02 | Stop reason: HOSPADM

## 2021-05-31 RX ORDER — VALSARTAN 160 MG/1
320 TABLET ORAL DAILY
Status: DISCONTINUED | OUTPATIENT
Start: 2021-06-01 | End: 2021-06-02 | Stop reason: HOSPADM

## 2021-05-31 RX ORDER — SODIUM CHLORIDE 0.9 % (FLUSH) 0.9 %
10 SYRINGE (ML) INJECTION
Status: DISCONTINUED | OUTPATIENT
Start: 2021-05-31 | End: 2021-06-02 | Stop reason: HOSPADM

## 2021-05-31 RX ORDER — PANTOPRAZOLE SODIUM 40 MG/10ML
40 INJECTION, POWDER, LYOPHILIZED, FOR SOLUTION INTRAVENOUS 2 TIMES DAILY
Status: DISCONTINUED | OUTPATIENT
Start: 2021-05-31 | End: 2021-06-02 | Stop reason: HOSPADM

## 2021-05-31 RX ORDER — SODIUM CHLORIDE 0.9 % (FLUSH) 0.9 %
10 SYRINGE (ML) INJECTION
Status: DISCONTINUED | OUTPATIENT
Start: 2021-05-31 | End: 2021-05-31

## 2021-05-31 RX ORDER — AMLODIPINE BESYLATE 5 MG/1
5 TABLET ORAL DAILY
Status: DISCONTINUED | OUTPATIENT
Start: 2021-06-01 | End: 2021-06-02 | Stop reason: HOSPADM

## 2021-05-31 RX ORDER — DOXAZOSIN 4 MG/1
4 TABLET ORAL NIGHTLY
Status: DISCONTINUED | OUTPATIENT
Start: 2021-05-31 | End: 2021-06-02 | Stop reason: HOSPADM

## 2021-05-31 RX ORDER — POLYETHYLENE GLYCOL 3350, SODIUM SULFATE, SODIUM CHLORIDE, POTASSIUM CHLORIDE, SODIUM ASCORBATE, AND ASCORBIC ACID 7.5-2.691G
2000 KIT ORAL ONCE
Status: COMPLETED | OUTPATIENT
Start: 2021-06-01 | End: 2021-06-01

## 2021-05-31 RX ORDER — HYDROCHLOROTHIAZIDE 25 MG/1
25 TABLET ORAL DAILY
Status: DISCONTINUED | OUTPATIENT
Start: 2021-06-01 | End: 2021-06-02 | Stop reason: HOSPADM

## 2021-05-31 RX ORDER — IPRATROPIUM BROMIDE AND ALBUTEROL SULFATE 2.5; .5 MG/3ML; MG/3ML
3 SOLUTION RESPIRATORY (INHALATION) EVERY 4 HOURS PRN
Status: DISCONTINUED | OUTPATIENT
Start: 2021-05-31 | End: 2021-06-02 | Stop reason: HOSPADM

## 2021-05-31 RX ADMIN — PANTOPRAZOLE SODIUM 40 MG: 40 INJECTION, POWDER, FOR SOLUTION INTRAVENOUS at 08:05

## 2021-05-31 RX ADMIN — FUROSEMIDE 20 MG: 10 INJECTION, SOLUTION INTRAMUSCULAR; INTRAVENOUS at 11:05

## 2021-05-31 RX ADMIN — MELATONIN TAB 3 MG 6 MG: 3 TAB at 08:05

## 2021-05-31 RX ADMIN — DOXAZOSIN 4 MG: 4 TABLET ORAL at 08:05

## 2021-05-31 RX ADMIN — FLECAINIDE ACETATE 100 MG: 50 TABLET ORAL at 08:05

## 2021-05-31 RX ADMIN — PANTOPRAZOLE SODIUM 40 MG: 40 INJECTION, POWDER, FOR SOLUTION INTRAVENOUS at 11:05

## 2021-06-01 ENCOUNTER — ANESTHESIA EVENT (OUTPATIENT)
Dept: ENDOSCOPY | Facility: HOSPITAL | Age: 71
DRG: 378 | End: 2021-06-01
Payer: MEDICARE

## 2021-06-01 LAB
ANION GAP SERPL CALC-SCNC: 14 MMOL/L (ref 8–16)
BASOPHILS # BLD AUTO: 0.03 K/UL (ref 0–0.2)
BASOPHILS # BLD AUTO: 0.03 K/UL (ref 0–0.2)
BASOPHILS NFR BLD: 0.5 % (ref 0–1.9)
BASOPHILS NFR BLD: 0.5 % (ref 0–1.9)
BUN SERPL-MCNC: 18 MG/DL (ref 8–23)
CALCIUM SERPL-MCNC: 8.9 MG/DL (ref 8.7–10.5)
CHLORIDE SERPL-SCNC: 102 MMOL/L (ref 95–110)
CO2 SERPL-SCNC: 25 MMOL/L (ref 23–29)
CREAT SERPL-MCNC: 1.2 MG/DL (ref 0.5–1.4)
DIFFERENTIAL METHOD: ABNORMAL
DIFFERENTIAL METHOD: ABNORMAL
EOSINOPHIL # BLD AUTO: 0.3 K/UL (ref 0–0.5)
EOSINOPHIL # BLD AUTO: 0.3 K/UL (ref 0–0.5)
EOSINOPHIL NFR BLD: 5 % (ref 0–8)
EOSINOPHIL NFR BLD: 5.1 % (ref 0–8)
ERYTHROCYTE [DISTWIDTH] IN BLOOD BY AUTOMATED COUNT: 17.1 % (ref 11.5–14.5)
ERYTHROCYTE [DISTWIDTH] IN BLOOD BY AUTOMATED COUNT: 17.2 % (ref 11.5–14.5)
EST. GFR  (AFRICAN AMERICAN): >60 ML/MIN/1.73 M^2
EST. GFR  (NON AFRICAN AMERICAN): >60 ML/MIN/1.73 M^2
GLUCOSE SERPL-MCNC: 101 MG/DL (ref 70–110)
HCT VFR BLD AUTO: 32.2 % (ref 40–54)
HCT VFR BLD AUTO: 33.7 % (ref 40–54)
HGB BLD-MCNC: 9.3 G/DL (ref 14–18)
HGB BLD-MCNC: 9.8 G/DL (ref 14–18)
IMM GRANULOCYTES # BLD AUTO: 0.01 K/UL (ref 0–0.04)
IMM GRANULOCYTES # BLD AUTO: 0.02 K/UL (ref 0–0.04)
IMM GRANULOCYTES NFR BLD AUTO: 0.2 % (ref 0–0.5)
IMM GRANULOCYTES NFR BLD AUTO: 0.4 % (ref 0–0.5)
LYMPHOCYTES # BLD AUTO: 1 K/UL (ref 1–4.8)
LYMPHOCYTES # BLD AUTO: 1 K/UL (ref 1–4.8)
LYMPHOCYTES NFR BLD: 16.6 % (ref 18–48)
LYMPHOCYTES NFR BLD: 16.7 % (ref 18–48)
MAGNESIUM SERPL-MCNC: 1.9 MG/DL (ref 1.6–2.6)
MCH RBC QN AUTO: 22.6 PG (ref 27–31)
MCH RBC QN AUTO: 22.7 PG (ref 27–31)
MCHC RBC AUTO-ENTMCNC: 28.9 G/DL (ref 32–36)
MCHC RBC AUTO-ENTMCNC: 29.1 G/DL (ref 32–36)
MCV RBC AUTO: 78 FL (ref 82–98)
MCV RBC AUTO: 79 FL (ref 82–98)
MONOCYTES # BLD AUTO: 0.8 K/UL (ref 0.3–1)
MONOCYTES # BLD AUTO: 0.9 K/UL (ref 0.3–1)
MONOCYTES NFR BLD: 14.4 % (ref 4–15)
MONOCYTES NFR BLD: 14.4 % (ref 4–15)
NEUTROPHILS # BLD AUTO: 3.6 K/UL (ref 1.8–7.7)
NEUTROPHILS # BLD AUTO: 3.9 K/UL (ref 1.8–7.7)
NEUTROPHILS NFR BLD: 62.9 % (ref 38–73)
NEUTROPHILS NFR BLD: 63.3 % (ref 38–73)
NRBC BLD-RTO: 0 /100 WBC
NRBC BLD-RTO: 0 /100 WBC
PHOSPHATE SERPL-MCNC: 3.2 MG/DL (ref 2.7–4.5)
PLATELET # BLD AUTO: 253 K/UL (ref 150–450)
PLATELET # BLD AUTO: 255 K/UL (ref 150–450)
PMV BLD AUTO: 9.3 FL (ref 9.2–12.9)
PMV BLD AUTO: 9.3 FL (ref 9.2–12.9)
POTASSIUM SERPL-SCNC: 3.4 MMOL/L (ref 3.5–5.1)
RBC # BLD AUTO: 4.09 M/UL (ref 4.6–6.2)
RBC # BLD AUTO: 4.33 M/UL (ref 4.6–6.2)
SODIUM SERPL-SCNC: 141 MMOL/L (ref 136–145)
WBC # BLD AUTO: 5.69 K/UL (ref 3.9–12.7)
WBC # BLD AUTO: 6.16 K/UL (ref 3.9–12.7)

## 2021-06-01 PROCEDURE — 85025 COMPLETE CBC W/AUTO DIFF WBC: CPT | Mod: 91 | Performed by: PHYSICIAN ASSISTANT

## 2021-06-01 PROCEDURE — 63600175 PHARM REV CODE 636 W HCPCS: Performed by: PHYSICIAN ASSISTANT

## 2021-06-01 PROCEDURE — 83735 ASSAY OF MAGNESIUM: CPT | Performed by: PHYSICIAN ASSISTANT

## 2021-06-01 PROCEDURE — 80048 BASIC METABOLIC PNL TOTAL CA: CPT | Performed by: PHYSICIAN ASSISTANT

## 2021-06-01 PROCEDURE — 27000221 HC OXYGEN, UP TO 24 HOURS

## 2021-06-01 PROCEDURE — 25000003 PHARM REV CODE 250: Performed by: PHYSICIAN ASSISTANT

## 2021-06-01 PROCEDURE — 99233 SBSQ HOSP IP/OBS HIGH 50: CPT | Mod: ,,, | Performed by: PHYSICIAN ASSISTANT

## 2021-06-01 PROCEDURE — 99233 PR SUBSEQUENT HOSPITAL CARE,LEVL III: ICD-10-PCS | Mod: ,,, | Performed by: PHYSICIAN ASSISTANT

## 2021-06-01 PROCEDURE — 94660 CPAP INITIATION&MGMT: CPT

## 2021-06-01 PROCEDURE — 99900035 HC TECH TIME PER 15 MIN (STAT)

## 2021-06-01 PROCEDURE — 36415 COLL VENOUS BLD VENIPUNCTURE: CPT | Performed by: PHYSICIAN ASSISTANT

## 2021-06-01 PROCEDURE — 94761 N-INVAS EAR/PLS OXIMETRY MLT: CPT

## 2021-06-01 PROCEDURE — C9113 INJ PANTOPRAZOLE SODIUM, VIA: HCPCS | Performed by: PHYSICIAN ASSISTANT

## 2021-06-01 PROCEDURE — 84100 ASSAY OF PHOSPHORUS: CPT | Performed by: PHYSICIAN ASSISTANT

## 2021-06-01 PROCEDURE — 25000003 PHARM REV CODE 250: Performed by: STUDENT IN AN ORGANIZED HEALTH CARE EDUCATION/TRAINING PROGRAM

## 2021-06-01 PROCEDURE — 11000001 HC ACUTE MED/SURG PRIVATE ROOM

## 2021-06-01 PROCEDURE — 99233 PR SUBSEQUENT HOSPITAL CARE,LEVL III: ICD-10-PCS | Mod: ,,, | Performed by: INTERNAL MEDICINE

## 2021-06-01 PROCEDURE — 99233 SBSQ HOSP IP/OBS HIGH 50: CPT | Mod: ,,, | Performed by: INTERNAL MEDICINE

## 2021-06-01 RX ORDER — FLECAINIDE ACETATE 50 MG/1
150 TABLET ORAL EVERY 12 HOURS
Status: DISCONTINUED | OUTPATIENT
Start: 2021-06-01 | End: 2021-06-02 | Stop reason: HOSPADM

## 2021-06-01 RX ADMIN — POLYETHYLENE GLYCOL 3350, SODIUM SULFATE, SODIUM CHLORIDE, POTASSIUM CHLORIDE, ASCORBIC ACID, SODIUM ASCORBATE 2000 ML: KIT at 06:06

## 2021-06-01 RX ADMIN — VALSARTAN 320 MG: 160 TABLET, FILM COATED ORAL at 09:06

## 2021-06-01 RX ADMIN — FLECAINIDE ACETATE 100 MG: 50 TABLET ORAL at 09:06

## 2021-06-01 RX ADMIN — FINASTERIDE 5 MG: 5 TABLET, FILM COATED ORAL at 09:06

## 2021-06-01 RX ADMIN — ATENOLOL 100 MG: 50 TABLET ORAL at 09:06

## 2021-06-01 RX ADMIN — DOXAZOSIN 4 MG: 4 TABLET ORAL at 08:06

## 2021-06-01 RX ADMIN — AMLODIPINE BESYLATE 5 MG: 5 TABLET ORAL at 09:06

## 2021-06-01 RX ADMIN — PANTOPRAZOLE SODIUM 40 MG: 40 INJECTION, POWDER, FOR SOLUTION INTRAVENOUS at 09:06

## 2021-06-01 RX ADMIN — HYDROCHLOROTHIAZIDE 25 MG: 25 TABLET ORAL at 09:06

## 2021-06-01 RX ADMIN — PANTOPRAZOLE SODIUM 40 MG: 40 INJECTION, POWDER, FOR SOLUTION INTRAVENOUS at 08:06

## 2021-06-01 RX ADMIN — FLECAINIDE ACETATE 150 MG: 50 TABLET ORAL at 08:06

## 2021-06-02 ENCOUNTER — ANESTHESIA (OUTPATIENT)
Dept: ENDOSCOPY | Facility: HOSPITAL | Age: 71
DRG: 378 | End: 2021-06-02
Payer: MEDICARE

## 2021-06-02 VITALS
TEMPERATURE: 98 F | SYSTOLIC BLOOD PRESSURE: 108 MMHG | HEART RATE: 56 BPM | BODY MASS INDEX: 40.43 KG/M2 | OXYGEN SATURATION: 95 % | DIASTOLIC BLOOD PRESSURE: 56 MMHG | RESPIRATION RATE: 18 BRPM | WEIGHT: 315 LBS | HEIGHT: 74 IN

## 2021-06-02 LAB
ANION GAP SERPL CALC-SCNC: 9 MMOL/L (ref 8–16)
BASOPHILS # BLD AUTO: 0.04 K/UL (ref 0–0.2)
BASOPHILS NFR BLD: 0.6 % (ref 0–1.9)
BUN SERPL-MCNC: 15 MG/DL (ref 8–23)
CALCIUM SERPL-MCNC: 8.9 MG/DL (ref 8.7–10.5)
CHLORIDE SERPL-SCNC: 106 MMOL/L (ref 95–110)
CO2 SERPL-SCNC: 28 MMOL/L (ref 23–29)
CREAT SERPL-MCNC: 1.2 MG/DL (ref 0.5–1.4)
DIFFERENTIAL METHOD: ABNORMAL
EOSINOPHIL # BLD AUTO: 0.4 K/UL (ref 0–0.5)
EOSINOPHIL NFR BLD: 6.1 % (ref 0–8)
ERYTHROCYTE [DISTWIDTH] IN BLOOD BY AUTOMATED COUNT: 17.1 % (ref 11.5–14.5)
EST. GFR  (AFRICAN AMERICAN): >60 ML/MIN/1.73 M^2
EST. GFR  (NON AFRICAN AMERICAN): >60 ML/MIN/1.73 M^2
GLUCOSE SERPL-MCNC: 91 MG/DL (ref 70–110)
HCT VFR BLD AUTO: 34.1 % (ref 40–54)
HGB BLD-MCNC: 10 G/DL (ref 14–18)
IMM GRANULOCYTES # BLD AUTO: 0.02 K/UL (ref 0–0.04)
IMM GRANULOCYTES NFR BLD AUTO: 0.3 % (ref 0–0.5)
LYMPHOCYTES # BLD AUTO: 1.2 K/UL (ref 1–4.8)
LYMPHOCYTES NFR BLD: 18.7 % (ref 18–48)
MAGNESIUM SERPL-MCNC: 2 MG/DL (ref 1.6–2.6)
MCH RBC QN AUTO: 22.9 PG (ref 27–31)
MCHC RBC AUTO-ENTMCNC: 29.3 G/DL (ref 32–36)
MCV RBC AUTO: 78 FL (ref 82–98)
MONOCYTES # BLD AUTO: 0.9 K/UL (ref 0.3–1)
MONOCYTES NFR BLD: 14.5 % (ref 4–15)
NEUTROPHILS # BLD AUTO: 3.8 K/UL (ref 1.8–7.7)
NEUTROPHILS NFR BLD: 59.8 % (ref 38–73)
NRBC BLD-RTO: 0 /100 WBC
PHOSPHATE SERPL-MCNC: 3.3 MG/DL (ref 2.7–4.5)
PLATELET # BLD AUTO: 260 K/UL (ref 150–450)
PMV BLD AUTO: 9.3 FL (ref 9.2–12.9)
POTASSIUM SERPL-SCNC: 3.4 MMOL/L (ref 3.5–5.1)
RBC # BLD AUTO: 4.37 M/UL (ref 4.6–6.2)
SODIUM SERPL-SCNC: 143 MMOL/L (ref 136–145)
WBC # BLD AUTO: 6.35 K/UL (ref 3.9–12.7)

## 2021-06-02 PROCEDURE — 43235 EGD DIAGNOSTIC BRUSH WASH: CPT | Performed by: INTERNAL MEDICINE

## 2021-06-02 PROCEDURE — 45380 COLONOSCOPY AND BIOPSY: CPT | Performed by: INTERNAL MEDICINE

## 2021-06-02 PROCEDURE — 45385 PR COLONOSCOPY,REMV LESN,SNARE: ICD-10-PCS | Mod: ,,, | Performed by: INTERNAL MEDICINE

## 2021-06-02 PROCEDURE — 88305 TISSUE EXAM BY PATHOLOGIST: CPT | Mod: 59 | Performed by: PATHOLOGY

## 2021-06-02 PROCEDURE — 99900035 HC TECH TIME PER 15 MIN (STAT)

## 2021-06-02 PROCEDURE — 43235 PR EGD, FLEX, DIAGNOSTIC: ICD-10-PCS | Mod: 51,,, | Performed by: INTERNAL MEDICINE

## 2021-06-02 PROCEDURE — 85025 COMPLETE CBC W/AUTO DIFF WBC: CPT | Performed by: PHYSICIAN ASSISTANT

## 2021-06-02 PROCEDURE — 63600175 PHARM REV CODE 636 W HCPCS: Performed by: PHYSICIAN ASSISTANT

## 2021-06-02 PROCEDURE — D9220A PRA ANESTHESIA: Mod: ANES,,, | Performed by: ANESTHESIOLOGY

## 2021-06-02 PROCEDURE — 45380 PR COLONOSCOPY,BIOPSY: ICD-10-PCS | Mod: 59,,, | Performed by: INTERNAL MEDICINE

## 2021-06-02 PROCEDURE — 25000003 PHARM REV CODE 250: Performed by: NURSE ANESTHETIST, CERTIFIED REGISTERED

## 2021-06-02 PROCEDURE — 36415 COLL VENOUS BLD VENIPUNCTURE: CPT | Performed by: PHYSICIAN ASSISTANT

## 2021-06-02 PROCEDURE — 88305 TISSUE EXAM BY PATHOLOGIST: ICD-10-PCS | Mod: 26,,, | Performed by: PATHOLOGY

## 2021-06-02 PROCEDURE — 84100 ASSAY OF PHOSPHORUS: CPT | Performed by: PHYSICIAN ASSISTANT

## 2021-06-02 PROCEDURE — 45385 COLONOSCOPY W/LESION REMOVAL: CPT | Performed by: INTERNAL MEDICINE

## 2021-06-02 PROCEDURE — 25000003 PHARM REV CODE 250: Performed by: PHYSICIAN ASSISTANT

## 2021-06-02 PROCEDURE — 37000009 HC ANESTHESIA EA ADD 15 MINS: Performed by: INTERNAL MEDICINE

## 2021-06-02 PROCEDURE — D9220A PRA ANESTHESIA: ICD-10-PCS | Mod: ANES,,, | Performed by: ANESTHESIOLOGY

## 2021-06-02 PROCEDURE — 80048 BASIC METABOLIC PNL TOTAL CA: CPT | Performed by: PHYSICIAN ASSISTANT

## 2021-06-02 PROCEDURE — 27000221 HC OXYGEN, UP TO 24 HOURS

## 2021-06-02 PROCEDURE — 63600175 PHARM REV CODE 636 W HCPCS: Performed by: NURSE ANESTHETIST, CERTIFIED REGISTERED

## 2021-06-02 PROCEDURE — 99239 PR HOSPITAL DISCHARGE DAY,>30 MIN: ICD-10-PCS | Mod: ,,, | Performed by: PHYSICIAN ASSISTANT

## 2021-06-02 PROCEDURE — 83735 ASSAY OF MAGNESIUM: CPT | Performed by: PHYSICIAN ASSISTANT

## 2021-06-02 PROCEDURE — 37000008 HC ANESTHESIA 1ST 15 MINUTES: Performed by: INTERNAL MEDICINE

## 2021-06-02 PROCEDURE — 94761 N-INVAS EAR/PLS OXIMETRY MLT: CPT

## 2021-06-02 PROCEDURE — D9220A PRA ANESTHESIA: ICD-10-PCS | Mod: CRNA,,, | Performed by: NURSE ANESTHETIST, CERTIFIED REGISTERED

## 2021-06-02 PROCEDURE — 45385 COLONOSCOPY W/LESION REMOVAL: CPT | Mod: ,,, | Performed by: INTERNAL MEDICINE

## 2021-06-02 PROCEDURE — 43235 EGD DIAGNOSTIC BRUSH WASH: CPT | Mod: 51,,, | Performed by: INTERNAL MEDICINE

## 2021-06-02 PROCEDURE — 27201089 HC SNARE, DISP (ANY): Performed by: INTERNAL MEDICINE

## 2021-06-02 PROCEDURE — 88305 TISSUE EXAM BY PATHOLOGIST: CPT | Mod: 26,,, | Performed by: PATHOLOGY

## 2021-06-02 PROCEDURE — 99239 HOSP IP/OBS DSCHRG MGMT >30: CPT | Mod: ,,, | Performed by: PHYSICIAN ASSISTANT

## 2021-06-02 PROCEDURE — D9220A PRA ANESTHESIA: Mod: CRNA,,, | Performed by: NURSE ANESTHETIST, CERTIFIED REGISTERED

## 2021-06-02 PROCEDURE — 45380 COLONOSCOPY AND BIOPSY: CPT | Mod: 59,,, | Performed by: INTERNAL MEDICINE

## 2021-06-02 PROCEDURE — C9113 INJ PANTOPRAZOLE SODIUM, VIA: HCPCS | Performed by: PHYSICIAN ASSISTANT

## 2021-06-02 RX ORDER — POTASSIUM CHLORIDE 20 MEQ/1
40 TABLET, EXTENDED RELEASE ORAL
Status: COMPLETED | OUTPATIENT
Start: 2021-06-02 | End: 2021-06-02

## 2021-06-02 RX ORDER — PROPOFOL 10 MG/ML
VIAL (ML) INTRAVENOUS
Status: DISCONTINUED | OUTPATIENT
Start: 2021-06-02 | End: 2021-06-02

## 2021-06-02 RX ORDER — LIDOCAINE HYDROCHLORIDE 20 MG/ML
INJECTION INTRAVENOUS
Status: DISCONTINUED | OUTPATIENT
Start: 2021-06-02 | End: 2021-06-02

## 2021-06-02 RX ORDER — EPHEDRINE SULFATE 50 MG/ML
INJECTION, SOLUTION INTRAVENOUS
Status: DISCONTINUED | OUTPATIENT
Start: 2021-06-02 | End: 2021-06-02

## 2021-06-02 RX ORDER — PROPOFOL 10 MG/ML
VIAL (ML) INTRAVENOUS CONTINUOUS PRN
Status: DISCONTINUED | OUTPATIENT
Start: 2021-06-02 | End: 2021-06-02

## 2021-06-02 RX ORDER — FLECAINIDE ACETATE 150 MG/1
150 TABLET ORAL EVERY 12 HOURS
Qty: 60 TABLET | Refills: 11 | Status: SHIPPED | OUTPATIENT
Start: 2021-06-02 | End: 2021-09-09

## 2021-06-02 RX ORDER — LIDOCAINE HYDROCHLORIDE 40 MG/ML
INJECTION, SOLUTION RETROBULBAR
Status: DISCONTINUED | OUTPATIENT
Start: 2021-06-02 | End: 2021-06-02

## 2021-06-02 RX ADMIN — PANTOPRAZOLE SODIUM 40 MG: 40 INJECTION, POWDER, FOR SOLUTION INTRAVENOUS at 09:06

## 2021-06-02 RX ADMIN — EPHEDRINE SULFATE 10 MG: 50 INJECTION INTRAVENOUS at 12:06

## 2021-06-02 RX ADMIN — FLECAINIDE ACETATE 150 MG: 50 TABLET ORAL at 09:06

## 2021-06-02 RX ADMIN — LIDOCAINE HYDROCHLORIDE 75 MG: 20 INJECTION, SOLUTION INTRAVENOUS at 12:06

## 2021-06-02 RX ADMIN — EPHEDRINE SULFATE 5 MG: 50 INJECTION INTRAVENOUS at 12:06

## 2021-06-02 RX ADMIN — SODIUM CHLORIDE: 0.9 INJECTION, SOLUTION INTRAVENOUS at 11:06

## 2021-06-02 RX ADMIN — FINASTERIDE 5 MG: 5 TABLET, FILM COATED ORAL at 08:06

## 2021-06-02 RX ADMIN — POTASSIUM CHLORIDE 40 MEQ: 1500 TABLET, EXTENDED RELEASE ORAL at 10:06

## 2021-06-02 RX ADMIN — EPHEDRINE SULFATE 15 MG: 50 INJECTION INTRAVENOUS at 12:06

## 2021-06-02 RX ADMIN — AMLODIPINE BESYLATE 5 MG: 5 TABLET ORAL at 09:06

## 2021-06-02 RX ADMIN — ATENOLOL 100 MG: 50 TABLET ORAL at 09:06

## 2021-06-02 RX ADMIN — VALSARTAN 320 MG: 160 TABLET, FILM COATED ORAL at 09:06

## 2021-06-02 RX ADMIN — PROPOFOL 60 MG: 10 INJECTION, EMULSION INTRAVENOUS at 12:06

## 2021-06-02 RX ADMIN — POTASSIUM CHLORIDE 40 MEQ: 1500 TABLET, EXTENDED RELEASE ORAL at 08:06

## 2021-06-02 RX ADMIN — Medication 150 MCG/KG/MIN: at 12:06

## 2021-06-02 RX ADMIN — LIDOCAINE HYDROCHLORIDE 2.5 ML: 40 INJECTION, SOLUTION RETROBULBAR; TOPICAL at 12:06

## 2021-06-02 RX ADMIN — HYDROCHLOROTHIAZIDE 25 MG: 25 TABLET ORAL at 09:06

## 2021-06-03 ENCOUNTER — TELEPHONE (OUTPATIENT)
Dept: INTERNAL MEDICINE | Facility: CLINIC | Age: 71
End: 2021-06-03

## 2021-06-04 ENCOUNTER — OFFICE VISIT (OUTPATIENT)
Dept: UROLOGY | Facility: CLINIC | Age: 71
End: 2021-06-04
Payer: MEDICARE

## 2021-06-04 VITALS
HEART RATE: 53 BPM | HEIGHT: 74 IN | DIASTOLIC BLOOD PRESSURE: 66 MMHG | BODY MASS INDEX: 40.43 KG/M2 | SYSTOLIC BLOOD PRESSURE: 104 MMHG | WEIGHT: 315 LBS

## 2021-06-04 DIAGNOSIS — R35.1 NOCTURIA: ICD-10-CM

## 2021-06-04 DIAGNOSIS — R35.0 URINARY FREQUENCY: ICD-10-CM

## 2021-06-04 LAB
BILIRUB SERPL-MCNC: NORMAL MG/DL
BLOOD URINE, POC: NORMAL
CLARITY, POC UA: CLEAR
COLOR, POC UA: YELLOW
GLUCOSE UR QL STRIP: NORMAL
KETONES UR QL STRIP: NORMAL
LEUKOCYTE ESTERASE URINE, POC: NORMAL
NITRITE, POC UA: NORMAL
PH, POC UA: 5
POC RESIDUAL URINE VOLUME: 22 ML (ref 0–100)
PROTEIN, POC: NORMAL
SPECIFIC GRAVITY, POC UA: 1.02
UROBILINOGEN, POC UA: NORMAL

## 2021-06-04 PROCEDURE — 1101F PR PT FALLS ASSESS DOC 0-1 FALLS W/OUT INJ PAST YR: ICD-10-PCS | Mod: CPTII,S$GLB,, | Performed by: UROLOGY

## 2021-06-04 PROCEDURE — 51798 US URINE CAPACITY MEASURE: CPT | Mod: S$GLB,,, | Performed by: UROLOGY

## 2021-06-04 PROCEDURE — 99999 PR PBB SHADOW E&M-EST. PATIENT-LVL IV: ICD-10-PCS | Mod: PBBFAC,,, | Performed by: UROLOGY

## 2021-06-04 PROCEDURE — 1126F AMNT PAIN NOTED NONE PRSNT: CPT | Mod: S$GLB,,, | Performed by: UROLOGY

## 2021-06-04 PROCEDURE — 1101F PT FALLS ASSESS-DOCD LE1/YR: CPT | Mod: CPTII,S$GLB,, | Performed by: UROLOGY

## 2021-06-04 PROCEDURE — 3008F BODY MASS INDEX DOCD: CPT | Mod: CPTII,S$GLB,, | Performed by: UROLOGY

## 2021-06-04 PROCEDURE — 1159F PR MEDICATION LIST DOCUMENTED IN MEDICAL RECORD: ICD-10-PCS | Mod: S$GLB,,, | Performed by: UROLOGY

## 2021-06-04 PROCEDURE — 1159F MED LIST DOCD IN RCRD: CPT | Mod: S$GLB,,, | Performed by: UROLOGY

## 2021-06-04 PROCEDURE — 99214 OFFICE O/P EST MOD 30 MIN: CPT | Mod: S$GLB,,, | Performed by: UROLOGY

## 2021-06-04 PROCEDURE — 1126F PR PAIN SEVERITY QUANTIFIED, NO PAIN PRESENT: ICD-10-PCS | Mod: S$GLB,,, | Performed by: UROLOGY

## 2021-06-04 PROCEDURE — 81002 POCT URINE DIPSTICK WITHOUT MICROSCOPE: ICD-10-PCS | Mod: S$GLB,,, | Performed by: UROLOGY

## 2021-06-04 PROCEDURE — 51798 POCT BLADDER SCAN: ICD-10-PCS | Mod: S$GLB,,, | Performed by: UROLOGY

## 2021-06-04 PROCEDURE — 1111F PR DISCHARGE MEDS RECONCILED W/ CURRENT OUTPATIENT MED LIST: ICD-10-PCS | Mod: CPTII,S$GLB,, | Performed by: UROLOGY

## 2021-06-04 PROCEDURE — 81002 URINALYSIS NONAUTO W/O SCOPE: CPT | Mod: S$GLB,,, | Performed by: UROLOGY

## 2021-06-04 PROCEDURE — 1111F DSCHRG MED/CURRENT MED MERGE: CPT | Mod: CPTII,S$GLB,, | Performed by: UROLOGY

## 2021-06-04 PROCEDURE — 3008F PR BODY MASS INDEX (BMI) DOCUMENTED: ICD-10-PCS | Mod: CPTII,S$GLB,, | Performed by: UROLOGY

## 2021-06-04 PROCEDURE — 99214 PR OFFICE/OUTPT VISIT, EST, LEVL IV, 30-39 MIN: ICD-10-PCS | Mod: S$GLB,,, | Performed by: UROLOGY

## 2021-06-04 PROCEDURE — 3288F FALL RISK ASSESSMENT DOCD: CPT | Mod: CPTII,S$GLB,, | Performed by: UROLOGY

## 2021-06-04 PROCEDURE — 3288F PR FALLS RISK ASSESSMENT DOCUMENTED: ICD-10-PCS | Mod: CPTII,S$GLB,, | Performed by: UROLOGY

## 2021-06-04 PROCEDURE — 99999 PR PBB SHADOW E&M-EST. PATIENT-LVL IV: CPT | Mod: PBBFAC,,, | Performed by: UROLOGY

## 2021-06-04 RX ORDER — OXYBUTYNIN CHLORIDE 15 MG/1
15 TABLET, EXTENDED RELEASE ORAL DAILY
COMMUNITY
Start: 2021-06-03 | End: 2021-07-06

## 2021-06-07 ENCOUNTER — PATIENT MESSAGE (OUTPATIENT)
Dept: UROLOGY | Facility: CLINIC | Age: 71
End: 2021-06-07

## 2021-06-08 ENCOUNTER — PATIENT MESSAGE (OUTPATIENT)
Dept: INTERNAL MEDICINE | Facility: CLINIC | Age: 71
End: 2021-06-08

## 2021-06-10 ENCOUNTER — LAB VISIT (OUTPATIENT)
Dept: LAB | Facility: HOSPITAL | Age: 71
End: 2021-06-10
Attending: FAMILY MEDICINE
Payer: MEDICARE

## 2021-06-10 ENCOUNTER — OFFICE VISIT (OUTPATIENT)
Dept: INTERNAL MEDICINE | Facility: CLINIC | Age: 71
End: 2021-06-10
Payer: MEDICARE

## 2021-06-10 VITALS
WEIGHT: 315 LBS | BODY MASS INDEX: 40.43 KG/M2 | HEIGHT: 74 IN | TEMPERATURE: 97 F | OXYGEN SATURATION: 97 % | DIASTOLIC BLOOD PRESSURE: 70 MMHG | RESPIRATION RATE: 18 BRPM | HEART RATE: 53 BPM | SYSTOLIC BLOOD PRESSURE: 110 MMHG

## 2021-06-10 DIAGNOSIS — G47.00 INSOMNIA, UNSPECIFIED TYPE: ICD-10-CM

## 2021-06-10 DIAGNOSIS — N40.1 BENIGN NON-NODULAR PROSTATIC HYPERPLASIA WITH LOWER URINARY TRACT SYMPTOMS: ICD-10-CM

## 2021-06-10 DIAGNOSIS — I10 ESSENTIAL HYPERTENSION: ICD-10-CM

## 2021-06-10 DIAGNOSIS — D62 ACUTE BLOOD LOSS ANEMIA: ICD-10-CM

## 2021-06-10 DIAGNOSIS — I48.20 CHRONIC ATRIAL FIBRILLATION: ICD-10-CM

## 2021-06-10 DIAGNOSIS — Z09 HOSPITAL DISCHARGE FOLLOW-UP: Primary | ICD-10-CM

## 2021-06-10 DIAGNOSIS — D50.0 IRON DEFICIENCY ANEMIA DUE TO CHRONIC BLOOD LOSS: ICD-10-CM

## 2021-06-10 DIAGNOSIS — E66.01 MORBID OBESITY DUE TO EXCESS CALORIES: ICD-10-CM

## 2021-06-10 DIAGNOSIS — I77.1 TORTUOUS AORTA: ICD-10-CM

## 2021-06-10 DIAGNOSIS — I25.10 NONOBSTRUCTIVE ATHEROSCLEROSIS OF CORONARY ARTERY: ICD-10-CM

## 2021-06-10 DIAGNOSIS — G47.33 OSA (OBSTRUCTIVE SLEEP APNEA): ICD-10-CM

## 2021-06-10 DIAGNOSIS — E78.00 PURE HYPERCHOLESTEROLEMIA: ICD-10-CM

## 2021-06-10 DIAGNOSIS — I65.22 STENOSIS OF LEFT CAROTID ARTERY: ICD-10-CM

## 2021-06-10 LAB
BASOPHILS # BLD AUTO: 0.03 K/UL (ref 0–0.2)
BASOPHILS NFR BLD: 0.4 % (ref 0–1.9)
DIFFERENTIAL METHOD: ABNORMAL
EOSINOPHIL # BLD AUTO: 0.4 K/UL (ref 0–0.5)
EOSINOPHIL NFR BLD: 5 % (ref 0–8)
ERYTHROCYTE [DISTWIDTH] IN BLOOD BY AUTOMATED COUNT: 16.8 % (ref 11.5–14.5)
FINAL PATHOLOGIC DIAGNOSIS: NORMAL
HCT VFR BLD AUTO: 34.9 % (ref 40–54)
HGB BLD-MCNC: 10 G/DL (ref 14–18)
IMM GRANULOCYTES # BLD AUTO: 0.02 K/UL (ref 0–0.04)
IMM GRANULOCYTES NFR BLD AUTO: 0.2 % (ref 0–0.5)
LYMPHOCYTES # BLD AUTO: 1.2 K/UL (ref 1–4.8)
LYMPHOCYTES NFR BLD: 14.6 % (ref 18–48)
Lab: NORMAL
MCH RBC QN AUTO: 22.4 PG (ref 27–31)
MCHC RBC AUTO-ENTMCNC: 28.7 G/DL (ref 32–36)
MCV RBC AUTO: 78 FL (ref 82–98)
MONOCYTES # BLD AUTO: 0.9 K/UL (ref 0.3–1)
MONOCYTES NFR BLD: 10.8 % (ref 4–15)
NEUTROPHILS # BLD AUTO: 5.6 K/UL (ref 1.8–7.7)
NEUTROPHILS NFR BLD: 69 % (ref 38–73)
NRBC BLD-RTO: 0 /100 WBC
PLATELET # BLD AUTO: 308 K/UL (ref 150–450)
PMV BLD AUTO: 9.6 FL (ref 9.2–12.9)
RBC # BLD AUTO: 4.46 M/UL (ref 4.6–6.2)
WBC # BLD AUTO: 8.14 K/UL (ref 3.9–12.7)

## 2021-06-10 PROCEDURE — 1126F PR PAIN SEVERITY QUANTIFIED, NO PAIN PRESENT: ICD-10-PCS | Mod: S$GLB,,, | Performed by: FAMILY MEDICINE

## 2021-06-10 PROCEDURE — 99214 OFFICE O/P EST MOD 30 MIN: CPT | Mod: S$GLB,,, | Performed by: FAMILY MEDICINE

## 2021-06-10 PROCEDURE — 99214 PR OFFICE/OUTPT VISIT, EST, LEVL IV, 30-39 MIN: ICD-10-PCS | Mod: S$GLB,,, | Performed by: FAMILY MEDICINE

## 2021-06-10 PROCEDURE — 1101F PR PT FALLS ASSESS DOC 0-1 FALLS W/OUT INJ PAST YR: ICD-10-PCS | Mod: CPTII,S$GLB,, | Performed by: FAMILY MEDICINE

## 2021-06-10 PROCEDURE — 36415 COLL VENOUS BLD VENIPUNCTURE: CPT | Mod: PO | Performed by: FAMILY MEDICINE

## 2021-06-10 PROCEDURE — 99999 PR PBB SHADOW E&M-EST. PATIENT-LVL V: ICD-10-PCS | Mod: PBBFAC,,, | Performed by: FAMILY MEDICINE

## 2021-06-10 PROCEDURE — 3008F BODY MASS INDEX DOCD: CPT | Mod: CPTII,S$GLB,, | Performed by: FAMILY MEDICINE

## 2021-06-10 PROCEDURE — 80053 COMPREHEN METABOLIC PANEL: CPT | Performed by: FAMILY MEDICINE

## 2021-06-10 PROCEDURE — 85025 COMPLETE CBC W/AUTO DIFF WBC: CPT | Performed by: FAMILY MEDICINE

## 2021-06-10 PROCEDURE — 1126F AMNT PAIN NOTED NONE PRSNT: CPT | Mod: S$GLB,,, | Performed by: FAMILY MEDICINE

## 2021-06-10 PROCEDURE — 99999 PR PBB SHADOW E&M-EST. PATIENT-LVL V: CPT | Mod: PBBFAC,,, | Performed by: FAMILY MEDICINE

## 2021-06-10 PROCEDURE — 1101F PT FALLS ASSESS-DOCD LE1/YR: CPT | Mod: CPTII,S$GLB,, | Performed by: FAMILY MEDICINE

## 2021-06-10 PROCEDURE — 83540 ASSAY OF IRON: CPT | Performed by: FAMILY MEDICINE

## 2021-06-10 PROCEDURE — 3288F PR FALLS RISK ASSESSMENT DOCUMENTED: ICD-10-PCS | Mod: CPTII,S$GLB,, | Performed by: FAMILY MEDICINE

## 2021-06-10 PROCEDURE — 3008F PR BODY MASS INDEX (BMI) DOCUMENTED: ICD-10-PCS | Mod: CPTII,S$GLB,, | Performed by: FAMILY MEDICINE

## 2021-06-10 PROCEDURE — 3288F FALL RISK ASSESSMENT DOCD: CPT | Mod: CPTII,S$GLB,, | Performed by: FAMILY MEDICINE

## 2021-06-10 RX ORDER — ZOLPIDEM TARTRATE 5 MG/1
5 TABLET ORAL NIGHTLY PRN
Qty: 30 TABLET | Refills: 3 | Status: SHIPPED | OUTPATIENT
Start: 2021-06-10 | End: 2021-07-10

## 2021-06-11 ENCOUNTER — PATIENT MESSAGE (OUTPATIENT)
Dept: GASTROENTEROLOGY | Facility: CLINIC | Age: 71
End: 2021-06-11

## 2021-06-11 LAB
ALBUMIN SERPL BCP-MCNC: 3.6 G/DL (ref 3.5–5.2)
ALP SERPL-CCNC: 83 U/L (ref 55–135)
ALT SERPL W/O P-5'-P-CCNC: 19 U/L (ref 10–44)
ANION GAP SERPL CALC-SCNC: 11 MMOL/L (ref 8–16)
AST SERPL-CCNC: 17 U/L (ref 10–40)
BILIRUB SERPL-MCNC: 0.5 MG/DL (ref 0.1–1)
BUN SERPL-MCNC: 15 MG/DL (ref 8–23)
CALCIUM SERPL-MCNC: 9.8 MG/DL (ref 8.7–10.5)
CHLORIDE SERPL-SCNC: 106 MMOL/L (ref 95–110)
CO2 SERPL-SCNC: 27 MMOL/L (ref 23–29)
CREAT SERPL-MCNC: 1.2 MG/DL (ref 0.5–1.4)
EST. GFR  (AFRICAN AMERICAN): >60 ML/MIN/1.73 M^2
EST. GFR  (NON AFRICAN AMERICAN): >60 ML/MIN/1.73 M^2
GLUCOSE SERPL-MCNC: 80 MG/DL (ref 70–110)
IRON SERPL-MCNC: 18 UG/DL (ref 45–160)
POTASSIUM SERPL-SCNC: 3.9 MMOL/L (ref 3.5–5.1)
PROT SERPL-MCNC: 7.4 G/DL (ref 6–8.4)
SATURATED IRON: 4 % (ref 20–50)
SODIUM SERPL-SCNC: 144 MMOL/L (ref 136–145)
TOTAL IRON BINDING CAPACITY: 468 UG/DL (ref 250–450)
TRANSFERRIN SERPL-MCNC: 316 MG/DL (ref 200–375)

## 2021-06-16 ENCOUNTER — PATIENT MESSAGE (OUTPATIENT)
Dept: SLEEP MEDICINE | Facility: CLINIC | Age: 71
End: 2021-06-16

## 2021-06-17 ENCOUNTER — TELEPHONE (OUTPATIENT)
Dept: INTERNAL MEDICINE | Facility: CLINIC | Age: 71
End: 2021-06-17

## 2021-06-21 ENCOUNTER — PROCEDURE VISIT (OUTPATIENT)
Dept: UROGYNECOLOGY | Facility: CLINIC | Age: 71
End: 2021-06-21
Attending: UROLOGY
Payer: MEDICARE

## 2021-06-21 ENCOUNTER — OFFICE VISIT (OUTPATIENT)
Dept: SURGERY | Facility: CLINIC | Age: 71
End: 2021-06-21
Payer: MEDICARE

## 2021-06-21 VITALS
SYSTOLIC BLOOD PRESSURE: 117 MMHG | HEIGHT: 74 IN | WEIGHT: 315 LBS | BODY MASS INDEX: 40.43 KG/M2 | DIASTOLIC BLOOD PRESSURE: 66 MMHG | HEART RATE: 53 BPM

## 2021-06-21 VITALS
DIASTOLIC BLOOD PRESSURE: 68 MMHG | BODY MASS INDEX: 40.43 KG/M2 | HEIGHT: 74 IN | SYSTOLIC BLOOD PRESSURE: 120 MMHG | WEIGHT: 315 LBS

## 2021-06-21 DIAGNOSIS — Z86.010 HISTORY OF COLON POLYPS: ICD-10-CM

## 2021-06-21 DIAGNOSIS — N40.1 BENIGN NON-NODULAR PROSTATIC HYPERPLASIA WITH LOWER URINARY TRACT SYMPTOMS: Primary | ICD-10-CM

## 2021-06-21 DIAGNOSIS — K92.2 LOWER GI BLEED: ICD-10-CM

## 2021-06-21 DIAGNOSIS — R35.0 URINARY FREQUENCY: ICD-10-CM

## 2021-06-21 DIAGNOSIS — K64.4 EXTERNAL HEMORRHOIDS: ICD-10-CM

## 2021-06-21 DIAGNOSIS — R35.1 NOCTURIA: ICD-10-CM

## 2021-06-21 DIAGNOSIS — K64.8 INTERNAL HEMORRHOIDS: Primary | ICD-10-CM

## 2021-06-21 DIAGNOSIS — K57.90 DIVERTICULOSIS: ICD-10-CM

## 2021-06-21 LAB
BILIRUB SERPL-MCNC: NORMAL MG/DL
BLOOD URINE, POC: NORMAL
CLARITY, POC UA: CLEAR
COLOR, POC UA: NORMAL
GLUCOSE UR QL STRIP: NORMAL
KETONES UR QL STRIP: NORMAL
LEUKOCYTE ESTERASE URINE, POC: NORMAL
NITRITE, POC UA: NORMAL
PH, POC UA: 5
PROTEIN, POC: NORMAL
SPECIFIC GRAVITY, POC UA: 1.01
UROBILINOGEN, POC UA: NORMAL

## 2021-06-21 PROCEDURE — 1101F PT FALLS ASSESS-DOCD LE1/YR: CPT | Mod: CPTII,S$GLB,, | Performed by: COLON & RECTAL SURGERY

## 2021-06-21 PROCEDURE — 1126F AMNT PAIN NOTED NONE PRSNT: CPT | Mod: CPTII,S$GLB,, | Performed by: COLON & RECTAL SURGERY

## 2021-06-21 PROCEDURE — 3008F PR BODY MASS INDEX (BMI) DOCUMENTED: ICD-10-PCS | Mod: CPTII,S$GLB,, | Performed by: COLON & RECTAL SURGERY

## 2021-06-21 PROCEDURE — 1111F PR DISCHARGE MEDS RECONCILED W/ CURRENT OUTPATIENT MED LIST: ICD-10-PCS | Mod: CPTII,S$GLB,, | Performed by: COLON & RECTAL SURGERY

## 2021-06-21 PROCEDURE — 51784 PR ANAL/URINARY MUSCLE STUDY: ICD-10-PCS | Mod: 51,S$GLB,, | Performed by: UROLOGY

## 2021-06-21 PROCEDURE — 99999 PR PBB SHADOW E&M-EST. PATIENT-LVL IV: CPT | Mod: PBBFAC,,, | Performed by: COLON & RECTAL SURGERY

## 2021-06-21 PROCEDURE — 3288F PR FALLS RISK ASSESSMENT DOCUMENTED: ICD-10-PCS | Mod: CPTII,S$GLB,, | Performed by: COLON & RECTAL SURGERY

## 2021-06-21 PROCEDURE — 51797 INTRAABDOMINAL PRESSURE TEST: CPT | Mod: S$GLB,,, | Performed by: UROLOGY

## 2021-06-21 PROCEDURE — 51728 PR COMPLEX CYSTOMETROGRAM VOIDING PRESSURE STUDIES: ICD-10-PCS | Mod: S$GLB,,, | Performed by: UROLOGY

## 2021-06-21 PROCEDURE — 81002 POCT URINE DIPSTICK WITHOUT MICROSCOPE: ICD-10-PCS | Mod: S$GLB,,, | Performed by: UROLOGY

## 2021-06-21 PROCEDURE — 1159F PR MEDICATION LIST DOCUMENTED IN MEDICAL RECORD: ICD-10-PCS | Mod: CPTII,S$GLB,, | Performed by: COLON & RECTAL SURGERY

## 2021-06-21 PROCEDURE — 3078F PR MOST RECENT DIASTOLIC BLOOD PRESSURE < 80 MM HG: ICD-10-PCS | Mod: CPTII,S$GLB,, | Performed by: COLON & RECTAL SURGERY

## 2021-06-21 PROCEDURE — 46600 DIAGNOSTIC ANOSCOPY SPX: CPT | Mod: S$GLB,,, | Performed by: COLON & RECTAL SURGERY

## 2021-06-21 PROCEDURE — 1111F DSCHRG MED/CURRENT MED MERGE: CPT | Mod: CPTII,S$GLB,, | Performed by: COLON & RECTAL SURGERY

## 2021-06-21 PROCEDURE — 52000 CYSTOSCOPY: ICD-10-PCS | Mod: 59,S$GLB,, | Performed by: UROLOGY

## 2021-06-21 PROCEDURE — 51728 CYSTOMETROGRAM W/VP: CPT | Mod: S$GLB,,, | Performed by: UROLOGY

## 2021-06-21 PROCEDURE — 3074F SYST BP LT 130 MM HG: CPT | Mod: CPTII,S$GLB,, | Performed by: COLON & RECTAL SURGERY

## 2021-06-21 PROCEDURE — 51741 ELECTRO-UROFLOWMETRY FIRST: CPT | Mod: 51,S$GLB,, | Performed by: UROLOGY

## 2021-06-21 PROCEDURE — 87086 URINE CULTURE/COLONY COUNT: CPT | Performed by: UROLOGY

## 2021-06-21 PROCEDURE — 3078F DIAST BP <80 MM HG: CPT | Mod: CPTII,S$GLB,, | Performed by: COLON & RECTAL SURGERY

## 2021-06-21 PROCEDURE — 46600 PR DIAG2STIC A2SCOPY: ICD-10-PCS | Mod: S$GLB,,, | Performed by: COLON & RECTAL SURGERY

## 2021-06-21 PROCEDURE — 99213 PR OFFICE/OUTPT VISIT, EST, LEVL III, 20-29 MIN: ICD-10-PCS | Mod: 25,S$GLB,, | Performed by: COLON & RECTAL SURGERY

## 2021-06-21 PROCEDURE — 3008F BODY MASS INDEX DOCD: CPT | Mod: CPTII,S$GLB,, | Performed by: COLON & RECTAL SURGERY

## 2021-06-21 PROCEDURE — 1126F PR PAIN SEVERITY QUANTIFIED, NO PAIN PRESENT: ICD-10-PCS | Mod: CPTII,S$GLB,, | Performed by: COLON & RECTAL SURGERY

## 2021-06-21 PROCEDURE — 99999 PR PBB SHADOW E&M-EST. PATIENT-LVL IV: ICD-10-PCS | Mod: PBBFAC,,, | Performed by: COLON & RECTAL SURGERY

## 2021-06-21 PROCEDURE — 51741 PR UROFLOWMETRY, COMPLEX: ICD-10-PCS | Mod: 51,S$GLB,, | Performed by: UROLOGY

## 2021-06-21 PROCEDURE — 1101F PR PT FALLS ASSESS DOC 0-1 FALLS W/OUT INJ PAST YR: ICD-10-PCS | Mod: CPTII,S$GLB,, | Performed by: COLON & RECTAL SURGERY

## 2021-06-21 PROCEDURE — 3288F FALL RISK ASSESSMENT DOCD: CPT | Mod: CPTII,S$GLB,, | Performed by: COLON & RECTAL SURGERY

## 2021-06-21 PROCEDURE — 51797 PR VOIDING PRESS STUDY INTRA-ABDOMINAL VOID: ICD-10-PCS | Mod: S$GLB,,, | Performed by: UROLOGY

## 2021-06-21 PROCEDURE — 99213 OFFICE O/P EST LOW 20 MIN: CPT | Mod: 25,S$GLB,, | Performed by: COLON & RECTAL SURGERY

## 2021-06-21 PROCEDURE — 1159F MED LIST DOCD IN RCRD: CPT | Mod: CPTII,S$GLB,, | Performed by: COLON & RECTAL SURGERY

## 2021-06-21 PROCEDURE — 81002 URINALYSIS NONAUTO W/O SCOPE: CPT | Mod: S$GLB,,, | Performed by: UROLOGY

## 2021-06-21 PROCEDURE — 51784 ANAL/URINARY MUSCLE STUDY: CPT | Mod: 51,S$GLB,, | Performed by: UROLOGY

## 2021-06-21 PROCEDURE — 3074F PR MOST RECENT SYSTOLIC BLOOD PRESSURE < 130 MM HG: ICD-10-PCS | Mod: CPTII,S$GLB,, | Performed by: COLON & RECTAL SURGERY

## 2021-06-21 PROCEDURE — 52000 CYSTOURETHROSCOPY: CPT | Mod: 59,S$GLB,, | Performed by: UROLOGY

## 2021-06-21 RX ORDER — SULFAMETHOXAZOLE AND TRIMETHOPRIM 800; 160 MG/1; MG/1
1 TABLET ORAL
Status: COMPLETED | OUTPATIENT
Start: 2021-06-21 | End: 2021-06-21

## 2021-06-21 RX ORDER — CIPROFLOXACIN 2 MG/ML
400 INJECTION, SOLUTION INTRAVENOUS
Status: CANCELLED | OUTPATIENT
Start: 2021-06-21

## 2021-06-21 RX ORDER — LIDOCAINE HYDROCHLORIDE 20 MG/ML
JELLY TOPICAL ONCE
Status: COMPLETED | OUTPATIENT
Start: 2021-06-21 | End: 2021-06-21

## 2021-06-21 RX ADMIN — SULFAMETHOXAZOLE AND TRIMETHOPRIM 1 TABLET: 800; 160 TABLET ORAL at 04:06

## 2021-06-21 RX ADMIN — LIDOCAINE HYDROCHLORIDE 5 ML: 20 JELLY TOPICAL at 04:06

## 2021-06-22 ENCOUNTER — TELEPHONE (OUTPATIENT)
Dept: UROLOGY | Facility: CLINIC | Age: 71
End: 2021-06-22

## 2021-06-22 LAB — BACTERIA UR CULT: NORMAL

## 2021-06-24 ENCOUNTER — PATIENT MESSAGE (OUTPATIENT)
Dept: SURGERY | Facility: OTHER | Age: 71
End: 2021-06-24

## 2021-06-24 ENCOUNTER — PATIENT MESSAGE (OUTPATIENT)
Dept: CARDIOLOGY | Facility: CLINIC | Age: 71
End: 2021-06-24

## 2021-06-28 ENCOUNTER — TELEPHONE (OUTPATIENT)
Dept: UROLOGY | Facility: CLINIC | Age: 71
End: 2021-06-28

## 2021-06-28 ENCOUNTER — ANESTHESIA EVENT (OUTPATIENT)
Dept: SURGERY | Facility: OTHER | Age: 71
End: 2021-06-28
Payer: MEDICARE

## 2021-06-29 ENCOUNTER — HOSPITAL ENCOUNTER (OUTPATIENT)
Facility: OTHER | Age: 71
Discharge: HOME | End: 2021-06-29
Attending: UROLOGY | Admitting: UROLOGY
Payer: MEDICARE

## 2021-06-29 ENCOUNTER — ANESTHESIA (OUTPATIENT)
Dept: SURGERY | Facility: OTHER | Age: 71
End: 2021-06-29
Payer: MEDICARE

## 2021-06-29 VITALS
DIASTOLIC BLOOD PRESSURE: 79 MMHG | SYSTOLIC BLOOD PRESSURE: 144 MMHG | BODY MASS INDEX: 40.43 KG/M2 | OXYGEN SATURATION: 100 % | RESPIRATION RATE: 16 BRPM | TEMPERATURE: 98 F | HEART RATE: 52 BPM | HEIGHT: 74 IN | WEIGHT: 315 LBS

## 2021-06-29 DIAGNOSIS — R35.1 NOCTURIA: ICD-10-CM

## 2021-06-29 DIAGNOSIS — R35.0 URINARY FREQUENCY: ICD-10-CM

## 2021-06-29 DIAGNOSIS — N40.1 BENIGN NON-NODULAR PROSTATIC HYPERPLASIA WITH LOWER URINARY TRACT SYMPTOMS: ICD-10-CM

## 2021-06-29 PROCEDURE — 52441 PR CYSTO W/INSERT PERMANENT ADJ IMPLANT, SINGLE: ICD-10-PCS | Mod: ,,, | Performed by: UROLOGY

## 2021-06-29 PROCEDURE — 37000008 HC ANESTHESIA 1ST 15 MINUTES: Performed by: UROLOGY

## 2021-06-29 PROCEDURE — 25000003 PHARM REV CODE 250: Performed by: NURSE ANESTHETIST, CERTIFIED REGISTERED

## 2021-06-29 PROCEDURE — 52441 CYSTO INSJ TRNSPRSTC 1 IMPLT: CPT | Mod: ,,, | Performed by: UROLOGY

## 2021-06-29 PROCEDURE — 52442 CYSTO INS TRNSPRSTC IMPLT EA: CPT | Mod: ,,, | Performed by: UROLOGY

## 2021-06-29 PROCEDURE — L8699 PROSTHETIC IMPLANT NOS: HCPCS | Performed by: UROLOGY

## 2021-06-29 PROCEDURE — 36000707: Performed by: UROLOGY

## 2021-06-29 PROCEDURE — 71000016 HC POSTOP RECOV ADDL HR: Performed by: UROLOGY

## 2021-06-29 PROCEDURE — 36000706: Performed by: UROLOGY

## 2021-06-29 PROCEDURE — 71000015 HC POSTOP RECOV 1ST HR: Performed by: UROLOGY

## 2021-06-29 PROCEDURE — 25000003 PHARM REV CODE 250: Performed by: UROLOGY

## 2021-06-29 PROCEDURE — 52442 PR CYSTO W/INSERT PERMANENT ADJ IMPLANT, EA ADDTL IMPLANT: ICD-10-PCS | Mod: ,,, | Performed by: UROLOGY

## 2021-06-29 PROCEDURE — 37000009 HC ANESTHESIA EA ADD 15 MINS: Performed by: UROLOGY

## 2021-06-29 PROCEDURE — 63600175 PHARM REV CODE 636 W HCPCS: Performed by: UROLOGY

## 2021-06-29 PROCEDURE — 63600175 PHARM REV CODE 636 W HCPCS: Performed by: NURSE ANESTHETIST, CERTIFIED REGISTERED

## 2021-06-29 DEVICE — SYS UROLIFT: Type: IMPLANTABLE DEVICE | Site: PROSTATE | Status: FUNCTIONAL

## 2021-06-29 RX ORDER — SULFAMETHOXAZOLE AND TRIMETHOPRIM 800; 160 MG/1; MG/1
1 TABLET ORAL 2 TIMES DAILY
Qty: 4 TABLET | Refills: 0 | Status: SHIPPED | OUTPATIENT
Start: 2021-06-29 | End: 2021-07-01

## 2021-06-29 RX ORDER — PHENAZOPYRIDINE HYDROCHLORIDE 100 MG/1
100 TABLET, FILM COATED ORAL 3 TIMES DAILY PRN
Qty: 20 TABLET | Refills: 1 | Status: SHIPPED | OUTPATIENT
Start: 2021-06-29 | End: 2021-09-09

## 2021-06-29 RX ORDER — PHENAZOPYRIDINE HYDROCHLORIDE 200 MG/1
200 TABLET, FILM COATED ORAL 3 TIMES DAILY PRN
Status: DISCONTINUED | OUTPATIENT
Start: 2021-06-29 | End: 2021-06-29 | Stop reason: HOSPADM

## 2021-06-29 RX ORDER — SODIUM CHLORIDE 0.9 % (FLUSH) 0.9 %
3 SYRINGE (ML) INJECTION
Status: DISCONTINUED | OUTPATIENT
Start: 2021-06-29 | End: 2021-06-29 | Stop reason: HOSPADM

## 2021-06-29 RX ORDER — CIPROFLOXACIN 2 MG/ML
400 INJECTION, SOLUTION INTRAVENOUS
Status: COMPLETED | OUTPATIENT
Start: 2021-06-29 | End: 2021-06-29

## 2021-06-29 RX ORDER — LIDOCAINE HYDROCHLORIDE 20 MG/ML
INJECTION INTRAVENOUS
Status: DISCONTINUED | OUTPATIENT
Start: 2021-06-29 | End: 2021-06-29

## 2021-06-29 RX ORDER — HYDROMORPHONE HYDROCHLORIDE 2 MG/ML
0.4 INJECTION, SOLUTION INTRAMUSCULAR; INTRAVENOUS; SUBCUTANEOUS EVERY 5 MIN PRN
Status: DISCONTINUED | OUTPATIENT
Start: 2021-06-29 | End: 2021-06-29 | Stop reason: HOSPADM

## 2021-06-29 RX ORDER — OXYCODONE HYDROCHLORIDE 5 MG/1
5 TABLET ORAL
Status: DISCONTINUED | OUTPATIENT
Start: 2021-06-29 | End: 2021-06-29 | Stop reason: HOSPADM

## 2021-06-29 RX ORDER — PROPOFOL 10 MG/ML
VIAL (ML) INTRAVENOUS
Status: DISCONTINUED | OUTPATIENT
Start: 2021-06-29 | End: 2021-06-29

## 2021-06-29 RX ORDER — ONDANSETRON 2 MG/ML
4 INJECTION INTRAMUSCULAR; INTRAVENOUS DAILY PRN
Status: DISCONTINUED | OUTPATIENT
Start: 2021-06-29 | End: 2021-06-29 | Stop reason: HOSPADM

## 2021-06-29 RX ORDER — MORPHINE SULFATE 10 MG/ML
3 INJECTION INTRAMUSCULAR; INTRAVENOUS; SUBCUTANEOUS
Status: CANCELLED | OUTPATIENT
Start: 2021-06-29

## 2021-06-29 RX ORDER — MEPERIDINE HYDROCHLORIDE 25 MG/ML
12.5 INJECTION INTRAMUSCULAR; INTRAVENOUS; SUBCUTANEOUS ONCE AS NEEDED
Status: DISCONTINUED | OUTPATIENT
Start: 2021-06-29 | End: 2021-06-29 | Stop reason: HOSPADM

## 2021-06-29 RX ORDER — PROPOFOL 10 MG/ML
VIAL (ML) INTRAVENOUS CONTINUOUS PRN
Status: DISCONTINUED | OUTPATIENT
Start: 2021-06-29 | End: 2021-06-29

## 2021-06-29 RX ORDER — HYDROCODONE BITARTRATE AND ACETAMINOPHEN 5; 325 MG/1; MG/1
1 TABLET ORAL EVERY 4 HOURS PRN
Status: CANCELLED | OUTPATIENT
Start: 2021-06-29

## 2021-06-29 RX ORDER — FENTANYL CITRATE 50 UG/ML
INJECTION, SOLUTION INTRAMUSCULAR; INTRAVENOUS
Status: DISCONTINUED | OUTPATIENT
Start: 2021-06-29 | End: 2021-06-29

## 2021-06-29 RX ADMIN — PROPOFOL 75 MCG/KG/MIN: 10 INJECTION, EMULSION INTRAVENOUS at 11:06

## 2021-06-29 RX ADMIN — PROPOFOL 60 MG: 10 INJECTION, EMULSION INTRAVENOUS at 11:06

## 2021-06-29 RX ADMIN — FENTANYL CITRATE 50 MCG: 50 INJECTION, SOLUTION INTRAMUSCULAR; INTRAVENOUS at 11:06

## 2021-06-29 RX ADMIN — PHENAZOPYRIDINE HYDROCHLORIDE 200 MG: 200 TABLET ORAL at 12:06

## 2021-06-29 RX ADMIN — CIPROFLOXACIN 400 MG: 2 INJECTION, SOLUTION INTRAVENOUS at 11:06

## 2021-06-29 RX ADMIN — LIDOCAINE HYDROCHLORIDE 25 MG: 20 INJECTION, SOLUTION INTRAVENOUS at 11:06

## 2021-07-04 ENCOUNTER — PATIENT MESSAGE (OUTPATIENT)
Dept: INTERNAL MEDICINE | Facility: CLINIC | Age: 71
End: 2021-07-04

## 2021-07-04 DIAGNOSIS — G62.9 PERIPHERAL POLYNEUROPATHY: Primary | ICD-10-CM

## 2021-07-06 RX ORDER — GABAPENTIN 100 MG/1
100 CAPSULE ORAL 3 TIMES DAILY
Qty: 90 CAPSULE | Refills: 11 | Status: SHIPPED | OUTPATIENT
Start: 2021-07-06 | End: 2021-10-13

## 2021-07-14 ENCOUNTER — PATIENT OUTREACH (OUTPATIENT)
Dept: ADMINISTRATIVE | Facility: OTHER | Age: 71
End: 2021-07-14

## 2021-07-15 ENCOUNTER — TELEPHONE (OUTPATIENT)
Dept: SLEEP MEDICINE | Facility: CLINIC | Age: 71
End: 2021-07-15

## 2021-07-15 ENCOUNTER — OFFICE VISIT (OUTPATIENT)
Dept: SLEEP MEDICINE | Facility: CLINIC | Age: 71
End: 2021-07-15
Payer: MEDICARE

## 2021-07-15 VITALS
WEIGHT: 315 LBS | SYSTOLIC BLOOD PRESSURE: 139 MMHG | BODY MASS INDEX: 40.43 KG/M2 | DIASTOLIC BLOOD PRESSURE: 72 MMHG | HEIGHT: 74 IN | HEART RATE: 63 BPM

## 2021-07-15 DIAGNOSIS — G47.33 OSA (OBSTRUCTIVE SLEEP APNEA): ICD-10-CM

## 2021-07-15 DIAGNOSIS — I10 ESSENTIAL HYPERTENSION: Primary | ICD-10-CM

## 2021-07-15 PROCEDURE — 3008F BODY MASS INDEX DOCD: CPT | Mod: CPTII,S$GLB,, | Performed by: NURSE PRACTITIONER

## 2021-07-15 PROCEDURE — 99999 PR PBB SHADOW E&M-EST. PATIENT-LVL III: CPT | Mod: PBBFAC,,, | Performed by: NURSE PRACTITIONER

## 2021-07-15 PROCEDURE — 3075F PR MOST RECENT SYSTOLIC BLOOD PRESS GE 130-139MM HG: ICD-10-PCS | Mod: CPTII,S$GLB,, | Performed by: NURSE PRACTITIONER

## 2021-07-15 PROCEDURE — 99499 UNLISTED E&M SERVICE: CPT | Mod: S$GLB,,, | Performed by: NURSE PRACTITIONER

## 2021-07-15 PROCEDURE — 3288F FALL RISK ASSESSMENT DOCD: CPT | Mod: CPTII,S$GLB,, | Performed by: NURSE PRACTITIONER

## 2021-07-15 PROCEDURE — 1126F PR PAIN SEVERITY QUANTIFIED, NO PAIN PRESENT: ICD-10-PCS | Mod: S$GLB,,, | Performed by: NURSE PRACTITIONER

## 2021-07-15 PROCEDURE — 3008F PR BODY MASS INDEX (BMI) DOCUMENTED: ICD-10-PCS | Mod: CPTII,S$GLB,, | Performed by: NURSE PRACTITIONER

## 2021-07-15 PROCEDURE — 99999 PR PBB SHADOW E&M-EST. PATIENT-LVL III: ICD-10-PCS | Mod: PBBFAC,,, | Performed by: NURSE PRACTITIONER

## 2021-07-15 PROCEDURE — 3075F SYST BP GE 130 - 139MM HG: CPT | Mod: CPTII,S$GLB,, | Performed by: NURSE PRACTITIONER

## 2021-07-15 PROCEDURE — 1159F PR MEDICATION LIST DOCUMENTED IN MEDICAL RECORD: ICD-10-PCS | Mod: S$GLB,,, | Performed by: NURSE PRACTITIONER

## 2021-07-15 PROCEDURE — 1159F MED LIST DOCD IN RCRD: CPT | Mod: S$GLB,,, | Performed by: NURSE PRACTITIONER

## 2021-07-15 PROCEDURE — 1126F AMNT PAIN NOTED NONE PRSNT: CPT | Mod: S$GLB,,, | Performed by: NURSE PRACTITIONER

## 2021-07-15 PROCEDURE — 99213 OFFICE O/P EST LOW 20 MIN: CPT | Mod: S$GLB,,, | Performed by: NURSE PRACTITIONER

## 2021-07-15 PROCEDURE — 3078F PR MOST RECENT DIASTOLIC BLOOD PRESSURE < 80 MM HG: ICD-10-PCS | Mod: CPTII,S$GLB,, | Performed by: NURSE PRACTITIONER

## 2021-07-15 PROCEDURE — 3288F PR FALLS RISK ASSESSMENT DOCUMENTED: ICD-10-PCS | Mod: CPTII,S$GLB,, | Performed by: NURSE PRACTITIONER

## 2021-07-15 PROCEDURE — 99499 RISK ADDL DX/OHS AUDIT: ICD-10-PCS | Mod: S$GLB,,, | Performed by: NURSE PRACTITIONER

## 2021-07-15 PROCEDURE — 1101F PT FALLS ASSESS-DOCD LE1/YR: CPT | Mod: CPTII,S$GLB,, | Performed by: NURSE PRACTITIONER

## 2021-07-15 PROCEDURE — 1101F PR PT FALLS ASSESS DOC 0-1 FALLS W/OUT INJ PAST YR: ICD-10-PCS | Mod: CPTII,S$GLB,, | Performed by: NURSE PRACTITIONER

## 2021-07-15 PROCEDURE — 99213 PR OFFICE/OUTPT VISIT, EST, LEVL III, 20-29 MIN: ICD-10-PCS | Mod: S$GLB,,, | Performed by: NURSE PRACTITIONER

## 2021-07-15 PROCEDURE — 3078F DIAST BP <80 MM HG: CPT | Mod: CPTII,S$GLB,, | Performed by: NURSE PRACTITIONER

## 2021-07-15 RX ORDER — ZOLPIDEM TARTRATE 5 MG/1
5 TABLET ORAL NIGHTLY PRN
COMMUNITY
End: 2021-10-20 | Stop reason: SDUPTHER

## 2021-07-30 ENCOUNTER — PATIENT MESSAGE (OUTPATIENT)
Dept: CARDIOLOGY | Facility: CLINIC | Age: 71
End: 2021-07-30

## 2021-08-11 ENCOUNTER — PATIENT MESSAGE (OUTPATIENT)
Dept: PHARMACY | Facility: CLINIC | Age: 71
End: 2021-08-11

## 2021-09-02 ENCOUNTER — TELEPHONE (OUTPATIENT)
Dept: ELECTROPHYSIOLOGY | Facility: CLINIC | Age: 71
End: 2021-09-02

## 2021-09-02 RX ORDER — FINASTERIDE 5 MG/1
TABLET, FILM COATED ORAL
Qty: 90 TABLET | Refills: 1 | Status: SHIPPED | OUTPATIENT
Start: 2021-09-02 | End: 2021-09-09

## 2021-09-08 ENCOUNTER — PATIENT MESSAGE (OUTPATIENT)
Dept: UROLOGY | Facility: CLINIC | Age: 71
End: 2021-09-08

## 2021-09-08 ENCOUNTER — PATIENT OUTREACH (OUTPATIENT)
Dept: ADMINISTRATIVE | Facility: OTHER | Age: 71
End: 2021-09-08

## 2021-09-09 ENCOUNTER — OFFICE VISIT (OUTPATIENT)
Dept: ELECTROPHYSIOLOGY | Facility: CLINIC | Age: 71
End: 2021-09-09
Payer: MEDICARE

## 2021-09-09 ENCOUNTER — HOSPITAL ENCOUNTER (OUTPATIENT)
Dept: CARDIOLOGY | Facility: CLINIC | Age: 71
Discharge: HOME | End: 2021-09-09
Payer: MEDICARE

## 2021-09-09 VITALS
SYSTOLIC BLOOD PRESSURE: 104 MMHG | BODY MASS INDEX: 39.49 KG/M2 | WEIGHT: 307.75 LBS | HEIGHT: 74 IN | HEART RATE: 58 BPM | DIASTOLIC BLOOD PRESSURE: 65 MMHG

## 2021-09-09 DIAGNOSIS — I48.91 ATRIAL FIBRILLATION, UNSPECIFIED TYPE: ICD-10-CM

## 2021-09-09 DIAGNOSIS — G47.33 OSA (OBSTRUCTIVE SLEEP APNEA): ICD-10-CM

## 2021-09-09 DIAGNOSIS — I48.11 LONGSTANDING PERSISTENT ATRIAL FIBRILLATION: ICD-10-CM

## 2021-09-09 DIAGNOSIS — I48.20 CHRONIC ATRIAL FIBRILLATION: ICD-10-CM

## 2021-09-09 DIAGNOSIS — E78.00 PURE HYPERCHOLESTEROLEMIA: ICD-10-CM

## 2021-09-09 DIAGNOSIS — I10 ESSENTIAL HYPERTENSION: Primary | ICD-10-CM

## 2021-09-09 PROCEDURE — 4010F PR ACE/ARB THEARPY RXD/TAKEN: ICD-10-PCS | Mod: CPTII,S$GLB,, | Performed by: INTERNAL MEDICINE

## 2021-09-09 PROCEDURE — 1126F AMNT PAIN NOTED NONE PRSNT: CPT | Mod: CPTII,S$GLB,, | Performed by: INTERNAL MEDICINE

## 2021-09-09 PROCEDURE — 99999 PR PBB SHADOW E&M-EST. PATIENT-LVL III: CPT | Mod: PBBFAC,,, | Performed by: INTERNAL MEDICINE

## 2021-09-09 PROCEDURE — 3074F SYST BP LT 130 MM HG: CPT | Mod: CPTII,S$GLB,, | Performed by: INTERNAL MEDICINE

## 2021-09-09 PROCEDURE — 1101F PR PT FALLS ASSESS DOC 0-1 FALLS W/OUT INJ PAST YR: ICD-10-PCS | Mod: CPTII,S$GLB,, | Performed by: INTERNAL MEDICINE

## 2021-09-09 PROCEDURE — 3288F FALL RISK ASSESSMENT DOCD: CPT | Mod: CPTII,S$GLB,, | Performed by: INTERNAL MEDICINE

## 2021-09-09 PROCEDURE — 93005 ELECTROCARDIOGRAM TRACING: CPT | Mod: S$GLB,,, | Performed by: INTERNAL MEDICINE

## 2021-09-09 PROCEDURE — 1159F PR MEDICATION LIST DOCUMENTED IN MEDICAL RECORD: ICD-10-PCS | Mod: CPTII,S$GLB,, | Performed by: INTERNAL MEDICINE

## 2021-09-09 PROCEDURE — 1160F RVW MEDS BY RX/DR IN RCRD: CPT | Mod: CPTII,S$GLB,, | Performed by: INTERNAL MEDICINE

## 2021-09-09 PROCEDURE — 1126F PR PAIN SEVERITY QUANTIFIED, NO PAIN PRESENT: ICD-10-PCS | Mod: CPTII,S$GLB,, | Performed by: INTERNAL MEDICINE

## 2021-09-09 PROCEDURE — 3008F BODY MASS INDEX DOCD: CPT | Mod: CPTII,S$GLB,, | Performed by: INTERNAL MEDICINE

## 2021-09-09 PROCEDURE — 93005 RHYTHM STRIP: ICD-10-PCS | Mod: S$GLB,,, | Performed by: INTERNAL MEDICINE

## 2021-09-09 PROCEDURE — 4010F ACE/ARB THERAPY RXD/TAKEN: CPT | Mod: CPTII,S$GLB,, | Performed by: INTERNAL MEDICINE

## 2021-09-09 PROCEDURE — 93010 RHYTHM STRIP: ICD-10-PCS | Mod: S$GLB,,, | Performed by: INTERNAL MEDICINE

## 2021-09-09 PROCEDURE — 99999 PR PBB SHADOW E&M-EST. PATIENT-LVL III: ICD-10-PCS | Mod: PBBFAC,,, | Performed by: INTERNAL MEDICINE

## 2021-09-09 PROCEDURE — 99214 OFFICE O/P EST MOD 30 MIN: CPT | Mod: S$GLB,,, | Performed by: INTERNAL MEDICINE

## 2021-09-09 PROCEDURE — 3074F PR MOST RECENT SYSTOLIC BLOOD PRESSURE < 130 MM HG: ICD-10-PCS | Mod: CPTII,S$GLB,, | Performed by: INTERNAL MEDICINE

## 2021-09-09 PROCEDURE — 3288F PR FALLS RISK ASSESSMENT DOCUMENTED: ICD-10-PCS | Mod: CPTII,S$GLB,, | Performed by: INTERNAL MEDICINE

## 2021-09-09 PROCEDURE — 1160F PR REVIEW ALL MEDS BY PRESCRIBER/CLIN PHARMACIST DOCUMENTED: ICD-10-PCS | Mod: CPTII,S$GLB,, | Performed by: INTERNAL MEDICINE

## 2021-09-09 PROCEDURE — 1101F PT FALLS ASSESS-DOCD LE1/YR: CPT | Mod: CPTII,S$GLB,, | Performed by: INTERNAL MEDICINE

## 2021-09-09 PROCEDURE — 3044F PR MOST RECENT HEMOGLOBIN A1C LEVEL <7.0%: ICD-10-PCS | Mod: CPTII,S$GLB,, | Performed by: INTERNAL MEDICINE

## 2021-09-09 PROCEDURE — 3008F PR BODY MASS INDEX (BMI) DOCUMENTED: ICD-10-PCS | Mod: CPTII,S$GLB,, | Performed by: INTERNAL MEDICINE

## 2021-09-09 PROCEDURE — 93010 ELECTROCARDIOGRAM REPORT: CPT | Mod: S$GLB,,, | Performed by: INTERNAL MEDICINE

## 2021-09-09 PROCEDURE — 99214 PR OFFICE/OUTPT VISIT, EST, LEVL IV, 30-39 MIN: ICD-10-PCS | Mod: S$GLB,,, | Performed by: INTERNAL MEDICINE

## 2021-09-09 PROCEDURE — 1159F MED LIST DOCD IN RCRD: CPT | Mod: CPTII,S$GLB,, | Performed by: INTERNAL MEDICINE

## 2021-09-09 PROCEDURE — 3078F PR MOST RECENT DIASTOLIC BLOOD PRESSURE < 80 MM HG: ICD-10-PCS | Mod: CPTII,S$GLB,, | Performed by: INTERNAL MEDICINE

## 2021-09-09 PROCEDURE — 3078F DIAST BP <80 MM HG: CPT | Mod: CPTII,S$GLB,, | Performed by: INTERNAL MEDICINE

## 2021-09-09 PROCEDURE — 3044F HG A1C LEVEL LT 7.0%: CPT | Mod: CPTII,S$GLB,, | Performed by: INTERNAL MEDICINE

## 2021-09-09 RX ORDER — ATENOLOL 50 MG/1
50 TABLET ORAL DAILY
Qty: 90 TABLET | Refills: 3 | Status: SHIPPED | OUTPATIENT
Start: 2021-09-09 | End: 2021-09-20 | Stop reason: SDUPTHER

## 2021-09-09 RX ORDER — FINASTERIDE 5 MG/1
TABLET, FILM COATED ORAL
Qty: 90 TABLET | Refills: 0 | Status: SHIPPED | OUTPATIENT
Start: 2021-09-09 | End: 2021-09-20

## 2021-09-15 ENCOUNTER — PATIENT MESSAGE (OUTPATIENT)
Dept: INTERNAL MEDICINE | Facility: CLINIC | Age: 71
End: 2021-09-15

## 2021-09-15 DIAGNOSIS — Z00.00 PREVENTATIVE HEALTH CARE: Primary | ICD-10-CM

## 2021-09-15 DIAGNOSIS — I77.1 TORTUOUS AORTA: ICD-10-CM

## 2021-09-15 DIAGNOSIS — R35.0 URINARY FREQUENCY: ICD-10-CM

## 2021-09-15 DIAGNOSIS — R06.09 DYSPNEA ON EXERTION: ICD-10-CM

## 2021-09-15 DIAGNOSIS — D62 ACUTE BLOOD LOSS ANEMIA: ICD-10-CM

## 2021-09-15 DIAGNOSIS — I48.20 CHRONIC ATRIAL FIBRILLATION: ICD-10-CM

## 2021-09-15 DIAGNOSIS — H43.813 POSTERIOR VITREOUS DETACHMENT, BOTH EYES: ICD-10-CM

## 2021-09-15 DIAGNOSIS — N40.1 BENIGN NON-NODULAR PROSTATIC HYPERPLASIA WITH LOWER URINARY TRACT SYMPTOMS: ICD-10-CM

## 2021-09-15 DIAGNOSIS — E78.00 PURE HYPERCHOLESTEROLEMIA: ICD-10-CM

## 2021-09-15 DIAGNOSIS — H52.7 REFRACTIVE ERROR: ICD-10-CM

## 2021-09-15 DIAGNOSIS — E66.01 MORBID OBESITY DUE TO EXCESS CALORIES: ICD-10-CM

## 2021-09-15 DIAGNOSIS — K62.5 RECTAL BLEED: ICD-10-CM

## 2021-09-15 DIAGNOSIS — G47.33 OSA (OBSTRUCTIVE SLEEP APNEA): ICD-10-CM

## 2021-09-15 DIAGNOSIS — K80.50 BILIARY COLIC: ICD-10-CM

## 2021-09-15 DIAGNOSIS — D50.9 IRON DEFICIENCY ANEMIA, UNSPECIFIED IRON DEFICIENCY ANEMIA TYPE: ICD-10-CM

## 2021-09-15 DIAGNOSIS — H35.373 EPIRETINAL MEMBRANE, BOTH EYES: ICD-10-CM

## 2021-09-15 DIAGNOSIS — N52.9 ERECTILE DYSFUNCTION, UNSPECIFIED ERECTILE DYSFUNCTION TYPE: ICD-10-CM

## 2021-09-15 DIAGNOSIS — I48.11 LONGSTANDING PERSISTENT ATRIAL FIBRILLATION: ICD-10-CM

## 2021-09-15 DIAGNOSIS — K92.2 LOWER GI BLEED: ICD-10-CM

## 2021-09-15 DIAGNOSIS — Z12.11 COLON CANCER SCREENING: ICD-10-CM

## 2021-09-15 DIAGNOSIS — I10 ESSENTIAL HYPERTENSION: ICD-10-CM

## 2021-09-15 DIAGNOSIS — Z96.1 PSEUDOPHAKIA OF BOTH EYES: ICD-10-CM

## 2021-09-15 DIAGNOSIS — I65.22 STENOSIS OF LEFT CAROTID ARTERY: ICD-10-CM

## 2021-09-15 DIAGNOSIS — D50.0 IRON DEFICIENCY ANEMIA DUE TO CHRONIC BLOOD LOSS: ICD-10-CM

## 2021-09-15 DIAGNOSIS — I25.10 NONOBSTRUCTIVE ATHEROSCLEROSIS OF CORONARY ARTERY: ICD-10-CM

## 2021-09-15 DIAGNOSIS — G47.09 OTHER INSOMNIA: ICD-10-CM

## 2021-09-17 ENCOUNTER — OFFICE VISIT (OUTPATIENT)
Dept: UROLOGY | Facility: CLINIC | Age: 71
End: 2021-09-17
Attending: UROLOGY
Payer: MEDICARE

## 2021-09-17 ENCOUNTER — LAB VISIT (OUTPATIENT)
Dept: LAB | Facility: HOSPITAL | Age: 71
End: 2021-09-17
Attending: FAMILY MEDICINE
Payer: MEDICARE

## 2021-09-17 ENCOUNTER — TELEPHONE (OUTPATIENT)
Dept: UROLOGY | Facility: CLINIC | Age: 71
End: 2021-09-17

## 2021-09-17 DIAGNOSIS — G47.09 OTHER INSOMNIA: ICD-10-CM

## 2021-09-17 DIAGNOSIS — Z96.1 PSEUDOPHAKIA OF BOTH EYES: ICD-10-CM

## 2021-09-17 DIAGNOSIS — N13.8 BPH WITH OBSTRUCTION/LOWER URINARY TRACT SYMPTOMS: ICD-10-CM

## 2021-09-17 DIAGNOSIS — K80.50 BILIARY COLIC: ICD-10-CM

## 2021-09-17 DIAGNOSIS — G47.33 OSA (OBSTRUCTIVE SLEEP APNEA): ICD-10-CM

## 2021-09-17 DIAGNOSIS — R35.0 URINARY FREQUENCY: Primary | ICD-10-CM

## 2021-09-17 DIAGNOSIS — Z00.00 PREVENTATIVE HEALTH CARE: ICD-10-CM

## 2021-09-17 DIAGNOSIS — I48.11 LONGSTANDING PERSISTENT ATRIAL FIBRILLATION: ICD-10-CM

## 2021-09-17 DIAGNOSIS — D62 ACUTE BLOOD LOSS ANEMIA: ICD-10-CM

## 2021-09-17 DIAGNOSIS — H52.7 REFRACTIVE ERROR: ICD-10-CM

## 2021-09-17 DIAGNOSIS — I65.22 STENOSIS OF LEFT CAROTID ARTERY: ICD-10-CM

## 2021-09-17 DIAGNOSIS — R35.1 NOCTURIA: ICD-10-CM

## 2021-09-17 DIAGNOSIS — H35.373 EPIRETINAL MEMBRANE, BOTH EYES: ICD-10-CM

## 2021-09-17 DIAGNOSIS — K62.5 RECTAL BLEED: ICD-10-CM

## 2021-09-17 DIAGNOSIS — R35.0 URINARY FREQUENCY: ICD-10-CM

## 2021-09-17 DIAGNOSIS — I10 ESSENTIAL HYPERTENSION: ICD-10-CM

## 2021-09-17 DIAGNOSIS — E66.01 MORBID OBESITY DUE TO EXCESS CALORIES: ICD-10-CM

## 2021-09-17 DIAGNOSIS — R06.09 DYSPNEA ON EXERTION: ICD-10-CM

## 2021-09-17 DIAGNOSIS — K92.2 LOWER GI BLEED: ICD-10-CM

## 2021-09-17 DIAGNOSIS — D50.9 IRON DEFICIENCY ANEMIA, UNSPECIFIED IRON DEFICIENCY ANEMIA TYPE: ICD-10-CM

## 2021-09-17 DIAGNOSIS — D50.0 IRON DEFICIENCY ANEMIA DUE TO CHRONIC BLOOD LOSS: ICD-10-CM

## 2021-09-17 DIAGNOSIS — N40.1 BPH WITH OBSTRUCTION/LOWER URINARY TRACT SYMPTOMS: ICD-10-CM

## 2021-09-17 DIAGNOSIS — I25.10 NONOBSTRUCTIVE ATHEROSCLEROSIS OF CORONARY ARTERY: ICD-10-CM

## 2021-09-17 DIAGNOSIS — H43.813 POSTERIOR VITREOUS DETACHMENT, BOTH EYES: ICD-10-CM

## 2021-09-17 DIAGNOSIS — Z12.11 COLON CANCER SCREENING: ICD-10-CM

## 2021-09-17 DIAGNOSIS — I77.1 TORTUOUS AORTA: ICD-10-CM

## 2021-09-17 DIAGNOSIS — N52.9 ERECTILE DYSFUNCTION, UNSPECIFIED ERECTILE DYSFUNCTION TYPE: ICD-10-CM

## 2021-09-17 DIAGNOSIS — E78.00 PURE HYPERCHOLESTEROLEMIA: ICD-10-CM

## 2021-09-17 DIAGNOSIS — I48.20 CHRONIC ATRIAL FIBRILLATION: ICD-10-CM

## 2021-09-17 DIAGNOSIS — N40.1 BENIGN NON-NODULAR PROSTATIC HYPERPLASIA WITH LOWER URINARY TRACT SYMPTOMS: ICD-10-CM

## 2021-09-17 LAB
ALBUMIN SERPL BCP-MCNC: 3.8 G/DL (ref 3.5–5.2)
ALP SERPL-CCNC: 70 U/L (ref 55–135)
ALT SERPL W/O P-5'-P-CCNC: 16 U/L (ref 10–44)
ANION GAP SERPL CALC-SCNC: 12 MMOL/L (ref 8–16)
AST SERPL-CCNC: 17 U/L (ref 10–40)
BASOPHILS # BLD AUTO: 0.06 K/UL (ref 0–0.2)
BASOPHILS NFR BLD: 0.8 % (ref 0–1.9)
BILIRUB SERPL-MCNC: 0.5 MG/DL (ref 0.1–1)
BUN SERPL-MCNC: 11 MG/DL (ref 8–23)
CALCIUM SERPL-MCNC: 9.5 MG/DL (ref 8.7–10.5)
CHLORIDE SERPL-SCNC: 100 MMOL/L (ref 95–110)
CHOLEST SERPL-MCNC: 133 MG/DL (ref 120–199)
CHOLEST/HDLC SERPL: 3.7 {RATIO} (ref 2–5)
CO2 SERPL-SCNC: 26 MMOL/L (ref 23–29)
CREAT SERPL-MCNC: 1.2 MG/DL (ref 0.5–1.4)
DIFFERENTIAL METHOD: ABNORMAL
EOSINOPHIL # BLD AUTO: 0.4 K/UL (ref 0–0.5)
EOSINOPHIL NFR BLD: 5.4 % (ref 0–8)
ERYTHROCYTE [DISTWIDTH] IN BLOOD BY AUTOMATED COUNT: 16.7 % (ref 11.5–14.5)
EST. GFR  (AFRICAN AMERICAN): >60 ML/MIN/1.73 M^2
EST. GFR  (NON AFRICAN AMERICAN): >60 ML/MIN/1.73 M^2
GLUCOSE SERPL-MCNC: 105 MG/DL (ref 70–110)
HCT VFR BLD AUTO: 51.3 % (ref 40–54)
HDLC SERPL-MCNC: 36 MG/DL (ref 40–75)
HDLC SERPL: 27.1 % (ref 20–50)
HGB BLD-MCNC: 16.4 G/DL (ref 14–18)
IMM GRANULOCYTES # BLD AUTO: 0.02 K/UL (ref 0–0.04)
IMM GRANULOCYTES NFR BLD AUTO: 0.3 % (ref 0–0.5)
LDLC SERPL CALC-MCNC: 68.4 MG/DL (ref 63–159)
LYMPHOCYTES # BLD AUTO: 1.5 K/UL (ref 1–4.8)
LYMPHOCYTES NFR BLD: 19.5 % (ref 18–48)
MCH RBC QN AUTO: 28.6 PG (ref 27–31)
MCHC RBC AUTO-ENTMCNC: 32 G/DL (ref 32–36)
MCV RBC AUTO: 89 FL (ref 82–98)
MONOCYTES # BLD AUTO: 0.8 K/UL (ref 0.3–1)
MONOCYTES NFR BLD: 10.4 % (ref 4–15)
NEUTROPHILS # BLD AUTO: 5 K/UL (ref 1.8–7.7)
NEUTROPHILS NFR BLD: 63.6 % (ref 38–73)
NONHDLC SERPL-MCNC: 97 MG/DL
NRBC BLD-RTO: 0 /100 WBC
PLATELET # BLD AUTO: 241 K/UL (ref 150–450)
PMV BLD AUTO: 9.7 FL (ref 9.2–12.9)
POTASSIUM SERPL-SCNC: 4.1 MMOL/L (ref 3.5–5.1)
PROT SERPL-MCNC: 7.4 G/DL (ref 6–8.4)
RBC # BLD AUTO: 5.74 M/UL (ref 4.6–6.2)
SODIUM SERPL-SCNC: 138 MMOL/L (ref 136–145)
T4 FREE SERPL-MCNC: 0.9 NG/DL (ref 0.71–1.51)
TRIGL SERPL-MCNC: 143 MG/DL (ref 30–150)
TSH SERPL DL<=0.005 MIU/L-ACNC: 1.36 UIU/ML (ref 0.4–4)
WBC # BLD AUTO: 7.91 K/UL (ref 3.9–12.7)

## 2021-09-17 PROCEDURE — 1160F PR REVIEW ALL MEDS BY PRESCRIBER/CLIN PHARMACIST DOCUMENTED: ICD-10-PCS | Mod: CPTII,95,, | Performed by: UROLOGY

## 2021-09-17 PROCEDURE — 1159F MED LIST DOCD IN RCRD: CPT | Mod: CPTII,95,, | Performed by: UROLOGY

## 2021-09-17 PROCEDURE — 80053 COMPREHEN METABOLIC PANEL: CPT | Performed by: FAMILY MEDICINE

## 2021-09-17 PROCEDURE — 4010F PR ACE/ARB THEARPY RXD/TAKEN: ICD-10-PCS | Mod: CPTII,95,, | Performed by: UROLOGY

## 2021-09-17 PROCEDURE — 99214 OFFICE O/P EST MOD 30 MIN: CPT | Mod: 95,,, | Performed by: UROLOGY

## 2021-09-17 PROCEDURE — 84443 ASSAY THYROID STIM HORMONE: CPT | Performed by: FAMILY MEDICINE

## 2021-09-17 PROCEDURE — 80061 LIPID PANEL: CPT | Performed by: FAMILY MEDICINE

## 2021-09-17 PROCEDURE — 99214 PR OFFICE/OUTPT VISIT, EST, LEVL IV, 30-39 MIN: ICD-10-PCS | Mod: 95,,, | Performed by: UROLOGY

## 2021-09-17 PROCEDURE — 36415 COLL VENOUS BLD VENIPUNCTURE: CPT | Mod: PO | Performed by: FAMILY MEDICINE

## 2021-09-17 PROCEDURE — 1159F PR MEDICATION LIST DOCUMENTED IN MEDICAL RECORD: ICD-10-PCS | Mod: CPTII,95,, | Performed by: UROLOGY

## 2021-09-17 PROCEDURE — 3044F HG A1C LEVEL LT 7.0%: CPT | Mod: CPTII,95,, | Performed by: UROLOGY

## 2021-09-17 PROCEDURE — 85025 COMPLETE CBC W/AUTO DIFF WBC: CPT | Performed by: FAMILY MEDICINE

## 2021-09-17 PROCEDURE — 84439 ASSAY OF FREE THYROXINE: CPT | Performed by: FAMILY MEDICINE

## 2021-09-17 PROCEDURE — 1160F RVW MEDS BY RX/DR IN RCRD: CPT | Mod: CPTII,95,, | Performed by: UROLOGY

## 2021-09-17 PROCEDURE — 3044F PR MOST RECENT HEMOGLOBIN A1C LEVEL <7.0%: ICD-10-PCS | Mod: CPTII,95,, | Performed by: UROLOGY

## 2021-09-17 PROCEDURE — 4010F ACE/ARB THERAPY RXD/TAKEN: CPT | Mod: CPTII,95,, | Performed by: UROLOGY

## 2021-09-17 RX ORDER — OXYBUTYNIN CHLORIDE 5 MG/1
5 TABLET ORAL NIGHTLY
Qty: 30 TABLET | Refills: 11 | Status: SHIPPED | OUTPATIENT
Start: 2021-09-17 | End: 2022-10-19

## 2021-09-18 ENCOUNTER — PATIENT MESSAGE (OUTPATIENT)
Dept: ELECTROPHYSIOLOGY | Facility: CLINIC | Age: 71
End: 2021-09-18

## 2021-09-18 DIAGNOSIS — I10 ESSENTIAL HYPERTENSION: ICD-10-CM

## 2021-09-20 ENCOUNTER — OFFICE VISIT (OUTPATIENT)
Dept: INTERNAL MEDICINE | Facility: CLINIC | Age: 71
End: 2021-09-20
Payer: MEDICARE

## 2021-09-20 ENCOUNTER — LAB VISIT (OUTPATIENT)
Dept: LAB | Facility: HOSPITAL | Age: 71
End: 2021-09-20
Attending: FAMILY MEDICINE
Payer: MEDICARE

## 2021-09-20 VITALS
BODY MASS INDEX: 40.43 KG/M2 | OXYGEN SATURATION: 96 % | HEART RATE: 50 BPM | TEMPERATURE: 98 F | HEIGHT: 74 IN | SYSTOLIC BLOOD PRESSURE: 122 MMHG | WEIGHT: 315 LBS | DIASTOLIC BLOOD PRESSURE: 82 MMHG

## 2021-09-20 DIAGNOSIS — R06.09 DYSPNEA ON EXERTION: ICD-10-CM

## 2021-09-20 DIAGNOSIS — G47.33 OSA (OBSTRUCTIVE SLEEP APNEA): ICD-10-CM

## 2021-09-20 DIAGNOSIS — Z00.00 PREVENTATIVE HEALTH CARE: Primary | ICD-10-CM

## 2021-09-20 DIAGNOSIS — R42 DIZZINESS: ICD-10-CM

## 2021-09-20 DIAGNOSIS — E66.01 MORBID OBESITY DUE TO EXCESS CALORIES: ICD-10-CM

## 2021-09-20 DIAGNOSIS — N40.1 BENIGN NON-NODULAR PROSTATIC HYPERPLASIA WITH LOWER URINARY TRACT SYMPTOMS: ICD-10-CM

## 2021-09-20 DIAGNOSIS — I65.22 STENOSIS OF LEFT CAROTID ARTERY: ICD-10-CM

## 2021-09-20 DIAGNOSIS — I48.20 CHRONIC ATRIAL FIBRILLATION: ICD-10-CM

## 2021-09-20 DIAGNOSIS — I25.10 NONOBSTRUCTIVE ATHEROSCLEROSIS OF CORONARY ARTERY: ICD-10-CM

## 2021-09-20 DIAGNOSIS — I77.1 TORTUOUS AORTA: ICD-10-CM

## 2021-09-20 DIAGNOSIS — I10 ESSENTIAL HYPERTENSION: ICD-10-CM

## 2021-09-20 DIAGNOSIS — D50.9 IRON DEFICIENCY ANEMIA, UNSPECIFIED IRON DEFICIENCY ANEMIA TYPE: ICD-10-CM

## 2021-09-20 LAB
ANION GAP SERPL CALC-SCNC: 11 MMOL/L (ref 8–16)
BNP SERPL-MCNC: 87 PG/ML (ref 0–99)
BUN SERPL-MCNC: 16 MG/DL (ref 8–23)
CALCIUM SERPL-MCNC: 9.4 MG/DL (ref 8.7–10.5)
CHLORIDE SERPL-SCNC: 101 MMOL/L (ref 95–110)
CO2 SERPL-SCNC: 27 MMOL/L (ref 23–29)
CREAT SERPL-MCNC: 1.2 MG/DL (ref 0.5–1.4)
EST. GFR  (AFRICAN AMERICAN): >60 ML/MIN/1.73 M^2
EST. GFR  (NON AFRICAN AMERICAN): >60 ML/MIN/1.73 M^2
GLUCOSE SERPL-MCNC: 94 MG/DL (ref 70–110)
POTASSIUM SERPL-SCNC: 4 MMOL/L (ref 3.5–5.1)
SODIUM SERPL-SCNC: 139 MMOL/L (ref 136–145)

## 2021-09-20 PROCEDURE — 3044F HG A1C LEVEL LT 7.0%: CPT | Mod: CPTII,S$GLB,, | Performed by: FAMILY MEDICINE

## 2021-09-20 PROCEDURE — 4010F PR ACE/ARB THEARPY RXD/TAKEN: ICD-10-PCS | Mod: CPTII,S$GLB,, | Performed by: FAMILY MEDICINE

## 2021-09-20 PROCEDURE — 3074F SYST BP LT 130 MM HG: CPT | Mod: CPTII,S$GLB,, | Performed by: FAMILY MEDICINE

## 2021-09-20 PROCEDURE — 3288F FALL RISK ASSESSMENT DOCD: CPT | Mod: CPTII,S$GLB,, | Performed by: FAMILY MEDICINE

## 2021-09-20 PROCEDURE — 3079F DIAST BP 80-89 MM HG: CPT | Mod: CPTII,S$GLB,, | Performed by: FAMILY MEDICINE

## 2021-09-20 PROCEDURE — 99999 PR PBB SHADOW E&M-EST. PATIENT-LVL IV: CPT | Mod: PBBFAC,,, | Performed by: FAMILY MEDICINE

## 2021-09-20 PROCEDURE — 99214 PR OFFICE/OUTPT VISIT, EST, LEVL IV, 30-39 MIN: ICD-10-PCS | Mod: S$GLB,,, | Performed by: FAMILY MEDICINE

## 2021-09-20 PROCEDURE — 99999 PR PBB SHADOW E&M-EST. PATIENT-LVL IV: ICD-10-PCS | Mod: PBBFAC,,, | Performed by: FAMILY MEDICINE

## 2021-09-20 PROCEDURE — 1159F PR MEDICATION LIST DOCUMENTED IN MEDICAL RECORD: ICD-10-PCS | Mod: CPTII,S$GLB,, | Performed by: FAMILY MEDICINE

## 2021-09-20 PROCEDURE — 1159F MED LIST DOCD IN RCRD: CPT | Mod: CPTII,S$GLB,, | Performed by: FAMILY MEDICINE

## 2021-09-20 PROCEDURE — 4010F ACE/ARB THERAPY RXD/TAKEN: CPT | Mod: CPTII,S$GLB,, | Performed by: FAMILY MEDICINE

## 2021-09-20 PROCEDURE — 1126F PR PAIN SEVERITY QUANTIFIED, NO PAIN PRESENT: ICD-10-PCS | Mod: CPTII,S$GLB,, | Performed by: FAMILY MEDICINE

## 2021-09-20 PROCEDURE — 1101F PR PT FALLS ASSESS DOC 0-1 FALLS W/OUT INJ PAST YR: ICD-10-PCS | Mod: CPTII,S$GLB,, | Performed by: FAMILY MEDICINE

## 2021-09-20 PROCEDURE — 1160F RVW MEDS BY RX/DR IN RCRD: CPT | Mod: CPTII,S$GLB,, | Performed by: FAMILY MEDICINE

## 2021-09-20 PROCEDURE — 1101F PT FALLS ASSESS-DOCD LE1/YR: CPT | Mod: CPTII,S$GLB,, | Performed by: FAMILY MEDICINE

## 2021-09-20 PROCEDURE — 3008F BODY MASS INDEX DOCD: CPT | Mod: CPTII,S$GLB,, | Performed by: FAMILY MEDICINE

## 2021-09-20 PROCEDURE — 3044F PR MOST RECENT HEMOGLOBIN A1C LEVEL <7.0%: ICD-10-PCS | Mod: CPTII,S$GLB,, | Performed by: FAMILY MEDICINE

## 2021-09-20 PROCEDURE — 3074F PR MOST RECENT SYSTOLIC BLOOD PRESSURE < 130 MM HG: ICD-10-PCS | Mod: CPTII,S$GLB,, | Performed by: FAMILY MEDICINE

## 2021-09-20 PROCEDURE — 3008F PR BODY MASS INDEX (BMI) DOCUMENTED: ICD-10-PCS | Mod: CPTII,S$GLB,, | Performed by: FAMILY MEDICINE

## 2021-09-20 PROCEDURE — 3288F PR FALLS RISK ASSESSMENT DOCUMENTED: ICD-10-PCS | Mod: CPTII,S$GLB,, | Performed by: FAMILY MEDICINE

## 2021-09-20 PROCEDURE — 1126F AMNT PAIN NOTED NONE PRSNT: CPT | Mod: CPTII,S$GLB,, | Performed by: FAMILY MEDICINE

## 2021-09-20 PROCEDURE — 83880 ASSAY OF NATRIURETIC PEPTIDE: CPT | Performed by: FAMILY MEDICINE

## 2021-09-20 PROCEDURE — 36415 COLL VENOUS BLD VENIPUNCTURE: CPT | Mod: PO | Performed by: FAMILY MEDICINE

## 2021-09-20 PROCEDURE — 80048 BASIC METABOLIC PNL TOTAL CA: CPT | Performed by: FAMILY MEDICINE

## 2021-09-20 PROCEDURE — 1160F PR REVIEW ALL MEDS BY PRESCRIBER/CLIN PHARMACIST DOCUMENTED: ICD-10-PCS | Mod: CPTII,S$GLB,, | Performed by: FAMILY MEDICINE

## 2021-09-20 PROCEDURE — 99214 OFFICE O/P EST MOD 30 MIN: CPT | Mod: S$GLB,,, | Performed by: FAMILY MEDICINE

## 2021-09-20 PROCEDURE — 3079F PR MOST RECENT DIASTOLIC BLOOD PRESSURE 80-89 MM HG: ICD-10-PCS | Mod: CPTII,S$GLB,, | Performed by: FAMILY MEDICINE

## 2021-09-20 RX ORDER — DOXAZOSIN 4 MG/1
TABLET ORAL
Qty: 90 TABLET | Refills: 1 | OUTPATIENT
Start: 2021-09-20

## 2021-09-20 RX ORDER — ATENOLOL 50 MG/1
50 TABLET ORAL DAILY
Qty: 90 TABLET | Refills: 3 | Status: SHIPPED | OUTPATIENT
Start: 2021-09-20 | End: 2022-09-29

## 2021-09-20 RX ORDER — HYDROCHLOROTHIAZIDE 25 MG/1
TABLET ORAL
Qty: 90 TABLET | Refills: 1 | Status: SHIPPED | OUTPATIENT
Start: 2021-09-20 | End: 2021-12-23 | Stop reason: SDUPTHER

## 2021-09-20 RX ORDER — VALSARTAN 320 MG/1
TABLET ORAL
Qty: 90 TABLET | Refills: 1 | Status: SHIPPED | OUTPATIENT
Start: 2021-09-20 | End: 2022-04-21

## 2021-10-02 DIAGNOSIS — K21.9 GASTROESOPHAGEAL REFLUX DISEASE: ICD-10-CM

## 2021-10-04 RX ORDER — PANTOPRAZOLE SODIUM 40 MG/1
TABLET, DELAYED RELEASE ORAL
Qty: 90 TABLET | Refills: 3 | Status: SHIPPED | OUTPATIENT
Start: 2021-10-04 | End: 2022-10-21 | Stop reason: SDUPTHER

## 2021-10-08 ENCOUNTER — PATIENT MESSAGE (OUTPATIENT)
Dept: SLEEP MEDICINE | Facility: CLINIC | Age: 71
End: 2021-10-08

## 2021-10-12 ENCOUNTER — PATIENT MESSAGE (OUTPATIENT)
Dept: INTERNAL MEDICINE | Facility: CLINIC | Age: 71
End: 2021-10-12

## 2021-10-12 DIAGNOSIS — L60.9 NAIL ABNORMALITY: Primary | ICD-10-CM

## 2021-10-13 RX ORDER — GABAPENTIN 100 MG/1
200 CAPSULE ORAL 3 TIMES DAILY
Qty: 180 CAPSULE | Refills: 11 | Status: SHIPPED | OUTPATIENT
Start: 2021-10-13 | End: 2022-07-22

## 2021-10-20 ENCOUNTER — PATIENT MESSAGE (OUTPATIENT)
Dept: SLEEP MEDICINE | Facility: CLINIC | Age: 71
End: 2021-10-20
Payer: MEDICARE

## 2021-10-20 DIAGNOSIS — G47.00 INSOMNIA, UNSPECIFIED TYPE: Primary | ICD-10-CM

## 2021-10-20 RX ORDER — ATENOLOL 100 MG/1
TABLET ORAL
Qty: 90 TABLET | Refills: 3 | Status: SHIPPED | OUTPATIENT
Start: 2021-10-20 | End: 2022-03-08

## 2021-10-20 RX ORDER — ZOLPIDEM TARTRATE 5 MG/1
5 TABLET ORAL NIGHTLY PRN
Qty: 30 TABLET | Refills: 5 | Status: SHIPPED | OUTPATIENT
Start: 2021-10-20 | End: 2022-05-06

## 2021-10-22 ENCOUNTER — TELEPHONE (OUTPATIENT)
Dept: INTERNAL MEDICINE | Facility: CLINIC | Age: 71
End: 2021-10-22

## 2021-10-22 ENCOUNTER — PATIENT MESSAGE (OUTPATIENT)
Dept: INTERNAL MEDICINE | Facility: CLINIC | Age: 71
End: 2021-10-22
Payer: MEDICARE

## 2021-10-23 ENCOUNTER — PATIENT MESSAGE (OUTPATIENT)
Dept: INTERNAL MEDICINE | Facility: CLINIC | Age: 71
End: 2021-10-23
Payer: MEDICARE

## 2021-10-25 ENCOUNTER — TELEPHONE (OUTPATIENT)
Dept: INTERNAL MEDICINE | Facility: CLINIC | Age: 71
End: 2021-10-25
Payer: MEDICARE

## 2021-10-25 RX ORDER — FLECAINIDE ACETATE 100 MG/1
100 TABLET ORAL 2 TIMES DAILY
COMMUNITY
Start: 2021-08-10 | End: 2022-05-05

## 2021-10-28 ENCOUNTER — IMMUNIZATION (OUTPATIENT)
Dept: INTERNAL MEDICINE | Facility: CLINIC | Age: 71
End: 2021-10-28
Payer: MEDICARE

## 2021-10-28 ENCOUNTER — OFFICE VISIT (OUTPATIENT)
Dept: UROLOGY | Facility: CLINIC | Age: 71
End: 2021-10-28
Attending: UROLOGY
Payer: MEDICARE

## 2021-10-28 DIAGNOSIS — Z23 NEED FOR VACCINATION: Primary | ICD-10-CM

## 2021-10-28 DIAGNOSIS — R35.1 NOCTURIA: ICD-10-CM

## 2021-10-28 DIAGNOSIS — N40.0 BENIGN PROSTATIC HYPERPLASIA, UNSPECIFIED WHETHER LOWER URINARY TRACT SYMPTOMS PRESENT: Primary | ICD-10-CM

## 2021-10-28 PROCEDURE — 91300 COVID-19, MRNA, LNP-S, PF, 30 MCG/0.3 ML DOSE VACCINE: CPT | Mod: PBBFAC | Performed by: FAMILY MEDICINE

## 2021-10-28 PROCEDURE — 1160F PR REVIEW ALL MEDS BY PRESCRIBER/CLIN PHARMACIST DOCUMENTED: ICD-10-PCS | Mod: CPTII,95,, | Performed by: UROLOGY

## 2021-10-28 PROCEDURE — 0003A COVID-19, MRNA, LNP-S, PF, 30 MCG/0.3 ML DOSE VACCINE: CPT | Mod: PBBFAC | Performed by: FAMILY MEDICINE

## 2021-10-28 PROCEDURE — 99213 PR OFFICE/OUTPT VISIT, EST, LEVL III, 20-29 MIN: ICD-10-PCS | Mod: 95,,, | Performed by: UROLOGY

## 2021-10-28 PROCEDURE — 99213 OFFICE O/P EST LOW 20 MIN: CPT | Mod: 95,,, | Performed by: UROLOGY

## 2021-10-28 PROCEDURE — 1159F PR MEDICATION LIST DOCUMENTED IN MEDICAL RECORD: ICD-10-PCS | Mod: CPTII,95,, | Performed by: UROLOGY

## 2021-10-28 PROCEDURE — 1160F RVW MEDS BY RX/DR IN RCRD: CPT | Mod: CPTII,95,, | Performed by: UROLOGY

## 2021-10-28 PROCEDURE — 4010F ACE/ARB THERAPY RXD/TAKEN: CPT | Mod: CPTII,95,, | Performed by: UROLOGY

## 2021-10-28 PROCEDURE — 4010F PR ACE/ARB THEARPY RXD/TAKEN: ICD-10-PCS | Mod: CPTII,95,, | Performed by: UROLOGY

## 2021-10-28 PROCEDURE — 1159F MED LIST DOCD IN RCRD: CPT | Mod: CPTII,95,, | Performed by: UROLOGY

## 2021-10-28 PROCEDURE — 3044F PR MOST RECENT HEMOGLOBIN A1C LEVEL <7.0%: ICD-10-PCS | Mod: CPTII,95,, | Performed by: UROLOGY

## 2021-10-28 PROCEDURE — 3044F HG A1C LEVEL LT 7.0%: CPT | Mod: CPTII,95,, | Performed by: UROLOGY

## 2021-11-03 ENCOUNTER — OFFICE VISIT (OUTPATIENT)
Dept: PODIATRY | Facility: CLINIC | Age: 71
End: 2021-11-03
Payer: MEDICARE

## 2021-11-03 VITALS
WEIGHT: 315 LBS | SYSTOLIC BLOOD PRESSURE: 146 MMHG | BODY MASS INDEX: 41.44 KG/M2 | DIASTOLIC BLOOD PRESSURE: 104 MMHG | HEART RATE: 65 BPM

## 2021-11-03 DIAGNOSIS — G57.53 TARSAL TUNNEL SYNDROME, BILATERAL LOWER LIMBS: Primary | ICD-10-CM

## 2021-11-03 DIAGNOSIS — L60.9 NAIL ABNORMALITY: ICD-10-CM

## 2021-11-03 PROCEDURE — 1125F AMNT PAIN NOTED PAIN PRSNT: CPT | Mod: CPTII,S$GLB,, | Performed by: PODIATRIST

## 2021-11-03 PROCEDURE — 99203 PR OFFICE/OUTPT VISIT, NEW, LEVL III, 30-44 MIN: ICD-10-PCS | Mod: S$GLB,,, | Performed by: PODIATRIST

## 2021-11-03 PROCEDURE — 3080F PR MOST RECENT DIASTOLIC BLOOD PRESSURE >= 90 MM HG: ICD-10-PCS | Mod: CPTII,S$GLB,, | Performed by: PODIATRIST

## 2021-11-03 PROCEDURE — 3077F PR MOST RECENT SYSTOLIC BLOOD PRESSURE >= 140 MM HG: ICD-10-PCS | Mod: CPTII,S$GLB,, | Performed by: PODIATRIST

## 2021-11-03 PROCEDURE — 3008F BODY MASS INDEX DOCD: CPT | Mod: CPTII,S$GLB,, | Performed by: PODIATRIST

## 2021-11-03 PROCEDURE — 99203 OFFICE O/P NEW LOW 30 MIN: CPT | Mod: S$GLB,,, | Performed by: PODIATRIST

## 2021-11-03 PROCEDURE — 3044F HG A1C LEVEL LT 7.0%: CPT | Mod: CPTII,S$GLB,, | Performed by: PODIATRIST

## 2021-11-03 PROCEDURE — 1160F RVW MEDS BY RX/DR IN RCRD: CPT | Mod: CPTII,S$GLB,, | Performed by: PODIATRIST

## 2021-11-03 PROCEDURE — 99999 PR PBB SHADOW E&M-EST. PATIENT-LVL IV: ICD-10-PCS | Mod: PBBFAC,,, | Performed by: PODIATRIST

## 2021-11-03 PROCEDURE — 3008F PR BODY MASS INDEX (BMI) DOCUMENTED: ICD-10-PCS | Mod: CPTII,S$GLB,, | Performed by: PODIATRIST

## 2021-11-03 PROCEDURE — 3080F DIAST BP >= 90 MM HG: CPT | Mod: CPTII,S$GLB,, | Performed by: PODIATRIST

## 2021-11-03 PROCEDURE — 99999 PR PBB SHADOW E&M-EST. PATIENT-LVL IV: CPT | Mod: PBBFAC,,, | Performed by: PODIATRIST

## 2021-11-03 PROCEDURE — 3044F PR MOST RECENT HEMOGLOBIN A1C LEVEL <7.0%: ICD-10-PCS | Mod: CPTII,S$GLB,, | Performed by: PODIATRIST

## 2021-11-03 PROCEDURE — 1159F MED LIST DOCD IN RCRD: CPT | Mod: CPTII,S$GLB,, | Performed by: PODIATRIST

## 2021-11-03 PROCEDURE — 1159F PR MEDICATION LIST DOCUMENTED IN MEDICAL RECORD: ICD-10-PCS | Mod: CPTII,S$GLB,, | Performed by: PODIATRIST

## 2021-11-03 PROCEDURE — 1160F PR REVIEW ALL MEDS BY PRESCRIBER/CLIN PHARMACIST DOCUMENTED: ICD-10-PCS | Mod: CPTII,S$GLB,, | Performed by: PODIATRIST

## 2021-11-03 PROCEDURE — 4010F PR ACE/ARB THEARPY RXD/TAKEN: ICD-10-PCS | Mod: CPTII,S$GLB,, | Performed by: PODIATRIST

## 2021-11-03 PROCEDURE — 3077F SYST BP >= 140 MM HG: CPT | Mod: CPTII,S$GLB,, | Performed by: PODIATRIST

## 2021-11-03 PROCEDURE — 4010F ACE/ARB THERAPY RXD/TAKEN: CPT | Mod: CPTII,S$GLB,, | Performed by: PODIATRIST

## 2021-11-03 PROCEDURE — 1125F PR PAIN SEVERITY QUANTIFIED, PAIN PRESENT: ICD-10-PCS | Mod: CPTII,S$GLB,, | Performed by: PODIATRIST

## 2021-11-03 RX ORDER — GABAPENTIN 300 MG/1
300 CAPSULE ORAL 3 TIMES DAILY
Qty: 90 CAPSULE | Refills: 11 | Status: SHIPPED | OUTPATIENT
Start: 2021-11-03 | End: 2022-07-22

## 2021-11-03 RX ORDER — LIDOCAINE HYDROCHLORIDE 20 MG/ML
JELLY TOPICAL
Qty: 30 ML | Refills: 2 | Status: SHIPPED | OUTPATIENT
Start: 2021-11-03 | End: 2022-04-13 | Stop reason: SDUPTHER

## 2021-11-04 ENCOUNTER — PATIENT MESSAGE (OUTPATIENT)
Dept: PODIATRY | Facility: CLINIC | Age: 71
End: 2021-11-04
Payer: MEDICARE

## 2021-11-05 ENCOUNTER — PATIENT MESSAGE (OUTPATIENT)
Dept: NEUROLOGY | Facility: CLINIC | Age: 71
End: 2021-11-05
Payer: MEDICARE

## 2021-11-05 ENCOUNTER — PATIENT MESSAGE (OUTPATIENT)
Dept: INTERNAL MEDICINE | Facility: CLINIC | Age: 71
End: 2021-11-05
Payer: MEDICARE

## 2021-11-22 ENCOUNTER — PATIENT MESSAGE (OUTPATIENT)
Dept: PODIATRY | Facility: CLINIC | Age: 71
End: 2021-11-22
Payer: MEDICARE

## 2021-11-30 ENCOUNTER — PES CALL (OUTPATIENT)
Dept: ADMINISTRATIVE | Facility: CLINIC | Age: 71
End: 2021-11-30
Payer: MEDICARE

## 2021-12-21 ENCOUNTER — PATIENT MESSAGE (OUTPATIENT)
Dept: INTERNAL MEDICINE | Facility: CLINIC | Age: 71
End: 2021-12-21
Payer: MEDICARE

## 2021-12-21 NOTE — TELEPHONE ENCOUNTER
Pt would like to know if he could  take muscle relaxers if they were prescribed to him for his current injury

## 2021-12-22 ENCOUNTER — HOSPITAL ENCOUNTER (OUTPATIENT)
Dept: CARDIOLOGY | Facility: HOSPITAL | Age: 71
Discharge: HOME | End: 2021-12-22
Attending: PHYSICIAN ASSISTANT
Payer: MEDICARE

## 2021-12-22 ENCOUNTER — OFFICE VISIT (OUTPATIENT)
Dept: URGENT CARE | Facility: CLINIC | Age: 71
End: 2021-12-22
Payer: MEDICARE

## 2021-12-22 VITALS
TEMPERATURE: 98 F | WEIGHT: 315 LBS | DIASTOLIC BLOOD PRESSURE: 90 MMHG | RESPIRATION RATE: 20 BRPM | HEART RATE: 60 BPM | SYSTOLIC BLOOD PRESSURE: 152 MMHG | BODY MASS INDEX: 41.34 KG/M2 | OXYGEN SATURATION: 96 %

## 2021-12-22 DIAGNOSIS — M79.662 PAIN AND SWELLING OF LEFT LOWER LEG: ICD-10-CM

## 2021-12-22 DIAGNOSIS — M79.662 PAIN AND SWELLING OF LEFT LOWER LEG: Primary | ICD-10-CM

## 2021-12-22 DIAGNOSIS — M79.89 PAIN AND SWELLING OF LEFT LOWER LEG: ICD-10-CM

## 2021-12-22 DIAGNOSIS — M79.89 PAIN AND SWELLING OF LEFT LOWER LEG: Primary | ICD-10-CM

## 2021-12-22 PROCEDURE — 3044F HG A1C LEVEL LT 7.0%: CPT | Mod: CPTII,S$GLB,, | Performed by: PHYSICIAN ASSISTANT

## 2021-12-22 PROCEDURE — 4010F ACE/ARB THERAPY RXD/TAKEN: CPT | Mod: CPTII,S$GLB,, | Performed by: PHYSICIAN ASSISTANT

## 2021-12-22 PROCEDURE — 4010F PR ACE/ARB THEARPY RXD/TAKEN: ICD-10-PCS | Mod: CPTII,S$GLB,, | Performed by: PHYSICIAN ASSISTANT

## 2021-12-22 PROCEDURE — 1159F MED LIST DOCD IN RCRD: CPT | Mod: CPTII,S$GLB,, | Performed by: PHYSICIAN ASSISTANT

## 2021-12-22 PROCEDURE — 3080F DIAST BP >= 90 MM HG: CPT | Mod: CPTII,S$GLB,, | Performed by: PHYSICIAN ASSISTANT

## 2021-12-22 PROCEDURE — 93971 EXTREMITY STUDY: CPT | Mod: LT

## 2021-12-22 PROCEDURE — 93971 EXTREMITY STUDY: CPT | Mod: 26,LT,, | Performed by: INTERNAL MEDICINE

## 2021-12-22 PROCEDURE — 3077F PR MOST RECENT SYSTOLIC BLOOD PRESSURE >= 140 MM HG: ICD-10-PCS | Mod: CPTII,S$GLB,, | Performed by: PHYSICIAN ASSISTANT

## 2021-12-22 PROCEDURE — 3077F SYST BP >= 140 MM HG: CPT | Mod: CPTII,S$GLB,, | Performed by: PHYSICIAN ASSISTANT

## 2021-12-22 PROCEDURE — 3008F PR BODY MASS INDEX (BMI) DOCUMENTED: ICD-10-PCS | Mod: CPTII,S$GLB,, | Performed by: PHYSICIAN ASSISTANT

## 2021-12-22 PROCEDURE — 1160F RVW MEDS BY RX/DR IN RCRD: CPT | Mod: CPTII,S$GLB,, | Performed by: PHYSICIAN ASSISTANT

## 2021-12-22 PROCEDURE — 3080F PR MOST RECENT DIASTOLIC BLOOD PRESSURE >= 90 MM HG: ICD-10-PCS | Mod: CPTII,S$GLB,, | Performed by: PHYSICIAN ASSISTANT

## 2021-12-22 PROCEDURE — 99213 OFFICE O/P EST LOW 20 MIN: CPT | Mod: S$GLB,,, | Performed by: PHYSICIAN ASSISTANT

## 2021-12-22 PROCEDURE — 1160F PR REVIEW ALL MEDS BY PRESCRIBER/CLIN PHARMACIST DOCUMENTED: ICD-10-PCS | Mod: CPTII,S$GLB,, | Performed by: PHYSICIAN ASSISTANT

## 2021-12-22 PROCEDURE — 1159F PR MEDICATION LIST DOCUMENTED IN MEDICAL RECORD: ICD-10-PCS | Mod: CPTII,S$GLB,, | Performed by: PHYSICIAN ASSISTANT

## 2021-12-22 PROCEDURE — 99213 PR OFFICE/OUTPT VISIT, EST, LEVL III, 20-29 MIN: ICD-10-PCS | Mod: S$GLB,,, | Performed by: PHYSICIAN ASSISTANT

## 2021-12-22 PROCEDURE — 3044F PR MOST RECENT HEMOGLOBIN A1C LEVEL <7.0%: ICD-10-PCS | Mod: CPTII,S$GLB,, | Performed by: PHYSICIAN ASSISTANT

## 2021-12-22 PROCEDURE — 3008F BODY MASS INDEX DOCD: CPT | Mod: CPTII,S$GLB,, | Performed by: PHYSICIAN ASSISTANT

## 2021-12-22 PROCEDURE — 93971 CV US DOPPLER VENOUS LEG LEFT (CUPID ONLY): ICD-10-PCS | Mod: 26,LT,, | Performed by: INTERNAL MEDICINE

## 2021-12-22 RX ORDER — METHOCARBAMOL 750 MG/1
750 TABLET, FILM COATED ORAL 3 TIMES DAILY
Qty: 21 TABLET | Refills: 0 | Status: SHIPPED | OUTPATIENT
Start: 2021-12-22 | End: 2022-06-29

## 2021-12-23 ENCOUNTER — PATIENT MESSAGE (OUTPATIENT)
Dept: INTERNAL MEDICINE | Facility: CLINIC | Age: 71
End: 2021-12-23
Payer: MEDICARE

## 2021-12-23 DIAGNOSIS — I10 ESSENTIAL HYPERTENSION: ICD-10-CM

## 2021-12-23 RX ORDER — HYDROCHLOROTHIAZIDE 25 MG/1
25 TABLET ORAL DAILY
Qty: 90 TABLET | Refills: 0 | Status: SHIPPED | OUTPATIENT
Start: 2021-12-23 | End: 2022-04-21 | Stop reason: SDUPTHER

## 2022-01-04 ENCOUNTER — PATIENT MESSAGE (OUTPATIENT)
Dept: PODIATRY | Facility: CLINIC | Age: 72
End: 2022-01-04
Payer: MEDICARE

## 2022-01-04 ENCOUNTER — PROCEDURE VISIT (OUTPATIENT)
Dept: PHYSICAL MEDICINE AND REHAB | Facility: CLINIC | Age: 72
End: 2022-01-04
Payer: MEDICARE

## 2022-01-04 DIAGNOSIS — G57.53 TARSAL TUNNEL SYNDROME, BILATERAL LOWER LIMBS: ICD-10-CM

## 2022-01-04 PROCEDURE — 95912 NRV CNDJ TEST 11-12 STUDIES: CPT | Mod: S$GLB,,, | Performed by: PHYSICAL MEDICINE & REHABILITATION

## 2022-01-04 PROCEDURE — 95886 MUSC TEST DONE W/N TEST COMP: CPT | Mod: PBBFAC | Performed by: PHYSICAL MEDICINE & REHABILITATION

## 2022-01-04 PROCEDURE — 95912 PR NERVE CONDUCTION STUDY; 11 -12 STUDIES: ICD-10-PCS | Mod: S$GLB,,, | Performed by: PHYSICAL MEDICINE & REHABILITATION

## 2022-01-04 PROCEDURE — 95886 PR EMG COMPLETE, W/ NERVE CONDUCTION STUDIES, 5+ MUSCLES: ICD-10-PCS | Mod: S$GLB,,, | Performed by: PHYSICAL MEDICINE & REHABILITATION

## 2022-01-04 PROCEDURE — 95912 NRV CNDJ TEST 11-12 STUDIES: CPT | Mod: PBBFAC | Performed by: PHYSICAL MEDICINE & REHABILITATION

## 2022-01-04 PROCEDURE — 95886 MUSC TEST DONE W/N TEST COMP: CPT | Mod: S$GLB,,, | Performed by: PHYSICAL MEDICINE & REHABILITATION

## 2022-01-04 NOTE — PROCEDURES
Test Date:  2022    Patient: Zeenat Mancia : 1950 Physician: Jase Vernon D.O.   ID#:  SEX: Male Ref. Phys: Leonel Perez DPM     HPI: Zeenat Mancia is a 71 y.o.male who presents for NCS/EMG to evaluate tarsal tunnel bilaterally.  Pt with tingling and burning pain in the bilateral feet- tips of toes and soles of feet.      NCV & EMG Findings:   Evaluation of the right median sensory nerve showed prolonged distal peak latency and decreased conduction velocity.   The left Superficial Fibular sensory, the right Superficial Fibular sensory, the left sural sensory, and the right sural sensory nerves showed no response.   All remaining nerves (as indicated in the following tables) were within normal limits.   Needle evaluation of the left Abductor Hallucis muscle showed increased insertional activity, moderately increased spontaneous activity, and slightly increased polyphasic potentials.   The right Abductor Hallucis muscle showed increased insertional activity, moderately increased spontaneous activity, and diminished recruitment.   All remaining muscles (as indicated in the following table) showed no evidence of electrical instability.    Impression:  1. Technically, somewhat limited study due to peripheral edema of legs  2. The sensory responses were absent in the bilateral lower extremities.  This can either be due to the technical issues above, or could be a sensory peripheral polyneuropathy.  I favor the latter as his symptoms correlate with this.   3. Incidentally, there is evidence of a median mononeuropathy across the right wrist (I.e. carpal tunnel syndrome).  Upper extremity sensory studies performed due to lack of responses in the legs.  If a more detailed study is desired for the carpal tunnel syndrome, a separate upper extremity study can be ordered.  Note:  He has no significant hand symptoms.     4. No definitive evidence of tarsal tunnel syndrome on either side.  The  bilateral abductor hallicus muscles showed active denervation, but this could be due to the peripheral polyneuropathy described above, and can also be seen in normal feet, especially with advancing age.        ___________________________  Jase Vernon D.O.        NCS+  Motor Nerve Results      Latency Amplitude F-Lat Segment Distance CV Comment   Site (ms) Norm (mV) Norm (ms)  (cm) (m/s) Norm    Left Fibular (EDB)   Ankle 4.0  < 6.5 2.7  > 1.10         Bel Fib Head 13.8 - 1.62 -  Bel Fib Head-Ankle 36 37  > 36    Right Fibular (EDB)   Ankle 4.3  < 6.5 1.92  > 1.10         Bel Fib Head 14.2 - 1.89 -  Bel Fib Head-Ankle 36 36  > 36    Left Tibial (AHB)   Ankle 4.1  < 6.1 1.58  > 1.10         Right Tibial (AHB)   Ankle 4.8  < 6.1 3.1  > 1.10           Sensory Nerve Results      Latency (Peak) Amplitude (P-P) Segment Distance CV Comment   Site (ms) Norm (µV) Norm  (cm) (m/s) Norm    Right Median   Wrist-Dig II *4.5  < 4.0 17  > 8 Wrist-Dig II 17 *38  > 39    Right Ulnar   Wrist-Dig V 3.2  < 4.0 24  > 4 Wrist-Dig V 14 44  > 38    Right Radial   Forearm-Wrist 1.93  < 2.8 13  > 11 Forearm-Wrist 10 52 -    Left Sural   Calf-Lat Mall *NR  < 4.5 *NR  > 4 Calf-Lat Mall 14 *NR  > 35    Right Sural   Calf-Lat Mall *NR  < 4.5 *NR  > 4 Calf-Lat Mall 14 *NR  > 35    Left Superficial Fibular   14 cm-Ankle *NR  < 4.2 *NR  > 5 14 cm-Ankle 14 *NR  > 32    Right Superficial Fibular   14 cm-Ankle *NR  < 4.2 *NR  > 5 14 cm-Ankle 14 *NR  > 32      EMG+     Side Muscle Nerve Root Ins Act Fibs Psw Amp Dur Poly Recrt Int Pat Comment   Left Vastus Med Femoral L2-L4 Nml Nml Nml Nml Nml 0 Nml Nml    Left Tib Anterior Fibular,  Deep Fibula... L4-L5 Nml Nml Nml Nml Nml 0 Nml Nml    Left Fib Longus Fibular,  Superficial... L5-S1 Nml Nml Nml Nml Nml 0 Nml Nml    Left AHB Tibial,  Medial Plant... S1-S2 *Incr *2+ *2+ Nml Nml *1+ Nml Nml    Left Gastroc Tibial S1-S2 Nml Nml Nml Nml Nml 0 Nml Nml    Left Dorsal Interossei ped I Lateral Plantar  S2-S3 Nml Nml Nml Nml Nml 0 Nml Nml    Right Vastus Med Femoral L2-L4 Nml Nml Nml Nml Nml 0 Nml Nml    Right Tib Anterior Fibular,  Deep Fibula... L4-L5 Nml Nml Nml Nml Nml 0 Nml Nml    Right Fib Longus Fibular,  Superficial... L5-S1 Nml Nml Nml Nml Nml 0 Nml Nml    Right EDB Fibular,  Deep Fibula... L5-S1 Nml Nml Nml Nml Nml 0 Nml Nml    Right AHB Tibial,  Medial Plant... S1-S2 *Incr *1+ *2+ Nml Nml 0 *Reduced Nml    Right Gastroc Tibial S1-S2 Nml Nml Nml Nml Nml 0 Nml Nml    Right Dorsal Interossei ped I Lateral Plantar S2-S3 Nml Nml Nml Nml Nml 0 Nml Nml            Waveforms:    Motor              Sensory

## 2022-01-05 ENCOUNTER — PATIENT MESSAGE (OUTPATIENT)
Dept: ADMINISTRATIVE | Facility: OTHER | Age: 72
End: 2022-01-05
Payer: MEDICARE

## 2022-01-06 ENCOUNTER — OFFICE VISIT (OUTPATIENT)
Dept: PODIATRY | Facility: CLINIC | Age: 72
End: 2022-01-06
Payer: MEDICARE

## 2022-01-06 VITALS
HEART RATE: 74 BPM | SYSTOLIC BLOOD PRESSURE: 143 MMHG | DIASTOLIC BLOOD PRESSURE: 89 MMHG | WEIGHT: 315 LBS | BODY MASS INDEX: 41.35 KG/M2

## 2022-01-06 DIAGNOSIS — G57.30 DEEP PERONEAL NEUROPATHY, UNSPECIFIED LATERALITY: ICD-10-CM

## 2022-01-06 DIAGNOSIS — G57.53 TARSAL TUNNEL SYNDROME, BILATERAL LOWER LIMBS: Primary | ICD-10-CM

## 2022-01-06 PROCEDURE — 64450 PR NERVE BLOCK INJ, ANES/STEROID, OTHER PERIPHERAL: ICD-10-PCS | Mod: 50,S$GLB,, | Performed by: PODIATRIST

## 2022-01-06 PROCEDURE — 64450 NJX AA&/STRD OTHER PN/BRANCH: CPT | Mod: 50,S$GLB,, | Performed by: PODIATRIST

## 2022-01-06 PROCEDURE — 99213 OFFICE O/P EST LOW 20 MIN: CPT | Mod: PBBFAC,25 | Performed by: PODIATRIST

## 2022-01-06 PROCEDURE — 99999 PR PBB SHADOW E&M-EST. PATIENT-LVL III: ICD-10-PCS | Mod: PBBFAC,,, | Performed by: PODIATRIST

## 2022-01-06 PROCEDURE — 64450 NJX AA&/STRD OTHER PN/BRANCH: CPT | Mod: 50,PBBFAC | Performed by: PODIATRIST

## 2022-01-06 PROCEDURE — 99214 PR OFFICE/OUTPT VISIT, EST, LEVL IV, 30-39 MIN: ICD-10-PCS | Mod: 25,S$GLB,, | Performed by: PODIATRIST

## 2022-01-06 PROCEDURE — 99999 PR PBB SHADOW E&M-EST. PATIENT-LVL III: CPT | Mod: PBBFAC,,, | Performed by: PODIATRIST

## 2022-01-06 PROCEDURE — 99214 OFFICE O/P EST MOD 30 MIN: CPT | Mod: 25,S$GLB,, | Performed by: PODIATRIST

## 2022-01-16 NOTE — PROGRESS NOTES
Subjective:      Patient ID: Zeenat Mancia is a 71 y.o. male.    Chief Complaint: Follow-up (Discuss to discuss test results Nerve CS)    Zeenat is a 71 y.o. male who presents to the podiatry clinic  with complaint of  bilateral foot pain. Onset of the symptoms was several months ago. Precipitating event: none known. Current symptoms include: Burning tingling and numbness. Aggravating factors: inactivity. Symptoms have gradually worsened.   He did very well with the diagnostic nerve blocks.  He had much improvement in terms of pain relief.  He would like to discuss possible surgical intervention options.        Review of Systems   Constitutional: Negative for chills, decreased appetite, fever and malaise/fatigue.   HENT: Negative for congestion, hearing loss, nosebleeds and tinnitus.    Eyes: Negative for double vision, pain, photophobia and visual disturbance.   Cardiovascular: Negative for chest pain, claudication, cyanosis and leg swelling.   Respiratory: Negative for cough, hemoptysis, shortness of breath and wheezing.    Endocrine: Negative for cold intolerance and heat intolerance.   Hematologic/Lymphatic: Negative for adenopathy and bleeding problem.   Skin: Negative for color change, dry skin, itching, nail changes and suspicious lesions.   Musculoskeletal: Positive for joint pain and myalgias. Negative for arthritis and stiffness.   Gastrointestinal: Negative for abdominal pain, jaundice, nausea and vomiting.   Genitourinary: Negative for dysuria, frequency and hematuria.   Neurological: Positive for numbness, paresthesias and sensory change. Negative for difficulty with concentration and loss of balance.   Psychiatric/Behavioral: Negative for altered mental status, hallucinations and suicidal ideas. The patient is not nervous/anxious.    Allergic/Immunologic: Negative for environmental allergies and persistent infections.           Objective:      Physical Exam  Constitutional:       Appearance: He is  well-developed.   HENT:      Head: Normocephalic and atraumatic.   Cardiovascular:      Pulses:           Dorsalis pedis pulses are 2+ on the right side and 2+ on the left side.        Posterior tibial pulses are 2+ on the right side and 2+ on the left side.   Pulmonary:      Effort: Pulmonary effort is normal.   Musculoskeletal:      Right foot: Normal range of motion. No deformity.      Left foot: Normal range of motion. No deformity.      Comments: Inspection and palpation of the muscles joints and bones of both lower extremities reveal that muscle strength for the anterior, lateral, and posterior muscle groups and intrinsic muscle groups of the foot are all 5 over 5 symmetrical. Ankle, subtalar, midtarsal, and digital joint range of motion  are within normal limits, nonpainful, without crepitus or effusion. Patient exhibits a normal angle and base of gait. Palpation of the tendons reveal no defects.     Feet:      Right foot:      Skin integrity: No skin breakdown or erythema.      Left foot:      Skin integrity: No skin breakdown or erythema.   Skin:     General: Skin is warm and dry.      Nails: There is no clubbing.      Comments: Skin turgor is normal bilaterally. Skin texture is well hydrated to both lower extremities. No lesions or rashes or wounds appreciated bilaterally. Nail plates 1 through 5 bilaterally are within normal limits for length and thickness. No nail clubbing or incurvation noted.   Neurological:      Mental Status: He is alert and oriented to person, place, and time.      Deep Tendon Reflexes:      Reflex Scores:       Patellar reflexes are 2+ on the right side and 2+ on the left side.       Achilles reflexes are 2+ on the right side and 2+ on the left side.     Comments: Sharp, dull, light touch, vibratory, and proprioceptive sensation are intact bilaterally. Deep tendon reflexes to patellar and Achilles tendon are symmetrical, 2/4 bilaterally. No ankle clonus or Babinski reflexes noted  bilaterally. Coordination is normal to both feet and lower extremities.    Positive tarsal tunnel Tinel sign and tenderness with palpation overlying bilateral superficial peroneal nerves.   Psychiatric:         Behavior: Behavior normal.               Assessment:       Encounter Diagnoses   Name Primary?    Tarsal tunnel syndrome, bilateral lower limbs Yes    Deep peroneal neuropathy, unspecified laterality          Plan:       Zeenat was seen today for follow-up.    Diagnoses and all orders for this visit:    Tarsal tunnel syndrome, bilateral lower limbs    Deep peroneal neuropathy, unspecified laterality      I counseled the patient on his conditions, their implications and medical management.      The nature of the condition, options for management, as well as potential risks and complications were discussed in detail with patient. Patient was amenable to my recommendations and left my office fully informed and will follow up as instructed or sooner if necessary.      Discussed options for peripheral neuropathy/nerve entrapment syndrome including nerve block therapy, surgical nerve entrapment decompression procedures, and various vitamins and supplementation available shown to improve nerve function.    The area overlying the bilateral tarsal tunnel nerve(s) was sterilely prepped, verbal consent was obtained, 2 cc's of 0.5% Marcaine plain was infiltrated around the affected nerve(s) for a diagnostic nerve block.  This was well tolerated with no complications.    The patient has decided to forego any further conservative treatment and opted for surgical intervention.  Alternative treatments, and benefits of surgery were discussed with the patient.  Risks and complications, including but not limited to: pain, swelling, numbness, infection, failure to achieve the stated goals, recurrence of problem, nerve and blood vessel damage, scar tissue formation, further need of surgery, loss of limb or life, was discussed in  detail with the patient.  All questions asked were answered, and written informed consent was sign and received. The patient will undergo right tarsal tunnel release with cortisone injection right 1st metatarsal cuneiform joint.

## 2022-01-19 ENCOUNTER — PATIENT MESSAGE (OUTPATIENT)
Dept: SLEEP MEDICINE | Facility: CLINIC | Age: 72
End: 2022-01-19
Payer: MEDICARE

## 2022-01-19 ENCOUNTER — PES CALL (OUTPATIENT)
Dept: ADMINISTRATIVE | Facility: CLINIC | Age: 72
End: 2022-01-19
Payer: MEDICARE

## 2022-02-23 ENCOUNTER — PATIENT MESSAGE (OUTPATIENT)
Dept: INTERNAL MEDICINE | Facility: CLINIC | Age: 72
End: 2022-02-23
Payer: MEDICARE

## 2022-02-23 ENCOUNTER — PATIENT MESSAGE (OUTPATIENT)
Dept: UROLOGY | Facility: CLINIC | Age: 72
End: 2022-02-23
Payer: MEDICARE

## 2022-03-08 ENCOUNTER — PATIENT MESSAGE (OUTPATIENT)
Dept: SLEEP MEDICINE | Facility: CLINIC | Age: 72
End: 2022-03-08
Payer: MEDICARE

## 2022-04-06 ENCOUNTER — OFFICE VISIT (OUTPATIENT)
Dept: PODIATRY | Facility: CLINIC | Age: 72
End: 2022-04-06
Payer: MEDICARE

## 2022-04-06 VITALS
BODY MASS INDEX: 40.43 KG/M2 | HEIGHT: 74 IN | SYSTOLIC BLOOD PRESSURE: 125 MMHG | WEIGHT: 315 LBS | DIASTOLIC BLOOD PRESSURE: 86 MMHG | HEART RATE: 72 BPM

## 2022-04-06 DIAGNOSIS — G57.30 DEEP PERONEAL NEUROPATHY, UNSPECIFIED LATERALITY: ICD-10-CM

## 2022-04-06 DIAGNOSIS — G57.53 TARSAL TUNNEL SYNDROME, BILATERAL LOWER LIMBS: Primary | ICD-10-CM

## 2022-04-06 PROCEDURE — 64450 NJX AA&/STRD OTHER PN/BRANCH: CPT | Mod: 50,S$GLB,, | Performed by: PODIATRIST

## 2022-04-06 PROCEDURE — 99999 PR PBB SHADOW E&M-EST. PATIENT-LVL III: CPT | Mod: PBBFAC,,, | Performed by: PODIATRIST

## 2022-04-06 PROCEDURE — 99214 OFFICE O/P EST MOD 30 MIN: CPT | Mod: 25,S$GLB,, | Performed by: PODIATRIST

## 2022-04-06 PROCEDURE — 99214 PR OFFICE/OUTPT VISIT, EST, LEVL IV, 30-39 MIN: ICD-10-PCS | Mod: 25,S$GLB,, | Performed by: PODIATRIST

## 2022-04-06 PROCEDURE — 64450 PR NERVE BLOCK INJ, ANES/STEROID, OTHER PERIPHERAL: ICD-10-PCS | Mod: 50,S$GLB,, | Performed by: PODIATRIST

## 2022-04-06 PROCEDURE — 99999 PR PBB SHADOW E&M-EST. PATIENT-LVL III: ICD-10-PCS | Mod: PBBFAC,,, | Performed by: PODIATRIST

## 2022-04-07 ENCOUNTER — PATIENT MESSAGE (OUTPATIENT)
Dept: INTERNAL MEDICINE | Facility: CLINIC | Age: 72
End: 2022-04-07
Payer: MEDICARE

## 2022-04-07 NOTE — TELEPHONE ENCOUNTER
No new care gaps identified.  Powered by [employer]. Reference number: [id number].   4/07/2022 11:52:54 AM CDT

## 2022-04-08 RX ORDER — AMLODIPINE BESYLATE 5 MG/1
TABLET ORAL
Qty: 90 TABLET | Refills: 0 | OUTPATIENT
Start: 2022-04-08

## 2022-04-08 NOTE — TELEPHONE ENCOUNTER
Quick DC. Request already responded to by other means (e.g. phone or fax)   Refill Authorization Note   Zeenat Gavino  is requesting a refill authorization.  Brief Assessment and Rationale for Refill:  Quick Discontinue  Medication Therapy Plan:       Medication Reconciliation Completed:  No      Comments:     Note composed:2:03 PM 04/08/2022

## 2022-04-12 ENCOUNTER — PATIENT MESSAGE (OUTPATIENT)
Dept: INTERNAL MEDICINE | Facility: CLINIC | Age: 72
End: 2022-04-12
Payer: MEDICARE

## 2022-04-12 ENCOUNTER — PATIENT MESSAGE (OUTPATIENT)
Dept: PODIATRY | Facility: CLINIC | Age: 72
End: 2022-04-12
Payer: MEDICARE

## 2022-04-13 RX ORDER — LIDOCAINE HYDROCHLORIDE 20 MG/ML
JELLY TOPICAL
Qty: 30 ML | Refills: 2 | Status: SHIPPED | OUTPATIENT
Start: 2022-04-13 | End: 2022-07-22

## 2022-04-15 NOTE — PROGRESS NOTES
Subjective:      Patient ID: Zeenat Mancia is a 71 y.o. male.    Chief Complaint: Foot Pain (Tarsal Tunnel Syndrome or NCS. Both)    Zeenat is a 71 y.o. male who presents to the podiatry clinic  with complaint of  bilateral foot pain. Onset of the symptoms was several months ago. Precipitating event: none known. Current symptoms include: Burning tingling and numbness. Aggravating factors: inactivity. Symptoms have gradually worsened.   He did very well with the diagnostic nerve blocks.  He had much improvement in terms of pain relief.  He would like to repeat these today.      Review of Systems   Constitutional: Negative for chills, decreased appetite, fever and malaise/fatigue.   HENT: Negative for congestion, hearing loss, nosebleeds and tinnitus.    Eyes: Negative for double vision, pain, photophobia and visual disturbance.   Cardiovascular: Negative for chest pain, claudication, cyanosis and leg swelling.   Respiratory: Negative for cough, hemoptysis, shortness of breath and wheezing.    Endocrine: Negative for cold intolerance and heat intolerance.   Hematologic/Lymphatic: Negative for adenopathy and bleeding problem.   Skin: Negative for color change, dry skin, itching, nail changes and suspicious lesions.   Musculoskeletal: Positive for joint pain and myalgias. Negative for arthritis and stiffness.   Gastrointestinal: Negative for abdominal pain, jaundice, nausea and vomiting.   Genitourinary: Negative for dysuria, frequency and hematuria.   Neurological: Positive for numbness, paresthesias and sensory change. Negative for difficulty with concentration and loss of balance.   Psychiatric/Behavioral: Negative for altered mental status, hallucinations and suicidal ideas. The patient is not nervous/anxious.    Allergic/Immunologic: Negative for environmental allergies and persistent infections.           Objective:      Physical Exam  Constitutional:       Appearance: He is well-developed.   HENT:      Head:  Normocephalic and atraumatic.   Cardiovascular:      Pulses:           Dorsalis pedis pulses are 2+ on the right side and 2+ on the left side.        Posterior tibial pulses are 2+ on the right side and 2+ on the left side.   Pulmonary:      Effort: Pulmonary effort is normal.   Musculoskeletal:      Right foot: Normal range of motion. No deformity.      Left foot: Normal range of motion. No deformity.      Comments: Inspection and palpation of the muscles joints and bones of both lower extremities reveal that muscle strength for the anterior, lateral, and posterior muscle groups and intrinsic muscle groups of the foot are all 5 over 5 symmetrical. Ankle, subtalar, midtarsal, and digital joint range of motion  are within normal limits, nonpainful, without crepitus or effusion. Patient exhibits a normal angle and base of gait. Palpation of the tendons reveal no defects.     Feet:      Right foot:      Skin integrity: No skin breakdown or erythema.      Left foot:      Skin integrity: No skin breakdown or erythema.   Skin:     General: Skin is warm and dry.      Nails: There is no clubbing.      Comments: Skin turgor is normal bilaterally. Skin texture is well hydrated to both lower extremities. No lesions or rashes or wounds appreciated bilaterally. Nail plates 1 through 5 bilaterally are within normal limits for length and thickness. No nail clubbing or incurvation noted.   Neurological:      Mental Status: He is alert and oriented to person, place, and time.      Deep Tendon Reflexes:      Reflex Scores:       Patellar reflexes are 2+ on the right side and 2+ on the left side.       Achilles reflexes are 2+ on the right side and 2+ on the left side.     Comments: Sharp, dull, light touch, vibratory, and proprioceptive sensation are intact bilaterally. Deep tendon reflexes to patellar and Achilles tendon are symmetrical, 2/4 bilaterally. No ankle clonus or Babinski reflexes noted bilaterally. Coordination is normal  to both feet and lower extremities.    Positive tarsal tunnel Tinel sign and tenderness with palpation overlying bilateral deep peroneal nerves.   Psychiatric:         Behavior: Behavior normal.               Assessment:       Encounter Diagnoses   Name Primary?    Tarsal tunnel syndrome, bilateral lower limbs Yes    Deep peroneal neuropathy, unspecified laterality          Plan:       Zeenat was seen today for foot pain.    Diagnoses and all orders for this visit:    Tarsal tunnel syndrome, bilateral lower limbs    Deep peroneal neuropathy, unspecified laterality    Other orders  -     LIDOcaine HCL 2% (XYLOCAINE) 2 % jelly; Apply topically as needed. Apply topically once nightly to affected part of foot/feet.      I counseled the patient on his conditions, their implications and medical management.      The nature of the condition, options for management, as well as potential risks and complications were discussed in detail with patient. Patient was amenable to my recommendations and left my office fully informed and will follow up as instructed or sooner if necessary.      Discussed options for peripheral neuropathy/nerve entrapment syndrome including nerve block therapy, surgical nerve entrapment decompression procedures, and various vitamins and supplementation available shown to improve nerve function.    The area overlying the bilateral tarsal tunnel in bilateral deep peroneal nerve(s) was sterilely prepped, verbal consent was obtained, 2 cc's of 0.5% Marcaine plain was infiltrated around the affected nerve(s) for a diagnostic nerve block.  This was well tolerated with no complications.    The nature of the condition, options for management, as well as potential risks and complications were discussed in detail with patient. Patient was amenable to my recommendations and left my office fully informed and will follow up as instructed or sooner if necessary.

## 2022-04-19 ENCOUNTER — OFFICE VISIT (OUTPATIENT)
Dept: INTERNAL MEDICINE | Facility: CLINIC | Age: 72
End: 2022-04-19
Payer: MEDICARE

## 2022-04-19 ENCOUNTER — HOSPITAL ENCOUNTER (OUTPATIENT)
Dept: RADIOLOGY | Facility: HOSPITAL | Age: 72
Discharge: HOME | End: 2022-04-19
Attending: FAMILY MEDICINE
Payer: MEDICARE

## 2022-04-19 VITALS
BODY MASS INDEX: 40.43 KG/M2 | RESPIRATION RATE: 20 BRPM | SYSTOLIC BLOOD PRESSURE: 108 MMHG | HEIGHT: 74 IN | WEIGHT: 315 LBS | TEMPERATURE: 97 F | OXYGEN SATURATION: 98 % | DIASTOLIC BLOOD PRESSURE: 76 MMHG | HEART RATE: 57 BPM

## 2022-04-19 DIAGNOSIS — K59.00 CONSTIPATION, UNSPECIFIED CONSTIPATION TYPE: ICD-10-CM

## 2022-04-19 DIAGNOSIS — R06.09 DYSPNEA ON EXERTION: ICD-10-CM

## 2022-04-19 DIAGNOSIS — R42 DIZZINESS: Primary | ICD-10-CM

## 2022-04-19 DIAGNOSIS — R11.0 NAUSEA: ICD-10-CM

## 2022-04-19 PROCEDURE — 93010 EKG 12-LEAD: ICD-10-PCS | Mod: S$GLB,,, | Performed by: INTERNAL MEDICINE

## 2022-04-19 PROCEDURE — 71046 X-RAY EXAM CHEST 2 VIEWS: CPT | Mod: 26,,, | Performed by: RADIOLOGY

## 2022-04-19 PROCEDURE — 99999 PR PBB SHADOW E&M-EST. PATIENT-LVL V: ICD-10-PCS | Mod: PBBFAC,,, | Performed by: FAMILY MEDICINE

## 2022-04-19 PROCEDURE — 71046 X-RAY EXAM CHEST 2 VIEWS: CPT | Mod: TC,PO

## 2022-04-19 PROCEDURE — 99214 PR OFFICE/OUTPT VISIT, EST, LEVL IV, 30-39 MIN: ICD-10-PCS | Mod: S$GLB,,, | Performed by: FAMILY MEDICINE

## 2022-04-19 PROCEDURE — 99214 OFFICE O/P EST MOD 30 MIN: CPT | Mod: S$GLB,,, | Performed by: FAMILY MEDICINE

## 2022-04-19 PROCEDURE — 99999 PR PBB SHADOW E&M-EST. PATIENT-LVL V: CPT | Mod: PBBFAC,,, | Performed by: FAMILY MEDICINE

## 2022-04-19 PROCEDURE — 93005 ELECTROCARDIOGRAM TRACING: CPT | Mod: S$GLB,,, | Performed by: FAMILY MEDICINE

## 2022-04-19 PROCEDURE — 71046 XR CHEST PA AND LATERAL: ICD-10-PCS | Mod: 26,,, | Performed by: RADIOLOGY

## 2022-04-19 PROCEDURE — 93010 ELECTROCARDIOGRAM REPORT: CPT | Mod: S$GLB,,, | Performed by: INTERNAL MEDICINE

## 2022-04-19 PROCEDURE — 93005 EKG 12-LEAD: ICD-10-PCS | Mod: S$GLB,,, | Performed by: FAMILY MEDICINE

## 2022-04-19 RX ORDER — ACETAMINOPHEN, DIPHENHYDRAMINE HCL, PHENYLEPHRINE HCL 325; 25; 5 MG/1; MG/1; MG/1
10 TABLET ORAL NIGHTLY
COMMUNITY

## 2022-04-19 RX ORDER — FERROUS SULFATE 325(65) MG
325 TABLET ORAL
COMMUNITY
End: 2022-05-05

## 2022-04-19 RX ORDER — DOCUSATE SODIUM 100 MG/1
100 CAPSULE, LIQUID FILLED ORAL 2 TIMES DAILY
COMMUNITY
End: 2023-02-02

## 2022-04-19 NOTE — PROGRESS NOTES
Subjective:       Patient ID: Zeenat Mancia is a 71 y.o. male.    Chief Complaint: GI Problem (Constipation, BM irregular, nausea), Dizziness, and Fall (X2 had fall this morning)    HPI 71-year-old white male with atrial fibrillation, hypertension, BPH, and previous GI bleed presents to clinic today accompanied by his wife secondary to concerns of GI issues with chronic constipation and nausea but also with complaints of dizziness and 2 falls.  The patient reports that the 1st fall he tripped on the carpet and the 2nd fall which was this morning was secondary to him slipping on wet floor.  He denies hitting his head with either full.  Upon further questioning the patient's wife reports that the patient has been experiencing dizziness for the past few months that is worse with position change.  He also notices occasional shortness of breath with exertion but denies any chest pain or palpitations.  He did previously have a GI bleed approximately 1 year ago which required hospitalization but at this time he denies noticing any blood in his stool.  Review of Systems   Constitutional: Positive for unexpected weight change. Negative for activity change, appetite change, chills, fatigue and fever.   HENT: Negative for nasal congestion, ear pain, hearing loss, postnasal drip, rhinorrhea, sinus pressure/congestion, sore throat, tinnitus and trouble swallowing.    Eyes: Negative for discharge, redness, itching and visual disturbance.   Respiratory: Positive for shortness of breath and wheezing. Negative for cough and chest tightness.    Cardiovascular: Negative for chest pain and palpitations.   Gastrointestinal: Positive for constipation and nausea. Negative for abdominal pain, blood in stool, diarrhea and vomiting.   Endocrine: Positive for polyuria. Negative for polydipsia.   Genitourinary: Positive for urgency. Negative for decreased urine volume, difficulty urinating, dysuria, frequency and hematuria.   Musculoskeletal:  Negative for arthralgias, back pain, joint swelling, myalgias, neck pain and neck stiffness.   Integumentary:  Negative for rash.   Neurological: Positive for dizziness and weakness. Negative for light-headedness and headaches.   Psychiatric/Behavioral: Positive for confusion and dysphoric mood.         Objective:      Physical Exam  Vitals and nursing note reviewed.   Constitutional:       General: He is not in acute distress.     Appearance: He is well-developed. He is obese. He is not diaphoretic.   HENT:      Head: Normocephalic and atraumatic.      Right Ear: External ear normal.      Left Ear: External ear normal.      Nose: Nose normal.      Mouth/Throat:      Pharynx: No oropharyngeal exudate.   Eyes:      General: No scleral icterus.        Right eye: No discharge.         Left eye: No discharge.      Conjunctiva/sclera: Conjunctivae normal.      Pupils: Pupils are equal, round, and reactive to light.   Neck:      Thyroid: No thyromegaly.      Vascular: No JVD.      Trachea: No tracheal deviation.   Cardiovascular:      Rate and Rhythm: Normal rate and regular rhythm.      Heart sounds: Normal heart sounds. No murmur heard.    No friction rub. No gallop.   Pulmonary:      Effort: Pulmonary effort is normal. No respiratory distress.      Breath sounds: Normal breath sounds. No stridor. No wheezing or rales.   Abdominal:      General: Bowel sounds are normal. There is no distension.      Palpations: Abdomen is soft. There is no mass.      Tenderness: There is no abdominal tenderness. There is no guarding or rebound.   Musculoskeletal:         General: No tenderness. Normal range of motion.      Cervical back: Normal range of motion and neck supple.   Lymphadenopathy:      Cervical: No cervical adenopathy.   Skin:     General: Skin is warm and dry.      Coloration: Skin is not pale.      Findings: No erythema or rash.   Neurological:      Mental Status: He is alert and oriented to person, place, and time.    Psychiatric:         Behavior: Behavior normal.         Thought Content: Thought content normal.         Judgment: Judgment normal.       EKG:  Atrial fibrillation with slow ventricular response ventricular rate 57 beats per minute.  Assessment:       Problem List Items Addressed This Visit     Dyspnea on exertion    Relevant Orders    CBC Auto Differential    Comprehensive Metabolic Panel    IN OFFICE EKG 12-LEAD (to Muse)    X-Ray Chest PA And Lateral      Other Visit Diagnoses     Dizziness    -  Primary    Relevant Orders    CBC Auto Differential    Comprehensive Metabolic Panel    IN OFFICE EKG 12-LEAD (to Muse)    Constipation, unspecified constipation type        Relevant Orders    Ambulatory referral/consult to Gastroenterology    Nausea        Relevant Orders    Ambulatory referral/consult to Gastroenterology          Plan:       1. CBC, CMP, and EKG now.  2. Chest x-ray now.  3. Encourage increased hydration and use of MiraLax 1 capful daily.  4. Refer to GI for continued evaluation of constipation and nausea and follow-up of GI bleed last year.  5. Return to clinic as needed or proceed to the emergency room if symptoms continue to persist or worsen.

## 2022-04-20 ENCOUNTER — PATIENT MESSAGE (OUTPATIENT)
Dept: INTERNAL MEDICINE | Facility: CLINIC | Age: 72
End: 2022-04-20
Payer: MEDICARE

## 2022-04-20 DIAGNOSIS — D75.1 POLYCYTHEMIA: Primary | ICD-10-CM

## 2022-04-20 NOTE — TELEPHONE ENCOUNTER
Please schedule a hematology referral for further evaluation of the patient's polycythemia.  Thank you.

## 2022-04-21 ENCOUNTER — PATIENT MESSAGE (OUTPATIENT)
Dept: INTERNAL MEDICINE | Facility: CLINIC | Age: 72
End: 2022-04-21
Payer: MEDICARE

## 2022-04-22 DIAGNOSIS — I10 ESSENTIAL HYPERTENSION: ICD-10-CM

## 2022-04-22 RX ORDER — HYDROCHLOROTHIAZIDE 25 MG/1
TABLET ORAL
Qty: 90 TABLET | Refills: 3 | OUTPATIENT
Start: 2022-04-22

## 2022-04-22 NOTE — TELEPHONE ENCOUNTER
No new care gaps identified.  Powered by [employer]. Reference number: [id number].   4/22/2022 4:19:32 AM CDT

## 2022-04-27 ENCOUNTER — PATIENT OUTREACH (OUTPATIENT)
Dept: ADMINISTRATIVE | Facility: OTHER | Age: 72
End: 2022-04-27
Payer: MEDICARE

## 2022-04-28 ENCOUNTER — OFFICE VISIT (OUTPATIENT)
Dept: UROLOGY | Facility: CLINIC | Age: 72
End: 2022-04-28
Attending: UROLOGY
Payer: MEDICARE

## 2022-04-28 VITALS — HEART RATE: 76 BPM | SYSTOLIC BLOOD PRESSURE: 111 MMHG | DIASTOLIC BLOOD PRESSURE: 74 MMHG

## 2022-04-28 DIAGNOSIS — R35.1 NOCTURIA: ICD-10-CM

## 2022-04-28 DIAGNOSIS — N40.0 BENIGN PROSTATIC HYPERPLASIA, UNSPECIFIED WHETHER LOWER URINARY TRACT SYMPTOMS PRESENT: Primary | ICD-10-CM

## 2022-04-28 LAB
BILIRUB SERPL-MCNC: NEGATIVE MG/DL
BLOOD URINE, POC: NEGATIVE
CLARITY, POC UA: CLEAR
COLOR, POC UA: YELLOW
GLUCOSE UR QL STRIP: NORMAL
KETONES UR QL STRIP: NEGATIVE
LEUKOCYTE ESTERASE URINE, POC: ABNORMAL
NITRITE, POC UA: NEGATIVE
PH, POC UA: 5
POC RESIDUAL URINE VOLUME: 0 ML (ref 0–100)
PROTEIN, POC: 30
SPECIFIC GRAVITY, POC UA: 1.02
UROBILINOGEN, POC UA: NORMAL

## 2022-04-28 PROCEDURE — 81002 URINALYSIS NONAUTO W/O SCOPE: CPT | Mod: S$GLB,,, | Performed by: UROLOGY

## 2022-04-28 PROCEDURE — 51798 POCT BLADDER SCAN: ICD-10-PCS | Mod: S$GLB,,, | Performed by: UROLOGY

## 2022-04-28 PROCEDURE — 81002 POCT URINE DIPSTICK WITHOUT MICROSCOPE: ICD-10-PCS | Mod: S$GLB,,, | Performed by: UROLOGY

## 2022-04-28 PROCEDURE — 51798 US URINE CAPACITY MEASURE: CPT | Mod: S$GLB,,, | Performed by: UROLOGY

## 2022-04-28 PROCEDURE — 99214 OFFICE O/P EST MOD 30 MIN: CPT | Mod: S$GLB,,, | Performed by: UROLOGY

## 2022-04-28 PROCEDURE — 99214 PR OFFICE/OUTPT VISIT, EST, LEVL IV, 30-39 MIN: ICD-10-PCS | Mod: S$GLB,,, | Performed by: UROLOGY

## 2022-04-28 RX ORDER — TAMSULOSIN HYDROCHLORIDE 0.4 MG/1
0.4 CAPSULE ORAL DAILY
Qty: 30 CAPSULE | Refills: 11 | Status: SHIPPED | OUTPATIENT
Start: 2022-04-28 | End: 2023-01-17

## 2022-04-28 NOTE — PROGRESS NOTES
Subjective:      Zeenat Mancia is a 71 y.o. male who returns today regarding his BPH.    He is s/p Urolift 6/29/21. No problems post-op.    His preop AUASS was 33/6.    His AUASS today is 19/4 (same as last visit).    He has stopped any of his BPH medications.    He remains very bothered by nocturia 3-4 times per night. He sleeps with CPAP and takes Ambien and melatonin nightly. No significant improvement with ditropan qhs.     The following portions of the patient's history were reviewed and updated as appropriate: allergies, current medications, past family history, past medical history, past social history, past surgical history and problem list.    Review of Systems  A comprehensive multipoint review of systems was negative except as otherwise stated in the HPI.     Objective:   Vitals: There were no vitals taken for this visit.     Physical Exam   General: alert and oriented, no acute distress  Respiratory: Symmetric expansion, non-labored breathing  Neuro: no gross deficits  Psych: normal judgment and insight, normal mood/affect and non-anxious    Bladder Scan PVR: 0cc      Lab Review     Lab Results   Component Value Date    WBC 9.37 04/19/2022    HGB 18.9 (H) 04/19/2022    HCT 58.3 (H) 04/19/2022    HCT 34 (L) 05/31/2021    MCV 93 04/19/2022     04/19/2022     Lab Results   Component Value Date    CREATININE 1.2 04/19/2022    BUN 15 04/19/2022     Lab Results   Component Value Date    PSA 0.44 08/21/2017       Assessment and Plan:   1. Benign non-nodular prostatic hyperplasia with lower urinary tract symptoms  2. Nocturia  -- Doing well s/p Urolift  -- Continue ditropan qhs  -- Will try flomax again - OK to DC if not working  -- Elevate LE prior to bed  -- FU 6 mos

## 2022-05-03 ENCOUNTER — PATIENT MESSAGE (OUTPATIENT)
Dept: PODIATRY | Facility: CLINIC | Age: 72
End: 2022-05-03
Payer: MEDICARE

## 2022-05-04 NOTE — PROGRESS NOTES
PATIENT: Zeenat Mancia  MRN: [id number]  DATE: 5/5/2022    Diagnosis:   1. Polycythemia    2. History of iron deficiency anemia    3. History of smoking    4. JACKELYN (obstructive sleep apnea)    5. Morbid obesity    6. Advance care planning      Chief Complaint: polycythemia    Oncologic History:      Oncologic History     Oncologic Treatment     Pathology       Subjective:    History of Present Illness: Mr. Mancia is a 71 y.o. male who presents for evaluation and management of polycythemia. He is referred by Dr. Ledezma.    - labs on 4/19/22 revealed an elevated hemoglobin at 18.9 g/dL.  - he had previously seen hematology/oncology, most recently on 2/3/20, for iron deficiency anemia.  - today, he is doing okay. He endorses fatigue, dizziness. He denies shortness of breath, chest pain, nausea, vomiting, diarrhea, constipation. He has foot issues for which he sees a podiatrist.  - he has sleep apnea, for which he uses a CPAP.  - he has gained about 20 lbs in the past 1-2 years.    Past medical, surgical, family, and social histories have been reviewed and updated below.    Past Medical History:   Past Medical History:   Diagnosis Date    A-fib     Arthritis     Atrial flutter     Colon polyp     Diverticulosis     Dyslipidemia     GERD (gastroesophageal reflux disease)     on Protonix    Hepatic steatosis     Hyperlipidemia     Hypertension     Irregular heart beat     Multiple gastric ulcers 05/16/2018    gi scope per Dr. Houston    Obesity     Sleep apnea     uses CPAP    Unspecified disorder of kidney and ureter     kidney stones       Past Surgical History:   Past Surgical History:   Procedure Laterality Date    APPENDECTOMY      CATARACT EXTRACTION  2009    OS    CATARACT EXTRACTION W/  INTRAOCULAR LENS IMPLANT Right 6/3/2015    [id number] 14.5d//    COLONOSCOPY N/A 10/10/2017    Procedure: COLONOSCOPY;  Surgeon: Jameson Houston MD;  Location: [facility];  Service: Endoscopy;  Laterality: N/A;  repeat in 5 years for surveillance    COLONOSCOPY N/A 5/16/2018    Procedure: COLONOSCOPY;  Surgeon: Jameson Houston MD;  Location: [facility] ENDO;  Service: Endoscopy;  Laterality: N/A;    COLONOSCOPY N/A 6/2/2021    Procedure: COLONOSCOPY;  Surgeon: Agustin Corona MD;  Location: [facility] (2ND FLR);  Service: Endoscopy;  Laterality: N/A;    CORONARY ANGIOGRAPHY N/A 1/4/2021    Procedure: ANGIOGRAM, CORONARY ARTERY;  Surgeon: Jase Moore MD;  Location: [facility] CATH LAB/EP;  Service: Cardiology;  Laterality: N/A;    CYSTOSCOPY WITH INSERTION OF MINIMALLY INVASIVE IMPLANT TO ENLARGE PROSTATIC URETHRA N/A 6/29/2021    Procedure: TRANSPROSTATIC TISSUE RETRACTION;  Surgeon: Agustin Clay MD;  Location: [facility];  Service: Urology;  Laterality: N/A;    ESOPHAGOGASTRODUODENOSCOPY N/A 7/2/2018    Procedure: EGD (ESOPHAGOGASTRODUODENOSCOPY);  Surgeon: Jameson Houston MD;  Location: [facility];  Service: Endoscopy;  Laterality: N/A;    ESOPHAGOGASTRODUODENOSCOPY N/A 6/2/2021    Procedure: EGD (ESOPHAGOGASTRODUODENOSCOPY);  Surgeon: Agustin Corona MD;  Location: [facility] (2ND FLR);  Service: Endoscopy;  Laterality: N/A;    EYE SURGERY      cataract    LEFT HEART CATHETERIZATION N/A 1/4/2021    Procedure: Left heart cath;  Surgeon: Jase Moore MD;  Location: [facility] CATH LAB/EP;  Service: Cardiology;  Laterality: N/A;    ROBOT-ASSISTED CHOLECYSTECTOMY N/A 2/4/2021    Procedure: ROBOTIC CHOLECYSTECTOMY;  Surgeon: Pal Cheney MD;  Location: [facility] OR;  Service: General;  Laterality: N/A;    TONSILLECTOMY      TRANSESOPHAGEAL ECHOCARDIOGRAPHY N/A 5/14/2021    Procedure: ECHOCARDIOGRAM, TRANSESOPHAGEAL;  Surgeon: Leo Reeves III, MD;  Location: [facility] EP LAB;  Service: Cardiology;  Laterality: N/A;    TREATMENT OF CARDIAC ARRHYTHMIA N/A 4/19/2021    Procedure: Cardioversion or Defibrillation;  Surgeon: Judy Jerome MD;  Location: [facility] CATH LAB/EP;  Service: Cardiology;  Laterality:  N/A;    TREATMENT OF CARDIAC ARRHYTHMIA N/A 5/14/2021    Procedure: CARDIOVERSION;  Surgeon: PEMA Downs MD;  [facility];  Service: Cardiology;  Laterality: N/A;  AF, PHUONG, DCCV, MAC, DM, 3 PREP    UPPER GASTROINTESTINAL ENDOSCOPY  05/16/2018    Dr. Houston       Family History:   Family History   Problem Relation Age of Onset    Arthritis Mother     Heart disease Mother     Blindness Mother         OS due to injury    Coronary artery disease Father     Heart disease Father     Cataracts Father     Amblyopia Neg Hx     Cancer Neg Hx     Diabetes Neg Hx     Glaucoma Neg Hx     Hypertension Neg Hx     Macular degeneration Neg Hx     Retinal detachment Neg Hx     Strabismus Neg Hx     Stroke Neg Hx     Thyroid disease Neg Hx     Colon cancer Neg Hx     Colon polyps Neg Hx     Crohn's disease Neg Hx     Ulcerative colitis Neg Hx     Esophageal cancer Neg Hx     Stomach cancer Neg Hx        Social History:  reports that he has quit smoking. He started smoking about 24 years ago. He has never used smokeless tobacco. He reports current alcohol use. He reports that he does not use drugs.    Allergies:  Review of patient's allergies indicates:   Allergen Reactions    Lipitor [atorvastatin] Itching       Medications:  Current Outpatient Medications   Medication Sig Dispense Refill    amLODIPine (NORVASC) 5 MG tablet TAKE 1 TABLET(5 MG) BY MOUTH EVERY DAY 90 tablet 1    apixaban (ELIQUIS) 5 mg Tab Take 1 tablet (5 mg total) by mouth 2 (two) times daily. 180 tablet 3    atenoloL (TENORMIN) 50 MG tablet Take 1 tablet (50 mg total) by mouth once daily. 90 tablet 3    docusate sodium (COLACE) 100 MG capsule Take 100 mg by mouth 2 (two) times daily.      ferrous sulfate (FEOSOL) 325 mg (65 mg iron) Tab tablet Take 325 mg by mouth daily with breakfast.      flecainide (TAMBOCOR) 100 MG Tab Take 100 mg by mouth 2 (two) times daily.      gabapentin (NEURONTIN) 100 MG capsule Take 2  capsules (200 mg total) by mouth 3 (three) times daily. (Patient taking differently: Take 400 mg by mouth 3 (three) times daily.) 180 capsule 11    gabapentin (NEURONTIN) 300 MG capsule Take 1 capsule (300 mg total) by mouth 3 (three) times daily. (Patient not taking: No sig reported) 90 capsule 11    hydroCHLOROthiazide (HYDRODIURIL) 25 MG tablet Take 1 tablet (25 mg total) by mouth once daily. 90 tablet 3    LIDOcaine HCL 2% (XYLOCAINE) 2 % jelly Apply topically as needed. Apply topically once nightly to affected part of foot/feet. (Patient not taking: Reported on 4/28/2022) 30 mL 2    melatonin 10 mg Tab Take 10 mg by mouth every evening.      methocarbamoL (ROBAXIN) 750 MG Tab Take 1 tablet (750 mg total) by mouth 3 (three) times daily. (Patient not taking: No sig reported) 21 tablet 0    multivit-min/FA/lycopen/lutein (CENTRUM SILVER MEN ORAL) Take by mouth once daily.      oxybutynin (DITROPAN) 5 MG Tab Take 1 tablet (5 mg total) by mouth every evening. 30 tablet 11    pantoprazole (PROTONIX) 40 MG tablet TAKE 1 TABLET(40 MG) BY MOUTH EVERY DAY 90 tablet 3    tamsulosin (FLOMAX) 0.4 mg Cap Take 1 capsule (0.4 mg total) by mouth once daily. 30 capsule 11    valsartan (DIOVAN) 320 MG tablet TAKE 1 TABLET(320 MG) BY MOUTH EVERY DAY 90 tablet 3    zolpidem (AMBIEN) 5 MG Tab Take 1 tablet (5 mg total) by mouth nightly as needed. 30 tablet 5     No current facility-administered medications for this visit.       Review of Systems   Constitutional: Positive for fatigue.   HENT: Negative for sore throat.    Eyes: Negative for visual disturbance.   Respiratory: Negative for shortness of breath.    Cardiovascular: Negative for chest pain.   Gastrointestinal: Negative for abdominal pain.   Genitourinary: Negative for dysuria.   Musculoskeletal: Negative for back pain.        Foot issues   Skin: Negative for rash.   Neurological: Positive for dizziness. Negative for headaches.   Hematological: Negative for  "adenopathy.   Psychiatric/Behavioral: The patient is not nervous/anxious.        ECOG Performance Status:   ECOG SCORE    1 - Restricted in strenuous activity-ambulatory and able to carry out work of a light nature         Objective:      Vitals:   Vitals:    05/05/22 1357   BP: 121/80   BP Location: Right arm   Patient Position: Sitting   BP Method: Large (Automatic)   Pulse: 74   Resp: 18   Temp: 97.8 °F (36.6 °C)   TempSrc: Oral   SpO2: 96%   Weight: (!) 148.9 kg (328 lb 4.2 oz)   Height: 6' 2" (1.88 m)     BMI: Body mass index is 42.15 kg/m².    Physical Exam  Vitals and nursing note reviewed.   Constitutional:       Appearance: He is well-developed.   HENT:      Head: Normocephalic and atraumatic.   Eyes:      Pupils: Pupils are equal, round, and reactive to light.   Cardiovascular:      Rate and Rhythm: Normal rate and regular rhythm.   Pulmonary:      Effort: Pulmonary effort is normal.      Breath sounds: Normal breath sounds.   Abdominal:      General: Bowel sounds are normal.      Palpations: Abdomen is soft.   Musculoskeletal:         General: Normal range of motion.      Cervical back: Normal range of motion and neck supple.   Skin:     General: Skin is warm and dry.   Neurological:      Mental Status: He is alert and oriented to person, place, and time.   Psychiatric:         Behavior: Behavior normal.         Thought Content: Thought content normal.         Judgment: Judgment normal.       Laboratory Data:  Labs have been reviewed.    Lab Results   Component Value Date    WBC 9.37 04/19/2022    HGB 18.9 (H) 04/19/2022    HCT 58.3 (H) 04/19/2022    MCV 93 04/19/2022     04/19/2022           Imaging:    Assessment:       1. Polycythemia    2. History of iron deficiency anemia    3. History of smoking    4. JACKELYN (obstructive sleep apnea)    5. Morbid obesity    6. Advance care planning           Plan:     1. Polycythemia  - I have reviewed his chart  - labs on 4/19/22 revealed an elevated hemoglobin " at 18.9 g/dL.  - he had previously seen hematology/oncology, most recently on 2/3/20, for iron deficiency anemia.  - he has sleep apnea, for which he uses a CPAP. Perhaps he has secondary polycythemia related to sleep apnea.  - he has a distant history of smoking, so maybe he has secondary polycythemia related to that.  - will perform a workup to rule in/out primary/secondary causes of polycythemia.  - check CBC, erythropoietin, carboxyhemoglobin, ferritin, iron/tibc, JAK2 with reflex testing.  - I will contact him with results of testing. If needed, I will schedule a follow-up appointment.    2. History of smoking  - he used to smoke 2-3 packs per day, but he quit about 35 years ago.    3. Morbid obesity  - Body mass index is 42.15 kg/m².   - he has gained about 20 lbs in the past 1-2 years.  - obesity-hypoventilation syndrome can be a cause of polycythemia  - I encouraged weight loss    4. Advance Care Planning     Date: 05/05/2022    Power of   I initiated the process of advance care planning today and explained the importance of this process to the patient.  I introduced the concept of advance directives to the patient, as well. Then the patient received detailed information about the importance of designating a Health Care Power of  (HCPOA). He was also instructed to communicate with this person about their wishes for future healthcare, should he become sick and lose decision-making capacity. The patient has not previously appointed a HCPOA. After our discussion, the patient has decided to complete a HCPOA and has appointed his wife Kerri Mancia ([phone]) and children Deion Mancia ([phone]) and Jase Mancia ([phone]). I spent a total time of 16 minutes discussing this issue with the patient.        - I will contact him with results of testing. If needed, I will schedule a follow-up appointment.      Bala Alan M.D.  Hematology/Oncology  [facility] [place] [address]  [facility], [address]  [place] [address]  Phone: [phone]  Fax: [phone]

## 2022-05-05 ENCOUNTER — OFFICE VISIT (OUTPATIENT)
Dept: HEMATOLOGY/ONCOLOGY | Facility: CLINIC | Age: 72
End: 2022-05-05
Payer: MEDICARE

## 2022-05-05 ENCOUNTER — LAB VISIT (OUTPATIENT)
Dept: LAB | Facility: HOSPITAL | Age: 72
End: 2022-05-05
Attending: INTERNAL MEDICINE
Payer: MEDICARE

## 2022-05-05 VITALS
RESPIRATION RATE: 18 BRPM | HEART RATE: 74 BPM | SYSTOLIC BLOOD PRESSURE: 121 MMHG | OXYGEN SATURATION: 96 % | HEIGHT: 74 IN | TEMPERATURE: 98 F | BODY MASS INDEX: 40.43 KG/M2 | WEIGHT: 315 LBS | DIASTOLIC BLOOD PRESSURE: 80 MMHG

## 2022-05-05 DIAGNOSIS — G47.33 OSA (OBSTRUCTIVE SLEEP APNEA): ICD-10-CM

## 2022-05-05 DIAGNOSIS — Z71.89 ADVANCE CARE PLANNING: ICD-10-CM

## 2022-05-05 DIAGNOSIS — Z86.2 HISTORY OF IRON DEFICIENCY ANEMIA: ICD-10-CM

## 2022-05-05 DIAGNOSIS — D75.1 POLYCYTHEMIA: Primary | ICD-10-CM

## 2022-05-05 DIAGNOSIS — E66.01 MORBID OBESITY: ICD-10-CM

## 2022-05-05 DIAGNOSIS — Z87.891 HISTORY OF SMOKING: ICD-10-CM

## 2022-05-05 DIAGNOSIS — D75.1 POLYCYTHEMIA: ICD-10-CM

## 2022-05-05 LAB
BASOPHILS # BLD AUTO: 0.03 K/UL (ref 0–0.2)
BASOPHILS NFR BLD: 0.4 % (ref 0–1.9)
DIFFERENTIAL METHOD: ABNORMAL
EOSINOPHIL # BLD AUTO: 0.3 K/UL (ref 0–0.5)
EOSINOPHIL NFR BLD: 4.1 % (ref 0–8)
ERYTHROCYTE [DISTWIDTH] IN BLOOD BY AUTOMATED COUNT: 13.4 % (ref 11.5–14.5)
FERRITIN SERPL-MCNC: 151 NG/ML (ref 20–300)
HCT VFR BLD AUTO: 56.1 % (ref 40–54)
HGB BLD-MCNC: 18.8 G/DL (ref 14–18)
IMM GRANULOCYTES # BLD AUTO: 0.02 K/UL (ref 0–0.04)
IMM GRANULOCYTES NFR BLD AUTO: 0.3 % (ref 0–0.5)
IRON SERPL-MCNC: 73 UG/DL (ref 45–160)
LYMPHOCYTES # BLD AUTO: 1.4 K/UL (ref 1–4.8)
LYMPHOCYTES NFR BLD: 18 % (ref 18–48)
MCH RBC QN AUTO: 30.3 PG (ref 27–31)
MCHC RBC AUTO-ENTMCNC: 33.5 G/DL (ref 32–36)
MCV RBC AUTO: 91 FL (ref 82–98)
MONOCYTES # BLD AUTO: 0.8 K/UL (ref 0.3–1)
MONOCYTES NFR BLD: 10.7 % (ref 4–15)
NEUTROPHILS # BLD AUTO: 5 K/UL (ref 1.8–7.7)
NEUTROPHILS NFR BLD: 66.5 % (ref 38–73)
NRBC BLD-RTO: 0 /100 WBC
PLATELET # BLD AUTO: 196 K/UL (ref 150–450)
PMV BLD AUTO: 9.8 FL (ref 9.2–12.9)
RBC # BLD AUTO: 6.2 M/UL (ref 4.6–6.2)
SATURATED IRON: 21 % (ref 20–50)
TOTAL IRON BINDING CAPACITY: 354 UG/DL (ref 250–450)
TRANSFERRIN SERPL-MCNC: 239 MG/DL (ref 200–375)
WBC # BLD AUTO: 7.56 K/UL (ref 3.9–12.7)

## 2022-05-05 PROCEDURE — 81270 JAK2 GENE: CPT | Performed by: INTERNAL MEDICINE

## 2022-05-05 PROCEDURE — 82375 ASSAY CARBOXYHB QUANT: CPT | Performed by: INTERNAL MEDICINE

## 2022-05-05 PROCEDURE — 99497 PR ADVNCD CARE PLAN 30 MIN: ICD-10-PCS | Mod: S$GLB,,, | Performed by: INTERNAL MEDICINE

## 2022-05-05 PROCEDURE — 99214 OFFICE O/P EST MOD 30 MIN: CPT | Mod: S$GLB,,, | Performed by: INTERNAL MEDICINE

## 2022-05-05 PROCEDURE — 85025 COMPLETE CBC W/AUTO DIFF WBC: CPT | Performed by: INTERNAL MEDICINE

## 2022-05-05 PROCEDURE — 99497 ADVNCD CARE PLAN 30 MIN: CPT | Mod: S$GLB,,, | Performed by: INTERNAL MEDICINE

## 2022-05-05 PROCEDURE — 99214 PR OFFICE/OUTPT VISIT, EST, LEVL IV, 30-39 MIN: ICD-10-PCS | Mod: S$GLB,,, | Performed by: INTERNAL MEDICINE

## 2022-05-05 PROCEDURE — 81339 MPL GENE SEQ ALYS EXON 10: CPT | Performed by: INTERNAL MEDICINE

## 2022-05-05 PROCEDURE — 84466 ASSAY OF TRANSFERRIN: CPT | Performed by: INTERNAL MEDICINE

## 2022-05-05 PROCEDURE — 99999 PR PBB SHADOW E&M-EST. PATIENT-LVL V: ICD-10-PCS | Mod: PBBFAC,,, | Performed by: INTERNAL MEDICINE

## 2022-05-05 PROCEDURE — 81219 CALR GENE COM VARIANTS: CPT | Performed by: INTERNAL MEDICINE

## 2022-05-05 PROCEDURE — 82728 ASSAY OF FERRITIN: CPT | Performed by: INTERNAL MEDICINE

## 2022-05-05 PROCEDURE — 99999 PR PBB SHADOW E&M-EST. PATIENT-LVL V: CPT | Mod: PBBFAC,,, | Performed by: INTERNAL MEDICINE

## 2022-05-06 ENCOUNTER — PATIENT MESSAGE (OUTPATIENT)
Dept: NEUROLOGY | Facility: CLINIC | Age: 72
End: 2022-05-06
Payer: MEDICARE

## 2022-05-09 LAB — COHGB MFR BLD: 2 %

## 2022-05-13 ENCOUNTER — TELEPHONE (OUTPATIENT)
Dept: HEMATOLOGY/ONCOLOGY | Facility: CLINIC | Age: 72
End: 2022-05-13
Payer: MEDICARE

## 2022-05-13 LAB
MPNR  FINAL DIAGNOSIS: NORMAL
MPNR  SPECIMEN TYPE: NORMAL
MPNR RESULT: NORMAL

## 2022-05-13 NOTE — TELEPHONE ENCOUNTER
I called and reviewed his labs. JAK2 with reflex testing was negative. I suspect his polycythemia is secondary to his obstructive sleep apnea. No further workup at this time.    Bala Alan M.D.  Hematology/Oncology  [facility] [address], [address]  [place] [address]  Phone: [phone]  Fax: [phone]

## 2022-05-17 ENCOUNTER — OFFICE VISIT (OUTPATIENT)
Dept: PODIATRY | Facility: CLINIC | Age: 72
End: 2022-05-17
Payer: MEDICARE

## 2022-05-17 VITALS
HEIGHT: 74 IN | SYSTOLIC BLOOD PRESSURE: 139 MMHG | HEART RATE: 56 BPM | BODY MASS INDEX: 40.43 KG/M2 | DIASTOLIC BLOOD PRESSURE: 76 MMHG | WEIGHT: 315 LBS

## 2022-05-17 DIAGNOSIS — G58.9 NERVE ENTRAPMENT SYNDROME: Primary | ICD-10-CM

## 2022-05-17 PROCEDURE — 99214 PR OFFICE/OUTPT VISIT, EST, LEVL IV, 30-39 MIN: ICD-10-PCS | Mod: S$GLB,,, | Performed by: PODIATRIST

## 2022-05-17 PROCEDURE — 99999 PR PBB SHADOW E&M-EST. PATIENT-LVL V: CPT | Mod: PBBFAC,,, | Performed by: PODIATRIST

## 2022-05-17 PROCEDURE — 99214 OFFICE O/P EST MOD 30 MIN: CPT | Mod: S$GLB,,, | Performed by: PODIATRIST

## 2022-05-17 PROCEDURE — 99999 PR PBB SHADOW E&M-EST. PATIENT-LVL V: ICD-10-PCS | Mod: PBBFAC,,, | Performed by: PODIATRIST

## 2022-05-21 NOTE — PROGRESS NOTES
Subjective:      Patient ID: Zeenat Mancia is a 72 y.o. male.    Chief Complaint: Foot Pain (VERNA FOOT PAIN)    Zeenat is a 72 y.o. male who presents to the podiatry clinic  with complaint of  bilateral foot pain. Onset of the symptoms was several months ago. Precipitating event: none known. Current symptoms include: Burning tingling and numbness. Aggravating factors: inactivity. Symptoms have gradually worsened.   He did very well with the diagnostic nerve blocks.  He would like to discuss possible surgical intervention.    Review of Systems   Constitutional: Negative for chills, decreased appetite, fever and malaise/fatigue.   HENT: Negative for congestion, hearing loss, nosebleeds and tinnitus.    Eyes: Negative for double vision, pain, photophobia and visual disturbance.   Cardiovascular: Negative for chest pain, claudication, cyanosis and leg swelling.   Respiratory: Negative for cough, hemoptysis, shortness of breath and wheezing.    Endocrine: Negative for cold intolerance and heat intolerance.   Hematologic/Lymphatic: Negative for adenopathy and bleeding problem.   Skin: Negative for color change, dry skin, itching, nail changes and suspicious lesions.   Musculoskeletal: Positive for joint pain and myalgias. Negative for arthritis and stiffness.   Gastrointestinal: Negative for abdominal pain, jaundice, nausea and vomiting.   Genitourinary: Negative for dysuria, frequency and hematuria.   Neurological: Positive for numbness, paresthesias and sensory change. Negative for difficulty with concentration and loss of balance.   Psychiatric/Behavioral: Negative for altered mental status, hallucinations and suicidal ideas. The patient is not nervous/anxious.    Allergic/Immunologic: Negative for environmental allergies and persistent infections.           Objective:      Physical Exam  Constitutional:       Appearance: He is well-developed.   HENT:      Head: Normocephalic and atraumatic.   Cardiovascular:       Pulses:           Dorsalis pedis pulses are 2+ on the right side and 2+ on the left side.        Posterior tibial pulses are 2+ on the right side and 2+ on the left side.   Pulmonary:      Effort: Pulmonary effort is normal.   Musculoskeletal:      Right foot: Normal range of motion. No deformity.      Left foot: Normal range of motion. No deformity.      Comments: Inspection and palpation of the muscles joints and bones of both lower extremities reveal that muscle strength for the anterior, lateral, and posterior muscle groups and intrinsic muscle groups of the foot are all 5 over 5 symmetrical. Ankle, subtalar, midtarsal, and digital joint range of motion  are within normal limits, nonpainful, without crepitus or effusion. Patient exhibits a normal angle and base of gait. Palpation of the tendons reveal no defects.     Feet:      Right foot:      Skin integrity: No skin breakdown or erythema.      Left foot:      Skin integrity: No skin breakdown or erythema.   Skin:     General: Skin is warm and dry.      Nails: There is no clubbing.      Comments: Skin turgor is normal bilaterally. Skin texture is well hydrated to both lower extremities. No lesions or rashes or wounds appreciated bilaterally. Nail plates 1 through 5 bilaterally are within normal limits for length and thickness. No nail clubbing or incurvation noted.   Neurological:      Mental Status: He is alert and oriented to person, place, and time.      Deep Tendon Reflexes:      Reflex Scores:       Patellar reflexes are 2+ on the right side and 2+ on the left side.       Achilles reflexes are 2+ on the right side and 2+ on the left side.     Comments: Sharp, dull, light touch, vibratory, and proprioceptive sensation are intact bilaterally. Deep tendon reflexes to patellar and Achilles tendon are symmetrical, 2/4 bilaterally. No ankle clonus or Babinski reflexes noted bilaterally. Coordination is normal to both feet and lower extremities.    Positive  tarsal tunnel Tinel sign and tenderness with palpation overlying bilateral deep peroneal nerves.   Psychiatric:         Behavior: Behavior normal.               Assessment:       Encounter Diagnosis   Name Primary?    Nerve entrapment syndrome Yes         Plan:       Zeenat was seen today for foot pain.    Diagnoses and all orders for this visit:    Nerve entrapment syndrome  -     Ambulatory referral/consult to Physical Medicine Rehab; Future  -     Case Request Operating Room: NEUROPLASTY  -     Case Request Operating Room: NEUROPLASTY x 3      I counseled the patient on his conditions, their implications and medical management.      The nature of the condition, options for management, as well as potential risks and complications were discussed in detail with patient. Patient was amenable to my recommendations and left my office fully informed and will follow up as instructed or sooner if necessary.      Discussed options for peripheral neuropathy/nerve entrapment syndrome including nerve block therapy, surgical nerve entrapment decompression procedures, and various vitamins and supplementation available shown to improve nerve function.    The patient has decided to forego any further conservative treatment and opted for surgical intervention.  Alternative treatments, and benefits of surgery were discussed with the patient.  Risks and complications, including but not limited to: pain, swelling, numbness, infection, failure to achieve the stated goals, recurrence of problem, nerve and blood vessel damage, scar tissue formation, further need of surgery, loss of limb or life, was discussed in detail with the patient.  All questions asked were answered, and written informed consent was sign and received. The patient will undergo tarsal tunnel release left lower extremity.

## 2022-05-27 ENCOUNTER — OFFICE VISIT (OUTPATIENT)
Dept: PHYSICAL MEDICINE AND REHAB | Facility: CLINIC | Age: 72
End: 2022-05-27
Payer: MEDICARE

## 2022-05-27 ENCOUNTER — PATIENT MESSAGE (OUTPATIENT)
Dept: PHYSICAL MEDICINE AND REHAB | Facility: CLINIC | Age: 72
End: 2022-05-27

## 2022-05-27 VITALS
WEIGHT: 315 LBS | HEIGHT: 74 IN | SYSTOLIC BLOOD PRESSURE: 116 MMHG | HEART RATE: 61 BPM | DIASTOLIC BLOOD PRESSURE: 81 MMHG | BODY MASS INDEX: 40.43 KG/M2

## 2022-05-27 DIAGNOSIS — G58.9 NERVE ENTRAPMENT SYNDROME: ICD-10-CM

## 2022-05-27 DIAGNOSIS — M79.605 CHRONIC PAIN OF BOTH LOWER EXTREMITIES: Primary | ICD-10-CM

## 2022-05-27 DIAGNOSIS — G89.29 CHRONIC PAIN OF BOTH LOWER EXTREMITIES: Primary | ICD-10-CM

## 2022-05-27 DIAGNOSIS — M79.604 CHRONIC PAIN OF BOTH LOWER EXTREMITIES: Primary | ICD-10-CM

## 2022-05-27 PROCEDURE — 99999 PR PBB SHADOW E&M-EST. PATIENT-LVL III: ICD-10-PCS | Mod: PBBFAC,,, | Performed by: PHYSICAL MEDICINE & REHABILITATION

## 2022-05-27 PROCEDURE — 99203 PR OFFICE/OUTPT VISIT, NEW, LEVL III, 30-44 MIN: ICD-10-PCS | Mod: S$GLB,,, | Performed by: PHYSICAL MEDICINE & REHABILITATION

## 2022-05-27 PROCEDURE — 99203 OFFICE O/P NEW LOW 30 MIN: CPT | Mod: S$GLB,,, | Performed by: PHYSICAL MEDICINE & REHABILITATION

## 2022-05-27 PROCEDURE — 99999 PR PBB SHADOW E&M-EST. PATIENT-LVL III: CPT | Mod: PBBFAC,,, | Performed by: PHYSICAL MEDICINE & REHABILITATION

## 2022-05-27 RX ORDER — GABAPENTIN 600 MG/1
600 TABLET ORAL 3 TIMES DAILY
Qty: 90 TABLET | Refills: 11 | Status: SHIPPED | OUTPATIENT
Start: 2022-05-27 | End: 2023-06-01 | Stop reason: ALTCHOICE

## 2022-05-30 ENCOUNTER — PATIENT MESSAGE (OUTPATIENT)
Dept: PAIN MEDICINE | Facility: CLINIC | Age: 72
End: 2022-05-30
Payer: MEDICARE

## 2022-05-30 ENCOUNTER — TELEPHONE (OUTPATIENT)
Dept: PHYSICAL MEDICINE AND REHAB | Facility: CLINIC | Age: 72
End: 2022-05-30
Payer: MEDICARE

## 2022-06-03 ENCOUNTER — PATIENT MESSAGE (OUTPATIENT)
Dept: PHARMACY | Facility: CLINIC | Age: 72
End: 2022-06-03
Payer: MEDICARE

## 2022-06-03 ENCOUNTER — TELEPHONE (OUTPATIENT)
Dept: ENDOSCOPY | Facility: HOSPITAL | Age: 72
End: 2022-06-03
Payer: MEDICARE

## 2022-06-03 ENCOUNTER — OFFICE VISIT (OUTPATIENT)
Dept: GASTROENTEROLOGY | Facility: CLINIC | Age: 72
End: 2022-06-03
Payer: MEDICARE

## 2022-06-03 VITALS
HEIGHT: 74 IN | WEIGHT: 315 LBS | BODY MASS INDEX: 40.43 KG/M2 | HEART RATE: 53 BPM | SYSTOLIC BLOOD PRESSURE: 127 MMHG | DIASTOLIC BLOOD PRESSURE: 86 MMHG

## 2022-06-03 DIAGNOSIS — K59.00 CONSTIPATION, UNSPECIFIED CONSTIPATION TYPE: ICD-10-CM

## 2022-06-03 DIAGNOSIS — K62.5 RECTAL BLEEDING: Primary | ICD-10-CM

## 2022-06-03 DIAGNOSIS — R11.0 NAUSEA: ICD-10-CM

## 2022-06-03 DIAGNOSIS — K63.5 POLYP OF COLON, UNSPECIFIED PART OF COLON, UNSPECIFIED TYPE: ICD-10-CM

## 2022-06-03 DIAGNOSIS — K64.9 HEMORRHOIDS, UNSPECIFIED HEMORRHOID TYPE: ICD-10-CM

## 2022-06-03 PROCEDURE — 99214 PR OFFICE/OUTPT VISIT, EST, LEVL IV, 30-39 MIN: ICD-10-PCS | Mod: S$GLB,,, | Performed by: STUDENT IN AN ORGANIZED HEALTH CARE EDUCATION/TRAINING PROGRAM

## 2022-06-03 PROCEDURE — 99214 OFFICE O/P EST MOD 30 MIN: CPT | Mod: S$GLB,,, | Performed by: STUDENT IN AN ORGANIZED HEALTH CARE EDUCATION/TRAINING PROGRAM

## 2022-06-03 PROCEDURE — 99999 PR PBB SHADOW E&M-EST. PATIENT-LVL V: CPT | Mod: PBBFAC,,, | Performed by: STUDENT IN AN ORGANIZED HEALTH CARE EDUCATION/TRAINING PROGRAM

## 2022-06-03 PROCEDURE — 99999 PR PBB SHADOW E&M-EST. PATIENT-LVL V: ICD-10-PCS | Mod: PBBFAC,,, | Performed by: STUDENT IN AN ORGANIZED HEALTH CARE EDUCATION/TRAINING PROGRAM

## 2022-06-03 NOTE — TELEPHONE ENCOUNTER
Patient needs to schedule colonoscopy. Patient is currently on eliquis. Per endoscopy protocol, eliquis needs to be held x2 days prior to procedure. Okay to hold? Please advise. Thanks.

## 2022-06-03 NOTE — PROGRESS NOTES
[facility] Consultation Note    Reason for Consult:  The primary encounter diagnosis was Rectal bleeding. Diagnoses of Constipation, unspecified constipation type, Nausea, Polyp of colon, unspecified part of colon, unspecified type, and Hemorrhoids, unspecified hemorrhoid type were also pertinent to this visit.    PCP:   Rikki Ledezma   [address] [facility] [id number]    Referring MD:  Rikki Ledezma Md  [address] [place] [address]    HPI:  This is a 72 y.o. male here for evaluation of constipation.    The patient notes that he had been regular with daily bowel movements.  However, at present, he can go 2 days without a bowel movement.  He finds his bowel habits are not regular.  He is taking metamucil daily and a stool softener (colace).  He does not think that they have corrected the problem.     He also reports bleeding with bowel movements.  This occurs intermittently, but is a significant quantity of blood.  It is bright red and he notes it in the stool and with wiping.  It does not occur with all bowel movements.  He notes it dripping from his bottom when he stands up.      Colonoscopy in June 2021 was limited due to prep quality.  He had multiple polyp and hemorrhoids.  He was given a year follow-up.    He is taking eliquis for atrial fibrillation.     He was to see a CRS provider, but this has not been scheduled.          Abdominal pain - no  Reflux - no  Dysphagia - no   Bowel habits - normal  GI bleeding - none  NSAID usage - none        ROS:  Constitutional: No fevers, chills, No weight loss  ENT: No allergies  CV: No chest pain  Pulm: No cough, No shortness of breath  Ophtho: No vision changes  GI: see HPI  Derm: No rash  Heme: No lymphadenopathy, No bruising  MSK: No arthritis  : No dysuria, No hematuria  Endo: No hot or cold intolerance  Neuro: No syncope, No seizure  Psych: No anxiety, No depression    Medical History:  has a past medical  history of A-fib, Arthritis, Atrial flutter, Colon polyp, Diverticulosis, Dyslipidemia, GERD (gastroesophageal reflux disease), Hepatic steatosis, Hyperlipidemia, Hypertension, Irregular heart beat, Multiple gastric ulcers (05/16/2018), Obesity, Sleep apnea, and Unspecified disorder of kidney and ureter.    Surgical History:  has a past surgical history that includes Eye surgery; Tonsillectomy; Appendectomy; Cataract extraction (2009); Cataract extraction w/  intraocular lens implant (Right, 6/3/2015); Upper gastrointestinal endoscopy (05/16/2018); Colonoscopy (N/A, 10/10/2017); Colonoscopy (N/A, 5/16/2018); Esophagogastroduodenoscopy (N/A, 7/2/2018); Left heart catheterization (N/A, 1/4/2021); Coronary angiography (N/A, 1/4/2021); Robot-assisted cholecystectomy (N/A, 2/4/2021); Treatment of cardiac arrhythmia (N/A, 4/19/2021); Treatment of cardiac arrhythmia (N/A, 5/14/2021); Transesophageal echocardiography (N/A, 5/14/2021); Esophagogastroduodenoscopy (N/A, 6/2/2021); Colonoscopy (N/A, 6/2/2021); and Cystoscopy with insertion of minimally invasive implant to enlarge prostatic urethra (N/A, 6/29/2021).    Family History: family history includes Arthritis in his mother; Blindness in his mother; Cataracts in his father; Coronary artery disease in his father; Heart disease in his father and mother..     Social History:  reports that he has quit smoking. He started smoking about 24 years ago. He has never used smokeless tobacco. He reports current alcohol use. He reports that he does not use drugs.    Review of patient's allergies indicates:   Allergen Reactions    Lipitor [atorvastatin] Itching       Current Outpatient Rx   Medication Sig Dispense Refill    amLODIPine (NORVASC) 5 MG tablet TAKE 1 TABLET(5 MG) BY MOUTH EVERY DAY 90 tablet 1    atenoloL (TENORMIN) 50 MG tablet Take 1 tablet (50 mg total) by mouth once daily. 90 tablet 3    docusate sodium (COLACE) 100 MG capsule Take 100 mg by mouth 2 (two) times  "daily.      ELIQUIS 5 mg Tab TAKE 1 TABLET(5 MG) BY MOUTH TWICE DAILY 180 tablet 3    gabapentin (NEURONTIN) 600 MG tablet Take 1 tablet (600 mg total) by mouth 3 (three) times daily. 90 tablet 11    hydroCHLOROthiazide (HYDRODIURIL) 25 MG tablet Take 1 tablet (25 mg total) by mouth once daily. 90 tablet 3    LIDOcaine HCL 2% (XYLOCAINE) 2 % jelly Apply topically as needed. Apply topically once nightly to affected part of foot/feet. 30 mL 2    melatonin 10 mg Tab Take 10 mg by mouth every evening.      methocarbamoL (ROBAXIN) 750 MG Tab Take 1 tablet (750 mg total) by mouth 3 (three) times daily. 21 tablet 0    multivit-min/FA/lycopen/lutein (CENTRUM SILVER MEN ORAL) Take by mouth once daily.      oxybutynin (DITROPAN) 5 MG Tab Take 1 tablet (5 mg total) by mouth every evening. 30 tablet 11    pantoprazole (PROTONIX) 40 MG tablet TAKE 1 TABLET(40 MG) BY MOUTH EVERY DAY 90 tablet 3    tamsulosin (FLOMAX) 0.4 mg Cap Take 1 capsule (0.4 mg total) by mouth once daily. 30 capsule 11    valsartan (DIOVAN) 320 MG tablet TAKE 1 TABLET(320 MG) BY MOUTH EVERY DAY 90 tablet 3    zolpidem (AMBIEN) 5 MG Tab TAKE 1 TABLET(5 MG) BY MOUTH EVERY NIGHT AS NEEDED 30 tablet 3    gabapentin (NEURONTIN) 100 MG capsule Take 2 capsules (200 mg total) by mouth 3 (three) times daily. (Patient taking differently: Take 400 mg by mouth 3 (three) times daily.) 180 capsule 11    gabapentin (NEURONTIN) 300 MG capsule Take 1 capsule (300 mg total) by mouth 3 (three) times daily. (Patient not taking: Reported on 6/3/2022) 90 capsule 11    linaCLOtide (LINZESS) 145 mcg Cap capsule Take 1 capsule (145 mcg total) by mouth before breakfast. 30 capsule 3       Objective Findings:    Vital Signs:  /86   Pulse (!) 53   Ht 6' 2" (1.88 m)   Wt (!) 149.1 kg (328 lb 11.3 oz)   BMI 42.20 kg/m²   Body mass index is 42.2 kg/m².    Physical Exam:  General Appearance: Well appearing in no acute distress  Head:   Normocephalic, without " obvious abnormality  Eyes:    No scleral icterus, EOMI  ENT: Neck supple, Lips, mucosa, and tongue normal; teeth and gums normal  Lungs: CTA bilaterally in anterior and posterior fields, no wheezes, no crackles.  Heart:  Regular rate and rhythm, S1, S2 normal, no murmurs heard  Abdomen: Soft, non tender, non distended with positive bowel sounds in all four quadrants. No hepatosplenomegaly, ascites, or mass  Extremities: 2+ pulses, no clubbing, cyanosis or edema  Skin: No rash  Neurologic: CN II-XII intact      Labs:  Lab Results   Component Value Date    WBC 7.56 05/05/2022    HGB 18.8 (H) 05/05/2022    HCT 56.1 (H) 05/05/2022     05/05/2022    CHOL 133 09/17/2021    TRIG 143 09/17/2021    HDL 36 (L) 09/17/2021    ALT 16 04/19/2022    AST 15 04/19/2022     04/19/2022    K 4.4 04/19/2022    CL 98 04/19/2022    CREATININE 1.2 04/19/2022    BUN 15 04/19/2022    CO2 2 05/05/2022    TSH 1.363 09/17/2021    PSA 0.44 08/21/2017    INR 1.2 05/11/2021    HGBA1C 5.8 (H) 01/15/2021         Endoscopy:    EGD/Colonoscopy 6/2/21  Impression:            - Preparation of the colon was fair.                          - Non-thrombosed external hemorrhoids found on                          perianal exam.                          - Internal hemorrhoids.                          - Diverticulosis in the sigmoid colon and in the                          descending colon.                          - One 12 mm polyp in the transverse colon, removed                          with a hot snare. Resected and retrieved.                          - Two 5 to 6 mm polyps at the hepatic flexure and                          in the cecum, removed with a cold snare. Resected                          and retrieved.                          - One 2 mm polyp in the cecum, removed with a                          jumbo cold forceps. Resected and retrieved.     Findings:        The esophagus was normal.        The Z-line was regular and was found 44  cm from the incisors.        The cardia and gastric fundus were normal on retroflexion.        The entire examined stomach was normal.        The examined duodenum was normal.     Assessment:  1. Rectal bleeding    2. Constipation, unspecified constipation type    3. Nausea    4. Polyp of colon, unspecified part of colon, unspecified type    5. Hemorrhoids, unspecified hemorrhoid type         In brief, the patient is a 72 year the GI clinic, constipation, prior colon polyps.    The patient reports a longstanding history of constipation and rectal bleeding.  He had a colonoscopy done last year which was notable for multiple polyps and poor prep.  His colonoscopy was done for hematochezia and he had fairly large hemorrhoids.  His hemorrhoids were thought to be sources bleeding he is referred to Colorectal surgery.  He has unfortunately not followed up with them.    I will help the patient schedule an appointment with Colorectal surgery for hemorrhoidal banding.  He is scheduled for Tuesday June 7th I have advised him to stop his Eliquis 48 hours prior to his appointment for potential banding.    Additionally, regarding his constipation he does not seem to be responding to fiber supplementation and Colace.  I will transition him to Linzess 145 mcg daily.    He is due for his surveillance colonoscopy and I have placed the order today.  He will meet with our schedulers today.    Follow up in about 3 months (around 9/3/2022).      Order summary:  Orders Placed This Encounter    Ambulatory referral/consult to Colorectal Surgery    linaCLOtide (LINZESS) 145 mcg Cap capsule    Case Request Endoscopy: COLONOSCOPY         Thank you so much for allowing me to participate in the care of Zeenat Lara MD

## 2022-06-06 ENCOUNTER — HOSPITAL ENCOUNTER (OUTPATIENT)
Dept: RADIOLOGY | Facility: HOSPITAL | Age: 72
Discharge: HOME | End: 2022-06-06
Attending: PHYSICAL MEDICINE & REHABILITATION
Payer: MEDICARE

## 2022-06-06 DIAGNOSIS — G89.29 CHRONIC PAIN OF BOTH LOWER EXTREMITIES: ICD-10-CM

## 2022-06-06 DIAGNOSIS — M79.604 CHRONIC PAIN OF BOTH LOWER EXTREMITIES: ICD-10-CM

## 2022-06-06 DIAGNOSIS — M79.605 CHRONIC PAIN OF BOTH LOWER EXTREMITIES: ICD-10-CM

## 2022-06-06 PROCEDURE — 93970 EXTREMITY STUDY: CPT | Mod: 26,,, | Performed by: RADIOLOGY

## 2022-06-06 PROCEDURE — 93922 US ARTERIAL LOWER EXTREMITY BILAT WITH ABI (XPD): ICD-10-PCS | Mod: 26,,, | Performed by: RADIOLOGY

## 2022-06-06 PROCEDURE — 93970 US LOWER EXTREMITY VEINS BILATERAL: ICD-10-PCS | Mod: 26,,, | Performed by: RADIOLOGY

## 2022-06-06 PROCEDURE — 93925 US ARTERIAL LOWER EXTREMITY BILAT WITH ABI (XPD): ICD-10-PCS | Mod: 26,,, | Performed by: RADIOLOGY

## 2022-06-06 PROCEDURE — 93925 LOWER EXTREMITY STUDY: CPT | Mod: 26,,, | Performed by: RADIOLOGY

## 2022-06-06 PROCEDURE — 93922 UPR/L XTREMITY ART 2 LEVELS: CPT | Mod: 26,,, | Performed by: RADIOLOGY

## 2022-06-06 PROCEDURE — 93970 EXTREMITY STUDY: CPT | Mod: TC

## 2022-06-06 PROCEDURE — 93925 LOWER EXTREMITY STUDY: CPT | Mod: TC

## 2022-06-07 ENCOUNTER — OFFICE VISIT (OUTPATIENT)
Dept: SURGERY | Facility: CLINIC | Age: 72
End: 2022-06-07
Payer: MEDICARE

## 2022-06-07 ENCOUNTER — PATIENT MESSAGE (OUTPATIENT)
Dept: PHYSICAL MEDICINE AND REHAB | Facility: CLINIC | Age: 72
End: 2022-06-07
Payer: MEDICARE

## 2022-06-07 VITALS
HEIGHT: 74 IN | WEIGHT: 315 LBS | BODY MASS INDEX: 40.43 KG/M2 | HEART RATE: 75 BPM | SYSTOLIC BLOOD PRESSURE: 145 MMHG | DIASTOLIC BLOOD PRESSURE: 82 MMHG

## 2022-06-07 DIAGNOSIS — K64.8 INTERNAL HEMORRHOIDS: Primary | ICD-10-CM

## 2022-06-07 PROCEDURE — 99214 PR OFFICE/OUTPT VISIT, EST, LEVL IV, 30-39 MIN: ICD-10-PCS | Mod: 25,S$GLB,, | Performed by: STUDENT IN AN ORGANIZED HEALTH CARE EDUCATION/TRAINING PROGRAM

## 2022-06-07 PROCEDURE — 46600 DIAGNOSTIC ANOSCOPY SPX: CPT | Mod: S$GLB,,, | Performed by: STUDENT IN AN ORGANIZED HEALTH CARE EDUCATION/TRAINING PROGRAM

## 2022-06-07 PROCEDURE — 99214 OFFICE O/P EST MOD 30 MIN: CPT | Mod: 25,S$GLB,, | Performed by: STUDENT IN AN ORGANIZED HEALTH CARE EDUCATION/TRAINING PROGRAM

## 2022-06-07 PROCEDURE — 46600 PR DIAG2STIC A2SCOPY: ICD-10-PCS | Mod: S$GLB,,, | Performed by: STUDENT IN AN ORGANIZED HEALTH CARE EDUCATION/TRAINING PROGRAM

## 2022-06-07 PROCEDURE — 99999 PR PBB SHADOW E&M-EST. PATIENT-LVL III: CPT | Mod: PBBFAC,,, | Performed by: STUDENT IN AN ORGANIZED HEALTH CARE EDUCATION/TRAINING PROGRAM

## 2022-06-07 PROCEDURE — 99999 PR PBB SHADOW E&M-EST. PATIENT-LVL III: ICD-10-PCS | Mod: PBBFAC,,, | Performed by: STUDENT IN AN ORGANIZED HEALTH CARE EDUCATION/TRAINING PROGRAM

## 2022-06-08 ENCOUNTER — PATIENT MESSAGE (OUTPATIENT)
Dept: CARDIOLOGY | Facility: CLINIC | Age: 72
End: 2022-06-08
Payer: MEDICARE

## 2022-06-08 NOTE — TELEPHONE ENCOUNTER
Reached out to pt to arrange an appt with Dr Jerome in regard to need of clearance for colonoscopy with instructions for Mercedes,     lov 4/15/2021    Offered appt on 6/9, declined    Offered appt on 6/13 in afternoon and accepted 2:20 appt    Request message to be sent to his portal with appt information.

## 2022-06-09 ENCOUNTER — PATIENT MESSAGE (OUTPATIENT)
Dept: PHYSICAL MEDICINE AND REHAB | Facility: CLINIC | Age: 72
End: 2022-06-09
Payer: MEDICARE

## 2022-06-09 ENCOUNTER — TELEPHONE (OUTPATIENT)
Dept: PHYSICAL MEDICINE AND REHAB | Facility: CLINIC | Age: 72
End: 2022-06-09
Payer: MEDICARE

## 2022-06-09 NOTE — TELEPHONE ENCOUNTER
Pain is likely due to his neuropathy. Unforounately, there is no cure. Neurontin can take up to 4 wks to work. Pls give him a follow up, we can discuss his options if neurontin does not help-pls notify

## 2022-06-13 ENCOUNTER — TELEPHONE (OUTPATIENT)
Dept: ADMINISTRATIVE | Facility: OTHER | Age: 72
End: 2022-06-13
Payer: MEDICARE

## 2022-06-13 ENCOUNTER — OFFICE VISIT (OUTPATIENT)
Dept: CARDIOLOGY | Facility: CLINIC | Age: 72
End: 2022-06-13
Payer: MEDICARE

## 2022-06-13 VITALS
HEART RATE: 78 BPM | SYSTOLIC BLOOD PRESSURE: 112 MMHG | DIASTOLIC BLOOD PRESSURE: 74 MMHG | WEIGHT: 315 LBS | BODY MASS INDEX: 42.46 KG/M2 | OXYGEN SATURATION: 97 %

## 2022-06-13 DIAGNOSIS — E66.01 MORBID OBESITY: ICD-10-CM

## 2022-06-13 DIAGNOSIS — I48.91 ATRIAL FIBRILLATION, UNSPECIFIED TYPE: ICD-10-CM

## 2022-06-13 DIAGNOSIS — E78.5 HYPERLIPIDEMIA, UNSPECIFIED HYPERLIPIDEMIA TYPE: ICD-10-CM

## 2022-06-13 DIAGNOSIS — I70.0 AORTIC ATHEROSCLEROSIS: ICD-10-CM

## 2022-06-13 DIAGNOSIS — I10 ESSENTIAL HYPERTENSION: ICD-10-CM

## 2022-06-13 DIAGNOSIS — Z01.810 ENCOUNTER FOR PRE-OPERATIVE CARDIOVASCULAR CLEARANCE: Primary | ICD-10-CM

## 2022-06-13 PROCEDURE — 99214 PR OFFICE/OUTPT VISIT, EST, LEVL IV, 30-39 MIN: ICD-10-PCS | Mod: S$GLB,,, | Performed by: INTERNAL MEDICINE

## 2022-06-13 PROCEDURE — 99999 PR PBB SHADOW E&M-EST. PATIENT-LVL III: CPT | Mod: PBBFAC,,, | Performed by: INTERNAL MEDICINE

## 2022-06-13 PROCEDURE — 99999 PR PBB SHADOW E&M-EST. PATIENT-LVL III: ICD-10-PCS | Mod: PBBFAC,,, | Performed by: INTERNAL MEDICINE

## 2022-06-13 PROCEDURE — 99214 OFFICE O/P EST MOD 30 MIN: CPT | Mod: S$GLB,,, | Performed by: INTERNAL MEDICINE

## 2022-06-13 NOTE — PROGRESS NOTES
Cardiology Clinic note    Subjective:   Patient ID:  Zeenat Mancia is a 72 y.o. male who presents for Afib, HTN FUP    HPI:   Afib: No palpitations, irregular heart beats, syncope or near syncope    HTN: On medications    HLD: Not on statin    SH Tobacco Quit 1990  FH Father had CABG early 60s    Patient Active Problem List    Diagnosis Date Noted    Urinary frequency 06/29/2021    Dyspnea on exertion 05/31/2021    Lower GI bleed 05/31/2021    Longstanding persistent atrial fibrillation 05/14/2021    Biliary colic 02/04/2021    Other insomnia 01/21/2021    Nonobstructive atherosclerosis of coronary artery 01/04/2021    CLIFFORD (iron deficiency anemia) 07/02/2018    Iron deficiency anemia due to chronic blood loss 06/08/2018    Rectal bleed     Acute blood loss anemia     Chronic atrial fibrillation 10/11/2017    Colon cancer screening 10/10/2017    Tortuous aorta 07/26/2017     CXR 5/18/17      Left-sided carotid artery disease 07/26/2017     Cspine 12/29/14      Epiretinal membrane, both eyes 03/06/2017    Posterior vitreous detachment, both eyes 03/06/2017    Essential hypertension 02/16/2016    Morbid obesity due to excess calories 02/16/2016    Benign non-nodular prostatic hyperplasia with lower urinary tract symptoms 02/16/2016    Pseudophakia of both eyes 02/16/2016    Refractive error 02/16/2016    JACKELYN (obstructive sleep apnea) 01/17/2013    Erectile dysfunction 09/12/2012    Hyperlipidemia        Patient's Medications   New Prescriptions    No medications on file   Previous Medications    AMLODIPINE (NORVASC) 5 MG TABLET    TAKE 1 TABLET(5 MG) BY MOUTH EVERY DAY    ATENOLOL (TENORMIN) 50 MG TABLET    Take 1 tablet (50 mg total) by mouth once daily.    DOCUSATE SODIUM (COLACE) 100 MG CAPSULE    Take 100 mg by mouth 2 (two) times daily.    ELIQUIS 5 MG TAB    TAKE 1 TABLET(5 MG) BY MOUTH TWICE DAILY    GABAPENTIN (NEURONTIN) 100 MG CAPSULE    Take 2 capsules (200 mg total) by mouth 3  (three) times daily.    GABAPENTIN (NEURONTIN) 300 MG CAPSULE    Take 1 capsule (300 mg total) by mouth 3 (three) times daily.    GABAPENTIN (NEURONTIN) 600 MG TABLET    Take 1 tablet (600 mg total) by mouth 3 (three) times daily.    HYDROCHLOROTHIAZIDE (HYDRODIURIL) 25 MG TABLET    Take 1 tablet (25 mg total) by mouth once daily.    LIDOCAINE HCL 2% (XYLOCAINE) 2 % JELLY    Apply topically as needed. Apply topically once nightly to affected part of foot/feet.    LINACLOTIDE (LINZESS) 145 MCG CAP CAPSULE    Take 1 capsule (145 mcg total) by mouth before breakfast.    MELATONIN 10 MG TAB    Take 10 mg by mouth every evening.    METHOCARBAMOL (ROBAXIN) 750 MG TAB    Take 1 tablet (750 mg total) by mouth 3 (three) times daily.    MULTIVIT-MIN/FA/LYCOPEN/LUTEIN (CENTRUM SILVER MEN ORAL)    Take by mouth once daily.    OXYBUTYNIN (DITROPAN) 5 MG TAB    Take 1 tablet (5 mg total) by mouth every evening.    PANTOPRAZOLE (PROTONIX) 40 MG TABLET    TAKE 1 TABLET(40 MG) BY MOUTH EVERY DAY    TAMSULOSIN (FLOMAX) 0.4 MG CAP    Take 1 capsule (0.4 mg total) by mouth once daily.    VALSARTAN (DIOVAN) 320 MG TABLET    TAKE 1 TABLET(320 MG) BY MOUTH EVERY DAY    VARICELLA-ZOSTER GE-AS01B, PF, (SHINGRIX, PF,) 50 MCG/0.5 ML INJECTION    Inject into the muscle.    ZOLPIDEM (AMBIEN) 5 MG TAB    TAKE 1 TABLET(5 MG) BY MOUTH EVERY NIGHT AS NEEDED   Modified Medications    No medications on file   Discontinued Medications    No medications on file        Review of Systems   Constitutional: Negative for fever.   HENT: Negative for nosebleeds.    Cardiovascular: Negative for chest pain, irregular heartbeat, near-syncope, orthopnea, palpitations and syncope. PND: Wears CPAP.        As above   Respiratory: Negative for hemoptysis.    Hematologic/Lymphatic: Negative for bleeding problem.   Skin: Negative for poor wound healing.   Gastrointestinal: Hematochezia: Hemorrhoids - following GI.   Genitourinary: Negative for hematuria.    Neurological: Negative for seizures.   Allergic/Immunologic: Positive for environmental allergies.         Objective:   Vitals  Vitals:    06/13/22 1406   BP: 112/74   Pulse: 78   SpO2: 97%   Weight: (!) 150 kg (330 lb 11 oz)          Physical Exam  Constitutional:       General: He is not in acute distress.     Appearance: He is well-developed. He is not diaphoretic.   Eyes:      Conjunctiva/sclera: Conjunctivae normal.   Neck:      Vascular: JVD: Unable to appreciate d/t habitus.   Cardiovascular:      Rate and Rhythm: Normal rate. Rhythm irregularly irregular.      Heart sounds: No murmur heard.    No friction rub. No gallop.   Pulmonary:      Effort: Pulmonary effort is normal. No respiratory distress.      Breath sounds: Normal breath sounds.   Abdominal:      General: Bowel sounds are normal.      Palpations: Abdomen is soft.      Tenderness: There is no abdominal tenderness.   Musculoskeletal:         General: No swelling.      Cervical back: Normal range of motion.   Skin:     General: Skin is warm.   Neurological:      Mental Status: He is alert.   Psychiatric:         Mood and Affect: Mood normal.             Assessment:     1. Encounter for pre-operative cardiovascular clearance    2. Atrial fibrillation, unspecified type    3. Aortic atherosclerosis    4. Essential hypertension    5. Hyperlipidemia, unspecified hyperlipidemia type    6. Morbid obesity        Plan:   Zeenat Mancia is a 72 y.o. male h/o Afib, HTN, obesity, JACKELYN    - EKG personally reviewed. My interpretation:  4/19/22: Afib HR 50s, LAD. Possible inferior infarct. PRWP. RBBB. Non-sp ST-T abn. QTc 414  - Echo 2019: LVEF 65%, normal diastology. Normal RVSF. Mild MR, TR. PASP 32, CVP 3. Mild LAE  - Reg Nuc Stress 2017: Negative for ischemia (EKG, Nuc)    - PET stress Dec 2020:  - Small (<10%) amount of mild resting heterogeneity that doesn't change with stress.  - Whole heart absolute myocardial perfusion (cc/min/g) averaged 0.48 cc/min/g at  "rest, which is reduced, 0.57 cc/min/g at stress, which is severely reduced, and 1.21  CFR, which is severely reduced.  - Absolute myocardial stress flows were severely reduced in all myocardial quadrants which can be seen with multivessel CAD as well as caffeine consumption. Clinical correlation recommended.  - Coronary angiogram Jan 2021: Non-obs CAD. LVEF 60%. Right radial access    Pre-Operative Cardiac Risk Assessment  Procedure: Colonoscopy  RCRI: 0  METs >4  Low antony-opertaive risk for cardiac events  May hold apixaban antony-operatively    Atrial Fibrillation  - sp DCCV with EP May 2021; recurrent Afib; Aflutter noted on EKG end of the month May 2021. Followed by Dr Barrientos. Given no symptoms, adopted a rate control strategy.  - CHADS2-VASc 2 (HTN, Age x1)  - HASBLED 1 (Age)  - Apixaban 5 mg bid (Age < 80 y, Wt > 60 kg, Cr <1.5)  Estimated body mass index is 42.46 kg/m² as calculated from the following:    Height as of 6/7/22: 6' 2" (1.88 m).    Weight as of this encounter: 150 kg (330 lb 11 oz).  72 y.o.  Lab Results   Component Value Date    CREATININE 1.2 04/19/2022    AST 15 04/19/2022    BILITOT 1.0 04/19/2022   - BB  - Echocardiogram    HTN  BP well controlled  Amlodipine, atenolol, HCTZ, valsartan    Aortic atherosclerosis  Noted on CT Abd 2018  Statin recommended- he would like to defer    HLD  Lab Results   Component Value Date    LDLCALC 68.4 09/17/2021   Statin as above  Allergy to atorvastatin    Obesity  Estimated body mass index is 42.46 kg/m² as calculated from the following:    Height as of 6/7/22: 6' 2" (1.88 m).    Weight as of this encounter: 150 kg (330 lb 11 oz).  Discussed diet and exercise  Bariatric referral      Continue with current medical plan and lifestyle changes.    Orders Placed This Encounter   Procedures    Ambulatory referral/consult to Bariatric Medicine     Standing Status:   Future     Standing Expiration Date:   7/13/2023     Referral Priority:   Routine     Referral Type:  "  Consultation     Referral Reason:   Specialty Services Required     Requested Specialty:   Bariatrics     Number of Visits Requested:   1    Echo     Standing Status:   Future     Standing Expiration Date:   6/13/2023     Order Specific Question:   Release to patient     Answer:   Immediate       Follow up as scheduled  Return sooner for concerns or questions. If symptoms persist go to the ED    He expressed verbal understanding and agreed with the plan      Judy Jerome MD  Interventional Cardiology  [facility]  Phone: [phone]

## 2022-06-15 ENCOUNTER — TELEPHONE (OUTPATIENT)
Dept: PODIATRY | Facility: CLINIC | Age: 72
End: 2022-06-15
Payer: MEDICARE

## 2022-06-15 NOTE — TELEPHONE ENCOUNTER
Called pt to schedule surgery pt states he does not want to have surgery at this time. He's trying other means first.

## 2022-06-19 ENCOUNTER — PATIENT MESSAGE (OUTPATIENT)
Dept: PHYSICAL MEDICINE AND REHAB | Facility: CLINIC | Age: 72
End: 2022-06-19
Payer: MEDICARE

## 2022-06-22 ENCOUNTER — PATIENT MESSAGE (OUTPATIENT)
Dept: GASTROENTEROLOGY | Facility: CLINIC | Age: 72
End: 2022-06-22
Payer: MEDICARE

## 2022-06-23 ENCOUNTER — HOSPITAL ENCOUNTER (OUTPATIENT)
Dept: CARDIOLOGY | Facility: HOSPITAL | Age: 72
Discharge: HOME | End: 2022-06-23
Attending: INTERNAL MEDICINE
Payer: MEDICARE

## 2022-06-23 ENCOUNTER — PATIENT MESSAGE (OUTPATIENT)
Dept: CARDIOLOGY | Facility: CLINIC | Age: 72
End: 2022-06-23

## 2022-06-23 VITALS — HEIGHT: 74 IN | BODY MASS INDEX: 40.43 KG/M2 | WEIGHT: 315 LBS

## 2022-06-23 DIAGNOSIS — I48.91 ATRIAL FIBRILLATION, UNSPECIFIED TYPE: ICD-10-CM

## 2022-06-23 LAB
AORTIC ROOT ANNULUS: 3.31 CM
ASCENDING AORTA: 3.66 CM
AV INDEX (PROSTH): 0.73
AV MEAN GRADIENT: 6 MMHG
AV PEAK GRADIENT: 14 MMHG
AV VALVE AREA: 3.49 CM2
AV VELOCITY RATIO: 0.74
BSA FOR ECHO PROCEDURE: 2.8 M2
CV ECHO LV RWT: 0.37 CM
DOP CALC AO PEAK VEL: 1.87 M/S
DOP CALC AO VTI: 33.29 CM
DOP CALC LVOT AREA: 4.8 CM2
DOP CALC LVOT DIAMETER: 2.47 CM
DOP CALC LVOT PEAK VEL: 1.39 M/S
DOP CALC LVOT STROKE VOLUME: 116.28 CM3
DOP CALCLVOT PEAK VEL VTI: 24.28 CM
ECHO LV POSTERIOR WALL: 0.98 CM (ref 0.6–1.1)
EJECTION FRACTION: 55 %
FRACTIONAL SHORTENING: 35 % (ref 28–44)
INTERVENTRICULAR SEPTUM: 1.04 CM (ref 0.6–1.1)
LA MAJOR: 6.53 CM
LA MINOR: 6.42 CM
LA WIDTH: 4.18 CM
LEFT ATRIUM SIZE: 5 CM
LEFT ATRIUM VOLUME INDEX MOD: 52.2 ML/M2
LEFT ATRIUM VOLUME INDEX: 42.8 ML/M2
LEFT ATRIUM VOLUME MOD: 140.49 CM3
LEFT ATRIUM VOLUME: 115.02 CM3
LEFT INTERNAL DIMENSION IN SYSTOLE: 3.42 CM (ref 2.1–4)
LEFT VENTRICLE DIASTOLIC VOLUME INDEX: 49.17 ML/M2
LEFT VENTRICLE DIASTOLIC VOLUME: 132.27 ML
LEFT VENTRICLE MASS INDEX: 74 G/M2
LEFT VENTRICLE SYSTOLIC VOLUME INDEX: 17.8 ML/M2
LEFT VENTRICLE SYSTOLIC VOLUME: 47.97 ML
LEFT VENTRICULAR INTERNAL DIMENSION IN DIASTOLE: 5.25 CM (ref 3.5–6)
LEFT VENTRICULAR MASS: 199.9 G
PV PEAK VELOCITY: 0.72 CM/S
RA MAJOR: 5.5 CM
RA PRESSURE: 3 MMHG
RA WIDTH: 4.2 CM
RIGHT VENTRICULAR END-DIASTOLIC DIMENSION: 3 CM
RIGHT VENTRICULAR LENGTH IN DIASTOLE (APICAL 4-CHAMBER VIEW): 6 CM
RV TISSUE DOPPLER FREE WALL SYSTOLIC VELOCITY 1 (APICAL 4 CHAMBER VIEW): 14.13 CM/S
STJ: 3.32 CM
TRICUSPID ANNULAR PLANE SYSTOLIC EXCURSION: 2.1 CM

## 2022-06-23 PROCEDURE — 93306 ECHO (CUPID ONLY): ICD-10-PCS | Mod: 26,,, | Performed by: INTERNAL MEDICINE

## 2022-06-23 PROCEDURE — 25500020 PHARM REV CODE 255: Performed by: INTERNAL MEDICINE

## 2022-06-23 PROCEDURE — 93306 TTE W/DOPPLER COMPLETE: CPT | Mod: 26,,, | Performed by: INTERNAL MEDICINE

## 2022-06-23 PROCEDURE — C8929 TTE W OR WO FOL WCON,DOPPLER: HCPCS

## 2022-06-23 RX ADMIN — HUMAN ALBUMIN MICROSPHERES AND PERFLUTREN 0.66 MG: 10; .22 INJECTION, SOLUTION INTRAVENOUS at 02:06

## 2022-06-27 ENCOUNTER — TELEPHONE (OUTPATIENT)
Dept: ENDOSCOPY | Facility: HOSPITAL | Age: 72
End: 2022-06-27
Payer: MEDICARE

## 2022-06-27 ENCOUNTER — PES CALL (OUTPATIENT)
Dept: ADMINISTRATIVE | Facility: CLINIC | Age: 72
End: 2022-06-27
Payer: MEDICARE

## 2022-06-27 NOTE — TELEPHONE ENCOUNTER
----- Message from Melissa Cardona sent at 6/27/2022  9:12 AM CDT -----  Regarding: Appt  Contact: [phone]  Patient is calling to speak with someone to schedule his colonoscopy due to he has his clearance . Please contact pt

## 2022-06-28 ENCOUNTER — PATIENT MESSAGE (OUTPATIENT)
Dept: INTERNAL MEDICINE | Facility: CLINIC | Age: 72
End: 2022-06-28
Payer: MEDICARE

## 2022-06-28 ENCOUNTER — OFFICE VISIT (OUTPATIENT)
Dept: OPTOMETRY | Facility: CLINIC | Age: 72
End: 2022-06-28
Payer: MEDICARE

## 2022-06-28 ENCOUNTER — TELEPHONE (OUTPATIENT)
Dept: ENDOSCOPY | Facility: HOSPITAL | Age: 72
End: 2022-06-28
Payer: MEDICARE

## 2022-06-28 DIAGNOSIS — H52.4 MYOPIA WITH PRESBYOPIA OF BOTH EYES: ICD-10-CM

## 2022-06-28 DIAGNOSIS — Z13.5 GLAUCOMA SCREENING: ICD-10-CM

## 2022-06-28 DIAGNOSIS — H52.13 MYOPIA WITH PRESBYOPIA OF BOTH EYES: ICD-10-CM

## 2022-06-28 DIAGNOSIS — H35.372 EPIRETINAL MEMBRANE (ERM) OF LEFT EYE: Primary | ICD-10-CM

## 2022-06-28 PROCEDURE — 92004 PR EYE EXAM, NEW PATIENT,COMPREHESV: ICD-10-PCS | Mod: S$GLB,,, | Performed by: OPTOMETRIST

## 2022-06-28 PROCEDURE — 92015 PR REFRACTION: ICD-10-PCS | Mod: S$GLB,,, | Performed by: OPTOMETRIST

## 2022-06-28 PROCEDURE — 92004 COMPRE OPH EXAM NEW PT 1/>: CPT | Mod: S$GLB,,, | Performed by: OPTOMETRIST

## 2022-06-28 PROCEDURE — 99999 PR PBB SHADOW E&M-EST. PATIENT-LVL III: CPT | Mod: PBBFAC,,, | Performed by: OPTOMETRIST

## 2022-06-28 PROCEDURE — 92015 DETERMINE REFRACTIVE STATE: CPT | Mod: S$GLB,,, | Performed by: OPTOMETRIST

## 2022-06-28 PROCEDURE — 99999 PR PBB SHADOW E&M-EST. PATIENT-LVL III: ICD-10-PCS | Mod: PBBFAC,,, | Performed by: OPTOMETRIST

## 2022-06-28 NOTE — TELEPHONE ENCOUNTER
Hi,    Patient needs to be scheduled for an Colonoscopy. Can he hold his Eliquis for 2 days prior per our Endoscopy protocol?    Please advise.    Thanks,   PETAR Quesada

## 2022-06-28 NOTE — PROGRESS NOTES
HPI     Annual eye exam  Blurry vision distance and near     Last edited by Steven Dejesus, OD on 6/28/2022  9:32 AM. (History)        ROS     Positive for: Eyes (cat surgery/ERM OU)    Negative for: Constitutional, Gastrointestinal, Neurological, Skin,   Genitourinary, Musculoskeletal, HENT, Endocrine, Cardiovascular,   Respiratory, Psychiatric, Allergic/Imm, Heme/Lymph    Last edited by Steven Dejesus, OD on 6/28/2022  9:32 AM. (History)        Assessment /Plan     For exam results, see Encounter Report.    Epiretinal membrane (ERM) of left eye    Glaucoma screening    Myopia with presbyopia of both eyes      1.mild pco sp monovision IOLs--pt happy without spex  2. Mild ERM OS--see retina notes    PLAN:    rtc 1 yr

## 2022-06-29 ENCOUNTER — PATIENT MESSAGE (OUTPATIENT)
Dept: PHYSICAL MEDICINE AND REHAB | Facility: CLINIC | Age: 72
End: 2022-06-29
Payer: MEDICARE

## 2022-06-29 ENCOUNTER — PATIENT MESSAGE (OUTPATIENT)
Dept: INTERNAL MEDICINE | Facility: CLINIC | Age: 72
End: 2022-06-29
Payer: MEDICARE

## 2022-06-29 ENCOUNTER — PATIENT MESSAGE (OUTPATIENT)
Dept: GASTROENTEROLOGY | Facility: CLINIC | Age: 72
End: 2022-06-29
Payer: MEDICARE

## 2022-06-29 ENCOUNTER — PATIENT MESSAGE (OUTPATIENT)
Dept: ENDOSCOPY | Facility: HOSPITAL | Age: 72
End: 2022-06-29
Payer: MEDICARE

## 2022-06-29 RX ORDER — METHOCARBAMOL 750 MG/1
750 TABLET, FILM COATED ORAL 4 TIMES DAILY PRN
Qty: 40 TABLET | Refills: 0 | Status: SHIPPED | OUTPATIENT
Start: 2022-06-29 | End: 2022-07-09

## 2022-06-29 NOTE — TELEPHONE ENCOUNTER
PT has been having lower back pain for almost a week now     Pt states he has been taking Ibuprofen and it helps some but the pain persists    Pt would like to know what would you recommend?      LOV:  04/19/22

## 2022-06-30 ENCOUNTER — PATIENT MESSAGE (OUTPATIENT)
Dept: SURGERY | Facility: CLINIC | Age: 72
End: 2022-06-30
Payer: MEDICARE

## 2022-06-30 ENCOUNTER — OFFICE VISIT (OUTPATIENT)
Dept: PHYSICAL MEDICINE AND REHAB | Facility: CLINIC | Age: 72
End: 2022-06-30
Payer: MEDICARE

## 2022-06-30 ENCOUNTER — HOSPITAL ENCOUNTER (OUTPATIENT)
Dept: RADIOLOGY | Facility: HOSPITAL | Age: 72
Discharge: HOME | End: 2022-06-30
Attending: PHYSICAL MEDICINE & REHABILITATION
Payer: MEDICARE

## 2022-06-30 ENCOUNTER — PATIENT MESSAGE (OUTPATIENT)
Dept: PHYSICAL MEDICINE AND REHAB | Facility: CLINIC | Age: 72
End: 2022-06-30

## 2022-06-30 VITALS
HEART RATE: 78 BPM | SYSTOLIC BLOOD PRESSURE: 133 MMHG | BODY MASS INDEX: 40.43 KG/M2 | WEIGHT: 315 LBS | HEIGHT: 74 IN | DIASTOLIC BLOOD PRESSURE: 87 MMHG

## 2022-06-30 DIAGNOSIS — M54.9 BACK PAIN, UNSPECIFIED BACK LOCATION, UNSPECIFIED BACK PAIN LATERALITY, UNSPECIFIED CHRONICITY: ICD-10-CM

## 2022-06-30 DIAGNOSIS — M99.08 RIB CAGE DYSFUNCTION: ICD-10-CM

## 2022-06-30 DIAGNOSIS — M79.10 MYALGIA: ICD-10-CM

## 2022-06-30 DIAGNOSIS — M54.9 BACK PAIN, UNSPECIFIED BACK LOCATION, UNSPECIFIED BACK PAIN LATERALITY, UNSPECIFIED CHRONICITY: Primary | ICD-10-CM

## 2022-06-30 PROCEDURE — 99214 OFFICE O/P EST MOD 30 MIN: CPT | Mod: 25,S$GLB,, | Performed by: PHYSICAL MEDICINE & REHABILITATION

## 2022-06-30 PROCEDURE — 20553 PR INJECT TRIGGER POINTS, > 3: ICD-10-PCS | Mod: S$GLB,,, | Performed by: PHYSICAL MEDICINE & REHABILITATION

## 2022-06-30 PROCEDURE — 74019 RADEX ABDOMEN 2 VIEWS: CPT | Mod: 26,,, | Performed by: RADIOLOGY

## 2022-06-30 PROCEDURE — 72070 X-RAY EXAM THORAC SPINE 2VWS: CPT | Mod: 26,,, | Performed by: RADIOLOGY

## 2022-06-30 PROCEDURE — 72070 X-RAY EXAM THORAC SPINE 2VWS: CPT | Mod: TC,FY

## 2022-06-30 PROCEDURE — 74019 XR ABDOMEN FLAT AND ERECT: ICD-10-PCS | Mod: 26,,, | Performed by: RADIOLOGY

## 2022-06-30 PROCEDURE — 72110 X-RAY EXAM L-2 SPINE 4/>VWS: CPT | Mod: TC,FY

## 2022-06-30 PROCEDURE — 20553 NJX 1/MLT TRIGGER POINTS 3/>: CPT | Mod: S$GLB,,, | Performed by: PHYSICAL MEDICINE & REHABILITATION

## 2022-06-30 PROCEDURE — 99214 PR OFFICE/OUTPT VISIT, EST, LEVL IV, 30-39 MIN: ICD-10-PCS | Mod: 25,S$GLB,, | Performed by: PHYSICAL MEDICINE & REHABILITATION

## 2022-06-30 PROCEDURE — 72110 X-RAY EXAM L-2 SPINE 4/>VWS: CPT | Mod: 26,,, | Performed by: RADIOLOGY

## 2022-06-30 PROCEDURE — 72070 XR THORACIC SPINE AP LATERAL: ICD-10-PCS | Mod: 26,,, | Performed by: RADIOLOGY

## 2022-06-30 PROCEDURE — 74019 RADEX ABDOMEN 2 VIEWS: CPT | Mod: TC,FY

## 2022-06-30 PROCEDURE — 99999 PR PBB SHADOW E&M-EST. PATIENT-LVL V: ICD-10-PCS | Mod: PBBFAC,,, | Performed by: PHYSICAL MEDICINE & REHABILITATION

## 2022-06-30 PROCEDURE — 99999 PR PBB SHADOW E&M-EST. PATIENT-LVL V: CPT | Mod: PBBFAC,,, | Performed by: PHYSICAL MEDICINE & REHABILITATION

## 2022-06-30 PROCEDURE — 72110 XR LUMBAR SPINE COMPLETE 5 VIEW: ICD-10-PCS | Mod: 26,,, | Performed by: RADIOLOGY

## 2022-06-30 RX ORDER — KETOROLAC TROMETHAMINE 30 MG/ML
15 INJECTION, SOLUTION INTRAMUSCULAR; INTRAVENOUS
Status: COMPLETED | OUTPATIENT
Start: 2022-06-30 | End: 2022-06-30

## 2022-06-30 RX ORDER — LIDOCAINE HYDROCHLORIDE 10 MG/ML
3 INJECTION INFILTRATION; PERINEURAL
Status: COMPLETED | OUTPATIENT
Start: 2022-06-30 | End: 2022-06-30

## 2022-06-30 RX ORDER — METHYLPREDNISOLONE 4 MG/1
TABLET ORAL
Qty: 21 EACH | Refills: 0 | Status: SHIPPED | OUTPATIENT
Start: 2022-06-30 | End: 2022-07-22

## 2022-06-30 RX ORDER — METHYLPREDNISOLONE ACETATE 40 MG/ML
40 INJECTION, SUSPENSION INTRA-ARTICULAR; INTRALESIONAL; INTRAMUSCULAR; SOFT TISSUE
Status: COMPLETED | OUTPATIENT
Start: 2022-06-30 | End: 2022-06-30

## 2022-06-30 RX ORDER — TIZANIDINE 4 MG/1
4 TABLET ORAL EVERY 8 HOURS
Qty: 90 TABLET | Refills: 6 | Status: SHIPPED | OUTPATIENT
Start: 2022-06-30 | End: 2022-07-22

## 2022-06-30 RX ADMIN — LIDOCAINE HYDROCHLORIDE 3 ML: 10 INJECTION INFILTRATION; PERINEURAL at 11:06

## 2022-06-30 RX ADMIN — KETOROLAC TROMETHAMINE 15 MG: 30 INJECTION, SOLUTION INTRAMUSCULAR; INTRAVENOUS at 11:06

## 2022-06-30 RX ADMIN — METHYLPREDNISOLONE ACETATE 40 MG: 40 INJECTION, SUSPENSION INTRA-ARTICULAR; INTRALESIONAL; INTRAMUSCULAR; SOFT TISSUE at 11:06

## 2022-06-30 NOTE — PROGRESS NOTES
HPI:  Patient is a 72 y.o. year old male w. Back pain and foot pain. I have been following him for neuralgia due to peripheral neuropathy. Last eval. I increased neurontin. He finds that the higher dose helps.  Today, his worse pain is his back pain. Moving certain positions worsens his pain. He denies any trauma. He has had similar episodes in past. He denies weakness or frequent falls.    Imaging  No pertinent imaging    Labs  EGFr cr lft's gluc nl      Past Medical History:   Diagnosis Date    A-fib     Arthritis     Atrial flutter     Colon polyp     Diverticulosis     Dyslipidemia     GERD (gastroesophageal reflux disease)     on Protonix    Hepatic steatosis     Hyperlipidemia     Hypertension     Irregular heart beat     Multiple gastric ulcers 05/16/2018    gi scope per Dr. Houston    Obesity     Sleep apnea     uses CPAP    Unspecified disorder of kidney and ureter     kidney stones     Past Surgical History:   Procedure Laterality Date    APPENDECTOMY      CATARACT EXTRACTION  2009    OS    CATARACT EXTRACTION W/  INTRAOCULAR LENS IMPLANT Right 6/3/2015    [id number] 14.5d//    COLONOSCOPY N/A 10/10/2017    Procedure: COLONOSCOPY;  Surgeon: Jameson Houston MD;  Location: [facility] ENDO;  Service: Endoscopy;  Laterality: N/A; repeat in 5 years for surveillance    COLONOSCOPY N/A 5/16/2018    Procedure: COLONOSCOPY;  Surgeon: Jameson Houston MD;  Location: [facility];  Service: Endoscopy;  Laterality: N/A;    COLONOSCOPY N/A 6/2/2021    Procedure: COLONOSCOPY;  Surgeon: Agustin Corona MD;  Location: [facility] ENDO ([address] [place] [address]);  Service: Endoscopy;  Laterality: N/A;    CORONARY ANGIOGRAPHY N/A 1/4/2021    Procedure: ANGIOGRAM, CORONARY ARTERY;  Surgeon: Jase Moore MD;  Location: [facility] CATH LAB/EP;  Service: Cardiology;  Laterality: N/A;    CYSTOSCOPY WITH INSERTION OF MINIMALLY INVASIVE IMPLANT TO ENLARGE PROSTATIC URETHRA N/A 6/29/2021    Procedure: TRANSPROSTATIC TISSUE RETRACTION;   Surgeon: Agustin Clay MD;  Location: [facility] OR;  Service: Urology;  Laterality: N/A;    ESOPHAGOGASTRODUODENOSCOPY N/A 7/2/2018    Procedure: EGD (ESOPHAGOGASTRODUODENOSCOPY);  Surgeon: Jameson Houston MD;  Location: [facility] ENDO;  Service: Endoscopy;  Laterality: N/A;    ESOPHAGOGASTRODUODENOSCOPY N/A 6/2/2021    Procedure: EGD (ESOPHAGOGASTRODUODENOSCOPY);  Surgeon: Agustin Corona MD;  Location: [facility] ENDO ([facility] FLR);  Service: Endoscopy;  Laterality: N/A;    EYE SURGERY      cataract    LEFT HEART CATHETERIZATION N/A 1/4/2021    Procedure: Left heart cath;  Surgeon: Jase Moore MD;  Location: [facility] CATH LAB/EP;  Service: Cardiology;  Laterality: N/A;    ROBOT-ASSISTED CHOLECYSTECTOMY N/A 2/4/2021    Procedure: ROBOTIC CHOLECYSTECTOMY;  Surgeon: Pal Cheney MD;  Location: [facility] OR;  Service: General;  Laterality: N/A;    TONSILLECTOMY      TRANSESOPHAGEAL ECHOCARDIOGRAPHY N/A 5/14/2021    Procedure: ECHOCARDIOGRAM, TRANSESOPHAGEAL;  Surgeon: Leo Reeves III, MD;  Location: [facility] EP LAB;  Service: Cardiology;  Laterality: N/A;    TREATMENT OF CARDIAC ARRHYTHMIA N/A 4/19/2021    Procedure: Cardioversion or Defibrillation;  Surgeon: Judy Jerome MD;  Location: [facility] CATH LAB/EP;  Service: Cardiology;  Laterality: N/A;    TREATMENT OF CARDIAC ARRHYTHMIA N/A 5/14/2021    Procedure: CARDIOVERSION;  Surgeon: PEMA Downs MD;  Location: [facility] EP LAB;  Service: Cardiology;  Laterality: N/A;  AF, PHUONG, DCCV, MAC, DM, 3 PREP    UPPER GASTROINTESTINAL ENDOSCOPY  05/16/2018    Dr. Houston     Family History   Problem Relation Age of Onset    Arthritis Mother     Heart disease Mother     Blindness Mother         OS due to injury    Coronary artery disease Father     Heart disease Father     Cataracts Father     Amblyopia Neg Hx     Cancer Neg Hx     Diabetes Neg Hx     Glaucoma Neg Hx     Hypertension Neg Hx     Macular degeneration Neg Hx     Retinal detachment Neg Hx      Strabismus Neg Hx     Stroke Neg Hx     Thyroid disease Neg Hx     Colon cancer Neg Hx     Colon polyps Neg Hx     Crohn's disease Neg Hx     Ulcerative colitis Neg Hx     Esophageal cancer Neg Hx     Stomach cancer Neg Hx      Social History     Socioeconomic History    Marital status:     Number of children: 3   Tobacco Use    Smoking status: Former Smoker     Start date: 10/5/1997    Smokeless tobacco: Never Used    Tobacco comment: quit in 1980s    Substance and Sexual Activity    Alcohol use: Yes     Comment: 1 beer every 2 month    Drug use: No    Sexual activity: Not Currently     Partners: Female   Social History Narrative    Patient is originally [place]        Working ; retired,  plants            47 years         Children    Son x3         Lives with  Kerri         [employer]    Updates    9/8/20- moved from [facility]       Social [facility]     Financial Resource Strain: Low Risk     Difficulty of Paying Living Expenses: Not hard at all   Food Insecurity: No Food Insecurity    Worried About Running Out of Food in the Last Year: Never true    Ran Out of Food in the Last Year: Never true   Transportation Needs: No Transportation Needs    Lack of Transportation (Medical): No    Lack of Transportation (Non-Medical): No   Physical Activity: Insufficiently Active    Days of Exercise per Week: 1 day    Minutes of Exercise per Session: 10 min   Stress: Stress Concern Present    Feeling of Stress : Rather much   Social Connections: Unknown    Frequency of Communication with Friends and Family: Three times a week    Frequency of Social Gatherings with Friends and Family: Once a week    Active Member of Clubs or Organizations: Yes    Attends Club or Organization Meetings: 1 to 4 times per year    Marital Status:    Housing Stability: Low Risk     Unable to Pay for  Housing in the Last Year: No    Number of Places Lived in the Last Year: 2    Unstable Housing in the Last Year: No       Review of patient's allergies indicates:   Allergen Reactions    Lipitor [atorvastatin] Itching       Current Outpatient Medications:     amLODIPine (NORVASC) 5 MG tablet, TAKE 1 TABLET(5 MG) BY MOUTH EVERY DAY, Disp: 90 tablet, Rfl: 1    atenoloL (TENORMIN) 50 MG tablet, Take 1 tablet (50 mg total) by mouth once daily., Disp: 90 tablet, Rfl: 3    docusate sodium (COLACE) 100 MG capsule, Take 100 mg by mouth 2 (two) times daily., Disp: , Rfl:     ELIQUIS 5 mg Tab, TAKE 1 TABLET(5 MG) BY MOUTH TWICE DAILY, Disp: 180 tablet, Rfl: 3    gabapentin (NEURONTIN) 300 MG capsule, Take 1 capsule (300 mg total) by mouth 3 (three) times daily., Disp: 90 capsule, Rfl: 11    gabapentin (NEURONTIN) 600 MG tablet, Take 1 tablet (600 mg total) by mouth 3 (three) times daily., Disp: 90 tablet, Rfl: 11    hydroCHLOROthiazide (HYDRODIURIL) 25 MG tablet, Take 1 tablet (25 mg total) by mouth once daily., Disp: 90 tablet, Rfl: 3    LIDOcaine HCL 2% (XYLOCAINE) 2 % jelly, Apply topically as needed. Apply topically once nightly to affected part of foot/feet., Disp: 30 mL, Rfl: 2    linaCLOtide (LINZESS) 145 mcg Cap capsule, Take 1 capsule (145 mcg total) by mouth before breakfast., Disp: 30 capsule, Rfl: 3    melatonin 10 mg Tab, Take 10 mg by mouth every evening., Disp: , Rfl:     methocarbamoL (ROBAXIN) 750 MG Tab, Take 1 tablet (750 mg total) by mouth 4 (four) times daily as needed (muscle strain/pain)., Disp: 40 tablet, Rfl: 0    multivit-min/FA/lycopen/lutein (CENTRUM SILVER MEN ORAL), Take by mouth once daily., Disp: , Rfl:     oxybutynin (DITROPAN) 5 MG Tab, Take 1 tablet (5 mg total) by mouth every evening., Disp: 30 tablet, Rfl: 11    pantoprazole (PROTONIX) 40 MG tablet, TAKE 1 TABLET(40 MG) BY MOUTH EVERY DAY, Disp: 90 tablet, Rfl: 3    tamsulosin (FLOMAX) 0.4 mg Cap, Take 1 capsule (0.4 mg  total) by mouth once daily., Disp: 30 capsule, Rfl: 11    valsartan (DIOVAN) 320 MG tablet, TAKE 1 TABLET(320 MG) BY MOUTH EVERY DAY, Disp: 90 tablet, Rfl: 3    varicella-zoster gE-AS01B, PF, (SHINGRIX, PF,) 50 mcg/0.5 mL injection, Inject into the muscle., Disp: 1 each, Rfl: 0    zolpidem (AMBIEN) 5 MG Tab, TAKE 1 TABLET(5 MG) BY MOUTH EVERY NIGHT AS NEEDED, Disp: 30 tablet, Rfl: 3    gabapentin (NEURONTIN) 100 MG capsule, Take 2 capsules (200 mg total) by mouth 3 (three) times daily. (Patient taking differently: Take 400 mg by mouth 3 (three) times daily.), Disp: 180 capsule, Rfl: 11    methylPREDNISolone (MEDROL DOSEPACK) 4 mg tablet, use as directed, Disp: 21 each, Rfl: 0    tiZANidine (ZANAFLEX) 4 MG tablet, Take 1 tablet (4 mg total) by mouth every 8 (eight) hours., Disp: 90 tablet, Rfl: 6    Current Facility-Administered Medications:     ketorolac injection 15 mg, 15 mg, Intravenous, 1 time in Clinic/HOD, Genna Chacko, DO    LIDOcaine HCL 10 mg/ml (1%) injection 3 mL, 3 mL, Other, 1 time in Clinic/HOD, Genna Chacko,     methylPREDNISolone acetate injection 40 mg, 40 mg, Intramuscular, 1 time in Clinic/HOD, Genna Chacko, DO      Review of Systems  No nausea, vomiting, fevers, Chills , contipation, diarrhea or sweats    Physical Exam:      Vitals:    06/30/22 1106   BP: 133/87   Pulse: 78     alert and oriented ×4 follows commands answers all questions appropriately,affect wnl  Manual muscle test 5 out of 5 sensation to light touch grossly intact  +tenderness right thoracic and lumbar paraspinals and QL  Antalgic  Gait  Difficulty going from sitting to standing  -slR modified  NO muscular atrophy  No C/C/E  Assymetric rib cage    Assessment:  Aculte lumbar strain + rib cage dysfunction  Underlying lumbar spondylosis  Peripheral neuropathy    Plan:  TPI's to low back today  Therapy to address spine and rib cage  zanaflex tid +tylenol for pain control  Lumbar spine xray  KUB  "to ensure it is not due to a kidney stone      pROCEDURE NOTE:  Risk and benefit of trigger point injections given to pt. Injections performed w. A 1.5" 25G needle after sterile prep w. chlorohexedine, verbal consent obtained . NO complications. B/l Quadratus Lumborum, thoracic+ lumbar paraspinals AND ILIOCOSTALIS were inJected with a total of 3ML of 1% Lidocaine+ toradol 30mg + depomedrol 40mg            "

## 2022-07-01 ENCOUNTER — TELEPHONE (OUTPATIENT)
Dept: PHYSICAL MEDICINE AND REHAB | Facility: CLINIC | Age: 72
End: 2022-07-01
Payer: MEDICARE

## 2022-07-07 ENCOUNTER — PES CALL (OUTPATIENT)
Dept: ADMINISTRATIVE | Facility: CLINIC | Age: 72
End: 2022-07-07
Payer: MEDICARE

## 2022-07-20 ENCOUNTER — TELEPHONE (OUTPATIENT)
Dept: ADMINISTRATIVE | Facility: CLINIC | Age: 72
End: 2022-07-20
Payer: MEDICARE

## 2022-07-22 ENCOUNTER — OFFICE VISIT (OUTPATIENT)
Dept: INTERNAL MEDICINE | Facility: CLINIC | Age: 72
End: 2022-07-22
Payer: MEDICARE

## 2022-07-22 VITALS
DIASTOLIC BLOOD PRESSURE: 74 MMHG | WEIGHT: 315 LBS | HEIGHT: 74 IN | BODY MASS INDEX: 40.43 KG/M2 | SYSTOLIC BLOOD PRESSURE: 112 MMHG | HEART RATE: 62 BPM | OXYGEN SATURATION: 95 % | RESPIRATION RATE: 12 BRPM | TEMPERATURE: 98 F

## 2022-07-22 DIAGNOSIS — I77.1 TORTUOUS AORTA: ICD-10-CM

## 2022-07-22 DIAGNOSIS — H52.7 REFRACTIVE ERROR: ICD-10-CM

## 2022-07-22 DIAGNOSIS — Z00.00 ENCOUNTER FOR PREVENTIVE HEALTH EXAMINATION: Primary | ICD-10-CM

## 2022-07-22 DIAGNOSIS — Z96.1 PSEUDOPHAKIA OF BOTH EYES: ICD-10-CM

## 2022-07-22 DIAGNOSIS — G47.33 OSA (OBSTRUCTIVE SLEEP APNEA): ICD-10-CM

## 2022-07-22 DIAGNOSIS — I10 ESSENTIAL HYPERTENSION: ICD-10-CM

## 2022-07-22 DIAGNOSIS — G47.09 OTHER INSOMNIA: ICD-10-CM

## 2022-07-22 DIAGNOSIS — I65.23 BILATERAL CAROTID ARTERY STENOSIS: ICD-10-CM

## 2022-07-22 DIAGNOSIS — H35.373 EPIRETINAL MEMBRANE, BOTH EYES: ICD-10-CM

## 2022-07-22 DIAGNOSIS — K80.20 CALCULUS OF GALLBLADDER WITHOUT CHOLECYSTITIS WITHOUT OBSTRUCTION: ICD-10-CM

## 2022-07-22 DIAGNOSIS — N40.1 BENIGN NON-NODULAR PROSTATIC HYPERPLASIA WITH LOWER URINARY TRACT SYMPTOMS: ICD-10-CM

## 2022-07-22 DIAGNOSIS — Z87.19 HISTORY OF RECTAL BLEEDING: ICD-10-CM

## 2022-07-22 DIAGNOSIS — D50.0 IRON DEFICIENCY ANEMIA DUE TO CHRONIC BLOOD LOSS: ICD-10-CM

## 2022-07-22 DIAGNOSIS — I48.20 CHRONIC ATRIAL FIBRILLATION: ICD-10-CM

## 2022-07-22 DIAGNOSIS — E66.01 MORBID OBESITY DUE TO EXCESS CALORIES: ICD-10-CM

## 2022-07-22 DIAGNOSIS — Z12.11 COLON CANCER SCREENING: ICD-10-CM

## 2022-07-22 DIAGNOSIS — H43.813 POSTERIOR VITREOUS DETACHMENT, BOTH EYES: ICD-10-CM

## 2022-07-22 DIAGNOSIS — N52.9 ERECTILE DYSFUNCTION, UNSPECIFIED ERECTILE DYSFUNCTION TYPE: ICD-10-CM

## 2022-07-22 DIAGNOSIS — I25.10 NONOBSTRUCTIVE ATHEROSCLEROSIS OF CORONARY ARTERY: ICD-10-CM

## 2022-07-22 PROBLEM — R06.09 DYSPNEA ON EXERTION: Status: RESOLVED | Noted: 2021-05-31 | Resolved: 2022-07-22

## 2022-07-22 PROBLEM — K92.2 LOWER GI BLEED: Status: RESOLVED | Noted: 2021-05-31 | Resolved: 2022-07-22

## 2022-07-22 PROCEDURE — 3074F PR MOST RECENT SYSTOLIC BLOOD PRESSURE < 130 MM HG: ICD-10-PCS | Mod: CPTII,S$GLB,, | Performed by: NURSE PRACTITIONER

## 2022-07-22 PROCEDURE — 1160F PR REVIEW ALL MEDS BY PRESCRIBER/CLIN PHARMACIST DOCUMENTED: ICD-10-PCS | Mod: CPTII,S$GLB,, | Performed by: NURSE PRACTITIONER

## 2022-07-22 PROCEDURE — 4010F PR ACE/ARB THEARPY RXD/TAKEN: ICD-10-PCS | Mod: CPTII,S$GLB,, | Performed by: NURSE PRACTITIONER

## 2022-07-22 PROCEDURE — 1125F AMNT PAIN NOTED PAIN PRSNT: CPT | Mod: CPTII,S$GLB,, | Performed by: NURSE PRACTITIONER

## 2022-07-22 PROCEDURE — 1157F PR ADVANCE CARE PLAN OR EQUIV PRESENT IN MEDICAL RECORD: ICD-10-PCS | Mod: CPTII,S$GLB,, | Performed by: NURSE PRACTITIONER

## 2022-07-22 PROCEDURE — 99999 PR PBB SHADOW E&M-EST. PATIENT-LVL V: CPT | Mod: PBBFAC,,, | Performed by: NURSE PRACTITIONER

## 2022-07-22 PROCEDURE — 3078F DIAST BP <80 MM HG: CPT | Mod: CPTII,S$GLB,, | Performed by: NURSE PRACTITIONER

## 2022-07-22 PROCEDURE — 1100F PTFALLS ASSESS-DOCD GE2>/YR: CPT | Mod: CPTII,S$GLB,, | Performed by: NURSE PRACTITIONER

## 2022-07-22 PROCEDURE — 3008F PR BODY MASS INDEX (BMI) DOCUMENTED: ICD-10-PCS | Mod: CPTII,S$GLB,, | Performed by: NURSE PRACTITIONER

## 2022-07-22 PROCEDURE — 1170F FXNL STATUS ASSESSED: CPT | Mod: CPTII,S$GLB,, | Performed by: NURSE PRACTITIONER

## 2022-07-22 PROCEDURE — 3288F FALL RISK ASSESSMENT DOCD: CPT | Mod: CPTII,S$GLB,, | Performed by: NURSE PRACTITIONER

## 2022-07-22 PROCEDURE — 1125F PR PAIN SEVERITY QUANTIFIED, PAIN PRESENT: ICD-10-PCS | Mod: CPTII,S$GLB,, | Performed by: NURSE PRACTITIONER

## 2022-07-22 PROCEDURE — G0439 PR MEDICARE ANNUAL WELLNESS SUBSEQUENT VISIT: ICD-10-PCS | Mod: S$GLB,,, | Performed by: NURSE PRACTITIONER

## 2022-07-22 PROCEDURE — 1100F PR PT FALLS ASSESS DOC 2+ FALLS/FALL W/INJURY/YR: ICD-10-PCS | Mod: CPTII,S$GLB,, | Performed by: NURSE PRACTITIONER

## 2022-07-22 PROCEDURE — 1159F MED LIST DOCD IN RCRD: CPT | Mod: CPTII,S$GLB,, | Performed by: NURSE PRACTITIONER

## 2022-07-22 PROCEDURE — 1170F PR FUNCTIONAL STATUS ASSESSED: ICD-10-PCS | Mod: CPTII,S$GLB,, | Performed by: NURSE PRACTITIONER

## 2022-07-22 PROCEDURE — 1157F ADVNC CARE PLAN IN RCRD: CPT | Mod: CPTII,S$GLB,, | Performed by: NURSE PRACTITIONER

## 2022-07-22 PROCEDURE — 3074F SYST BP LT 130 MM HG: CPT | Mod: CPTII,S$GLB,, | Performed by: NURSE PRACTITIONER

## 2022-07-22 PROCEDURE — 1159F PR MEDICATION LIST DOCUMENTED IN MEDICAL RECORD: ICD-10-PCS | Mod: CPTII,S$GLB,, | Performed by: NURSE PRACTITIONER

## 2022-07-22 PROCEDURE — 4010F ACE/ARB THERAPY RXD/TAKEN: CPT | Mod: CPTII,S$GLB,, | Performed by: NURSE PRACTITIONER

## 2022-07-22 PROCEDURE — 99999 PR PBB SHADOW E&M-EST. PATIENT-LVL V: ICD-10-PCS | Mod: PBBFAC,,, | Performed by: NURSE PRACTITIONER

## 2022-07-22 PROCEDURE — 1160F RVW MEDS BY RX/DR IN RCRD: CPT | Mod: CPTII,S$GLB,, | Performed by: NURSE PRACTITIONER

## 2022-07-22 PROCEDURE — 3008F BODY MASS INDEX DOCD: CPT | Mod: CPTII,S$GLB,, | Performed by: NURSE PRACTITIONER

## 2022-07-22 PROCEDURE — 3078F PR MOST RECENT DIASTOLIC BLOOD PRESSURE < 80 MM HG: ICD-10-PCS | Mod: CPTII,S$GLB,, | Performed by: NURSE PRACTITIONER

## 2022-07-22 PROCEDURE — 3288F PR FALLS RISK ASSESSMENT DOCUMENTED: ICD-10-PCS | Mod: CPTII,S$GLB,, | Performed by: NURSE PRACTITIONER

## 2022-07-22 PROCEDURE — G0439 PPPS, SUBSEQ VISIT: HCPCS | Mod: S$GLB,,, | Performed by: NURSE PRACTITIONER

## 2022-07-22 NOTE — PATIENT INSTRUCTIONS
Counseling and Referral of Other Preventative  (Italic type indicates deductible and co-insurance are waived)    Patient Name: Zeenat Mancia  Today's Date: 7/22/2022    Health Maintenance         Date Due Completion Date    COVID-19 Vaccine (4 - Booster for Pfizer series) 02/28/2022 10/28/2021    Colorectal Cancer Screening Scheduled for August of 2022 6/2/2021    Override on 7/15/2008: Done    Shingles Vaccine (3 of 3) 07/29/2022 6/3/2022    Influenza Vaccine (1) 09/01/2022 11/18/2021    Lipid Panel 09/17/2026 9/17/2021    TETANUS VACCINE 08/29/2029 8/29/2019          No orders of the defined types were placed in this encounter.    The following information is provided to all patients.  This information is to help you find resources for any of the problems found today that may be affecting your health:                Living healthy guide: www.[facility]      Understanding Diabetes: www.diabetes.org      Eating healthy: www.cdc.gov/healthyweight      [facility] home safety checklist: www.cdc.gov/steadi/patient.html      Agency on Aging: www.goea.[place].[facility]      Alcoholics anonymous (AA): www.aa.org      Physical Activity: www.krystian.nih.gov/so7lkdh      Tobacco use: www.quitwithusla.org

## 2022-07-22 NOTE — PROGRESS NOTES
"  Zeenat Mancia presented for a follow-up Medicare AWV today. The following components were reviewed and updated:    · Medical history  · Family History  · Social history  · Allergies and Current Medications  · Health Risk Assessment  · Health Maintenance  · Care Team    **See Completed Assessments for Annual Wellness visit with in the encounter summary    The following assessments were completed:  · Depression Screening  · Cognitive function Screening  · Timed Get Up Test  · Whisper Test    Vitals:    07/22/22 1411   BP: 112/74   BP Location: Right arm   Patient Position: Sitting   BP Method: Large (Manual)   Pulse: 62   Resp: 12   Temp: 97.5 °F (36.4 °C)   TempSrc: Temporal   SpO2: 95%   Weight: (!) 147.4 kg (324 lb 15.3 oz)   Height: 6' 2" (1.88 m)     BP Readings from Last 3 Encounters:   07/22/22 112/74   06/30/22 133/87   06/13/22 112/74       Body mass index is 41.72 kg/m².  Wt Readings from Last 3 Encounters:   07/22/22 1411 (!) 147.4 kg (324 lb 15.3 oz)   06/30/22 1106 (!) 149.7 kg (330 lb)   06/23/22 1317 (!) 149.7 kg (330 lb)       Physical Exam  Vitals reviewed.   Constitutional:       Appearance: Normal appearance.   HENT:      Head: Normocephalic and atraumatic.   Cardiovascular:      Rate and Rhythm: Normal rate.   Pulmonary:      Effort: Pulmonary effort is normal.      Breath sounds: Normal breath sounds. No wheezing.   Skin:     General: Skin is warm and dry.   Neurological:      Mental Status: He is alert and oriented to person, place, and time.              Diagnoses and health risks identified today and associated recommendations/orders:  1. Encounter for preventive health examination      2. Other insomnia    Chronic, stable on Ambien, follow up with PCP     3. Morbid obesity due to excess calories    Chronic, down a few lbs since June, follow up with PCP     4. Erectile dysfunction, unspecified erectile dysfunction type  5. Benign non-nodular prostatic hyperplasia with lower urinary tract " symptoms    Chronic, stable, follow up with Urology     6. Epiretinal membrane, both eyes  7. Pseudophakia of both eyes  8. Refractive error  9. Posterior vitreous detachment, both eyes    Chronic, stable, follow up with Opthalmology     10. Essential hypertension    Chronic, stable, continue current medication, follow up with PCP   ? If home machine is accurate - advised to discuss with Digital Medicine     11. Tortuous aorta  12. Bilateral carotid artery stenosis  13. Chronic atrial fibrillation  14. Nonobstructive atherosclerosis of coronary artery    Chronic, stable, follow up with Cardiology     15. Iron deficiency anemia due to chronic blood loss    Resolved, follow up with PCP     16. JACKELYN (obstructive sleep apnea)    Chronic, stable on CPAP, follow up with PCP     17. Calculus of gallbladder without cholecystitis without obstruction    Chronic, stable, follow up with GI if necessary     18. History of rectal bleeding  19. Colon cancer screening    Has colonoscopy scheduled for August of 2022       Provided Zeenat with a 5-10 year written screening schedule and personal prevention plan. Recommendations were developed using the USPSTF age appropriate recommendations. Education, counseling, and referrals were provided as needed.  After Visit Summary printed and given to patient which includes a list of additional screenings\tests needed.    Follow up in about 1 year (around 7/22/2023) for Medicare Annual Wellness Visit.      Charis Disla NP  I offered to discuss advanced care planning, including how to pick a person who would make decisions for you if you were unable to make them for yourself, called a health care power of , and what kind of decisions you might make such as use of life sustaining treatments such as ventilators and tube feeding when faced with a life limiting illness recorded on a living will that they will need to know. (How you want to be cared for as you near the end of your natural  life)     X  Patient has advanced directives on file, which we reviewed, and they do not wish to make changes.

## 2022-07-26 ENCOUNTER — TELEPHONE (OUTPATIENT)
Dept: PODIATRY | Facility: CLINIC | Age: 72
End: 2022-07-26
Payer: MEDICARE

## 2022-07-26 NOTE — TELEPHONE ENCOUNTER
Spoke to pt that states he's undecided about surgery at this time. He states he will give our office a call back when he decides. Pt surgery check list put in a folder for undecided pts.

## 2022-07-28 ENCOUNTER — PATIENT MESSAGE (OUTPATIENT)
Dept: NEUROLOGY | Facility: CLINIC | Age: 72
End: 2022-07-28
Payer: MEDICARE

## 2022-07-28 DIAGNOSIS — G47.00 INSOMNIA, UNSPECIFIED TYPE: ICD-10-CM

## 2022-07-28 RX ORDER — ZOLPIDEM TARTRATE 5 MG/1
TABLET ORAL
Qty: 90 TABLET | Refills: 0 | Status: SHIPPED | OUTPATIENT
Start: 2022-07-28 | End: 2022-10-31

## 2022-07-29 ENCOUNTER — TELEPHONE (OUTPATIENT)
Dept: CARDIOLOGY | Facility: CLINIC | Age: 72
End: 2022-07-29
Payer: MEDICARE

## 2022-07-29 NOTE — TELEPHONE ENCOUNTER
----- Message from Judy Jerome MD sent at 6/27/2022  9:22 AM CDT -----  Please call patient re normal pumping function of heart on echocardiogram

## 2022-08-02 ENCOUNTER — PATIENT MESSAGE (OUTPATIENT)
Dept: ENDOSCOPY | Facility: HOSPITAL | Age: 72
End: 2022-08-02
Payer: MEDICARE

## 2022-08-09 ENCOUNTER — CLINICAL SUPPORT (OUTPATIENT)
Dept: REHABILITATION | Facility: HOSPITAL | Age: 72
End: 2022-08-09
Payer: MEDICARE

## 2022-08-09 DIAGNOSIS — G89.29 CHRONIC RIGHT-SIDED LOW BACK PAIN WITHOUT SCIATICA: ICD-10-CM

## 2022-08-09 DIAGNOSIS — R68.89 DECREASED STRENGTH, ENDURANCE, AND MOBILITY: ICD-10-CM

## 2022-08-09 DIAGNOSIS — M54.9 BACK PAIN, UNSPECIFIED BACK LOCATION, UNSPECIFIED BACK PAIN LATERALITY, UNSPECIFIED CHRONICITY: ICD-10-CM

## 2022-08-09 DIAGNOSIS — M99.08 RIB CAGE DYSFUNCTION: ICD-10-CM

## 2022-08-09 DIAGNOSIS — M54.50 CHRONIC RIGHT-SIDED LOW BACK PAIN WITHOUT SCIATICA: ICD-10-CM

## 2022-08-09 DIAGNOSIS — R53.1 DECREASED STRENGTH, ENDURANCE, AND MOBILITY: ICD-10-CM

## 2022-08-09 DIAGNOSIS — Z74.09 DECREASED STRENGTH, ENDURANCE, AND MOBILITY: ICD-10-CM

## 2022-08-09 PROCEDURE — 97162 PT EVAL MOD COMPLEX 30 MIN: CPT | Mod: PN

## 2022-08-09 PROCEDURE — 97110 THERAPEUTIC EXERCISES: CPT | Mod: PN

## 2022-08-09 NOTE — PLAN OF CARE
[facility]  Physical Therapy Initial Evaluation    Date: 8/9/2022   Name: Zeenat Mancia  Clinic Number: [id number]    Therapy Diagnosis:   Encounter Diagnoses   Name Primary?    Back pain, unspecified back location, unspecified back pain laterality, unspecified chronicity     Rib cage dysfunction     Chronic right-sided low back pain without sciatica     Decreased strength, endurance, and mobility      Physician: Genna Chacko,*    Physician Orders: PT Eval and Treat   Medical Diagnosis from Referral:   M54.9 (ICD-10-CM) - Back pain, unspecified back location, unspecified back pain laterality, unspecified chronicity   M99.08 (ICD-10-CM) - Rib cage dysfunction   Evaluation Date: 8/9/2022  Authorization Period Expiration: 12/31/2022  Plan of Care Expiration: 10/5/2022  Visit # / Visits authorized: 1/50    Time In: 8:05 am  Time Out: 9:00 am  Total Appointment Time (timed & untimed codes): 55 minutes (1 MCE) (1 TE)    Precautions: Standard    Subjective   Date of onset: ~ 6 months   History of current condition - C J reports: he has been having neuropathy in both feet for about 6 months to a year and progressively started having low back pain about 4 months ago. He reports low back pain with insidious onset as he woke up with pain that progressively got worse. He reports that he has been seeing a chiropractor recently which has helped a little bit. Patient reports being fairly sedentary and gets minimal exercise and he has gainedd about 60 pounds since the pandemic. Aggravating factors include extended sitting, extended standing, bending over, and prolonged positions. Easing factors include medication and the chiropractor. Patient denies numbness/tingling down his lower extremity, but he does have tingling in both of his feet. Patient denies any saddle paresthesia, recent balance deficits, or bowel/bladder incontinence, but he does report having to urinate often. Patient does  "report 2 falls this year from tripping over his dog.     Recent falls: tripped over his dog twice; "several months ago"    Imagin2022  FINDINGS:  The lumbar vertebral bodies maintain normal height.  There is no fracture.  There is disc space narrowing throughout the lumbar spine.  There is facet joint arthropathy most apparent at the lower 2 lumbar levels.  There is mild degenerative anterolisthesis of L4 on L5.       Medical History:   Past Medical History:   Diagnosis Date    A-fib     Acute blood loss anemia     Arthritis     Atrial flutter     Colon polyp     Diverticulosis     Dyslipidemia     Dyspnea on exertion 2021    GERD (gastroesophageal reflux disease)     on Protonix    Hepatic steatosis     Hyperlipidemia     Hypertension     Irregular heart beat     Lower GI bleed 2021    Multiple gastric ulcers 2018    gi scope per Dr. Houston    Obesity     Sleep apnea     uses CPAP    Unspecified disorder of kidney and ureter     kidney stones       Surgical History:   Zeenat Mancia  has a past surgical history that includes Eye surgery; Tonsillectomy; Appendectomy; Cataract extraction (); Cataract extraction w/  intraocular lens implant (Right, 6/3/2015); Upper gastrointestinal endoscopy (2018); Colonoscopy (N/A, 10/10/2017); Colonoscopy (N/A, 2018); Esophagogastroduodenoscopy (N/A, 2018); Left heart catheterization (N/A, 2021); Coronary angiography (N/A, 2021); Robot-assisted cholecystectomy (N/A, 2021); Treatment of cardiac arrhythmia (N/A, 2021); Treatment of cardiac arrhythmia (N/A, 2021); Transesophageal echocardiography (N/A, 2021); Esophagogastroduodenoscopy (N/A, 2021); Colonoscopy (N/A, 2021); and Cystoscopy with insertion of minimally invasive implant to enlarge prostatic urethra (N/A, 2021).    Medications:   Zeenat has a current medication list which includes the following prescription(s): amlodipine, " "atenolol, docusate sodium, eliquis, gabapentin, hydrochlorothiazide, melatonin, multivit-min/fa/lycopen/lutein, oxybutynin, pantoprazole, tamsulosin, valsartan, shingrix (pf), and zolpidem.    Allergies:   Review of patient's allergies indicates:   Allergen Reactions    Lipitor [atorvastatin] Itching        Prior Therapy: no   Social History: lives with wife   Occupation: retired   Prior Level of Function: no issues about 1 year ago   Current Level of Function: difficulty with standing/walking long distances     Pain:  Current: 5/10; Worst: 8/10; Best: 0/10   Location: low back  Description: aching, dull, tight, sharp, reports numbness/tingling into bilateral feet   Aggravating Factors: see above   Easing Factors: see above    Pt's goals:   1. Patient would like to improve his low back pain and function prior to going on his cruise in late August.  Objective   Observation: > BMI    Sitting Posture:     Increased lumbar lordosis   Slumped sitting posture     DTR:   Right Left   Patellar (L3-4) 1+ 1+   Achilles (S1) 0 0       Neuro Dynamic Testing:    Sciatic nerve:      SLR:   R = (+)     L = (+)    Lumbar Range of Motion:    Degrees Pain   Flexion  50 degrees    Low back pain   Rich's sign ( + )   Extension  5 degrees    "feels good"   Left Side Bending  Limited  No pain   Right Side Bending  Limited  Right sided low back pain   Left rotation    Limited  No pain   Right Rotation    Limited   Right sided low back pain        Hip Range of Motion:   Left Passive Right Passive   External Rotation  25 degrees  20 degrees   Internal Rotation  20 degrees  25 degrees   Flexion  90 degrees  90 degrees       Lower Extremity Strength  Right LE  Left LE    Knee extension: 4/5 Knee extension: 4/5   Knee flexion: 4/5 Knee flexion: 4/5   Hip flexion: 3+/5 Hip flexion: 3+/5   Hip extension:  3/5 Hip extension: 3/5   Hip abduction: 3/5 Hip abduction: 3/5       Joint mobility: hypomobility in the lumbar spine and bilateral hips " "    Palpation:   Lumbar paraspinals and gluteus medius tender to palpation    Sensation: WNL    Flexibility: significant hamstring, gluteus, and lumbar tightness      Limitation/Restriction for FOTO Lumbar Survey    Therapist reviewed FOTO scores for Zeenat Mancia on 8/9/2022.   FOTO documents entered into EPIC - see Media section.    Limitation Score: 54%  Predicted Limitation Score: 44%         TREATMENT   Treatment Time In: 8:45 am  Treatment Time Out: 9:00 am  Total Treatment time (time-based codes) separate from Evaluation: 15 minutes    TICO ZARATE received therapeutic exercises to develop strength, endurance, ROM, flexibility, posture, and core stabilization for 15 minutes including:  Prone press ups: 2x5  LTR: 20x  SKTC: 10"x10 - towel assist   Piriformis stretch: 3x30" - towel assist       Home Exercises and Patient Education Provided    Education provided:   - HEP  - Prognosis/POC  - Pain science    Written Home Exercises Provided: yes.  Exercises were reviewed and TICO ZARATE was able to demonstrate them prior to the end of the session.  TICO ZARATE demonstrated good  understanding of the education provided.     See EMR under Patient Instructions for exercises provided 8/9/2022.    Assessment   Zeenat is a 72 y.o. male referred to outpatient Physical Therapy with a medical diagnosis of chronic right sided low back pain. Patient has signs and symptoms presenting as low back pain due to mobility deficits. Possible discogenic pain involvement; although, patient presents with lumbar facet arthropathy and hypomobility. Significant hip and lumbar range of motion deficits along with bilateral foot neuropathy without specific diagnosis for this cause. Positive seated slump test bilaterally, which could be contributing to numbness/tingling in feet, but symptoms appear to be vascular and sedentary related rather than due to compression. Patient would benefit from skilled PT focusing on regaining hip and lumbar mobility followed by core " stability and functional strengthening.     Pt to be seen 2x/week for 8 weeks     Pt prognosis is Good.   Pt will benefit from skilled outpatient Physical Therapy to address the deficits stated above and in the chart below, provide pt/family education, and to maximize pt's level of independence.     Plan of care discussed with patient: Yes  Pt's spiritual, cultural and educational needs considered and patient is agreeable to the plan of care and goals as stated below:     Anticipated Barriers for therapy: > BMI; sedentary lifestyle; difficulty describing symptoms     Medical Necessity is demonstrated by the following  History  Co-morbidities and personal factors that may impact the plan of care Co-morbidities:   advanced age, high BMI, HTN and level of undertstanding of current condition    Personal Factors:   age  coping style  lifestyle     high   Examination  Body Structures and Functions, activity limitations and participation restrictions that may impact the plan of care Body Regions:   back  lower extremities    Body Systems:    gross symmetry  ROM  strength  gross coordinated movement  balance  gait  transfers  transitions  motor control  motor learning    Participation Restrictions:   Community ambulation   Shopping   Cleaning   Chores    Activity limitations:   Learning and applying knowledge  no deficits    General Tasks and Commands  no deficits    Communication  no deficits    Mobility  lifting and carrying objects  walking    Self care  no deficits    Domestic Life  shopping  doing house work (cleaning house, washing dishes, laundry)  assisting others    Interactions/Relationships  no deficits    Life Areas  no deficits    Community and Social Life  no deficits         high   Clinical Presentation evolving clinical presentation with changing clinical characteristics moderate   Decision Making/ Complexity Score: moderate     Goals:  Short Term Goals: 4 weeks  1. Patient will be independent with HEP in  order to supplement pain free lumbar ROM - PROGRESSING, NOT MET  2. Pt will improve hip flexibility to WNL to promote functional mobility - PROGRESSING, NOT MET  3. Patient will display the ability to perform an isometric transverse abdominis isometric contraction in supine for improved trunk stability - PROGRESSING, NOT MET      Long Term Goals: 8 weeks   1. Pt will improve lumbar FOTO survey to </=44% limited in order to return to ADLs without limitation - PROGRESSING, NOT MET  2. Patient will improve hip flexion, abduction, and extension strength to a 5/5 bilaterally for improved trunk support. - PROGRESSING, NOT MET  3. Pt will report no pain during lumbar AROM in order to promote functional mobility - PROGRESSING, NOT MET  4. Patient will display the ability and understanding of performing a proper hip hinge with quality movement coordination for lifting mechanics. - PROGRESSING, NOT MET     Plan   Plan of care Certification: 8/9/2022 to 10/5/2022    Outpatient Physical Therapy 2 times weekly for 8 weeks to include the following interventions: Cervical/Lumbar Traction, Gait Training, Manual Therapy, Moist Heat/ Ice, Neuromuscular Re-ed, Patient Education, Self Care, Therapeutic Activities, and Therapeutic Exercise.     Bala Graham, PT

## 2022-08-11 ENCOUNTER — IMMUNIZATION (OUTPATIENT)
Dept: INTERNAL MEDICINE | Facility: CLINIC | Age: 72
End: 2022-08-11
Payer: MEDICARE

## 2022-08-11 DIAGNOSIS — Z23 NEED FOR VACCINATION: Primary | ICD-10-CM

## 2022-08-11 PROCEDURE — 0054A COVID-19, MRNA, LNP-S, PF, 30 MCG/0.3 ML DOSE VACCINE (PFIZER): CPT | Mod: PBBFAC | Performed by: FAMILY MEDICINE

## 2022-08-15 ENCOUNTER — CLINICAL SUPPORT (OUTPATIENT)
Dept: REHABILITATION | Facility: HOSPITAL | Age: 72
End: 2022-08-15
Payer: MEDICARE

## 2022-08-15 DIAGNOSIS — M99.08 RIB CAGE DYSFUNCTION: ICD-10-CM

## 2022-08-15 DIAGNOSIS — R53.1 DECREASED STRENGTH, ENDURANCE, AND MOBILITY: ICD-10-CM

## 2022-08-15 DIAGNOSIS — R68.89 DECREASED STRENGTH, ENDURANCE, AND MOBILITY: ICD-10-CM

## 2022-08-15 DIAGNOSIS — M54.9 BACK PAIN, UNSPECIFIED BACK LOCATION, UNSPECIFIED BACK PAIN LATERALITY, UNSPECIFIED CHRONICITY: Primary | ICD-10-CM

## 2022-08-15 DIAGNOSIS — Z74.09 DECREASED STRENGTH, ENDURANCE, AND MOBILITY: ICD-10-CM

## 2022-08-15 PROCEDURE — 97110 THERAPEUTIC EXERCISES: CPT | Mod: PN

## 2022-08-15 PROCEDURE — 97140 MANUAL THERAPY 1/> REGIONS: CPT | Mod: PN

## 2022-08-15 NOTE — PROGRESS NOTES
"[facility]   Physical Therapy Treatment Note     Name: Zeenat Mancia  Clinic Number: [id number]    Therapy Diagnosis:   Encounter Diagnoses   Name Primary?    Back pain, unspecified back location, unspecified back pain laterality, unspecified chronicity Yes    Rib cage dysfunction     Decreased strength, endurance, and mobility      Physician: Genna Chacko,*    Visit Date: 8/15/2022     Physician Orders: PT Eval and Treat   Medical Diagnosis from Referral:   M54.9 (ICD-10-CM) - Back pain, unspecified back location, unspecified back pain laterality, unspecified chronicity   M99.08 (ICD-10-CM) - Rib cage dysfunction   Evaluation Date: 8/9/2022  Authorization Period Expiration: 12/31/2022  Plan of Care Expiration: 10/5/2022  Visit # / Visits authorized: 2/50     Time In: 8:05 am  Time Out: 9:00 am  Total Appointment Time (timed & untimed codes): 55 minutes (3 TE) (1 MT)     Precautions: Standard    SUBJECTIVE     Pt reports: he had a reaction following the covid-19 booster and has been feeling under the weather since our evaluation. He reports feeling like he has recovered at this point, but he didn't get to do any of the exercises provided. Patient reports minimal low back pain today, but if he moves a certain way, he will feel it.  He was not compliant with home exercise program.  Response to previous treatment: no change  Functional change: no change     Pain: 3/10  Location: low back     OBJECTIVE     Objective Measures updated at progress report unless specified.     Treatment     C J received the treatments listed below:      therapeutic exercises to develop strength, endurance, ROM, flexibility, posture and core stabilization for 45 minutes including:  LTR: 20x  Supine hamstring stretch with strap: 3x30 - bilateral   Hip adduction with transverse abdominis contraction: 5"x10   Hook lying hip abduction isometric: green theraband - 5"x20  Seated swiss ball rollouts: " "10x  Gastroc fitter stretch: 4x30"  Hamstring stretch at stairs: 3x30"  Nu-step: 6'xL3    manual therapy techniques: Joint mobilizations were applied to the: bilateral hips for 10 minutes, including:  Manual hip flexion PROM - bilateral   Lateral hip distraction - bilateral           Patient Education and Home Exercises     Home Exercises Provided and Patient Education Provided     Education provided:   - home exercise program   - POC/Prognosis     Written Home Exercises Provided: Patient instructed to cont prior HEP. Exercises were reviewed and TICO ZARATE was able to demonstrate them prior to the end of the session.  TICO ZARATE demonstrated good  understanding of the education provided. See EMR under Patient Instructions for exercises provided during therapy sessions    ASSESSMENT   Zeenat is a 72 y.o. male referred to outpatient Physical Therapy with a medical diagnosis of chronic right sided low back pain. Patient has signs and symptoms presenting as low back pain due to mobility deficits. Possible discogenic pain involvement; although, patient presents with lumbar facet arthropathy and hypomobility. Significant hip and lumbar range of motion deficits along with bilateral foot neuropathy without specific diagnosis for this cause. Patient presents with lethargy secondary to current state of condition and reports of reaction to covid-19 booster. Therex completed at a slow pace and patient instructed to begin home exercise program and increasing exercise demands at home.     TICO ZARATE Is progressing well towards his goals.   Pt prognosis is Good.     Pt will continue to benefit from skilled outpatient physical therapy to address the deficits listed in the problem list box on initial evaluation, provide pt/family education and to maximize pt's level of independence in the home and community environment.     Pt's spiritual, cultural and educational needs considered and pt agreeable to plan of care and goals.     Anticipated barriers to " physical therapy: > BMI; sedentary lifestyle; difficulty describing symptoms    Goals:   Short Term Goals: 4 weeks  1. Patient will be independent with HEP in order to supplement pain free lumbar ROM - PROGRESSING, NOT MET  2. Pt will improve hip flexibility to WNL to promote functional mobility - PROGRESSING, NOT MET  3. Patient will display the ability to perform an isometric transverse abdominis isometric contraction in supine for improved trunk stability - PROGRESSING, NOT MET      Long Term Goals: 8 weeks   1. Pt will improve lumbar FOTO survey to </=44% limited in order to return to ADLs without limitation - PROGRESSING, NOT MET  2. Patient will improve hip flexion, abduction, and extension strength to a 5/5 bilaterally for improved trunk support. - PROGRESSING, NOT MET  3. Pt will report no pain during lumbar AROM in order to promote functional mobility - PROGRESSING, NOT MET  4. Patient will display the ability and understanding of performing a proper hip hinge with quality movement coordination for lifting mechanics. - PROGRESSING, NOT MET     PLAN   Lumbar/hip mobility   Core strength  Gross lower extremity strengthening   Endurance and mobility     Bala Graham, PT

## 2022-08-17 ENCOUNTER — CLINICAL SUPPORT (OUTPATIENT)
Dept: REHABILITATION | Facility: HOSPITAL | Age: 72
End: 2022-08-17
Payer: MEDICARE

## 2022-08-17 DIAGNOSIS — M54.50 ACUTE BILATERAL LOW BACK PAIN WITHOUT SCIATICA: ICD-10-CM

## 2022-08-17 DIAGNOSIS — R68.89 DECREASED STRENGTH, ENDURANCE, AND MOBILITY: ICD-10-CM

## 2022-08-17 DIAGNOSIS — Z74.09 DECREASED STRENGTH, ENDURANCE, AND MOBILITY: ICD-10-CM

## 2022-08-17 DIAGNOSIS — R53.1 DECREASED STRENGTH, ENDURANCE, AND MOBILITY: ICD-10-CM

## 2022-08-17 DIAGNOSIS — M54.9 BACK PAIN, UNSPECIFIED BACK LOCATION, UNSPECIFIED BACK PAIN LATERALITY, UNSPECIFIED CHRONICITY: Primary | ICD-10-CM

## 2022-08-17 DIAGNOSIS — M99.08 RIB CAGE DYSFUNCTION: ICD-10-CM

## 2022-08-17 PROCEDURE — 97140 MANUAL THERAPY 1/> REGIONS: CPT | Mod: PN,CQ

## 2022-08-17 PROCEDURE — 97110 THERAPEUTIC EXERCISES: CPT | Mod: PN,CQ

## 2022-08-17 NOTE — PROGRESS NOTES
"[facility]   Physical Therapy Treatment Note     Name: Zeenat Mancia  Clinic Number: [id number]    Therapy Diagnosis:   Encounter Diagnoses   Name Primary?    Back pain, unspecified back location, unspecified back pain laterality, unspecified chronicity Yes    Rib cage dysfunction     Acute bilateral low back pain without sciatica     Decreased strength, endurance, and mobility      Physician: Genna Chacko,*    Visit Date: 8/17/2022     Physician Orders: PT Eval and Treat   Medical Diagnosis from Referral:   M54.9 (ICD-10-CM) - Back pain, unspecified back location, unspecified back pain laterality, unspecified chronicity   M99.08 (ICD-10-CM) - Rib cage dysfunction   Evaluation Date: 8/9/2022  Authorization Period Expiration: 12/31/2022  Plan of Care Expiration: 10/5/2022  Visit # / Visits authorized: 3/50     Time In: 800  Time Out: 0900  Total Appointment Time (timed & untimed codes): 55 minutes (3 TE) (1 MT)     Precautions: Standard    SUBJECTIVE     Pt reports: he is dizzy when going from sit to stand. His pain is minimal today   He was not compliant with home exercise program.  Response to previous treatment: no change  Functional change: no change     Pain: 3/10  Location: low back     OBJECTIVE     Objective Measures updated at progress report unless specified.     Treatment     C J received the treatments listed below:      therapeutic exercises to develop strength, endurance, ROM, flexibility, posture and core stabilization for 45 minutes including:  LTR: 20x  Supine hamstring stretch with strap: 3x30 - bilateral   Supine March into PB c/ 3sec hold x10   Hip adduction with transverse abdominis contraction: 5"x10   Hook lying hip abduction isometric: green theraband - 5"x20 (increase NV)  Seated swiss ball rollouts: 10x  STS from chair with airex 2x5   Gastroc fitter stretch: 4x30"  Hamstring stretch at stairs: 3x30"  Nu-step: 6'xL3    manual therapy techniques: Joint " mobilizations were applied to the: bilateral hips for 10 minutes, including:  Manual hip flexion PROM - bilateral   Lateral hip distraction - bilateral           Patient Education and Home Exercises     Home Exercises Provided and Patient Education Provided     Education provided:   - home exercise program   - POC/Prognosis     Written Home Exercises Provided: Patient instructed to cont prior HEP. Exercises were reviewed and TICO ZARATE was able to demonstrate them prior to the end of the session.  TICO ZARATE demonstrated good  understanding of the education provided. See EMR under Patient Instructions for exercises provided during therapy sessions    ASSESSMENT   Zeenat is a 72 y.o. male referred to outpatient Physical Therapy with a medical diagnosis of chronic right sided low back pain. Patient has signs and symptoms presenting as low back pain due to mobility deficits. Pt reports dizziness changing positions throughout session, but improves within a couple of minutes. Pt had difficulty going from supine <> sit on mat with increased low back pain. Increased core strengthening with supine marches and added functional strengthening with sit to stand both tolerated well.    TICO ZARATE Is progressing well towards his goals.   Pt prognosis is Good.     Pt will continue to benefit from skilled outpatient physical therapy to address the deficits listed in the problem list box on initial evaluation, provide pt/family education and to maximize pt's level of independence in the home and community environment.     Pt's spiritual, cultural and educational needs considered and pt agreeable to plan of care and goals.     Anticipated barriers to physical therapy: > BMI; sedentary lifestyle; difficulty describing symptoms    Goals:   Short Term Goals: 4 weeks  1. Patient will be independent with HEP in order to supplement pain free lumbar ROM - PROGRESSING, NOT MET  2. Pt will improve hip flexibility to WNL to promote functional mobility -  PROGRESSING, NOT MET  3. Patient will display the ability to perform an isometric transverse abdominis isometric contraction in supine for improved trunk stability - PROGRESSING, NOT MET      Long Term Goals: 8 weeks   1. Pt will improve lumbar FOTO survey to </=44% limited in order to return to ADLs without limitation - PROGRESSING, NOT MET  2. Patient will improve hip flexion, abduction, and extension strength to a 5/5 bilaterally for improved trunk support. - PROGRESSING, NOT MET  3. Pt will report no pain during lumbar AROM in order to promote functional mobility - PROGRESSING, NOT MET  4. Patient will display the ability and understanding of performing a proper hip hinge with quality movement coordination for lifting mechanics. - PROGRESSING, NOT MET     PLAN   Lumbar/hip mobility   Core strength  Gross lower extremity strengthening   Endurance and mobility     Blake Reyna, PTA

## 2022-08-22 ENCOUNTER — CLINICAL SUPPORT (OUTPATIENT)
Dept: REHABILITATION | Facility: HOSPITAL | Age: 72
End: 2022-08-22
Payer: MEDICARE

## 2022-08-22 DIAGNOSIS — R68.89 DECREASED STRENGTH, ENDURANCE, AND MOBILITY: ICD-10-CM

## 2022-08-22 DIAGNOSIS — R53.1 DECREASED STRENGTH, ENDURANCE, AND MOBILITY: ICD-10-CM

## 2022-08-22 DIAGNOSIS — Z74.09 DECREASED STRENGTH, ENDURANCE, AND MOBILITY: ICD-10-CM

## 2022-08-22 DIAGNOSIS — M99.08 RIB CAGE DYSFUNCTION: Primary | ICD-10-CM

## 2022-08-22 PROCEDURE — 97110 THERAPEUTIC EXERCISES: CPT | Mod: PN

## 2022-08-22 NOTE — PROGRESS NOTES
"[facility]   Physical Therapy Treatment Note     Name: Zeenat Mancia  Clinic Number: [id number]    Therapy Diagnosis:   Encounter Diagnoses   Name Primary?    Rib cage dysfunction Yes    Decreased strength, endurance, and mobility      Physician: Genna Chacko,*    Visit Date: 8/22/2022     Physician Orders: PT Eval and Treat   Medical Diagnosis from Referral:   M54.9 (ICD-10-CM) - Back pain, unspecified back location, unspecified back pain laterality, unspecified chronicity   M99.08 (ICD-10-CM) - Rib cage dysfunction   Evaluation Date: 8/9/2022  Authorization Period Expiration: 12/31/2022  Plan of Care Expiration: 10/5/2022  Visit # / Visits authorized: 4/50     Time In: 11:00am  Time Out: 11:58 am  Total Appointment Time (timed & untimed codes): 30 minutes ( physical therapist 1:1 2 TE)     Precautions: Standard    SUBJECTIVE     Pt reports: he is constantly dizzy especially when moving into different positions but he always has a consistent level of dizziness with sitting/standing. He I has an appointment with DO to discuss dizziness. His back is feeling better until he pulled a suitcase from the closet that aggravated it.     He was not compliant with home exercise program.  Response to previous treatment: no change  Functional change: no change     Pain: 0/10  Location: low back     OBJECTIVE     Objective Measures updated at progress report unless specified.     Treatment     C J received the treatments listed below:      therapeutic exercises to develop strength, endurance, ROM, flexibility, posture and core stabilization for 30 minutes with 1:1 PT including:  Nu-step: 6'xL3  LTR: 20x  Piriformis Stretch" 3x30"   Sciatic nerve glides: 25x  Hip adduction with transverse abdominis contraction: 5"x10   Hook lying hip abduction isometric with transverse abdominis contraction : green theraband - 5"x20 (increase NV)  LAQS: 2# ankle weights - 20x each  Prone press up: " "3x10  Seated swiss ball rollouts: 15x  STS from chair : 2x10    OOT:  Supine hamstring stretch with strap: 3x30 - bilateral   Supine March into PB c/ 3sec hold x10   Gastroc fitter stretch: 4x30"  Hamstring stretch at stairs: 3x30"      manual therapy techniques: Joint mobilizations were applied to the: bilateral hips for 0 minutes, including:  Manual hip flexion PROM - bilateral   Lateral hip distraction - bilateral           Patient Education and Home Exercises     Home Exercises Provided and Patient Education Provided     Education provided:   - home exercise program   - POC/Prognosis     Written Home Exercises Provided: Patient instructed to cont prior HEP. Exercises were reviewed and TICO ZARATE was able to demonstrate them prior to the end of the session.  TICO ZARATE demonstrated good  understanding of the education provided. See EMR under Patient Instructions for exercises provided during therapy sessions    ASSESSMENT   Zeenat is a 72 y.o. male referred to outpatient Physical Therapy with a medical diagnosis of chronic right sided low back pain. Patient has signs and symptoms presenting as low back pain due to mobility deficits. Patient reports dizziness with all transitions and a low level of constant dizziness with static positions. He verbalizes he has an appointment with a DO to discuss dizziness tomorrow on 8/23/2022. Educated patient on signs and symptoms to mention at appointment. Patient is slow to transition with all bed mobility with increased back pain with some transitions. He is moderately fatigued following session stating his legs felt "weak". He would benefit from more functional strengthening during upcoming visits. Will monitor dizziness.    TICO ZARATE Is progressing well towards his goals.   Pt prognosis is Good.     Pt will continue to benefit from skilled outpatient physical therapy to address the deficits listed in the problem list box on initial evaluation, provide pt/family education and to maximize pt's " level of independence in the home and community environment.     Pt's spiritual, cultural and educational needs considered and pt agreeable to plan of care and goals.     Anticipated barriers to physical therapy: > BMI; sedentary lifestyle; difficulty describing symptoms    Goals:   Short Term Goals: 4 weeks  1. Patient will be independent with HEP in order to supplement pain free lumbar ROM - PROGRESSING, NOT MET  2. Pt will improve hip flexibility to WNL to promote functional mobility - PROGRESSING, NOT MET  3. Patient will display the ability to perform an isometric transverse abdominis isometric contraction in supine for improved trunk stability - PROGRESSING, NOT MET      Long Term Goals: 8 weeks   1. Pt will improve lumbar FOTO survey to </=44% limited in order to return to ADLs without limitation - PROGRESSING, NOT MET  2. Patient will improve hip flexion, abduction, and extension strength to a 5/5 bilaterally for improved trunk support. - PROGRESSING, NOT MET  3. Pt will report no pain during lumbar AROM in order to promote functional mobility - PROGRESSING, NOT MET  4. Patient will display the ability and understanding of performing a proper hip hinge with quality movement coordination for lifting mechanics. - PROGRESSING, NOT MET     PLAN   Lumbar/hip mobility   Core strength  Gross lower extremity strengthening   Endurance and mobility     Kusum Julien, PT

## 2022-08-23 ENCOUNTER — OFFICE VISIT (OUTPATIENT)
Dept: PHYSICAL MEDICINE AND REHAB | Facility: CLINIC | Age: 72
End: 2022-08-23
Payer: MEDICARE

## 2022-08-23 VITALS
BODY MASS INDEX: 40.43 KG/M2 | WEIGHT: 315 LBS | HEART RATE: 54 BPM | HEIGHT: 74 IN | SYSTOLIC BLOOD PRESSURE: 97 MMHG | DIASTOLIC BLOOD PRESSURE: 70 MMHG

## 2022-08-23 DIAGNOSIS — M54.16 LUMBAR RADICULOPATHY, CHRONIC: ICD-10-CM

## 2022-08-23 PROCEDURE — 4010F ACE/ARB THERAPY RXD/TAKEN: CPT | Mod: CPTII,S$GLB,, | Performed by: PHYSICAL MEDICINE & REHABILITATION

## 2022-08-23 PROCEDURE — 1126F PR PAIN SEVERITY QUANTIFIED, NO PAIN PRESENT: ICD-10-PCS | Mod: CPTII,S$GLB,, | Performed by: PHYSICAL MEDICINE & REHABILITATION

## 2022-08-23 PROCEDURE — 3288F FALL RISK ASSESSMENT DOCD: CPT | Mod: CPTII,S$GLB,, | Performed by: PHYSICAL MEDICINE & REHABILITATION

## 2022-08-23 PROCEDURE — 4010F PR ACE/ARB THEARPY RXD/TAKEN: ICD-10-PCS | Mod: CPTII,S$GLB,, | Performed by: PHYSICAL MEDICINE & REHABILITATION

## 2022-08-23 PROCEDURE — 3288F PR FALLS RISK ASSESSMENT DOCUMENTED: ICD-10-PCS | Mod: CPTII,S$GLB,, | Performed by: PHYSICAL MEDICINE & REHABILITATION

## 2022-08-23 PROCEDURE — 3008F BODY MASS INDEX DOCD: CPT | Mod: CPTII,S$GLB,, | Performed by: PHYSICAL MEDICINE & REHABILITATION

## 2022-08-23 PROCEDURE — 1100F PTFALLS ASSESS-DOCD GE2>/YR: CPT | Mod: CPTII,S$GLB,, | Performed by: PHYSICAL MEDICINE & REHABILITATION

## 2022-08-23 PROCEDURE — 99213 PR OFFICE/OUTPT VISIT, EST, LEVL III, 20-29 MIN: ICD-10-PCS | Mod: S$GLB,,, | Performed by: PHYSICAL MEDICINE & REHABILITATION

## 2022-08-23 PROCEDURE — 99213 OFFICE O/P EST LOW 20 MIN: CPT | Mod: S$GLB,,, | Performed by: PHYSICAL MEDICINE & REHABILITATION

## 2022-08-23 PROCEDURE — 3078F PR MOST RECENT DIASTOLIC BLOOD PRESSURE < 80 MM HG: ICD-10-PCS | Mod: CPTII,S$GLB,, | Performed by: PHYSICAL MEDICINE & REHABILITATION

## 2022-08-23 PROCEDURE — 1159F MED LIST DOCD IN RCRD: CPT | Mod: CPTII,S$GLB,, | Performed by: PHYSICAL MEDICINE & REHABILITATION

## 2022-08-23 PROCEDURE — 3074F PR MOST RECENT SYSTOLIC BLOOD PRESSURE < 130 MM HG: ICD-10-PCS | Mod: CPTII,S$GLB,, | Performed by: PHYSICAL MEDICINE & REHABILITATION

## 2022-08-23 PROCEDURE — 3078F DIAST BP <80 MM HG: CPT | Mod: CPTII,S$GLB,, | Performed by: PHYSICAL MEDICINE & REHABILITATION

## 2022-08-23 PROCEDURE — 1126F AMNT PAIN NOTED NONE PRSNT: CPT | Mod: CPTII,S$GLB,, | Performed by: PHYSICAL MEDICINE & REHABILITATION

## 2022-08-23 PROCEDURE — 3008F PR BODY MASS INDEX (BMI) DOCUMENTED: ICD-10-PCS | Mod: CPTII,S$GLB,, | Performed by: PHYSICAL MEDICINE & REHABILITATION

## 2022-08-23 PROCEDURE — 1157F ADVNC CARE PLAN IN RCRD: CPT | Mod: CPTII,S$GLB,, | Performed by: PHYSICAL MEDICINE & REHABILITATION

## 2022-08-23 PROCEDURE — 1100F PR PT FALLS ASSESS DOC 2+ FALLS/FALL W/INJURY/YR: ICD-10-PCS | Mod: CPTII,S$GLB,, | Performed by: PHYSICAL MEDICINE & REHABILITATION

## 2022-08-23 PROCEDURE — 99999 PR PBB SHADOW E&M-EST. PATIENT-LVL IV: CPT | Mod: PBBFAC,,, | Performed by: PHYSICAL MEDICINE & REHABILITATION

## 2022-08-23 PROCEDURE — 3074F SYST BP LT 130 MM HG: CPT | Mod: CPTII,S$GLB,, | Performed by: PHYSICAL MEDICINE & REHABILITATION

## 2022-08-23 PROCEDURE — 1159F PR MEDICATION LIST DOCUMENTED IN MEDICAL RECORD: ICD-10-PCS | Mod: CPTII,S$GLB,, | Performed by: PHYSICAL MEDICINE & REHABILITATION

## 2022-08-23 PROCEDURE — 99999 PR PBB SHADOW E&M-EST. PATIENT-LVL IV: ICD-10-PCS | Mod: PBBFAC,,, | Performed by: PHYSICAL MEDICINE & REHABILITATION

## 2022-08-23 PROCEDURE — 1157F PR ADVANCE CARE PLAN OR EQUIV PRESENT IN MEDICAL RECORD: ICD-10-PCS | Mod: CPTII,S$GLB,, | Performed by: PHYSICAL MEDICINE & REHABILITATION

## 2022-08-23 RX ORDER — DULOXETIN HYDROCHLORIDE 20 MG/1
20 CAPSULE, DELAYED RELEASE ORAL DAILY
Qty: 30 CAPSULE | Refills: 11 | Status: SHIPPED | OUTPATIENT
Start: 2022-08-23 | End: 2022-12-13

## 2022-08-23 NOTE — PROGRESS NOTES
HPI:  Patient is a 72 y.o. year old male  w. Back pain and foot pain. I have been following him for neuralgia due to peripheral neuropathy. Last eval. I prescribed physical therapy which is helping. However, he is still having back pain radiating to his legs. He denies any weakness, frequent falls or trauma.  He is on neurontin which gives him some relief, but it makes him sedated.    Imaging  No pertinent imaging     Labs  EGFr cr lft's gluc nl         Past Medical History:   Diagnosis Date    A-fib     Acute blood loss anemia     Arthritis     Atrial flutter     Colon polyp     Diverticulosis     Dyslipidemia     Dyspnea on exertion 5/31/2021    GERD (gastroesophageal reflux disease)     on Protonix    Hepatic steatosis     Hyperlipidemia     Hypertension     Irregular heart beat     Lower GI bleed 5/31/2021    Multiple gastric ulcers 05/16/2018    gi scope per Dr. Houston    Obesity     Sleep apnea     uses CPAP    Unspecified disorder of kidney and ureter     kidney stones     Past Surgical History:   Procedure Laterality Date    APPENDECTOMY      CATARACT EXTRACTION  2009    OS    CATARACT EXTRACTION W/  INTRAOCULAR LENS IMPLANT Right 6/3/2015    [id number] 14.5d//    COLONOSCOPY N/A 10/10/2017    Procedure: COLONOSCOPY;  Surgeon: Jameson Houston MD;  Location: [facility];  Service: Endoscopy;  Laterality: N/A; repeat in 5 years for surveillance    COLONOSCOPY N/A 5/16/2018    Procedure: COLONOSCOPY;  Surgeon: Jameson Houston MD;  Location: [facility];  Service: Endoscopy;  Laterality: N/A;    COLONOSCOPY N/A 6/2/2021    Procedure: COLONOSCOPY;  Surgeon: Agustin Corona MD;  Location: [facility] ([address] [place] [address]);  Service: Endoscopy;  Laterality: N/A;    CORONARY ANGIOGRAPHY N/A 1/4/2021    Procedure: ANGIOGRAM, CORONARY ARTERY;  Surgeon: Jase Moore MD;  Location: [facility] CATH LAB/EP;  Service: Cardiology;  Laterality: N/A;    CYSTOSCOPY WITH INSERTION OF MINIMALLY INVASIVE IMPLANT  TO ENLARGE PROSTATIC URETHRA N/A 6/29/2021    Procedure: TRANSPROSTATIC TISSUE RETRACTION;  Surgeon: Agustin Clay MD;  Location: [facility] OR;  Service: Urology;  Laterality: N/A;    ESOPHAGOGASTRODUODENOSCOPY N/A 7/2/2018    Procedure: EGD (ESOPHAGOGASTRODUODENOSCOPY);  Surgeon: Jameson Houston MD;  Location: [facility];  Service: Endoscopy;  Laterality: N/A;    ESOPHAGOGASTRODUODENOSCOPY N/A 6/2/2021    Procedure: EGD (ESOPHAGOGASTRODUODENOSCOPY);  Surgeon: Agustin Corona MD;  Location: [facility] ENDO ([facility]);  Service: Endoscopy;  Laterality: N/A;    EYE SURGERY      cataract    LEFT HEART CATHETERIZATION N/A 1/4/2021    Procedure: Left heart cath;  Surgeon: Jase Moore MD;  Location: [facility] CATH LAB/EP;  Service: Cardiology;  Laterality: N/A;    ROBOT-ASSISTED CHOLECYSTECTOMY N/A 2/4/2021    Procedure: ROBOTIC CHOLECYSTECTOMY;  Surgeon: Pal Cheney MD;  Location: [facility] OR;  Service: General;  Laterality: N/A;    TONSILLECTOMY      TRANSESOPHAGEAL ECHOCARDIOGRAPHY N/A 5/14/2021    Procedure: ECHOCARDIOGRAM, TRANSESOPHAGEAL;  Surgeon: Leo Reeves III, MD;  Location: [facility] EP LAB;  Service: Cardiology;  Laterality: N/A;    TREATMENT OF CARDIAC ARRHYTHMIA N/A 4/19/2021    Procedure: Cardioversion or Defibrillation;  Surgeon: Judy Jerome MD;  Location: [facility] CATH LAB/EP;  Service: Cardiology;  Laterality: N/A;    TREATMENT OF CARDIAC ARRHYTHMIA N/A 5/14/2021    Procedure: CARDIOVERSION;  Surgeon: PEMA Downs MD;  Location: [facility] EP LAB;  Service: Cardiology;  Laterality: N/A;  AF, PHUONG, DCCV, MAC, DM, 3 PREP    UPPER GASTROINTESTINAL ENDOSCOPY  05/16/2018    Dr. Houston     Family History   Problem Relation Age of Onset    Arthritis Mother     Heart disease Mother     Blindness Mother         OS due to injury    Coronary artery disease Father     Heart disease Father     Cataracts Father     Amblyopia Neg Hx     Cancer Neg Hx     Diabetes Neg Hx     Glaucoma Neg Hx      Hypertension Neg Hx     Macular degeneration Neg Hx     Retinal detachment Neg Hx     Strabismus Neg Hx     Stroke Neg Hx     Thyroid disease Neg Hx     Colon cancer Neg Hx     Colon polyps Neg Hx     Crohn's disease Neg Hx     Ulcerative colitis Neg Hx     Esophageal cancer Neg Hx     Stomach cancer Neg Hx      Social History     Socioeconomic History    Marital status:     Number of children: 3   Tobacco Use    Smoking status: Former Smoker     Start date: 10/5/1997    Smokeless tobacco: Never Used    Tobacco comment: quit in 1980s    Substance and Sexual Activity    Alcohol use: Yes     Comment: 1 beer every 2 month    Drug use: No    Sexual activity: Not Currently     Partners: Female   Social History Narrative    Patient is originally from [place] at         [place] ; [place]        Working ; retired,  plants            47 years         Children    Son x3         Lives with  Kerri         [employer]    Updates    9/8/20- moved from [facility]       Social Determinants of Health     Financial Resource Strain: Low Risk     Difficulty of Paying Living Expenses: Not hard at all   Food Insecurity: No Food Insecurity    Worried About Running Out of Food in the Last Year: Never true    Ran Out of Food in the Last Year: Never true   Transportation Needs: No Transportation Needs    Lack of Transportation (Medical): No    Lack of Transportation (Non-Medical): No   Physical Activity: Insufficiently Active    Days of Exercise per Week: 1 day    Minutes of Exercise per Session: 10 min   Stress: Stress Concern Present    Feeling of Stress : Rather much   Social Connections: Unknown    Frequency of Communication with Friends and Family: Three times a week    Frequency of Social Gatherings with Friends and Family: Once a week    Active Member of Clubs or Organizations: Yes    Attends Club or Organization Meetings: 1 to 4 times  per year    Marital Status:    Housing Stability: Low Risk     Unable to Pay for Housing in the Last Year: No    Number of Places Lived in the Last Year: 2    Unstable Housing in the Last Year: No       Review of patient's allergies indicates:   Allergen Reactions    Lipitor [atorvastatin] Itching       Current Outpatient Medications:     amLODIPine (NORVASC) 5 MG tablet, TAKE 1 TABLET(5 MG) BY MOUTH EVERY DAY, Disp: 90 tablet, Rfl: 1    atenoloL (TENORMIN) 50 MG tablet, Take 1 tablet (50 mg total) by mouth once daily., Disp: 90 tablet, Rfl: 3    docusate sodium (COLACE) 100 MG capsule, Take 100 mg by mouth 2 (two) times daily., Disp: , Rfl:     ELIQUIS 5 mg Tab, TAKE 1 TABLET(5 MG) BY MOUTH TWICE DAILY, Disp: 180 tablet, Rfl: 3    gabapentin (NEURONTIN) 600 MG tablet, Take 1 tablet (600 mg total) by mouth 3 (three) times daily., Disp: 90 tablet, Rfl: 11    hydroCHLOROthiazide (HYDRODIURIL) 25 MG tablet, TAKE 1 TABLET(25 MG) BY MOUTH EVERY DAY, Disp: 90 tablet, Rfl: 0    melatonin 10 mg Tab, Take 10 mg by mouth every evening., Disp: , Rfl:     multivit-min/FA/lycopen/lutein (CENTRUM SILVER MEN ORAL), Take by mouth once daily., Disp: , Rfl:     oxybutynin (DITROPAN) 5 MG Tab, Take 1 tablet (5 mg total) by mouth every evening., Disp: 30 tablet, Rfl: 11    pantoprazole (PROTONIX) 40 MG tablet, TAKE 1 TABLET(40 MG) BY MOUTH EVERY DAY, Disp: 90 tablet, Rfl: 3    tamsulosin (FLOMAX) 0.4 mg Cap, Take 1 capsule (0.4 mg total) by mouth once daily., Disp: 30 capsule, Rfl: 11    valsartan (DIOVAN) 320 MG tablet, TAKE 1 TABLET(320 MG) BY MOUTH EVERY DAY, Disp: 90 tablet, Rfl: 3    varicella-zoster gE-AS01B, PF, (SHINGRIX, PF,) 50 mcg/0.5 mL injection, Inject into the muscle., Disp: 1 each, Rfl: 0    zolpidem (AMBIEN) 5 MG Tab, TAKE 1 TABLET(5 MG) BY MOUTH EVERY NIGHT AS NEEDED, Disp: 90 tablet, Rfl: 0      Review of Systems  No nausea, vomiting, fevers, Chills , contipation, diarrhea or  "sweats    Physical Exam:      Vitals:    08/23/22 0935   BP: 97/70   Pulse: (!) 54        alert and oriented ×4 follows commands answers all questions appropriately,affect wnl  Manual muscle test 5 out of 5 sensation to light touch grossly intact  +tenderness right thoracic and lumbar paraspinals and QL  Antalgic  Gait  Difficulty going from sitting to standing  -slR modified  NO muscular atrophy  No C/C/E       Assessment:  Lumbar radiculitis  Peripheral neuropathy  Gastric ulcers avoid nsaids      Plan:  cymbalta trial  Cont. Therapy  If this does not help, may consider a "sweet" caudal GWENDOLYN          "

## 2022-08-25 ENCOUNTER — TELEPHONE (OUTPATIENT)
Dept: BARIATRICS | Facility: CLINIC | Age: 72
End: 2022-08-25
Payer: MEDICARE

## 2022-09-03 ENCOUNTER — PATIENT MESSAGE (OUTPATIENT)
Dept: PHYSICAL MEDICINE AND REHAB | Facility: CLINIC | Age: 72
End: 2022-09-03
Payer: MEDICARE

## 2022-09-13 ENCOUNTER — TELEPHONE (OUTPATIENT)
Dept: INTERNAL MEDICINE | Facility: CLINIC | Age: 72
End: 2022-09-13
Payer: MEDICARE

## 2022-09-13 ENCOUNTER — CLINICAL SUPPORT (OUTPATIENT)
Dept: REHABILITATION | Facility: HOSPITAL | Age: 72
End: 2022-09-13
Payer: MEDICARE

## 2022-09-13 DIAGNOSIS — M54.9 BACK PAIN, UNSPECIFIED BACK LOCATION, UNSPECIFIED BACK PAIN LATERALITY, UNSPECIFIED CHRONICITY: Primary | ICD-10-CM

## 2022-09-13 DIAGNOSIS — R68.89 DECREASED STRENGTH, ENDURANCE, AND MOBILITY: ICD-10-CM

## 2022-09-13 DIAGNOSIS — G89.29 CHRONIC RIGHT-SIDED LOW BACK PAIN WITHOUT SCIATICA: ICD-10-CM

## 2022-09-13 DIAGNOSIS — Z74.09 DECREASED STRENGTH, ENDURANCE, AND MOBILITY: ICD-10-CM

## 2022-09-13 DIAGNOSIS — M99.08 RIB CAGE DYSFUNCTION: ICD-10-CM

## 2022-09-13 DIAGNOSIS — R53.1 DECREASED STRENGTH, ENDURANCE, AND MOBILITY: ICD-10-CM

## 2022-09-13 DIAGNOSIS — M54.50 CHRONIC RIGHT-SIDED LOW BACK PAIN WITHOUT SCIATICA: ICD-10-CM

## 2022-09-13 PROCEDURE — 97530 THERAPEUTIC ACTIVITIES: CPT | Mod: PN

## 2022-09-13 NOTE — TELEPHONE ENCOUNTER
Returned pt call in regards to :  Pt called and stated that she has been dizzy not sleeping well and wheezing and would only want to see you.     Left voicemail

## 2022-09-13 NOTE — TELEPHONE ENCOUNTER
----- Message from Virginia Guerrero sent at 9/13/2022  8:58 AM CDT -----  Contact: Pt [phone]  Pt called and stated that she has been dizzy not sleeping well and wheezing and would only want to see you.     Please call

## 2022-09-13 NOTE — PROGRESS NOTES
[facility]   Physical Therapy Treatment Note     Name: Zeenat Mancia  Clinic Number: [id number]    Therapy Diagnosis:   Encounter Diagnoses   Name Primary?    Back pain, unspecified back location, unspecified back pain laterality, unspecified chronicity Yes    Rib cage dysfunction     Chronic right-sided low back pain without sciatica     Decreased strength, endurance, and mobility      Physician: Genna Chacko,*    Visit Date: 9/13/2022     Physician Orders: PT Eval and Treat   Medical Diagnosis from Referral:   M54.9 (ICD-10-CM) - Back pain, unspecified back location, unspecified back pain laterality, unspecified chronicity   M99.08 (ICD-10-CM) - Rib cage dysfunction   Evaluation Date: 8/9/2022  Authorization Period Expiration: 12/31/2022  Plan of Care Expiration: 10/5/2022  Visit # / Visits authorized: 5/50     Time In: 8:00 am  Time Out: 8:20 m  Total Appointment Time (timed & untimed codes): 20 minutes (1 TE)     Precautions: Standard    SUBJECTIVE     Pt reports: he is no longer having any low back pain and he believes he is having non-orthopedic health issues at this point. He reports going back to MD soon and feels as if he could be discharged from physical therapy today    He was not compliant with home exercise program.  Response to previous treatment: no change  Functional change: no change     Pain: 0/10  Location: low back     OBJECTIVE     Objective Measures updated at progress report unless specified.     Treatment     C J received the treatments listed below:      Therapeutic activities to improve functional performance for 20 minutes, including:  FOTO  Home exercise program overview   Questions and answers   Discharge            Patient Education and Home Exercises     Home Exercises Provided and Patient Education Provided     Education provided:   - home exercise program   - POC/Prognosis     Written Home Exercises Provided: Patient instructed to cont prior HEP.  Exercises were reviewed and TICO ZARATE was able to demonstrate them prior to the end of the session.  TICO ZARATE demonstrated good  understanding of the education provided. See EMR under Patient Instructions for exercises provided during therapy sessions    ASSESSMENT   Zeenat is a 72 y.o. male referred to outpatient Physical Therapy with a medical diagnosis of chronic right sided low back pain. Patient presented to therapy with reports of no longer experiencing low back pain. Patient has follow up with MD soon and was recommended to attend follow up and discuss plan of care. Patient has been discharged from physical therapy.     TICO ZARATE Is progressing well towards his goals.   Pt prognosis is Good.     Pt will continue to benefit from skilled outpatient physical therapy to address the deficits listed in the problem list box on initial evaluation, provide pt/family education and to maximize pt's level of independence in the home and community environment.     Pt's spiritual, cultural and educational needs considered and pt agreeable to plan of care and goals.     Anticipated barriers to physical therapy: > BMI; sedentary lifestyle; difficulty describing symptoms    Goals:   Short Term Goals: 4 weeks  1. Patient will be independent with HEP in order to supplement pain free lumbar ROM - PROGRESSING, NOT MET  2. Pt will improve hip flexibility to WNL to promote functional mobility - PROGRESSING, NOT MET  3. Patient will display the ability to perform an isometric transverse abdominis isometric contraction in supine for improved trunk stability - PROGRESSING, NOT MET      Long Term Goals: 8 weeks   1. Pt will improve lumbar FOTO survey to </=44% limited in order to return to ADLs without limitation - PROGRESSING, NOT MET  2. Patient will improve hip flexion, abduction, and extension strength to a 5/5 bilaterally for improved trunk support. - PROGRESSING, NOT MET  3. Pt will report no pain during lumbar AROM in order to promote functional  mobility - PROGRESSING, NOT MET  4. Patient will display the ability and understanding of performing a proper hip hinge with quality movement coordination for lifting mechanics. - PROGRESSING, NOT MET     PLAN   Lumbar/hip mobility   Core strength  Gross lower extremity strengthening   Endurance and mobility     Bala Graham, PT

## 2022-09-14 ENCOUNTER — TELEPHONE (OUTPATIENT)
Dept: PHYSICAL MEDICINE AND REHAB | Facility: CLINIC | Age: 72
End: 2022-09-14
Payer: MEDICARE

## 2022-09-14 ENCOUNTER — PATIENT MESSAGE (OUTPATIENT)
Dept: PHYSICAL MEDICINE AND REHAB | Facility: CLINIC | Age: 72
End: 2022-09-14
Payer: MEDICARE

## 2022-09-19 ENCOUNTER — HOSPITAL ENCOUNTER (OUTPATIENT)
Dept: RADIOLOGY | Facility: HOSPITAL | Age: 72
Discharge: HOME | End: 2022-09-19
Attending: PHYSICAL MEDICINE & REHABILITATION
Payer: MEDICARE

## 2022-09-19 DIAGNOSIS — M54.16 LUMBAR RADICULOPATHY, CHRONIC: ICD-10-CM

## 2022-09-19 PROBLEM — M99.08 RIB CAGE DYSFUNCTION: Status: RESOLVED | Noted: 2022-08-09 | Resolved: 2022-09-19

## 2022-09-19 PROBLEM — Z74.09 DECREASED STRENGTH, ENDURANCE, AND MOBILITY: Status: RESOLVED | Noted: 2022-08-09 | Resolved: 2022-09-19

## 2022-09-19 PROBLEM — R53.1 DECREASED STRENGTH, ENDURANCE, AND MOBILITY: Status: RESOLVED | Noted: 2022-08-09 | Resolved: 2022-09-19

## 2022-09-19 PROBLEM — R68.89 DECREASED STRENGTH, ENDURANCE, AND MOBILITY: Status: RESOLVED | Noted: 2022-08-09 | Resolved: 2022-09-19

## 2022-09-19 PROBLEM — M54.50 LOW BACK PAIN: Status: RESOLVED | Noted: 2022-08-09 | Resolved: 2022-09-19

## 2022-09-19 PROBLEM — M54.9 BACK PAIN: Status: RESOLVED | Noted: 2022-08-09 | Resolved: 2022-09-19

## 2022-09-19 PROCEDURE — 72148 MRI LUMBAR SPINE W/O DYE: CPT | Mod: TC

## 2022-09-19 PROCEDURE — 72148 MRI LUMBAR SPINE WITHOUT CONTRAST: ICD-10-PCS | Mod: 26,,, | Performed by: RADIOLOGY

## 2022-09-19 PROCEDURE — 72148 MRI LUMBAR SPINE W/O DYE: CPT | Mod: 26,,, | Performed by: RADIOLOGY

## 2022-09-23 ENCOUNTER — PATIENT MESSAGE (OUTPATIENT)
Dept: UROLOGY | Facility: CLINIC | Age: 72
End: 2022-09-23
Payer: MEDICARE

## 2022-09-29 ENCOUNTER — TELEPHONE (OUTPATIENT)
Dept: INTERNAL MEDICINE | Facility: CLINIC | Age: 72
End: 2022-09-29
Payer: MEDICARE

## 2022-09-29 NOTE — TELEPHONE ENCOUNTER
----- Message from Christine Gaines sent at 9/29/2022  9:12 AM CDT -----  Contact: [phone]  Pt is scheduled to see Dr Ledezma for dizziness on 10/21/22. He is requesting an appt to be seen sooner with only Dr Ledezma but nothing is available. Can you please call him to discuss? He states he also has fatigue but no other symptoms.

## 2022-10-03 ENCOUNTER — PATIENT MESSAGE (OUTPATIENT)
Dept: ENDOSCOPY | Facility: HOSPITAL | Age: 72
End: 2022-10-03
Payer: MEDICARE

## 2022-10-10 ENCOUNTER — ANESTHESIA EVENT (OUTPATIENT)
Dept: ENDOSCOPY | Facility: HOSPITAL | Age: 72
End: 2022-10-10
Payer: MEDICARE

## 2022-10-11 ENCOUNTER — HOSPITAL ENCOUNTER (OUTPATIENT)
Facility: HOSPITAL | Age: 72
Discharge: HOME | End: 2022-10-11
Attending: STUDENT IN AN ORGANIZED HEALTH CARE EDUCATION/TRAINING PROGRAM | Admitting: STUDENT IN AN ORGANIZED HEALTH CARE EDUCATION/TRAINING PROGRAM
Payer: MEDICARE

## 2022-10-11 ENCOUNTER — ANESTHESIA (OUTPATIENT)
Dept: ENDOSCOPY | Facility: HOSPITAL | Age: 72
End: 2022-10-11
Payer: MEDICARE

## 2022-10-11 VITALS
TEMPERATURE: 98 F | HEIGHT: 74 IN | HEART RATE: 66 BPM | BODY MASS INDEX: 39.78 KG/M2 | WEIGHT: 310 LBS | RESPIRATION RATE: 16 BRPM | DIASTOLIC BLOOD PRESSURE: 56 MMHG | SYSTOLIC BLOOD PRESSURE: 92 MMHG | OXYGEN SATURATION: 95 %

## 2022-10-11 DIAGNOSIS — K59.00 CONSTIPATION: ICD-10-CM

## 2022-10-11 PROCEDURE — D9220A PRA ANESTHESIA: Mod: PT,ANES,, | Performed by: ANESTHESIOLOGY

## 2022-10-11 PROCEDURE — 88305 TISSUE EXAM BY PATHOLOGIST: CPT | Performed by: PATHOLOGY

## 2022-10-11 PROCEDURE — 37000008 HC ANESTHESIA 1ST 15 MINUTES: Performed by: STUDENT IN AN ORGANIZED HEALTH CARE EDUCATION/TRAINING PROGRAM

## 2022-10-11 PROCEDURE — 45380 PR COLONOSCOPY,BIOPSY: ICD-10-PCS | Mod: PT,,, | Performed by: STUDENT IN AN ORGANIZED HEALTH CARE EDUCATION/TRAINING PROGRAM

## 2022-10-11 PROCEDURE — 37000009 HC ANESTHESIA EA ADD 15 MINS: Performed by: STUDENT IN AN ORGANIZED HEALTH CARE EDUCATION/TRAINING PROGRAM

## 2022-10-11 PROCEDURE — 25000003 PHARM REV CODE 250: Performed by: NURSE ANESTHETIST, CERTIFIED REGISTERED

## 2022-10-11 PROCEDURE — 27201089 HC SNARE, DISP (ANY): Performed by: STUDENT IN AN ORGANIZED HEALTH CARE EDUCATION/TRAINING PROGRAM

## 2022-10-11 PROCEDURE — 63600175 PHARM REV CODE 636 W HCPCS: Performed by: NURSE ANESTHETIST, CERTIFIED REGISTERED

## 2022-10-11 PROCEDURE — 45380 COLONOSCOPY AND BIOPSY: CPT | Mod: PT | Performed by: STUDENT IN AN ORGANIZED HEALTH CARE EDUCATION/TRAINING PROGRAM

## 2022-10-11 PROCEDURE — 45380 COLONOSCOPY AND BIOPSY: CPT | Mod: PT,,, | Performed by: STUDENT IN AN ORGANIZED HEALTH CARE EDUCATION/TRAINING PROGRAM

## 2022-10-11 PROCEDURE — 88305 TISSUE EXAM BY PATHOLOGIST: CPT | Mod: 26,,, | Performed by: PATHOLOGY

## 2022-10-11 PROCEDURE — D9220A PRA ANESTHESIA: ICD-10-PCS | Mod: PT,CRNA,, | Performed by: NURSE ANESTHETIST, CERTIFIED REGISTERED

## 2022-10-11 PROCEDURE — D9220A PRA ANESTHESIA: Mod: PT,CRNA,, | Performed by: NURSE ANESTHETIST, CERTIFIED REGISTERED

## 2022-10-11 PROCEDURE — 88305 TISSUE EXAM BY PATHOLOGIST: ICD-10-PCS | Mod: 26,,, | Performed by: PATHOLOGY

## 2022-10-11 PROCEDURE — D9220A PRA ANESTHESIA: ICD-10-PCS | Mod: PT,ANES,, | Performed by: ANESTHESIOLOGY

## 2022-10-11 RX ORDER — LIDOCAINE HYDROCHLORIDE 20 MG/ML
INJECTION INTRAVENOUS
Status: DISCONTINUED | OUTPATIENT
Start: 2022-10-11 | End: 2022-10-11

## 2022-10-11 RX ORDER — ONDANSETRON 2 MG/ML
4 INJECTION INTRAMUSCULAR; INTRAVENOUS ONCE AS NEEDED
Status: DISCONTINUED | OUTPATIENT
Start: 2022-10-11 | End: 2022-10-11 | Stop reason: HOSPADM

## 2022-10-11 RX ORDER — SODIUM CHLORIDE 0.9 % (FLUSH) 0.9 %
10 SYRINGE (ML) INJECTION
Status: DISCONTINUED | OUTPATIENT
Start: 2022-10-11 | End: 2022-10-11 | Stop reason: HOSPADM

## 2022-10-11 RX ORDER — PROPOFOL 10 MG/ML
VIAL (ML) INTRAVENOUS CONTINUOUS PRN
Status: DISCONTINUED | OUTPATIENT
Start: 2022-10-11 | End: 2022-10-11

## 2022-10-11 RX ORDER — PROPOFOL 10 MG/ML
VIAL (ML) INTRAVENOUS
Status: DISCONTINUED | OUTPATIENT
Start: 2022-10-11 | End: 2022-10-11

## 2022-10-11 RX ADMIN — PROPOFOL 150 MCG/KG/MIN: 10 INJECTION, EMULSION INTRAVENOUS at 07:10

## 2022-10-11 RX ADMIN — SODIUM CHLORIDE: 0.9 INJECTION, SOLUTION INTRAVENOUS at 07:10

## 2022-10-11 RX ADMIN — PROPOFOL 50 MG: 10 INJECTION, EMULSION INTRAVENOUS at 07:10

## 2022-10-11 RX ADMIN — LIDOCAINE HYDROCHLORIDE 100 MG: 20 INJECTION INTRAVENOUS at 07:10

## 2022-10-11 RX ADMIN — PROPOFOL 30 MG: 10 INJECTION, EMULSION INTRAVENOUS at 07:10

## 2022-10-11 NOTE — PLAN OF CARE
Patient discharged to home with all belongings. Alert and oriented. Denied pain or discomfort. Vital signs stable.

## 2022-10-11 NOTE — [employer] PATIENT INSTRUCTIONS
Discharge Summary/Instructions after an Endoscopic Procedure  Patient Name: Zeenat Mancia  Patient MRN: [id number]  Patient YOB: 1950 Tuesday, October 11, 2022  Edin Lara MD  Dear patient,  As a result of recent federal legislation (The Federal Cures Act), you may   receive lab or pathology results from your procedure in your [facility]   account before your physician is able to contact you. Your physician or   their representative will relay the results to you with their   recommendations at their soonest availability.  Thank you,  RESTRICTIONS:  During your procedure today, you received medications for sedation.  These   medications may affect your judgment, balance and coordination.  Therefore,   for 24 hours, you have the following restrictions:   - DO NOT drive a car, operate machinery, make legal/financial decisions,   sign important papers or drink alcohol.    ACTIVITY:  Today: no heavy lifting, straining or running due to procedural   sedation/anesthesia.  The following day: return to full activity including work.  DIET:  Eat and drink normally unless instructed otherwise.     TREATMENT FOR COMMON SIDE EFFECTS:  - Mild abdominal pain, nausea, belching, bloating or excessive gas:  rest,   eat lightly and use a heating pad.  - Sore Throat: treat with throat lozenges and/or gargle with warm salt   water.  - Because air was used during the procedure, expelling large amounts of air   from your rectum or belching is normal.  - If a bowel prep was taken, you may not have a bowel movement for 1-3 days.    This is normal.  SYMPTOMS TO WATCH FOR AND REPORT TO YOUR PHYSICIAN:  1. Abdominal pain or bloating, other than gas cramps.  2. Chest pain.  3. Back pain.  4. Signs of infection such as: chills or fever occurring within 24 hours   after the procedure.  5. Rectal bleeding, which would show as bright red, maroon, or black stools.   (A tablespoon of blood from the rectum is not serious, especially  if   hemorrhoids are present.)  6. Vomiting.  7. Weakness or dizziness.  GO DIRECTLY TO THE NEAREST EMERGENCY ROOM IF YOU HAVE ANY OF THE FOLLOWING:      Difficulty breathing              Chills and/or fever over 101 F   Persistent vomiting and/or vomiting blood   Severe abdominal pain   Severe chest pain   Black, tarry stools   Bleeding- more than one tablespoon   Any other symptom or condition that you feel may need urgent attention  Your doctor recommends these additional instructions:  If any biopsies were taken, your doctors clinic will contact you in 1 to 2   weeks with any results.  - Repeat colonoscopy in 5 years for surveillance.   - Patient has a contact number available for emergencies.  The signs and   symptoms of potential delayed complications were discussed with the   patient.  Return to normal activities tomorrow.  Written discharge   instructions were provided to the patient.   - Resume previous diet.   - Continue present medications.   - Await pathology results.   - Resume Eliquis (apixaban) at prior dose today.   - Discharge patient to home.   For questions, problems or results please call your physician - Edin Lara MD at Work:  [phone].  [facility], EMERGENCY ROOM PHONE NUMBER: [phone]  IF A COMPLICATION OR EMERGENCY SITUATION ARISES AND YOU ARE UNABLE TO REACH   YOUR PHYSICIAN - GO DIRECTLY TO THE EMERGENCY ROOM.  Edin Lara MD  10/11/2022 8:29:19 AM  This report has been verified and signed electronically.  Dear patient,  As a result of recent federal legislation (The Federal Cures Act), you may   receive lab or pathology results from your procedure in your [facility]   account before your physician is able to contact you. Your physician or   their representative will relay the results to you with their   recommendations at their soonest availability.  Thank you,  [employer]ATION

## 2022-10-11 NOTE — TRANSFER OF CARE
"Anesthesia Transfer of Care Note    Patient: Zeenat Mancia    Procedure(s) Performed: Procedure(s) (LRB):  COLONOSCOPY (N/A)    Patient location: [facility]    Anesthesia Type: general    Transport from OR: Transported from OR on room air with adequate spontaneous ventilation    Post pain: adequate analgesia    Post assessment: no apparent anesthetic complications and tolerated procedure well    Post vital signs: stable    Level of consciousness: awake, alert and oriented    Nausea/Vomiting: no nausea/vomiting    Complications: none    Transfer of care protocol was followed      Last vitals:   Visit Vitals  BP (!) 92/56   Pulse 66   Temp 36.8 °C (98.3 °F) (Temporal)   Resp 16   Ht 6' 2" (1.88 m)   Wt (!) 140.6 kg (310 lb)   SpO2 95%   BMI 39.80 kg/m²     "

## 2022-10-11 NOTE — H&P
Short Stay Endoscopy History and Physical    PCP - Rikki Ledezma MD    Procedure - Colonoscopy  ASA - per anesthesia  Mallampati - per anesthesia  History of Anesthesia problems - no  Family history Anesthesia problems - no   Plan of anesthesia - General    HPI:  This is a 72 y.o. male here for evaluation of : constipation and personal history of colon polyps      ROS:  Constitutional: No fevers, chills, No weight loss  CV: No chest pain  Pulm: No cough, No shortness of breath  GI: see HPI  Derm: No rash    Medical History:  has a past medical history of A-fib, Acute blood loss anemia, Arthritis, Atrial flutter, Colon polyp, Diverticulosis, Dyslipidemia, Dyspnea on exertion (5/31/2021), GERD (gastroesophageal reflux disease), Hepatic steatosis, Hyperlipidemia, Hypertension, Irregular heart beat, Lower GI bleed (5/31/2021), Multiple gastric ulcers (05/16/2018), Obesity, Sleep apnea, and Unspecified disorder of kidney and ureter.    Surgical History:  has a past surgical history that includes Eye surgery; Tonsillectomy; Appendectomy; Cataract extraction (2009); Cataract extraction w/  intraocular lens implant (Right, 6/3/2015); Upper gastrointestinal endoscopy (05/16/2018); Colonoscopy (N/A, 10/10/2017); Colonoscopy (N/A, 5/16/2018); Esophagogastroduodenoscopy (N/A, 7/2/2018); Left heart catheterization (N/A, 1/4/2021); Coronary angiography (N/A, 1/4/2021); Robot-assisted cholecystectomy (N/A, 2/4/2021); Treatment of cardiac arrhythmia (N/A, 4/19/2021); Treatment of cardiac arrhythmia (N/A, 5/14/2021); Transesophageal echocardiography (N/A, 5/14/2021); Esophagogastroduodenoscopy (N/A, 6/2/2021); Colonoscopy (N/A, 6/2/2021); and Cystoscopy with insertion of minimally invasive implant to enlarge prostatic urethra (N/A, 6/29/2021).    Family History: family history includes Arthritis in his mother; Blindness in his mother; Cataracts in his father; Coronary artery disease in his father; Heart disease in his father  and mother.. Otherwise no colon cancer, inflammatory bowel disease, or GI malignancies.    Social History:  reports that he has quit smoking. He started smoking about 25 years ago. He has never used smokeless tobacco. He reports current alcohol use. He reports that he does not use drugs.    Review of patient's allergies indicates:   Allergen Reactions    Lipitor [atorvastatin] Itching       Medications:   Medications Prior to Admission   Medication Sig Dispense Refill Last Dose    amLODIPine (NORVASC) 5 MG tablet TAKE 1 TABLET(5 MG) BY MOUTH EVERY DAY 90 tablet 1 10/11/2022    atenoloL (TENORMIN) 50 MG tablet TAKE 1 TABLET(50 MG) BY MOUTH EVERY DAY 90 tablet 3 10/11/2022    ELIQUIS 5 mg Tab TAKE 1 TABLET(5 MG) BY MOUTH TWICE DAILY 180 tablet 3 Past Week    gabapentin (NEURONTIN) 600 MG tablet Take 1 tablet (600 mg total) by mouth 3 (three) times daily. 90 tablet 11 10/11/2022    hydroCHLOROthiazide (HYDRODIURIL) 25 MG tablet TAKE 1 TABLET(25 MG) BY MOUTH EVERY DAY 90 tablet 0 10/11/2022    tamsulosin (FLOMAX) 0.4 mg Cap Take 1 capsule (0.4 mg total) by mouth once daily. 30 capsule 11 10/11/2022    valsartan (DIOVAN) 320 MG tablet TAKE 1 TABLET(320 MG) BY MOUTH EVERY DAY 90 tablet 3 10/11/2022    docusate sodium (COLACE) 100 MG capsule Take 100 mg by mouth 2 (two) times daily.       DULoxetine (CYMBALTA) 20 MG capsule Take 1 capsule (20 mg total) by mouth once daily. 30 capsule 11     melatonin 10 mg Tab Take 10 mg by mouth every evening.       multivit-min/FA/lycopen/lutein (CENTRUM SILVER MEN ORAL) Take by mouth once daily.       oxybutynin (DITROPAN) 5 MG Tab Take 1 tablet (5 mg total) by mouth every evening. 30 tablet 11     pantoprazole (PROTONIX) 40 MG tablet TAKE 1 TABLET(40 MG) BY MOUTH EVERY DAY 90 tablet 3     varicella-zoster [id number], PF, (SHINGRIX, PF,) 50 mcg/0.5 mL injection Inject into the muscle. 1 each 0     zolpidem (AMBIEN) 5 MG Tab TAKE 1 TABLET(5 MG) BY MOUTH EVERY NIGHT AS NEEDED 90 tablet 0           Physical Exam:    Vital Signs:   Vitals:    10/11/22 0723   BP: 121/87   Pulse: 65   Resp: 18   Temp: 98 °F (36.7 °C)       Gen: NAD, lying comfortably  HENT: NCAT, oropharynx clear  Eyes: anicteric sclerae, EOMI grossly  Neck: supple, no visible masses/goiter  Cardiac: RRR  Lungs: non-labored breathing  Abd: soft, NT/ND, normoactive BS  Ext: no LE edema, warm, well perfused  Skin: skin intact on exposed body parts, no visible rashes, lesions  Neuro: A&Ox4, neuro exam grossly intact, moves all extremities  Psych: appropriate mood, affect        Labs:  Lab Results   Component Value Date    WBC 7.56 05/05/2022    HGB 18.8 (H) 05/05/2022    HCT 56.1 (H) 05/05/2022     05/05/2022    CHOL 133 09/17/2021    TRIG 143 09/17/2021    HDL 36 (L) 09/17/2021    ALT 16 04/19/2022    AST 15 04/19/2022     04/19/2022    K 4.4 04/19/2022    CL 98 04/19/2022    CREATININE 1.2 04/19/2022    BUN 15 04/19/2022    CO2 2 05/05/2022    TSH 1.363 09/17/2021    PSA 0.44 08/21/2017    INR 1.2 05/11/2021    HGBA1C 5.8 (H) 01/15/2021       Plan:  Colonoscopy for constipation, h/o colon polyps    I have explained the risks and benefits of endoscopy procedures to the patient including but not limited to bleeding, perforation, infection, and death.  The patient was asked if they understand and allowed to ask any further questions to their satisfaction.    Jacek Zuluaga MD

## 2022-10-11 NOTE — ANESTHESIA PREPROCEDURE EVALUATION
10/11/2022  Zeenat Mancia is a 72 y.o., male   Pre-operative evaluation for Procedure(s) (LRB):  COLONOSCOPY (N/A)    Zeenat Mancia is a 72 y.o. male     Patient Active Problem List   Diagnosis    Erectile dysfunction    JACKELYN (obstructive sleep apnea)    Essential hypertension    Morbid obesity due to excess calories    Benign non-nodular prostatic hyperplasia with lower urinary tract symptoms    Pseudophakia of both eyes    Refractive error    Epiretinal membrane, both eyes    Posterior vitreous detachment, both eyes    Tortuous aorta    Colon cancer screening    Chronic atrial fibrillation    History of rectal bleeding    Iron deficiency anemia due to chronic blood loss    Nonobstructive atherosclerosis of coronary artery    Other insomnia    Calculus of gallbladder without cholecystitis without obstruction       Review of patient's allergies indicates:   Allergen Reactions    Lipitor [atorvastatin] Itching       No current facility-administered medications on file prior to encounter.     Current Outpatient Medications on File Prior to Encounter   Medication Sig Dispense Refill    amLODIPine (NORVASC) 5 MG tablet TAKE 1 TABLET(5 MG) BY MOUTH EVERY DAY 90 tablet 1    ELIQUIS 5 mg Tab TAKE 1 TABLET(5 MG) BY MOUTH TWICE DAILY 180 tablet 3    gabapentin (NEURONTIN) 600 MG tablet Take 1 tablet (600 mg total) by mouth 3 (three) times daily. 90 tablet 11    tamsulosin (FLOMAX) 0.4 mg Cap Take 1 capsule (0.4 mg total) by mouth once daily. 30 capsule 11    valsartan (DIOVAN) 320 MG tablet TAKE 1 TABLET(320 MG) BY MOUTH EVERY DAY 90 tablet 3    docusate sodium (COLACE) 100 MG capsule Take 100 mg by mouth 2 (two) times daily.      melatonin 10 mg Tab Take 10 mg by mouth every evening.      multivit-min/FA/lycopen/lutein (CENTRUM SILVER MEN ORAL) Take by mouth once daily.      oxybutynin  (DITROPAN) 5 MG Tab Take 1 tablet (5 mg total) by mouth every evening. 30 tablet 11    pantoprazole (PROTONIX) 40 MG tablet TAKE 1 TABLET(40 MG) BY MOUTH EVERY DAY 90 tablet 3       Past Surgical History:   Procedure Laterality Date    APPENDECTOMY      CATARACT EXTRACTION  2009    OS    CATARACT EXTRACTION W/  INTRAOCULAR LENS IMPLANT Right 6/3/2015    [id number] 14.5d//    COLONOSCOPY N/A 10/10/2017    Procedure: COLONOSCOPY;  Surgeon: Jameson Houston MD;  Location: [facility];  Service: Endoscopy;  Laterality: N/A; repeat in 5 years for surveillance    COLONOSCOPY N/A 5/16/2018    Procedure: COLONOSCOPY;  Surgeon: Jameson Houston MD;  Location: [facility];  Service: Endoscopy;  Laterality: N/A;    COLONOSCOPY N/A 6/2/2021    Procedure: COLONOSCOPY;  Surgeon: Agustin Corona MD;  Location: [facility] (2ND FLR);  Service: Endoscopy;  Laterality: N/A;    CORONARY ANGIOGRAPHY N/A 1/4/2021    Procedure: ANGIOGRAM, CORONARY ARTERY;  Surgeon: Jase Moore MD;  Location: [facility] CATH LAB/EP;  Service: Cardiology;  Laterality: N/A;    CYSTOSCOPY WITH INSERTION OF MINIMALLY INVASIVE IMPLANT TO ENLARGE PROSTATIC URETHRA N/A 6/29/2021    Procedure: TRANSPROSTATIC TISSUE RETRACTION;  Surgeon: Agustin Clay MD;  Location: [facility];  Service: Urology;  Laterality: N/A;    ESOPHAGOGASTRODUODENOSCOPY N/A 7/2/2018    Procedure: EGD (ESOPHAGOGASTRODUODENOSCOPY);  Surgeon: Jameson Houston MD;  Location: [facility];  Service: Endoscopy;  Laterality: N/A;    ESOPHAGOGASTRODUODENOSCOPY N/A 6/2/2021    Procedure: EGD (ESOPHAGOGASTRODUODENOSCOPY);  Surgeon: Agustin Corona MD;  Location: [facility] (2ND FLR);  Service: Endoscopy;  Laterality: N/A;    EYE SURGERY      cataract    LEFT HEART CATHETERIZATION N/A 1/4/2021    Procedure: Left heart cath;  Surgeon: Jase Moore MD;  Location: [facility] CATH LAB/EP;  Service: Cardiology;  Laterality: N/A;    ROBOT-ASSISTED CHOLECYSTECTOMY N/A 2/4/2021    Procedure:  ROBOTIC CHOLECYSTECTOMY;  Surgeon: Pal Cheney MD;  Location: [facility] OR;  Service: General;  Laterality: N/A;    TONSILLECTOMY      TRANSESOPHAGEAL ECHOCARDIOGRAPHY N/A 5/14/2021    Procedure: ECHOCARDIOGRAM, TRANSESOPHAGEAL;  Surgeon: Leo Reeves III, MD;  Location: [facility] EP LAB;  Service: Cardiology;  Laterality: N/A;    TREATMENT OF CARDIAC ARRHYTHMIA N/A 4/19/2021    Procedure: Cardioversion or Defibrillation;  Surgeon: Judy Jerome MD;  Location: [facility] CATH LAB/EP;  Service: Cardiology;  Laterality: N/A;    TREATMENT OF CARDIAC ARRHYTHMIA N/A 5/14/2021    Procedure: CARDIOVERSION;  Surgeon: PEMA Downs MD;  Location: [facility] EP LAB;  Service: Cardiology;  Laterality: N/A;  AF, PHUONG, DCCV, MAC, DM, 3 PREP    UPPER GASTROINTESTINAL ENDOSCOPY  05/16/2018    Dr. Houston       2D Echo:  Results for orders placed or performed in visit on 10/17/17   2D Echo w/ Color Flow Doppler   Result Value Ref Range    EF + QEF 65 55 - 65    Mitral Valve Regurgitation TRIVIAL     Diastolic Dysfunction No     Est. PA Systolic Pressure 26.23     Tricuspid Valve Regurgitation TRIVIAL TO MILD            Pre-op Assessment    I have reviewed the Patient Summary Reports.     I have reviewed the Nursing Notes. I have reviewed the NPO Status.   I have reviewed the Medications.     Review of Systems  Anesthesia Hx:  Denies Hx of Anesthetic complications  History of prior surgery of interest to airway management or planning: Denies Family Hx of Anesthesia complications.  Personal Hx of Anesthesia complications  Difficult Intubation   Social:  Former Smoker, Social Alcohol Use    Hematology/Oncology:  Hematology Normal   Oncology Normal     EENT/Dental:EENT/Dental Normal   Cardiovascular:   Exercise tolerance: good Hypertension CAD  Dysrhythmias atrial fibrillation    Pulmonary:   Shortness of breath Sleep Apnea    Renal/:   Chronic Renal Disease    Hepatic/GI:   PUD, GERD    Neurological:  Neurology Normal     Endocrine:  Endocrine Normal  Morbid Obesity / BMI > 40  Psych:  Psychiatric Normal           Physical Exam  General: Well nourished, Cooperative, Alert and Oriented    Airway:  Mallampati: III   Mouth Opening: Normal  TM Distance: Normal  Tongue: Normal  Neck ROM: Normal ROM    Dental:  Intact    Chest/Lungs:  Clear to auscultation    Heart:  Rate: Normal        Anesthesia Plan  Type of Anesthesia, risks & benefits discussed:    Anesthesia Type: Gen ETT, Gen Natural Airway  Intra-op Monitoring Plan: Standard ASA Monitors  Post Op Pain Control Plan: multimodal analgesia and IV/PO Opioids PRN  Induction:  IV  Airway Plan: Direct, Post-Induction  Informed Consent: Informed consent signed with the Patient and all parties understand the risks and agree with anesthesia plan.  All questions answered. Patient consented to blood products? No  ASA Score: 3  Day of Surgery Review of History & Physical: H&P Update referred to the surgeon/provider.    Ready For Surgery From Anesthesia Perspective.     .

## 2022-10-11 NOTE — ANESTHESIA POSTPROCEDURE EVALUATION
Anesthesia Post Evaluation    Patient: Zeenat Mancia    Procedure(s) Performed: Procedure(s) (LRB):  COLONOSCOPY (N/A)    Final Anesthesia Type: general      Patient location during evaluation: GI PACU  Patient participation: Yes- Able to Participate  Level of consciousness: awake and alert and oriented  Post-procedure vital signs: reviewed and stable  Pain management: adequate  Airway patency: patent    PONV status at discharge: No PONV  Anesthetic complications: no      Cardiovascular status: blood pressure returned to baseline and hemodynamically stable  Respiratory status: unassisted, spontaneous ventilation and room air  Hydration status: euvolemic  Follow-up not needed.          Vitals Value Taken Time   /90 10/11/22 0901   Temp 36.8 °C (98.3 °F) 10/11/22 0829   Pulse 60 10/11/22 0910   Resp 16 10/11/22 0831   SpO2 96 % 10/11/22 0910   Vitals shown include unvalidated device data.      No case tracking events are documented in the log.      Pain/Jose Score: Jose Score: 7 (10/11/2022  8:30 AM)

## 2022-10-13 ENCOUNTER — OFFICE VISIT (OUTPATIENT)
Dept: PHYSICAL MEDICINE AND REHAB | Facility: CLINIC | Age: 72
End: 2022-10-13
Payer: MEDICARE

## 2022-10-13 VITALS
HEART RATE: 83 BPM | BODY MASS INDEX: 39.78 KG/M2 | WEIGHT: 310 LBS | HEIGHT: 74 IN | DIASTOLIC BLOOD PRESSURE: 83 MMHG | SYSTOLIC BLOOD PRESSURE: 128 MMHG

## 2022-10-13 DIAGNOSIS — M79.2 NEURALGIA: Primary | ICD-10-CM

## 2022-10-13 LAB
FINAL PATHOLOGIC DIAGNOSIS: NORMAL
GROSS: NORMAL
Lab: NORMAL

## 2022-10-13 PROCEDURE — 1157F PR ADVANCE CARE PLAN OR EQUIV PRESENT IN MEDICAL RECORD: ICD-10-PCS | Mod: CPTII,S$GLB,, | Performed by: PHYSICAL MEDICINE & REHABILITATION

## 2022-10-13 PROCEDURE — 3074F PR MOST RECENT SYSTOLIC BLOOD PRESSURE < 130 MM HG: ICD-10-PCS | Mod: CPTII,S$GLB,, | Performed by: PHYSICAL MEDICINE & REHABILITATION

## 2022-10-13 PROCEDURE — 1157F ADVNC CARE PLAN IN RCRD: CPT | Mod: CPTII,S$GLB,, | Performed by: PHYSICAL MEDICINE & REHABILITATION

## 2022-10-13 PROCEDURE — 3288F FALL RISK ASSESSMENT DOCD: CPT | Mod: CPTII,S$GLB,, | Performed by: PHYSICAL MEDICINE & REHABILITATION

## 2022-10-13 PROCEDURE — 1101F PT FALLS ASSESS-DOCD LE1/YR: CPT | Mod: CPTII,S$GLB,, | Performed by: PHYSICAL MEDICINE & REHABILITATION

## 2022-10-13 PROCEDURE — 1101F PR PT FALLS ASSESS DOC 0-1 FALLS W/OUT INJ PAST YR: ICD-10-PCS | Mod: CPTII,S$GLB,, | Performed by: PHYSICAL MEDICINE & REHABILITATION

## 2022-10-13 PROCEDURE — 3079F DIAST BP 80-89 MM HG: CPT | Mod: CPTII,S$GLB,, | Performed by: PHYSICAL MEDICINE & REHABILITATION

## 2022-10-13 PROCEDURE — 99999 PR PBB SHADOW E&M-EST. PATIENT-LVL III: ICD-10-PCS | Mod: PBBFAC,,, | Performed by: PHYSICAL MEDICINE & REHABILITATION

## 2022-10-13 PROCEDURE — 1159F PR MEDICATION LIST DOCUMENTED IN MEDICAL RECORD: ICD-10-PCS | Mod: CPTII,S$GLB,, | Performed by: PHYSICAL MEDICINE & REHABILITATION

## 2022-10-13 PROCEDURE — 99999 PR PBB SHADOW E&M-EST. PATIENT-LVL III: CPT | Mod: PBBFAC,,, | Performed by: PHYSICAL MEDICINE & REHABILITATION

## 2022-10-13 PROCEDURE — 1125F PR PAIN SEVERITY QUANTIFIED, PAIN PRESENT: ICD-10-PCS | Mod: CPTII,S$GLB,, | Performed by: PHYSICAL MEDICINE & REHABILITATION

## 2022-10-13 PROCEDURE — 3079F PR MOST RECENT DIASTOLIC BLOOD PRESSURE 80-89 MM HG: ICD-10-PCS | Mod: CPTII,S$GLB,, | Performed by: PHYSICAL MEDICINE & REHABILITATION

## 2022-10-13 PROCEDURE — 3288F PR FALLS RISK ASSESSMENT DOCUMENTED: ICD-10-PCS | Mod: CPTII,S$GLB,, | Performed by: PHYSICAL MEDICINE & REHABILITATION

## 2022-10-13 PROCEDURE — 4010F ACE/ARB THERAPY RXD/TAKEN: CPT | Mod: CPTII,S$GLB,, | Performed by: PHYSICAL MEDICINE & REHABILITATION

## 2022-10-13 PROCEDURE — 99214 OFFICE O/P EST MOD 30 MIN: CPT | Mod: S$GLB,,, | Performed by: PHYSICAL MEDICINE & REHABILITATION

## 2022-10-13 PROCEDURE — 1125F AMNT PAIN NOTED PAIN PRSNT: CPT | Mod: CPTII,S$GLB,, | Performed by: PHYSICAL MEDICINE & REHABILITATION

## 2022-10-13 PROCEDURE — 3074F SYST BP LT 130 MM HG: CPT | Mod: CPTII,S$GLB,, | Performed by: PHYSICAL MEDICINE & REHABILITATION

## 2022-10-13 PROCEDURE — 1159F MED LIST DOCD IN RCRD: CPT | Mod: CPTII,S$GLB,, | Performed by: PHYSICAL MEDICINE & REHABILITATION

## 2022-10-13 PROCEDURE — 99214 PR OFFICE/OUTPT VISIT, EST, LEVL IV, 30-39 MIN: ICD-10-PCS | Mod: S$GLB,,, | Performed by: PHYSICAL MEDICINE & REHABILITATION

## 2022-10-13 PROCEDURE — 4010F PR ACE/ARB THEARPY RXD/TAKEN: ICD-10-PCS | Mod: CPTII,S$GLB,, | Performed by: PHYSICAL MEDICINE & REHABILITATION

## 2022-10-13 NOTE — PROGRESS NOTES
HPI:  Patient is a 72 y.o. year old male w. Back and b/l foot pain. Last eval. I started him on cymbalta and physical therapy. He states his back pain has resolved. I also ordered an MRI of lspine, results are below.  He was unable to tolerate neurontin 600mg tid because it made him too sedated. He is only taking it at night. When he reduced his neurontin dose his foot pain returned.    Imaging  MRI LUMBAR SPINE WITHOUT CONTRAST     CLINICAL HISTORY:  Radiculopathy, lumbar regionLumbar radiculopathy, symptoms persist with conservative treatment;     TECHNIQUE:  Sagittal T1, sagittal T2, sagittal STIR, axial T1 and axial T2 weighted images of the lumbar spine obtained without contrast.     COMPARISON:  06/30/2022 x-ray     FINDINGS:  Lumbar spine alignment is within normal limits. The vertebral body heights are well maintained, with no fracture.  No marrow signal abnormality suspicious for an infiltrative process.     The conus is normal in appearance.  The adjacent soft tissue structures show no significant abnormalities.     L1-L2: Circumferential disc bulge and spondylitic spurring.No significant central canal or neural foraminal narrowing.     L2-L3: Circumferential disc bulge and spondylitic spurring.  No significant central canal or neural foraminal narrowing.     L3-L4: Circumferential disc bulge and minimal facet arthritis.  Asymmetric left-sided spondylitic spurring.  No significant central canal or neural foraminal narrowing.     L4-L5: Circumferential disc bulge and asymmetric spondylitic spurring.  Moderate facet arthritis.  No significant central canal or neural foraminal narrowing.     L5-S1: Circumferential disc bulge and small central disc protrusion.  Mild facet arthritis.  Mild left-sided neural foraminal narrowing.     Impression:     Multilevel degenerative changes resulting in mild left-sided L5 S1 foraminal encroachment.  Small superimposed central disc protrusion.      Labs  EGFR cr lfts gluc  nl    EMG RESULTS  Impression:  1. Technically, somewhat limited study due to peripheral edema of legs  2. The sensory responses were absent in the bilateral lower extremities.  This can either be due to the technical issues above, or could be a sensory peripheral polyneuropathy.  I favor the latter as his symptoms correlate with this.   3. Incidentally, there is evidence of a median mononeuropathy across the right wrist (I.e. carpal tunnel syndrome).  Upper extremity sensory studies performed due to lack of responses in the legs.  If a more detailed study is desired for the carpal tunnel syndrome, a separate upper extremity study can be ordered.  Note:  He has no significant hand symptoms.     4. No definitive evidence of tarsal tunnel syndrome on either side.  The bilateral abductor hallicus muscles showed active denervation, but this could be due to the peripheral polyneuropathy described above, and can also be seen in normal feet, especially with advancing age.            Past Medical History:   Diagnosis Date    A-fib     Acute blood loss anemia     Arthritis     Atrial flutter     Colon polyp     Diverticulosis     Dyslipidemia     Dyspnea on exertion 5/31/2021    GERD (gastroesophageal reflux disease)     on Protonix    Hepatic steatosis     Hyperlipidemia     Hypertension     Irregular heart beat     Lower GI bleed 5/31/2021    Multiple gastric ulcers 05/16/2018    gi scope per Dr. Houston    Obesity     Sleep apnea     uses CPAP    Unspecified disorder of kidney and ureter     kidney stones     Past Surgical History:   Procedure Laterality Date    APPENDECTOMY      CATARACT EXTRACTION  2009    OS    CATARACT EXTRACTION W/  INTRAOCULAR LENS IMPLANT Right 6/3/2015    [id number] 14.5d//    COLONOSCOPY N/A 10/10/2017    Procedure: COLONOSCOPY;  Surgeon: Jameson Houston MD;  Location: [facility];  Service: Endoscopy;  Laterality: N/A; repeat in 5 years for surveillance    COLONOSCOPY N/A 5/16/2018    Procedure:  COLONOSCOPY;  Surgeon: Jameson Houston MD;  Location: [facility];  Service: Endoscopy;  Laterality: N/A;    COLONOSCOPY N/A 6/2/2021    Procedure: COLONOSCOPY;  Surgeon: Agustin Corona MD;  Location: [facility] (2ND FLR);  Service: Endoscopy;  Laterality: N/A;    COLONOSCOPY N/A 10/11/2022    Procedure: COLONOSCOPY;  Surgeon: Edin Lara MD;  Location: [facility] (2ND FLR);  Service: Endoscopy;  Laterality: N/A;  2nd floor-difficult intubation  ok to hold eliquis x2 days per Dr. Jerome, see telephone encounter 6/28-[facility]  fully vaccinated, instructions sent to [id number]  extended miralax prep  Clear liquids up to 2 hrs prior/ AM prep [id number]    CORONARY ANGIOGRAPHY N/A 1/4/2021    Procedure: ANGIOGRAM, CORONARY ARTERY;  Surgeon: Jase Moore MD;  Location: [facility] CATH LAB/EP;  Service: Cardiology;  Laterality: N/A;    CYSTOSCOPY WITH INSERTION OF MINIMALLY INVASIVE IMPLANT TO ENLARGE PROSTATIC URETHRA N/A 6/29/2021    Procedure: TRANSPROSTATIC TISSUE RETRACTION;  Surgeon: Agustin Clay MD;  Location: [facility];  Service: Urology;  Laterality: N/A;    ESOPHAGOGASTRODUODENOSCOPY N/A 7/2/2018    Procedure: EGD (ESOPHAGOGASTRODUODENOSCOPY);  Surgeon: Jameson Houston MD;  Location: [facility];  Service: Endoscopy;  Laterality: N/A;    ESOPHAGOGASTRODUODENOSCOPY N/A 6/2/2021    Procedure: EGD (ESOPHAGOGASTRODUODENOSCOPY);  Surgeon: Agustin Corona MD;  Location: [facility] (2ND FLR);  Service: Endoscopy;  Laterality: N/A;    EYE SURGERY      cataract    LEFT HEART CATHETERIZATION N/A 1/4/2021    Procedure: Left heart cath;  Surgeon: Jase Moore MD;  Location: [facility] CATH LAB/EP;  Service: Cardiology;  Laterality: N/A;    ROBOT-ASSISTED CHOLECYSTECTOMY N/A 2/4/2021    Procedure: ROBOTIC CHOLECYSTECTOMY;  Surgeon: Pal Cheney MD;  Location: [facility];  Service: General;  Laterality: N/A;    TONSILLECTOMY      TRANSESOPHAGEAL ECHOCARDIOGRAPHY N/A 5/14/2021    Procedure:  ECHOCARDIOGRAM, TRANSESOPHAGEAL;  Surgeon: Leo Reeves III, MD;  Location: [facility] EP LAB;  Service: Cardiology;  Laterality: N/A;    TREATMENT OF CARDIAC ARRHYTHMIA N/A 4/19/2021    Procedure: Cardioversion or Defibrillation;  Surgeon: Judy Jerome MD;  Location: [facility] CATH LAB/EP;  Service: Cardiology;  Laterality: N/A;    TREATMENT OF CARDIAC ARRHYTHMIA N/A 5/14/2021    Procedure: CARDIOVERSION;  Surgeon: PEMA Downs MD;  Location: [facility] EP LAB;  Service: Cardiology;  Laterality: N/A;  AF, PHUONG, DCCV, MAC, DM, 3 PREP    UPPER GASTROINTESTINAL ENDOSCOPY  05/16/2018    Dr. Houston     Family History   Problem Relation Age of Onset    Arthritis Mother     Heart disease Mother     Blindness Mother         OS due to injury    Coronary artery disease Father     Heart disease Father     Cataracts Father     Amblyopia Neg Hx     Cancer Neg Hx     Diabetes Neg Hx     Glaucoma Neg Hx     Hypertension Neg Hx     Macular degeneration Neg Hx     Retinal detachment Neg Hx     Strabismus Neg Hx     Stroke Neg Hx     Thyroid disease Neg Hx     Colon cancer Neg Hx     Colon polyps Neg Hx     Crohn's disease Neg Hx     Ulcerative colitis Neg Hx     Esophageal cancer Neg Hx     Stomach cancer Neg Hx      Social History     Socioeconomic History    Marital status:     Number of children: 3   Tobacco Use    Smoking status: Former     Types: Cigarettes     Start date: 10/5/1997    Smokeless tobacco: Never    Tobacco comments:     quit in 1980s    Substance and Sexual Activity    Alcohol use: Yes     Comment: 1 beer every 2 month    Drug use: No    Sexual activity: Not Currently     Partners: Female   Social History Narrative    Patient is originally from [place]         [employer] [facility]        Working ; retired,  plants            47 years         Children    Son x3         Lives with  Kerri         Diet/Rudder    Updates    9/8/20- moved from [facility]        Social Determinants of Health     Financial Resource Strain: Low Risk     Difficulty of Paying Living Expenses: Not hard at all   Food Insecurity: No Food Insecurity    Worried About Running Out of Food in the Last Year: Never true    Ran Out of Food in the Last Year: Never true   Transportation Needs: No Transportation Needs    Lack of Transportation (Medical): No    Lack of Transportation (Non-Medical): No   Physical Activity: Insufficiently Active    Days of Exercise per Week: 1 day    Minutes of Exercise per Session: 10 min   Stress: Stress Concern Present    Feeling of Stress : Rather much   Social Connections: Unknown    Frequency of Communication with Friends and Family: Three times a week    Frequency of Social Gatherings with Friends and Family: Once a week    Active Member of Clubs or Organizations: Yes    Attends Club or Organization Meetings: 1 to 4 times per year    Marital Status:    Housing Stability: Low Risk     Unable to Pay for Housing in the Last Year: No    Number of Places Lived in the Last Year: 2    Unstable Housing in the Last Year: No       Review of patient's allergies indicates:   Allergen Reactions    Lipitor [atorvastatin] Itching       Current Outpatient Medications:     amLODIPine (NORVASC) 5 MG tablet, TAKE 1 TABLET(5 MG) BY MOUTH EVERY DAY, Disp: 90 tablet, Rfl: 1    atenoloL (TENORMIN) 50 MG tablet, TAKE 1 TABLET(50 MG) BY MOUTH EVERY DAY, Disp: 90 tablet, Rfl: 3    docusate sodium (COLACE) 100 MG capsule, Take 100 mg by mouth 2 (two) times daily., Disp: , Rfl:     DULoxetine (CYMBALTA) 20 MG capsule, Take 1 capsule (20 mg total) by mouth once daily., Disp: 30 capsule, Rfl: 11    ELIQUIS 5 mg Tab, TAKE 1 TABLET(5 MG) BY MOUTH TWICE DAILY, Disp: 180 tablet, Rfl: 3    gabapentin (NEURONTIN) 600 MG tablet, Take 1 tablet (600 mg total) by mouth 3 (three) times daily., Disp: 90 tablet, Rfl: 11    hydroCHLOROthiazide (HYDRODIURIL) 25 MG tablet, TAKE 1 TABLET(25 MG) BY MOUTH  "EVERY DAY, Disp: 90 tablet, Rfl: 0    melatonin 10 mg Tab, Take 10 mg by mouth every evening., Disp: , Rfl:     multivit-min/FA/lycopen/lutein (CENTRUM SILVER MEN ORAL), Take by mouth once daily., Disp: , Rfl:     pantoprazole (PROTONIX) 40 MG tablet, TAKE 1 TABLET(40 MG) BY MOUTH EVERY DAY, Disp: 90 tablet, Rfl: 3    tamsulosin (FLOMAX) 0.4 mg Cap, Take 1 capsule (0.4 mg total) by mouth once daily., Disp: 30 capsule, Rfl: 11    valsartan (DIOVAN) 320 MG tablet, TAKE 1 TABLET(320 MG) BY MOUTH EVERY DAY, Disp: 90 tablet, Rfl: 3    varicella-zoster gE-AS01B, PF, (SHINGRIX, PF,) 50 mcg/0.5 mL injection, Inject into the muscle., Disp: 1 each, Rfl: 0    zolpidem (AMBIEN) 5 MG Tab, TAKE 1 TABLET(5 MG) BY MOUTH EVERY NIGHT AS NEEDED, Disp: 90 tablet, Rfl: 0    oxybutynin (DITROPAN) 5 MG Tab, Take 1 tablet (5 mg total) by mouth every evening., Disp: 30 tablet, Rfl: 11      Review of Systems  No nausea, vomiting, fevers, Chills , contipation, diarrhea or sweats     Physical Exam:      Vitals:    10/13/22 1317   BP: 128/83   Pulse: 83     alert and oriented ×4 follows commands answers all questions appropriately,affect wnl  Manual muscle test 5 out of 5 sensation to light touch grossly intact  No muscular atrophy  Antalgic gait  -slR modified  No C/C/E     Assessment:  Neuralgia- possible sensory peripheral neuropathy via EMG by   Mild lumbar DDD  Plan:  Inc neurontin to 900mg than to 1200mg nightly if tolerated  Stop ambien and melatonin for now  Cont cymbalta  If no improvement , will consider "sweet" caudal GWENDOLYN    Greater than 50%of time spent reviewing diagnosis, prognosis and treatment        "

## 2022-10-17 ENCOUNTER — TELEPHONE (OUTPATIENT)
Dept: INTERNAL MEDICINE | Facility: CLINIC | Age: 72
End: 2022-10-17
Payer: MEDICARE

## 2022-10-17 ENCOUNTER — OFFICE VISIT (OUTPATIENT)
Dept: OTOLARYNGOLOGY | Facility: CLINIC | Age: 72
End: 2022-10-17
Payer: MEDICARE

## 2022-10-17 ENCOUNTER — PATIENT MESSAGE (OUTPATIENT)
Dept: PHYSICAL MEDICINE AND REHAB | Facility: CLINIC | Age: 72
End: 2022-10-17
Payer: MEDICARE

## 2022-10-17 ENCOUNTER — PATIENT MESSAGE (OUTPATIENT)
Dept: INTERNAL MEDICINE | Facility: CLINIC | Age: 72
End: 2022-10-17
Payer: MEDICARE

## 2022-10-17 VITALS — DIASTOLIC BLOOD PRESSURE: 87 MMHG | SYSTOLIC BLOOD PRESSURE: 133 MMHG | HEART RATE: 68 BPM

## 2022-10-17 DIAGNOSIS — G47.09 OTHER INSOMNIA: ICD-10-CM

## 2022-10-17 DIAGNOSIS — H52.7 REFRACTIVE ERROR: ICD-10-CM

## 2022-10-17 DIAGNOSIS — Z00.01 ENCOUNTER FOR GENERAL ADULT MEDICAL EXAMINATION WITH ABNORMAL FINDINGS: Primary | ICD-10-CM

## 2022-10-17 DIAGNOSIS — K80.20 CALCULUS OF GALLBLADDER WITHOUT CHOLECYSTITIS WITHOUT OBSTRUCTION: ICD-10-CM

## 2022-10-17 DIAGNOSIS — I25.10 NONOBSTRUCTIVE ATHEROSCLEROSIS OF CORONARY ARTERY: ICD-10-CM

## 2022-10-17 DIAGNOSIS — I77.1 TORTUOUS AORTA: ICD-10-CM

## 2022-10-17 DIAGNOSIS — N20.0 NEPHROLITH: ICD-10-CM

## 2022-10-17 DIAGNOSIS — Z87.19 HISTORY OF RECTAL BLEEDING: ICD-10-CM

## 2022-10-17 DIAGNOSIS — H43.813 POSTERIOR VITREOUS DETACHMENT, BOTH EYES: ICD-10-CM

## 2022-10-17 DIAGNOSIS — E66.01 MORBID OBESITY DUE TO EXCESS CALORIES: ICD-10-CM

## 2022-10-17 DIAGNOSIS — G47.33 OSA (OBSTRUCTIVE SLEEP APNEA): ICD-10-CM

## 2022-10-17 DIAGNOSIS — L29.9 ITCHING OF EAR: Primary | ICD-10-CM

## 2022-10-17 DIAGNOSIS — Z96.1 PSEUDOPHAKIA OF BOTH EYES: ICD-10-CM

## 2022-10-17 DIAGNOSIS — N40.1 BENIGN NON-NODULAR PROSTATIC HYPERPLASIA WITH LOWER URINARY TRACT SYMPTOMS: ICD-10-CM

## 2022-10-17 DIAGNOSIS — H35.373 EPIRETINAL MEMBRANE, BOTH EYES: ICD-10-CM

## 2022-10-17 DIAGNOSIS — T16.2XXA ACUTE FOREIGN BODY OF EAR CANAL, LEFT, INITIAL ENCOUNTER: ICD-10-CM

## 2022-10-17 DIAGNOSIS — D50.0 IRON DEFICIENCY ANEMIA DUE TO CHRONIC BLOOD LOSS: ICD-10-CM

## 2022-10-17 DIAGNOSIS — N52.9 ERECTILE DYSFUNCTION, UNSPECIFIED ERECTILE DYSFUNCTION TYPE: ICD-10-CM

## 2022-10-17 DIAGNOSIS — I10 ESSENTIAL HYPERTENSION: ICD-10-CM

## 2022-10-17 DIAGNOSIS — I48.20 CHRONIC ATRIAL FIBRILLATION: ICD-10-CM

## 2022-10-17 PROCEDURE — 1101F PT FALLS ASSESS-DOCD LE1/YR: CPT | Mod: CPTII,S$GLB,, | Performed by: OTOLARYNGOLOGY

## 2022-10-17 PROCEDURE — 69200 PR REMV EXT CANAL FOREIGN BODY: ICD-10-PCS | Mod: LT,S$GLB,, | Performed by: OTOLARYNGOLOGY

## 2022-10-17 PROCEDURE — 1157F ADVNC CARE PLAN IN RCRD: CPT | Mod: CPTII,S$GLB,, | Performed by: OTOLARYNGOLOGY

## 2022-10-17 PROCEDURE — 99202 PR OFFICE/OUTPT VISIT, NEW, LEVL II, 15-29 MIN: ICD-10-PCS | Mod: 25,S$GLB,, | Performed by: OTOLARYNGOLOGY

## 2022-10-17 PROCEDURE — 3079F PR MOST RECENT DIASTOLIC BLOOD PRESSURE 80-89 MM HG: ICD-10-PCS | Mod: CPTII,S$GLB,, | Performed by: OTOLARYNGOLOGY

## 2022-10-17 PROCEDURE — 4010F PR ACE/ARB THEARPY RXD/TAKEN: ICD-10-PCS | Mod: CPTII,S$GLB,, | Performed by: OTOLARYNGOLOGY

## 2022-10-17 PROCEDURE — 69200 CLEAR OUTER EAR CANAL: CPT | Mod: LT,S$GLB,, | Performed by: OTOLARYNGOLOGY

## 2022-10-17 PROCEDURE — 99999 PR PBB SHADOW E&M-EST. PATIENT-LVL III: ICD-10-PCS | Mod: PBBFAC,,, | Performed by: OTOLARYNGOLOGY

## 2022-10-17 PROCEDURE — 1125F AMNT PAIN NOTED PAIN PRSNT: CPT | Mod: CPTII,S$GLB,, | Performed by: OTOLARYNGOLOGY

## 2022-10-17 PROCEDURE — 99202 OFFICE O/P NEW SF 15 MIN: CPT | Mod: 25,S$GLB,, | Performed by: OTOLARYNGOLOGY

## 2022-10-17 PROCEDURE — 3075F SYST BP GE 130 - 139MM HG: CPT | Mod: CPTII,S$GLB,, | Performed by: OTOLARYNGOLOGY

## 2022-10-17 PROCEDURE — 3079F DIAST BP 80-89 MM HG: CPT | Mod: CPTII,S$GLB,, | Performed by: OTOLARYNGOLOGY

## 2022-10-17 PROCEDURE — 3288F PR FALLS RISK ASSESSMENT DOCUMENTED: ICD-10-PCS | Mod: CPTII,S$GLB,, | Performed by: OTOLARYNGOLOGY

## 2022-10-17 PROCEDURE — 99999 PR PBB SHADOW E&M-EST. PATIENT-LVL III: CPT | Mod: PBBFAC,,, | Performed by: OTOLARYNGOLOGY

## 2022-10-17 PROCEDURE — 3288F FALL RISK ASSESSMENT DOCD: CPT | Mod: CPTII,S$GLB,, | Performed by: OTOLARYNGOLOGY

## 2022-10-17 PROCEDURE — 1157F PR ADVANCE CARE PLAN OR EQUIV PRESENT IN MEDICAL RECORD: ICD-10-PCS | Mod: CPTII,S$GLB,, | Performed by: OTOLARYNGOLOGY

## 2022-10-17 PROCEDURE — 1125F PR PAIN SEVERITY QUANTIFIED, PAIN PRESENT: ICD-10-PCS | Mod: CPTII,S$GLB,, | Performed by: OTOLARYNGOLOGY

## 2022-10-17 PROCEDURE — 3075F PR MOST RECENT SYSTOLIC BLOOD PRESS GE 130-139MM HG: ICD-10-PCS | Mod: CPTII,S$GLB,, | Performed by: OTOLARYNGOLOGY

## 2022-10-17 PROCEDURE — 1101F PR PT FALLS ASSESS DOC 0-1 FALLS W/OUT INJ PAST YR: ICD-10-PCS | Mod: CPTII,S$GLB,, | Performed by: OTOLARYNGOLOGY

## 2022-10-17 PROCEDURE — 4010F ACE/ARB THERAPY RXD/TAKEN: CPT | Mod: CPTII,S$GLB,, | Performed by: OTOLARYNGOLOGY

## 2022-10-17 NOTE — PROGRESS NOTES
Subjective:       Patient ID: Zeenat Mancia is a 72 y.o. male.    Chief Complaint: Foreign Body in Ear (Cotton swab Left ear) and Otalgia (Left ear 2/10)    HPI: Mr. Mancia is a 72 year old CM who gained a semi-urgent appointment with me today for extraction of a foreign body from his itchy left ear canal. He believes that the the foreign body has been in his left ear fcanal or about 10 days.  His daughter in law is a NP who saw the F.B. ( tip of a cotton swab) deeply imbedded in his ear canal( covered with some wax) . He was referred here for extraction by her.  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 He retired from this work 9 years ago.  He denies diabetes.    Past Medical History:   Diagnosis Date    A-fib     Acute blood loss anemia     Arthritis     Atrial flutter     Colon polyp     Diverticulosis     Dyslipidemia     Dyspnea on exertion 5/31/2021    GERD (gastroesophageal reflux disease)     on Protonix    Hepatic steatosis     Hyperlipidemia     Hypertension     Irregular heart beat     Lower GI bleed 5/31/2021    Multiple gastric ulcers 05/16/2018    gi scope per Dr. Houston    Obesity     Sleep apnea     uses CPAP    Unspecified disorder of kidney and ureter     kidney stones       Review of Systems     Constitutional: Negative for appetite change, chills, fatigue, fever and unexpected weight loss.      HENT: Positive for ear pain.      Eyes:  Negative for change in eyesight, eye drainage, eye itching and photophobia.     Respiratory:  Positive for sleep apnea.      Cardiovascular:  Negative for chest pain, foot swelling, irregular heartbeat and swollen veins.     Gastrointestinal:  Negative for abdominal pain, acid reflux, constipation, diarrhea, heartburn and vomiting.     Genitourinary: Positive for frequent urination.     Musc: Negative for aching joints, aching muscles, back pain and neck pain.     Skin: Negative for rash.  "    Allergy: Negative for food allergies and seasonal allergies.     Endocrine: Negative for cold intolerance and heat intolerance.      Neurological: Positive for dizziness.     Hematologic: Positive for bruises/bleeds easily.     Psychiatric: Negative for decreased concentration, depression, nervous/anxious and sleep disturbance.              Objective:    Gen.: alert and oriented CM in  no acute distress  /87 P 68 Ht 6'2" Wt 310 lbs  Procedure:  Tip of a cotton swab is carefully extracted from the deep left ear canal with the use of micro forceps.  Left ear canal is mildly inflamed only.  There is no evidence of shamika otitis externa.  Tiny bit of blood is noted in the floor of the left ear canal near the TM which is left alone.  Left TM is intact as visualized.  There is a tiny piece of cerumen covering the posterior aspect of superior right TM which is left alone.  The right TM is intact.    There is no evidence of right otitis externa.  There is no cerumen in the right ear canal.    Physical Exam  Constitutional:       Appearance: He is well-developed.   HENT:      Head: Normocephalic.      Jaw: No trismus.      Right Ear: Hearing, tympanic membrane and ear canal normal. No decreased hearing noted. No drainage. No foreign body. No mastoid tenderness. Tympanic membrane is not perforated.      Left Ear: Hearing, tympanic membrane and ear canal normal. No decreased hearing noted. No drainage. No foreign body. No mastoid tenderness. Tympanic membrane is not perforated.      Ears:        Nose: No nasal deformity, septal deviation or laceration.      Right Sinus: No maxillary sinus tenderness or frontal sinus tenderness.      Left Sinus: No maxillary sinus tenderness or frontal sinus tenderness.      Mouth/Throat:      Mouth: No oral lesions.      Dentition: Does not have dentures. No dental caries.      Pharynx: Uvula midline. No oropharyngeal exudate or uvula swelling.      Tonsils: No tonsillar abscesses. "   Neck:      Thyroid: No thyromegaly.   Pulmonary:      Effort: Pulmonary effort is normal.      Breath sounds: No stridor.   Lymphadenopathy:      Cervical: No cervical adenopathy.   Skin:     Findings: No rash.   Neurological:      Mental Status: He is alert and oriented to person, place, and time.   Psychiatric:         Behavior: Behavior normal.       Assessment:       1. Itching of ear    2. Acute foreign body of ear canal, left, initial encounter          Plan:     F.B. ( tip of cotton swab) removed from deep AS eac with microforceps  Dropper provided for instillation of white vinegar to ear canals prn itching ( 1-2 drops prn)   Written Rx for Acetasol HC otic drops 10 ml ( or Vosol HC otic ) ; 2-4 drops to itchy, inflamed ear canals BID prn  Avoid swabbing of ear canals in general  RTC prn; audiometry on return prn  Call for any significant change in otologic status ( Ciprodex?)

## 2022-10-18 NOTE — PATIENT INSTRUCTIONS
F.B. ( tip of cotton swab) removed from deep AS eac with microforceps  Dropper provided for instillation of white vinegar to ear canals prn itching ( 1-2 drops prn)   Written Rx for Acetasol HC otic drops 10 ml ( or Vosol HC otic ) ; 2-4 drops to itchy, inflamed ear canals BID prn  Avoid swabbing of ear canals in general  RTC prn; audiometry on return prn  Call for any significant change in otologic status ( Ciprodex?)

## 2022-10-20 ENCOUNTER — LAB VISIT (OUTPATIENT)
Dept: LAB | Facility: HOSPITAL | Age: 72
End: 2022-10-20
Attending: FAMILY MEDICINE
Payer: MEDICARE

## 2022-10-20 DIAGNOSIS — Z00.01 ENCOUNTER FOR GENERAL ADULT MEDICAL EXAMINATION WITH ABNORMAL FINDINGS: ICD-10-CM

## 2022-10-20 DIAGNOSIS — I10 ESSENTIAL HYPERTENSION: ICD-10-CM

## 2022-10-20 DIAGNOSIS — H52.7 REFRACTIVE ERROR: ICD-10-CM

## 2022-10-20 DIAGNOSIS — I77.1 TORTUOUS AORTA: ICD-10-CM

## 2022-10-20 DIAGNOSIS — H35.373 EPIRETINAL MEMBRANE, BOTH EYES: ICD-10-CM

## 2022-10-20 DIAGNOSIS — I25.10 NONOBSTRUCTIVE ATHEROSCLEROSIS OF CORONARY ARTERY: ICD-10-CM

## 2022-10-20 DIAGNOSIS — K80.20 CALCULUS OF GALLBLADDER WITHOUT CHOLECYSTITIS WITHOUT OBSTRUCTION: ICD-10-CM

## 2022-10-20 DIAGNOSIS — I48.20 CHRONIC ATRIAL FIBRILLATION: ICD-10-CM

## 2022-10-20 DIAGNOSIS — Z96.1 PSEUDOPHAKIA OF BOTH EYES: ICD-10-CM

## 2022-10-20 DIAGNOSIS — G47.33 OSA (OBSTRUCTIVE SLEEP APNEA): ICD-10-CM

## 2022-10-20 DIAGNOSIS — N40.1 BENIGN NON-NODULAR PROSTATIC HYPERPLASIA WITH LOWER URINARY TRACT SYMPTOMS: ICD-10-CM

## 2022-10-20 DIAGNOSIS — E66.01 MORBID OBESITY DUE TO EXCESS CALORIES: ICD-10-CM

## 2022-10-20 DIAGNOSIS — H43.813 POSTERIOR VITREOUS DETACHMENT, BOTH EYES: ICD-10-CM

## 2022-10-20 DIAGNOSIS — N20.0 NEPHROLITH: ICD-10-CM

## 2022-10-20 DIAGNOSIS — N52.9 ERECTILE DYSFUNCTION, UNSPECIFIED ERECTILE DYSFUNCTION TYPE: ICD-10-CM

## 2022-10-20 DIAGNOSIS — D50.0 IRON DEFICIENCY ANEMIA DUE TO CHRONIC BLOOD LOSS: ICD-10-CM

## 2022-10-20 DIAGNOSIS — Z87.19 HISTORY OF RECTAL BLEEDING: ICD-10-CM

## 2022-10-20 DIAGNOSIS — G47.09 OTHER INSOMNIA: ICD-10-CM

## 2022-10-20 LAB
25(OH)D3+25(OH)D2 SERPL-MCNC: 27 NG/ML (ref 30–96)
ALBUMIN SERPL BCP-MCNC: 3.4 G/DL (ref 3.5–5.2)
ALP SERPL-CCNC: 148 U/L (ref 55–135)
ALT SERPL W/O P-5'-P-CCNC: 44 U/L (ref 10–44)
ANION GAP SERPL CALC-SCNC: 11 MMOL/L (ref 8–16)
AST SERPL-CCNC: 36 U/L (ref 10–40)
BASOPHILS # BLD AUTO: 0.06 K/UL (ref 0–0.2)
BASOPHILS NFR BLD: 0.8 % (ref 0–1.9)
BILIRUB SERPL-MCNC: 1.8 MG/DL (ref 0.1–1)
BILIRUB UR QL STRIP: NEGATIVE
BUN SERPL-MCNC: 15 MG/DL (ref 8–23)
CALCIUM SERPL-MCNC: 9.6 MG/DL (ref 8.7–10.5)
CHLORIDE SERPL-SCNC: 100 MMOL/L (ref 95–110)
CHOLEST SERPL-MCNC: 157 MG/DL (ref 120–199)
CHOLEST/HDLC SERPL: 3.7 {RATIO} (ref 2–5)
CLARITY UR REFRACT.AUTO: CLEAR
CO2 SERPL-SCNC: 28 MMOL/L (ref 23–29)
COLOR UR AUTO: YELLOW
COMPLEXED PSA SERPL-MCNC: 2.8 NG/ML (ref 0–4)
CREAT SERPL-MCNC: 1.3 MG/DL (ref 0.5–1.4)
DIFFERENTIAL METHOD: ABNORMAL
EOSINOPHIL # BLD AUTO: 0.4 K/UL (ref 0–0.5)
EOSINOPHIL NFR BLD: 5.5 % (ref 0–8)
ERYTHROCYTE [DISTWIDTH] IN BLOOD BY AUTOMATED COUNT: 14.7 % (ref 11.5–14.5)
EST. GFR  (NO RACE VARIABLE): 58.4 ML/MIN/1.73 M^2
ESTIMATED AVG GLUCOSE: 111 MG/DL (ref 68–131)
GLUCOSE SERPL-MCNC: 103 MG/DL (ref 70–110)
GLUCOSE UR QL STRIP: NEGATIVE
HBA1C MFR BLD: 5.5 % (ref 4–5.6)
HCT VFR BLD AUTO: 57.9 % (ref 40–54)
HDLC SERPL-MCNC: 43 MG/DL (ref 40–75)
HDLC SERPL: 27.4 % (ref 20–50)
HGB BLD-MCNC: 18.1 G/DL (ref 14–18)
HGB UR QL STRIP: NEGATIVE
IMM GRANULOCYTES # BLD AUTO: 0.02 K/UL (ref 0–0.04)
IMM GRANULOCYTES NFR BLD AUTO: 0.3 % (ref 0–0.5)
KETONES UR QL STRIP: NEGATIVE
LDLC SERPL CALC-MCNC: 90 MG/DL (ref 63–159)
LEUKOCYTE ESTERASE UR QL STRIP: NEGATIVE
LYMPHOCYTES # BLD AUTO: 1.4 K/UL (ref 1–4.8)
LYMPHOCYTES NFR BLD: 17.5 % (ref 18–48)
MCH RBC QN AUTO: 29.4 PG (ref 27–31)
MCHC RBC AUTO-ENTMCNC: 31.3 G/DL (ref 32–36)
MCV RBC AUTO: 94 FL (ref 82–98)
MONOCYTES # BLD AUTO: 0.8 K/UL (ref 0.3–1)
MONOCYTES NFR BLD: 10.4 % (ref 4–15)
NEUTROPHILS # BLD AUTO: 5.1 K/UL (ref 1.8–7.7)
NEUTROPHILS NFR BLD: 65.5 % (ref 38–73)
NITRITE UR QL STRIP: NEGATIVE
NONHDLC SERPL-MCNC: 114 MG/DL
NRBC BLD-RTO: 0 /100 WBC
PH UR STRIP: 5 [PH] (ref 5–8)
PLATELET # BLD AUTO: 265 K/UL (ref 150–450)
PMV BLD AUTO: 10.6 FL (ref 9.2–12.9)
POTASSIUM SERPL-SCNC: 4.6 MMOL/L (ref 3.5–5.1)
PROT SERPL-MCNC: 7.1 G/DL (ref 6–8.4)
PROT UR QL STRIP: NEGATIVE
RBC # BLD AUTO: 6.15 M/UL (ref 4.6–6.2)
SODIUM SERPL-SCNC: 139 MMOL/L (ref 136–145)
SP GR UR STRIP: 1.02 (ref 1–1.03)
T4 FREE SERPL-MCNC: 0.89 NG/DL (ref 0.71–1.51)
TRIGL SERPL-MCNC: 120 MG/DL (ref 30–150)
TSH SERPL DL<=0.005 MIU/L-ACNC: 1.5 UIU/ML (ref 0.4–4)
URN SPEC COLLECT METH UR: NORMAL
WBC # BLD AUTO: 7.7 K/UL (ref 3.9–12.7)

## 2022-10-20 PROCEDURE — 81003 URINALYSIS AUTO W/O SCOPE: CPT | Performed by: FAMILY MEDICINE

## 2022-10-20 PROCEDURE — 84153 ASSAY OF PSA TOTAL: CPT | Performed by: FAMILY MEDICINE

## 2022-10-20 PROCEDURE — 84443 ASSAY THYROID STIM HORMONE: CPT | Performed by: FAMILY MEDICINE

## 2022-10-20 PROCEDURE — 84439 ASSAY OF FREE THYROXINE: CPT | Performed by: FAMILY MEDICINE

## 2022-10-20 PROCEDURE — 83036 HEMOGLOBIN GLYCOSYLATED A1C: CPT | Performed by: FAMILY MEDICINE

## 2022-10-20 PROCEDURE — 82306 VITAMIN D 25 HYDROXY: CPT | Performed by: FAMILY MEDICINE

## 2022-10-20 PROCEDURE — 36415 COLL VENOUS BLD VENIPUNCTURE: CPT | Mod: PO | Performed by: FAMILY MEDICINE

## 2022-10-20 PROCEDURE — 80053 COMPREHEN METABOLIC PANEL: CPT | Performed by: FAMILY MEDICINE

## 2022-10-20 PROCEDURE — 85025 COMPLETE CBC W/AUTO DIFF WBC: CPT | Performed by: FAMILY MEDICINE

## 2022-10-20 PROCEDURE — 80061 LIPID PANEL: CPT | Performed by: FAMILY MEDICINE

## 2022-10-21 ENCOUNTER — OFFICE VISIT (OUTPATIENT)
Dept: INTERNAL MEDICINE | Facility: CLINIC | Age: 72
End: 2022-10-21
Payer: MEDICARE

## 2022-10-21 VITALS
RESPIRATION RATE: 14 BRPM | TEMPERATURE: 98 F | HEIGHT: 74 IN | DIASTOLIC BLOOD PRESSURE: 76 MMHG | SYSTOLIC BLOOD PRESSURE: 118 MMHG | WEIGHT: 315 LBS | HEART RATE: 69 BPM | OXYGEN SATURATION: 97 % | BODY MASS INDEX: 40.43 KG/M2

## 2022-10-21 DIAGNOSIS — D75.1 POLYCYTHEMIA: ICD-10-CM

## 2022-10-21 DIAGNOSIS — N40.1 BENIGN NON-NODULAR PROSTATIC HYPERPLASIA WITH LOWER URINARY TRACT SYMPTOMS: ICD-10-CM

## 2022-10-21 DIAGNOSIS — K21.9 GASTROESOPHAGEAL REFLUX DISEASE WITHOUT ESOPHAGITIS: ICD-10-CM

## 2022-10-21 DIAGNOSIS — I77.1 TORTUOUS AORTA: ICD-10-CM

## 2022-10-21 DIAGNOSIS — E66.01 MORBID OBESITY DUE TO EXCESS CALORIES: ICD-10-CM

## 2022-10-21 DIAGNOSIS — I48.20 CHRONIC ATRIAL FIBRILLATION: ICD-10-CM

## 2022-10-21 DIAGNOSIS — Z00.01 ENCOUNTER FOR GENERAL ADULT MEDICAL EXAMINATION WITH ABNORMAL FINDINGS: Primary | ICD-10-CM

## 2022-10-21 DIAGNOSIS — G47.09 OTHER INSOMNIA: ICD-10-CM

## 2022-10-21 DIAGNOSIS — Z23 NEED FOR PROPHYLACTIC VACCINATION AND INOCULATION AGAINST INFLUENZA: ICD-10-CM

## 2022-10-21 DIAGNOSIS — G47.33 OSA (OBSTRUCTIVE SLEEP APNEA): ICD-10-CM

## 2022-10-21 DIAGNOSIS — M79.2 NEURALGIA: ICD-10-CM

## 2022-10-21 DIAGNOSIS — I25.10 NONOBSTRUCTIVE ATHEROSCLEROSIS OF CORONARY ARTERY: ICD-10-CM

## 2022-10-21 DIAGNOSIS — I10 ESSENTIAL HYPERTENSION: ICD-10-CM

## 2022-10-21 DIAGNOSIS — D50.0 IRON DEFICIENCY ANEMIA DUE TO CHRONIC BLOOD LOSS: ICD-10-CM

## 2022-10-21 PROCEDURE — 99999 PR PBB SHADOW E&M-EST. PATIENT-LVL IV: ICD-10-PCS | Mod: PBBFAC,,, | Performed by: FAMILY MEDICINE

## 2022-10-21 PROCEDURE — 3078F DIAST BP <80 MM HG: CPT | Mod: CPTII,S$GLB,, | Performed by: FAMILY MEDICINE

## 2022-10-21 PROCEDURE — 3074F SYST BP LT 130 MM HG: CPT | Mod: CPTII,S$GLB,, | Performed by: FAMILY MEDICINE

## 2022-10-21 PROCEDURE — 1160F RVW MEDS BY RX/DR IN RCRD: CPT | Mod: CPTII,S$GLB,, | Performed by: FAMILY MEDICINE

## 2022-10-21 PROCEDURE — 99397 PER PM REEVAL EST PAT 65+ YR: CPT | Mod: 25,S$GLB,, | Performed by: FAMILY MEDICINE

## 2022-10-21 PROCEDURE — 3074F PR MOST RECENT SYSTOLIC BLOOD PRESSURE < 130 MM HG: ICD-10-PCS | Mod: CPTII,S$GLB,, | Performed by: FAMILY MEDICINE

## 2022-10-21 PROCEDURE — G0008 ADMIN INFLUENZA VIRUS VAC: HCPCS | Mod: S$GLB,,, | Performed by: FAMILY MEDICINE

## 2022-10-21 PROCEDURE — 99397 PR PREVENTIVE VISIT,EST,65 & OVER: ICD-10-PCS | Mod: 25,S$GLB,, | Performed by: FAMILY MEDICINE

## 2022-10-21 PROCEDURE — 3044F PR MOST RECENT HEMOGLOBIN A1C LEVEL <7.0%: ICD-10-PCS | Mod: CPTII,S$GLB,, | Performed by: FAMILY MEDICINE

## 2022-10-21 PROCEDURE — 1160F PR REVIEW ALL MEDS BY PRESCRIBER/CLIN PHARMACIST DOCUMENTED: ICD-10-PCS | Mod: CPTII,S$GLB,, | Performed by: FAMILY MEDICINE

## 2022-10-21 PROCEDURE — 90694 VACC AIIV4 NO PRSRV 0.5ML IM: CPT | Mod: S$GLB,,, | Performed by: FAMILY MEDICINE

## 2022-10-21 PROCEDURE — G0008 FLU VACCINE - QUADRIVALENT - ADJUVANTED: ICD-10-PCS | Mod: S$GLB,,, | Performed by: FAMILY MEDICINE

## 2022-10-21 PROCEDURE — 99999 PR PBB SHADOW E&M-EST. PATIENT-LVL IV: CPT | Mod: PBBFAC,,, | Performed by: FAMILY MEDICINE

## 2022-10-21 PROCEDURE — 1159F PR MEDICATION LIST DOCUMENTED IN MEDICAL RECORD: ICD-10-PCS | Mod: CPTII,S$GLB,, | Performed by: FAMILY MEDICINE

## 2022-10-21 PROCEDURE — 90694 FLU VACCINE - QUADRIVALENT - ADJUVANTED: ICD-10-PCS | Mod: S$GLB,,, | Performed by: FAMILY MEDICINE

## 2022-10-21 PROCEDURE — 4010F PR ACE/ARB THEARPY RXD/TAKEN: ICD-10-PCS | Mod: CPTII,S$GLB,, | Performed by: FAMILY MEDICINE

## 2022-10-21 PROCEDURE — 4010F ACE/ARB THERAPY RXD/TAKEN: CPT | Mod: CPTII,S$GLB,, | Performed by: FAMILY MEDICINE

## 2022-10-21 PROCEDURE — 3078F PR MOST RECENT DIASTOLIC BLOOD PRESSURE < 80 MM HG: ICD-10-PCS | Mod: CPTII,S$GLB,, | Performed by: FAMILY MEDICINE

## 2022-10-21 PROCEDURE — 1157F ADVNC CARE PLAN IN RCRD: CPT | Mod: CPTII,S$GLB,, | Performed by: FAMILY MEDICINE

## 2022-10-21 PROCEDURE — 1159F MED LIST DOCD IN RCRD: CPT | Mod: CPTII,S$GLB,, | Performed by: FAMILY MEDICINE

## 2022-10-21 PROCEDURE — 3044F HG A1C LEVEL LT 7.0%: CPT | Mod: CPTII,S$GLB,, | Performed by: FAMILY MEDICINE

## 2022-10-21 PROCEDURE — 1157F PR ADVANCE CARE PLAN OR EQUIV PRESENT IN MEDICAL RECORD: ICD-10-PCS | Mod: CPTII,S$GLB,, | Performed by: FAMILY MEDICINE

## 2022-10-21 RX ORDER — PANTOPRAZOLE SODIUM 40 MG/1
40 TABLET, DELAYED RELEASE ORAL DAILY
Qty: 90 TABLET | Refills: 3 | Status: SHIPPED | OUTPATIENT
Start: 2022-10-21 | End: 2023-07-25

## 2022-10-21 RX ORDER — HYDROCHLOROTHIAZIDE 25 MG/1
25 TABLET ORAL DAILY
Qty: 90 TABLET | Refills: 3 | Status: SHIPPED | OUTPATIENT
Start: 2022-10-21 | End: 2023-10-27

## 2022-10-21 RX ORDER — AMLODIPINE BESYLATE 5 MG/1
5 TABLET ORAL DAILY
Qty: 90 TABLET | Refills: 3 | Status: SHIPPED | OUTPATIENT
Start: 2022-10-21 | End: 2023-10-27

## 2022-10-21 NOTE — PROGRESS NOTES
Subjective:       Patient ID: Zeenat Mancia is a 72 y.o. male.    Chief Complaint: Annual Exam    HPI 72-year-old white male presents to clinic today accompanied by his wife for annual physical exam.  He continues to be followed by Cardiology secondary to longstanding persistent atrial fibrillation.  Atrial fibrillation remains stable on atenolol 50 mg daily, valsartan 320 mg daily, amlodipine 5 mg daily, and hydrochlorothiazide 25 mg daily.  He remains anticoagulated with Eliquis 5 mg b.i.d. he was admitted to the hospital last year secondary to a GI bleed with resultant iron deficiency anemia.  At this time anemia has resolved after treatment with ferrous sulfate.  The patient is now experiencing polycythemia.  He has been evaluated by Hematology who believes that the polycythemia is secondary to the patient's sleep apnea and recommend no further workup.  He continues to be on pantoprazole daily status post GI bleed.  He continues to be followed by urology secondary to BPH which remains stable on oxybutynin 5 mg daily and Flomax 0.4 mg daily.  He is currently being followed by Physical Medicine and rehab secondary to chronic bilateral lower extremity neuralgia for which he was started on gabapentin 600 mg t.i.d..  He notes that he takes 1200 mg at bedtime and also takes Ambien 5 mg nightly secondary to insomnia.  He continues to complain of frequent dizziness and fatigue.  He has a past surgical history of appendectomy, cataract surgery, tonsillectomy, left heart catheterization, cholecystectomy, and attempted cardioversion.  He has a family history of heart disease.  He is up-to-date with colonoscopy.  Flu vaccine has been discussed and will be given today.  Review of Systems   Constitutional:  Positive for fatigue. Negative for appetite change, chills and fever.   HENT:  Negative for nasal congestion, ear pain, hearing loss, postnasal drip, rhinorrhea, sinus pressure/congestion, sore throat and tinnitus.     Eyes:  Negative for redness, itching and visual disturbance.   Respiratory:  Negative for cough, chest tightness and shortness of breath.    Cardiovascular:  Negative for chest pain and palpitations.   Gastrointestinal:  Negative for abdominal pain, constipation, diarrhea, nausea and vomiting.   Genitourinary:  Negative for decreased urine volume, difficulty urinating, dysuria, frequency, hematuria and urgency.   Musculoskeletal:  Negative for back pain, myalgias, neck pain and neck stiffness.   Integumentary:  Negative for rash.   Neurological:  Positive for dizziness. Negative for light-headedness and headaches.   Psychiatric/Behavioral: Negative.         Objective:      Physical Exam  Vitals and nursing note reviewed.   Constitutional:       General: He is not in acute distress.     Appearance: Normal appearance. He is well-developed. He is obese. He is not diaphoretic.   HENT:      Head: Normocephalic and atraumatic.      Right Ear: External ear normal.      Left Ear: External ear normal.      Nose: Nose normal.      Mouth/Throat:      Pharynx: No oropharyngeal exudate.   Eyes:      General: No scleral icterus.        Right eye: No discharge.         Left eye: No discharge.      Conjunctiva/sclera: Conjunctivae normal.      Pupils: Pupils are equal, round, and reactive to light.   Neck:      Thyroid: No thyromegaly.      Vascular: No JVD.      Trachea: No tracheal deviation.   Cardiovascular:      Rate and Rhythm: Normal rate and regular rhythm.      Heart sounds: Normal heart sounds. No murmur heard.    No friction rub. No gallop.   Pulmonary:      Effort: Pulmonary effort is normal. No respiratory distress.      Breath sounds: Normal breath sounds. No stridor. No wheezing, rhonchi or rales.   Chest:      Chest wall: No tenderness.   Abdominal:      General: Bowel sounds are normal. There is no distension.      Palpations: Abdomen is soft. There is no mass.      Tenderness: There is no abdominal tenderness.  There is no guarding or rebound.   Musculoskeletal:         General: No tenderness. Normal range of motion.      Cervical back: Normal range of motion and neck supple.   Lymphadenopathy:      Cervical: No cervical adenopathy.   Skin:     General: Skin is warm and dry.      Coloration: Skin is not pale.      Findings: No erythema or rash.   Neurological:      Mental Status: He is alert and oriented to person, place, and time.   Psychiatric:         Mood and Affect: Mood normal.         Behavior: Behavior normal.         Thought Content: Thought content normal.         Judgment: Judgment normal.       Assessment:       Problem List Items Addressed This Visit       Benign non-nodular prostatic hyperplasia with lower urinary tract symptoms    Chronic atrial fibrillation    Essential hypertension    Relevant Medications    hydroCHLOROthiazide (HYDRODIURIL) 25 MG tablet    Iron deficiency anemia due to chronic blood loss    Morbid obesity due to excess calories    Nonobstructive atherosclerosis of coronary artery    JACKELYN (obstructive sleep apnea)    Other insomnia    Tortuous aorta     Other Visit Diagnoses       Encounter for general adult medical examination with abnormal findings    -  Primary    Gastroesophageal reflux disease without esophagitis        Relevant Medications    pantoprazole (PROTONIX) 40 MG tablet    Polycythemia        Relevant Orders    Ambulatory referral/consult to Hematology / Oncology    Neuralgia        Need for prophylactic vaccination and inoculation against influenza                Plan:         1. Labs have been reviewed and are overall within normal limits except for continued polycythemia.  2. Continue Eliquis 5 mg b.i.d., valsartan 320 mg daily, hydrochlorothiazide 25 mg daily, atenolol 50 mg daily, and amlodipine 5 mg daily.  Atrial fibrillation, atherosclerosis, tortuous aorta, and hypertension are stable.  Continue follow-up with cardiology as scheduled.  3. Continue pantoprazole 40 mg  daily.  GERD is stable.  4. Iron deficiency anemia is stable status post GI bleed last year.    5. Recommend follow-up with Hematology for continued evaluation of continued polycythemia.    6. Continue CPAP nightly.    7. I believe that the patient is experiencing dizziness and fatigue secondary to medication affects of Ambien and gabapentin.  I have encouraged the patient to decrease use of Ambien strictly to an as needed basis.  8. Recommend that the patient decreased the dose of gabapentin back to 600 mg 3 times daily and follow-up with Physical Medicine for further evaluation as I believe that the patient is experiencing dizziness and lightheadedness secondary to the high doses of gabapentin.    9. Continue oxybutynin and Flomax as prescribed.  BPH is stable.  10. Encourage diet and exercise.    11. Flu vaccine given.    12. Return to clinic as needed or in 1 year for annual physical exam.

## 2022-10-24 ENCOUNTER — TELEPHONE (OUTPATIENT)
Dept: HEMATOLOGY/ONCOLOGY | Facility: CLINIC | Age: 72
End: 2022-10-24
Payer: MEDICARE

## 2022-10-24 ENCOUNTER — TELEPHONE (OUTPATIENT)
Dept: PHYSICAL MEDICINE AND REHAB | Facility: CLINIC | Age: 72
End: 2022-10-24
Payer: MEDICARE

## 2022-10-24 NOTE — TELEPHONE ENCOUNTER
"----- Message from Luis Daniel Cherry sent at 10/24/2022 10:53 AM CDT -----  Scheduling Request        Patient Status: Est      Scheduling Appt: From referral      Time/Date Preference: Soonest available      Relationship to Patient?: Kerri Mancia (Spouse)       Contact Preference?:  [phone] (Mobile)      Treating Provider: Brisa / Waqas      Do you feel you need to be seen today? No      Additional Notes:  "Thank you for all that you do for our patients"     "

## 2022-10-28 ENCOUNTER — PATIENT MESSAGE (OUTPATIENT)
Dept: INTERNAL MEDICINE | Facility: CLINIC | Age: 72
End: 2022-10-28
Payer: MEDICARE

## 2022-10-31 ENCOUNTER — TELEPHONE (OUTPATIENT)
Dept: PHYSICAL MEDICINE AND REHAB | Facility: CLINIC | Age: 72
End: 2022-10-31
Payer: MEDICARE

## 2022-10-31 ENCOUNTER — PATIENT MESSAGE (OUTPATIENT)
Dept: PHYSICAL MEDICINE AND REHAB | Facility: CLINIC | Age: 72
End: 2022-10-31
Payer: MEDICARE

## 2022-11-02 ENCOUNTER — PATIENT MESSAGE (OUTPATIENT)
Dept: INTERNAL MEDICINE | Facility: CLINIC | Age: 72
End: 2022-11-02
Payer: MEDICARE

## 2022-11-03 ENCOUNTER — PATIENT MESSAGE (OUTPATIENT)
Dept: INTERNAL MEDICINE | Facility: CLINIC | Age: 72
End: 2022-11-03
Payer: MEDICARE

## 2022-11-03 RX ORDER — TADALAFIL 20 MG/1
20 TABLET ORAL DAILY
Qty: 10 TABLET | Refills: 11 | Status: SHIPPED | OUTPATIENT
Start: 2022-11-03 | End: 2023-03-17 | Stop reason: SDUPTHER

## 2022-11-06 NOTE — PROGRESS NOTES
PATIENT: Zeenat Mancia  MRN: [id number]  DATE: 11/8/2022    Diagnosis:   1. Secondary polycythemia    2. History of iron deficiency anemia    3. History of smoking    4. JACKELYN (obstructive sleep apnea)    5. Morbid obesity      Chief Complaint: polycythemia    Oncologic History:      Oncologic History     Oncologic Treatment     Pathology       Subjective:    History of Present Illness: Mr. Mancia is a 72 y.o. male who presented in May 2022 for evaluation and management of polycythemia. He was referred by Dr. Ledezma.    - labs on 4/19/22 revealed an elevated hemoglobin at 18.9 g/dL.  - he had previously seen hematology/oncology, most recently on 2/3/20, for iron deficiency anemia.  - today, he is doing okay. He endorses fatigue, dizziness. He denies shortness of breath, chest pain, nausea, vomiting, diarrhea, constipation.   - he has sleep apnea, for which he uses a CPAP.  - he has gained about 20 lbs in the past 1-2 years.    Interval history:  - he presents for a follow-up appointment for his secondary polycythemia / iron deficiency.  - today, he is doing okay. He endorses fatigue, dizziness. He notes pain in his feet. He denies shortness of breath, chest pain, nausea, vomiting, diarrhea, constipation.           Past medical, surgical, family, and social histories have been reviewed and updated below.    Past Medical History:   Past Medical History:   Diagnosis Date    A-fib     Acute blood loss anemia     Arthritis     Atrial flutter     Colon polyp     Diverticulosis     Dyslipidemia     Dyspnea on exertion 5/31/2021    GERD (gastroesophageal reflux disease)     on Protonix    Hepatic steatosis     Hyperlipidemia     Hypertension     Irregular heart beat     Lower GI bleed 5/31/2021    Multiple gastric ulcers 05/16/2018    gi scope per Dr. Houston    Obesity     Sleep apnea     uses CPAP    Unspecified disorder of kidney and ureter     kidney stones       Past Surgical History:   Past Surgical History:   Procedure  Laterality Date    APPENDECTOMY      CATARACT EXTRACTION  2009    OS    CATARACT EXTRACTION W/  INTRAOCULAR LENS IMPLANT Right 6/3/2015    [id number] 14.5d//    COLONOSCOPY N/A 10/10/2017    Procedure: COLONOSCOPY;  Surgeon: Jameson Houston MD;  Location: [facility];  Service: Endoscopy;  Laterality: N/A; repeat in 5 years for surveillance    COLONOSCOPY N/A 5/16/2018    Procedure: COLONOSCOPY;  Surgeon: Jameson Houston MD;  Location: [facility];  Service: Endoscopy;  Laterality: N/A;    COLONOSCOPY N/A 6/2/2021    Procedure: COLONOSCOPY;  Surgeon: Agustin Corona MD;  Location: [facility] (2ND FLR);  Service: Endoscopy;  Laterality: N/A;    COLONOSCOPY N/A 10/11/2022    Procedure: COLONOSCOPY;  Surgeon: Edin Lara MD;  Location: [facility] (2ND FLR);  Service: Endoscopy;  Laterality: N/A;  2nd floor-difficult intubation  ok to hold eliquis x2 days per Dr. Jerome, see telephone encounter 6/28-vt  fully vaccinated, instructions sent to [facility]  extended miralax prep  Clear liquids up to 2 hrs prior/ AM prep [id number]    CORONARY ANGIOGRAPHY N/A 1/4/2021    Procedure: ANGIOGRAM, CORONARY ARTERY;  Surgeon: Jase Moore MD;  Location: [facility] CATH LAB/EP;  Service: Cardiology;  Laterality: N/A;    CYSTOSCOPY WITH INSERTION OF MINIMALLY INVASIVE IMPLANT TO ENLARGE PROSTATIC URETHRA N/A 6/29/2021    Procedure: TRANSPROSTATIC TISSUE RETRACTION;  Surgeon: Agustin Clay MD;  Location: [facility];  Service: Urology;  Laterality: N/A;    ESOPHAGOGASTRODUODENOSCOPY N/A 7/2/2018    Procedure: EGD (ESOPHAGOGASTRODUODENOSCOPY);  Surgeon: Jameson Houston MD;  Location: [facility];  Service: Endoscopy;  Laterality: N/A;    ESOPHAGOGASTRODUODENOSCOPY N/A 6/2/2021    Procedure: EGD (ESOPHAGOGASTRODUODENOSCOPY);  Surgeon: Agustin Corona MD;  [facility]  Service: Endoscopy;  Laterality: N/A;    EYE SURGERY      cataract    LEFT HEART CATHETERIZATION N/A 1/4/2021    Procedure: Left  heart cath;  Surgeon: Jase Moore MD;  Location: [facility] CATH LAB/EP;  Service: Cardiology;  Laterality: N/A;    ROBOT-ASSISTED CHOLECYSTECTOMY N/A 2/4/2021    Procedure: ROBOTIC CHOLECYSTECTOMY;  Surgeon: Pal Cheney MD;  Location: [facility] OR;  Service: General;  Laterality: N/A;    TONSILLECTOMY      TRANSESOPHAGEAL ECHOCARDIOGRAPHY N/A 5/14/2021    Procedure: ECHOCARDIOGRAM, TRANSESOPHAGEAL;  Surgeon: eLo Reeves III, MD;  Location: [facility] EP LAB;  Service: Cardiology;  Laterality: N/A;    TREATMENT OF CARDIAC ARRHYTHMIA N/A 4/19/2021    Procedure: Cardioversion or Defibrillation;  Surgeon: Judy Jerome MD;  Location: [facility] CATH LAB/EP;  Service: Cardiology;  Laterality: N/A;    TREATMENT OF CARDIAC ARRHYTHMIA N/A 5/14/2021    Procedure: CARDIOVERSION;  Surgeon: PEMA Downs MD;  Location: [facility] EP LAB;  Service: Cardiology;  Laterality: N/A;  AF, PHUONG, DCCV, MAC, DM, 3 PREP    UPPER GASTROINTESTINAL ENDOSCOPY  05/16/2018    Dr. Houston       Family History:   Family History   Problem Relation Age of Onset    Arthritis Mother     Heart disease Mother     Blindness Mother         OS due to injury    Coronary artery disease Father     Heart disease Father     Cataracts Father     Amblyopia Neg Hx     Cancer Neg Hx     Diabetes Neg Hx     Glaucoma Neg Hx     Hypertension Neg Hx     Macular degeneration Neg Hx     Retinal detachment Neg Hx     Strabismus Neg Hx     Stroke Neg Hx     Thyroid disease Neg Hx     Colon cancer Neg Hx     Colon polyps Neg Hx     Crohn's disease Neg Hx     Ulcerative colitis Neg Hx     Esophageal cancer Neg Hx     Stomach cancer Neg Hx        Social History:  reports that he has quit smoking. He started smoking about 25 years ago. He has never used smokeless tobacco. He reports current alcohol use. He reports that he does not use drugs.    Allergies:  Review of patient's allergies indicates:   Allergen Reactions    Lipitor [atorvastatin] Itching        Medications:  Current Outpatient Medications   Medication Sig Dispense Refill    amLODIPine (NORVASC) 5 MG tablet Take 1 tablet (5 mg total) by mouth once daily. 90 tablet 3    atenoloL (TENORMIN) 50 MG tablet TAKE 1 TABLET(50 MG) BY MOUTH EVERY DAY 90 tablet 3    docusate sodium (COLACE) 100 MG capsule Take 100 mg by mouth 2 (two) times daily.      DULoxetine (CYMBALTA) 20 MG capsule Take 1 capsule (20 mg total) by mouth once daily. 30 capsule 11    ELIQUIS 5 mg Tab TAKE 1 TABLET(5 MG) BY MOUTH TWICE DAILY 180 tablet 3    gabapentin (NEURONTIN) 600 MG tablet Take 1 tablet (600 mg total) by mouth 3 (three) times daily. 90 tablet 11    hydroCHLOROthiazide (HYDRODIURIL) 25 MG tablet Take 1 tablet (25 mg total) by mouth once daily. 90 tablet 3    melatonin 10 mg Tab Take 10 mg by mouth every evening.      multivit-min/FA/lycopen/lutein (CENTRUM SILVER MEN ORAL) Take by mouth once daily.      oxybutynin (DITROPAN) 5 MG Tab TAKE 1 TABLET(5 MG) BY MOUTH EVERY EVENING 30 tablet 11    pantoprazole (PROTONIX) 40 MG tablet Take 1 tablet (40 mg total) by mouth once daily. 90 tablet 3    tadalafiL (CIALIS) 20 MG Tab Take 1 tablet (20 mg total) by mouth once daily. 10 tablet 11    tamsulosin (FLOMAX) 0.4 mg Cap Take 1 capsule (0.4 mg total) by mouth once daily. 30 capsule 11    valsartan (DIOVAN) 320 MG tablet TAKE 1 TABLET(320 MG) BY MOUTH EVERY DAY 90 tablet 3    zolpidem (AMBIEN) 5 MG Tab TAKE 1 TABLET(5 MG) BY MOUTH EVERY NIGHT AS NEEDED 30 tablet 0     No current facility-administered medications for this visit.       Review of Systems   Constitutional:  Positive for fatigue.   HENT:  Negative for sore throat.    Eyes:  Negative for visual disturbance.   Respiratory:  Negative for shortness of breath.    Cardiovascular:  Negative for chest pain.   Gastrointestinal:  Negative for abdominal pain.   Genitourinary:  Negative for dysuria.   Musculoskeletal:  Negative for back pain.        Foot issues   Skin:  Negative for  rash.   Neurological:  Positive for dizziness. Negative for headaches.   Hematological:  Negative for adenopathy.   Psychiatric/Behavioral:  The patient is not nervous/anxious.      ECOG Performance Status:   ECOG SCORE 1            Objective:      Vitals:   Vitals:    11/08/22 0935   BP: (!) 101/48   Pulse: 93   SpO2: 96%   Weight: (!) 147.5 kg (325 lb 2.9 oz)     BMI: Body mass index is 41.75 kg/m².    Physical Exam  Vitals and nursing note reviewed.   Constitutional:       Appearance: He is well-developed.   HENT:      Head: Normocephalic and atraumatic.   Eyes:      Pupils: Pupils are equal, round, and reactive to light.   Cardiovascular:      Rate and Rhythm: Normal rate and regular rhythm.   Pulmonary:      Effort: Pulmonary effort is normal.      Breath sounds: Normal breath sounds.   Abdominal:      General: Bowel sounds are normal.      Palpations: Abdomen is soft.   Musculoskeletal:         General: Normal range of motion.      Cervical back: Normal range of motion and neck supple.   Skin:     General: Skin is warm and dry.   Neurological:      Mental Status: He is alert and oriented to person, place, and time.   Psychiatric:         Behavior: Behavior normal.         Thought Content: Thought content normal.         Judgment: Judgment normal.     Laboratory Data:  Labs have been reviewed.    Lab Results   Component Value Date    WBC 7.70 10/20/2022    HGB 18.1 (H) 10/20/2022    HCT 57.9 (H) 10/20/2022    MCV 94 10/20/2022     10/20/2022           Imaging:    Assessment:       1. Secondary polycythemia    2. History of iron deficiency anemia    3. History of smoking    4. JACKELYN (obstructive sleep apnea)    5. Morbid obesity           Plan:     1. Secondary Polycythemia  - I have reviewed his chart  - labs on 4/19/22 revealed an elevated hemoglobin at 18.9 g/dL.  - he had previously seen hematology/oncology, most recently on 2/3/20, for iron deficiency anemia.  - he has sleep apnea, for which he uses a  CPAP. Perhaps he has secondary polycythemia related to sleep apnea.  - he has a distant history of smoking, so maybe he has secondary polycythemia related to that.  - JAK2 with reflex testing was negative, essentially ruling out polycythemia vera.  - CBC (10/20/22) revealed an elevated hemoglobin and hematocrit.  - I stated that in secondary polycythemia, the risk of thrombosis/stroke/cardiac event is much lower than in primary polycythemia vera. The benefits of phlebotomy in secondary polycythemia are less pronounced. Sometimes we recommend it for symptoms, but I don't think his symptoms are related to an elevated blood count  - return to clinic as needed.    2. History of iron deficiency   - likely secondary to frequent blood donations.  - iron studies in 2018 and 2021 revealed iron deficiency.  - iron studies on 5/5/22 were normal    3. History of smoking  - he used to smoke 2-3 packs per day, but he quit about 35 years ago.    4. Morbid obesity  - Body mass index is 41.75 kg/m².   - obesity-hypoventilation syndrome can be a cause of polycythemia  - I encouraged weight loss    5. Advance Care Planning     Power of   After our discussion (at previous visit), the patient decided to complete a HCPOA and appointed his  wife Kerri Mancia ([phone]) and children Deion Mancia ([phone]) and Jase Gavino ([phone]) .         - return to clinic as needed.    Bala Alan M.D.  Hematology/Oncology  [facility] - [address], [address]  [place] [address]  Phone: [phone]  Fax: [phone]

## 2022-11-08 ENCOUNTER — OFFICE VISIT (OUTPATIENT)
Dept: HEMATOLOGY/ONCOLOGY | Facility: CLINIC | Age: 72
End: 2022-11-08
Payer: MEDICARE

## 2022-11-08 VITALS
SYSTOLIC BLOOD PRESSURE: 101 MMHG | DIASTOLIC BLOOD PRESSURE: 48 MMHG | BODY MASS INDEX: 41.75 KG/M2 | OXYGEN SATURATION: 96 % | WEIGHT: 315 LBS | HEART RATE: 93 BPM

## 2022-11-08 DIAGNOSIS — E66.01 MORBID OBESITY: ICD-10-CM

## 2022-11-08 DIAGNOSIS — D75.1 SECONDARY POLYCYTHEMIA: Primary | ICD-10-CM

## 2022-11-08 DIAGNOSIS — Z87.891 HISTORY OF SMOKING: ICD-10-CM

## 2022-11-08 DIAGNOSIS — Z86.2 HISTORY OF IRON DEFICIENCY ANEMIA: ICD-10-CM

## 2022-11-08 DIAGNOSIS — G47.33 OSA (OBSTRUCTIVE SLEEP APNEA): ICD-10-CM

## 2022-11-08 PROCEDURE — 3078F DIAST BP <80 MM HG: CPT | Mod: CPTII,S$GLB,, | Performed by: INTERNAL MEDICINE

## 2022-11-08 PROCEDURE — 1159F MED LIST DOCD IN RCRD: CPT | Mod: CPTII,S$GLB,, | Performed by: INTERNAL MEDICINE

## 2022-11-08 PROCEDURE — 1159F PR MEDICATION LIST DOCUMENTED IN MEDICAL RECORD: ICD-10-PCS | Mod: CPTII,S$GLB,, | Performed by: INTERNAL MEDICINE

## 2022-11-08 PROCEDURE — 1100F PTFALLS ASSESS-DOCD GE2>/YR: CPT | Mod: CPTII,S$GLB,, | Performed by: INTERNAL MEDICINE

## 2022-11-08 PROCEDURE — 3044F HG A1C LEVEL LT 7.0%: CPT | Mod: CPTII,S$GLB,, | Performed by: INTERNAL MEDICINE

## 2022-11-08 PROCEDURE — 3044F PR MOST RECENT HEMOGLOBIN A1C LEVEL <7.0%: ICD-10-PCS | Mod: CPTII,S$GLB,, | Performed by: INTERNAL MEDICINE

## 2022-11-08 PROCEDURE — 1157F PR ADVANCE CARE PLAN OR EQUIV PRESENT IN MEDICAL RECORD: ICD-10-PCS | Mod: CPTII,S$GLB,, | Performed by: INTERNAL MEDICINE

## 2022-11-08 PROCEDURE — 3074F PR MOST RECENT SYSTOLIC BLOOD PRESSURE < 130 MM HG: ICD-10-PCS | Mod: CPTII,S$GLB,, | Performed by: INTERNAL MEDICINE

## 2022-11-08 PROCEDURE — 1100F PR PT FALLS ASSESS DOC 2+ FALLS/FALL W/INJURY/YR: ICD-10-PCS | Mod: CPTII,S$GLB,, | Performed by: INTERNAL MEDICINE

## 2022-11-08 PROCEDURE — 3078F PR MOST RECENT DIASTOLIC BLOOD PRESSURE < 80 MM HG: ICD-10-PCS | Mod: CPTII,S$GLB,, | Performed by: INTERNAL MEDICINE

## 2022-11-08 PROCEDURE — 3288F PR FALLS RISK ASSESSMENT DOCUMENTED: ICD-10-PCS | Mod: CPTII,S$GLB,, | Performed by: INTERNAL MEDICINE

## 2022-11-08 PROCEDURE — 1157F ADVNC CARE PLAN IN RCRD: CPT | Mod: CPTII,S$GLB,, | Performed by: INTERNAL MEDICINE

## 2022-11-08 PROCEDURE — 3288F FALL RISK ASSESSMENT DOCD: CPT | Mod: CPTII,S$GLB,, | Performed by: INTERNAL MEDICINE

## 2022-11-08 PROCEDURE — 3074F SYST BP LT 130 MM HG: CPT | Mod: CPTII,S$GLB,, | Performed by: INTERNAL MEDICINE

## 2022-11-08 PROCEDURE — 99214 PR OFFICE/OUTPT VISIT, EST, LEVL IV, 30-39 MIN: ICD-10-PCS | Mod: S$GLB,,, | Performed by: INTERNAL MEDICINE

## 2022-11-08 PROCEDURE — 4010F PR ACE/ARB THEARPY RXD/TAKEN: ICD-10-PCS | Mod: CPTII,S$GLB,, | Performed by: INTERNAL MEDICINE

## 2022-11-08 PROCEDURE — 99214 OFFICE O/P EST MOD 30 MIN: CPT | Mod: S$GLB,,, | Performed by: INTERNAL MEDICINE

## 2022-11-08 PROCEDURE — 99999 PR PBB SHADOW E&M-EST. PATIENT-LVL III: CPT | Mod: PBBFAC,,, | Performed by: INTERNAL MEDICINE

## 2022-11-08 PROCEDURE — 99999 PR PBB SHADOW E&M-EST. PATIENT-LVL III: ICD-10-PCS | Mod: PBBFAC,,, | Performed by: INTERNAL MEDICINE

## 2022-11-08 PROCEDURE — 4010F ACE/ARB THERAPY RXD/TAKEN: CPT | Mod: CPTII,S$GLB,, | Performed by: INTERNAL MEDICINE

## 2022-11-08 PROCEDURE — 1125F PR PAIN SEVERITY QUANTIFIED, PAIN PRESENT: ICD-10-PCS | Mod: CPTII,S$GLB,, | Performed by: INTERNAL MEDICINE

## 2022-11-08 PROCEDURE — 1125F AMNT PAIN NOTED PAIN PRSNT: CPT | Mod: CPTII,S$GLB,, | Performed by: INTERNAL MEDICINE

## 2022-11-08 PROCEDURE — 3008F PR BODY MASS INDEX (BMI) DOCUMENTED: ICD-10-PCS | Mod: CPTII,S$GLB,, | Performed by: INTERNAL MEDICINE

## 2022-11-08 PROCEDURE — 3008F BODY MASS INDEX DOCD: CPT | Mod: CPTII,S$GLB,, | Performed by: INTERNAL MEDICINE

## 2022-11-08 NOTE — Clinical Note
Good morning,  Thanks for sending him back to me. I think he has a secondary polycythemia related to obstructive sleep apnea/obesity hypoventilation syndrome. JAK2 with reflex testing was negative, essentially ruling out the diagnosis of polycythemia vera. I don't think he needs phlebotomies to get his counts lower, although he could try it and see if it helps with his chronic symptoms. But he does not have a specific hematocrit goal in secondary polycythemia.  Thanks! Bala

## 2022-11-10 ENCOUNTER — PATIENT MESSAGE (OUTPATIENT)
Dept: PHYSICAL MEDICINE AND REHAB | Facility: CLINIC | Age: 72
End: 2022-11-10
Payer: MEDICARE

## 2022-11-10 ENCOUNTER — PATIENT MESSAGE (OUTPATIENT)
Dept: INTERNAL MEDICINE | Facility: CLINIC | Age: 72
End: 2022-11-10
Payer: MEDICARE

## 2022-11-10 DIAGNOSIS — F41.9 ANXIETY: Primary | ICD-10-CM

## 2022-11-10 DIAGNOSIS — G47.00 INSOMNIA, UNSPECIFIED TYPE: ICD-10-CM

## 2022-11-10 NOTE — TELEPHONE ENCOUNTER
Still having a problem sleeping despite Capp and medications     PT states that he wakes up and think about things from my past and worry sometimes throughout the day.      Pt would like to know if he should  see a counselor? And can you recommend someone in the [facility] system?

## 2022-11-17 ENCOUNTER — OFFICE VISIT (OUTPATIENT)
Dept: PHYSICAL MEDICINE AND REHAB | Facility: CLINIC | Age: 72
End: 2022-11-17
Payer: MEDICARE

## 2022-11-17 VITALS
WEIGHT: 315 LBS | SYSTOLIC BLOOD PRESSURE: 127 MMHG | DIASTOLIC BLOOD PRESSURE: 86 MMHG | HEIGHT: 74 IN | BODY MASS INDEX: 40.43 KG/M2 | HEART RATE: 73 BPM

## 2022-11-17 DIAGNOSIS — M54.16 LUMBAR RADICULITIS: Primary | ICD-10-CM

## 2022-11-17 PROCEDURE — 99999 PR PBB SHADOW E&M-EST. PATIENT-LVL III: CPT | Mod: PBBFAC,,, | Performed by: PHYSICAL MEDICINE & REHABILITATION

## 2022-11-17 PROCEDURE — 1100F PR PT FALLS ASSESS DOC 2+ FALLS/FALL W/INJURY/YR: ICD-10-PCS | Mod: CPTII,S$GLB,, | Performed by: PHYSICAL MEDICINE & REHABILITATION

## 2022-11-17 PROCEDURE — 1126F PR PAIN SEVERITY QUANTIFIED, NO PAIN PRESENT: ICD-10-PCS | Mod: CPTII,S$GLB,, | Performed by: PHYSICAL MEDICINE & REHABILITATION

## 2022-11-17 PROCEDURE — 4010F PR ACE/ARB THEARPY RXD/TAKEN: ICD-10-PCS | Mod: CPTII,S$GLB,, | Performed by: PHYSICAL MEDICINE & REHABILITATION

## 2022-11-17 PROCEDURE — 99213 OFFICE O/P EST LOW 20 MIN: CPT | Mod: S$GLB,,, | Performed by: PHYSICAL MEDICINE & REHABILITATION

## 2022-11-17 PROCEDURE — 3044F HG A1C LEVEL LT 7.0%: CPT | Mod: CPTII,S$GLB,, | Performed by: PHYSICAL MEDICINE & REHABILITATION

## 2022-11-17 PROCEDURE — 99213 PR OFFICE/OUTPT VISIT, EST, LEVL III, 20-29 MIN: ICD-10-PCS | Mod: S$GLB,,, | Performed by: PHYSICAL MEDICINE & REHABILITATION

## 2022-11-17 PROCEDURE — 3008F PR BODY MASS INDEX (BMI) DOCUMENTED: ICD-10-PCS | Mod: CPTII,S$GLB,, | Performed by: PHYSICAL MEDICINE & REHABILITATION

## 2022-11-17 PROCEDURE — 3044F PR MOST RECENT HEMOGLOBIN A1C LEVEL <7.0%: ICD-10-PCS | Mod: CPTII,S$GLB,, | Performed by: PHYSICAL MEDICINE & REHABILITATION

## 2022-11-17 PROCEDURE — 1159F MED LIST DOCD IN RCRD: CPT | Mod: CPTII,S$GLB,, | Performed by: PHYSICAL MEDICINE & REHABILITATION

## 2022-11-17 PROCEDURE — 3288F PR FALLS RISK ASSESSMENT DOCUMENTED: ICD-10-PCS | Mod: CPTII,S$GLB,, | Performed by: PHYSICAL MEDICINE & REHABILITATION

## 2022-11-17 PROCEDURE — 1157F ADVNC CARE PLAN IN RCRD: CPT | Mod: CPTII,S$GLB,, | Performed by: PHYSICAL MEDICINE & REHABILITATION

## 2022-11-17 PROCEDURE — 3074F SYST BP LT 130 MM HG: CPT | Mod: CPTII,S$GLB,, | Performed by: PHYSICAL MEDICINE & REHABILITATION

## 2022-11-17 PROCEDURE — 3079F DIAST BP 80-89 MM HG: CPT | Mod: CPTII,S$GLB,, | Performed by: PHYSICAL MEDICINE & REHABILITATION

## 2022-11-17 PROCEDURE — 3079F PR MOST RECENT DIASTOLIC BLOOD PRESSURE 80-89 MM HG: ICD-10-PCS | Mod: CPTII,S$GLB,, | Performed by: PHYSICAL MEDICINE & REHABILITATION

## 2022-11-17 PROCEDURE — 3008F BODY MASS INDEX DOCD: CPT | Mod: CPTII,S$GLB,, | Performed by: PHYSICAL MEDICINE & REHABILITATION

## 2022-11-17 PROCEDURE — 3074F PR MOST RECENT SYSTOLIC BLOOD PRESSURE < 130 MM HG: ICD-10-PCS | Mod: CPTII,S$GLB,, | Performed by: PHYSICAL MEDICINE & REHABILITATION

## 2022-11-17 PROCEDURE — 1126F AMNT PAIN NOTED NONE PRSNT: CPT | Mod: CPTII,S$GLB,, | Performed by: PHYSICAL MEDICINE & REHABILITATION

## 2022-11-17 PROCEDURE — 1157F PR ADVANCE CARE PLAN OR EQUIV PRESENT IN MEDICAL RECORD: ICD-10-PCS | Mod: CPTII,S$GLB,, | Performed by: PHYSICAL MEDICINE & REHABILITATION

## 2022-11-17 PROCEDURE — 4010F ACE/ARB THERAPY RXD/TAKEN: CPT | Mod: CPTII,S$GLB,, | Performed by: PHYSICAL MEDICINE & REHABILITATION

## 2022-11-17 PROCEDURE — 99999 PR PBB SHADOW E&M-EST. PATIENT-LVL III: ICD-10-PCS | Mod: PBBFAC,,, | Performed by: PHYSICAL MEDICINE & REHABILITATION

## 2022-11-17 PROCEDURE — 1100F PTFALLS ASSESS-DOCD GE2>/YR: CPT | Mod: CPTII,S$GLB,, | Performed by: PHYSICAL MEDICINE & REHABILITATION

## 2022-11-17 PROCEDURE — 1159F PR MEDICATION LIST DOCUMENTED IN MEDICAL RECORD: ICD-10-PCS | Mod: CPTII,S$GLB,, | Performed by: PHYSICAL MEDICINE & REHABILITATION

## 2022-11-17 PROCEDURE — 3288F FALL RISK ASSESSMENT DOCD: CPT | Mod: CPTII,S$GLB,, | Performed by: PHYSICAL MEDICINE & REHABILITATION

## 2022-11-17 RX ORDER — DULOXETIN HYDROCHLORIDE 60 MG/1
60 CAPSULE, DELAYED RELEASE ORAL DAILY
Qty: 30 CAPSULE | Refills: 11 | Status: SHIPPED | OUTPATIENT
Start: 2022-11-17 | End: 2022-12-13 | Stop reason: SDUPTHER

## 2022-11-17 NOTE — PROGRESS NOTES
HPI:  Patient is a 72 y.o. year old male  w. b/l foot pain. Last eval.he was on cymbalta 30mg  and neurontin , but has been weaning off neurontin because of the sedation it caused. He states his pain in his legs and feet has worsened. He describes his pain as burning. It makes it difficult to sleep. Imaging  MRI LUMBAR SPINE WITHOUT CONTRAST     CLINICAL HISTORY:  Radiculopathy, lumbar regionLumbar radiculopathy, symptoms persist with conservative treatment;     TECHNIQUE:  Sagittal T1, sagittal T2, sagittal STIR, axial T1 and axial T2 weighted images of the lumbar spine obtained without contrast.     COMPARISON:  06/30/2022 x-ray     FINDINGS:  Lumbar spine alignment is within normal limits. The vertebral body heights are well maintained, with no fracture.  No marrow signal abnormality suspicious for an infiltrative process.     The conus is normal in appearance.  The adjacent soft tissue structures show no significant abnormalities.     L1-L2: Circumferential disc bulge and spondylitic spurring.No significant central canal or neural foraminal narrowing.     L2-L3: Circumferential disc bulge and spondylitic spurring.  No significant central canal or neural foraminal narrowing.     L3-L4: Circumferential disc bulge and minimal facet arthritis.  Asymmetric left-sided spondylitic spurring.  No significant central canal or neural foraminal narrowing.     L4-L5: Circumferential disc bulge and asymmetric spondylitic spurring.  Moderate facet arthritis.  No significant central canal or neural foraminal narrowing.     L5-S1: Circumferential disc bulge and small central disc protrusion.  Mild facet arthritis.  Mild left-sided neural foraminal narrowing.     Impression:     Multilevel degenerative changes resulting in mild left-sided L5 S1 foraminal encroachment.  Small superimposed central disc protrusion.        Labs  EGFR cr lfts gluc nl     EMG RESULTS  Impression:  1. Technically, somewhat limited study due to  peripheral edema of legs  2. The sensory responses were absent in the bilateral lower extremities.  This can either be due to the technical issues above, or could be a sensory peripheral polyneuropathy.  I favor the latter as his symptoms correlate with this.   3. Incidentally, there is evidence of a median mononeuropathy across the right wrist (I.e. carpal tunnel syndrome).  Upper extremity sensory studies performed due to lack of responses in the legs.  If a more detailed study is desired for the carpal tunnel syndrome, a separate upper extremity study can be ordered.  Note:  He has no significant hand symptoms.     4. No definitive evidence of tarsal tunnel syndrome on either side.  The bilateral abductor hallicus muscles showed active denervation, but this could be due to the peripheral polyneuropathy described above, and can also be seen in normal feet, especially with advancing age.                 Past Medical History:   Diagnosis Date    A-fib     Acute blood loss anemia     Arthritis     Atrial flutter     Colon polyp     Diverticulosis     Dyslipidemia     Dyspnea on exertion 5/31/2021    GERD (gastroesophageal reflux disease)     on Protonix    Hepatic steatosis     Hyperlipidemia     Hypertension     Irregular heart beat     Lower GI bleed 5/31/2021    Multiple gastric ulcers 05/16/2018    gi scope per Dr. Houston    Obesity     Sleep apnea     uses CPAP    Unspecified disorder of kidney and ureter     kidney stones     Past Surgical History:   Procedure Laterality Date    APPENDECTOMY      CATARACT EXTRACTION  2009    OS    CATARACT EXTRACTION W/  INTRAOCULAR LENS IMPLANT Right 6/3/2015    [id number] 14.5d//    COLONOSCOPY N/A 10/10/2017    Procedure: COLONOSCOPY;  Surgeon: Jameson Houston MD;  Location: [facility];  Service: Endoscopy;  Laterality: N/A; repeat in 5 years for surveillance    COLONOSCOPY N/A 5/16/2018    Procedure: COLONOSCOPY;  Surgeon: Jameson Houston MD;  Location: [facility];   Service: Endoscopy;  Laterality: N/A;    COLONOSCOPY N/A 6/2/2021    Procedure: COLONOSCOPY;  Surgeon: Agustin Corona MD;  Location: [facility] (2ND FLR);  Service: Endoscopy;  Laterality: N/A;    COLONOSCOPY N/A 10/11/2022    Procedure: COLONOSCOPY;  Surgeon: Edin Lara MD;  Location: [facility] (2ND FLR);  Service: Endoscopy;  Laterality: N/A;  2nd floor-difficult intubation  ok to hold eliquis x2 days per Dr. Jerome, see telephone encounter 6/28-Kpvt  fully vaccinated, instructions [facility]""  extended miralax prep  Clear liquids up to 2 hrs prior/ AM prep [id number]    CORONARY ANGIOGRAPHY N/A 1/4/2021    Procedure: ANGIOGRAM, CORONARY ARTERY;  Surgeon: Jase Moore MD;  Location: [facility] CATH LAB/EP;  Service: Cardiology;  Laterality: N/A;    CYSTOSCOPY WITH INSERTION OF MINIMALLY INVASIVE IMPLANT TO ENLARGE PROSTATIC URETHRA N/A 6/29/2021    Procedure: TRANSPROSTATIC TISSUE RETRACTION;  Surgeon: Agustin Clay MD;  Location: [facility];  Service: Urology;  Laterality: N/A;    ESOPHAGOGASTRODUODENOSCOPY N/A 7/2/2018    Procedure: EGD (ESOPHAGOGASTRODUODENOSCOPY);  Surgeon: Jameson Houston MD;  Location: [facility];  Service: Endoscopy;  Laterality: N/A;    ESOPHAGOGASTRODUODENOSCOPY N/A 6/2/2021    Procedure: EGD (ESOPHAGOGASTRODUODENOSCOPY);  Surgeon: Agustin Corona MD;  Location: [facility] (2ND [facility]);  Service: Endoscopy;  Laterality: N/A;    EYE SURGERY      cataract    LEFT HEART CATHETERIZATION N/A 1/4/2021    Procedure: Left heart cath;  Surgeon: Jase Moore MD;  Location: [facility] CATH LAB/EP;  Service: Cardiology;  Laterality: N/A;    ROBOT-ASSISTED CHOLECYSTECTOMY N/A 2/4/2021    Procedure: ROBOTIC CHOLECYSTECTOMY;  Surgeon: Pal Cheney MD;  Location: [facility];  Service: General;  Laterality: N/A;    TONSILLECTOMY      TRANSESOPHAGEAL ECHOCARDIOGRAPHY N/A 5/14/2021    Procedure: ECHOCARDIOGRAM, TRANSESOPHAGEAL;  Surgeon: Leo Reeves III, MD;  Location:  [facility] EP LAB;  Service: Cardiology;  Laterality: N/A;    TREATMENT OF CARDIAC ARRHYTHMIA N/A 4/19/2021    Procedure: Cardioversion or Defibrillation;  Surgeon: Judy Jerome MD;  Location: [facility] CATH LAB/EP;  Service: Cardiology;  Laterality: N/A;    TREATMENT OF CARDIAC ARRHYTHMIA N/A 5/14/2021    Procedure: CARDIOVERSION;  Surgeon: PEMA Downs MD;  Location: [facility] EP LAB;  Service: Cardiology;  Laterality: N/A;  AF, PHUONG, DCCV, MAC, DM, 3 PREP    UPPER GASTROINTESTINAL ENDOSCOPY  05/16/2018    Dr. Houston     Family History   Problem Relation Age of Onset    Arthritis Mother     Heart disease Mother     Blindness Mother         OS due to injury    Coronary artery disease Father     Heart disease Father     Cataracts Father     Amblyopia Neg Hx     Cancer Neg Hx     Diabetes Neg Hx     Glaucoma Neg Hx     Hypertension Neg Hx     Macular degeneration Neg Hx     Retinal detachment Neg Hx     Strabismus Neg Hx     Stroke Neg Hx     Thyroid disease Neg Hx     Colon cancer Neg Hx     Colon polyps Neg Hx     Crohn's disease Neg Hx     Ulcerative colitis Neg Hx     Esophageal cancer Neg Hx     Stomach cancer Neg Hx      Social History     Socioeconomic History    Marital status:     Number of children: 3   Tobacco Use    Smoking status: Former     Types: Cigarettes     Start date: 10/5/1997    Smokeless tobacco: Never    Tobacco comments:     quit in 1980s    Substance and Sexual Activity    Alcohol use: Yes     Comment: 1 beer every 2 month    Drug use: No    Sexual activity: Not Currently     Partners: Female   Social History Narrative    Patient is originally from [place]         School at         [employer] ; [employer]        Working ; retired,  plants            47 years         Children    Son x3         Lives with  [employer]    Updates    9/8/20- moved from [facility]       Social Determinants of Health     Financial Resource Strain: Low Risk      Difficulty of Paying Living Expenses: Not hard at all   Food Insecurity: No Food Insecurity    Worried About Running Out of Food in the Last Year: Never true    Ran Out of Food in the Last Year: Never true   Transportation Needs: No Transportation Needs    Lack of Transportation (Medical): No    Lack of Transportation (Non-Medical): No   Physical Activity: Insufficiently Active    Days of Exercise per Week: 1 day    Minutes of Exercise per Session: 10 min   Stress: Stress Concern Present    Feeling of Stress : Rather much   Social Connections: Unknown    Frequency of Communication with Friends and Family: Three times a week    Frequency of Social Gatherings with Friends and Family: Once a week    Active Member of Clubs or Organizations: Yes    Attends Club or Organization Meetings: 1 to 4 times per year    Marital Status:    Housing Stability: Low Risk     Unable to Pay for Housing in the Last Year: No    Number of Places Lived in the Last Year: 2    Unstable Housing in the Last Year: No       Review of patient's allergies indicates:   Allergen Reactions    Lipitor [atorvastatin] Itching       Current Outpatient Medications:     amLODIPine (NORVASC) 5 MG tablet, Take 1 tablet (5 mg total) by mouth once daily., Disp: 90 tablet, Rfl: 3    atenoloL (TENORMIN) 50 MG tablet, TAKE 1 TABLET(50 MG) BY MOUTH EVERY DAY, Disp: 90 tablet, Rfl: 3    docusate sodium (COLACE) 100 MG capsule, Take 100 mg by mouth 2 (two) times daily., Disp: , Rfl:     DULoxetine (CYMBALTA) 20 MG capsule, Take 1 capsule (20 mg total) by mouth once daily., Disp: 30 capsule, Rfl: 11    ELIQUIS 5 mg Tab, TAKE 1 TABLET(5 MG) BY MOUTH TWICE DAILY, Disp: 180 tablet, Rfl: 3    gabapentin (NEURONTIN) 600 MG tablet, Take 1 tablet (600 mg total) by mouth 3 (three) times daily., Disp: 90 tablet, Rfl: 11    hydroCHLOROthiazide (HYDRODIURIL) 25 MG tablet, Take 1 tablet (25 mg total) by mouth once daily., Disp: 90 tablet, Rfl: 3    melatonin 10 mg Tab,  "Take 10 mg by mouth every evening., Disp: , Rfl:     multivit-min/FA/lycopen/lutein (CENTRUM SILVER MEN ORAL), Take by mouth once daily., Disp: , Rfl:     oxybutynin (DITROPAN) 5 MG Tab, TAKE 1 TABLET(5 MG) BY MOUTH EVERY EVENING, Disp: 30 tablet, Rfl: 11    pantoprazole (PROTONIX) 40 MG tablet, Take 1 tablet (40 mg total) by mouth once daily., Disp: 90 tablet, Rfl: 3    tadalafiL (CIALIS) 20 MG Tab, Take 1 tablet (20 mg total) by mouth once daily., Disp: 10 tablet, Rfl: 11    tamsulosin (FLOMAX) 0.4 mg Cap, Take 1 capsule (0.4 mg total) by mouth once daily., Disp: 30 capsule, Rfl: 11    valsartan (DIOVAN) 320 MG tablet, TAKE 1 TABLET(320 MG) BY MOUTH EVERY DAY, Disp: 90 tablet, Rfl: 3    zolpidem (AMBIEN) 5 MG Tab, TAKE 1 TABLET(5 MG) BY MOUTH EVERY NIGHT AS NEEDED, Disp: 30 tablet, Rfl: 0    DULoxetine (CYMBALTA) 60 MG capsule, Take 1 capsule (60 mg total) by mouth once daily., Disp: 30 capsule, Rfl: 11      Review of Systems  No nausea, vomiting, fevers, Chills , contipation, diarrhea or sweats     Physical Exam:      Vitals:    11/17/22 1116   BP: 127/86   Pulse: 73     alert and oriented ×4 follows commands answers all questions appropriately,affect wnl  Manual muscle test 5 out of 5 sensation to light touch grossly intact  No muscular atrophy  Antalgic gait  -slR modified  No C/C/E      Assessment:  Peripheral neuropathy  Lumbar radiculitis  Plan:  May continue to wean off neurontin as he is not tolerating it.  Willl increase cymbalta to 60mg a day. Instructions issued  If no improvement , will consider "sweet" caudal GWENDOLYN          "

## 2022-11-27 ENCOUNTER — PATIENT MESSAGE (OUTPATIENT)
Dept: INTERNAL MEDICINE | Facility: CLINIC | Age: 72
End: 2022-11-27
Payer: MEDICARE

## 2022-12-01 RX ORDER — SILDENAFIL 100 MG/1
100 TABLET, FILM COATED ORAL DAILY PRN
Qty: 10 TABLET | Refills: 11 | Status: SHIPPED | OUTPATIENT
Start: 2022-12-01 | End: 2023-02-07 | Stop reason: SDUPTHER

## 2022-12-07 ENCOUNTER — TELEPHONE (OUTPATIENT)
Dept: FAMILY MEDICINE | Facility: CLINIC | Age: 72
End: 2022-12-07
Payer: MEDICARE

## 2022-12-12 ENCOUNTER — PATIENT MESSAGE (OUTPATIENT)
Dept: INTERNAL MEDICINE | Facility: CLINIC | Age: 72
End: 2022-12-12
Payer: MEDICARE

## 2022-12-13 ENCOUNTER — PATIENT MESSAGE (OUTPATIENT)
Dept: PHYSICAL MEDICINE AND REHAB | Facility: CLINIC | Age: 72
End: 2022-12-13
Payer: MEDICARE

## 2022-12-13 RX ORDER — DULOXETIN HYDROCHLORIDE 60 MG/1
120 CAPSULE, DELAYED RELEASE ORAL DAILY
Qty: 180 CAPSULE | Refills: 3 | Status: SHIPPED | OUTPATIENT
Start: 2022-12-13 | End: 2022-12-14 | Stop reason: SDUPTHER

## 2022-12-13 RX ORDER — DULOXETIN HYDROCHLORIDE 60 MG/1
60 CAPSULE, DELAYED RELEASE ORAL DAILY
Qty: 30 CAPSULE | Refills: 11 | Status: SHIPPED | OUTPATIENT
Start: 2022-12-13 | End: 2022-12-13

## 2022-12-14 ENCOUNTER — PATIENT MESSAGE (OUTPATIENT)
Dept: INTERNAL MEDICINE | Facility: CLINIC | Age: 72
End: 2022-12-14
Payer: MEDICARE

## 2022-12-14 ENCOUNTER — PATIENT MESSAGE (OUTPATIENT)
Dept: NEUROLOGY | Facility: CLINIC | Age: 72
End: 2022-12-14
Payer: MEDICARE

## 2022-12-14 ENCOUNTER — TELEPHONE (OUTPATIENT)
Dept: PHYSICAL MEDICINE AND REHAB | Facility: CLINIC | Age: 72
End: 2022-12-14
Payer: MEDICARE

## 2022-12-14 DIAGNOSIS — M54.16 LUMBAR RADICULITIS: Primary | ICD-10-CM

## 2022-12-14 RX ORDER — DULOXETIN HYDROCHLORIDE 60 MG/1
120 CAPSULE, DELAYED RELEASE ORAL DAILY
Qty: 180 CAPSULE | Refills: 3 | Status: SHIPPED | OUTPATIENT
Start: 2022-12-14 | End: 2023-02-02

## 2022-12-14 NOTE — TELEPHONE ENCOUNTER
Pls set up w. Pain management and notify. I am not able to do a procedure on him because I am leaving [facility], and will not be able to follow up

## 2022-12-14 NOTE — TELEPHONE ENCOUNTER
Spoke with patient to advise of referral placed to Pain Management for any procedures in light of Dr. Chacko's departure. Patient requested to be scheduled in [place]. Advised PM scheduling dept would contact and he could at that time req provider in [place] area. Also advised to call if he has not been contacted in a timely manner to schedule. Verbalizes understanding.

## 2022-12-19 ENCOUNTER — TELEPHONE (OUTPATIENT)
Dept: FAMILY MEDICINE | Facility: CLINIC | Age: 72
End: 2022-12-19
Payer: MEDICARE

## 2022-12-29 ENCOUNTER — PATIENT MESSAGE (OUTPATIENT)
Dept: PHYSICAL MEDICINE AND REHAB | Facility: CLINIC | Age: 72
End: 2022-12-29
Payer: MEDICARE

## 2023-01-01 NOTE — TELEPHONE ENCOUNTER
"Spoke with patient he was calling us b/c he is confused why he is being told by "us" meaning our auth dept that he is not covered for the IRON infusions. He called his insurance today and was told they cover such infusions. He gave me a phone number for [place] with [employer] at [phone]. [phone].     IM's  Elizabeth Coyne who is currently on the phone with pts insurance company to see if he is now approved. Awaiting to see if pt covered.     Let pt know someone from out office would contact him back once this was straightened out. He was ok with that and voiced all understanding.       " EMTALA consent form signed by pt mother, witnessed by ANNEMARIE Barth RN, placed on chart.

## 2023-01-06 ENCOUNTER — PES CALL (OUTPATIENT)
Dept: ADMINISTRATIVE | Facility: CLINIC | Age: 73
End: 2023-01-06
Payer: MEDICARE

## 2023-01-10 ENCOUNTER — TELEPHONE (OUTPATIENT)
Dept: INTERNAL MEDICINE | Facility: CLINIC | Age: 73
End: 2023-01-10
Payer: MEDICARE

## 2023-01-10 NOTE — TELEPHONE ENCOUNTER
----- Message from Caren Mancia sent at 1/10/2023  9:39 AM CST -----  Contact: [phone]  Patient would like to be seen as a new patient and he is the father in law of Mandy Mancia , please call and advise. Patient is having some issues that needs to be addressed sooner than later.

## 2023-01-20 ENCOUNTER — PATIENT MESSAGE (OUTPATIENT)
Dept: UROLOGY | Facility: CLINIC | Age: 73
End: 2023-01-20
Payer: MEDICARE

## 2023-01-30 ENCOUNTER — PATIENT MESSAGE (OUTPATIENT)
Dept: PRIMARY CARE CLINIC | Facility: CLINIC | Age: 73
End: 2023-01-30
Payer: MEDICARE

## 2023-02-02 ENCOUNTER — OFFICE VISIT (OUTPATIENT)
Dept: PRIMARY CARE CLINIC | Facility: CLINIC | Age: 73
End: 2023-02-02
Payer: MEDICARE

## 2023-02-02 VITALS
HEIGHT: 74 IN | BODY MASS INDEX: 40.43 KG/M2 | DIASTOLIC BLOOD PRESSURE: 80 MMHG | SYSTOLIC BLOOD PRESSURE: 124 MMHG | RESPIRATION RATE: 18 BRPM | WEIGHT: 315 LBS | OXYGEN SATURATION: 98 % | HEART RATE: 56 BPM | TEMPERATURE: 98 F

## 2023-02-02 DIAGNOSIS — G47.33 OSA (OBSTRUCTIVE SLEEP APNEA): ICD-10-CM

## 2023-02-02 DIAGNOSIS — M79.2 NEURALGIA: ICD-10-CM

## 2023-02-02 DIAGNOSIS — E66.01 MORBID OBESITY: ICD-10-CM

## 2023-02-02 DIAGNOSIS — I77.1 TORTUOUS AORTA: ICD-10-CM

## 2023-02-02 DIAGNOSIS — I11.9 HYPERTENSIVE HEART DISEASE WITHOUT HEART FAILURE: Primary | ICD-10-CM

## 2023-02-02 DIAGNOSIS — N40.1 BENIGN NON-NODULAR PROSTATIC HYPERPLASIA WITH LOWER URINARY TRACT SYMPTOMS: ICD-10-CM

## 2023-02-02 DIAGNOSIS — I48.20 CHRONIC ATRIAL FIBRILLATION: ICD-10-CM

## 2023-02-02 DIAGNOSIS — E66.01 MORBID OBESITY DUE TO EXCESS CALORIES: ICD-10-CM

## 2023-02-02 DIAGNOSIS — G47.09 OTHER INSOMNIA: ICD-10-CM

## 2023-02-02 DIAGNOSIS — I25.10 NONOBSTRUCTIVE ATHEROSCLEROSIS OF CORONARY ARTERY: ICD-10-CM

## 2023-02-02 PROCEDURE — 3008F PR BODY MASS INDEX (BMI) DOCUMENTED: ICD-10-PCS | Mod: CPTII,S$GLB,, | Performed by: INTERNAL MEDICINE

## 2023-02-02 PROCEDURE — 3074F SYST BP LT 130 MM HG: CPT | Mod: CPTII,S$GLB,, | Performed by: INTERNAL MEDICINE

## 2023-02-02 PROCEDURE — 1159F MED LIST DOCD IN RCRD: CPT | Mod: CPTII,S$GLB,, | Performed by: INTERNAL MEDICINE

## 2023-02-02 PROCEDURE — 3008F BODY MASS INDEX DOCD: CPT | Mod: CPTII,S$GLB,, | Performed by: INTERNAL MEDICINE

## 2023-02-02 PROCEDURE — 1126F PR PAIN SEVERITY QUANTIFIED, NO PAIN PRESENT: ICD-10-PCS | Mod: CPTII,S$GLB,, | Performed by: INTERNAL MEDICINE

## 2023-02-02 PROCEDURE — 1157F PR ADVANCE CARE PLAN OR EQUIV PRESENT IN MEDICAL RECORD: ICD-10-PCS | Mod: CPTII,S$GLB,, | Performed by: INTERNAL MEDICINE

## 2023-02-02 PROCEDURE — 1159F PR MEDICATION LIST DOCUMENTED IN MEDICAL RECORD: ICD-10-PCS | Mod: CPTII,S$GLB,, | Performed by: INTERNAL MEDICINE

## 2023-02-02 PROCEDURE — 1160F PR REVIEW ALL MEDS BY PRESCRIBER/CLIN PHARMACIST DOCUMENTED: ICD-10-PCS | Mod: CPTII,S$GLB,, | Performed by: INTERNAL MEDICINE

## 2023-02-02 PROCEDURE — 99214 PR OFFICE/OUTPT VISIT, EST, LEVL IV, 30-39 MIN: ICD-10-PCS | Mod: S$GLB,,, | Performed by: INTERNAL MEDICINE

## 2023-02-02 PROCEDURE — 99999 PR PBB SHADOW E&M-EST. PATIENT-LVL IV: ICD-10-PCS | Mod: PBBFAC,,, | Performed by: INTERNAL MEDICINE

## 2023-02-02 PROCEDURE — 1126F AMNT PAIN NOTED NONE PRSNT: CPT | Mod: CPTII,S$GLB,, | Performed by: INTERNAL MEDICINE

## 2023-02-02 PROCEDURE — 1101F PT FALLS ASSESS-DOCD LE1/YR: CPT | Mod: CPTII,S$GLB,, | Performed by: INTERNAL MEDICINE

## 2023-02-02 PROCEDURE — 3079F DIAST BP 80-89 MM HG: CPT | Mod: CPTII,S$GLB,, | Performed by: INTERNAL MEDICINE

## 2023-02-02 PROCEDURE — 3288F PR FALLS RISK ASSESSMENT DOCUMENTED: ICD-10-PCS | Mod: CPTII,S$GLB,, | Performed by: INTERNAL MEDICINE

## 2023-02-02 PROCEDURE — 1157F ADVNC CARE PLAN IN RCRD: CPT | Mod: CPTII,S$GLB,, | Performed by: INTERNAL MEDICINE

## 2023-02-02 PROCEDURE — 99214 OFFICE O/P EST MOD 30 MIN: CPT | Mod: S$GLB,,, | Performed by: INTERNAL MEDICINE

## 2023-02-02 PROCEDURE — 1101F PR PT FALLS ASSESS DOC 0-1 FALLS W/OUT INJ PAST YR: ICD-10-PCS | Mod: CPTII,S$GLB,, | Performed by: INTERNAL MEDICINE

## 2023-02-02 PROCEDURE — 99999 PR PBB SHADOW E&M-EST. PATIENT-LVL IV: CPT | Mod: PBBFAC,,, | Performed by: INTERNAL MEDICINE

## 2023-02-02 PROCEDURE — 1160F RVW MEDS BY RX/DR IN RCRD: CPT | Mod: CPTII,S$GLB,, | Performed by: INTERNAL MEDICINE

## 2023-02-02 PROCEDURE — 3288F FALL RISK ASSESSMENT DOCD: CPT | Mod: CPTII,S$GLB,, | Performed by: INTERNAL MEDICINE

## 2023-02-02 PROCEDURE — 3074F PR MOST RECENT SYSTOLIC BLOOD PRESSURE < 130 MM HG: ICD-10-PCS | Mod: CPTII,S$GLB,, | Performed by: INTERNAL MEDICINE

## 2023-02-02 PROCEDURE — 3079F PR MOST RECENT DIASTOLIC BLOOD PRESSURE 80-89 MM HG: ICD-10-PCS | Mod: CPTII,S$GLB,, | Performed by: INTERNAL MEDICINE

## 2023-02-02 RX ORDER — DULOXETIN HYDROCHLORIDE 20 MG/1
20 CAPSULE, DELAYED RELEASE ORAL DAILY
Qty: 30 CAPSULE | Refills: 0 | Status: SHIPPED | OUTPATIENT
Start: 2023-02-02 | End: 2023-04-14

## 2023-02-02 RX ORDER — TRAZODONE HYDROCHLORIDE 100 MG/1
100 TABLET ORAL NIGHTLY
Qty: 30 TABLET | Refills: 1 | Status: SHIPPED | OUTPATIENT
Start: 2023-02-02 | End: 2023-03-06

## 2023-02-02 NOTE — PROGRESS NOTES
[facility] Note    Chief Complaint      Chief Complaint   Patient presents with    Establish Care       History of Present Illness      Zeenat Mancia is a 72 y.o. male who presents today for   Chief Complaint   Patient presents with    Establish Care   .  Patient comes to appointment here for establish visit with me . He is dealing with some anxiety , depression issues and is seeing therapist currently . He is on multiple meds and is interested in stopping hctz , flomax , duloxetine at the request of his therapist as them may be contributing to his anxiety .     HPI    No problem-specific Assessment & Plan notes found for this encounter.       Problem List Items Addressed This Visit          Neuro    Neuralgia       Cardiac/Vascular    Hypertensive heart disease without heart failure - Primary    Overview     bp well controlled on current regimen          Tortuous aorta    Overview     CXR 5/18/17         Chronic atrial fibrillation    Overview     Dr eve gregorio managing . Cont current          Nonobstructive atherosclerosis of coronary artery    Overview     Cont b blocker , eliqus , and is not on statin due to intolerance            Renal/    Benign non-nodular prostatic hyperplasia with lower urinary tract symptoms    Overview     Dr calhoun managing . Has had urolift . Wants to try off flomax             Endocrine    Morbid obesity due to excess calories    Overview     Cont to encourage diet and weight loss             Other    JACKELYN (obstructive sleep apnea)    Overview     Utilizing  cpap with good benefit          Other insomnia    Overview     Has been on ambien for years will continue  reviewed           Other Visit Diagnoses       Morbid obesity                 Past Medical History:  Past Medical History:   Diagnosis Date    A-fib     Acute blood loss anemia     Arthritis     Atrial flutter     Colon polyp     Diverticulosis     Dyslipidemia     Dyspnea on exertion 5/31/2021     GERD (gastroesophageal reflux disease)     on Protonix    Hepatic steatosis     Hyperlipidemia     Hypertension     Irregular heart beat     Lower GI bleed 5/31/2021    Multiple gastric ulcers 05/16/2018    gi scope per Dr. Houston    Obesity     Sleep apnea     uses CPAP    Unspecified disorder of kidney and ureter     kidney stones       Past Surgical History:  Past Surgical History:   Procedure Laterality Date    APPENDECTOMY      CATARACT EXTRACTION  2009    OS    CATARACT EXTRACTION W/  INTRAOCULAR LENS IMPLANT Right 6/3/2015    [id number] 14.5d//    COLONOSCOPY N/A 10/10/2017    Procedure: COLONOSCOPY;  Surgeon: Jameson Houston MD;  Location: [facility] ENDO;  Service: Endoscopy;  Laterality: N/A; repeat in 5 years for surveillance    COLONOSCOPY N/A 5/16/2018    Procedure: COLONOSCOPY;  Surgeon: Jameson Houston MD;  Location: [facility];  Service: Endoscopy;  Laterality: N/A;    COLONOSCOPY N/A 6/2/2021    Procedure: COLONOSCOPY;  Surgeon: Agustin Corona MD;  Location: [facility] (2ND FLR);  Service: Endoscopy;  Laterality: N/A;    COLONOSCOPY N/A 10/11/2022    Procedure: COLONOSCOPY;  Surgeon: Edin Lara MD;  Location: [facility] (2ND FLR);  Service: Endoscopy;  Laterality: N/A;  2nd floor-difficult intubation  ok to hold eliquis x2 days per Dr. Jerome, see telephone encounter 6/28-Kpvt  fully vaccinated, instructions [facility] to [id number]  extended miralax prep  Clear liquids up to 2 hrs prior/ AM prep [id number]    CORONARY ANGIOGRAPHY N/A 1/4/2021    Procedure: ANGIOGRAM, CORONARY ARTERY;  Surgeon: Jase Moore MD;  Location: [facility] CATH LAB/EP;  Service: Cardiology;  Laterality: N/A;    CYSTOSCOPY WITH INSERTION OF MINIMALLY INVASIVE IMPLANT TO ENLARGE PROSTATIC URETHRA N/A 6/29/2021    Procedure: TRANSPROSTATIC TISSUE RETRACTION;  Surgeon: Agustin Clay MD;  Location: [facility] OR;  Service: Urology;  Laterality: N/A;    ESOPHAGOGASTRODUODENOSCOPY N/A 7/2/2018    Procedure: EGD  (ESOPHAGOGASTRODUODENOSCOPY);  Surgeon: Jameson Houston MD;  Location: [facility] ENDO;  Service: Endoscopy;  Laterality: N/A;    ESOPHAGOGASTRODUODENOSCOPY N/A 6/2/2021    Procedure: EGD (ESOPHAGOGASTRODUODENOSCOPY);  Surgeon: Agustin Corona MD;  Location: [facility] ENDO ([facility] FLR);  Service: Endoscopy;  Laterality: N/A;    EYE SURGERY      cataract    LEFT HEART CATHETERIZATION N/A 1/4/2021    Procedure: Left heart cath;  Surgeon: Jase Moore MD;  Location: [facility] CATH LAB/EP;  Service: Cardiology;  Laterality: N/A;    ROBOT-ASSISTED CHOLECYSTECTOMY N/A 2/4/2021    Procedure: ROBOTIC CHOLECYSTECTOMY;  Surgeon: Pal Cheney MD;  Location: [facility] OR;  Service: General;  Laterality: N/A;    TONSILLECTOMY      TRANSESOPHAGEAL ECHOCARDIOGRAPHY N/A 5/14/2021    Procedure: ECHOCARDIOGRAM, TRANSESOPHAGEAL;  Surgeon: Leo Reeves III, MD;  Location: [facility] EP LAB;  Service: Cardiology;  Laterality: N/A;    TREATMENT OF CARDIAC ARRHYTHMIA N/A 4/19/2021    Procedure: Cardioversion or Defibrillation;  Surgeon: Judy Jerome MD;  Location: [facility] CATH LAB/EP;  Service: Cardiology;  Laterality: N/A;    TREATMENT OF CARDIAC ARRHYTHMIA N/A 5/14/2021    Procedure: CARDIOVERSION;  Surgeon: PEMA Downs MD;  Location: [facility] EP LAB;  Service: Cardiology;  Laterality: N/A;  AF, PHUONG, DCCV, MAC, DM, 3 PREP    UPPER GASTROINTESTINAL ENDOSCOPY  05/16/2018    Dr. Houston       Family History:  family history includes Arthritis in his mother; Blindness in his mother; Cataracts in his father; Coronary artery disease in his father; Heart disease in his father and mother.     Social History:  Social History     Socioeconomic History    Marital status:     Number of children: 3   Tobacco Use    Smoking status: Former     Types: Cigarettes     Start date: 10/5/1997    Smokeless tobacco: Never    Tobacco comments:     quit in 1980s    Substance and Sexual Activity    Alcohol use: Yes     Comment: 1 beer every 2 month     Drug use: No    Sexual activity: Not Currently     Partners: Female   Social History Narrative    Patient is originally from [place] at         [employer] ; [place]        Working ; retired,  plants            47 years         Children    Son x3         Lives with  Kerri Ann/Mariano    Updates    9/8/20- moved from [facility]       Social Determinants of Health     Financial Resource Strain: Low Risk     Difficulty of Paying Living Expenses: Not hard at all   Food Insecurity: No Food Insecurity    Worried About Running Out of Food in the Last Year: Never true    Ran Out of Food in the Last Year: Never true   Transportation Needs: No Transportation Needs    Lack of Transportation (Medical): No    Lack of Transportation (Non-Medical): No   Physical Activity: Insufficiently Active    Days of Exercise per Week: 1 day    Minutes of Exercise per Session: 10 min   Stress: Stress Concern Present    Feeling of Stress : Rather much   Social Connections: Unknown    Frequency of Communication with Friends and Family: Three times a week    Frequency of Social Gatherings with Friends and Family: Once a week    Active Member of Clubs or Organizations: Yes    Attends Club or Organization Meetings: 1 to 4 times per year    Marital Status:    Housing Stability: Low Risk     Unable to Pay for Housing in the Last Year: No    Number of Places Lived in the Last Year: 2    Unstable Housing in the Last Year: No       Review of Systems:   Review of Systems   Constitutional:  Negative for fever and weight loss.   HENT:  Negative for congestion, hearing loss and sore throat.    Eyes:  Negative for blurred vision.   Respiratory:  Negative for cough and shortness of breath.    Cardiovascular:  Negative for chest pain, palpitations, claudication and leg swelling.   Gastrointestinal:  Negative for abdominal pain, constipation, diarrhea and heartburn.   Genitourinary:  Negative  for dysuria.   Musculoskeletal:  Negative for back pain and myalgias.   Skin:  Negative for rash.   Neurological:  Positive for tingling. Negative for focal weakness and headaches.   Psychiatric/Behavioral:  Negative for depression, memory loss and suicidal ideas. The patient is nervous/anxious and has insomnia.       Medications:  Outpatient Encounter Medications as of 2/2/2023   Medication Sig Dispense Refill    amLODIPine (NORVASC) 5 MG tablet Take 1 tablet (5 mg total) by mouth once daily. 90 tablet 3    atenoloL (TENORMIN) 50 MG tablet TAKE 1 TABLET(50 MG) BY MOUTH EVERY DAY 90 tablet 3    ELIQUIS 5 mg Tab TAKE 1 TABLET(5 MG) BY MOUTH TWICE DAILY 180 tablet 3    gabapentin (NEURONTIN) 600 MG tablet Take 1 tablet (600 mg total) by mouth 3 (three) times daily. 90 tablet 11    hydroCHLOROthiazide (HYDRODIURIL) 25 MG tablet Take 1 tablet (25 mg total) by mouth once daily. 90 tablet 3    melatonin 10 mg Tab Take 10 mg by mouth every evening.      multivit-min/FA/lycopen/lutein (CENTRUM SILVER MEN ORAL) Take by mouth once daily.      pantoprazole (PROTONIX) 40 MG tablet Take 1 tablet (40 mg total) by mouth once daily. 90 tablet 3    sildenafiL (VIAGRA) 100 MG tablet Take 1 tablet (100 mg total) by mouth daily as needed for Erectile Dysfunction. 10 tablet 11    tadalafiL (CIALIS) 20 MG Tab Take 1 tablet (20 mg total) by mouth once daily. 10 tablet 11    tamsulosin (FLOMAX) 0.4 mg Cap TAKE 1 CAPSULE(0.4 MG) BY MOUTH EVERY DAY 30 capsule 11    valsartan (DIOVAN) 320 MG tablet TAKE 1 TABLET(320 MG) BY MOUTH EVERY DAY 90 tablet 3    zolpidem (AMBIEN) 5 MG Tab TAKE 1 TABLET(5 MG) BY MOUTH EVERY NIGHT AS NEEDED 30 tablet 1    [DISCONTINUED] DULoxetine (CYMBALTA) 60 MG capsule Take 2 capsules (120 mg total) by mouth once daily. 180 capsule 3    DULoxetine (CYMBALTA) 20 MG capsule Take 1 capsule (20 mg total) by mouth once daily. 30 capsule 0    traZODone (DESYREL) 100 MG tablet Take 1 tablet (100 mg total) by mouth every  "evening. 30 tablet 1    [DISCONTINUED] docusate sodium (COLACE) 100 MG capsule Take 100 mg by mouth 2 (two) times daily.      [DISCONTINUED] oxybutynin (DITROPAN) 5 MG Tab TAKE 1 TABLET(5 MG) BY MOUTH EVERY EVENING (Patient not taking: Reported on 2/2/2023) 30 tablet 11     No facility-administered encounter medications on file as of 2/2/2023.       Allergies:  Review of patient's allergies indicates:   Allergen Reactions    Lipitor [atorvastatin] Itching         Physical Exam      Vitals:    02/02/23 0859   BP: 124/80   Pulse: (!) 56   Resp: 18   Temp: 97.6 °F (36.4 °C)        Vital Signs  Temp: 97.6 °F (36.4 °C)  Temp src: Oral  Pulse: (!) 56  Resp: 18  SpO2: 98 %  BP: 124/80  BP Location: Right arm  Patient Position: Sitting  Pain Score: 0-No pain  Height and Weight  Height: 6' 2" (188 cm)  Weight: (!) 148.2 kg (326 lb 11.6 oz)  BSA (Calculated - sq m): 2.78 sq meters  BMI (Calculated): 41.9  Weight in (lb) to have BMI = 25: 194.3]     Body mass index is 41.95 kg/m².    Physical Exam  Constitutional:       Appearance: He is well-developed.   HENT:      Head: Normocephalic.   Eyes:      Pupils: Pupils are equal, round, and reactive to light.   Neck:      Thyroid: No thyromegaly.   Cardiovascular:      Rate and Rhythm: Normal rate and regular rhythm.      Heart sounds: No murmur heard.    No friction rub. No gallop.   Pulmonary:      Effort: Pulmonary effort is normal.      Breath sounds: Normal breath sounds.   Abdominal:      General: Bowel sounds are normal.      Palpations: Abdomen is soft.   Musculoskeletal:         General: Normal range of motion.      Cervical back: Normal range of motion.   Skin:     General: Skin is warm and dry.   Neurological:      Mental Status: He is alert and oriented to person, place, and time.      Sensory: No sensory deficit.   Psychiatric:         Behavior: Behavior normal.        Laboratory:  CBC:  No results for input(s): WBC, RBC, HGB, HCT, PLT, MCV, MCH, MCHC in the last 2160 " hours.  CMP:  No results for input(s): GLU, CALCIUM, ALBUMIN, PROT, NA, K, CO2, CL, BUN, ALKPHOS, ALT, AST, BILITOT in the last 2160 hours.    Invalid input(s): CREATININ  URINALYSIS:  No results for input(s): COLORU, CLARITYU, SPECGRAV, PHUR, PROTEINUA, GLUCOSEU, BILIRUBINCON, BLOODU, WBCU, RBCU, BACTERIA, MUCUS, NITRITE, LEUKOCYTESUR, UROBILINOGEN, HYALINECASTS in the last 2160 hours.   LIPIDS:  No results for input(s): TSH, HDL, CHOL, TRIG, LDLCALC, CHOLHDL, NONHDLCHOL, TOTALCHOLEST in the last 2160 hours.  TSH:  No results for input(s): TSH in the last 2160 hours.  A1C:  No results for input(s): HGBA1C in the last 2160 hours.    Radiology:        Assessment:     Zeenat Mancia is a 72 y.o.male with:    Hypertensive heart disease without heart failure    Morbid obesity    Chronic atrial fibrillation    Tortuous aorta    Benign non-nodular prostatic hyperplasia with lower urinary tract symptoms    Nonobstructive atherosclerosis of coronary artery    JACKELYN (obstructive sleep apnea)    Other insomnia  -     traZODone (DESYREL) 100 MG tablet; Take 1 tablet (100 mg total) by mouth every evening.  Dispense: 30 tablet; Refill: 1    Morbid obesity due to excess calories    Neuralgia  -     DULoxetine (CYMBALTA) 20 MG capsule; Take 1 capsule (20 mg total) by mouth once daily.  Dispense: 30 capsule; Refill: 0                Plan:     Problem List Items Addressed This Visit          Neuro    Neuralgia       Cardiac/Vascular    Hypertensive heart disease without heart failure - Primary    Overview     bp well controlled on current regimen          Tortuous aorta    Overview     CXR 5/18/17         Chronic atrial fibrillation    Overview     Dr prado cards managing . Cont current          Nonobstructive atherosclerosis of coronary artery    Overview     Cont b blocker , eliqus , and is not on statin due to intolerance            Renal/    Benign non-nodular prostatic hyperplasia with lower urinary tract symptoms    Overview      Dr calhoun managing . Has had urolift . Wants to try off flomax             Endocrine    Morbid obesity due to excess calories    Overview     Cont to encourage diet and weight loss             Other    JACKELYN (obstructive sleep apnea)    Overview     Utilizing  cpap with good benefit          Other insomnia    Overview     Has been on ambien for years will continue  reviewed           Other Visit Diagnoses       Morbid obesity                As above, continue current medications and maintain follow up with specialists.  Return to clinic in 1 months.      Frederick W Dantagnan [facility]

## 2023-02-05 ENCOUNTER — PATIENT MESSAGE (OUTPATIENT)
Dept: PRIMARY CARE CLINIC | Facility: CLINIC | Age: 73
End: 2023-02-05
Payer: MEDICARE

## 2023-02-07 ENCOUNTER — PATIENT MESSAGE (OUTPATIENT)
Dept: PRIMARY CARE CLINIC | Facility: CLINIC | Age: 73
End: 2023-02-07
Payer: MEDICARE

## 2023-02-07 RX ORDER — TADALAFIL 20 MG/1
20 TABLET ORAL DAILY
Qty: 10 TABLET | Refills: 0 | Status: CANCELLED | OUTPATIENT
Start: 2023-02-07 | End: 2024-02-07

## 2023-02-07 RX ORDER — SILDENAFIL 100 MG/1
100 TABLET, FILM COATED ORAL DAILY PRN
Qty: 10 TABLET | Refills: 3 | Status: SHIPPED | OUTPATIENT
Start: 2023-02-07

## 2023-02-13 ENCOUNTER — OFFICE VISIT (OUTPATIENT)
Dept: PAIN MEDICINE | Facility: CLINIC | Age: 73
End: 2023-02-13
Payer: MEDICARE

## 2023-02-13 ENCOUNTER — PATIENT MESSAGE (OUTPATIENT)
Dept: PAIN MEDICINE | Facility: CLINIC | Age: 73
End: 2023-02-13

## 2023-02-13 VITALS
DIASTOLIC BLOOD PRESSURE: 86 MMHG | BODY MASS INDEX: 41.95 KG/M2 | SYSTOLIC BLOOD PRESSURE: 123 MMHG | WEIGHT: 315 LBS | HEART RATE: 65 BPM

## 2023-02-13 DIAGNOSIS — M79.671 BILATERAL FOOT PAIN: ICD-10-CM

## 2023-02-13 DIAGNOSIS — G60.9 IDIOPATHIC PERIPHERAL NEUROPATHY: Primary | ICD-10-CM

## 2023-02-13 DIAGNOSIS — M79.672 BILATERAL FOOT PAIN: ICD-10-CM

## 2023-02-13 DIAGNOSIS — M54.16 LUMBAR RADICULITIS: ICD-10-CM

## 2023-02-13 PROCEDURE — 99999 PR PBB SHADOW E&M-EST. PATIENT-LVL IV: ICD-10-PCS | Mod: PBBFAC,,, | Performed by: STUDENT IN AN ORGANIZED HEALTH CARE EDUCATION/TRAINING PROGRAM

## 2023-02-13 PROCEDURE — 1125F AMNT PAIN NOTED PAIN PRSNT: CPT | Mod: CPTII,S$GLB,, | Performed by: STUDENT IN AN ORGANIZED HEALTH CARE EDUCATION/TRAINING PROGRAM

## 2023-02-13 PROCEDURE — 3079F DIAST BP 80-89 MM HG: CPT | Mod: CPTII,S$GLB,, | Performed by: STUDENT IN AN ORGANIZED HEALTH CARE EDUCATION/TRAINING PROGRAM

## 2023-02-13 PROCEDURE — 1157F PR ADVANCE CARE PLAN OR EQUIV PRESENT IN MEDICAL RECORD: ICD-10-PCS | Mod: CPTII,S$GLB,, | Performed by: STUDENT IN AN ORGANIZED HEALTH CARE EDUCATION/TRAINING PROGRAM

## 2023-02-13 PROCEDURE — 3074F PR MOST RECENT SYSTOLIC BLOOD PRESSURE < 130 MM HG: ICD-10-PCS | Mod: CPTII,S$GLB,, | Performed by: STUDENT IN AN ORGANIZED HEALTH CARE EDUCATION/TRAINING PROGRAM

## 2023-02-13 PROCEDURE — 99205 PR OFFICE/OUTPT VISIT, NEW, LEVL V, 60-74 MIN: ICD-10-PCS | Mod: S$GLB,,, | Performed by: STUDENT IN AN ORGANIZED HEALTH CARE EDUCATION/TRAINING PROGRAM

## 2023-02-13 PROCEDURE — 99999 PR PBB SHADOW E&M-EST. PATIENT-LVL IV: CPT | Mod: PBBFAC,,, | Performed by: STUDENT IN AN ORGANIZED HEALTH CARE EDUCATION/TRAINING PROGRAM

## 2023-02-13 PROCEDURE — 3074F SYST BP LT 130 MM HG: CPT | Mod: CPTII,S$GLB,, | Performed by: STUDENT IN AN ORGANIZED HEALTH CARE EDUCATION/TRAINING PROGRAM

## 2023-02-13 PROCEDURE — 99205 OFFICE O/P NEW HI 60 MIN: CPT | Mod: S$GLB,,, | Performed by: STUDENT IN AN ORGANIZED HEALTH CARE EDUCATION/TRAINING PROGRAM

## 2023-02-13 PROCEDURE — 1159F MED LIST DOCD IN RCRD: CPT | Mod: CPTII,S$GLB,, | Performed by: STUDENT IN AN ORGANIZED HEALTH CARE EDUCATION/TRAINING PROGRAM

## 2023-02-13 PROCEDURE — 1159F PR MEDICATION LIST DOCUMENTED IN MEDICAL RECORD: ICD-10-PCS | Mod: CPTII,S$GLB,, | Performed by: STUDENT IN AN ORGANIZED HEALTH CARE EDUCATION/TRAINING PROGRAM

## 2023-02-13 PROCEDURE — 3008F BODY MASS INDEX DOCD: CPT | Mod: CPTII,S$GLB,, | Performed by: STUDENT IN AN ORGANIZED HEALTH CARE EDUCATION/TRAINING PROGRAM

## 2023-02-13 PROCEDURE — 3079F PR MOST RECENT DIASTOLIC BLOOD PRESSURE 80-89 MM HG: ICD-10-PCS | Mod: CPTII,S$GLB,, | Performed by: STUDENT IN AN ORGANIZED HEALTH CARE EDUCATION/TRAINING PROGRAM

## 2023-02-13 PROCEDURE — 1125F PR PAIN SEVERITY QUANTIFIED, PAIN PRESENT: ICD-10-PCS | Mod: CPTII,S$GLB,, | Performed by: STUDENT IN AN ORGANIZED HEALTH CARE EDUCATION/TRAINING PROGRAM

## 2023-02-13 PROCEDURE — 3008F PR BODY MASS INDEX (BMI) DOCUMENTED: ICD-10-PCS | Mod: CPTII,S$GLB,, | Performed by: STUDENT IN AN ORGANIZED HEALTH CARE EDUCATION/TRAINING PROGRAM

## 2023-02-13 PROCEDURE — 1157F ADVNC CARE PLAN IN RCRD: CPT | Mod: CPTII,S$GLB,, | Performed by: STUDENT IN AN ORGANIZED HEALTH CARE EDUCATION/TRAINING PROGRAM

## 2023-02-13 RX ORDER — LIDOCAINE 50 MG/G
OINTMENT TOPICAL
Qty: 30 G | Refills: 1 | Status: SHIPPED | OUTPATIENT
Start: 2023-02-13 | End: 2023-03-15

## 2023-02-13 NOTE — PROGRESS NOTES
[facility]    Referred by: Dr. Genna Ellis*   Reason for referral: Lumbar radiculitis     CC:   Chief Complaint   Patient presents with    Foot Pain     Subjective:   Zeenat Mancia is a 72 y.o. male who has a past medical history of A-fib, Acute blood loss anemia, Arthritis, Atrial flutter, Colon polyp, Diverticulosis, Dyslipidemia, Dyspnea on exertion, GERD (gastroesophageal reflux disease), Hepatic steatosis, Hyperlipidemia, Hypertension, Irregular heart beat, Lower GI bleed, Multiple gastric ulcers, Obesity, Sleep apnea, and Unspecified disorder of kidney and ureter. He takes Eliquis for Afib. He complains of pain as described below.    Location: both feet   Onset: 2 years ago  Radiation: N/A  Timing: persistent  Current Pain Score: 6/10  Weekly Pain Range: 6-7/10  Quality: Burning  Worsened by: standing and walking for more than several minutes  Improved by: medications, rest, and sitting    Pt reports 2 years of burning, stinging, tingling foot pain that is worse at night and with walking and standing. He reports previous history of low back pain, however this resolved with PT.  He denies back pain today.  His foot pain is bilateral and covers the entirety of the foot including both dorsal and plantar surfaces, with symptoms most pronounced over the toes.  He has been seen by Podiatry who performed diagnostic tarsal tunnel injections with some temporary relief.  He was offered tarsal tunnel decompression surgery, but declined. EMG performed 1/2022 no definitive evidence of tarsal tunnel syndrome and possible evidence of peripheral polyneuropathy. He previously followed with Dr. Poon who started the patient on gabapentin; he is prescribed 600 mg TID, however only takes 600 mg BID as he often forgets his 3rd dose. He is prescribed Cymbalta by his PCP for anxiety. He reports he has recently gained weight due to decreased activity. He denies hx of diabetes or  chemotherapy. He  denies symptoms in the hands.       Previous Therapies:  PT/OT: Previously completed PT for low back pain  HEP: No  TENS: No  Interventions: Diagnostic Tarsal Tunnel Injections with Podiatry   Surgery: Right Carpal Tunnel Release  Opioids: None  Adjuvants:     Current Pain Medications:  Gabapentin 600 mg BID  Cymbalta 20 mg QD     Assessment & Plan:  Problem List Items Addressed This Visit          Neuro    Lumbar radiculitis     Other Visit Diagnoses       Idiopathic peripheral neuropathy    -  Primary    Bilateral foot pain              Treatment Plan:   Procedures: None indicated at this time.  Discussed that patient may benefit from SCS trial should pain continue despite medication optimization.   PT/OT/HEP: I encouraged the patient to start a home exercise regimen that includes daily cardiovascular exercise lasting at least 30 minutes.    Medications:    - Discussed increasing Gabapentin to 600 mg TID.  -  Encouraged to take his Cymbalta as written 20 mg QD  - Rx for topical Lidocaine 5 % ointment - apply as needed to the feet/   -  Reviewed and consistent with medication use as prescribed.  Imaging: Reviewed.   Follow Up: RTC 4 weeks or send message on [employer].       Disclaimer: This note was partly generated using dictation software which may occasionally result in transcription errors.    Imaging:  Reviewed.     EXAMINATION:  MRI LUMBAR SPINE WITHOUT CONTRAST     CLINICAL HISTORY:  Radiculopathy, lumbar regionLumbar radiculopathy, symptoms persist with conservative treatment;     TECHNIQUE:  Sagittal T1, sagittal T2, sagittal STIR, axial T1 and axial T2 weighted images of the lumbar spine obtained without contrast.     COMPARISON:  06/30/2022 x-ray     FINDINGS:  Lumbar spine alignment is within normal limits. The vertebral body heights are well maintained, with no fracture.  No marrow signal abnormality suspicious for an infiltrative process.     The conus is normal in appearance.  The adjacent soft  tissue structures show no significant abnormalities.     L1-L2: Circumferential disc bulge and spondylitic spurring.No significant central canal or neural foraminal narrowing.     L2-L3: Circumferential disc bulge and spondylitic spurring.  No significant central canal or neural foraminal narrowing.     L3-L4: Circumferential disc bulge and minimal facet arthritis.  Asymmetric left-sided spondylitic spurring.  No significant central canal or neural foraminal narrowing.     L4-L5: Circumferential disc bulge and asymmetric spondylitic spurring.  Moderate facet arthritis.  No significant central canal or neural foraminal narrowing.     L5-S1: Circumferential disc bulge and small central disc protrusion.  Mild facet arthritis.  Mild left-sided neural foraminal narrowing.     Impression:     Multilevel degenerative changes resulting in mild left-sided L5 S1 foraminal encroachment.  Small superimposed central disc protrusion.        Electronically signed by: Marlo Camargo Jr    EMG:  Test Date:  2022     Patient: Zeenat Mancia : 1950 Physician: Jase Vernon D.O.   ID#:   SEX: Male Ref. Phys: Leonel Perez, DPM      HPI: Zeenat Mancia is a 71 y.o.male who presents for NCS/EMG to evaluate tarsal tunnel bilaterally.  Pt with tingling and burning pain in the bilateral feet- tips of toes and soles of feet.       NCV & EMG Findings:  Evaluation of the right median sensory nerve showed prolonged distal peak latency and decreased conduction velocity.  The left Superficial Fibular sensory, the right Superficial Fibular sensory, the left sural sensory, and the right sural sensory nerves showed no response.  All remaining nerves (as indicated in the following tables) were within normal limits.  Needle evaluation of the left Abductor Hallucis muscle showed increased insertional activity, moderately increased spontaneous activity, and slightly increased polyphasic potentials.  The right Abductor Hallucis  muscle showed increased insertional activity, moderately increased spontaneous activity, and diminished recruitment.  All remaining muscles (as indicated in the following table) showed no evidence of electrical instability.     Impression:  1. Technically, somewhat limited study due to peripheral edema of legs  2. The sensory responses were absent in the bilateral lower extremities.  This can either be due to the technical issues above, or could be a sensory peripheral polyneuropathy.  I favor the latter as his symptoms correlate with this.   3. Incidentally, there is evidence of a median mononeuropathy across the right wrist (I.e. carpal tunnel syndrome).  Upper extremity sensory studies performed due to lack of responses in the legs.  If a more detailed study is desired for the carpal tunnel syndrome, a separate upper extremity study can be ordered.  Note:  He has no significant hand symptoms.     4. No definitive evidence of tarsal tunnel syndrome on either side.  The bilateral abductor hallicus muscles showed active denervation, but this could be due to the peripheral polyneuropathy described above, and can also be seen in normal feet, especially with advancing age.         Review of Systems:  Review of Systems   Constitutional:  Negative for chills and fever.        +weight gain   HENT:  Negative for congestion and ear pain.    Eyes:  Negative for discharge and redness.   Respiratory:  Negative for cough and wheezing.    Cardiovascular:  Negative for chest pain.   Gastrointestinal:  Negative for abdominal pain and vomiting.   Musculoskeletal:  Negative for back pain and neck pain.        +Bilateral foot pain   Skin:  Negative for itching and rash.   Neurological:  Positive for tingling (b/l feet) and sensory change (Burning, tingling over b/l feet). Negative for weakness.     Physical Exam:  Vitals:    02/13/23 1151   BP: 123/86   Pulse: 65   Weight: (!) 148.2 kg (326 lb 11.6 oz)   PainSc:   6      General    Constitutional: He is oriented to person, place, and time. He appears well-developed and well-nourished. No distress.   HENT:   Head: Normocephalic and atraumatic.   Nose: Nose normal.   Eyes: Conjunctivae and EOM are normal. Pupils are equal, round, and reactive to light.   Neck: Neck supple.   Cardiovascular:  Normal rate and regular rhythm.            Pulmonary/Chest: Effort normal and breath sounds normal.   Abdominal: Soft. He exhibits no distension.   Neurological: He is alert and oriented to person, place, and time.   Psychiatric: He has a normal mood and affect. His behavior is normal. Thought content normal.     General Musculoskeletal Exam   Gait: normal     Right Ankle/Foot Exam     Inspection   Deformity: absent  Erythema: absent  Bruising:  Foot - absent  Effusion:  Foot - absent    Muscle Strength   The patient has normal right ankle strength.    Other   Sensation: decreased (over plantar and dorsal aspect)    Comments:  No clubbing of the nails. Normal capillary refill.     Left Ankle/Foot Exam     Inspection  Deformity: absent  Erythema: absent  Bruising:  Foot - absent  Effusion:  Foot - absent    Muscle Strength   The patient has normal left ankle strength.    Other   Sensation: decreased (over plantar and dorsal foot)    Comments:  No clubbing of the nails. Normal capillary refill.       Vascular Exam     Right Pulses  Dorsalis Pedis:      2+          Left Pulses  Dorsalis Pedis:      2+          Edema  Right Lower Leg: absent

## 2023-02-15 ENCOUNTER — TELEPHONE (OUTPATIENT)
Dept: PODIATRY | Facility: CLINIC | Age: 73
End: 2023-02-15
Payer: MEDICARE

## 2023-02-15 ENCOUNTER — PATIENT MESSAGE (OUTPATIENT)
Dept: PAIN MEDICINE | Facility: CLINIC | Age: 73
End: 2023-02-15
Payer: MEDICARE

## 2023-02-15 NOTE — TELEPHONE ENCOUNTER
I called patient he does not want surgery requesting a appointment to discuss surgery with Dr. Perez scheduled for 3/8

## 2023-02-16 ENCOUNTER — OFFICE VISIT (OUTPATIENT)
Dept: SLEEP MEDICINE | Facility: CLINIC | Age: 73
End: 2023-02-16
Payer: MEDICARE

## 2023-02-16 DIAGNOSIS — G47.33 OSA (OBSTRUCTIVE SLEEP APNEA): Primary | ICD-10-CM

## 2023-02-16 PROCEDURE — 99214 OFFICE O/P EST MOD 30 MIN: CPT | Mod: CR,95,, | Performed by: NURSE PRACTITIONER

## 2023-02-16 PROCEDURE — 1157F ADVNC CARE PLAN IN RCRD: CPT | Mod: CPTII,95,, | Performed by: NURSE PRACTITIONER

## 2023-02-16 PROCEDURE — 99214 PR OFFICE/OUTPT VISIT, EST, LEVL IV, 30-39 MIN: ICD-10-PCS | Mod: CR,95,, | Performed by: NURSE PRACTITIONER

## 2023-02-16 PROCEDURE — 1157F PR ADVANCE CARE PLAN OR EQUIV PRESENT IN MEDICAL RECORD: ICD-10-PCS | Mod: CPTII,95,, | Performed by: NURSE PRACTITIONER

## 2023-02-16 NOTE — PROGRESS NOTES
The patient location is: home  The chief complaint leading to consultation is: JACKELYN    Visit type: audiovisual    Face to Face time with patient:32 minutes of total time spent on the encounter, which includes face to face time and non-face to face time preparing to see the patient (eg, review of tests), Obtaining and/or reviewing separately obtained history, Documenting clinical information in the electronic or other health record, Independently interpreting results (not separately reported) and communicating results to the patient/family/caregiver, or Care coordination (not separately reported). Each patient to whom he or she provides medical services by telemedicine is:  (1) informed of the relationship between the physician and patient and the respective role of any other health care provider with respect to management of the patient; and (2) notified that he or she may decline to receive medical services by telemedicine and may withdraw from such care at any time.      Since seen he has been using autobipap qhs up until got DS2 replacement machine BUT come to find out its an apap machine! 4-20cm. Wants to try mask over nose. Has nocturia 1-3x/night and mask leaks. Snoring resolved.     interrogation- 30d 7.5h/n unable to view AHI/90% tile currently on his machine, no SD card from old machine in it  bp stable  Afib chronic    \HX VB 9/2020 (former seen by Dr. Bo kunz)  HISTORY OF PRESENT ILLNESS:Zeenat Mancia a 72 y.o. male presents for the management of obstructive sleep apnea. Last seen by Dr. Bus[facility] 2013.  He was diagnosed with sleep apnea by study done 2011. After bipap titration study 2015 he has been using therapy better. Actually not suing bipap or autobipap at all but rather cpap 16cm. Mask leaks so has to cinch mask/disrupts his sleep. Been taking lunesta 2mg past several mos but still waking up.Tired/fatigue. has gained 50# this year/more sedentary. Using F20  mask Using qhs.  ESS=15  Interrogation- filter dirty dk blue on, ag 8.7h/nigth. 30/30d>4hr. 99% mask fit. AHI 3.1    REv'd his sleep studies and other doc sleep records with him today  AFib- on eliquis  BP stable    1/2021 vb: Since seen he has been using autobipap mode but still having frequent disrupted sleep due to mask leaks/cinching mask. Got mask liner but his F20 cushion is torn and mask liner very worn. Now using F30 but leaks. ESS=1. Sleep early evening 7-8p and out of bed (wife too) ` 5am daily. Takes lunesta 2mg but not helping, may need refills soon. Never tried remfresh. Snoring resolved.   No encore data except 2019  interrogation- ahi 3-5, 88-99% mask fit. PB 8-14%, avg mask on time 8hr/n. 29/30d>4h. 90% tile 14.6-`15/12cm      PSG 8/6/11: +JACKELYN, AHI 58.9, low sat 88%, split, titrated to improvement at 11 cm in side position (incl REM), but through 16 cm while supine without effective pressure determined; wt 299 lbs  Titration 9/19/11: Titrated through 17 cm, mouth leak, required full mask, ongoing events at all pressures, but improvement at 17 cm.  Titration study 2015: 24/18cm effective/FFM (302#)      ASSESSMENT:   JACKELYN, severe. Remains adherent with PAP but its wrong therapy, he had pressure intolerability prior needing very high cpap therapy and underwent BIPAP titration study 2015 and was using autobipap. NEEDS bipap machine, eligible via ins for ne machine.  Insomnia  He has medical comorbidities of permanent atrial fibrillation, morbid obesity, hypertension    PLAN:   1. Continue 4-20cm until get NEW RESMED autobipap max ipap 14cm/min epap 5cm, ps 4cm., trial also nose mask. [facility] DME supplies. RTC 4-5 wks after setup  2. Continue to see pcp HTN/continue meds and cards as advised

## 2023-02-17 ENCOUNTER — TELEPHONE (OUTPATIENT)
Dept: PODIATRY | Facility: CLINIC | Age: 73
End: 2023-02-17
Payer: MEDICARE

## 2023-03-01 ENCOUNTER — PATIENT MESSAGE (OUTPATIENT)
Dept: UROLOGY | Facility: CLINIC | Age: 73
End: 2023-03-01
Payer: MEDICARE

## 2023-03-05 DIAGNOSIS — G47.09 OTHER INSOMNIA: ICD-10-CM

## 2023-03-06 RX ORDER — TRAZODONE HYDROCHLORIDE 100 MG/1
TABLET ORAL
Qty: 30 TABLET | Refills: 1 | Status: SHIPPED | OUTPATIENT
Start: 2023-03-06 | End: 2023-05-11

## 2023-03-06 NOTE — TELEPHONE ENCOUNTER
No new care gaps identified.  [facility] Embedded Care Gaps. Reference number: [id number]. 3/05/2023   9:59:13 PM CST

## 2023-03-06 NOTE — TELEPHONE ENCOUNTER
Refill Routing Note   Medication(s) are not appropriate for processing by [facility] for the following reason(s):       New or recently adjusted medication    [facility] action(s):  Defer         Appointments  past 12m or future 3m with PCP    Date Provider   Last Visit   2/2/2023 Elias Thomas MD   Next Visit   3/7/2023 Elias Thomas MD   ED visits in past 90 days: 0        Note composed:10:37 PM 03/05/2023

## 2023-03-07 ENCOUNTER — OFFICE VISIT (OUTPATIENT)
Dept: PRIMARY CARE CLINIC | Facility: CLINIC | Age: 73
End: 2023-03-07
Payer: MEDICARE

## 2023-03-07 VITALS
WEIGHT: 315 LBS | TEMPERATURE: 99 F | SYSTOLIC BLOOD PRESSURE: 138 MMHG | RESPIRATION RATE: 18 BRPM | BODY MASS INDEX: 40.43 KG/M2 | HEART RATE: 70 BPM | OXYGEN SATURATION: 96 % | HEIGHT: 74 IN | DIASTOLIC BLOOD PRESSURE: 88 MMHG

## 2023-03-07 DIAGNOSIS — M79.2 NEURALGIA: ICD-10-CM

## 2023-03-07 DIAGNOSIS — G47.09 OTHER INSOMNIA: ICD-10-CM

## 2023-03-07 DIAGNOSIS — N40.1 BENIGN NON-NODULAR PROSTATIC HYPERPLASIA WITH LOWER URINARY TRACT SYMPTOMS: ICD-10-CM

## 2023-03-07 PROCEDURE — 3075F SYST BP GE 130 - 139MM HG: CPT | Mod: CPTII,S$GLB,, | Performed by: INTERNAL MEDICINE

## 2023-03-07 PROCEDURE — 99214 OFFICE O/P EST MOD 30 MIN: CPT | Mod: S$GLB,,, | Performed by: INTERNAL MEDICINE

## 2023-03-07 PROCEDURE — 1157F PR ADVANCE CARE PLAN OR EQUIV PRESENT IN MEDICAL RECORD: ICD-10-PCS | Mod: CPTII,S$GLB,, | Performed by: INTERNAL MEDICINE

## 2023-03-07 PROCEDURE — 3079F DIAST BP 80-89 MM HG: CPT | Mod: CPTII,S$GLB,, | Performed by: INTERNAL MEDICINE

## 2023-03-07 PROCEDURE — 1159F PR MEDICATION LIST DOCUMENTED IN MEDICAL RECORD: ICD-10-PCS | Mod: CPTII,S$GLB,, | Performed by: INTERNAL MEDICINE

## 2023-03-07 PROCEDURE — 99214 PR OFFICE/OUTPT VISIT, EST, LEVL IV, 30-39 MIN: ICD-10-PCS | Mod: S$GLB,,, | Performed by: INTERNAL MEDICINE

## 2023-03-07 PROCEDURE — 1160F PR REVIEW ALL MEDS BY PRESCRIBER/CLIN PHARMACIST DOCUMENTED: ICD-10-PCS | Mod: CPTII,S$GLB,, | Performed by: INTERNAL MEDICINE

## 2023-03-07 PROCEDURE — 3288F FALL RISK ASSESSMENT DOCD: CPT | Mod: CPTII,S$GLB,, | Performed by: INTERNAL MEDICINE

## 2023-03-07 PROCEDURE — 1126F PR PAIN SEVERITY QUANTIFIED, NO PAIN PRESENT: ICD-10-PCS | Mod: CPTII,S$GLB,, | Performed by: INTERNAL MEDICINE

## 2023-03-07 PROCEDURE — 99999 PR PBB SHADOW E&M-EST. PATIENT-LVL IV: CPT | Mod: PBBFAC,,, | Performed by: INTERNAL MEDICINE

## 2023-03-07 PROCEDURE — 3079F PR MOST RECENT DIASTOLIC BLOOD PRESSURE 80-89 MM HG: ICD-10-PCS | Mod: CPTII,S$GLB,, | Performed by: INTERNAL MEDICINE

## 2023-03-07 PROCEDURE — 1159F MED LIST DOCD IN RCRD: CPT | Mod: CPTII,S$GLB,, | Performed by: INTERNAL MEDICINE

## 2023-03-07 PROCEDURE — 1157F ADVNC CARE PLAN IN RCRD: CPT | Mod: CPTII,S$GLB,, | Performed by: INTERNAL MEDICINE

## 2023-03-07 PROCEDURE — 1160F RVW MEDS BY RX/DR IN RCRD: CPT | Mod: CPTII,S$GLB,, | Performed by: INTERNAL MEDICINE

## 2023-03-07 PROCEDURE — 99999 PR PBB SHADOW E&M-EST. PATIENT-LVL IV: ICD-10-PCS | Mod: PBBFAC,,, | Performed by: INTERNAL MEDICINE

## 2023-03-07 PROCEDURE — 1101F PT FALLS ASSESS-DOCD LE1/YR: CPT | Mod: CPTII,S$GLB,, | Performed by: INTERNAL MEDICINE

## 2023-03-07 PROCEDURE — 1101F PR PT FALLS ASSESS DOC 0-1 FALLS W/OUT INJ PAST YR: ICD-10-PCS | Mod: CPTII,S$GLB,, | Performed by: INTERNAL MEDICINE

## 2023-03-07 PROCEDURE — 3008F BODY MASS INDEX DOCD: CPT | Mod: CPTII,S$GLB,, | Performed by: INTERNAL MEDICINE

## 2023-03-07 PROCEDURE — 3008F PR BODY MASS INDEX (BMI) DOCUMENTED: ICD-10-PCS | Mod: CPTII,S$GLB,, | Performed by: INTERNAL MEDICINE

## 2023-03-07 PROCEDURE — 1126F AMNT PAIN NOTED NONE PRSNT: CPT | Mod: CPTII,S$GLB,, | Performed by: INTERNAL MEDICINE

## 2023-03-07 PROCEDURE — 3288F PR FALLS RISK ASSESSMENT DOCUMENTED: ICD-10-PCS | Mod: CPTII,S$GLB,, | Performed by: INTERNAL MEDICINE

## 2023-03-07 PROCEDURE — 3075F PR MOST RECENT SYSTOLIC BLOOD PRESS GE 130-139MM HG: ICD-10-PCS | Mod: CPTII,S$GLB,, | Performed by: INTERNAL MEDICINE

## 2023-03-07 NOTE — PROGRESS NOTES
[facility] Note    Chief Complaint      Chief Complaint   Patient presents with    Follow-up     1m follow up        History of Present Illness      Zeenat Mancia is a 72 y.o. male who presents today for   Chief Complaint   Patient presents with    Follow-up     1m follow up    .  Patient comes to appointment here for 1 m f/u after initiating trazodone for sleep . Is doing well currently . Cont with same regimen .     HPI    No problem-specific Assessment & Plan notes found for this encounter.       Problem List Items Addressed This Visit          Neuro    Neuralgia    Overview     Cont gabapentin             Renal/    Benign non-nodular prostatic hyperplasia with lower urinary tract symptoms    Overview     Dr calhoun managing . Has had urolift . Has tried off flomax and has noted some increase in nocturia             Other    Other insomnia    Overview     Has been switched to trazadone and it seems to be working well . Will continue             Past Medical History:  Past Medical History:   Diagnosis Date    A-fib     Acute blood loss anemia     Arthritis     Atrial flutter     Colon polyp     Diverticulosis     Dyslipidemia     Dyspnea on exertion 5/31/2021    GERD (gastroesophageal reflux disease)     on Protonix    Hepatic steatosis     Hyperlipidemia     Hypertension     Irregular heart beat     Lower GI bleed 5/31/2021    Multiple gastric ulcers 05/16/2018    gi scope per Dr. oHuston    Obesity     Sleep apnea     uses CPAP    Unspecified disorder of kidney and ureter     kidney stones       Past Surgical History:  Past Surgical History:   Procedure Laterality Date    ADENOIDECTOMY  1958    APPENDECTOMY      CATARACT EXTRACTION  2009    OS    CATARACT EXTRACTION W/  INTRAOCULAR LENS IMPLANT Right 06/03/2015    [id number] 14.5d//    CHOLECYSTECTOMY  2021    COLONOSCOPY N/A 10/10/2017    Procedure: COLONOSCOPY;  Surgeon: Jameson Houston MD;  Location: [facility];  Service:  Endoscopy;  Laterality: N/A; repeat in 5 years for surveillance    COLONOSCOPY N/A 05/16/2018    Procedure: COLONOSCOPY;  Surgeon: Jameson Houston MD;  Location: [facility];  Service: Endoscopy;  Laterality: N/A;    COLONOSCOPY N/A 06/02/2021    Procedure: COLONOSCOPY;  Surgeon: Agustin Corona MD;  Location: [facility] (2ND FLR);  Service: Endoscopy;  Laterality: N/A;    COLONOSCOPY N/A 10/11/2022    Procedure: COLONOSCOPY;  Surgeon: Edin Lara MD;  Location: [facility] (2ND FLR);  Service: Endoscopy;  Laterality: N/A;  2nd floor-difficult intubation  ok to hold eliquis x2 days per Dr. Jerome, see telephone encounter 6/28-vt  fully vaccinated, instructions [facility]  extended miralax prep  Clear liquids up to 2 hrs prior/ AM prep [id number]    CORONARY ANGIOGRAPHY N/A 01/04/2021    Procedure: ANGIOGRAM, CORONARY ARTERY;  Surgeon: Jase Moore MD;  Location: [facility] CATH LAB/EP;  Service: Cardiology;  Laterality: N/A;    CYSTOSCOPY WITH INSERTION OF MINIMALLY INVASIVE IMPLANT TO ENLARGE PROSTATIC URETHRA N/A 06/29/2021    Procedure: TRANSPROSTATIC TISSUE RETRACTION;  Surgeon: Agustin Clay MD;  [facility];  Service: Urology;  Laterality: N/A;    ESOPHAGOGASTRODUODENOSCOPY N/A 07/02/2018    Procedure: EGD (ESOPHAGOGASTRODUODENOSCOPY);  Surgeon: Jameson Houston MD;  Location: [facility];  Service: Endoscopy;  Laterality: N/A;    ESOPHAGOGASTRODUODENOSCOPY N/A 06/02/2021    Procedure: EGD (ESOPHAGOGASTRODUODENOSCOPY);  Surgeon: Agustin Corona MD;  [facility]  Service: Endoscopy;  Laterality: N/A;    EYE SURGERY      cataract    LEFT HEART CATHETERIZATION N/A 01/04/2021    Procedure: Left heart cath;  Surgeon: Jase Moore MD;  Location: [facility] CATH LAB/EP;  Service: Cardiology;  Laterality: N/A;    ROBOT-ASSISTED CHOLECYSTECTOMY N/A 02/04/2021    Procedure: ROBOTIC CHOLECYSTECTOMY;  Surgeon: Pal Cheney MD;  Location: [facility];  Service:  General;  Laterality: N/A;    TONSILLECTOMY      TRANSESOPHAGEAL ECHOCARDIOGRAPHY N/A 2021    Procedure: ECHOCARDIOGRAM, TRANSESOPHAGEAL;  Surgeon: Leo Reeves III, MD;  Location: [facility] EP LAB;  Service: Cardiology;  Laterality: N/A;    TREATMENT OF CARDIAC ARRHYTHMIA N/A 2021    Procedure: Cardioversion or Defibrillation;  Surgeon: Judy Jerome MD;  Location: [facility] CATH LAB/EP;  Service: Cardiology;  Laterality: N/A;    TREATMENT OF CARDIAC ARRHYTHMIA N/A 2021    Procedure: CARDIOVERSION;  Surgeon: PEMA Downs MD;  Location: [facility] EP LAB;  Service: Cardiology;  Laterality: N/A;  AF, PHUONG, DCCV, MAC, DM, 3 PREP    UPPER GASTROINTESTINAL ENDOSCOPY  2018    Dr. Houston       Family History:  family history includes Arthritis in his mother; Blindness in his mother; Cataracts in his father; Coronary artery disease in his father; Heart disease in his father and mother.     Social History:  Social History     Socioeconomic History    Marital status:     Number of children: 3   Tobacco Use    Smoking status: Former     Packs/day: 2.00     Years: 10.00     Pack years: 20.00     Types: Cigarettes     Start date: 1969     Quit date: 1980     Years since quittin.2    Smokeless tobacco: Never    Tobacco comments:     quit in     Substance and Sexual Activity    Alcohol use: Not Currently     Alcohol/week: 2.0 standard drinks     Types: 1 Glasses of wine, 1 Cans of beer per week     Comment: 1 beer every 2 month    Drug use: No    Sexual activity: Not Currently     Partners: Female   Social History Narrative    Patient is originally from [place]         [place]        Working ; retired,  plants            47 years         Children    Son x3         Lives with  Kerri         Diet/Bioclones    Updates    20- moved from [facility]       Social Determinants of Health     Financial Resource Strain:  Low Risk     Difficulty of Paying Living Expenses: Not hard at all   Food Insecurity: No Food Insecurity    Worried About Running Out of Food in the Last Year: Never true    Ran Out of Food in the Last Year: Never true   Transportation Needs: No Transportation Needs    Lack of Transportation (Medical): No    Lack of Transportation (Non-Medical): No   Physical Activity: Insufficiently Active    Days of Exercise per Week: 1 day    Minutes of Exercise per Session: 10 min   Stress: Stress Concern Present    Feeling of Stress : Rather much   Social Connections: Unknown    Frequency of Communication with Friends and Family: Three times a week    Frequency of Social Gatherings with Friends and Family: Once a week    Active Member of Clubs or Organizations: Yes    Attends Club or Organization Meetings: 1 to 4 times per year    Marital Status:    Housing Stability: Low Risk     Unable to Pay for Housing in the Last Year: No    Number of Places Lived in the Last Year: 2    Unstable Housing in the Last Year: No       Review of Systems:   Review of Systems   Constitutional:  Negative for fever and weight loss.   HENT:  Negative for congestion, hearing loss and sore throat.    Eyes:  Negative for blurred vision.   Respiratory:  Negative for cough and shortness of breath.    Cardiovascular:  Negative for chest pain, palpitations, claudication and leg swelling.   Gastrointestinal:  Negative for abdominal pain, constipation, diarrhea and heartburn.   Genitourinary:  Negative for dysuria.   Musculoskeletal:  Negative for back pain and myalgias.   Skin:  Negative for rash.   Neurological:  Positive for dizziness and tingling. Negative for focal weakness and headaches.   Psychiatric/Behavioral:  Negative for depression and suicidal ideas. The patient is nervous/anxious.       Medications:  Outpatient Encounter Medications as of 3/7/2023   Medication Sig Dispense Refill    amLODIPine (NORVASC) 5 MG tablet Take 1 tablet (5 mg  total) by mouth once daily. 90 tablet 3    atenoloL (TENORMIN) 50 MG tablet TAKE 1 TABLET(50 MG) BY MOUTH EVERY DAY 90 tablet 3    DULoxetine (CYMBALTA) 20 MG capsule Take 1 capsule (20 mg total) by mouth once daily. 30 capsule 0    ELIQUIS 5 mg Tab TAKE 1 TABLET(5 MG) BY MOUTH TWICE DAILY 180 tablet 3    gabapentin (NEURONTIN) 600 MG tablet Take 1 tablet (600 mg total) by mouth 3 (three) times daily. 90 tablet 11    hydroCHLOROthiazide (HYDRODIURIL) 25 MG tablet Take 1 tablet (25 mg total) by mouth once daily. 90 tablet 3    LIDOcaine (XYLOCAINE) 5 % Oint ointment Apply topically as needed (peripheral neuropathy pain). 30 g 1    melatonin 10 mg Tab Take 10 mg by mouth every evening.      multivit-min/FA/lycopen/lutein (CENTRUM SILVER MEN ORAL) Take by mouth once daily.      pantoprazole (PROTONIX) 40 MG tablet Take 1 tablet (40 mg total) by mouth once daily. 90 tablet 3    sildenafiL (VIAGRA) 100 MG tablet Take 1 tablet (100 mg total) by mouth daily as needed for Erectile Dysfunction. 10 tablet 3    tadalafiL (CIALIS) 20 MG Tab Take 1 tablet (20 mg total) by mouth once daily. 10 tablet 11    tamsulosin (FLOMAX) 0.4 mg Cap TAKE 1 CAPSULE(0.4 MG) BY MOUTH EVERY DAY 30 capsule 11    traZODone (DESYREL) 100 MG tablet TAKE 1 TABLET(100 MG) BY MOUTH EVERY EVENING 30 tablet 1    valsartan (DIOVAN) 320 MG tablet TAKE 1 TABLET(320 MG) BY MOUTH EVERY DAY 90 tablet 3    [DISCONTINUED] sildenafiL (VIAGRA) 100 MG tablet Take 1 tablet (100 mg total) by mouth daily as needed for Erectile Dysfunction. 10 tablet 11    [DISCONTINUED] traZODone (DESYREL) 100 MG tablet Take 1 tablet (100 mg total) by mouth every evening. 30 tablet 1    [DISCONTINUED] zolpidem (AMBIEN) 5 MG Tab TAKE 1 TABLET(5 MG) BY MOUTH EVERY NIGHT AS NEEDED 30 tablet 1     No facility-administered encounter medications on file as of 3/7/2023.       Allergies:  Review of patient's allergies indicates:   Allergen Reactions    Lipitor [atorvastatin] Itching  "        Physical Exam      Vitals:    03/07/23 0957   BP: 138/88   Pulse: 70   Resp: 18   Temp: 98.7 °F (37.1 °C)        Vital Signs  Temp: 98.7 °F (37.1 °C)  Temp Source: Oral  Pulse: 70  Resp: 18  SpO2: 96 %  BP: 138/88  BP Location: Right arm  Patient Position: Sitting  Pain Score: 0-No pain  Height and Weight  Height: 6' 2" (188 cm)  Weight: (!) 147.2 kg (324 lb 8.3 oz)  BSA (Calculated - sq m): 2.77 sq meters  BMI (Calculated): 41.6  Weight in (lb) to have BMI = 25: 194.3]     Body mass index is 41.67 kg/m².    Physical Exam  Constitutional:       Appearance: He is well-developed.   HENT:      Head: Normocephalic.   Eyes:      Pupils: Pupils are equal, round, and reactive to light.   Neck:      Thyroid: No thyromegaly.   Cardiovascular:      Rate and Rhythm: Normal rate and regular rhythm.      Heart sounds: No murmur heard.    No friction rub. No gallop.   Pulmonary:      Effort: Pulmonary effort is normal.      Breath sounds: Normal breath sounds.   Abdominal:      General: Bowel sounds are normal.      Palpations: Abdomen is soft.   Musculoskeletal:         General: Normal range of motion.      Cervical back: Normal range of motion.   Skin:     General: Skin is warm and dry.   Neurological:      Mental Status: He is alert and oriented to person, place, and time.      Sensory: No sensory deficit.   Psychiatric:         Behavior: Behavior normal.        Laboratory:  CBC:  No results for input(s): WBC, RBC, HGB, HCT, PLT, MCV, MCH, MCHC in the last 2160 hours.  CMP:  No results for input(s): GLU, CALCIUM, ALBUMIN, PROT, NA, K, CO2, CL, BUN, ALKPHOS, ALT, AST, BILITOT in the last 2160 hours.    Invalid input(s): CREATININ  URINALYSIS:  No results for input(s): COLORU, CLARITYU, SPECGRAV, PHUR, PROTEINUA, GLUCOSEU, BILIRUBINCON, BLOODU, WBCU, RBCU, BACTERIA, MUCUS, NITRITE, LEUKOCYTESUR, UROBILINOGEN, HYALINECASTS in the last 2160 hours.   LIPIDS:  No results for input(s): TSH, HDL, CHOL, TRIG, LDLCALC, CHOLHDL, " NONHDLCHOL, TOTALCHOLEST in the last 2160 hours.  TSH:  No results for input(s): TSH in the last 2160 hours.  A1C:  No results for input(s): HGBA1C in the last 2160 hours.    Radiology:        Assessment:     Zeenat Mancia is a 72 y.o.male with:    Other insomnia    Benign non-nodular prostatic hyperplasia with lower urinary tract symptoms    Neuralgia                Plan:     Problem List Items Addressed This Visit          Neuro    Neuralgia    Overview     Cont gabapentin             Renal/    Benign non-nodular prostatic hyperplasia with lower urinary tract symptoms    Overview     Dr calhoun managing . Has had urolift . Has tried off flomax and has noted some increase in nocturia             Other    Other insomnia    Overview     Has been switched to trazadone and it seems to be working well . Will continue            As above, continue current medications and maintain follow up with specialists.  Return to clinic in 6 months.      Frederick W Dantagnan [facility]

## 2023-03-17 ENCOUNTER — OFFICE VISIT (OUTPATIENT)
Dept: UROLOGY | Facility: CLINIC | Age: 73
End: 2023-03-17
Payer: MEDICARE

## 2023-03-17 DIAGNOSIS — R35.1 NOCTURIA: ICD-10-CM

## 2023-03-17 DIAGNOSIS — N52.01 ERECTILE DYSFUNCTION DUE TO ARTERIAL INSUFFICIENCY: ICD-10-CM

## 2023-03-17 DIAGNOSIS — N40.0 BENIGN PROSTATIC HYPERPLASIA, UNSPECIFIED WHETHER LOWER URINARY TRACT SYMPTOMS PRESENT: Primary | ICD-10-CM

## 2023-03-17 PROCEDURE — 1160F PR REVIEW ALL MEDS BY PRESCRIBER/CLIN PHARMACIST DOCUMENTED: ICD-10-PCS | Mod: CPTII,95,, | Performed by: UROLOGY

## 2023-03-17 PROCEDURE — 1160F RVW MEDS BY RX/DR IN RCRD: CPT | Mod: CPTII,95,, | Performed by: UROLOGY

## 2023-03-17 PROCEDURE — 1159F MED LIST DOCD IN RCRD: CPT | Mod: CPTII,95,, | Performed by: UROLOGY

## 2023-03-17 PROCEDURE — 1157F PR ADVANCE CARE PLAN OR EQUIV PRESENT IN MEDICAL RECORD: ICD-10-PCS | Mod: CPTII,95,, | Performed by: UROLOGY

## 2023-03-17 PROCEDURE — 1157F ADVNC CARE PLAN IN RCRD: CPT | Mod: CPTII,95,, | Performed by: UROLOGY

## 2023-03-17 PROCEDURE — 99214 PR OFFICE/OUTPT VISIT, EST, LEVL IV, 30-39 MIN: ICD-10-PCS | Mod: 95,,, | Performed by: UROLOGY

## 2023-03-17 PROCEDURE — 99214 OFFICE O/P EST MOD 30 MIN: CPT | Mod: 95,,, | Performed by: UROLOGY

## 2023-03-17 PROCEDURE — 1159F PR MEDICATION LIST DOCUMENTED IN MEDICAL RECORD: ICD-10-PCS | Mod: CPTII,95,, | Performed by: UROLOGY

## 2023-03-17 RX ORDER — TADALAFIL 20 MG/1
20 TABLET ORAL
Qty: 10 TABLET | Refills: 11 | Status: SHIPPED | OUTPATIENT
Start: 2023-03-17 | End: 2024-03-19

## 2023-03-17 RX ORDER — TADALAFIL 20 MG/1
20 TABLET ORAL DAILY
Qty: 10 TABLET | Refills: 11 | Status: SHIPPED | OUTPATIENT
Start: 2023-03-17 | End: 2023-03-17

## 2023-03-17 NOTE — PROGRESS NOTES
The patient location is: [place]  The chief complaint leading to consultation is: BPH    Visit type: audiovisual    Face to Face time with patient: 15 minutes    25 minutes of total time spent on the encounter, which includes face to face time and non-face to face time preparing to see the patient (eg, review of tests), Obtaining and/or reviewing separately obtained history, Documenting clinical information in the electronic or other health record, Independently interpreting results (not separately reported) and communicating results to the patient/family/caregiver, or Care coordination (not separately reported).     Each patient to whom he or she provides medical services by telemedicine is:  (1) informed of the relationship between the physician and patient and the respective role of any other health care provider with respect to management of the patient; and (2) notified that he or she may decline to receive medical services by telemedicine and may withdraw from such care at any time.    Notes:      Subjective:      Zeenat Mancia is a 72 y.o. male who returns today regarding his BPH.    He is s/p Urolift 6/29/21. His preop AUASS was 33/6 improved to 19/4 at last visit (none today).    Since then he has remained very bothered by nocturia 3-4 times per night. He sleeps with CPAP and takes Ambien and melatonin nightly. No significant improvement with ditropan qhs.     Restarted flomax last year but then stopped recently due to possible side effects. After he stopped he noted significant increase in frequency and urgency.    Continues to report ED. Has had mixed results w/ viagra and cialis in the past.    The following portions of the patient's history were reviewed and updated as appropriate: allergies, current medications, past family history, past medical history, past social history, past surgical history and problem list.    Review of Systems  A comprehensive multipoint review of systems was negative except as  otherwise stated in the HPI.     Objective:   Vitals: There were no vitals taken for this visit.     Physical Exam   General: alert and oriented, no acute distress  Respiratory: Symmetric expansion, non-labored breathing  Neuro: no gross deficits  Psych: normal judgment and insight, normal mood/affect and non-anxious    Lab Review     Lab Results   Component Value Date    WBC 7.70 10/20/2022    HGB 18.1 (H) 10/20/2022    HCT 57.9 (H) 10/20/2022    HCT 34 (L) 05/31/2021    MCV 94 10/20/2022     10/20/2022     Lab Results   Component Value Date    CREATININE 1.3 10/20/2022    BUN 15 10/20/2022     Lab Results   Component Value Date    PSA 0.44 08/21/2017    PSADIAG 2.8 10/20/2022       Assessment and Plan:   1. Benign non-nodular prostatic hyperplasia with lower urinary tract symptoms  2. Nocturia  -- Doing well s/p Urolift  -- Restart flomax   -- Elevate LE prior to bed    3. ED  -- Restart cialis PRN    FU 6 mos

## 2023-03-27 ENCOUNTER — TELEPHONE (OUTPATIENT)
Dept: BARIATRICS | Facility: CLINIC | Age: 73
End: 2023-03-27
Payer: MEDICARE

## 2023-03-27 NOTE — TELEPHONE ENCOUNTER
Notified patient of the date & time of financial phone call appt.  Pt aware appt is a telephone call.    Bahena  Appt. 03/23/2023  .

## 2023-04-04 ENCOUNTER — PATIENT MESSAGE (OUTPATIENT)
Dept: PAIN MEDICINE | Facility: CLINIC | Age: 73
End: 2023-04-04
Payer: MEDICARE

## 2023-04-05 DIAGNOSIS — G89.4 CHRONIC PAIN SYNDROME: Primary | ICD-10-CM

## 2023-04-13 ENCOUNTER — PATIENT MESSAGE (OUTPATIENT)
Dept: PRIMARY CARE CLINIC | Facility: CLINIC | Age: 73
End: 2023-04-13
Payer: MEDICARE

## 2023-04-14 RX ORDER — DULOXETIN HYDROCHLORIDE 30 MG/1
30 CAPSULE, DELAYED RELEASE ORAL DAILY
Qty: 30 CAPSULE | Refills: 0 | Status: SHIPPED | OUTPATIENT
Start: 2023-04-14 | End: 2023-05-11

## 2023-04-14 RX ORDER — DULOXETIN HYDROCHLORIDE 30 MG/1
30 CAPSULE, DELAYED RELEASE ORAL DAILY
COMMUNITY
End: 2023-04-14 | Stop reason: SDUPTHER

## 2023-04-14 NOTE — TELEPHONE ENCOUNTER
Pended 30 day supply for 30 MG, can you sign? Advised pt to let us know when he has a week left of 30 MG so we can call in 60 MG

## 2023-04-17 ENCOUNTER — PATIENT MESSAGE (OUTPATIENT)
Dept: PRIMARY CARE CLINIC | Facility: CLINIC | Age: 73
End: 2023-04-17
Payer: MEDICARE

## 2023-04-23 DIAGNOSIS — G47.09 OTHER INSOMNIA: ICD-10-CM

## 2023-04-23 NOTE — TELEPHONE ENCOUNTER
No new care gaps identified.  [facility] Embedded Care Gaps. Reference number: [id number]. 4/23/2023   8:07:28 AM JADENT

## 2023-04-24 NOTE — TELEPHONE ENCOUNTER
Refill Routing Note   Medication(s) are not appropriate for processing by [facility] for the following reason(s):      New or recently adjusted medication    [facility] action(s):  Defer       Medication Therapy Plan: Trazodone new start (2/2/23); Duloxetine new start (4/14/23); Defer      Appointments  past 12m or future 3m with PCP    Date Provider   Last Visit   3/7/2023 Elias Thomas MD   Next Visit   9/18/2023 Elais Thomas MD   ED visits in past 90 days: 0        Note composed:9:31 AM 04/24/2023

## 2023-04-25 RX ORDER — TIZANIDINE 4 MG/1
TABLET ORAL
Qty: 90 TABLET | Refills: 6 | OUTPATIENT
Start: 2023-04-25

## 2023-04-28 ENCOUNTER — CLINICAL SUPPORT (OUTPATIENT)
Dept: REHABILITATION | Facility: HOSPITAL | Age: 73
End: 2023-04-28
Attending: STUDENT IN AN ORGANIZED HEALTH CARE EDUCATION/TRAINING PROGRAM
Payer: MEDICARE

## 2023-04-28 DIAGNOSIS — G89.4 CHRONIC PAIN SYNDROME: ICD-10-CM

## 2023-04-28 PROCEDURE — 97811 ACUP 1/> W/O ESTIM EA ADD 15: CPT

## 2023-04-28 PROCEDURE — 97810 ACUP 1/> WO ESTIM 1ST 15 MIN: CPT

## 2023-04-28 NOTE — PROGRESS NOTES
Acupuncture Evaluation Note     Name: Zeenat Mancia  Clinic Number: [id number]    Traditional Chinese Medicine (TCM) Diagnosis: Qi Stagnation and Blood Stasis  Medical Diagnosis:   Encounter Diagnosis   Name Primary?    Chronic pain syndrome         Evaluation Date: 4/28/2023    Visit #/Visits authorized: 1/20    Precautions: Standard    Subjective     Chief Concern: Constant tingling and numbing sensation in both feet.  Pain level is a constant 5/10 and usually goes up to 8-9/10 at night.  Both feet get cold easily.    Medical necessity is demonstrated by the following IMPAIRMENTS:     Medical Necessity: Decreased mobility limits day to day activities, social, and emergent situations and Decreased quality of life              Aggravating Factors:  movement, lying down, standing, prolonged sitting, changing positions, flexion, and extension       Relieving Factors:  nothing    Symptom Description:     Quality:  Tingling and Numb  Severity:  5  Frequency:  continuously    Previous Treatments Tried:  Medication, Therapy , and Chiropractic treatment    Digestion:     Diet: on average, 3 meals per day   Fluids: coffee 2 cups /day, is drinking moderate amounts of fluids, 2 beers per month(s)  Taste/Appetite: Good     Sleep: Poor    Energy Levels:  Low    Objective     Observation: Appears to be well-developed, well-nourished, walks slowly due to pain, talks clearly, mild purplish discoloration on the surface of both feet.    Tongue:      Body:  normal   Color:  red   Coating:  thick, greasy,, and yellow,    Pulse:  deep       Treatment     Treatment Principles:  To invigorate qi and blood.    Acupuncture points used:    Bilateral points:  ST43, ST44, [place] x 4, LV3, KI1, KI3, SP3, SP4      Needles In: 22  Needles Out: 22  Needles W/O STIM placed: 11:05 AM  Needles W/O STIM removed: 11:25 AM    Assessment     After treatment, patient felt 20% better.     Patient prognosis is good.     Patient will continue to benefit  from acupuncture treatment to address the deficits listed in the problem list box on initial evaluation, provide patient family education and to maximize pt's level of independence in the home and community environment.     Patient's spiritual, cultural and educational needs considered and pt agreeable to plan of care and goals.     Anticipated barriers to treatment: None.    Plan     Recommend 1 /week for 19 sessions then re-assess.      Education:  Patient is aware of cumulative benefit of acupuncture

## 2023-05-01 ENCOUNTER — CLINICAL SUPPORT (OUTPATIENT)
Dept: REHABILITATION | Facility: HOSPITAL | Age: 73
End: 2023-05-01
Payer: MEDICARE

## 2023-05-01 DIAGNOSIS — G89.4 CHRONIC PAIN SYNDROME: Primary | ICD-10-CM

## 2023-05-01 PROCEDURE — 97811 ACUP 1/> W/O ESTIM EA ADD 15: CPT

## 2023-05-01 PROCEDURE — 97810 ACUP 1/> WO ESTIM 1ST 15 MIN: CPT

## 2023-05-01 NOTE — PROGRESS NOTES
Acupuncture Evaluation Note     Name: Zeenat Mancia  Clinic Number: [id number]    Traditional Chinese Medicine (TCM) Diagnosis: Qi Stagnation and Blood Stasis  Medical Diagnosis: G89.4 (ICD-10-CM) - Chronic pain syndrome     Evaluation Date: 5/1/2023    Visit #/Visits authorized: 2/20    Precautions: Standard    Subjective     Chief Concern: Constant tingling and numbing sensation in both feet started about a year ago in 2022 without known reason.  Pain level is a constant 5/10 and usually goes up to 8-9/10 at night.  Both feet get cold easily.    Medical necessity is demonstrated by the following IMPAIRMENTS:     Medical Necessity: Decreased mobility limits day to day activities, social, and emergent situations and Decreased quality of life              Aggravating Factors:  movement, lying down, standing, prolonged sitting, changing positions, flexion, and extension       Relieving Factors:  Acupuncture    Symptom Description:     Quality:  Tingling and Numb  Severity:  5  Frequency:  continuously    Previous Treatments Tried:  Medication, Therapy , and Chiropractic treatment    Digestion:     Diet: on average, 3 meals per day   Fluids: coffee 2 cups /day, is drinking moderate amounts of fluids, 2 beers per month(s)  Taste/Appetite: Good     Sleep: Poor    Energy Levels:  Low    Objective     Observation: Appears to be well-developed, well-nourished, walks slowly due to pain, talks clearly, mild purplish discoloration on the surface of both feet is gone after first acupuncture treatment.      Tongue:      Body:  normal   Color:  red   Coating:  thick, greasy,, and yellow,    Pulse:  deep       Treatment     Treatment Principles:  To invigorate qi and blood.    Acupuncture points used:    Bilateral points:  ST43, ST44, [place] x 4, LV3, KI1, KI3, SP3, SP4      Needles In: 22  Needles Out: 22  Needles W/O STIM placed: 11:35 AM  [place] W/O STIM removed: 11:55 AM    Assessment     After treatment, patient felt better.      Patient prognosis is good.     Patient will continue to benefit from acupuncture treatment to address the deficits listed in the problem list box on initial evaluation, provide patient family education and to maximize pt's level of independence in the home and community environment.     Patient's spiritual, cultural and educational needs considered and pt agreeable to plan of care and goals.     Anticipated barriers to treatment: None.    Plan     Recommend 1 /week for 18 sessions then re-assess.      Education:  Patient is aware of cumulative benefit of acupuncture

## 2023-05-05 ENCOUNTER — CLINICAL SUPPORT (OUTPATIENT)
Dept: REHABILITATION | Facility: HOSPITAL | Age: 73
End: 2023-05-05
Payer: MEDICARE

## 2023-05-05 ENCOUNTER — PATIENT MESSAGE (OUTPATIENT)
Dept: PRIMARY CARE CLINIC | Facility: CLINIC | Age: 73
End: 2023-05-05
Payer: MEDICARE

## 2023-05-05 DIAGNOSIS — G89.4 CHRONIC PAIN SYNDROME: Primary | ICD-10-CM

## 2023-05-05 PROCEDURE — 97810 ACUP 1/> WO ESTIM 1ST 15 MIN: CPT

## 2023-05-05 PROCEDURE — 97811 ACUP 1/> W/O ESTIM EA ADD 15: CPT

## 2023-05-05 NOTE — PROGRESS NOTES
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  Pain level usually goes up to 8-9/10 at night.  Both feet get cold easily.    Medical necessity is demonstrated by the following IMPAIRMENTS:     Medical Necessity: Decreased mobility limits day to day activities, social, and emergent situations and Decreased quality of life              Aggravating Factors:  movement, lying down, standing, prolonged sitting, changing positions, flexion, and extension       Relieving Factors:  Acupuncture    Symptom Description:     Quality:  Tingling and Numb  Severity:  4  Frequency:  continuously    Previous Treatments Tried:  Medication, Therapy , and Chiropractic treatment    Digestion:     Diet: on average, 3 meals per day   Fluids: coffee 2 cups /day, is drinking moderate amounts of fluids, 2 beers per month(s)  Taste/Appetite: Good     Sleep: Poor    Energy Levels:  Low    Objective     Observation: Appears to be well-developed, well-nourished, walks slowly due to tingling pain in the feet, talks clearly, skin color of both feet appears to normal with no scars.    Tongue:      Body:  normal   Color:  red   Coating:  thick, greasy,, and yellow,    Pulse:  deep       Treatment     Treatment Principles:  To invigorate qi and blood.    Acupuncture points used:    Bilateral points:  ST43, ST44, [place] x 4, LV3, KI3, SP3, SP4      Needles In: 21  Needles Out: 21  Needles W/O STIM placed: 10:00 AM  [place] W/O STIM removed: 10:22 AM    Assessment     After treatment, patient felt better.     Patient prognosis is good.     Patient will  continue to benefit from acupuncture treatment to address the deficits listed in the problem list box on initial evaluation, provide patient family education and to maximize pt's level of independence in the home and community environment.     Patient's spiritual, cultural and educational needs considered and pt agreeable to plan of care and goals.     Anticipated barriers to treatment: None.    Plan     Recommend 1 /week for 17 sessions then re-assess.      Education:  Patient is aware of cumulative benefit of acupuncture

## 2023-05-08 ENCOUNTER — CLINICAL SUPPORT (OUTPATIENT)
Dept: REHABILITATION | Facility: HOSPITAL | Age: 73
End: 2023-05-08
Payer: MEDICARE

## 2023-05-08 DIAGNOSIS — G89.4 CHRONIC PAIN SYNDROME: Primary | ICD-10-CM

## 2023-05-08 PROCEDURE — 97810 ACUP 1/> WO ESTIM 1ST 15 MIN: CPT

## 2023-05-08 PROCEDURE — 97811 ACUP 1/> W/O ESTIM EA ADD 15: CPT

## 2023-05-08 NOTE — PROGRESS NOTES
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  Pain level usually goes up to 8-9/10 at night.  Both feet get cold easily.    Medical necessity is demonstrated by the following IMPAIRMENTS:     Medical Necessity: Decreased mobility limits day to day activities, social, and emergent situations and Decreased quality of life              Aggravating Factors:  movement, lying down, standing, prolonged sitting, changing positions, flexion, and extension       Relieving Factors:  Acupuncture    Symptom Description:     Quality:  Tingling and Numb  Severity:  3-4  Frequency:  continuously    Previous Treatments Tried:  Medication, Therapy , and Chiropractic treatment    Response to Previous Acupuncture Treatment:  Some relief on both feet from last acupuncture that lasted about 2 days.    Digestion:     Diet: on average, 3 meals per day   Fluids: coffee 2 cups /day, is drinking moderate amounts of fluids, 2 beers per month(s)  Taste/Appetite: Good     Sleep: Poor    Energy Levels:  Low    Objective     Observation: Appears to be well-developed, well-nourished, walks slowly due to tingling pain in the feet, talks clearly, skin color of both feet appears to normal with no scars.    Tongue:      Body:  normal   Color:  red   Coating:  thick, greasy,, and yellow,    Pulse:  deep       Treatment     Treatment Principles:  To invigorate qi and blood.    Acupuncture points used:    Bilateral points:  ST43, ST44, [place] x 4, LV3, KI3, SP3, SP4      Needles In: 21  Needles Out: 21  Needles W/O STIM placed: 10:12 AM  [place] W/O STIM  removed: 10:30 AM    Assessment     After treatment, patient felt better.     Patient prognosis is good.     Patient will continue to benefit from acupuncture treatment to address the deficits listed in the problem list box on initial evaluation, provide patient family education and to maximize pt's level of independence in the home and community environment.     Patient's spiritual, cultural and educational needs considered and pt agreeable to plan of care and goals.     Anticipated barriers to treatment: None.    Plan     Recommend 1 /week for 16 sessions then re-assess.      Education:  Patient is aware of cumulative benefit of acupuncture

## 2023-05-09 ENCOUNTER — TELEPHONE (OUTPATIENT)
Dept: CARDIOLOGY | Facility: CLINIC | Age: 73
End: 2023-05-09
Payer: MEDICARE

## 2023-05-09 NOTE — TELEPHONE ENCOUNTER
----- Message from Jacqui Vora sent at 5/9/2023 10:00 AM CDT -----  Type:  Needs Medical Advice    Who Called: pt  Symptoms (please be specific): pt received letter saying he needed to make appt for follow up please schedule sooner appt pt has been feeling weak and tired  Would the patient rather a call back or a response via [facility]? call  Best Call Back Number: [phone]  Additional Information:

## 2023-05-11 RX ORDER — TRAZODONE HYDROCHLORIDE 100 MG/1
TABLET ORAL
Qty: 30 TABLET | Refills: 1 | Status: SHIPPED | OUTPATIENT
Start: 2023-05-11 | End: 2023-08-29 | Stop reason: SDUPTHER

## 2023-05-11 RX ORDER — DULOXETIN HYDROCHLORIDE 30 MG/1
CAPSULE, DELAYED RELEASE ORAL
Qty: 30 CAPSULE | Refills: 0 | Status: SHIPPED | OUTPATIENT
Start: 2023-05-11 | End: 2023-10-30

## 2023-05-12 ENCOUNTER — CLINICAL SUPPORT (OUTPATIENT)
Dept: REHABILITATION | Facility: HOSPITAL | Age: 73
End: 2023-05-12
Payer: MEDICARE

## 2023-05-12 DIAGNOSIS — G89.4 CHRONIC PAIN SYNDROME: Primary | ICD-10-CM

## 2023-05-12 PROCEDURE — 97811 ACUP 1/> W/O ESTIM EA ADD 15: CPT

## 2023-05-12 PROCEDURE — 97810 ACUP 1/> WO ESTIM 1ST 15 MIN: CPT

## 2023-05-12 NOTE — PROGRESS NOTES
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  Pain level usually goes up to 8-9/10 at night.  Both feet get cold easily.    Medical necessity is demonstrated by the following IMPAIRMENTS:     Medical Necessity: Decreased mobility limits day to day activities, social, and emergent situations and Decreased quality of life              Aggravating Factors:  movement, lying down, standing, prolonged sitting, changing positions, flexion, and extension       Relieving Factors:  Acupuncture    Symptom Description:     Quality:  Tingling and Numb  Severity:  4  Frequency:  continuously    Previous Treatments Tried:  Medication, Therapy , and Chiropractic treatment    Response to Previous Acupuncture Treatment:  Some relief on both feet from last acupuncture that lasted about 2 days.    Digestion:     Diet: on average, 3 meals per day   Fluids: coffee 2 cups /day, is drinking moderate amounts of fluids, 2 beers per month(s)  Taste/Appetite: Good     Sleep: Poor    Energy Levels:  Low    Objective     Observation: Appears to be well-developed, well-nourished, walks slowly due to tingling pain in the feet, talks clearly, skin color of both feet appears to normal with no scars.    Tongue:      Body:  normal   Color:  red   Coating:  thick, greasy,, and yellow,    Pulse:  deep       Treatment     Treatment Principles:  To invigorate qi and blood.    Acupuncture points used:    Bilateral points:  ST43, ST44, [place] x 4, LV3, KI3, SP3, SP4      Needles In: 21  Needles Out: 21  Needles W/O STIM placed: 2:18 PM  [place] W/O STIM  removed: 2:40 PM    Assessment     After treatment, patient felt better.     Patient prognosis is good.     Patient will continue to benefit from acupuncture treatment to address the deficits listed in the problem list box on initial evaluation, provide patient family education and to maximize pt's level of independence in the home and community environment.     Patient's spiritual, cultural and educational needs considered and pt agreeable to plan of care and goals.     Anticipated barriers to treatment: None.    Plan     Recommend 1 /week for 15 sessions then re-assess.      Education:  Patient is aware of cumulative benefit of acupuncture

## 2023-05-15 ENCOUNTER — CLINICAL SUPPORT (OUTPATIENT)
Dept: REHABILITATION | Facility: HOSPITAL | Age: 73
End: 2023-05-15
Payer: MEDICARE

## 2023-05-15 DIAGNOSIS — G89.4 CHRONIC PAIN SYNDROME: Primary | ICD-10-CM

## 2023-05-15 PROCEDURE — 97810 ACUP 1/> WO ESTIM 1ST 15 MIN: CPT

## 2023-05-15 PROCEDURE — 97811 ACUP 1/> W/O ESTIM EA ADD 15: CPT

## 2023-05-15 NOTE — PROGRESS NOTES
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  Pain level usually goes up to 8-9/10 at night.  Both feet get cold easily.    Medical necessity is demonstrated by the following IMPAIRMENTS:     Medical Necessity: Decreased mobility limits day to day activities, social, and emergent situations and Decreased quality of life              Aggravating Factors:  movement, lying down, standing, prolonged sitting, changing positions, flexion, and extension       Relieving Factors:  Acupuncture    Symptom Description:     Quality:  Tingling and Numb  Severity:  4  Frequency:  continuously    Previous Treatments Tried:  Medication, Therapy , and Chiropractic treatment    Response to Previous Acupuncture Treatment:  Some relief on both feet from last acupuncture that lasted about a couple of days.    Digestion:     Diet: on average, 3 meals per day   Fluids: coffee 2 cups /day, is drinking moderate amounts of fluids, 2 beers per month(s)  Taste/Appetite: Good     Sleep: Poor    Energy Levels:  Low    Objective     Observation: Appears to be well-developed, well-nourished, alert, oriented to person, place and time, walks slowly due to tingling pain in the feet, talks clearly, skin color of both feet appears to normal with no scars.    Tongue:      Body:  normal   Color:  red   Coating:  thick, greasy,, and yellow,    Pulse:  deep       Treatment     Treatment Principles:  To invigorate qi and blood.    Acupuncture points used:    Bilateral points:  ST43, ST44, [place] x 4, LV3, KI3, SP3, SP4, KI2, KI1      Needles In: 24  Needles  Out: 24  [place] W/O STIM placed: 11:30 PM  Needles W/O STIM removed: 11:52 PM    Assessment     After treatment, patient felt better.     Patient prognosis is good.     Patient will continue to benefit from acupuncture treatment to address the deficits listed in the problem list box on initial evaluation, provide patient family education and to maximize pt's level of independence in the home and community environment.     Patient's spiritual, cultural and educational needs considered and pt agreeable to plan of care and goals.     Anticipated barriers to treatment: None.    Plan     Recommend 1 /week for 14 sessions then re-assess.      Education:  Patient is aware of cumulative benefit of acupuncture

## 2023-05-19 ENCOUNTER — CLINICAL SUPPORT (OUTPATIENT)
Dept: REHABILITATION | Facility: HOSPITAL | Age: 73
End: 2023-05-19
Payer: MEDICARE

## 2023-05-19 DIAGNOSIS — G89.4 CHRONIC PAIN SYNDROME: Primary | ICD-10-CM

## 2023-05-19 PROCEDURE — 97810 ACUP 1/> WO ESTIM 1ST 15 MIN: CPT

## 2023-05-19 PROCEDURE — 97811 ACUP 1/> W/O ESTIM EA ADD 15: CPT

## 2023-05-19 NOTE — PROGRESS NOTES
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  Pain level usually goes up to 8-9/10 at night.  Both feet get cold easily.    Medical necessity is demonstrated by the following IMPAIRMENTS:     Medical Necessity: Decreased mobility limits day to day activities, social, and emergent situations and Decreased quality of life              Aggravating Factors:  movement, lying down, standing, prolonged sitting, changing positions, flexion, and extension       Relieving Factors:  Acupuncture    Symptom Description:     Quality:  Tingling and Numb  Severity:  4  Frequency:  continuously    Previous Treatments Tried:  Medication, Therapy , and Chiropractic treatment    Response to Previous Acupuncture Treatment:  Some relief on both feet from last acupuncture that lasted about a couple of days.    Digestion:     Diet: on average, 3 meals per day   Fluids: coffee 2 cups /day, is drinking moderate amounts of fluids, 2 beers per month(s)  Taste/Appetite: Good     Sleep: Poor    Energy Levels:  Low    Objective     Observation: Appears to be well-developed, well-nourished, alert, oriented to person, place and time, walks slowly due to tingling pain in the feet, talks clearly, skin color of both feet appears to normal with no scars.    Tongue:      Body:  normal   Color:  red   Coating:  thick, greasy,, and yellow,    Pulse:  deep       Treatment     Treatment Principles:  To invigorate qi and blood.    Acupuncture points used:    Bilateral points:  ST43, ST44, [place] x 4, LV3, KI3, SP3, SP4, KI2, KI1      Needles In: 24  Needles  Out: 24  [place] W/O STIM placed: 1:28 PM  Needles W/O STIM removed: 1:50 PM    Assessment     After treatment, patient felt better.     Patient prognosis is good.     Patient will continue to benefit from acupuncture treatment to address the deficits listed in the problem list box on initial evaluation, provide patient family education and to maximize pt's level of independence in the home and community environment.     Patient's spiritual, cultural and educational needs considered and pt agreeable to plan of care and goals.     Anticipated barriers to treatment: None.    Plan     Recommend 1 /week for 13 sessions then re-assess.      Education:  Patient is aware of cumulative benefit of acupuncture

## 2023-05-21 ENCOUNTER — PATIENT MESSAGE (OUTPATIENT)
Dept: PRIMARY CARE CLINIC | Facility: CLINIC | Age: 73
End: 2023-05-21
Payer: MEDICARE

## 2023-05-21 DIAGNOSIS — D50.0 IRON DEFICIENCY ANEMIA DUE TO CHRONIC BLOOD LOSS: Primary | ICD-10-CM

## 2023-05-22 ENCOUNTER — CLINICAL SUPPORT (OUTPATIENT)
Dept: REHABILITATION | Facility: HOSPITAL | Age: 73
End: 2023-05-22
Payer: MEDICARE

## 2023-05-22 DIAGNOSIS — G89.4 CHRONIC PAIN SYNDROME: Primary | ICD-10-CM

## 2023-05-22 PROCEDURE — 97811 ACUP 1/> W/O ESTIM EA ADD 15: CPT

## 2023-05-22 PROCEDURE — 97810 ACUP 1/> WO ESTIM 1ST 15 MIN: CPT

## 2023-05-22 NOTE — PROGRESS NOTES
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  Pain level usually goes up higher at night.  Both feet get cold easily.    Medical necessity is demonstrated by the following IMPAIRMENTS:     Medical Necessity: Decreased mobility limits day to day activities, social, and emergent situations and Decreased quality of life              Aggravating Factors:  movement, lying down, standing, prolonged sitting, changing positions, flexion, and extension       Relieving Factors:  Acupuncture    Symptom Description:     Quality:  Tingling and Numb  Severity:  4  Frequency:  continuously    Previous Treatments Tried:  Medication, Therapy , and Chiropractic treatment    Response to Previous Acupuncture Treatment:  Some relief on both feet from last acupuncture that lasted about a couple of days.    Digestion:     Diet: on average, 3 meals per day   Fluids: coffee 2 cups /day, is drinking moderate amounts of fluids, 2 beers per month(s)  Taste/Appetite: Good     Sleep: Poor    Energy Levels:  Low    Objective     Observation: Appears to be well-developed, well-nourished, alert, oriented to person, place and time, walks slowly due to tingling pain in the feet, talks clearly, skin color of both feet appears to normal with no scars.    Tongue:      Body:  normal   Color:  red   Coating:  thick, greasy,, and yellow,    Pulse:  deep       Treatment     Treatment Principles:  To invigorate qi and blood.    Acupuncture points used:    Bilateral points:  ST43, ST44, [place] x 4, LV3, KI3, SP3, SP4, KI2, KI1      Needles In: 24  Needles  Out: 24  [place] W/O STIM placed: 11:33 AM  Needles W/O STIM removed: 11:55 AM    Assessment     After treatment, patient felt better.     Patient prognosis is good.     Patient will continue to benefit from acupuncture treatment to address the deficits listed in the problem list box on initial evaluation, provide patient family education and to maximize pt's level of independence in the home and community environment.     Patient's spiritual, cultural and educational needs considered and pt agreeable to plan of care and goals.     Anticipated barriers to treatment: None.    Plan     Recommend 1 /week for 12 sessions then re-assess.      Education:  Patient is aware of cumulative benefit of acupuncture

## 2023-05-24 ENCOUNTER — PATIENT MESSAGE (OUTPATIENT)
Dept: PRIMARY CARE CLINIC | Facility: CLINIC | Age: 73
End: 2023-05-24
Payer: MEDICARE

## 2023-05-26 ENCOUNTER — CLINICAL SUPPORT (OUTPATIENT)
Dept: REHABILITATION | Facility: HOSPITAL | Age: 73
End: 2023-05-26
Payer: MEDICARE

## 2023-05-26 DIAGNOSIS — G89.4 CHRONIC PAIN SYNDROME: Primary | ICD-10-CM

## 2023-05-26 PROCEDURE — 97811 ACUP 1/> W/O ESTIM EA ADD 15: CPT

## 2023-05-26 PROCEDURE — 97810 ACUP 1/> WO ESTIM 1ST 15 MIN: CPT

## 2023-05-26 NOTE — PROGRESS NOTES
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  Pain level usually goes up higher at night.  Both feet get cold easily.    Medical necessity is demonstrated by the following IMPAIRMENTS:     Medical Necessity: Decreased mobility limits day to day activities, social, and emergent situations and Decreased quality of life              Aggravating Factors:  movement, lying down, standing, prolonged sitting, changing positions, flexion, and extension       Relieving Factors:  Acupuncture    Symptom Description:     Quality:  Tingling and Numb  Severity:  4  Frequency:  continuously    Previous Treatments Tried:  Medication, Therapy , and Chiropractic treatment    Response to Previous Acupuncture Treatment:  Some relief on both feet from last acupuncture that lasted about a couple of days.    Digestion:     Diet: on average, 3 meals per day   Fluids: coffee 2 cups /day, is drinking moderate amounts of fluids, 2 beers per month(s)  Taste/Appetite: Good     Sleep: Poor    Energy Levels:  Low    Objective     Observation: Appears to be well-developed, well-nourished, alert, oriented to person, place and time, walks slowly due to tingling pain in the feet, talks clearly, skin color of both feet appears to normal with no scars.    Tongue:      Body:  normal   Color:  red   Coating:  thick, greasy,, and yellow,    Pulse:  deep       Treatment     Treatment Principles:  To invigorate qi and blood.    Acupuncture points used:    Bilateral points:  ST43, ST44, [place] x 4, LV3, KI3, SP3, SP4, KI2, KI1      Needles In: 24  Needles  Out: 24  [place] W/O STIM placed: 1:08 PM  Needles W/O STIM removed: 1:30 PM    Assessment     After treatment, patient felt better.     Patient prognosis is good.     Patient will continue to benefit from acupuncture treatment to address the deficits listed in the problem list box on initial evaluation, provide patient family education and to maximize pt's level of independence in the home and community environment.     Patient's spiritual, cultural and educational needs considered and pt agreeable to plan of care and goals.     Anticipated barriers to treatment: None.    Plan     Recommend 1 /week for 11 sessions then re-assess.      Education:  Patient is aware of cumulative benefit of acupuncture

## 2023-05-29 ENCOUNTER — CLINICAL SUPPORT (OUTPATIENT)
Dept: REHABILITATION | Facility: HOSPITAL | Age: 73
End: 2023-05-29
Payer: MEDICARE

## 2023-05-29 DIAGNOSIS — G89.4 CHRONIC PAIN SYNDROME: Primary | ICD-10-CM

## 2023-05-29 PROCEDURE — 97810 ACUP 1/> WO ESTIM 1ST 15 MIN: CPT

## 2023-05-29 PROCEDURE — 97811 ACUP 1/> W/O ESTIM EA ADD 15: CPT

## 2023-05-29 NOTE — PROGRESS NOTES
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  Pain level usually goes up higher at night.  Both feet get cold easily.    Medical necessity is demonstrated by the following IMPAIRMENTS:     Medical Necessity: Decreased mobility limits day to day activities, social, and emergent situations and Decreased quality of life              Aggravating Factors:  movement, lying down, standing, prolonged sitting, changing positions, flexion, and extension       Relieving Factors:  Acupuncture    Symptom Description:     Quality:  Tingling and Numb  Severity:  4  Frequency:  continuously    Previous Treatments Tried:  Medication, Therapy , and Chiropractic treatment    Response to Previous Acupuncture Treatment:  Some relief on both feet from last acupuncture that lasted about a couple of days.    Digestion:     Diet: on average, 3 meals per day   Fluids: coffee 2 cups /day, is drinking moderate amounts of fluids, 2 beers per month(s)  Taste/Appetite: Good     Sleep: Poor    Energy Levels:  Low    Objective     Observation: Appears to be well-developed, well-nourished, alert, oriented to person, place and time, walks slowly due to tingling pain in the feet, talks clearly, skin color of both feet appears to normal with no scars.    Tongue:      Body:  normal   Color:  red   Coating:  thick, greasy,, and yellow,    Pulse:  deep       Treatment     Treatment Principles:  To invigorate qi and blood.    Acupuncture points used:    Bilateral points:  UB23, UB25, KI3, KI4, KI5, KI7, Shanon points x 4 on low back  Unilateral points:  DU2,  DU3, DU4, DU5  Needles In: 24  Needles Out: 24  Needles W/O STIM placed: 1:44 PM  Needles W/O STIM removed: 2:05 PM    Assessment     After treatment, patient felt better.     Patient prognosis is good.     Patient will continue to benefit from acupuncture treatment to address the deficits listed in the problem list box on initial evaluation, provide patient family education and to maximize pt's level of independence in the home and community environment.     Patient's spiritual, cultural and educational needs considered and pt agreeable to plan of care and goals.     Anticipated barriers to treatment: None.    Plan     Recommend 1 /week for 10 sessions then re-assess.      Education:  Patient is aware of cumulative benefit of acupuncture

## 2023-05-30 ENCOUNTER — PATIENT MESSAGE (OUTPATIENT)
Dept: PAIN MEDICINE | Facility: CLINIC | Age: 73
End: 2023-05-30
Payer: MEDICARE

## 2023-06-01 ENCOUNTER — OFFICE VISIT (OUTPATIENT)
Dept: PAIN MEDICINE | Facility: CLINIC | Age: 73
End: 2023-06-01
Payer: MEDICARE

## 2023-06-01 VITALS
DIASTOLIC BLOOD PRESSURE: 88 MMHG | BODY MASS INDEX: 41.67 KG/M2 | WEIGHT: 315 LBS | SYSTOLIC BLOOD PRESSURE: 146 MMHG | HEART RATE: 106 BPM

## 2023-06-01 DIAGNOSIS — G60.9 IDIOPATHIC PERIPHERAL NEUROPATHY: Primary | ICD-10-CM

## 2023-06-01 DIAGNOSIS — M79.671 BILATERAL FOOT PAIN: ICD-10-CM

## 2023-06-01 DIAGNOSIS — M79.672 BILATERAL FOOT PAIN: ICD-10-CM

## 2023-06-01 DIAGNOSIS — G89.4 CHRONIC PAIN SYNDROME: ICD-10-CM

## 2023-06-01 PROCEDURE — 1125F PR PAIN SEVERITY QUANTIFIED, PAIN PRESENT: ICD-10-PCS | Mod: CPTII,S$GLB,, | Performed by: STUDENT IN AN ORGANIZED HEALTH CARE EDUCATION/TRAINING PROGRAM

## 2023-06-01 PROCEDURE — 1159F MED LIST DOCD IN RCRD: CPT | Mod: CPTII,S$GLB,, | Performed by: STUDENT IN AN ORGANIZED HEALTH CARE EDUCATION/TRAINING PROGRAM

## 2023-06-01 PROCEDURE — 1160F PR REVIEW ALL MEDS BY PRESCRIBER/CLIN PHARMACIST DOCUMENTED: ICD-10-PCS | Mod: CPTII,S$GLB,, | Performed by: STUDENT IN AN ORGANIZED HEALTH CARE EDUCATION/TRAINING PROGRAM

## 2023-06-01 PROCEDURE — 3077F SYST BP >= 140 MM HG: CPT | Mod: CPTII,S$GLB,, | Performed by: STUDENT IN AN ORGANIZED HEALTH CARE EDUCATION/TRAINING PROGRAM

## 2023-06-01 PROCEDURE — 1157F ADVNC CARE PLAN IN RCRD: CPT | Mod: CPTII,S$GLB,, | Performed by: STUDENT IN AN ORGANIZED HEALTH CARE EDUCATION/TRAINING PROGRAM

## 2023-06-01 PROCEDURE — 99999 PR PBB SHADOW E&M-EST. PATIENT-LVL III: CPT | Mod: PBBFAC,,, | Performed by: STUDENT IN AN ORGANIZED HEALTH CARE EDUCATION/TRAINING PROGRAM

## 2023-06-01 PROCEDURE — 99214 PR OFFICE/OUTPT VISIT, EST, LEVL IV, 30-39 MIN: ICD-10-PCS | Mod: S$GLB,,, | Performed by: STUDENT IN AN ORGANIZED HEALTH CARE EDUCATION/TRAINING PROGRAM

## 2023-06-01 PROCEDURE — 3008F PR BODY MASS INDEX (BMI) DOCUMENTED: ICD-10-PCS | Mod: CPTII,S$GLB,, | Performed by: STUDENT IN AN ORGANIZED HEALTH CARE EDUCATION/TRAINING PROGRAM

## 2023-06-01 PROCEDURE — 1125F AMNT PAIN NOTED PAIN PRSNT: CPT | Mod: CPTII,S$GLB,, | Performed by: STUDENT IN AN ORGANIZED HEALTH CARE EDUCATION/TRAINING PROGRAM

## 2023-06-01 PROCEDURE — 1157F PR ADVANCE CARE PLAN OR EQUIV PRESENT IN MEDICAL RECORD: ICD-10-PCS | Mod: CPTII,S$GLB,, | Performed by: STUDENT IN AN ORGANIZED HEALTH CARE EDUCATION/TRAINING PROGRAM

## 2023-06-01 PROCEDURE — 3079F PR MOST RECENT DIASTOLIC BLOOD PRESSURE 80-89 MM HG: ICD-10-PCS | Mod: CPTII,S$GLB,, | Performed by: STUDENT IN AN ORGANIZED HEALTH CARE EDUCATION/TRAINING PROGRAM

## 2023-06-01 PROCEDURE — 1160F RVW MEDS BY RX/DR IN RCRD: CPT | Mod: CPTII,S$GLB,, | Performed by: STUDENT IN AN ORGANIZED HEALTH CARE EDUCATION/TRAINING PROGRAM

## 2023-06-01 PROCEDURE — 4010F PR ACE/ARB THEARPY RXD/TAKEN: ICD-10-PCS | Mod: CPTII,S$GLB,, | Performed by: STUDENT IN AN ORGANIZED HEALTH CARE EDUCATION/TRAINING PROGRAM

## 2023-06-01 PROCEDURE — 99214 OFFICE O/P EST MOD 30 MIN: CPT | Mod: S$GLB,,, | Performed by: STUDENT IN AN ORGANIZED HEALTH CARE EDUCATION/TRAINING PROGRAM

## 2023-06-01 PROCEDURE — 3079F DIAST BP 80-89 MM HG: CPT | Mod: CPTII,S$GLB,, | Performed by: STUDENT IN AN ORGANIZED HEALTH CARE EDUCATION/TRAINING PROGRAM

## 2023-06-01 PROCEDURE — 3077F PR MOST RECENT SYSTOLIC BLOOD PRESSURE >= 140 MM HG: ICD-10-PCS | Mod: CPTII,S$GLB,, | Performed by: STUDENT IN AN ORGANIZED HEALTH CARE EDUCATION/TRAINING PROGRAM

## 2023-06-01 PROCEDURE — 4010F ACE/ARB THERAPY RXD/TAKEN: CPT | Mod: CPTII,S$GLB,, | Performed by: STUDENT IN AN ORGANIZED HEALTH CARE EDUCATION/TRAINING PROGRAM

## 2023-06-01 PROCEDURE — 3008F BODY MASS INDEX DOCD: CPT | Mod: CPTII,S$GLB,, | Performed by: STUDENT IN AN ORGANIZED HEALTH CARE EDUCATION/TRAINING PROGRAM

## 2023-06-01 PROCEDURE — 1159F PR MEDICATION LIST DOCUMENTED IN MEDICAL RECORD: ICD-10-PCS | Mod: CPTII,S$GLB,, | Performed by: STUDENT IN AN ORGANIZED HEALTH CARE EDUCATION/TRAINING PROGRAM

## 2023-06-01 PROCEDURE — 99999 PR PBB SHADOW E&M-EST. PATIENT-LVL III: ICD-10-PCS | Mod: PBBFAC,,, | Performed by: STUDENT IN AN ORGANIZED HEALTH CARE EDUCATION/TRAINING PROGRAM

## 2023-06-01 RX ORDER — PREGABALIN 50 MG/1
50 CAPSULE ORAL 2 TIMES DAILY
Qty: 60 CAPSULE | Refills: 2 | Status: SHIPPED | OUTPATIENT
Start: 2023-06-01 | End: 2023-11-29

## 2023-06-01 NOTE — PROGRESS NOTES
[facility]    Referred by: No ref. provider found   Reason for referral: Idiopathic peripheral neuropathy     CC:   Chief Complaint   Patient presents with    Left Foot - Follow-up Clinical Reassessment    Right Foot - Follow-up Clinical Reassessment     Subjective:   Zeenat Mancia is a 73 y.o. male who has a past medical history of A-fib, Acute blood loss anemia, Arthritis, Atrial flutter, Colon polyp, Diverticulosis, Dyslipidemia, Dyspnea on exertion, GERD (gastroesophageal reflux disease), Hepatic steatosis, Hyperlipidemia, Hypertension, Irregular heart beat, Lower GI bleed, Multiple gastric ulcers, Obesity, Sleep apnea, and Unspecified disorder of kidney and ureter. He takes Eliquis for Afib. He complains of pain as described below.    Interval History 6/1/23:  Zeenat Mancia presents with continued bilateral feet pain. He feels the medications (gabapentin and cymbalta) make him groggy in the mornings, so he decided to stop taking the medications. He states he has been doing acupuncture, and has done 7-8 visits, however he has not felt it was particularly beneficial; he notes some improvement, but it is not long lasting.. He has also seen a podiatrist and neurology and was told that his pain is likely related to peripheral neuropathy.  He feels his toes are burning at night and finds difficulty getting to sleep.  He felt the lidocaine ointment helped a little, but not a great deal.     Initial HPI 2/13/23  Location: both feet   Onset: 2 years ago  Radiation: N/A  Timing: persistent  Current Pain Score: 6/10  Weekly Pain Range: 6-7/10  Quality: Burning  Worsened by: standing and walking for more than several minutes  Improved by: medications, rest, and sitting    Pt reports 2 years of burning, stinging, tingling foot pain that is worse at night and with walking and standing. He reports previous history of low back pain, however this resolved with PT.  He denies back pain today.  His  foot pain is bilateral and covers the entirety of the foot including both dorsal and plantar surfaces, with symptoms most pronounced over the toes.  He has been seen by Podiatry who performed diagnostic tarsal tunnel injections with some temporary relief.  He was offered tarsal tunnel decompression surgery, but declined. EMG performed 1/2022 no definitive evidence of tarsal tunnel syndrome and possible evidence of peripheral polyneuropathy. He previously followed with Dr. Poon who started the patient on gabapentin; he is prescribed 600 mg TID, however only takes 600 mg BID as he often forgets his 3rd dose. He is prescribed Cymbalta by his PCP for anxiety. He reports he has recently gained weight due to decreased activity. He denies hx of diabetes or  chemotherapy. He denies symptoms in the hands.       Previous Therapies:  PT/OT: Previously completed PT for low back pain  HEP: No  TENS: No  Interventions: Diagnostic Tarsal Tunnel Injections with Podiatry   Surgery: Right Carpal Tunnel Release  Opioids: None  Adjuvants:     Current Pain Medications:  Gabapentin 600 mg BID  Cymbalta 20 mg QD     Assessment & Plan:  Problem List Items Addressed This Visit    None  Visit Diagnoses       Idiopathic peripheral neuropathy    -  Primary    Chronic pain syndrome        Bilateral foot pain                Treatment Plan:   Procedures: We will schedule for Qtenza patch.  Alternatively, we can start him on the process towards spinal cord stimulator for neuropathy.  PT/OT/HEP: I encouraged the patient to start a home exercise regimen that includes daily cardiovascular exercise lasting at least 30 minutes.    Medications:    - continue topical Lidocaine 5 % ointment - apply as needed to the feet   - stop gabapentin as it is too sedating, we will switch to pregabalin   -  Trial of Pregabalin 50 mg BID; will plan to titrate up at tolerated.   Reviewed and consistent with medication use as prescribed.  Imaging: Reviewed.    Follow Up: RTC 4 weeks or send message on [employer].     Santos Garza MD  Pain Fellow    I personally evaluated and examined the patient.  I reviewed the trainee's HPI, physical examination, assessment, and plan. I agree with the findings and treatment plan.    Alethea Childers DO   Inter[facility] Pain Management          Disclaimer: This note was partly generated using dictation software which may occasionally result in transcription errors.    Imaging:  Reviewed.     EXAMINATION:  MRI LUMBAR SPINE WITHOUT CONTRAST     CLINICAL HISTORY:  Radiculopathy, lumbar regionLumbar radiculopathy, symptoms persist with conservative treatment;     TECHNIQUE:  Sagittal T1, sagittal T2, sagittal STIR, axial T1 and axial T2 weighted images of the lumbar spine obtained without contrast.     COMPARISON:  06/30/2022 x-ray     FINDINGS:  Lumbar spine alignment is within normal limits. The vertebral body heights are well maintained, with no fracture.  No marrow signal abnormality suspicious for an infiltrative process.     The conus is normal in appearance.  The adjacent soft tissue structures show no significant abnormalities.     L1-L2: Circumferential disc bulge and spondylitic spurring.No significant central canal or neural foraminal narrowing.     L2-L3: Circumferential disc bulge and spondylitic spurring.  No significant central canal or neural foraminal narrowing.     L3-L4: Circumferential disc bulge and minimal facet arthritis.  Asymmetric left-sided spondylitic spurring.  No significant central canal or neural foraminal narrowing.     L4-L5: Circumferential disc bulge and asymmetric spondylitic spurring.  Moderate facet arthritis.  No significant central canal or neural foraminal narrowing.     L5-S1: Circumferential disc bulge and small central disc protrusion.  Mild facet arthritis.  Mild left-sided neural foraminal narrowing.     Impression:     Multilevel degenerative changes resulting in mild left-sided L5 S1 foraminal  encroachment.  Small superimposed central disc protrusion.        Electronically signed by: Marlo Camargo Jr    EMG:  Test Date:  2022     Patient: Zeenat Mancia : 1950 Physician: Jase Vernon D.O.   ID#:   SEX: Male Ref. Phys: Leonel Perez, DPJENNIFER      HPI: Zeenat Mancia is a 71 y.o.male who presents for NCS/EMG to evaluate tarsal tunnel bilaterally.  Pt with tingling and burning pain in the bilateral feet- tips of toes and soles of feet.       NCV & EMG Findings:  Evaluation of the right median sensory nerve showed prolonged distal peak latency and decreased conduction velocity.  The left Superficial Fibular sensory, the right Superficial Fibular sensory, the left sural sensory, and the right sural sensory nerves showed no response.  All remaining nerves (as indicated in the following tables) were within normal limits.  Needle evaluation of the left Abductor Hallucis muscle showed increased insertional activity, moderately increased spontaneous activity, and slightly increased polyphasic potentials.  The right Abductor Hallucis muscle showed increased insertional activity, moderately increased spontaneous activity, and diminished recruitment.  All remaining muscles (as indicated in the following table) showed no evidence of electrical instability.     Impression:  1. Technically, somewhat limited study due to peripheral edema of legs  2. The sensory responses were absent in the bilateral lower extremities.  This can either be due to the technical issues above, or could be a sensory peripheral polyneuropathy.  I favor the latter as his symptoms correlate with this.   3. Incidentally, there is evidence of a median mononeuropathy across the right wrist (I.e. carpal tunnel syndrome).  Upper extremity sensory studies performed due to lack of responses in the legs.  If a more detailed study is desired for the carpal tunnel syndrome, a separate upper extremity study can be ordered.  Note:  He has  no significant hand symptoms.     4. No definitive evidence of tarsal tunnel syndrome on either side.  The bilateral abductor hallicus muscles showed active denervation, but this could be due to the peripheral polyneuropathy described above, and can also be seen in normal feet, especially with advancing age.         Review of Systems:  Review of Systems   Constitutional:  Negative for chills and fever.        +weight gain   HENT:  Negative for congestion and ear pain.    Eyes:  Negative for discharge and redness.   Respiratory:  Negative for cough and wheezing.    Cardiovascular:  Negative for chest pain.   Gastrointestinal:  Negative for abdominal pain and vomiting.   Musculoskeletal:  Negative for back pain and neck pain.        +Bilateral foot pain   Skin:  Negative for itching and rash.   Neurological:  Positive for tingling (b/l feet) and sensory change (Burning, tingling over b/l feet). Negative for weakness.     Physical Exam:  Vitals:    06/01/23 1424   BP: (!) 146/88   Pulse: 106   Weight: (!) 147.2 kg (324 lb 8.3 oz)   PainSc:   7       General    Vitals reviewed.  Constitutional: He is oriented to person, place, and time. He appears well-developed and well-nourished. No distress.   HENT:   Head: Normocephalic and atraumatic.   Nose: Nose normal.   Eyes: Conjunctivae and EOM are normal. Pupils are equal, round, and reactive to light.   Neck: Neck supple.   Cardiovascular:  Normal rate and regular rhythm.            Pulmonary/Chest: Effort normal and breath sounds normal.   Abdominal: Soft. He exhibits no distension.   Neurological: He is alert and oriented to person, place, and time.   Psychiatric: He has a normal mood and affect. His behavior is normal. Thought content normal.     General Musculoskeletal Exam   Gait: normal     Right Ankle/Foot Exam     Inspection   Deformity: absent  Erythema: absent  Bruising:  Foot - absent  Effusion:  Foot - absent    Muscle Strength   The patient has normal right  ankle strength.    Other   Sensation: decreased (over plantar and dorsal aspect)    Comments:  No clubbing of the nails. Normal capillary refill.     Left Ankle/Foot Exam     Inspection  Deformity: absent  Erythema: absent  Bruising:  Foot - absent  Effusion:  Foot - absent    Muscle Strength   The patient has normal left ankle strength.    Other   Sensation: decreased (over plantar and dorsal foot)    Comments:  No clubbing of the nails. Normal capillary refill.       Vascular Exam     Right Pulses  Dorsalis Pedis:      2+          Left Pulses  Dorsalis Pedis:      2+          Edema  Right Lower Leg: absent

## 2023-06-06 ENCOUNTER — PATIENT MESSAGE (OUTPATIENT)
Dept: PAIN MEDICINE | Facility: CLINIC | Age: 73
End: 2023-06-06
Payer: MEDICARE

## 2023-06-22 ENCOUNTER — LAB VISIT (OUTPATIENT)
Dept: LAB | Facility: HOSPITAL | Age: 73
End: 2023-06-22
Attending: INTERNAL MEDICINE
Payer: MEDICARE

## 2023-06-22 ENCOUNTER — OFFICE VISIT (OUTPATIENT)
Dept: CARDIOLOGY | Facility: CLINIC | Age: 73
End: 2023-06-22
Payer: MEDICARE

## 2023-06-22 VITALS
SYSTOLIC BLOOD PRESSURE: 123 MMHG | OXYGEN SATURATION: 97 % | DIASTOLIC BLOOD PRESSURE: 85 MMHG | HEART RATE: 77 BPM | WEIGHT: 315 LBS | HEIGHT: 74 IN | BODY MASS INDEX: 40.43 KG/M2

## 2023-06-22 DIAGNOSIS — I70.0 AORTIC ATHEROSCLEROSIS: ICD-10-CM

## 2023-06-22 DIAGNOSIS — E78.5 HYPERLIPIDEMIA, UNSPECIFIED HYPERLIPIDEMIA TYPE: ICD-10-CM

## 2023-06-22 DIAGNOSIS — I10 ESSENTIAL HYPERTENSION: ICD-10-CM

## 2023-06-22 DIAGNOSIS — R60.9 SWELLING: ICD-10-CM

## 2023-06-22 DIAGNOSIS — I48.91 ATRIAL FIBRILLATION, UNSPECIFIED TYPE: Primary | ICD-10-CM

## 2023-06-22 DIAGNOSIS — E66.01 MORBID OBESITY: ICD-10-CM

## 2023-06-22 LAB
CREAT SERPL-MCNC: 1.2 MG/DL (ref 0.5–1.4)
EST. GFR  (NO RACE VARIABLE): >60 ML/MIN/1.73 M^2
POTASSIUM SERPL-SCNC: 4 MMOL/L (ref 3.5–5.1)

## 2023-06-22 PROCEDURE — 99214 PR OFFICE/OUTPT VISIT, EST, LEVL IV, 30-39 MIN: ICD-10-PCS | Mod: S$GLB,,, | Performed by: INTERNAL MEDICINE

## 2023-06-22 PROCEDURE — 1101F PT FALLS ASSESS-DOCD LE1/YR: CPT | Mod: CPTII,S$GLB,, | Performed by: INTERNAL MEDICINE

## 2023-06-22 PROCEDURE — 1159F PR MEDICATION LIST DOCUMENTED IN MEDICAL RECORD: ICD-10-PCS | Mod: CPTII,S$GLB,, | Performed by: INTERNAL MEDICINE

## 2023-06-22 PROCEDURE — 1160F RVW MEDS BY RX/DR IN RCRD: CPT | Mod: CPTII,S$GLB,, | Performed by: INTERNAL MEDICINE

## 2023-06-22 PROCEDURE — 36415 COLL VENOUS BLD VENIPUNCTURE: CPT | Performed by: INTERNAL MEDICINE

## 2023-06-22 PROCEDURE — 3079F PR MOST RECENT DIASTOLIC BLOOD PRESSURE 80-89 MM HG: ICD-10-PCS | Mod: CPTII,S$GLB,, | Performed by: INTERNAL MEDICINE

## 2023-06-22 PROCEDURE — 99214 OFFICE O/P EST MOD 30 MIN: CPT | Mod: S$GLB,,, | Performed by: INTERNAL MEDICINE

## 2023-06-22 PROCEDURE — 3008F PR BODY MASS INDEX (BMI) DOCUMENTED: ICD-10-PCS | Mod: CPTII,S$GLB,, | Performed by: INTERNAL MEDICINE

## 2023-06-22 PROCEDURE — 3288F PR FALLS RISK ASSESSMENT DOCUMENTED: ICD-10-PCS | Mod: CPTII,S$GLB,, | Performed by: INTERNAL MEDICINE

## 2023-06-22 PROCEDURE — 1160F PR REVIEW ALL MEDS BY PRESCRIBER/CLIN PHARMACIST DOCUMENTED: ICD-10-PCS | Mod: CPTII,S$GLB,, | Performed by: INTERNAL MEDICINE

## 2023-06-22 PROCEDURE — 4010F ACE/ARB THERAPY RXD/TAKEN: CPT | Mod: CPTII,S$GLB,, | Performed by: INTERNAL MEDICINE

## 2023-06-22 PROCEDURE — 3008F BODY MASS INDEX DOCD: CPT | Mod: CPTII,S$GLB,, | Performed by: INTERNAL MEDICINE

## 2023-06-22 PROCEDURE — 1101F PR PT FALLS ASSESS DOC 0-1 FALLS W/OUT INJ PAST YR: ICD-10-PCS | Mod: CPTII,S$GLB,, | Performed by: INTERNAL MEDICINE

## 2023-06-22 PROCEDURE — 3074F PR MOST RECENT SYSTOLIC BLOOD PRESSURE < 130 MM HG: ICD-10-PCS | Mod: CPTII,S$GLB,, | Performed by: INTERNAL MEDICINE

## 2023-06-22 PROCEDURE — 1159F MED LIST DOCD IN RCRD: CPT | Mod: CPTII,S$GLB,, | Performed by: INTERNAL MEDICINE

## 2023-06-22 PROCEDURE — 84132 ASSAY OF SERUM POTASSIUM: CPT | Performed by: INTERNAL MEDICINE

## 2023-06-22 PROCEDURE — 1157F PR ADVANCE CARE PLAN OR EQUIV PRESENT IN MEDICAL RECORD: ICD-10-PCS | Mod: CPTII,S$GLB,, | Performed by: INTERNAL MEDICINE

## 2023-06-22 PROCEDURE — 3074F SYST BP LT 130 MM HG: CPT | Mod: CPTII,S$GLB,, | Performed by: INTERNAL MEDICINE

## 2023-06-22 PROCEDURE — 3079F DIAST BP 80-89 MM HG: CPT | Mod: CPTII,S$GLB,, | Performed by: INTERNAL MEDICINE

## 2023-06-22 PROCEDURE — 99999 PR PBB SHADOW E&M-EST. PATIENT-LVL III: ICD-10-PCS | Mod: PBBFAC,,, | Performed by: INTERNAL MEDICINE

## 2023-06-22 PROCEDURE — 99999 PR PBB SHADOW E&M-EST. PATIENT-LVL III: CPT | Mod: PBBFAC,,, | Performed by: INTERNAL MEDICINE

## 2023-06-22 PROCEDURE — 1157F ADVNC CARE PLAN IN RCRD: CPT | Mod: CPTII,S$GLB,, | Performed by: INTERNAL MEDICINE

## 2023-06-22 PROCEDURE — 1126F AMNT PAIN NOTED NONE PRSNT: CPT | Mod: CPTII,S$GLB,, | Performed by: INTERNAL MEDICINE

## 2023-06-22 PROCEDURE — 3288F FALL RISK ASSESSMENT DOCD: CPT | Mod: CPTII,S$GLB,, | Performed by: INTERNAL MEDICINE

## 2023-06-22 PROCEDURE — 82565 ASSAY OF CREATININE: CPT | Performed by: INTERNAL MEDICINE

## 2023-06-22 PROCEDURE — 4010F PR ACE/ARB THEARPY RXD/TAKEN: ICD-10-PCS | Mod: CPTII,S$GLB,, | Performed by: INTERNAL MEDICINE

## 2023-06-22 PROCEDURE — 1126F PR PAIN SEVERITY QUANTIFIED, NO PAIN PRESENT: ICD-10-PCS | Mod: CPTII,S$GLB,, | Performed by: INTERNAL MEDICINE

## 2023-06-22 RX ORDER — FUROSEMIDE 20 MG/1
20 TABLET ORAL
Qty: 6 TABLET | Refills: 0 | Status: SHIPPED | OUTPATIENT
Start: 2023-06-22 | End: 2023-06-26

## 2023-06-22 NOTE — PROGRESS NOTES
Cardiology Clinic note    Subjective:   Patient ID:  Zeenat Mancia is a 73 y.o. male who presents for Afib, HTN FUP    HPI:   Afib: No palpitations, irregular heart beats, syncope or near syncope    HTN: On medications    HLD: Not on statin    SH Tobacco Quit 1990  FH Father had CABG early 60s    Patient Active Problem List    Diagnosis Date Noted    Lumbar radiculitis 02/13/2023    Neuralgia 10/21/2022     Cont gabapentin       Calculus of gallbladder without cholecystitis without obstruction 02/04/2021    Other insomnia 01/21/2021     Has been switched to trazadone and it seems to be working well . Will continue      Nonobstructive atherosclerosis of coronary artery 01/04/2021     Cont b blocker , eliqus , and is not on statin due to intolerance      Iron deficiency anemia due to chronic blood loss 06/08/2018    History of rectal bleeding     Chronic atrial fibrillation 10/11/2017     Dr eve gregorio managing . Cont current       Colon cancer screening 10/10/2017    Tortuous aorta 07/26/2017     CXR 5/18/17      Epiretinal membrane, both eyes 03/06/2017    Posterior vitreous detachment, both eyes 03/06/2017    Hypertensive heart disease without heart failure 02/16/2016     bp well controlled on current regimen       Morbid obesity due to excess calories 02/16/2016     Cont to encourage diet and weight loss       Benign non-nodular prostatic hyperplasia with lower urinary tract symptoms 02/16/2016     Dr calhoun managing . Has had urolift . Has tried off flomax and has noted some increase in nocturia       Pseudophakia of both eyes 02/16/2016    Refractive error 02/16/2016    JACKELYN (obstructive sleep apnea) 01/17/2013     Utilizing  cpap with good benefit       Erectile dysfunction 09/12/2012       Patient's Medications   New Prescriptions    FUROSEMIDE (LASIX) 20 MG TABLET    Take 1 tablet (20 mg total) by mouth as needed (swelling).   Previous Medications    AMLODIPINE (NORVASC) 5 MG TABLET    Take 1 tablet (5 mg  total) by mouth once daily.    ATENOLOL (TENORMIN) 50 MG TABLET    TAKE 1 TABLET(50 MG) BY MOUTH EVERY DAY    DULOXETINE (CYMBALTA) 30 MG CAPSULE    TAKE 1 CAPSULE(30 MG) BY MOUTH EVERY DAY    ELIQUIS 5 MG TAB    TAKE 1 TABLET(5 MG) BY MOUTH TWICE DAILY    HYDROCHLOROTHIAZIDE (HYDRODIURIL) 25 MG TABLET    Take 1 tablet (25 mg total) by mouth once daily.    MELATONIN 10 MG TAB    Take 10 mg by mouth every evening.    MULTIVIT-MIN/FA/LYCOPEN/LUTEIN (CENTRUM SILVER MEN ORAL)    Take by mouth once daily.    PANTOPRAZOLE (PROTONIX) 40 MG TABLET    Take 1 tablet (40 mg total) by mouth once daily.    PREGABALIN (LYRICA) 50 MG CAPSULE    Take 1 capsule (50 mg total) by mouth 2 (two) times daily.    SILDENAFIL (VIAGRA) 100 MG TABLET    Take 1 tablet (100 mg total) by mouth daily as needed for Erectile Dysfunction.    TADALAFIL (CIALIS) 20 MG TAB    Take 1 tablet (20 mg total) by mouth every 72 hours as needed (ED).    TAMSULOSIN (FLOMAX) 0.4 MG CAP    TAKE 1 CAPSULE(0.4 MG) BY MOUTH EVERY DAY    TRAZODONE (DESYREL) 100 MG TABLET    TAKE 1 TABLET(100 MG) BY MOUTH EVERY EVENING    VALSARTAN (DIOVAN) 320 MG TABLET    TAKE 1 TABLET(320 MG) BY MOUTH EVERY DAY   Modified Medications    No medications on file   Discontinued Medications    No medications on file        Review of Systems   Constitutional: Negative for fever.   HENT:  Negative for nosebleeds.    Cardiovascular:  Negative for chest pain, dyspnea on exertion, irregular heartbeat, leg swelling, near-syncope, orthopnea, palpitations and syncope. PND: Wears CPAP.       As above   Respiratory:  Negative for hemoptysis.    Hematologic/Lymphatic: Negative for bleeding problem.   Skin:  Negative for poor wound healing.   Gastrointestinal:  Hematochezia: Hemorrhoids - following GI.   Genitourinary:  Negative for hematuria.   Neurological:  Negative for light-headedness.   Allergic/Immunologic: Negative for persistent infections.       Objective:   Vitals  Vitals:    06/22/23  "0914   BP: 123/85   Pulse: 77   SpO2: 97%   Weight: (!) 148.8 kg (328 lb)   Height: 6' 2" (1.88 m)          Physical Exam  Constitutional:       General: He is not in acute distress.     Appearance: He is well-developed. He is not diaphoretic.   HENT:      Head: Normocephalic.   Neck:      Vascular: JVD: Unable to appreciate d/t habitus.   Cardiovascular:      Rate and Rhythm: Normal rate. Rhythm irregularly irregular.      Heart sounds: No murmur heard.    No friction rub. No gallop.   Pulmonary:      Effort: Pulmonary effort is normal. No respiratory distress.      Breath sounds: Normal breath sounds.   Abdominal:      Palpations: Abdomen is soft.      Tenderness: There is no abdominal tenderness.   Musculoskeletal:         General: Swelling: Mild LE swelling.      Cervical back: Normal range of motion.   Skin:     General: Skin is warm.   Neurological:      Mental Status: He is alert.   Psychiatric:         Mood and Affect: Mood normal.           Assessment:     1. Atrial fibrillation, unspecified type    2. Aortic atherosclerosis    3. Essential hypertension    4. Hyperlipidemia, unspecified hyperlipidemia type    5. Morbid obesity    6. Swelling        Plan:   Zeenat Mancia is a 73 y.o. male h/o Afib, HTN, obesity, JACKELYN    - EKG personally reviewed. My interpretation:  4/19/22: Afib HR 50s, LAD. Possible inferior infarct. PRWP. RBBB. Non-sp ST-T abn. QTc 414  - Echo 2019: LVEF 65%, normal diastology. Normal RVSF. Mild MR, TR. PASP 32, CVP 3. Mild LAE  - Reg Nuc Stress 2017: Negative for ischemia (EKG, Nuc)  - Echo June 2022  Normal systolic function.  The estimated ejection fraction is 55%.  Normal right ventricular size with normal right ventricular systolic function.  Atrial fibrillation observed.  Moderate left atrial enlargement.  Normal central venous pressure (3 mmHg).  Technically difficult study    - PET stress Dec 2020:  - Small (<10%) amount of mild resting heterogeneity that doesn't change with " "stress.  - Whole heart absolute myocardial perfusion (cc/min/g) averaged 0.48 cc/min/g at rest, which is reduced, 0.57 cc/min/g at stress, which is severely reduced, and 1.21  CFR, which is severely reduced.  - Absolute myocardial stress flows were severely reduced in all myocardial quadrants which can be seen with multivessel CAD as well as caffeine consumption. Clinical correlation recommended.  - Coronary angiogram Jan 2021: Non-obs CAD. LVEF 60%. Right radial access    Atrial Fibrillation  - sp DCCV with EP May 2021; recurrent Afib; Aflutter noted on EKG end of the month May 2021. Followed by Dr Barrientos. Given no symptoms, adopted a rate control strategy.  - CHADS2-VASc 2 (HTN, Age x1)  - Apixaban 5 mg bid (Age < 80 y, Wt > 60 kg, Cr <1.5)  Estimated body mass index is 42.11 kg/m² as calculated from the following:    Height as of this encounter: 6' 2" (1.88 m).    Weight as of this encounter: 148.8 kg (328 lb).  73 y.o.  Lab Results   Component Value Date    CREATININE 1.3 10/20/2022    AST 36 10/20/2022    BILITOT 1.8 (H) 10/20/2022   - BB    Swelling  Echo  Recent weight gain  Discussed salt avoidance  Furosemide PRN for now    HTN  BP well controlled  Amlodipine, atenolol, HCTZ, valsartan    Aortic atherosclerosis  Noted on CT Abd 2018  Statin discussed previously and deferred    HLD  Lab Results   Component Value Date    LDLCALC 90.0 10/20/2022   Statin as above  Allergy to atorvastatin    Obesity  Estimated body mass index is 42.11 kg/m² as calculated from the following:    Height as of this encounter: 6' 2" (1.88 m).    Weight as of this encounter: 148.8 kg (328 lb).  Discussed diet and exercise      Continue with current medical plan and lifestyle changes.    Orders Placed This Encounter   Procedures    Creatinine, Serum     Standing Status:   Future     Standing Expiration Date:   8/20/2024    POTASSIUM     Standing Status:   Future     Standing Expiration Date:   8/20/2024    Echo     Standing Status:   " Future     Standing Expiration Date:   6/22/2024     Order Specific Question:   Release to patient     Answer:   Immediate       Follow up as scheduled  Return sooner for concerns or questions. If symptoms persist go to the ED    He expressed verbal understanding and agreed with the plan      Judy Jerome MD  Interventional Cardiology  [facility]  Phone: [phone]

## 2023-06-26 RX ORDER — FUROSEMIDE 20 MG/1
TABLET ORAL
Qty: 30 TABLET | Refills: 0 | Status: SHIPPED | OUTPATIENT
Start: 2023-06-26

## 2023-06-27 ENCOUNTER — PATIENT MESSAGE (OUTPATIENT)
Dept: CARDIOLOGY | Facility: CLINIC | Age: 73
End: 2023-06-27
Payer: MEDICARE

## 2023-07-10 ENCOUNTER — TELEPHONE (OUTPATIENT)
Dept: SURGERY | Facility: CLINIC | Age: 73
End: 2023-07-10
Payer: MEDICARE

## 2023-07-13 ENCOUNTER — TELEPHONE (OUTPATIENT)
Dept: BARIATRICS | Facility: CLINIC | Age: 73
End: 2023-07-13
Payer: MEDICARE

## 2023-07-13 ENCOUNTER — HOSPITAL ENCOUNTER (OUTPATIENT)
Dept: CARDIOLOGY | Facility: HOSPITAL | Age: 73
Discharge: HOME | End: 2023-07-13
Attending: INTERNAL MEDICINE
Payer: MEDICARE

## 2023-07-13 VITALS — WEIGHT: 315 LBS | BODY MASS INDEX: 40.43 KG/M2 | HEIGHT: 74 IN

## 2023-07-13 DIAGNOSIS — I48.91 ATRIAL FIBRILLATION, UNSPECIFIED TYPE: ICD-10-CM

## 2023-07-13 PROCEDURE — C8929 TTE W OR WO FOL WCON,DOPPLER: HCPCS

## 2023-07-13 PROCEDURE — 93306 ECHO (CUPID ONLY): ICD-10-PCS | Mod: 26,,, | Performed by: INTERNAL MEDICINE

## 2023-07-13 PROCEDURE — 93306 TTE W/DOPPLER COMPLETE: CPT | Mod: 26,,, | Performed by: INTERNAL MEDICINE

## 2023-07-14 LAB
ASCENDING AORTA: 3.58 CM
AV INDEX (PROSTH): 0.73
AV MEAN GRADIENT: 5 MMHG
AV PEAK GRADIENT: 9 MMHG
AV VALVE AREA: 2.84 CM2
AV VELOCITY RATIO: 0.71
BSA FOR ECHO PROCEDURE: 2.79 M2
CV ECHO LV RWT: 0.54 CM
DOP CALC AO PEAK VEL: 1.5 M/S
DOP CALC AO VTI: 26.7 CM
DOP CALC LVOT AREA: 3.9 CM2
DOP CALC LVOT DIAMETER: 2.22 CM
DOP CALC LVOT PEAK VEL: 1.06 M/S
DOP CALC LVOT STROKE VOLUME: 75.83 CM3
DOP CALC MV VTI: 22 CM
DOP CALCLVOT PEAK VEL VTI: 19.6 CM
E WAVE DECELERATION TIME: 268.38 MSEC
ECHO LV POSTERIOR WALL: 1.33 CM (ref 0.6–1.1)
EJECTION FRACTION: 60 %
FRACTIONAL SHORTENING: 26 % (ref 28–44)
INTERVENTRICULAR SEPTUM: 1.01 CM (ref 0.6–1.1)
IVC DIAMETER: 2.1 CM
LA MAJOR: 6.72 CM
LA MINOR: 6.92 CM
LA WIDTH: 3.9 CM
LEFT ATRIUM SIZE: 4.43 CM
LEFT ATRIUM VOLUME INDEX MOD: 31.9 ML/M2
LEFT ATRIUM VOLUME INDEX: 37.4 ML/M2
LEFT ATRIUM VOLUME MOD: 85.38 CM3
LEFT ATRIUM VOLUME: 100.13 CM3
LEFT INTERNAL DIMENSION IN SYSTOLE: 3.69 CM (ref 2.1–4)
LEFT VENTRICLE DIASTOLIC VOLUME INDEX: 43.37 ML/M2
LEFT VENTRICLE DIASTOLIC VOLUME: 116.22 ML
LEFT VENTRICLE MASS INDEX: 83 G/M2
LEFT VENTRICLE SYSTOLIC VOLUME INDEX: 21.5 ML/M2
LEFT VENTRICLE SYSTOLIC VOLUME: 57.65 ML
LEFT VENTRICULAR INTERNAL DIMENSION IN DIASTOLE: 4.96 CM (ref 3.5–6)
LEFT VENTRICULAR MASS: 222.74 G
LV SEPTAL E/E' RATIO: 16.33 M/S
LVOT MG: 2.94 MMHG
LVOT MV: 0.83 CM/S
MV MEAN GRADIENT: 2 MMHG
MV PEAK E VEL: 0.98 M/S
MV PEAK GRADIENT: 5 MMHG
MV VALVE AREA BY CONTINUITY EQUATION: 3.45 CM2
PISA MRMAX VEL: 2.49 M/S
PISA TR MAX VEL: 2.73 M/S
RA MAJOR: 6.53 CM
RA PRESSURE: 8 MMHG
RA WIDTH: 3.6 CM
RIGHT VENTRICULAR END-DIASTOLIC DIMENSION: 4.1 CM
SINUS: 3.8 CM
STJ: 3.2 CM
TDI SEPTAL: 0.06 M/S
TR MAX PG: 30 MMHG
TV REST PULMONARY ARTERY PRESSURE: 38 MMHG

## 2023-07-19 ENCOUNTER — PATIENT MESSAGE (OUTPATIENT)
Dept: PRIMARY CARE CLINIC | Facility: CLINIC | Age: 73
End: 2023-07-19
Payer: MEDICARE

## 2023-07-19 ENCOUNTER — TELEPHONE (OUTPATIENT)
Dept: CARDIOLOGY | Facility: CLINIC | Age: 73
End: 2023-07-19
Payer: MEDICARE

## 2023-07-19 DIAGNOSIS — R07.9 ACUTE CHEST PAIN: Primary | ICD-10-CM

## 2023-07-19 DIAGNOSIS — R60.9 SWELLING: Primary | ICD-10-CM

## 2023-07-19 RX ORDER — TIZANIDINE 4 MG/1
TABLET ORAL
COMMUNITY
Start: 2023-04-21 | End: 2023-07-26 | Stop reason: ALTCHOICE

## 2023-07-19 NOTE — TELEPHONE ENCOUNTER
Spoke with patient about Echo result.   Patient already scheduled for U/S and Ct chest.  Patient verbalized understanding on instruction given.

## 2023-07-19 NOTE — PROGRESS NOTES
Please call patient re normal pumping function of heart on echocardiogram. Right heart function is moderately reduced. I would like to get a CT scan of the chest and US of the legs to rule out any blood clots.    Orders placed - if you can help schedule thanks

## 2023-07-19 NOTE — TELEPHONE ENCOUNTER
----- Message from Judy Jerome MD sent at 7/19/2023  9:19 AM CDT -----  Please call patient re normal pumping function of heart on echocardiogram. Right heart function is moderately reduced. I would like to get a CT scan of the chest and US of the legs to rule out any blood clots.    Orders placed - if you can help schedule thanks

## 2023-07-21 ENCOUNTER — HOSPITAL ENCOUNTER (OUTPATIENT)
Dept: RADIOLOGY | Facility: HOSPITAL | Age: 73
Discharge: HOME | End: 2023-07-21
Attending: INTERNAL MEDICINE
Payer: MEDICARE

## 2023-07-21 DIAGNOSIS — R60.9 SWELLING: ICD-10-CM

## 2023-07-21 DIAGNOSIS — R07.9 ACUTE CHEST PAIN: ICD-10-CM

## 2023-07-21 PROCEDURE — 93970 US LOWER EXTREMITY VEINS BILATERAL INSUFFICIENCY: ICD-10-PCS | Mod: 26,,, | Performed by: RADIOLOGY

## 2023-07-21 PROCEDURE — 71275 CT ANGIOGRAPHY CHEST: CPT | Mod: 26,,, | Performed by: RADIOLOGY

## 2023-07-21 PROCEDURE — 71275 CT ANGIOGRAPHY CHEST: CPT | Mod: TC

## 2023-07-21 PROCEDURE — 93970 EXTREMITY STUDY: CPT | Mod: 26,,, | Performed by: RADIOLOGY

## 2023-07-21 PROCEDURE — 71275 CTA CHEST NON CORONARY (PE STUDIES): ICD-10-PCS | Mod: 26,,, | Performed by: RADIOLOGY

## 2023-07-21 PROCEDURE — 93970 EXTREMITY STUDY: CPT | Mod: TC

## 2023-07-21 PROCEDURE — 25500020 PHARM REV CODE 255: Performed by: INTERNAL MEDICINE

## 2023-07-21 RX ADMIN — IOHEXOL 100 ML: 350 INJECTION, SOLUTION INTRAVENOUS at 02:07

## 2023-07-21 NOTE — PROGRESS NOTES
Please call patient re CT scan with no blood clots. Lung nodule noted for which I recommend FUP with your PCP. PCP Dr Thomas cc'd here to keep updated.

## 2023-07-21 NOTE — PROGRESS NOTES
Please call patient re leg venous US with no clots. Reflux noted in the right leg. Recommend continue furosemide PRN, leg elevation. FUP 3 months

## 2023-07-25 DIAGNOSIS — K21.9 GASTROESOPHAGEAL REFLUX DISEASE WITHOUT ESOPHAGITIS: ICD-10-CM

## 2023-07-25 RX ORDER — PANTOPRAZOLE SODIUM 40 MG/1
TABLET, DELAYED RELEASE ORAL
Qty: 90 TABLET | Refills: 2 | Status: SHIPPED | OUTPATIENT
Start: 2023-07-25

## 2023-07-25 NOTE — TELEPHONE ENCOUNTER
Refill Routing Note     Refill Routing Note   Medication(s) are not appropriate for processing by [facility] for the following reason(s):      No active prescription written by provider    [facility] action(s):  Defer Care Due:  Labs due            Appointments  past 12m or future 3m with PCP    Date Provider   Last Visit   3/7/2023 Elias Thomas MD   Next Visit   9/18/2023 Elias Thomas MD   ED visits in past 90 days: 0        Note composed:9:24 AM 07/25/2023

## 2023-07-25 NOTE — TELEPHONE ENCOUNTER
Care Due:                  Date            Visit Type   Department     Provider  --------------------------------------------------------------------------------                                EP -                              PRIMARY  Last Visit: 03-      CARE ([facility])   None Found     Elias Thomas                              EP -                              PRIMARY  Next Visit: 09-      CARE ([facility])   None Found     Elias Thomas                                                            Last  Test          Frequency    Reason                     Performed    Due Date  --------------------------------------------------------------------------------    Na..........  12 months..  hydroCHLOROthiazide......  10-   10-    [facility] Embedded Care Due Messages. Reference number: [id number].   7/25/2023 8:07:26 AM CDT

## 2023-07-26 ENCOUNTER — OFFICE VISIT (OUTPATIENT)
Dept: PRIMARY CARE CLINIC | Facility: CLINIC | Age: 73
End: 2023-07-26
Payer: MEDICARE

## 2023-07-26 VITALS
SYSTOLIC BLOOD PRESSURE: 118 MMHG | WEIGHT: 315 LBS | OXYGEN SATURATION: 95 % | DIASTOLIC BLOOD PRESSURE: 78 MMHG | HEART RATE: 58 BPM | BODY MASS INDEX: 42.88 KG/M2

## 2023-07-26 DIAGNOSIS — I11.9 HYPERTENSIVE HEART DISEASE WITHOUT HEART FAILURE: ICD-10-CM

## 2023-07-26 DIAGNOSIS — M79.2 NEURALGIA: ICD-10-CM

## 2023-07-26 DIAGNOSIS — I25.10 NONOBSTRUCTIVE ATHEROSCLEROSIS OF CORONARY ARTERY: ICD-10-CM

## 2023-07-26 DIAGNOSIS — D50.0 IRON DEFICIENCY ANEMIA DUE TO CHRONIC BLOOD LOSS: ICD-10-CM

## 2023-07-26 DIAGNOSIS — R29.898 WEAKNESS OF BOTH LOWER EXTREMITIES: ICD-10-CM

## 2023-07-26 DIAGNOSIS — G62.9 NEUROPATHY: ICD-10-CM

## 2023-07-26 DIAGNOSIS — E66.01 MORBID OBESITY DUE TO EXCESS CALORIES: ICD-10-CM

## 2023-07-26 DIAGNOSIS — R40.0 SOMNOLENCE: Primary | ICD-10-CM

## 2023-07-26 DIAGNOSIS — M54.16 LUMBAR RADICULITIS: ICD-10-CM

## 2023-07-26 DIAGNOSIS — I48.20 CHRONIC ATRIAL FIBRILLATION: ICD-10-CM

## 2023-07-26 DIAGNOSIS — G47.33 OSA (OBSTRUCTIVE SLEEP APNEA): ICD-10-CM

## 2023-07-26 PROCEDURE — 99214 PR OFFICE/OUTPT VISIT, EST, LEVL IV, 30-39 MIN: ICD-10-PCS | Mod: S$GLB,,, | Performed by: NURSE PRACTITIONER

## 2023-07-26 PROCEDURE — 4010F PR ACE/ARB THEARPY RXD/TAKEN: ICD-10-PCS | Mod: CPTII,S$GLB,, | Performed by: NURSE PRACTITIONER

## 2023-07-26 PROCEDURE — 4010F ACE/ARB THERAPY RXD/TAKEN: CPT | Mod: CPTII,S$GLB,, | Performed by: NURSE PRACTITIONER

## 2023-07-26 PROCEDURE — 3008F BODY MASS INDEX DOCD: CPT | Mod: CPTII,S$GLB,, | Performed by: NURSE PRACTITIONER

## 2023-07-26 PROCEDURE — 1159F PR MEDICATION LIST DOCUMENTED IN MEDICAL RECORD: ICD-10-PCS | Mod: CPTII,S$GLB,, | Performed by: NURSE PRACTITIONER

## 2023-07-26 PROCEDURE — 1160F RVW MEDS BY RX/DR IN RCRD: CPT | Mod: CPTII,S$GLB,, | Performed by: NURSE PRACTITIONER

## 2023-07-26 PROCEDURE — 1157F PR ADVANCE CARE PLAN OR EQUIV PRESENT IN MEDICAL RECORD: ICD-10-PCS | Mod: CPTII,S$GLB,, | Performed by: NURSE PRACTITIONER

## 2023-07-26 PROCEDURE — 1157F ADVNC CARE PLAN IN RCRD: CPT | Mod: CPTII,S$GLB,, | Performed by: NURSE PRACTITIONER

## 2023-07-26 PROCEDURE — 99999 PR PBB SHADOW E&M-EST. PATIENT-LVL V: ICD-10-PCS | Mod: PBBFAC,,, | Performed by: NURSE PRACTITIONER

## 2023-07-26 PROCEDURE — 3078F PR MOST RECENT DIASTOLIC BLOOD PRESSURE < 80 MM HG: ICD-10-PCS | Mod: CPTII,S$GLB,, | Performed by: NURSE PRACTITIONER

## 2023-07-26 PROCEDURE — 1159F MED LIST DOCD IN RCRD: CPT | Mod: CPTII,S$GLB,, | Performed by: NURSE PRACTITIONER

## 2023-07-26 PROCEDURE — 3008F PR BODY MASS INDEX (BMI) DOCUMENTED: ICD-10-PCS | Mod: CPTII,S$GLB,, | Performed by: NURSE PRACTITIONER

## 2023-07-26 PROCEDURE — 1160F PR REVIEW ALL MEDS BY PRESCRIBER/CLIN PHARMACIST DOCUMENTED: ICD-10-PCS | Mod: CPTII,S$GLB,, | Performed by: NURSE PRACTITIONER

## 2023-07-26 PROCEDURE — 99999 PR PBB SHADOW E&M-EST. PATIENT-LVL V: CPT | Mod: PBBFAC,,, | Performed by: NURSE PRACTITIONER

## 2023-07-26 PROCEDURE — 3074F SYST BP LT 130 MM HG: CPT | Mod: CPTII,S$GLB,, | Performed by: NURSE PRACTITIONER

## 2023-07-26 PROCEDURE — 3078F DIAST BP <80 MM HG: CPT | Mod: CPTII,S$GLB,, | Performed by: NURSE PRACTITIONER

## 2023-07-26 PROCEDURE — 99214 OFFICE O/P EST MOD 30 MIN: CPT | Mod: S$GLB,,, | Performed by: NURSE PRACTITIONER

## 2023-07-26 PROCEDURE — 3074F PR MOST RECENT SYSTOLIC BLOOD PRESSURE < 130 MM HG: ICD-10-PCS | Mod: CPTII,S$GLB,, | Performed by: NURSE PRACTITIONER

## 2023-07-26 NOTE — PROGRESS NOTES
[facility] Note    Chief Complaint      Chief Complaint   Patient presents with    Medication Problem     History of Present Illness      Zeenat Mancia is a 73 y.o. male patient of Dr. Thomas's with chronic conditions of lumbar radiculitis, neuralgia, Afib, dyslipidemia, CAD, BPH, ED, morbid obesity, CLIFFORD, gerd, arthritis, JACKELYN, insomnia who is new to me and presents today for discussion with meds, fatigue/somnolence.   Pt gets dizzy with getting up from a seated position  Has appt with bariatrics for weight loss  Has lower extremity weakness    Continue lyrica for neuropathy of feet-only take in afternoon, instead of BID.  Continue trazodone for insomnia, but may need to decrease as well  Stop tizanidine  States not taking the HCTZ, but feels like he has been wheezing at times. Took lasix for lower ext swelling recently  Used to see Dr. Jerome but she is moving out of [facility], will refer to cards- Dr. OKEEFE or Dr. Upton,   Atenolol-bradycardia  Possible fluid overload    Referring to PT for eval of lower ext weakness and foot neuropathy      .      Health Maintenance   Topic Date Due    High Dose Statin  Never done    Lipid Panel  10/20/2027    TETANUS VACCINE  08/29/2029    Hepatitis C Screening  Completed    Abdominal Aortic Aneurysm Screening  Completed       Past Medical History:   Diagnosis Date    A-fib     Acute blood loss anemia     Arthritis     Atrial flutter     Colon polyp     Diverticulosis     Dyslipidemia     Dyspnea on exertion 5/31/2021    GERD (gastroesophageal reflux disease)     on Protonix    Hepatic steatosis     Hyperlipidemia     Hypertension     Irregular heart beat     Lower GI bleed 5/31/2021    Multiple gastric ulcers 05/16/2018    gi scope per Dr. Houston    Obesity     Sleep apnea     uses CPAP    Unspecified disorder of kidney and ureter     kidney stones       Past Surgical History:   Procedure Laterality Date    ADENOIDECTOMY  1958    APPENDECTOMY       CATARACT EXTRACTION  2009    OS    CATARACT EXTRACTION W/  INTRAOCULAR LENS IMPLANT Right 06/03/2015    [id number] 14.5d//    CHOLECYSTECTOMY  2021    COLONOSCOPY N/A 10/10/2017    Procedure: COLONOSCOPY;  Surgeon: Jameson Houston MD;  Location: [facility];  Service: Endoscopy;  Laterality: N/A; repeat in 5 years for surveillance    COLONOSCOPY N/A 05/16/2018    Procedure: COLONOSCOPY;  Surgeon: Jameson Houston MD;  Location: [facility];  Service: Endoscopy;  Laterality: N/A;    COLONOSCOPY N/A 06/02/2021    Procedure: COLONOSCOPY;  Surgeon: Agustin Corona MD;  Location: [facility] (2ND FLR);  Service: Endoscopy;  Laterality: N/A;    COLONOSCOPY N/A 10/11/2022    Procedure: COLONOSCOPY;  Surgeon: Edin Lara MD;  Location: [facility] (2ND FLR);  Service: Endoscopy;  Laterality: N/A;  2nd floor-difficult intubation  ok to hold eliquis x2 days per Dr. Jerome, see telephone encounter 6/28-vt  fully vaccinated, instructions sent to [id number]  extended miralax prep  Clear liquids up to 2 hrs prior/ AM prep [id number]    CORONARY ANGIOGRAPHY N/A 01/04/2021    Procedure: ANGIOGRAM, CORONARY ARTERY;  Surgeon: Jase Moore MD;  Location: [facility] CATH LAB/EP;  Service: Cardiology;  Laterality: N/A;    CYSTOSCOPY WITH INSERTION OF MINIMALLY INVASIVE IMPLANT TO ENLARGE PROSTATIC URETHRA N/A 06/29/2021    Procedure: TRANSPROSTATIC TISSUE RETRACTION;  Surgeon: Agustin Clay MD;  Location: [facility];  Service: Urology;  Laterality: N/A;    ESOPHAGOGASTRODUODENOSCOPY N/A 07/02/2018    Procedure: EGD (ESOPHAGOGASTRODUODENOSCOPY);  Surgeon: Jameson Houston MD;  Location: [facility];  Service: Endoscopy;  Laterality: N/A;    ESOPHAGOGASTRODUODENOSCOPY N/A 06/02/2021    Procedure: EGD (ESOPHAGOGASTRODUODENOSCOPY);  Surgeon: Agustin Corona MD;  Location: [facility] ([facility] FLR);  Service: Endoscopy;  Laterality: N/A;    EYE SURGERY      cataract    LEFT HEART CATHETERIZATION N/A 01/04/2021    Procedure:  Left heart cath;  Surgeon: Jase Moore MD;  Location: [facility] CATH LAB/EP;  Service: Cardiology;  Laterality: N/A;    ROBOT-ASSISTED CHOLECYSTECTOMY N/A 2021    Procedure: ROBOTIC CHOLECYSTECTOMY;  Surgeon: Pal Cheney MD;  Location: [facility] OR;  Service: General;  Laterality: N/A;    TONSILLECTOMY      TRANSESOPHAGEAL ECHOCARDIOGRAPHY N/A 2021    Procedure: ECHOCARDIOGRAM, TRANSESOPHAGEAL;  Surgeon: Leo Reeves III, MD;  Location: [facility] EP LAB;  Service: Cardiology;  Laterality: N/A;    TREATMENT OF CARDIAC ARRHYTHMIA N/A 2021    Procedure: Cardioversion or Defibrillation;  Surgeon: Judy Jerome MD;  Location: [facility] CATH LAB/EP;  Service: Cardiology;  Laterality: N/A;    TREATMENT OF CARDIAC ARRHYTHMIA N/A 2021    Procedure: CARDIOVERSION;  Surgeon: PEMA Downs MD;  Location: [facility] EP LAB;  Service: Cardiology;  Laterality: N/A;  AF, PHUONG, DCCV, MAC, DM, 3 PREP    UPPER GASTROINTESTINAL ENDOSCOPY  2018    Dr. Houston       family history includes Arthritis in his mother; Blindness in his mother; Cataracts in his father; Coronary artery disease in his father; Heart disease in his father and mother.     Social History     Tobacco Use    Smoking status: Former     Packs/day: 2.00     Years: 10.00     Pack years: 20.00     Types: Cigarettes     Start date: 1969     Quit date: 1980     Years since quittin.5    Smokeless tobacco: Never    Tobacco comments:     quit in     Substance Use Topics    Alcohol use: Not Currently     Alcohol/week: 2.0 standard drinks     Types: 1 Glasses of wine, 1 Cans of beer per week     Comment: 1 beer every 2 month    Drug use: No       Review of Systems   Constitutional:  Positive for malaise/fatigue. Negative for chills and fever.   HENT:  Negative for congestion, sinus pain and sore throat.    Eyes:  Negative for blurred vision.   Respiratory:  Positive for wheezing. Negative for cough and shortness of breath.     Cardiovascular:  Negative for chest pain, palpitations and leg swelling.   Gastrointestinal:  Negative for abdominal pain, constipation, diarrhea, nausea and vomiting.   Genitourinary:  Negative for dysuria.   Musculoskeletal:  Negative for myalgias.   Skin:  Negative for rash.   Neurological:  Positive for dizziness, sensory change and weakness. Negative for headaches.   Psychiatric/Behavioral:  Negative for depression. The patient is not nervous/anxious.       Outpatient Encounter Medications as of 7/26/2023   Medication Sig Dispense Refill    amLODIPine (NORVASC) 5 MG tablet Take 1 tablet (5 mg total) by mouth once daily. 90 tablet 3    atenoloL (TENORMIN) 50 MG tablet TAKE 1 TABLET(50 MG) BY MOUTH EVERY DAY 90 tablet 3    DULoxetine (CYMBALTA) 30 MG capsule TAKE 1 CAPSULE(30 MG) BY MOUTH EVERY DAY 30 capsule 0    ELIQUIS 5 mg Tab TAKE 1 TABLET(5 MG) BY MOUTH TWICE DAILY 180 tablet 3    furosemide (LASIX) 20 MG tablet TAKE 1 TABLET BY MOUTH DAILY AS NEEDED FOR SWELLING 30 tablet 0    melatonin 10 mg Tab Take 10 mg by mouth every evening.      multivit-min/FA/lycopen/lutein (CENTRUM SILVER MEN ORAL) Take by mouth once daily.      pregabalin (LYRICA) 50 MG capsule Take 1 capsule (50 mg total) by mouth 2 (two) times daily. 60 capsule 2    sildenafiL (VIAGRA) 100 MG tablet Take 1 tablet (100 mg total) by mouth daily as needed for Erectile Dysfunction. 10 tablet 3    tadalafiL (CIALIS) 20 MG Tab Take 1 tablet (20 mg total) by mouth every 72 hours as needed (ED). 10 tablet 11    tamsulosin (FLOMAX) 0.4 mg Cap TAKE 1 CAPSULE(0.4 MG) BY MOUTH EVERY DAY 30 capsule 11    traZODone (DESYREL) 100 MG tablet TAKE 1 TABLET(100 MG) BY MOUTH EVERY EVENING 30 tablet 1    valsartan (DIOVAN) 320 MG tablet TAKE 1 TABLET(320 MG) BY MOUTH EVERY DAY 90 tablet 3    [DISCONTINUED] tiZANidine (ZANAFLEX) 4 MG tablet       hydroCHLOROthiazide (HYDRODIURIL) 25 MG tablet Take 1 tablet (25 mg total) by mouth once daily. (Patient not taking:  Reported on 7/26/2023) 90 tablet 3    pantoprazole (PROTONIX) 40 MG tablet TAKE 1 TABLET(40 MG) BY MOUTH EVERY DAY (Patient not taking: Reported on 7/26/2023) 90 tablet 2    [DISCONTINUED] pantoprazole (PROTONIX) 40 MG tablet Take 1 tablet (40 mg total) by mouth once daily. 90 tablet 3     Facility-Administered Encounter Medications as of 7/26/2023   Medication Dose Route Frequency Provider Last Rate Last Admin    perflutren protein-A microsphr 0.22 mg/mL IV susp  0.5 mL Intravenous Once Mauri Portillo MD           Review of patient's allergies indicates:   Allergen Reactions    Lipitor [atorvastatin] Itching       Physical Exam      Vital Signs  Pulse: (!) 58  SpO2: 95 %  BP: 118/78  BP Location: Right arm  Patient Position: Sitting  Height and Weight  Weight: (!) 151.5 kg (334 lb)    Physical Exam  Vitals and nursing note reviewed.   Constitutional:       General: He is not in acute distress.     Appearance: Normal appearance. He is well-developed. He is obese. He is not ill-appearing.   HENT:      Head: Normocephalic and atraumatic.      Right Ear: External ear normal.      Left Ear: External ear normal.   Eyes:      Conjunctiva/sclera: Conjunctivae normal.      Pupils: Pupils are equal, round, and reactive to light.   Neck:      Thyroid: No thyromegaly.      Vascular: No JVD.      Trachea: No tracheal deviation.   Cardiovascular:      Rate and Rhythm: Normal rate and regular rhythm.      Heart sounds: Normal heart sounds.   Pulmonary:      Effort: Pulmonary effort is normal. No respiratory distress.      Breath sounds: Normal breath sounds. No wheezing or rhonchi.   Abdominal:      General: Bowel sounds are normal. There is no distension.      Palpations: Abdomen is soft.      Tenderness: There is no abdominal tenderness.   Musculoskeletal:         General: Swelling present. No tenderness. Normal range of motion.      Cervical back: Normal range of motion and neck supple.   Lymphadenopathy:      Cervical: No  cervical adenopathy.   Skin:     General: Skin is warm and dry.      Coloration: Skin is not pale.      Findings: No bruising, erythema or rash.   Neurological:      General: No focal deficit present.      Mental Status: He is alert and oriented to person, place, and time.      Motor: Weakness present.      Gait: Gait abnormal.   Psychiatric:         Mood and Affect: Mood normal.         Behavior: Behavior normal.         Thought Content: Thought content normal.         Judgment: Judgment normal.        Laboratory:  CBC:  Lab Results   Component Value Date    WBC 8.19 05/26/2023    RBC 6.20 05/26/2023    HGB 16.6 05/26/2023    HCT 55.3 (H) 05/26/2023     05/26/2023    MCV 89 05/26/2023    MCH 26.8 (L) 05/26/2023    MCHC 30.0 (L) 05/26/2023    MCHC 31.3 (L) 10/20/2022    MCHC 33.5 05/05/2022     CMP:  Lab Results   Component Value Date     10/20/2022    CALCIUM 9.6 10/20/2022    ALBUMIN 3.4 (L) 10/20/2022    PROT 7.1 10/20/2022     10/20/2022    K 4.0 06/22/2023    CO2 28 10/20/2022     10/20/2022    BUN 15 10/20/2022    ALKPHOS 148 (H) 10/20/2022    ALT 44 10/20/2022    AST 36 10/20/2022    BILITOT 1.8 (H) 10/20/2022    BILITOT 1.0 04/19/2022    BILITOT 0.5 09/17/2021     URINALYSIS:  Lab Results   Component Value Date    COLORU Yellow 10/20/2022    CLARITYU Clear 04/28/2022    SPECGRAV 1.020 10/20/2022    PHUR 5.0 10/20/2022    PROTEINUA Negative 10/20/2022    BACTERIA Moderate (A) 02/23/2021    NITRITE Negative 10/20/2022    LEUKOCYTESUR Negative 10/20/2022    UROBILINOGEN Normal 04/28/2022    HYALINECASTS 0 02/23/2021    HYALINECASTS 1 02/04/2021      LIPIDS:  Lab Results   Component Value Date    TSH 1.502 10/20/2022    TSH 1.363 09/17/2021    TSH 1.132 05/06/2021    HDL 43 10/20/2022    HDL 36 (L) 09/17/2021    HDL 38 (L) 06/05/2020    CHOL 157 10/20/2022    CHOL 133 09/17/2021    CHOL 146 06/05/2020    TRIG 120 10/20/2022    TRIG 143 09/17/2021    TRIG 141 06/05/2020    LDLCALC 90.0  10/20/2022    LDLCALC 68.4 09/17/2021    LDLCALC 79.8 06/05/2020    CHOLHDL 27.4 10/20/2022    CHOLHDL 27.1 09/17/2021    CHOLHDL 26.0 06/05/2020    NONHDLCHOL 114 10/20/2022    NONHDLCHOL 97 09/17/2021    NONHDLCHOL 108 06/05/2020    TOTALCHOLEST 3.7 10/20/2022    TOTALCHOLEST 3.7 09/17/2021    TOTALCHOLEST 3.8 06/05/2020     TSH:  Lab Results   Component Value Date    TSH 1.502 10/20/2022    TSH 1.363 09/17/2021    TSH 1.132 05/06/2021     A1C:  Lab Results   Component Value Date    HGBA1C 5.5 10/20/2022    HGBA1C 5.8 (H) 01/15/2021         Assessment/Plan     Zeenat Mancia is a 73 y.o.male with:    Somnolence    Neuropathy  -     Ambulatory referral/consult to Physical/Occupational Therapy; Future; Expected date: 07/26/2023    Weakness of both lower extremities  -     Ambulatory referral/consult to Physical/Occupational Therapy; Future; Expected date: 07/26/2023    Hypertensive heart disease without heart failure  -     Ambulatory referral/consult to Cardiology; Future; Expected date: 07/26/2023    JACKELYN (obstructive sleep apnea)    Nonobstructive atherosclerosis of coronary artery  -     Ambulatory referral/consult to Cardiology; Future; Expected date: 07/26/2023    Lumbar radiculitis    Neuralgia    Chronic atrial fibrillation    Iron deficiency anemia due to chronic blood loss    Morbid obesity due to excess calories         Health Maintenance Due   Topic Date Due    High Dose Statin  Never done    COVID-19 Vaccine (5 - Pfizer series) 10/06/2022          I spent 42 minutes on the day of this encounter for preparing for, evaluating, treating, and managing this patient.        -Continue current medications and maintain follow up with specialists.  Return to clinic: Follow up if symptoms worsen or fail to improve.      Erin Jiminez, NP-C [facility]

## 2023-08-01 ENCOUNTER — OFFICE VISIT (OUTPATIENT)
Dept: CARDIOLOGY | Facility: CLINIC | Age: 73
End: 2023-08-01
Payer: MEDICARE

## 2023-08-01 VITALS
DIASTOLIC BLOOD PRESSURE: 78 MMHG | HEART RATE: 76 BPM | WEIGHT: 315 LBS | BODY MASS INDEX: 40.43 KG/M2 | RESPIRATION RATE: 20 BRPM | SYSTOLIC BLOOD PRESSURE: 122 MMHG | HEIGHT: 74 IN

## 2023-08-01 DIAGNOSIS — I87.2 VENOUS INSUFFICIENCY: ICD-10-CM

## 2023-08-01 DIAGNOSIS — I48.20 CHRONIC ATRIAL FIBRILLATION: Primary | ICD-10-CM

## 2023-08-01 DIAGNOSIS — I11.9 HYPERTENSIVE HEART DISEASE WITHOUT HEART FAILURE: ICD-10-CM

## 2023-08-01 DIAGNOSIS — I25.10 NONOBSTRUCTIVE ATHEROSCLEROSIS OF CORONARY ARTERY: ICD-10-CM

## 2023-08-01 DIAGNOSIS — E66.01 MORBID OBESITY DUE TO EXCESS CALORIES: ICD-10-CM

## 2023-08-01 PROCEDURE — 1160F PR REVIEW ALL MEDS BY PRESCRIBER/CLIN PHARMACIST DOCUMENTED: ICD-10-PCS | Mod: CPTII,S$GLB,, | Performed by: INTERNAL MEDICINE

## 2023-08-01 PROCEDURE — 3074F SYST BP LT 130 MM HG: CPT | Mod: CPTII,S$GLB,, | Performed by: INTERNAL MEDICINE

## 2023-08-01 PROCEDURE — 1157F ADVNC CARE PLAN IN RCRD: CPT | Mod: CPTII,S$GLB,, | Performed by: INTERNAL MEDICINE

## 2023-08-01 PROCEDURE — 1126F PR PAIN SEVERITY QUANTIFIED, NO PAIN PRESENT: ICD-10-PCS | Mod: CPTII,S$GLB,, | Performed by: INTERNAL MEDICINE

## 2023-08-01 PROCEDURE — 1157F PR ADVANCE CARE PLAN OR EQUIV PRESENT IN MEDICAL RECORD: ICD-10-PCS | Mod: CPTII,S$GLB,, | Performed by: INTERNAL MEDICINE

## 2023-08-01 PROCEDURE — 3008F PR BODY MASS INDEX (BMI) DOCUMENTED: ICD-10-PCS | Mod: CPTII,S$GLB,, | Performed by: INTERNAL MEDICINE

## 2023-08-01 PROCEDURE — 3078F DIAST BP <80 MM HG: CPT | Mod: CPTII,S$GLB,, | Performed by: INTERNAL MEDICINE

## 2023-08-01 PROCEDURE — 99214 PR OFFICE/OUTPT VISIT, EST, LEVL IV, 30-39 MIN: ICD-10-PCS | Mod: S$GLB,,, | Performed by: INTERNAL MEDICINE

## 2023-08-01 PROCEDURE — 99214 OFFICE O/P EST MOD 30 MIN: CPT | Mod: S$GLB,,, | Performed by: INTERNAL MEDICINE

## 2023-08-01 PROCEDURE — 1101F PT FALLS ASSESS-DOCD LE1/YR: CPT | Mod: CPTII,S$GLB,, | Performed by: INTERNAL MEDICINE

## 2023-08-01 PROCEDURE — 3078F PR MOST RECENT DIASTOLIC BLOOD PRESSURE < 80 MM HG: ICD-10-PCS | Mod: CPTII,S$GLB,, | Performed by: INTERNAL MEDICINE

## 2023-08-01 PROCEDURE — 3288F FALL RISK ASSESSMENT DOCD: CPT | Mod: CPTII,S$GLB,, | Performed by: INTERNAL MEDICINE

## 2023-08-01 PROCEDURE — 3008F BODY MASS INDEX DOCD: CPT | Mod: CPTII,S$GLB,, | Performed by: INTERNAL MEDICINE

## 2023-08-01 PROCEDURE — 99999 PR PBB SHADOW E&M-EST. PATIENT-LVL IV: ICD-10-PCS | Mod: PBBFAC,,, | Performed by: INTERNAL MEDICINE

## 2023-08-01 PROCEDURE — 3288F PR FALLS RISK ASSESSMENT DOCUMENTED: ICD-10-PCS | Mod: CPTII,S$GLB,, | Performed by: INTERNAL MEDICINE

## 2023-08-01 PROCEDURE — 1126F AMNT PAIN NOTED NONE PRSNT: CPT | Mod: CPTII,S$GLB,, | Performed by: INTERNAL MEDICINE

## 2023-08-01 PROCEDURE — 4010F ACE/ARB THERAPY RXD/TAKEN: CPT | Mod: CPTII,S$GLB,, | Performed by: INTERNAL MEDICINE

## 2023-08-01 PROCEDURE — 1101F PR PT FALLS ASSESS DOC 0-1 FALLS W/OUT INJ PAST YR: ICD-10-PCS | Mod: CPTII,S$GLB,, | Performed by: INTERNAL MEDICINE

## 2023-08-01 PROCEDURE — 4010F PR ACE/ARB THEARPY RXD/TAKEN: ICD-10-PCS | Mod: CPTII,S$GLB,, | Performed by: INTERNAL MEDICINE

## 2023-08-01 PROCEDURE — 3074F PR MOST RECENT SYSTOLIC BLOOD PRESSURE < 130 MM HG: ICD-10-PCS | Mod: CPTII,S$GLB,, | Performed by: INTERNAL MEDICINE

## 2023-08-01 PROCEDURE — 99999 PR PBB SHADOW E&M-EST. PATIENT-LVL IV: CPT | Mod: PBBFAC,,, | Performed by: INTERNAL MEDICINE

## 2023-08-01 PROCEDURE — 1160F RVW MEDS BY RX/DR IN RCRD: CPT | Mod: CPTII,S$GLB,, | Performed by: INTERNAL MEDICINE

## 2023-08-01 PROCEDURE — 1159F PR MEDICATION LIST DOCUMENTED IN MEDICAL RECORD: ICD-10-PCS | Mod: CPTII,S$GLB,, | Performed by: INTERNAL MEDICINE

## 2023-08-01 PROCEDURE — 1159F MED LIST DOCD IN RCRD: CPT | Mod: CPTII,S$GLB,, | Performed by: INTERNAL MEDICINE

## 2023-08-01 NOTE — PROGRESS NOTES
HISTORY:    73-year-old male with a history of CAD-nonobs on cath, chronic atrial fibrillation, hypertension, aortic atherosclerosis, JACKELYN on CPAP, and B LE neuropathy presenting for initial evaluation by me.     The patient denies any symptoms of chest pain. Chronic LUND that has been stable for years. Activity levels mild and limited by B LE neuropathy.     The patient denies any previous history of myocardial infarction, peripheral arterial disease, stroke, congestive heart failure, or cardiomyopathy. Afib dx'd '17-'18 years ago incidentally. Pt has JACKELYN and Pickwickian type symptoms.     The patient currently tolerates atenolol 50 x 1, amlodipine 5 x 1, hydrochlorothiazide 25 x 1, valsartan 320 x 1, and apixaban 5 x 2. Bps controlled. No bleeding.     PHYSICAL EXAM:    Vitals:    08/01/23 1221   BP: 122/78   Pulse: 76   Resp: 20       NAD, A+Ox3.  No jvd, no bruit.  Irreg, irreg nml s1,s2. No murmurs.  CTA B no wheezes or crackles.  No edema.    LABS/STUDIES (imaging reviewed during clinic visit):    May 2023 hemoglobin 16.6 with MCV of 89.  Iron 61/TIBC 392/ferritin 24.  Creatinine 1.2.  2022 LDL 90 and HDL 43.  Triglycerides 120.  .  A1c and TSH normal.    April 2022 ECG atrial fibrillation with slow ventricular response.  Heart rate 57.  No Q-waves or ST changes.  Right bundle branch block.    TTE July 2023 normal LV size and function with EF 60%.  Mild RV enlargement with reduced function.  CVP 8.    Cardiac catheterization 2021 nonobstructive coronary artery disease.    CTA Chest July 2023 No e/o PE. Aortic and coronary calcification.   Venous us July 2023 No e/o DVT bilaterally. R GSV reflux at the level of the knee.   Arterial duplex 2022 Nml DIANNA bilaterally with no e/o significant stenosis.    ASSESSMENT & PLAN:    1. Chronic atrial fibrillation    2. Hypertensive heart disease without heart failure    3. Nonobstructive atherosclerosis of coronary artery    4. Venous insufficiency    5. Morbid  obesity due to excess calories              cAfib, asymptomatic. Rates controlled on atenolol. Tolerating apixiban without issue.    Bps controlled on current regimen of atenolol 50 x 1, amlodipine 5 x 1, hydrochlorothiazide 25 x 1, valsartan 320 x 1.     Venous insufficiency minimal. Asymptomatic.    RV dilation without e/o RV failure. CT PE negative. Likely related to JACKELYN. Pt has Pickwickian symptoms. Weight loss is a must. Has an appt with bariatric group.     We discussed the importance of diet modifications and instituting an exercise regimen.      Follow up in about 6 months (around 2/1/2024).      Jackie Upton MD

## 2023-08-11 ENCOUNTER — PATIENT MESSAGE (OUTPATIENT)
Dept: PRIMARY CARE CLINIC | Facility: CLINIC | Age: 73
End: 2023-08-11
Payer: MEDICARE

## 2023-08-11 DIAGNOSIS — F41.9 ANXIETY AND DEPRESSION: Primary | ICD-10-CM

## 2023-08-11 DIAGNOSIS — F32.A ANXIETY AND DEPRESSION: Primary | ICD-10-CM

## 2023-08-16 ENCOUNTER — PATIENT MESSAGE (OUTPATIENT)
Dept: BARIATRICS | Facility: CLINIC | Age: 73
End: 2023-08-16
Payer: MEDICARE

## 2023-08-21 ENCOUNTER — PATIENT MESSAGE (OUTPATIENT)
Dept: OPTOMETRY | Facility: CLINIC | Age: 73
End: 2023-08-21
Payer: MEDICARE

## 2023-08-24 ENCOUNTER — CLINICAL SUPPORT (OUTPATIENT)
Dept: REHABILITATION | Facility: HOSPITAL | Age: 73
End: 2023-08-24
Payer: MEDICARE

## 2023-08-24 ENCOUNTER — TELEPHONE (OUTPATIENT)
Dept: BARIATRICS | Facility: CLINIC | Age: 73
End: 2023-08-24
Payer: MEDICARE

## 2023-08-24 DIAGNOSIS — G62.9 NEUROPATHY: ICD-10-CM

## 2023-08-24 DIAGNOSIS — Z74.09 IMPAIRED FUNCTIONAL MOBILITY, BALANCE, GAIT, AND ENDURANCE: ICD-10-CM

## 2023-08-24 DIAGNOSIS — R29.898 WEAKNESS OF BOTH LOWER EXTREMITIES: ICD-10-CM

## 2023-08-24 PROCEDURE — 97110 THERAPEUTIC EXERCISES: CPT

## 2023-08-24 PROCEDURE — 97162 PT EVAL MOD COMPLEX 30 MIN: CPT

## 2023-08-24 NOTE — PLAN OF CARE
[facility]  Physical Therapy Initial Evaluation     Name: Zeenat Mancia  Clinic Number: [id number]    Therapy Diagnosis:   Encounter Diagnoses   Name Primary?    Neuropathy     Weakness of both lower extremities     Impaired functional mobility, balance, gait, and endurance      Physician: Christina Mann, NP    Physician Orders: PT Eval and Treat   Medical Diagnosis from Referral: G62.9 (ICD-10-CM) - Neuropathy R29.898 (ICD-10-CM) - Weakness of both lower extremities   Evaluation Date: 8/24/2023  Authorization Period Expiration: 07/25/2024   Plan of Care Expiration: 10/13/2023  Progress Note Due: 9/21/2023  Visit # / Visits authorized: 1/ 1  FOTO: 1/ 3    Precautions: Standard and Fall    Time In: 1:50 PM  Time Out: 2:36 PM  Total Billable Time: 46 minutes (1 MCE, 1 TE)    Subjective      Date of onset: several months    History of current condition - C J reports: tingling / numbness in bilateral feet which slightly increases when walking      Imaging, no relevant imaging    Prior Therapy: yes, outpatient PT for back pain  Social History:  lives with their spouse  Falls: twice, tripped over dogs   DME: Straight cane    Home Environment: [address], single story home   Exercise Routine / History: none   Family Present at time of Eval: no   Occupation: retired  Prior Level of Function: independent  Current Level of Function: independent    Pain:  Current 3/10, worst 8/10, best 3/10   Location: bilateral feet   Description: Numb and tingling  Aggravating Factors: Standing and Walking  Easing Factors: pain medication helps     Patient's goals: Start getting more active    Medical History:   Past Medical History:   Diagnosis Date    A-fib     Acute blood loss anemia     Arthritis     Atrial flutter     Colon polyp     Diverticulosis     Dyslipidemia     Dyspnea on exertion 5/31/2021    GERD (gastroesophageal reflux disease)     on Protonix    Hepatic steatosis     Hyperlipidemia     Hypertension      Irregular heart beat     Lower GI bleed 5/31/2021    Multiple gastric ulcers 05/16/2018    gi scope per Dr. Houston    Obesity     Sleep apnea     uses CPAP    Unspecified disorder of kidney and ureter     kidney stones       Surgical History:   Zeenat Mancia  has a past surgical history that includes Eye surgery; Tonsillectomy; Appendectomy; Cataract extraction (2009); Cataract extraction w/  intraocular lens implant (Right, 06/03/2015); Upper gastrointestinal endoscopy (05/16/2018); Colonoscopy (N/A, 10/10/2017); Colonoscopy (N/A, 05/16/2018); Esophagogastroduodenoscopy (N/A, 07/02/2018); Left heart catheterization (N/A, 01/04/2021); Coronary angiography (N/A, 01/04/2021); Robot-assisted cholecystectomy (N/A, 02/04/2021); Treatment of cardiac arrhythmia (N/A, 04/19/2021); Treatment of cardiac arrhythmia (N/A, 05/14/2021); Transesophageal echocardiography (N/A, 05/14/2021); Esophagogastroduodenoscopy (N/A, 06/02/2021); Colonoscopy (N/A, 06/02/2021); Cystoscopy with insertion of minimally invasive implant to enlarge prostatic urethra (N/A, 06/29/2021); Colonoscopy (N/A, 10/11/2022); Adenoidectomy (1958); and Cholecystectomy (2021).    Medications:   Zeenat has a current medication list which includes the following prescription(s): amlodipine, atenolol, duloxetine, eliquis, furosemide, hydrochlorothiazide, melatonin, mv-min/folic/k1/lycopen/lutein, pantoprazole, pregabalin, sildenafil, tadalafil, tamsulosin, trazodone, and valsartan, and the following Facility-Administered Medications: perflutren protein-a microsphr.    Allergies:   Review of patient's allergies indicates:   Allergen Reactions    Lipitor [atorvastatin] Itching        Objective      Sensation:  Light Touch: Intact           Proprioception:   Intact    Lumbar Range of Motion:     Degrees Pain   Flexion  50 degrees       [place]'s sign ( + )   Extension  5 degrees       Left Side Bending  Moderately Limited  No pain   Right Side Bending  Moderately  "Limited  no pain   Left rotation     Moderately Limited  No pain   Right Rotation     Moderately Limited  No pain         Hip Range of Motion:    Left Passive Right Passive   External Rotation  25 degrees  20 degrees   Internal Rotation  20 degrees  25 degrees   Flexion  100 degrees  100 degrees         Lower Extremity Strength  Right LE   Left LE     Knee extension: 4/5 Knee extension: 4/5   Knee flexion: 4/5 Knee flexion: 4/5   Hip flexion: 3+/5 Hip flexion: 3+/5   Hip extension:  3/5 Hip extension: 3/5   Hip abduction: 3/5 Hip abduction: 3/5      Flexibility: significant hamstring, gluteus, and lumbar tightness    Timed Up and Go: 13.9 sec  30 Second Sit to Stand: 9 reps with upper extremity push off  Two Minute Walk Test: 239 feet    Single Leg Balance: < 2 seconds bilaterally without upper extremity support      Intake Outcome Measure for FOTO Balance Survey    - not issued at eval, will issued at first after.     Therapist reviewed FOTO scores for Zeenat Mancia on 8/24/2023.   FOTO report - see Media section or FOTO account episode details.    Intake Score: TBD%       Treatment     Total Treatment time separate from Evaluation: 10 minutes    TICO ZARATE received the treatments listed below:      therapeutic exercises to develop strength and flexibility for 10 minutes including:  - seated hamstring stretch 20" x 3  - modified piriformis stretch in chair with stool 20" x 3  - sit to stand from standard chair x 10 twice daily      Patient Education and Home Exercises     Education provided:   - reviewed anatomy / physiology related to diagnosis  - role of PT, PT POC and goals  - attendance policy     Written Home Exercises Provided: yes.  Exercises were reviewed and TICO ZARATE was able to demonstrate them prior to the end of the session.  TICO ZARATE demonstrated good  understanding of the education provided.     See EMR under Patient Instructions for exercises provided 8/24/2023.    Assessment     Zeenat is a 73 y.o. male referred to " outpatient Physical Therapy with a medical diagnosis of G62.9 (ICD-10-CM) - Neuropathy, R29.898 (ICD-10-CM) - Weakness of both lower extremities.  Patient presents with bilateral foot pain and numbness, decreased hip range of motion, decreased bilateral lower extremity and core strength, decreased activity tolerance and endurance.  Pt would benefit from skilled PT intervention to improve bilateral lower extremity strength, core stability, functional mobility and overall activity tolerance to improve quality of life.     Patient prognosis is Fair.   Patient will benefit from skilled outpatient Physical Therapy to address the deficits stated above and in the chart below, provide patient /family education, and to maximize patient's level of independence.     Plan of care discussed with patient: Yes  Patient's spiritual, cultural and educational needs considered and patient is agreeable to the plan of care and goals as stated below:     Anticipated Barriers for therapy: chronicity of impairments, co-morbidities    Medical Necessity is demonstrated by the following  History  Co-morbidities and personal factors that may impact the plan of care [] LOW: no personal factors / co-morbidities  [] MODERATE: 1-2 personal factors / co-morbidities  [x] HIGH: 3+ personal factors / co-morbidities    Moderate / High Support Documentation:   Co-morbidities affecting plan of care: see PMH above    Personal Factors:   lifestyle     Examination  Body Structures and Functions, activity limitations and participation restrictions that may impact the plan of care [] LOW: addressing 1-2 elements  [] MODERATE: 3+ elements  [] HIGH: 4+ elements (please support below)    Moderate / High Support Documentation: Based on PMHX, co morbidities , data from assessments and functional level of assistance required with task and clinical presentation directly impacting function.        Clinical Presentation [] LOW: stable  [x] MODERATE: Evolving  [] HIGH:  Unstable     Decision Making/ Complexity Score: moderate       Goals:  Short Term Goals: 4 weeks   Pt will be compliant with HEP in order to maximize PT benefits  Pt will improve BLE MMT grades by >/=1/2 grade in order to improve strength for ADL completion  Pt will complete TUG in </= 12 seconds with least restrictive assistive device in order to reduce risk for falls and improve safety with functional mobility  Pt will score >/= 11 repetitions on 30-Second Sit to  order to improve BLE endurance and muscular power for transfers   Pt will complete Six-Minute Walk Test indoors in order to improve endurance for home mobility     Long Term Goals: 8 weeks   Pt will score </= TBD% on FOTO limitation survey in order to improve self-perception of functional mobility deficits  Pt will improve BLE MMT grades by >/=1 grade in order to improve strength for ADL completion  Pt will complete TUG in </= 10 seconds with least restrictive assistive device in order to reduce risk for falls and improve safety with functional mobility  Pt will demonstrate increase in single leg balance to at least 10 seconds bilaterally, demonstrating improved balance and decreased risk of falls.   Pt will score >/= 13 repetitions on 30-Second Sit to  order to improve BLE endurance and muscular power for transfers   Pt will walk >/= 1000 feet on Six-Minute Walk Test outdoors over multiple levels/surfaces in order to improve endurance for community mobility   Pt will perform 1 floor <> stand transfer with  UE support in order to demonstrate safe functional mobility in case of future fall  Pt will report 0 falls from initiation of PT management  Pt will begin some form of home/community fitness in order to sustain progress gained in PT    Plan     Plan of care Certification: 8/24/2023 to 10/13/2023.    Outpatient Physical Therapy 2 times weekly for 8 weeks to include the following interventions: Aquatic Therapy, Electrical Stimulation  PRN, Manual Therapy, Moist Heat/ Ice, Neuromuscular Re-ed, Patient Education, Therapeutic Activities, Therapeutic Exercise, and functional dry needling PRN .     Marychuy Meneses, PT        Physician's Signature: _________________________________________ Date: ________________

## 2023-08-29 DIAGNOSIS — G47.09 OTHER INSOMNIA: ICD-10-CM

## 2023-08-29 PROBLEM — Z74.09 IMPAIRED FUNCTIONAL MOBILITY, BALANCE, GAIT, AND ENDURANCE: Status: ACTIVE | Noted: 2023-08-29

## 2023-08-29 RX ORDER — TRAZODONE HYDROCHLORIDE 100 MG/1
100 TABLET ORAL NIGHTLY
Qty: 30 TABLET | Refills: 1 | Status: SHIPPED | OUTPATIENT
Start: 2023-08-29 | End: 2023-09-30

## 2023-08-29 NOTE — TELEPHONE ENCOUNTER
No care due was identified.  [facility] Embedded Care Due Messages. Reference number: [id number].   8/29/2023 1:33:05 PM CDT

## 2023-09-08 ENCOUNTER — CLINICAL SUPPORT (OUTPATIENT)
Dept: REHABILITATION | Facility: HOSPITAL | Age: 73
End: 2023-09-08
Payer: MEDICARE

## 2023-09-08 DIAGNOSIS — Z74.09 IMPAIRED FUNCTIONAL MOBILITY, BALANCE, GAIT, AND ENDURANCE: Primary | ICD-10-CM

## 2023-09-08 PROCEDURE — 97110 THERAPEUTIC EXERCISES: CPT

## 2023-09-11 NOTE — PROGRESS NOTES
"[facility]   Physical Therapy Treatment Note      Name: Zeenat Mancia  Clinic Number: [id number]    Therapy Diagnosis:   Encounter Diagnosis   Name Primary?    Impaired functional mobility, balance, gait, and endurance Yes     Physician: Christina Mann NP    Visit Date: 9/12/2023    Physician Orders: PT Eval and Treat   Medical Diagnosis from Referral: G62.9 (ICD-10-CM) - Neuropathy R29.898 (ICD-10-CM) - Weakness of both lower extremities   Evaluation Date: 8/24/2023  Authorization Period Expiration: 07/25/2024   Plan of Care Expiration: 10/13/2023  Progress Note Due: 9/21/2023  Visit # / Visits authorized: 1/1: 2/20  FOTO: 1/ 3     Precautions: Standard and Fall     PTA Visit #: 1/5     Time In: 9:30 AM   Time Out: 10:15 AM   Total Billable Time: 45 minutes    Subjective     Pt reports: increased shortness of breath lately, however, patient has an appointment with PCP on Monday. Patient also reports being "down for a couple of days" after last treatment session. Patient also reports increased difficulty sleeping at night, therefore, patient is always tired.     He was compliant with home exercise program.  Response to previous treatment: initial eval   Functional change: none     Pain: 7/10  Location: bilateral leg weakness    Objective      Objective Measures updated at progress report unless specified.     Treatment     C J received the treatments listed below:      Lower Extremity Strength    therapeutic exercises to develop strength and flexibility for 45 minutes including:    NuStep 5 minutes   Seated marching 2 x 10   Seated hip adduction 2 x 10   Seated clams green thera band 2 x 10   seated hamstring stretch 20" x 3  modified piriformis stretch in chair with stool 20" x 3  Seated long arch quads 2 x 10 each     Not performed today  - sit to stand from standard chair x 10 twice daily    Patient Education and Home Exercises       Education provided:   - reviewed anatomy / " physiology related to diagnosis  - role of PT, PT POC and goals  - attendance policy      Written Home Exercises Provided: yes.  Exercises were reviewed and TICO ZARATE was able to demonstrate them prior to the end of the session.  TICO ZARATE demonstrated good  understanding of the education provided.     Assessment     Physical Therapist required rest breaks throughout therapeutic exercise due to patient with increased fatigue and shortness of breath throughout task. Patient with most shortness of breath and long rest breaks between tasks with seated hamstring stretch and piriformis stretching. Patient required a seated rest break after performing long arch quads due to patient with increased fatigue throughout therapeutic exercise. Will continue to monitor and progress patient as tolerated. Physical Therapist Assistant also instructed patient to make sure he discuss with PCP his leak of sleep and shortness of breath, patient verbally understood and agreed.     TICO ZARATE Is progressing well towards his goals.   Pt prognosis is Good.     Pt will continue to benefit from skilled outpatient physical therapy to address the deficits listed in the problem list box on initial evaluation, provide pt/family education and to maximize pt's level of independence in the home and community environment.     Pt's spiritual, cultural and educational needs considered and pt agreeable to plan of care and goals.     Anticipated barriers to physical therapy:  chronicity of impairments, co-morbidities    Goals:   Short Term Goals: 4 weeks   Pt will be compliant with HEP in order to maximize PT benefits  Pt will improve BLE MMT grades by >/=1/2 grade in order to improve strength for ADL completion  Pt will complete TUG in </= 12 seconds with least restrictive assistive device in order to reduce risk for falls and improve safety with functional mobility  Pt will score >/= 11 repetitions on 30-Second Sit to  order to improve BLE endurance and muscular  power for transfers   Pt will complete Six-Minute Walk Test indoors in order to improve endurance for home mobility      Long Term Goals: 8 weeks   Pt will score </= TBD% on FOTO limitation survey in order to improve self-perception of functional mobility deficits  Pt will improve BLE MMT grades by >/=1 grade in order to improve strength for ADL completion  Pt will complete TUG in </= 10 seconds with least restrictive assistive device in order to reduce risk for falls and improve safety with functional mobility  Pt will demonstrate increase in single leg balance to at least 10 seconds bilaterally, demonstrating improved balance and decreased risk of falls.   Pt will score >/= 13 repetitions on 30-Second Sit to  order to improve BLE endurance and muscular power for transfers   Pt will walk >/= 1000 feet on Six-Minute Walk Test outdoors over multiple levels/surfaces in order to improve endurance for community mobility   Pt will perform 1 floor <> stand transfer with  UE support in order to demonstrate safe functional mobility in case of future fall  Pt will report 0 falls from initiation of PT management  Pt will begin some form of home/community fitness in order to sustain progress gained in PT    Plan     Continue with Physical Therapist Plan of Care.     Dallin Mayes, PTA

## 2023-09-12 ENCOUNTER — CLINICAL SUPPORT (OUTPATIENT)
Dept: REHABILITATION | Facility: HOSPITAL | Age: 73
End: 2023-09-12
Payer: MEDICARE

## 2023-09-12 DIAGNOSIS — Z74.09 IMPAIRED FUNCTIONAL MOBILITY, BALANCE, GAIT, AND ENDURANCE: Primary | ICD-10-CM

## 2023-09-12 PROCEDURE — 97110 THERAPEUTIC EXERCISES: CPT | Mod: CQ

## 2023-09-18 ENCOUNTER — OFFICE VISIT (OUTPATIENT)
Dept: PRIMARY CARE CLINIC | Facility: CLINIC | Age: 73
End: 2023-09-18
Payer: MEDICARE

## 2023-09-18 ENCOUNTER — HOSPITAL ENCOUNTER (OUTPATIENT)
Dept: RADIOLOGY | Facility: HOSPITAL | Age: 73
Discharge: HOME | End: 2023-09-18
Attending: INTERNAL MEDICINE
Payer: MEDICARE

## 2023-09-18 ENCOUNTER — PATIENT MESSAGE (OUTPATIENT)
Dept: PAIN MEDICINE | Facility: CLINIC | Age: 73
End: 2023-09-18
Payer: MEDICARE

## 2023-09-18 ENCOUNTER — CLINICAL SUPPORT (OUTPATIENT)
Dept: REHABILITATION | Facility: HOSPITAL | Age: 73
End: 2023-09-18
Payer: MEDICARE

## 2023-09-18 VITALS
RESPIRATION RATE: 18 BRPM | TEMPERATURE: 99 F | HEART RATE: 85 BPM | BODY MASS INDEX: 40.43 KG/M2 | HEIGHT: 74 IN | WEIGHT: 315 LBS | DIASTOLIC BLOOD PRESSURE: 80 MMHG | SYSTOLIC BLOOD PRESSURE: 134 MMHG | OXYGEN SATURATION: 96 %

## 2023-09-18 DIAGNOSIS — R05.9 COUGH, UNSPECIFIED TYPE: ICD-10-CM

## 2023-09-18 DIAGNOSIS — M60.9 STATIN-INDUCED MYOSITIS: ICD-10-CM

## 2023-09-18 DIAGNOSIS — T46.6X5A STATIN-INDUCED MYOSITIS: ICD-10-CM

## 2023-09-18 DIAGNOSIS — E66.01 MORBID OBESITY DUE TO EXCESS CALORIES: ICD-10-CM

## 2023-09-18 DIAGNOSIS — I25.10 NONOBSTRUCTIVE ATHEROSCLEROSIS OF CORONARY ARTERY: ICD-10-CM

## 2023-09-18 DIAGNOSIS — Z74.09 IMPAIRED FUNCTIONAL MOBILITY, BALANCE, GAIT, AND ENDURANCE: Primary | ICD-10-CM

## 2023-09-18 DIAGNOSIS — G47.33 OSA (OBSTRUCTIVE SLEEP APNEA): Primary | ICD-10-CM

## 2023-09-18 DIAGNOSIS — I48.20 CHRONIC ATRIAL FIBRILLATION: ICD-10-CM

## 2023-09-18 PROCEDURE — 1160F RVW MEDS BY RX/DR IN RCRD: CPT | Mod: CPTII,S$GLB,, | Performed by: INTERNAL MEDICINE

## 2023-09-18 PROCEDURE — 4010F PR ACE/ARB THEARPY RXD/TAKEN: ICD-10-PCS | Mod: CPTII,S$GLB,, | Performed by: INTERNAL MEDICINE

## 2023-09-18 PROCEDURE — 1126F PR PAIN SEVERITY QUANTIFIED, NO PAIN PRESENT: ICD-10-PCS | Mod: CPTII,S$GLB,, | Performed by: INTERNAL MEDICINE

## 2023-09-18 PROCEDURE — 97110 THERAPEUTIC EXERCISES: CPT

## 2023-09-18 PROCEDURE — 1157F ADVNC CARE PLAN IN RCRD: CPT | Mod: CPTII,S$GLB,, | Performed by: INTERNAL MEDICINE

## 2023-09-18 PROCEDURE — 99999 PR PBB SHADOW E&M-EST. PATIENT-LVL IV: ICD-10-PCS | Mod: PBBFAC,,, | Performed by: INTERNAL MEDICINE

## 2023-09-18 PROCEDURE — 1159F MED LIST DOCD IN RCRD: CPT | Mod: CPTII,S$GLB,, | Performed by: INTERNAL MEDICINE

## 2023-09-18 PROCEDURE — 3008F BODY MASS INDEX DOCD: CPT | Mod: CPTII,S$GLB,, | Performed by: INTERNAL MEDICINE

## 2023-09-18 PROCEDURE — 4010F ACE/ARB THERAPY RXD/TAKEN: CPT | Mod: CPTII,S$GLB,, | Performed by: INTERNAL MEDICINE

## 2023-09-18 PROCEDURE — 3075F SYST BP GE 130 - 139MM HG: CPT | Mod: CPTII,S$GLB,, | Performed by: INTERNAL MEDICINE

## 2023-09-18 PROCEDURE — 3288F PR FALLS RISK ASSESSMENT DOCUMENTED: ICD-10-PCS | Mod: CPTII,S$GLB,, | Performed by: INTERNAL MEDICINE

## 2023-09-18 PROCEDURE — 3079F DIAST BP 80-89 MM HG: CPT | Mod: CPTII,S$GLB,, | Performed by: INTERNAL MEDICINE

## 2023-09-18 PROCEDURE — 99999 PR PBB SHADOW E&M-EST. PATIENT-LVL IV: CPT | Mod: PBBFAC,,, | Performed by: INTERNAL MEDICINE

## 2023-09-18 PROCEDURE — 3075F PR MOST RECENT SYSTOLIC BLOOD PRESS GE 130-139MM HG: ICD-10-PCS | Mod: CPTII,S$GLB,, | Performed by: INTERNAL MEDICINE

## 2023-09-18 PROCEDURE — 1159F PR MEDICATION LIST DOCUMENTED IN MEDICAL RECORD: ICD-10-PCS | Mod: CPTII,S$GLB,, | Performed by: INTERNAL MEDICINE

## 2023-09-18 PROCEDURE — 71046 X-RAY EXAM CHEST 2 VIEWS: CPT | Mod: TC

## 2023-09-18 PROCEDURE — 3079F PR MOST RECENT DIASTOLIC BLOOD PRESSURE 80-89 MM HG: ICD-10-PCS | Mod: CPTII,S$GLB,, | Performed by: INTERNAL MEDICINE

## 2023-09-18 PROCEDURE — 1101F PR PT FALLS ASSESS DOC 0-1 FALLS W/OUT INJ PAST YR: ICD-10-PCS | Mod: CPTII,S$GLB,, | Performed by: INTERNAL MEDICINE

## 2023-09-18 PROCEDURE — 1101F PT FALLS ASSESS-DOCD LE1/YR: CPT | Mod: CPTII,S$GLB,, | Performed by: INTERNAL MEDICINE

## 2023-09-18 PROCEDURE — 99214 PR OFFICE/OUTPT VISIT, EST, LEVL IV, 30-39 MIN: ICD-10-PCS | Mod: S$GLB,,, | Performed by: INTERNAL MEDICINE

## 2023-09-18 PROCEDURE — 1126F AMNT PAIN NOTED NONE PRSNT: CPT | Mod: CPTII,S$GLB,, | Performed by: INTERNAL MEDICINE

## 2023-09-18 PROCEDURE — 1157F PR ADVANCE CARE PLAN OR EQUIV PRESENT IN MEDICAL RECORD: ICD-10-PCS | Mod: CPTII,S$GLB,, | Performed by: INTERNAL MEDICINE

## 2023-09-18 PROCEDURE — 99214 OFFICE O/P EST MOD 30 MIN: CPT | Mod: S$GLB,,, | Performed by: INTERNAL MEDICINE

## 2023-09-18 PROCEDURE — 3008F PR BODY MASS INDEX (BMI) DOCUMENTED: ICD-10-PCS | Mod: CPTII,S$GLB,, | Performed by: INTERNAL MEDICINE

## 2023-09-18 PROCEDURE — 71046 XR CHEST PA AND LATERAL: ICD-10-PCS | Mod: 26,,, | Performed by: RADIOLOGY

## 2023-09-18 PROCEDURE — 71046 X-RAY EXAM CHEST 2 VIEWS: CPT | Mod: 26,,, | Performed by: RADIOLOGY

## 2023-09-18 PROCEDURE — 3288F FALL RISK ASSESSMENT DOCD: CPT | Mod: CPTII,S$GLB,, | Performed by: INTERNAL MEDICINE

## 2023-09-18 PROCEDURE — 1160F PR REVIEW ALL MEDS BY PRESCRIBER/CLIN PHARMACIST DOCUMENTED: ICD-10-PCS | Mod: CPTII,S$GLB,, | Performed by: INTERNAL MEDICINE

## 2023-09-18 NOTE — PROGRESS NOTES
"[facility]   Physical Therapy Treatment Note      Name: Zeenat Mancia  Clinic Number: [id number]    Therapy Diagnosis:   Encounter Diagnosis   Name Primary?    Impaired functional mobility, balance, gait, and endurance Yes     Physician: Christina Mann NP    Visit Date: 9/8/2023       Physician Orders: PT Eval and Treat   Medical Diagnosis from Referral: G62.9 (ICD-10-CM) - Neuropathy R29.898 (ICD-10-CM) - Weakness of both lower extremities   Evaluation Date: 8/24/2023  Authorization Period Expiration: 07/25/2024   Plan of Care Expiration: 10/13/2023  Progress Note Due: 9/21/2023  Visit # / Visits authorized: 1/1: 2/20  FOTO: 1/ 3     Precautions: Standard and Fall      PTA Visit #: 1/5      Time In: 10:00 AM   Time Out: 10:5 AM   Total Billable Time: 45 minutes    PTA Visit #: 1/5       Subjective   Pt reports: increased shortness of breath lately, however, patient has an appointment with PCP on Monday. Patient also reports being "down for a couple of days" after last treatment session. Patient also reports increased difficulty sleeping at night, therefore, patient is always tired.      He was compliant with home exercise program.  Response to previous treatment: initial eval   Functional change: none      Pain: 7/10  Location: bilateral leg weakness       Objective      Objective Measures updated at progress report unless specified.     Treatment   C J received the treatments listed below:       Lower Extremity Strength     therapeutic exercises to develop strength and flexibility for 45 minutes including:     NuStep 5 minutes   Seated marching 2 x 10   Seated hip adduction 2 x 10   Seated clams green thera band 2 x 10   seated hamstring stretch 20" x 3  modified piriformis stretch in chair with stool 20" x 3  Seated long arch quads 2 x 10 each      Not performed today  - sit to stand from standard chair x 10 twice daily    Patient Education and Home Exercises       Education provided: "   - - HEP  -role of PT    Written Home Exercises Provided: Patient instructed to cont prior HEP. Exercises were reviewed and TICO ZARATE was able to demonstrate them prior to the end of the session.  TICO ZARATE demonstrated good  understanding of the education provided. See Electronic Medical Record under Patient Instructions for exercises provided during therapy sessions    Assessment        Pt required rest breaks throughout therapeutic exercise due to patient with increased fatigue and shortness of breath throughout task. Patient with most shortness of breath and long rest breaks between tasks with seated hamstring stretch and piriformis stretching. Patient required a seated rest break after performing long arch quads due to patient with increased fatigue throughout therapeutic exercise. Will continue to monitor and progress patient as tolerated.        TICO ZARATE Is progressing well towards his goals.   Patient prognosis is Good.     Patient will continue to benefit from skilled outpatient physical therapy to address the deficits listed in the problem list box on initial evaluation, provide pt/family education and to maximize pt's level of independence in the home and community environment.     Patient's spiritual, cultural and educational needs considered and pt agreeable to plan of care and goals.     Anticipated barriers to physical therapy: co-morbidities    Goals:   Goals:   Short Term Goals: 4 weeks   Pt will be compliant with HEP in order to maximize PT benefits  Pt will improve BLE MMT grades by >/=1/2 grade in order to improve strength for ADL completion  Pt will complete TUG in </= 12 seconds with least restrictive assistive device in order to reduce risk for falls and improve safety with functional mobility  Pt will score >/= 11 repetitions on 30-Second Sit to  order to improve BLE endurance and muscular power for transfers   Pt will complete Six-Minute Walk Test indoors in order to improve endurance for home  mobility      Long Term Goals: 8 weeks   Pt will score </= TBD% on FOTO limitation survey in order to improve self-perception of functional mobility deficits  Pt will improve BLE MMT grades by >/=1 grade in order to improve strength for ADL completion  Pt will complete TUG in </= 10 seconds with least restrictive assistive device in order to reduce risk for falls and improve safety with functional mobility  Pt will demonstrate increase in single leg balance to at least 10 seconds bilaterally, demonstrating improved balance and decreased risk of falls.   Pt will score >/= 13 repetitions on 30-Second Sit to  order to improve BLE endurance and muscular power for transfers   Pt will walk >/= 1000 feet on Six-Minute Walk Test outdoors over multiple levels/surfaces in order to improve endurance for community mobility   Pt will perform 1 floor <> stand transfer with  UE support in order to demonstrate safe functional mobility in case of future fall  Pt will report 0 falls from initiation of PT management  Pt will begin some form of home/community fitness in order to sustain progress gained in PT       Plan     Cont POC as tolerated.    Puneet Petty, PT

## 2023-09-18 NOTE — PROGRESS NOTES
[facility] Note    Chief Complaint      Chief Complaint   Patient presents with    Wheezing     6m       History of Present Illness      Zeenat Mancia is a 73 y.o. male who presents today for   Chief Complaint   Patient presents with    Wheezing     6m   .  Patient comes to appointment here for 6 m cehckup for chronc issues as detaile below . He is alsio c/o some wheesing .  He also feels sometimes difficulty to breath . He also complains of extreme fatigue with as little  as taking a shower .     HPI    No problem-specific Assessment & Plan notes found for this encounter.       Problem List Items Addressed This Visit          Cardiac/Vascular    Chronic atrial fibrillation    Overview     Dr prado cards managing . Cont current          Nonobstructive atherosclerosis of coronary artery    Overview     Cont b blocker , eliqus , and is not on statin due to intolerance            Endocrine    Morbid obesity due to excess calories    Overview     Cont to encourage diet and weight loss             Other    JACKELYN (obstructive sleep apnea) - Primary    Overview     Utilizing  cpap with good benefit          Statin-induced myositis    Overview     Unable to tolerate statisn              Past Medical History:  Past Medical History:   Diagnosis Date    A-fib     Acute blood loss anemia     Arthritis     Atrial flutter     Colon polyp     Diverticulosis     Dyslipidemia     Dyspnea on exertion 5/31/2021    GERD (gastroesophageal reflux disease)     on Protonix    Hepatic steatosis     Hyperlipidemia     Hypertension     Irregular heart beat     Lower GI bleed 5/31/2021    Multiple gastric ulcers 05/16/2018    gi scope per Dr. Houston    Obesity     Sleep apnea     uses CPAP    Unspecified disorder of kidney and ureter     kidney stones       Past Surgical History:  Past Surgical History:   Procedure Laterality Date    ADENOIDECTOMY  1958    APPENDECTOMY      CATARACT EXTRACTION  2009    OS     CATARACT EXTRACTION W/  INTRAOCULAR LENS IMPLANT Right 06/03/2015    [id number] 14.5d//    CHOLECYSTECTOMY  2021    COLONOSCOPY N/A 10/10/2017    Procedure: COLONOSCOPY;  Surgeon: Jameson Houston MD;  Location: [facility];  Service: Endoscopy;  Laterality: N/A; repeat in 5 years for surveillance    COLONOSCOPY N/A 05/16/2018    Procedure: COLONOSCOPY;  Surgeon: Jameson Houston MD;  Location: [facility];  Service: Endoscopy;  Laterality: N/A;    COLONOSCOPY N/A 06/02/2021    Procedure: COLONOSCOPY;  Surgeon: Agustin Corona MD;  Location: [facility] (2ND FLR);  Service: Endoscopy;  Laterality: N/A;    COLONOSCOPY N/A 10/11/2022    Procedure: COLONOSCOPY;  Surgeon: Edin Lara MD;  Location: [facility] (2ND FLR);  Service: Endoscopy;  Laterality: N/A;  2nd floor-difficult intubation  ok to hold eliquis x2 days per Dr. Jerome, see telephone encounter 6/28-vt  fully vaccinated, instructions [facility]  extended miralax prep  Clear liquids up to 2 hrs prior/ AM prep [id number]    CORONARY ANGIOGRAPHY N/A 01/04/2021    Procedure: ANGIOGRAM, CORONARY ARTERY;  Surgeon: Jase Moore MD;  Location: [facility] CATH LAB/EP;  Service: Cardiology;  Laterality: N/A;    CYSTOSCOPY WITH INSERTION OF MINIMALLY INVASIVE IMPLANT TO ENLARGE PROSTATIC URETHRA N/A 06/29/2021    Procedure: TRANSPROSTATIC TISSUE RETRACTION;  Surgeon: Agustin Clay MD;  Location: [facility] OR;  Service: Urology;  Laterality: N/A;    ESOPHAGOGASTRODUODENOSCOPY N/A 07/02/2018    Procedure: EGD (ESOPHAGOGASTRODUODENOSCOPY);  Surgeon: Jameson Houston MD;  Location: [facility];  Service: Endoscopy;  Laterality: N/A;    ESOPHAGOGASTRODUODENOSCOPY N/A 06/02/2021    Procedure: EGD (ESOPHAGOGASTRODUODENOSCOPY);  Surgeon: Agustin Corona MD;  Location: [facility] ([facility] FLR);  Service: Endoscopy;  Laterality: N/A;    EYE SURGERY      cataract    LEFT HEART CATHETERIZATION N/A 01/04/2021    Procedure: Left heart cath;  Surgeon: Jase  MD Oscar;  Location: [facility] CATH LAB/EP;  Service: Cardiology;  Laterality: N/A;    ROBOT-ASSISTED CHOLECYSTECTOMY N/A 2021    Procedure: ROBOTIC CHOLECYSTECTOMY;  Surgeon: Pal Cheney MD;  Location: [facility] OR;  Service: General;  Laterality: N/A;    TONSILLECTOMY      TRANSESOPHAGEAL ECHOCARDIOGRAPHY N/A 2021    Procedure: ECHOCARDIOGRAM, TRANSESOPHAGEAL;  Surgeon: Leo Reeves III, MD;  Location: [facility] EP LAB;  Service: Cardiology;  Laterality: N/A;    TREATMENT OF CARDIAC ARRHYTHMIA N/A 2021    Procedure: Cardioversion or Defibrillation;  Surgeon: Judy Jerome MD;  Location: [facility] CATH LAB/EP;  Service: Cardiology;  Laterality: N/A;    TREATMENT OF CARDIAC ARRHYTHMIA N/A 2021    Procedure: CARDIOVERSION;  Surgeon: PEMA Downs MD;  Location: [facility] EP LAB;  Service: Cardiology;  Laterality: N/A;  AF, PHUONG, DCCV, MAC, DM, 3 PREP    UPPER GASTROINTESTINAL ENDOSCOPY  2018    Dr. Houston       Family History:  family history includes Arthritis in his mother; Blindness in his mother; Cataracts in his father; Coronary artery disease in his father; Heart disease in his father and mother.     Social History:  Social History     Socioeconomic History    Marital status:     Number of children: 3   Tobacco Use    Smoking status: Former     Current packs/day: 0.00     Average packs/day: 2.0 packs/day for 11.0 years (22.0 ttl pk-yrs)     Types: Cigarettes     Start date: 1969     Quit date: 1980     Years since quittin.7    Smokeless tobacco: Never    Tobacco comments:     quit in     Substance and Sexual Activity    Alcohol use: Not Currently     Alcohol/week: 2.0 standard drinks of alcohol     Types: 1 Glasses of wine, 1 Cans of beer per week     Comment: 1 beer every 2 month    Drug use: No    Sexual activity: Not Currently     Partners: Female   Social History Narrative    Patient is originally from [place] at          College/university ; [employer]        Working ; retired,  plants            47 years         Children    Son x3         Lives with  Kerri Ann/tuulicse    Updates    9/8/20- moved from [facility]       Social Determinants of Health     Financial Resource Strain: Low Risk  (6/12/2022)    Overall Financial Resource Strain (CARDIA)     Difficulty of Paying Living Expenses: Not hard at all   Food Insecurity: No Food Insecurity (6/12/2022)    Hunger Vital Sign     Worried About Running Out of Food in the Last Year: Never true     Ran Out of Food in the Last Year: Never true   Transportation Needs: No Transportation Needs (6/12/2022)    PRAPARE - Transportation     Lack of Transportation (Medical): No     Lack of Transportation (Non-Medical): No   Physical Activity: Unknown (6/12/2022)    Exercise Vital Sign     Days of Exercise per Week: 1 day   Recent Concern: Physical Activity - Insufficiently Active (5/2/2022)    Exercise Vital Sign     Days of Exercise per Week: 2 days     Minutes of Exercise per Session: 10 min   Stress: Stress Concern Present (6/12/2022)    [employer] [place] [employer] - Occupational Stress Questionnaire     Feeling of Stress : Rather much   Social Connections: Unknown (6/12/2022)    Social Connection and Isolation Panel [NHANES]     Frequency of Communication with Friends and Family: Three times a week   Housing Stability: Unknown (6/12/2022)    Housing Stability Vital Sign     Unable to Pay for Housing in the Last Year: No     Unstable Housing in the Last Year: No       Review of Systems:   Review of Systems   Constitutional:  Negative for fever and weight loss.   HENT:  Negative for congestion, hearing loss and sore throat.    Eyes:  Negative for blurred vision.   Respiratory:  Positive for cough. Negative for shortness of breath.    Cardiovascular:  Negative for chest pain, palpitations, claudication and leg swelling.   Gastrointestinal:  Negative  for abdominal pain, constipation, diarrhea and heartburn.   Genitourinary:  Negative for dysuria.   Musculoskeletal:  Negative for back pain and myalgias.   Skin:  Negative for rash.   Neurological:  Positive for tingling. Negative for focal weakness and headaches.   Psychiatric/Behavioral:  Negative for depression and suicidal ideas. The patient is not nervous/anxious.         Medications:  Outpatient Encounter Medications as of 9/18/2023   Medication Sig Dispense Refill    amLODIPine (NORVASC) 5 MG tablet Take 1 tablet (5 mg total) by mouth once daily. 90 tablet 3    atenoloL (TENORMIN) 50 MG tablet TAKE 1 TABLET(50 MG) BY MOUTH EVERY DAY 90 tablet 3    DULoxetine (CYMBALTA) 30 MG capsule TAKE 1 CAPSULE(30 MG) BY MOUTH EVERY DAY 30 capsule 0    ELIQUIS 5 mg Tab TAKE 1 TABLET(5 MG) BY MOUTH TWICE DAILY 180 tablet 3    furosemide (LASIX) 20 MG tablet TAKE 1 TABLET BY MOUTH DAILY AS NEEDED FOR SWELLING 30 tablet 0    hydroCHLOROthiazide (HYDRODIURIL) 25 MG tablet Take 1 tablet (25 mg total) by mouth once daily. 90 tablet 3    melatonin 10 mg Tab Take 10 mg by mouth every evening.      multivit-min/FA/lycopen/lutein (CENTRUM SILVER MEN ORAL) Take by mouth once daily.      pantoprazole (PROTONIX) 40 MG tablet TAKE 1 TABLET(40 MG) BY MOUTH EVERY DAY 90 tablet 2    sildenafiL (VIAGRA) 100 MG tablet Take 1 tablet (100 mg total) by mouth daily as needed for Erectile Dysfunction. 10 tablet 3    tadalafiL (CIALIS) 20 MG Tab Take 1 tablet (20 mg total) by mouth every 72 hours as needed (ED). 10 tablet 11    tamsulosin (FLOMAX) 0.4 mg Cap TAKE 1 CAPSULE(0.4 MG) BY MOUTH EVERY DAY 30 capsule 11    traZODone (DESYREL) 100 MG tablet Take 1 tablet (100 mg total) by mouth every evening. 30 tablet 1    valsartan (DIOVAN) 320 MG tablet TAKE 1 TABLET(320 MG) BY MOUTH EVERY DAY 90 tablet 3    pregabalin (LYRICA) 50 MG capsule Take 1 capsule (50 mg total) by mouth 2 (two) times daily. 60 capsule 2    [DISCONTINUED] traZODone (DESYREL)  "100 MG tablet TAKE 1 TABLET(100 MG) BY MOUTH EVERY EVENING 30 tablet 1     Facility-Administered Encounter Medications as of 9/18/2023   Medication Dose Route Frequency Provider Last Rate Last Admin    perflutren protein-A microsphr 0.22 mg/mL IV susp  0.5 mL Intravenous Once Mauri Portillo MD           Allergies:  Review of patient's allergies indicates:   Allergen Reactions    Lipitor [atorvastatin] Itching         Physical Exam      Vitals:    09/18/23 1020   BP: 134/80   Pulse: 85   Resp: 18   Temp: 98.7 °F (37.1 °C)        Vital Signs  Temp: 98.7 °F (37.1 °C)  Temp Source: Oral  Pulse: 85  Resp: 18  SpO2: 96 %  BP: 134/80  BP Location: Right arm  Patient Position: Sitting  Pain Score: 0-No pain  Height and Weight  Height: 6' 2" (188 cm)  Weight: (!) 154.1 kg (339 lb 11.7 oz)  BSA (Calculated - sq m): 2.84 sq meters  BMI (Calculated): 43.6  Weight in (lb) to have BMI = 25: 194.3]     Body mass index is 43.62 kg/m².    Physical Exam  Constitutional:       Appearance: He is well-developed.   HENT:      Head: Normocephalic.   Eyes:      Pupils: Pupils are equal, round, and reactive to light.   Neck:      Thyroid: No thyromegaly.   Cardiovascular:      Rate and Rhythm: Normal rate and regular rhythm.      Heart sounds: No murmur heard.     No friction rub. No gallop.   Pulmonary:      Effort: Pulmonary effort is normal.      Breath sounds: Normal breath sounds.   Abdominal:      General: Bowel sounds are normal.      Palpations: Abdomen is soft.   Musculoskeletal:         General: Normal range of motion.      Cervical back: Normal range of motion.   Skin:     General: Skin is warm and dry.   Neurological:      Mental Status: He is alert and oriented to person, place, and time.      Sensory: No sensory deficit.   Psychiatric:         Behavior: Behavior normal.          Laboratory:  CBC:  No results for input(s): "WBC", "RBC", "HGB", "HCT", "PLT", "MCV", "MCH", "MCHC" in the last 2160 hours.  CMP:  Recent Labs   Lab " "Result Units 06/22/23  1014   Potassium mmol/L 4.0     URINALYSIS:  No results for input(s): "COLORU", "CLARITYU", "SPECGRAV", "PHUR", "PROTEINUA", "GLUCOSEU", "BILIRUBINCON", "BLOODU", "WBCU", "RBCU", "BACTERIA", "MUCUS", "NITRITE", "LEUKOCYTESUR", "UROBILINOGEN", "HYALINECASTS" in the last 2160 hours.   LIPIDS:  No results for input(s): "TSH", "HDL", "CHOL", "TRIG", "LDLCALC", "CHOLHDL", "NONHDLCHOL", "TOTALCHOLEST" in the last 2160 hours.  TSH:  No results for input(s): "TSH" in the last 2160 hours.  A1C:  No results for input(s): "HGBA1C" in the last 2160 hours.    Radiology:        Assessment:     Zeenat Mancia is a 73 y.o.male with:    JACKELYN (obstructive sleep apnea)    Morbid obesity due to excess calories    Chronic atrial fibrillation    Nonobstructive atherosclerosis of coronary artery    Statin-induced myositis                Plan:     Problem List Items Addressed This Visit          Cardiac/Vascular    Chronic atrial fibrillation    Overview     Dr prado cards managing . Cont current          Nonobstructive atherosclerosis of coronary artery    Overview     Cont b blocker , eliqus , and is not on statin due to intolerance            Endocrine    Morbid obesity due to excess calories    Overview     Cont to encourage diet and weight loss             Other    JACKELYN (obstructive sleep apnea) - Primary    Overview     Utilizing  cpap with good benefit          Statin-induced myositis    Overview     Unable to tolerate statisn             As above, continue current medications and maintain follow up with specialists.  Return to clinic in 6  months.      Frederick W Dantagnan [facility]                  "

## 2023-09-18 NOTE — PROGRESS NOTES
"[facility]   Physical Therapy Treatment Note      Name: Zeenat Mancia  Clinic Number: [id number]    Therapy Diagnosis:   Encounter Diagnosis   Name Primary?    Impaired functional mobility, balance, gait, and endurance Yes     Physician: Christina Mann NP    Visit Date: 9/18/2023    Physician Orders: PT Eval and Treat   Medical Diagnosis from Referral: G62.9 (ICD-10-CM) - Neuropathy R29.898 (ICD-10-CM) - Weakness of both lower extremities   Evaluation Date: 8/24/2023  Authorization Period Expiration: 07/25/2024   Plan of Care Expiration: 10/13/2023  Progress Note Due: 9/21/2023  Visit # / Visits authorized: 3/20  FOTO: 1/ 3     Precautions: Standard and Fall     PTA Visit #: 0/5     Time In: 11:25 AM   Time Out: 12:00 PM   Total Billable Time: 35 minutes    Subjective     Pt reports: he continues to have difficulty sleeping and therefore still feeling tired. He had an appointment with PCP this morning and they did a chest x-ray.    He was compliant with home exercise program.  Response to previous treatment: initial eval   Functional change: none     Pain: 7/10  Location: bilateral leg weakness    Objective      Objective Measures updated at progress report unless specified.     Treatment     C J received the treatments listed below:      Lower Extremity Strength    therapeutic exercises to develop strength and flexibility for 40 minutes including:    NuStep 5 minutes   Seated marching 2 x 10   Seated hip adduction 2 x 10   Seated clams green thera band 2 x 10   seated hamstring stretch 20" x 3  modified piriformis stretch in chair with stool 20" x 3  Seated long arch quads 2 x 10 each     Not performed today  - sit to stand from standard chair x 10 twice daily    Patient Education and Home Exercises       Education provided:   - reviewed anatomy / physiology related to diagnosis  - role of PT, PT POC and goals  - attendance policy      Written Home Exercises Provided: yes.  Exercises " were reviewed and TICO ZARATE was able to demonstrate them prior to the end of the session.  TICO ZARATE demonstrated good  understanding of the education provided.     Assessment   Pt continues to be limited with therex due to c/o fatigue and shortness of breath; he required several rest breaks during session due to fatigue. He continues to report fatigue due to difficulty sleeping. He is unable to tolerate therex in supine position, therefore all exercises performed in seated position. Session shortened to 35 minutes today due to pt's tolerance and reports of fatigue. Will continue to monitor and progress patient as tolerated.     TICO ZARATE Is progressing well towards his goals.   Pt prognosis is Good.     Pt will continue to benefit from skilled outpatient physical therapy to address the deficits listed in the problem list box on initial evaluation, provide pt/family education and to maximize pt's level of independence in the home and community environment.     Pt's spiritual, cultural and educational needs considered and pt agreeable to plan of care and goals.     Anticipated barriers to physical therapy:  chronicity of impairments, co-morbidities    Goals:   Short Term Goals: 4 weeks   Pt will be compliant with HEP in order to maximize PT benefits  Pt will improve BLE MMT grades by >/=1/2 grade in order to improve strength for ADL completion  Pt will complete TUG in </= 12 seconds with least restrictive assistive device in order to reduce risk for falls and improve safety with functional mobility  Pt will score >/= 11 repetitions on 30-Second Sit to  order to improve BLE endurance and muscular power for transfers   Pt will complete Six-Minute Walk Test indoors in order to improve endurance for home mobility      Long Term Goals: 8 weeks   Pt will score </= TBD% on FOTO limitation survey in order to improve self-perception of functional mobility deficits  Pt will improve BLE MMT grades by >/=1 grade in order to improve  strength for ADL completion  Pt will complete TUG in </= 10 seconds with least restrictive assistive device in order to reduce risk for falls and improve safety with functional mobility  Pt will demonstrate increase in single leg balance to at least 10 seconds bilaterally, demonstrating improved balance and decreased risk of falls.   Pt will score >/= 13 repetitions on 30-Second Sit to  order to improve BLE endurance and muscular power for transfers   Pt will walk >/= 1000 feet on Six-Minute Walk Test outdoors over multiple levels/surfaces in order to improve endurance for community mobility   Pt will perform 1 floor <> stand transfer with  UE support in order to demonstrate safe functional mobility in case of future fall  Pt will report 0 falls from initiation of PT management  Pt will begin some form of home/community fitness in order to sustain progress gained in PT    Plan     Continue with Physical Therapist Plan of Care.     Natasha Stanford, PT

## 2023-09-22 NOTE — PROGRESS NOTES
Refill Authorization Note   Zeenat Mancia is requesting a refill authorization.  Brief assessment and rationale for refill: Approve     Medication Therapy Plan: CDMR. approve     Medication reconciliation completed: No   Comments:       Requested Prescriptions   Pending Prescriptions Disp Refills    amLODIPine (NORVASC) 5 MG tablet [Pharmacy Med Name: AMLODIPINE BESYLATE TABS 5MG] 90 tablet 3     Sig: TAKE 1 TABLET DAILY       Calcium-Channel Blockers Protocol Passed - 11/6/2020  5:46 PM        Passed - Visit with authorizing provider in past 12 months or upcoming 90 days         Passed - Blood Pressure below 139/89 on file in past 12 months      BP Readings from Last 3 Encounters:   10/26/20 112/76   09/11/20 108/71 09/08/20 122/68            Cardiovascular:  Calcium Channel Blockers Passed - 11/6/2020  5:46 PM        Passed - Patient is at least 18 years old        Passed - Last BP in normal range within 360 days.     BP Readings from Last 3 Encounters:   10/26/20 112/76   09/11/20 108/71 09/08/20 122/68              Passed - Office visit in past 12 months or future 90 days     Recent Outpatient Visits            1 week ago Benign prostatic hyperplasia, unspecified whether lower urinary tract symptoms present    [facility] Laura Aleman MD    1 month ago JACKELYN (obstructive sleep apnea)    [place] [facility] Lore Greer NP    1 month ago Essential hypertension    [facility] Lewis Cantor III, MD    2 months ago Bleeding internal hemorrhoids    [facility] Lore Cates NP    5 months ago Hematochezia    [place] [facility] Lewis Cantor III, MD          Future Appointments              In 1 month Laura Aleman MD [facility], [place]    In 1 month LAB, [facility] [place]    In 1 month Lewis Cantor III, MD [facility], [place]                    Appointments  past 12m or  future 3m with PCP    Date Provider   Last Visit   9/8/2020 Lewis Cantor III, MD   Next Visit   12/11/2020 Lewis Cantor III, MD   ED visits in past 90 days: 0     Note composed:5:47 PM 11/06/2020              Graft Donor Site Bandage (Optional-Leave Blank If You Don't Want In Note): Steri-strips and a pressure bandage were applied to the donor site.

## 2023-09-25 ENCOUNTER — CLINICAL SUPPORT (OUTPATIENT)
Dept: REHABILITATION | Facility: HOSPITAL | Age: 73
End: 2023-09-25
Payer: MEDICARE

## 2023-09-25 DIAGNOSIS — Z74.09 IMPAIRED FUNCTIONAL MOBILITY, BALANCE, GAIT, AND ENDURANCE: Primary | ICD-10-CM

## 2023-09-25 PROCEDURE — 97110 THERAPEUTIC EXERCISES: CPT

## 2023-09-29 ENCOUNTER — TELEPHONE (OUTPATIENT)
Dept: BARIATRICS | Facility: CLINIC | Age: 73
End: 2023-09-29
Payer: MEDICARE

## 2023-09-29 NOTE — TELEPHONE ENCOUNTER
----- Message from Ghada Garcia MD sent at 9/29/2023 12:12 PM CDT -----  Please let pt know that I have reviewed his chart and with his hisory of Afib and kidney stones, along with limitations of what insurance will cover, I will not be have medical options to offer him.     Thank you,  Dr. Garcia

## 2023-09-30 DIAGNOSIS — G47.09 OTHER INSOMNIA: ICD-10-CM

## 2023-09-30 RX ORDER — TRAZODONE HYDROCHLORIDE 100 MG/1
100 TABLET ORAL NIGHTLY
Qty: 90 TABLET | Refills: 3 | Status: SHIPPED | OUTPATIENT
Start: 2023-09-30 | End: 2023-11-07 | Stop reason: SDUPTHER

## 2023-09-30 NOTE — TELEPHONE ENCOUNTER
Provider Staff:  Action required for this patient     Please see care gap opportunities below in Care Due Message: Lab (Na) due 10.15.2023.     Thanks!  [facility]     Appointments     Last Visit   9/18/2023 Elias Thomas MD   Next Visit   3/19/2024 Elias Thomas MD       Refill Decision Note   Zeenat Mancia  is requesting a refill authorization.  Brief Assessment and Rationale for Refill:  Approve       Medication Therapy Plan:  Lab (Na) due 10.15.2023      Comments:   Note composed:1:03 PM 09/30/2023

## 2023-09-30 NOTE — PROGRESS NOTES
"[facility]   Physical Therapy Treatment Note      Name: Zeenat Mancia  Clinic Number: [id number]    Therapy Diagnosis:   Encounter Diagnosis   Name Primary?    Impaired functional mobility, balance, gait, and endurance Yes     Physician: Christina Mann NP    Visit Date: 9/25/2023    Physician Orders: PT Eval and Treat   Medical Diagnosis from Referral: G62.9 (ICD-10-CM) - Neuropathy R29.898 (ICD-10-CM) - Weakness of both lower extremities   Evaluation Date: 8/24/2023  Authorization Period Expiration: 07/25/2024   Plan of Care Expiration: 10/13/2023  Progress Note Due: 9/21/2023  Visit # / Visits authorized: 3/20  FOTO: 1/ 3     Precautions: Standard and Fall     PTA Visit #: 0/5     Time In: 10:15 AM   Time Out: 11:00 PM   Total Billable Time: 45 minutes    Subjective     Pt reports: he continues to have difficulty sleeping and therefore still feeling tired.  He was compliant with home exercise program.  Response to previous treatment: initial eval   Functional change: none     Pain: 7/10  Location: bilateral leg weakness    Objective      Objective Measures updated at progress report unless specified.     Treatment     TICO ZARATE received the treatments listed below:      Lower Extremity Strength    therapeutic exercises to develop strength and flexibility for 40 minutes including:    NuStep 5 minutes   Seated marching 2 x 10   Seated hip adduction 2 x 10   Seated clams green thera band 2 x 10   seated hamstring stretch 20" x 3  modified piriformis stretch in chair with stool 20" x 3  Seated long arch quads 2 x 10 each     Not performed today  - sit to stand from standard chair x 10 twice daily    Patient Education and Home Exercises       Education provided:   - reviewed anatomy / physiology related to diagnosis  - role of PT, PT POC and goals  - attendance policy      Written Home Exercises Provided: yes.  Exercises were reviewed and TICO ZARATE was able to demonstrate them prior to the end of the " session.  TICO ZARATE demonstrated good  understanding of the education provided.     Assessment   Pt continues to be limited with therex due to c/o fatigue and shortness of breath; he required several rest breaks during session due to fatigue. He continues to report fatigue due to difficulty sleeping. He is unable to tolerate therex in supine position, therefore all exercises performed in seated position.  Will continue to monitor and progress patient as tolerated.     TICO ZARATE Is progressing well towards his goals.   Pt prognosis is Good.     Pt will continue to benefit from skilled outpatient physical therapy to address the deficits listed in the problem list box on initial evaluation, provide pt/family education and to maximize pt's level of independence in the home and community environment.     Pt's spiritual, cultural and educational needs considered and pt agreeable to plan of care and goals.     Anticipated barriers to physical therapy:  chronicity of impairments, co-morbidities    Goals:   Short Term Goals: 4 weeks   Pt will be compliant with HEP in order to maximize PT benefits  Pt will improve BLE MMT grades by >/=1/2 grade in order to improve strength for ADL completion  Pt will complete TUG in </= 12 seconds with least restrictive assistive device in order to reduce risk for falls and improve safety with functional mobility  Pt will score >/= 11 repetitions on 30-Second Sit to  order to improve BLE endurance and muscular power for transfers   Pt will complete Six-Minute Walk Test indoors in order to improve endurance for home mobility      Long Term Goals: 8 weeks   Pt will score </= TBD% on FOTO limitation survey in order to improve self-perception of functional mobility deficits  Pt will improve BLE MMT grades by >/=1 grade in order to improve strength for ADL completion  Pt will complete TUG in </= 10 seconds with least restrictive assistive device in order to reduce risk for falls and improve safety  with functional mobility  Pt will demonstrate increase in single leg balance to at least 10 seconds bilaterally, demonstrating improved balance and decreased risk of falls.   Pt will score >/= 13 repetitions on 30-Second Sit to  order to improve BLE endurance and muscular power for transfers   Pt will walk >/= 1000 feet on Six-Minute Walk Test outdoors over multiple levels/surfaces in order to improve endurance for community mobility   Pt will perform 1 floor <> stand transfer with  UE support in order to demonstrate safe functional mobility in case of future fall  Pt will report 0 falls from initiation of PT management  Pt will begin some form of home/community fitness in order to sustain progress gained in PT    Plan     Continue with Physical Therapist Plan of Care.     Puneet Petty, PT

## 2023-09-30 NOTE — TELEPHONE ENCOUNTER
Care Due:                  Date            Visit Type   Department     Provider  --------------------------------------------------------------------------------                                EP -                              PRIMARY      [facility]  Last Visit: 09-      CARE ([facility])   DARRYN Thomas                              EP -                              PRIMARY      [facility]  Next Visit: 03-      CARE ([facility])   DARRYN Thomas                                                            Last  Test          Frequency    Reason                     Performed    Due Date  --------------------------------------------------------------------------------    Na..........  12 months..  hydroCHLOROthiazide......  10-   10-    [facility] Embedded Care Due Messages. Reference number: [id number].   9/30/2023 8:07:36 AM CDT

## 2023-10-02 ENCOUNTER — PATIENT MESSAGE (OUTPATIENT)
Dept: PAIN MEDICINE | Facility: CLINIC | Age: 73
End: 2023-10-02
Payer: MEDICARE

## 2023-10-03 ENCOUNTER — OFFICE VISIT (OUTPATIENT)
Dept: PAIN MEDICINE | Facility: CLINIC | Age: 73
End: 2023-10-03
Payer: MEDICARE

## 2023-10-03 VITALS — SYSTOLIC BLOOD PRESSURE: 155 MMHG | HEART RATE: 66 BPM | DIASTOLIC BLOOD PRESSURE: 82 MMHG

## 2023-10-03 DIAGNOSIS — G60.9 IDIOPATHIC PERIPHERAL NEUROPATHY: Primary | ICD-10-CM

## 2023-10-03 DIAGNOSIS — M79.671 BILATERAL FOOT PAIN: ICD-10-CM

## 2023-10-03 DIAGNOSIS — M79.672 BILATERAL FOOT PAIN: ICD-10-CM

## 2023-10-03 PROCEDURE — 99999 PR PBB SHADOW E&M-EST. PATIENT-LVL III: ICD-10-PCS | Mod: PBBFAC,,, | Performed by: NURSE PRACTITIONER

## 2023-10-03 PROCEDURE — 4010F ACE/ARB THERAPY RXD/TAKEN: CPT | Mod: CPTII,S$GLB,, | Performed by: NURSE PRACTITIONER

## 2023-10-03 PROCEDURE — 3077F PR MOST RECENT SYSTOLIC BLOOD PRESSURE >= 140 MM HG: ICD-10-PCS | Mod: CPTII,S$GLB,, | Performed by: NURSE PRACTITIONER

## 2023-10-03 PROCEDURE — 99214 OFFICE O/P EST MOD 30 MIN: CPT | Mod: S$GLB,,, | Performed by: NURSE PRACTITIONER

## 2023-10-03 PROCEDURE — 99999 PR PBB SHADOW E&M-EST. PATIENT-LVL III: CPT | Mod: PBBFAC,,, | Performed by: NURSE PRACTITIONER

## 2023-10-03 PROCEDURE — 99214 PR OFFICE/OUTPT VISIT, EST, LEVL IV, 30-39 MIN: ICD-10-PCS | Mod: S$GLB,,, | Performed by: NURSE PRACTITIONER

## 2023-10-03 PROCEDURE — 1159F MED LIST DOCD IN RCRD: CPT | Mod: CPTII,S$GLB,, | Performed by: NURSE PRACTITIONER

## 2023-10-03 PROCEDURE — 3079F PR MOST RECENT DIASTOLIC BLOOD PRESSURE 80-89 MM HG: ICD-10-PCS | Mod: CPTII,S$GLB,, | Performed by: NURSE PRACTITIONER

## 2023-10-03 PROCEDURE — 3079F DIAST BP 80-89 MM HG: CPT | Mod: CPTII,S$GLB,, | Performed by: NURSE PRACTITIONER

## 2023-10-03 PROCEDURE — 1125F AMNT PAIN NOTED PAIN PRSNT: CPT | Mod: CPTII,S$GLB,, | Performed by: NURSE PRACTITIONER

## 2023-10-03 PROCEDURE — 4010F PR ACE/ARB THEARPY RXD/TAKEN: ICD-10-PCS | Mod: CPTII,S$GLB,, | Performed by: NURSE PRACTITIONER

## 2023-10-03 PROCEDURE — 3077F SYST BP >= 140 MM HG: CPT | Mod: CPTII,S$GLB,, | Performed by: NURSE PRACTITIONER

## 2023-10-03 PROCEDURE — 1159F PR MEDICATION LIST DOCUMENTED IN MEDICAL RECORD: ICD-10-PCS | Mod: CPTII,S$GLB,, | Performed by: NURSE PRACTITIONER

## 2023-10-03 PROCEDURE — 1125F PR PAIN SEVERITY QUANTIFIED, PAIN PRESENT: ICD-10-PCS | Mod: CPTII,S$GLB,, | Performed by: NURSE PRACTITIONER

## 2023-10-03 PROCEDURE — 1157F PR ADVANCE CARE PLAN OR EQUIV PRESENT IN MEDICAL RECORD: ICD-10-PCS | Mod: CPTII,S$GLB,, | Performed by: NURSE PRACTITIONER

## 2023-10-03 PROCEDURE — 1157F ADVNC CARE PLAN IN RCRD: CPT | Mod: CPTII,S$GLB,, | Performed by: NURSE PRACTITIONER

## 2023-10-03 NOTE — PROGRESS NOTES
[facility] Established Patient Evaluation    Referred by: No ref. provider found   Reason for referral: Idiopathic peripheral neuropathy     CC:   Chief Complaint   Patient presents with    Foot Pain     Subjective:   Zeenat Mancia is a 73 y.o. male who has a past medical history of A-fib, Acute blood loss anemia, Arthritis, Atrial flutter, Colon polyp, Diverticulosis, Dyslipidemia, Dyspnea on exertion, GERD (gastroesophageal reflux disease), Hepatic steatosis, Hyperlipidemia, Hypertension, Irregular heart beat, Lower GI bleed, Multiple gastric ulcers, Obesity, Sleep apnea, and Unspecified disorder of kidney and ureter. He takes Eliquis for Afib. He complains of pain as described below.      Interval updates 10/03/2023  73-year-old male presents with his wife continued to complain of bilateral foot pain.  He was switched to Lyrica 50 mg b.i.d. by Dr. Chidlers which she does state is helping and he denies any adverse side effects.  Last clinic visit Dr. Childers discussed providing him with Qutenza for bilateral foot pain.  He continues to follow up with Podiatry and Neurology and was told that his pain is likely related to peripheral neuropathy.  He is not been seen since July he would like to move forward with the Qutenza therapy.         Interval History 6/1/23:  Zeenat Mancia presents with continued bilateral feet pain. He feels the medications (gabapentin and cymbalta) make him groggy in the mornings, so he decided to stop taking the medications. He states he has been doing acupuncture, and has done 7-8 visits, however he has not felt it was particularly beneficial; he notes some improvement, but it is not long lasting.. He has also seen a podiatrist and neurology and was told that his pain is likely related to peripheral neuropathy.  He feels his toes are burning at night and finds difficulty getting to sleep.  He felt the lidocaine ointment helped a little, but not a great deal.     Initial HPI  2/13/23  Location: both feet   Onset: 2 years ago  Radiation: N/A  Timing: persistent  Current Pain Score: 6/10  Weekly Pain Range: 6-7/10  Quality: Burning  Worsened by: standing and walking for more than several minutes  Improved by: medications, rest, and sitting    Pt reports 2 years of burning, stinging, tingling foot pain that is worse at night and with walking and standing. He reports previous history of low back pain, however this resolved with PT.  He denies back pain today.  His foot pain is bilateral and covers the entirety of the foot including both dorsal and plantar surfaces, with symptoms most pronounced over the toes.  He has been seen by Podiatry who performed diagnostic tarsal tunnel injections with some temporary relief.  He was offered tarsal tunnel decompression surgery, but declined. EMG performed 1/2022 no definitive evidence of tarsal tunnel syndrome and possible evidence of peripheral polyneuropathy. He previously followed with Dr. Poon who started the patient on gabapentin; he is prescribed 600 mg TID, however only takes 600 mg BID as he often forgets his 3rd dose. He is prescribed Cymbalta by his PCP for anxiety. He reports he has recently gained weight due to decreased activity. He denies hx of diabetes or  chemotherapy. He denies symptoms in the hands.       Previous Therapies:  PT/OT: Previously completed PT for low back pain  HEP: No  TENS: No  Interventions: Diagnostic Tarsal Tunnel Injections with Podiatry   Surgery: Right Carpal Tunnel Release  Opioids: None  Adjuvants:     Current Pain Medications:  Gabapentin 600 mg BID  Cymbalta 20 mg QD     Assessment & Plan:  Problem List Items Addressed This Visit    None  Visit Diagnoses       Idiopathic peripheral neuropathy    -  Primary    Relevant Medications    capsaicin-skin cleanser patch 2 patch    Bilateral foot pain        Relevant Medications    capsaicin-skin cleanser patch 2 patch          10/03/2023-Zeenat Mancia is a 73 y.o.  male who  has a past medical history of A-fib, Acute blood loss anemia, Arthritis, Atrial flutter, Colon polyp, Diverticulosis, Dyslipidemia, Dyspnea on exertion (5/31/2021), GERD (gastroesophageal reflux disease), Hepatic steatosis, Hyperlipidemia, Hypertension, Irregular heart beat, Lower GI bleed (5/31/2021), Multiple gastric ulcers (05/16/2018), Obesity, Sleep apnea, and Unspecified disorder of kidney and ureter.  By history and examination this patient has chronic pain likely related to idiopathic neuropathy.  We discussed the underlying diagnoses and multiple treatment options including non-opioid medications, physical therapy, home exercise, core muscle enhancement, and activity modification.  The risks and benefits of each treatment option were discussed and all questions were answered.            Treatment Plan:   Procedures: We will schedule for Qtenza patch ordered today, [employer] will submit for authorization.  Alternatively, we can start him on the process towards spinal cord stimulator for neuropathy.  PT/OT/HEP: I encouraged the patient to start a home exercise regimen that includes daily cardiovascular exercise lasting at least 30 minutes.    Medications:    - continue topical Lidocaine 5 % ointment - apply as needed to the feet   - continue pregabalin 50 mg b.i.d. which seems to be working we can titrate up as tolerated but will perform Qutenza prior to increasing    Reviewed and consistent with medication use as prescribed.  Imaging: Reviewed.   Follow Up: RTC  4-5 days for Qutenza application     FARHANA Rainey  Interventional Pain Management    10/03/2023        Disclaimer: This note was partly generated using dictation software which may occasionally result in transcription errors.    Imaging:  Reviewed.     EXAMINATION:  MRI LUMBAR SPINE WITHOUT CONTRAST     CLINICAL HISTORY:  Radiculopathy, lumbar regionLumbar radiculopathy, symptoms persist with conservative treatment;      TECHNIQUE:  Sagittal T1, sagittal T2, sagittal STIR, axial T1 and axial T2 weighted images of the lumbar spine obtained without contrast.     COMPARISON:  2022 x-ray     FINDINGS:  Lumbar spine alignment is within normal limits. The vertebral body heights are well maintained, with no fracture.  No marrow signal abnormality suspicious for an infiltrative process.     The conus is normal in appearance.  The adjacent soft tissue structures show no significant abnormalities.     L1-L2: Circumferential disc bulge and spondylitic spurring.No significant central canal or neural foraminal narrowing.     L2-L3: Circumferential disc bulge and spondylitic spurring.  No significant central canal or neural foraminal narrowing.     L3-L4: Circumferential disc bulge and minimal facet arthritis.  Asymmetric left-sided spondylitic spurring.  No significant central canal or neural foraminal narrowing.     L4-L5: Circumferential disc bulge and asymmetric spondylitic spurring.  Moderate facet arthritis.  No significant central canal or neural foraminal narrowing.     L5-S1: Circumferential disc bulge and small central disc protrusion.  Mild facet arthritis.  Mild left-sided neural foraminal narrowing.     Impression:     Multilevel degenerative changes resulting in mild left-sided L5 S1 foraminal encroachment.  Small superimposed central disc protrusion.        Electronically signed by: Marlo Camargo Jr    EMG:  Test Date:  2022     Patient: Zeenat Mancia : 1950 Physician: Jase Vernon D.O.   ID#:   SEX: Male Ref. Phys: Leonel Perez, VALORIE      HPI: Zeenat Mancia is a 71 y.o.male who presents for NCS/EMG to evaluate tarsal tunnel bilaterally.  Pt with tingling and burning pain in the bilateral feet- tips of toes and soles of feet.       NCV & EMG Findings:  Evaluation of the right median sensory nerve showed prolonged distal peak latency and decreased conduction velocity.  The left Superficial Fibular  sensory, the right Superficial Fibular sensory, the left sural sensory, and the right sural sensory nerves showed no response.  All remaining nerves (as indicated in the following tables) were within normal limits.  Needle evaluation of the left Abductor Hallucis muscle showed increased insertional activity, moderately increased spontaneous activity, and slightly increased polyphasic potentials.  The right Abductor Hallucis muscle showed increased insertional activity, moderately increased spontaneous activity, and diminished recruitment.  All remaining muscles (as indicated in the following table) showed no evidence of electrical instability.     Impression:  1. Technically, somewhat limited study due to peripheral edema of legs  2. The sensory responses were absent in the bilateral lower extremities.  This can either be due to the technical issues above, or could be a sensory peripheral polyneuropathy.  I favor the latter as his symptoms correlate with this.   3. Incidentally, there is evidence of a median mononeuropathy across the right wrist (I.e. carpal tunnel syndrome).  Upper extremity sensory studies performed due to lack of responses in the legs.  If a more detailed study is desired for the carpal tunnel syndrome, a separate upper extremity study can be ordered.  Note:  He has no significant hand symptoms.     4. No definitive evidence of tarsal tunnel syndrome on either side.  The bilateral abductor hallicus muscles showed active denervation, but this could be due to the peripheral polyneuropathy described above, and can also be seen in normal feet, especially with advancing age.         Review of Systems:  Review of Systems   Constitutional:  Negative for chills and fever.        +weight gain   HENT:  Negative for congestion and ear pain.    Eyes:  Negative for discharge and redness.   Respiratory:  Negative for cough and wheezing.    Cardiovascular:  Negative for chest pain.   Gastrointestinal:  Negative  for abdominal pain and vomiting.   Musculoskeletal:  Negative for back pain and neck pain.        +Bilateral foot pain   Skin:  Negative for itching and rash.   Neurological:  Positive for tingling (b/l feet) and sensory change (Burning, tingling over b/l feet). Negative for weakness.       Physical Exam:  Vitals:    10/03/23 0940   BP: (!) 155/82   Pulse: 66   PainSc:   7       General    Vitals reviewed.  Constitutional: He is oriented to person, place, and time. He appears well-developed and well-nourished. No distress.   HENT:   Head: Normocephalic and atraumatic.   Nose: Nose normal.   Eyes: Conjunctivae and EOM are normal. Pupils are equal, round, and reactive to light.   Neck: Neck supple.   Cardiovascular:  Normal rate and regular rhythm.            Pulmonary/Chest: Effort normal and breath sounds normal.   Abdominal: Soft. He exhibits no distension.   Neurological: He is alert and oriented to person, place, and time.   Psychiatric: He has a normal mood and affect. His behavior is normal. Thought content normal.     General Musculoskeletal Exam   Gait: normal     Right Ankle/Foot Exam     Inspection   Deformity: absent  Erythema: absent  Bruising:  Foot - absent  Effusion:  Foot - absent    Muscle Strength   The patient has normal right ankle strength.    Other   Sensation: decreased (over plantar and dorsal aspect)    Comments:  No clubbing of the nails. Normal capillary refill.     Left Ankle/Foot Exam     Inspection  Deformity: absent  Erythema: absent  Bruising:  Foot - absent  Effusion:  Foot - absent    Muscle Strength   The patient has normal left ankle strength.    Other   Sensation: decreased (over plantar and dorsal foot)    Comments:  No clubbing of the nails. Normal capillary refill.       Vascular Exam     Right Pulses  Dorsalis Pedis:      2+          Left Pulses  Dorsalis Pedis:      2+          Edema  Right Lower Leg: absent

## 2023-10-05 ENCOUNTER — TELEPHONE (OUTPATIENT)
Dept: PAIN MEDICINE | Facility: CLINIC | Age: 73
End: 2023-10-05
Payer: MEDICARE

## 2023-10-05 ENCOUNTER — PATIENT MESSAGE (OUTPATIENT)
Dept: PAIN MEDICINE | Facility: CLINIC | Age: 73
End: 2023-10-05
Payer: MEDICARE

## 2023-10-05 DIAGNOSIS — G60.9 IDIOPATHIC PERIPHERAL NEUROPATHY: Primary | ICD-10-CM

## 2023-10-05 NOTE — TELEPHONE ENCOUNTER
----- Message from Jacqui Vora sent at 10/5/2023  9:29 AM CDT -----  Type:  Needs Medical Advice    Who Called: pt  Symptoms (please be specific): needs a call back on the claim for his foot wrap  from [employer]  Would the patient rather a call back or a response via [facility]? Call  Best Call Back Number: [phone]   Additional Information:

## 2023-10-06 ENCOUNTER — CLINICAL SUPPORT (OUTPATIENT)
Dept: REHABILITATION | Facility: HOSPITAL | Age: 73
End: 2023-10-06
Payer: MEDICARE

## 2023-10-06 DIAGNOSIS — Z74.09 IMPAIRED FUNCTIONAL MOBILITY, BALANCE, GAIT, AND ENDURANCE: Primary | ICD-10-CM

## 2023-10-06 PROCEDURE — 97110 THERAPEUTIC EXERCISES: CPT

## 2023-10-06 NOTE — PROGRESS NOTES
[facility]   Physical Therapy Treatment Note      Name: Zeenat Mancia  Clinic Number: [id number]    Therapy Diagnosis:   Encounter Diagnosis   Name Primary?    Impaired functional mobility, balance, gait, and endurance Yes       Physician: Christina Mann NP    Visit Date: 10/6/2023    Physician Orders: PT Eval and Treat   Medical Diagnosis from Referral: G62.9 (ICD-10-CM) - Neuropathy R29.898 (ICD-10-CM) - Weakness of both lower extremities   Evaluation Date: 8/24/2023  Authorization Period Expiration: 07/25/2024   Plan of Care Expiration: 10/13/2023  Progress Note Due: 10/13/2023  Visit # / Visits authorized: 5/20  FOTO: 1/ 3     Precautions: Standard and Fall     PTA Visit #: 0/5     Time In: 12:25 PM   Time Out: 1:20 PM   Total Billable Time: 55 minutes    Subjective     Pt reports: he continues to have difficulty sleeping and therefore still feeling tired. He has a visit scheduled with a osteopathic doctor on Monday    He was compliant with home exercise program.  Response to previous treatment: initial eval   Functional change: none     Pain: 7/10  Location: bilateral leg weakness    Objective      Objective Measures updated at progress report unless specified.     Treatment     C J received the treatments listed below:      therapeutic exercises to develop strength and flexibility for 55 minutes including:    NuStep 6 minutes   Standing Hamstring Stretch at stairs 5x15 sec  Standing marches at stairs 3x1 minute  Modified piriformis stretch in chair 5x15 sec both sides  Standing hip abduction 2x10 both sides  Standing hamstring curl 10x each side  Seated clams blue thera band 30x  Sit to stand from chair with arms 3x5    Patient Education and Home Exercises       Education provided:   - reviewed anatomy / physiology related to diagnosis  - role of PT, PT POC and goals  - attendance policy      Written Home Exercises Provided: yes.  Exercises were reviewed and TICO ZARATE was able to  demonstrate them prior to the end of the session.  TICO ZARATE demonstrated good  understanding of the education provided.     Assessment   Pt continues to be limited with therex due to c/o fatigue and shortness of breath; he required several rest breaks during session due to fatigue. He continues to report fatigue due to difficulty sleeping. Patient progressed with performing more movements in standing position. Plan to continue to progress as tolerated.    TICO ZARATE Is progressing well towards his goals.   Pt prognosis is Good.     Pt will continue to benefit from skilled outpatient physical therapy to address the deficits listed in the problem list box on initial evaluation, provide pt/family education and to maximize pt's level of independence in the home and community environment.     Pt's spiritual, cultural and educational needs considered and pt agreeable to plan of care and goals.     Anticipated barriers to physical therapy:  chronicity of impairments, co-morbidities    Goals:   Short Term Goals: 4 weeks   Pt will be compliant with HEP in order to maximize PT benefits  Pt will improve BLE MMT grades by >/=1/2 grade in order to improve strength for ADL completion  Pt will complete TUG in </= 12 seconds with least restrictive assistive device in order to reduce risk for falls and improve safety with functional mobility  Pt will score >/= 11 repetitions on 30-Second Sit to  order to improve BLE endurance and muscular power for transfers   Pt will complete Six-Minute Walk Test indoors in order to improve endurance for home mobility      Long Term Goals: 8 weeks   Pt will score </= TBD% on FOTO limitation survey in order to improve self-perception of functional mobility deficits  Pt will improve BLE MMT grades by >/=1 grade in order to improve strength for ADL completion  Pt will complete TUG in </= 10 seconds with least restrictive assistive device in order to reduce risk for falls and improve safety with functional  mobility  Pt will demonstrate increase in single leg balance to at least 10 seconds bilaterally, demonstrating improved balance and decreased risk of falls.   Pt will score >/= 13 repetitions on 30-Second Sit to  order to improve BLE endurance and muscular power for transfers   Pt will walk >/= 1000 feet on Six-Minute Walk Test outdoors over multiple levels/surfaces in order to improve endurance for community mobility   Pt will perform 1 floor <> stand transfer with  UE support in order to demonstrate safe functional mobility in case of future fall  Pt will report 0 falls from initiation of PT management  Pt will begin some form of home/community fitness in order to sustain progress gained in PT    Plan     Continue with Physical Therapist Plan of Care.     Dez Echols, PT

## 2023-10-09 ENCOUNTER — TELEPHONE (OUTPATIENT)
Dept: PAIN MEDICINE | Facility: CLINIC | Age: 73
End: 2023-10-09
Payer: MEDICARE

## 2023-10-09 ENCOUNTER — PROCEDURE VISIT (OUTPATIENT)
Dept: PAIN MEDICINE | Facility: CLINIC | Age: 73
End: 2023-10-09
Payer: MEDICARE

## 2023-10-09 VITALS
HEART RATE: 95 BPM | SYSTOLIC BLOOD PRESSURE: 132 MMHG | DIASTOLIC BLOOD PRESSURE: 89 MMHG | BODY MASS INDEX: 43.62 KG/M2 | WEIGHT: 315 LBS

## 2023-10-09 DIAGNOSIS — M79.671 BILATERAL FOOT PAIN: ICD-10-CM

## 2023-10-09 DIAGNOSIS — G60.9 IDIOPATHIC PERIPHERAL NEUROPATHY: Primary | ICD-10-CM

## 2023-10-09 DIAGNOSIS — G89.4 CHRONIC PAIN SYNDROME: ICD-10-CM

## 2023-10-09 DIAGNOSIS — M79.672 BILATERAL FOOT PAIN: ICD-10-CM

## 2023-10-09 PROCEDURE — 99215 OFFICE O/P EST HI 40 MIN: CPT | Mod: S$GLB,,, | Performed by: NURSE PRACTITIONER

## 2023-10-09 PROCEDURE — 99215 PR OFFICE/OUTPT VISIT, EST, LEVL V, 40-54 MIN: ICD-10-PCS | Mod: S$GLB,,, | Performed by: NURSE PRACTITIONER

## 2023-10-09 NOTE — PROCEDURES
Procedure: Chemical Neurotomy (Qutenza) LEFT FOOT     Anesthesia: Local     Indication for procedure: Chronic pain caused by idiopathic peripheral neuropathy, chronic bilateral foot pain     Complications: None      Procedure in Detail: The area to be treated with traced on the skin using a skin marker.  The area was cleaned with soap and water, then Lidocaine 2% gel was applied liberally and allowed to sit for 30 minutes.  The gel was then cleaned off of the skin.  One (1) Qutenza patches were then cut to size to fit the targeted area.  The backing was removed and the patches were applied to the skin.  The patient noted a feeling of warmth in the target area after 5 minutes without significant pain.     The patches were left on the skin for 60 minutes.  He rested comfortably  in the room and was check on every 10-15 minutes to ensure his comfort and safety.     At the end of 30 minutes, the patches were removed and the cleansing/neutralizing gel from the kit was applied liberally and allowed to sit on the skin for 3 minutes.  It was then wiped off and the skin was gently cleaned with soap and water.     Estimated blood loss: NA      Disposition: The patient tolerated the procedure without complaint.  He  was given post-procedure care instructions and will contact the clinic with any concerns.     Follow-up: RTC 91 days      FARHANA Rainey  Interventional Pain Management

## 2023-10-12 ENCOUNTER — PROCEDURE VISIT (OUTPATIENT)
Dept: PAIN MEDICINE | Facility: CLINIC | Age: 73
End: 2023-10-12
Payer: MEDICARE

## 2023-10-12 VITALS
SYSTOLIC BLOOD PRESSURE: 138 MMHG | BODY MASS INDEX: 43.62 KG/M2 | WEIGHT: 315 LBS | DIASTOLIC BLOOD PRESSURE: 87 MMHG | HEART RATE: 87 BPM

## 2023-10-12 DIAGNOSIS — M79.672 BILATERAL FOOT PAIN: ICD-10-CM

## 2023-10-12 DIAGNOSIS — G60.9 IDIOPATHIC PERIPHERAL NEUROPATHY: Primary | ICD-10-CM

## 2023-10-12 DIAGNOSIS — G89.4 CHRONIC PAIN SYNDROME: ICD-10-CM

## 2023-10-12 DIAGNOSIS — M79.671 BILATERAL FOOT PAIN: ICD-10-CM

## 2023-10-12 PROCEDURE — 99215 OFFICE O/P EST HI 40 MIN: CPT | Mod: S$GLB,,, | Performed by: NURSE PRACTITIONER

## 2023-10-12 PROCEDURE — 99215 PR OFFICE/OUTPT VISIT, EST, LEVL V, 40-54 MIN: ICD-10-PCS | Mod: S$GLB,,, | Performed by: NURSE PRACTITIONER

## 2023-10-13 NOTE — PROCEDURES
Procedure: Chemical Neurotomy (Qutenza) right foot     Anesthesia: Local     Indication for procedure: Chronic pain peripheral neuropathy, bilateral foot pain.  Complications: None      Procedure in Detail: The area to be treated with traced on the skin using a skin marker.  The area was cleaned with soap and water, then Lidocaine 2% gel was applied liberally and allowed to sit for 30 minutes.  The gel was then cleaned off of the skin.  One (1) Qutenza patches were then cut to size to fit the targeted area.  The backing was removed and the patches were applied to the skin.  The patient noted a feeling of warmth in the target area after 5 minutes without significant pain.     The patches were right on the skin for 30 minutes.  He rested comfortably  in the room and was check on every 10-15 minutes to ensure his comfort and safety.     At the end of 30 minutes, the patches were removed and the cleansing/neutralizing gel from the kit was applied liberally and allowed to sit on the skin for 3 minutes.  It was then wiped off and the skin was gently cleaned with soap and water.     Estimated blood loss: NA      Disposition: The patient tolerated the procedure without complaint.  He  was given post-procedure care instructions and will contact the clinic with any concerns.     Follow-up: RTC 91 days      FARHANA Rainey  Interventional Pain Management

## 2023-10-17 ENCOUNTER — PATIENT MESSAGE (OUTPATIENT)
Dept: PRIMARY CARE CLINIC | Facility: CLINIC | Age: 73
End: 2023-10-17
Payer: MEDICARE

## 2023-10-23 ENCOUNTER — CLINICAL SUPPORT (OUTPATIENT)
Dept: REHABILITATION | Facility: HOSPITAL | Age: 73
End: 2023-10-23
Payer: MEDICARE

## 2023-10-23 DIAGNOSIS — Z74.09 IMPAIRED FUNCTIONAL MOBILITY, BALANCE, GAIT, AND ENDURANCE: Primary | ICD-10-CM

## 2023-10-23 PROCEDURE — 97110 THERAPEUTIC EXERCISES: CPT

## 2023-10-23 NOTE — PROGRESS NOTES
[facility]   Physical Therapy Treatment Note      Name: Zeenat Mancia  Clinic Number: [id number]    Therapy Diagnosis:   Encounter Diagnosis   Name Primary?    Impaired functional mobility, balance, gait, and endurance Yes     Physician: Christina Mann NP    Visit Date: 10/23/2023    Physician Orders: PT Eval and Treat   Medical Diagnosis from Referral: G62.9 (ICD-10-CM) - Neuropathy R29.898 (ICD-10-CM) - Weakness of both lower extremities   Evaluation Date: 2023  Authorization Period Expiration: 2024   Plan of Care Expiration: 2023  Progress Note Due: 2023  Visit # / Visits authorized:   FOTO: 1/ 3     Precautions: Standard and Fall     PTA Visit #: 0/5     Time In: 2:45 PM   Time Out: 3:30 PM   Total Billable Time: 45 minutes    Subjective     Pt reports: that he received a chemical neurotomy last week to both feet. Patient reports pain in left hip flexor today.    He was compliant with home exercise program.  Response to previous treatment: initial eval   Functional change: none     Pain: 7/10  Location: bilateral leg weakness    Objective      10/23/2023:  30 Sec STS: 8 with upper extremity push off  TU.5 sec with no AD    Lower Extremity Strength  Right LE   Left LE     Knee extension: 4/5 Knee extension: 4/5   Knee flexion: 4/5 Knee flexion: 4/5   Hip flexion: 4-/5 Hip flexion: 3+/5       Treatment     C J received the treatments listed below:      therapeutic exercises to develop strength and flexibility for 45 minutes including:    Reassessment 5 minutes  Standing Hamstring Stretch at stairs 5x15 sec  Standing marches to cones 3x1 minute  Modified piriformis stretch in chair 5x15 sec both sides  Side steps air ex pad 5 laps  Standing hip abduction 2x10 both sides  Standing hamstring curl 10x each side  Sit to stand from chair with arms 3x5    NT:  NuStep 6 minutes   Seated clams blue thera band 30x    Patient Education and Home Exercises        Education provided:   - reviewed anatomy / physiology related to diagnosis  - role of PT, PT POC and goals  - attendance policy      Written Home Exercises Provided: yes.  Exercises were reviewed and TICO ZARATE was able to demonstrate them prior to the end of the session.  TICO ZARATE demonstrated good  understanding of the education provided.     Assessment   Patient reassessed today and is continuing to demonstrate deficits in lower extremity strength and endurance. Patient weight and other medical complications of neuropathy contributing to lower extremity pain. Patient unable to tolerance much progression due to fatigue and frequent rest breaks. Plan to continue to progress as tolerated.    TICO ZARATE Is progressing well towards his goals.   Pt prognosis is Good.     Pt will continue to benefit from skilled outpatient physical therapy to address the deficits listed in the problem list box on initial evaluation, provide pt/family education and to maximize pt's level of independence in the home and community environment.     Pt's spiritual, cultural and educational needs considered and pt agreeable to plan of care and goals.     Anticipated barriers to physical therapy:  chronicity of impairments, co-morbidities    Goals:   Short Term Goals: 4 weeks   Pt will be compliant with HEP in order to maximize PT benefits  Pt will improve BLE MMT grades by >/=1/2 grade in order to improve strength for ADL completion  Pt will complete TUG in </= 12 seconds with least restrictive assistive device in order to reduce risk for falls and improve safety with functional mobility  Pt will score >/= 11 repetitions on 30-Second Sit to  order to improve BLE endurance and muscular power for transfers   Pt will complete Six-Minute Walk Test indoors in order to improve endurance for home mobility      Long Term Goals: 8 weeks   Pt will score </= TBD% on FOTO limitation survey in order to improve self-perception of functional mobility deficits  Pt  will improve BLE MMT grades by >/=1 grade in order to improve strength for ADL completion  Pt will complete TUG in </= 10 seconds with least restrictive assistive device in order to reduce risk for falls and improve safety with functional mobility  Pt will demonstrate increase in single leg balance to at least 10 seconds bilaterally, demonstrating improved balance and decreased risk of falls.   Pt will score >/= 13 repetitions on 30-Second Sit to  order to improve BLE endurance and muscular power for transfers   Pt will walk >/= 1000 feet on Six-Minute Walk Test outdoors over multiple levels/surfaces in order to improve endurance for community mobility   Pt will perform 1 floor <> stand transfer with  UE support in order to demonstrate safe functional mobility in case of future fall  Pt will report 0 falls from initiation of PT management  Pt will begin some form of home/community fitness in order to sustain progress gained in Physical Therapy    Previous Short Term Goals Status:   progressing not met  New Short Term Goals Status:   progressing not met  Long Term Goal Status: continue per initial plan of care.  Reasons for Recertification of Therapy:   goals not met and functional mobility still limited      Plan     Updated Certification Period: 10/13/2023 to 12/13/2023   Recommended Treatment Plan: 1-2 times per week for 8 weeks:  Gait Training, Manual Therapy, Moist Heat/ Ice, Neuromuscular Re-ed, Patient Education, Therapeutic Activities, and Therapeutic Exercise  Other Recommendations:      Dez Echols, PT

## 2023-10-24 NOTE — PROGRESS NOTES
" [facility]   Physical Therapy Treatment Note      Name: Zeenat Mancia  Clinic Number: [id number]    Therapy Diagnosis:   Encounter Diagnosis   Name Primary?    Impaired functional mobility, balance, gait, and endurance Yes     Physician: Christina Mann NP    Visit Date: 10/25/2023    Physician Orders: PT Eval and Treat   Medical Diagnosis from Referral: G62.9 (ICD-10-CM) - Neuropathy R29.898 (ICD-10-CM) - Weakness of both lower extremities   Evaluation Date: 2023  Authorization Period Expiration: 2024   Plan of Care Expiration: 2023  Progress Note Due: 2023  Visit # / Visits authorized:   FOTO: 1/ 3     Precautions: Standard and Fall     PTA Visit #: 0/5     Time In: 2:35 PM   Time Out: 3:10 PM   Total Billable Time: 25 minutes    Subjective     Pt reports: he does not have much energy today and his legs "feel like jello." States he was very tired after last PT session on Monday. He also reports pain in Left posterior hip/thigh which he states started about 3 weeks ago without incident. He recently had chemical neurotomy performed to both feet and states he has not noticed much difference since the procedure.     He was compliant with home exercise program.  Response to previous treatment: moderate fatigue   Functional change: none     Pain: 7-8/10  Location: Left hip     Objective      10/23/2023:  30 Sec STS: 8 with upper extremity push off  TU.5 sec with no AD    Lower Extremity Strength  Right LE   Left LE     Knee extension: 4/5 Knee extension: 4/5   Knee flexion: 4/5 Knee flexion: 4/5   Hip flexion: 4-/5 Hip flexion: 3+/5       Treatment     C J received the treatments listed below:      therapeutic exercises to develop strength and flexibility for 25 minutes including:    Standing Hamstring Stretch at stairs 4x20 sec  Modified piriformis stretch in chair 4x20 sec both sides  Sit to stand from chair with arms - B UE assist 3x5    Education on " aquatic therapy, benefits, and reasons pt may be more appropriate for aquatic PT for 10 minutes       NT:  Standing marches to cones 3x1 minute - NP due to pt tolerance  Side steps air ex pad 5 laps - NP due to pt tolerance  Standing hip abduction 2x10 both sides - NP due to pt tolerance  Standing hamstring curl 10x each side - NP due to pt tolerance  NuStep 6 minutes   Seated clams blue thera band 30x    Patient Education and Home Exercises       Education provided:   - reviewed anatomy / physiology related to diagnosis  - role of PT, PT POC and goals  - attendance policy      Written Home Exercises Provided: yes.  Exercises were reviewed and TICO ZARATE was able to demonstrate them prior to the end of the session.  TICO ZARATE demonstrated good  understanding of the education provided.     Assessment   Pt continues to report generalized fatigue and low energy upon arrival to session today. He appeared motivated to participate in PT session, but continues to be limited by SOB, fatigue, and requires frequent rest breaks. After performing a few exercises he continued to report that B LE felt weak and asked to perform sitting exercises. Session ended early with PT educating pt on aquatic therapy and that pt may benefit more from participating in aquatic therapy sessions. Pt verbalized that he would like to inquire about transferring to aquatic PT. Pt was re-assessed last visit and continues to demonstrate B LE weakness and low endurance. Since pt is unable to tolerate progressions in therex due to fatigue and SOB, it is likely that he may be more appropriate for aquatic physical therapy. Plan to inquire about transferring patient to aquatic PT at [facility] location.       TICO ZARATE is not progressing well towards his goals.   Pt prognosis is Fair.    Pt's spiritual, cultural and educational needs considered and pt agreeable to plan of care and goals.     Anticipated barriers to physical therapy:  chronicity of impairments,  co-morbidities      Goals:   Short Term Goals: 4 weeks   Pt will be compliant with HEP in order to maximize PT benefits  Pt will improve BLE MMT grades by >/=1/2 grade in order to improve strength for ADL completion  Pt will complete TUG in </= 12 seconds with least restrictive assistive device in order to reduce risk for falls and improve safety with functional mobility  Pt will score >/= 11 repetitions on 30-Second Sit to  order to improve BLE endurance and muscular power for transfers   Pt will complete Six-Minute Walk Test indoors in order to improve endurance for home mobility      Long Term Goals: 8 weeks   Pt will score </= TBD% on FOTO limitation survey in order to improve self-perception of functional mobility deficits  Pt will improve BLE MMT grades by >/=1 grade in order to improve strength for ADL completion  Pt will complete TUG in </= 10 seconds with least restrictive assistive device in order to reduce risk for falls and improve safety with functional mobility  Pt will demonstrate increase in single leg balance to at least 10 seconds bilaterally, demonstrating improved balance and decreased risk of falls.   Pt will score >/= 13 repetitions on 30-Second Sit to  order to improve BLE endurance and muscular power for transfers   Pt will walk >/= 1000 feet on Six-Minute Walk Test outdoors over multiple levels/surfaces in order to improve endurance for community mobility   Pt will perform 1 floor <> stand transfer with  UE support in order to demonstrate safe functional mobility in case of future fall  Pt will report 0 falls from initiation of PT management  Pt will begin some form of home/community fitness in order to sustain progress gained in Physical Therapy    Previous Short Term Goals Status:   progressing not met  New Short Term Goals Status:   progressing not met  Long Term Goal Status: continue per initial plan of care.  Reasons for Recertification of Therapy:   goals not met and  functional mobility still limited      Plan     Updated Certification Period: 10/13/2023 to 12/13/2023   Recommended Treatment Plan: 1-2 times per week for 8 weeks:  Gait Training, Manual Therapy, Moist Heat/ Ice, Neuromuscular Re-ed, Patient Education, Therapeutic Activities, and Therapeutic Exercise  Other Recommendations:  transfer to aquatic physical therapy       Natasha Stanford, PT

## 2023-10-25 ENCOUNTER — CLINICAL SUPPORT (OUTPATIENT)
Dept: REHABILITATION | Facility: HOSPITAL | Age: 73
End: 2023-10-25
Payer: MEDICARE

## 2023-10-25 DIAGNOSIS — Z74.09 IMPAIRED FUNCTIONAL MOBILITY, BALANCE, GAIT, AND ENDURANCE: Primary | ICD-10-CM

## 2023-10-25 PROCEDURE — 97110 THERAPEUTIC EXERCISES: CPT

## 2023-10-27 ENCOUNTER — TELEPHONE (OUTPATIENT)
Dept: SPORTS MEDICINE | Facility: CLINIC | Age: 73
End: 2023-10-27
Payer: MEDICARE

## 2023-10-27 DIAGNOSIS — I10 ESSENTIAL HYPERTENSION: ICD-10-CM

## 2023-10-27 RX ORDER — HYDROCHLOROTHIAZIDE 25 MG/1
25 TABLET ORAL
Qty: 90 TABLET | Refills: 3 | Status: SHIPPED | OUTPATIENT
Start: 2023-10-27 | End: 2024-03-19

## 2023-10-27 RX ORDER — AMLODIPINE BESYLATE 5 MG/1
5 TABLET ORAL
Qty: 90 TABLET | Refills: 3 | Status: SHIPPED | OUTPATIENT
Start: 2023-10-27

## 2023-10-27 NOTE — TELEPHONE ENCOUNTER
Refill Routing Note   Medication(s) are not appropriate for processing by [facility] for the following reason(s):      No active prescription written by provider    [facility] action(s):  Defer Care Due:  None identified            Appointments  past 12m or future 3m with PCP    Date Provider   Last Visit   9/18/2023 Elias Thomas MD   Next Visit   3/19/2024 Elias Thomas MD   ED visits in past 90 days: 0        Note composed:10:51 AM 10/27/2023

## 2023-10-27 NOTE — TELEPHONE ENCOUNTER
No care due was identified.  [facility] Embedded Care Due Messages. Reference number: [id number].   10/27/2023 3:44:38 AM CDT

## 2023-10-27 NOTE — TELEPHONE ENCOUNTER
Left message informing patient Dr Rae does not treat hip pain. Left scheduling number to [facility] to be scheduled correctly.

## 2023-10-30 ENCOUNTER — HOSPITAL ENCOUNTER (OUTPATIENT)
Dept: RADIOLOGY | Facility: HOSPITAL | Age: 73
Discharge: HOME | End: 2023-10-30
Attending: PHYSICIAN ASSISTANT
Payer: MEDICARE

## 2023-10-30 ENCOUNTER — OFFICE VISIT (OUTPATIENT)
Dept: ORTHOPEDICS | Facility: CLINIC | Age: 73
End: 2023-10-30
Payer: MEDICARE

## 2023-10-30 DIAGNOSIS — R53.81 PHYSICAL DECONDITIONING: ICD-10-CM

## 2023-10-30 DIAGNOSIS — M25.552 LEFT HIP PAIN: ICD-10-CM

## 2023-10-30 DIAGNOSIS — M25.552 LEFT HIP PAIN: Primary | ICD-10-CM

## 2023-10-30 DIAGNOSIS — G62.9 NEUROPATHY: ICD-10-CM

## 2023-10-30 PROCEDURE — 73502 X-RAY EXAM HIP UNI 2-3 VIEWS: CPT | Mod: 26,LT,, | Performed by: RADIOLOGY

## 2023-10-30 PROCEDURE — 73502 X-RAY EXAM HIP UNI 2-3 VIEWS: CPT | Mod: TC,LT

## 2023-10-30 PROCEDURE — 4010F PR ACE/ARB THEARPY RXD/TAKEN: ICD-10-PCS | Mod: CPTII,S$GLB,, | Performed by: PHYSICIAN ASSISTANT

## 2023-10-30 PROCEDURE — 1157F PR ADVANCE CARE PLAN OR EQUIV PRESENT IN MEDICAL RECORD: ICD-10-PCS | Mod: CPTII,S$GLB,, | Performed by: PHYSICIAN ASSISTANT

## 2023-10-30 PROCEDURE — 99999 PR PBB SHADOW E&M-EST. PATIENT-LVL III: CPT | Mod: PBBFAC,,, | Performed by: PHYSICIAN ASSISTANT

## 2023-10-30 PROCEDURE — 73502 XR HIP WITH PELVIS WHEN PERFORMED, 2 OR 3 VIEWS LEFT: ICD-10-PCS | Mod: 26,LT,, | Performed by: RADIOLOGY

## 2023-10-30 PROCEDURE — 4010F ACE/ARB THERAPY RXD/TAKEN: CPT | Mod: CPTII,S$GLB,, | Performed by: PHYSICIAN ASSISTANT

## 2023-10-30 PROCEDURE — 99203 OFFICE O/P NEW LOW 30 MIN: CPT | Mod: S$GLB,,, | Performed by: PHYSICIAN ASSISTANT

## 2023-10-30 PROCEDURE — 1157F ADVNC CARE PLAN IN RCRD: CPT | Mod: CPTII,S$GLB,, | Performed by: PHYSICIAN ASSISTANT

## 2023-10-30 PROCEDURE — 99999 PR PBB SHADOW E&M-EST. PATIENT-LVL III: ICD-10-PCS | Mod: PBBFAC,,, | Performed by: PHYSICIAN ASSISTANT

## 2023-10-30 PROCEDURE — 99203 PR OFFICE/OUTPT VISIT, NEW, LEVL III, 30-44 MIN: ICD-10-PCS | Mod: S$GLB,,, | Performed by: PHYSICIAN ASSISTANT

## 2023-10-30 RX ORDER — DULOXETIN HYDROCHLORIDE 60 MG/1
120 CAPSULE, DELAYED RELEASE ORAL
COMMUNITY
Start: 2023-08-29 | End: 2023-11-24

## 2023-11-07 ENCOUNTER — OFFICE VISIT (OUTPATIENT)
Dept: PSYCHIATRY | Facility: CLINIC | Age: 73
End: 2023-11-07
Payer: MEDICARE

## 2023-11-07 ENCOUNTER — LAB VISIT (OUTPATIENT)
Dept: LAB | Facility: HOSPITAL | Age: 73
End: 2023-11-07
Payer: MEDICARE

## 2023-11-07 DIAGNOSIS — F41.9 ANXIETY AND DEPRESSION: ICD-10-CM

## 2023-11-07 DIAGNOSIS — G47.09 OTHER INSOMNIA: ICD-10-CM

## 2023-11-07 DIAGNOSIS — R41.82 ALTERED MENTAL STATUS, UNSPECIFIED ALTERED MENTAL STATUS TYPE: ICD-10-CM

## 2023-11-07 DIAGNOSIS — F32.A ANXIETY AND DEPRESSION: ICD-10-CM

## 2023-11-07 DIAGNOSIS — F41.9 ANXIETY AND DEPRESSION: Primary | ICD-10-CM

## 2023-11-07 DIAGNOSIS — F32.A ANXIETY AND DEPRESSION: Primary | ICD-10-CM

## 2023-11-07 LAB
T3 SERPL-MCNC: 104 NG/DL (ref 60–180)
T4 FREE SERPL-MCNC: 0.98 NG/DL (ref 0.71–1.51)
TSH SERPL DL<=0.005 MIU/L-ACNC: 1.18 UIU/ML (ref 0.4–4)
VIT B12 SERPL-MCNC: 788 PG/ML (ref 210–950)

## 2023-11-07 PROCEDURE — 1157F PR ADVANCE CARE PLAN OR EQUIV PRESENT IN MEDICAL RECORD: ICD-10-PCS | Mod: CPTII,S$GLB,, | Performed by: PSYCHIATRY & NEUROLOGY

## 2023-11-07 PROCEDURE — 4010F PR ACE/ARB THEARPY RXD/TAKEN: ICD-10-PCS | Mod: CPTII,S$GLB,, | Performed by: PSYCHIATRY & NEUROLOGY

## 2023-11-07 PROCEDURE — 86592 SYPHILIS TEST NON-TREP QUAL: CPT

## 2023-11-07 PROCEDURE — 36415 COLL VENOUS BLD VENIPUNCTURE: CPT

## 2023-11-07 PROCEDURE — 99205 PR OFFICE/OUTPT VISIT, NEW, LEVL V, 60-74 MIN: ICD-10-PCS | Mod: S$GLB,,, | Performed by: PSYCHIATRY & NEUROLOGY

## 2023-11-07 PROCEDURE — 99999 PR PBB SHADOW E&M-EST. PATIENT-LVL III: CPT | Mod: PBBFAC,,, | Performed by: PSYCHIATRY & NEUROLOGY

## 2023-11-07 PROCEDURE — 1157F ADVNC CARE PLAN IN RCRD: CPT | Mod: CPTII,S$GLB,, | Performed by: PSYCHIATRY & NEUROLOGY

## 2023-11-07 PROCEDURE — 99999 PR PBB SHADOW E&M-EST. PATIENT-LVL III: ICD-10-PCS | Mod: PBBFAC,,, | Performed by: PSYCHIATRY & NEUROLOGY

## 2023-11-07 PROCEDURE — 84480 ASSAY TRIIODOTHYRONINE (T3): CPT

## 2023-11-07 PROCEDURE — 82607 VITAMIN B-12: CPT

## 2023-11-07 PROCEDURE — 99205 OFFICE O/P NEW HI 60 MIN: CPT | Mod: S$GLB,,, | Performed by: PSYCHIATRY & NEUROLOGY

## 2023-11-07 PROCEDURE — 4010F ACE/ARB THERAPY RXD/TAKEN: CPT | Mod: CPTII,S$GLB,, | Performed by: PSYCHIATRY & NEUROLOGY

## 2023-11-07 PROCEDURE — 84443 ASSAY THYROID STIM HORMONE: CPT

## 2023-11-07 PROCEDURE — 84439 ASSAY OF FREE THYROXINE: CPT

## 2023-11-07 RX ORDER — TRAZODONE HYDROCHLORIDE 100 MG/1
100 TABLET ORAL NIGHTLY
Qty: 30 TABLET | Refills: 2 | Status: SHIPPED | OUTPATIENT
Start: 2023-11-07 | End: 2024-03-19

## 2023-11-07 NOTE — PROGRESS NOTES
"  OUTPATIENT PSYCHIATRY INITIAL EVAL    Encounter Date:  11/7/2023  Site: [facility]  Referral Source:  Elias Thomas*  Length of Session:  60 min  Source of Information: Patient    Chief Complaint: anxiety    HPI:  Zeenat Mancia is a 73 y.o. male with no known psychiatric history who presents for initial assessment.    - was seeing [employer] counselor, thinks it helped some, but quit going ~4-5 months ago  - counselor believes he has OCD  -  50 years, has 3 sons  - having anxiety   - has had erectile dysfunction for a couple years, "can't have sex," has a lot of sexual frustration  - has tried Viagra and Cialis which helped minimally  - looks at pornography  - is able achieve an erection for masturbation  - believes he has some degree of nocturnal tumescence but not full erection when he wakes up  - has been having visions/intrusive images of his wife having sex w/ a boyfriend she had as a teenager  - this "haunts" him  - gets anticipatory anxiety about having these thoughts  - having these images multiple times per day  - has been happening for several months but less than a year  - is [employer]  - is bothered by the notion of premarital sex, wanted to  a virgin  - "it's like [he] can't forgive her" despite her being "a wonderful woman"  - has a lot of guilt surrounding these thoughts/images  - has had jealousy about his wife having had sex at an earlier age and with someone else  - has talked to his wife about these images but does "not know how much he should push it"  - recent move from [place] to [place], then to [place]  - lost dog 5-6 months ago  - is not obsessed w/ order/organization  - no fears of contamination or germs  - no checking behaviors  - is up at night w/ racing thoughts often  - 47 y.o. son has bipolar d/o, worries about him a lot  - is on CPAP for JACKELYN  - is a self-described "couch potato," finds it difficult to be active / exercise d/t " "neuropathic pain  - gained ~150 lbs over past several years  - is "not a happy person" but didn't use to be that way      Psych Review of Sx: (Is pt experiencing or having changes in any of the following?)    Depression:  sleep: yes  interest/anhedonia: yes  feelings of guilt/worthlessness: yes  energy/anergy: yes  concentration: yes  appetite: no  weight: yes  somatic sx: yes  libido: yes    Anxiety: yes    PTSD: denies    Chiquita: denies    Psychosis: denies    OCD: see above    Eating disorders: hyperphagia      ADHD: difficulty concentrating    Risk Parameters:  Patient reports no suicidal ideation  Patient reports no homicidal ideation  Patient reports no self-injurious behavior  Patient reports no violent behavior    Medical Review of Systems:   No f/c.  No change in vision or hearing.  No HA or dizziness.  No sore throat or cough.  No CP or SOB.  No n/v/d/c.  No urinary changes.  No skin changes.  No numbness/tingling or aches/pains.    Past Medical/Surgical Hx:  Past Medical History:   Diagnosis Date    A-fib     Acute blood loss anemia     Arthritis     Atrial flutter     Colon polyp     Diverticulosis     Dyslipidemia     Dyspnea on exertion 5/31/2021    GERD (gastroesophageal reflux disease)     on Protonix    Hepatic steatosis     Hyperlipidemia     Hypertension     Irregular heart beat     Lower GI bleed 5/31/2021    Multiple gastric ulcers 05/16/2018    gi scope per Dr. Houston    Obesity     Sleep apnea     uses CPAP    Unspecified disorder of kidney and ureter     kidney stones     Past Surgical History:   Procedure Laterality Date    ADENOIDECTOMY  1958    APPENDECTOMY      CATARACT EXTRACTION  2009    OS    CATARACT EXTRACTION W/  INTRAOCULAR LENS IMPLANT Right 06/03/2015    [id number] 14.5d//    CHOLECYSTECTOMY  2021    COLONOSCOPY N/A 10/10/2017    Procedure: COLONOSCOPY;  Surgeon: Jameson Houston MD;  Location: [facility];  Service: Endoscopy;  Laterality: N/A; repeat in 5 years for surveillance "    COLONOSCOPY N/A 05/16/2018    Procedure: COLONOSCOPY;  Surgeon: Jameson Houston MD;  Location: [facility] ENDO;  Service: Endoscopy;  Laterality: N/A;    COLONOSCOPY N/A 06/02/2021    Procedure: COLONOSCOPY;  Surgeon: Agustin Corona MD;  Location: [facility] (2ND FLR);  Service: Endoscopy;  Laterality: N/A;    COLONOSCOPY N/A 10/11/2022    Procedure: COLONOSCOPY;  Surgeon: Edin Lara MD;  Location: [facility] (2ND FLR);  Service: Endoscopy;  Laterality: N/A;  2nd floor-difficult intubation  ok to hold eliquis x2 days per Dr. Jerome, see telephone encounter 6/28-[facility] [place]  fully vaccinated, instructions sent to [facility]  extended miralax prep  Clear liquids up to 2 hrs prior/ AM prep [id number]    CORONARY ANGIOGRAPHY N/A 01/04/2021    Procedure: ANGIOGRAM, CORONARY ARTERY;  Surgeon: Jase Moore MD;  Location: [facility] CATH LAB/EP;  Service: Cardiology;  Laterality: N/A;    CYSTOSCOPY WITH INSERTION OF MINIMALLY INVASIVE IMPLANT TO ENLARGE PROSTATIC URETHRA N/A 06/29/2021    Procedure: TRANSPROSTATIC TISSUE RETRACTION;  Surgeon: Agustin Clay MD;  Location: [facility];  Service: Urology;  Laterality: N/A;    ESOPHAGOGASTRODUODENOSCOPY N/A 07/02/2018    Procedure: EGD (ESOPHAGOGASTRODUODENOSCOPY);  Surgeon: Jameson Houston MD;  Location: [facility];  Service: Endoscopy;  Laterality: N/A;    ESOPHAGOGASTRODUODENOSCOPY N/A 06/02/2021    Procedure: EGD (ESOPHAGOGASTRODUODENOSCOPY);  Surgeon: Agustin Corona MD;  Location: [facility] (2ND FLR);  Service: Endoscopy;  Laterality: N/A;    EYE SURGERY      cataract    LEFT HEART CATHETERIZATION N/A 01/04/2021    Procedure: Left heart cath;  Surgeon: Jase Moore MD;  Location: [facility] CATH LAB/EP;  Service: Cardiology;  Laterality: N/A;    ROBOT-ASSISTED CHOLECYSTECTOMY N/A 02/04/2021    Procedure: ROBOTIC CHOLECYSTECTOMY;  Surgeon: Pal Cheney MD;  Location: [facility];  Service: General;  Laterality: N/A;    TONSILLECTOMY       TRANSESOPHAGEAL ECHOCARDIOGRAPHY N/A 05/14/2021    Procedure: ECHOCARDIOGRAM, TRANSESOPHAGEAL;  Surgeon: Leo Reeves III, MD;  Location: [facility] EP LAB;  Service: Cardiology;  Laterality: N/A;    TREATMENT OF CARDIAC ARRHYTHMIA N/A 04/19/2021    Procedure: Cardioversion or Defibrillation;  Surgeon: Judy Jerome MD;  Location: [facility] CATH LAB/EP;  Service: Cardiology;  Laterality: N/A;    TREATMENT OF CARDIAC ARRHYTHMIA N/A 05/14/2021    Procedure: CARDIOVERSION;  Surgeon: PEMA Downs MD;  Location: [facility] EP LAB;  Service: Cardiology;  Laterality: N/A;  AF, PHUONG, DCCV, MAC, DM, 3 PREP    UPPER GASTROINTESTINAL ENDOSCOPY  05/16/2018    Dr. Houston       Past Neurologic Hx:  Seizures:  no   Head trauma:  no    Past Psychiatric Hx:  Dx's: none  Psychiatric Hospitalizations: none  Suicide Attempts and Self-Harm: no  Outpt Psychiatrist: no  Outpt Therapist: saw [facility] counselor 4-5 months ago, does not remember name  Family Hx: son w/ bipolar d/o    Social Hx:  Relationship Status:   Children: 3   Employment: retired  Education: college graduate  Special Ed: no  Developmental: none known  Housing: house in [place]  H/o Abuse: no  H/o Violence: no  Access to Gun: yes, has pistol in safe in closet    Substance Use Hx:  Recreational Drugs: no  Misuse of Prescription Meds: no  Alcohol: 1-2 beers/week  Tobacco/Nicotine: former smoker  Rehab: no  AA/NA Meetings:  no  Consequences of Substance Use: none  Periods of Sobriety: n/a    Legal Hx:  Past Charges/Incarcerations: no  Pending Charges: no    Allergies:  Lipitor [atorvastatin]    Medications:  Scheduled:  Cymbalta 60 mg PO bid  PRN:  Trazodone 100 mg (was taking nightly but ran out ~1 w ago)  OTC:   Melatonin  Tylenol PM    AE's: none known  Adherence: adherent    Med Trials (& reason for discontinuation):  Gabapentin (drowsiness)      V/S:  There were no vitals filed for this visit.    Physical Exam:  Gen No acute distress   HEENT Normocephalic,  "atraumatic   Neck No gross deformity, ROM nl   Cardiopulm Normal effort   Abd No guarding   Skin No rashes or lesions noted   Neuro EOMI, symmetric facial expression, hearing grossly intact  No abnormal movements noted     Mental Status Exam:  Appearance: no acute distress, obese  Behavior: cooperative, pleasant, engaged  Psychomotor: no abnormal movements noted  Speech: spontaneous, normal rate/tone/vol  Mood: "sh*tty"  Affect: anxious, tearful  Thought Process: linear, goal-directed  Thought Content: denies SI/HI, no shamika delusional content, no paranoia  Perceptions: denies AVH  Sensorium: grossly intact  Memory: registers 3/3, recalls 1/3  Attention/Concentration: spells WORLD forwards, lists months of year backwards  Fund of Knowledge: knows current president, trouble recalling past presidents  Abstraction: able to abstract  Impulse Control: fair  Insight: fair  Judgement: fair  Reliability: good      Relevant Labs/Studies:    Lab Results   Component Value Date    WBC 8.19 05/26/2023    HGB 16.6 05/26/2023    HCT 55.3 (H) 05/26/2023    MCV 89 05/26/2023     05/26/2023     BMP  Lab Results   Component Value Date     10/20/2022    K 4.0 06/22/2023     10/20/2022    CO2 28 10/20/2022    BUN 15 10/20/2022    CREATININE 1.2 06/22/2023    CALCIUM 9.6 10/20/2022    ANIONGAP 11 10/20/2022    ESTGFRAFRICA >60.0 04/19/2022    EGFRNONAA >60.0 04/19/2022     Lab Results   Component Value Date    ALT 44 10/20/2022    AST 36 10/20/2022    ALKPHOS 148 (H) 10/20/2022    BILITOT 1.8 (H) 10/20/2022     Lab Results   Component Value Date    TSH 1.502 10/20/2022     Lab Results   Component Value Date    HGBA1C 5.5 10/20/2022       EKG: reviewed      ASSESSMENT:  Unspecified anxiety disorder  R/o unspecified depressive disorder      PLAN:    Continue Cymbalta 60 mg PO bid   Continue trazodone 100 mg PO qhs  ordered today  Discussed role of therapy in overcoming negative thoughts  Recommend psychotherapy/CBT  " referral placed to [facility]   Labs reviewed  Dementia workup: MRI brain, B12, TSH/T4/T3  Discussed w/ pt informed consent, risks vs. benefits, alternative treatments, side effect profile, and the inherent unpredictability of individual responses to these treatments. Answered pt's questions and addressed concerns. The pt expresses understanding of the above and displays the capacity to agree w/ this current plan   RTC in 3-4 months      Luigi Villalta MD  [facility], PGY-II  11/7/2023 10:00 AM

## 2023-11-08 LAB — RPR SER QL: NORMAL

## 2023-11-09 NOTE — PROGRESS NOTES
[facility]   Physical Therapy Treatment Note      Name: Zeenat Mancia  Clinic Number: [id number]    Therapy Diagnosis:   Encounter Diagnosis   Name Primary?    Impaired functional mobility, balance, gait, and endurance Yes     Physician: Christina Mann NP    Visit Date: 11/10/2023    Physician Orders: PT Eval and Treat   Medical Diagnosis from Referral: G62.9 (ICD-10-CM) - Neuropathy R29.898 (ICD-10-CM) - Weakness of both lower extremities   Evaluation Date: 8/24/2023  Authorization Period Expiration: 07/25/2024   Plan of Care Expiration: 12/13/2023  Progress Note Due: 11/23/2023    Visit # / Visits authorized: 9/20    FOTO: 1/ 3     Precautions: Standard and Fall      PTA Visit #: 1/5     Time In: 11:48 AM   Time Out: 12:26 PM   Total Billable Time: 38 minutes    Subjective     Pt reports: some stiffness and discomfort throughout left thigh upon entering Physical Therapy treatment today.     He was compliant with home exercise program.  Response to previous treatment: moderate fatigue   Functional change: none      Pain: 7/10  Location: Left hip     Objective      Objective Measures updated at progress report unless specified.     Treatment     C J received the treatments listed below:      therapeutic exercises to develop strength and flexibility for 38 minutes including:     NuStep 5 minutes   Seated in chair 4 x 30 second hold each   Modified piriformis stretch in chair 4 x 30 sec both sides  Sit to stand from chair with arms - B UE assist 3 x 5     Education on aquatic therapy, benefits, and reasons pt may be more appropriate for aquatic PT for 10 minutes         NT:  Standing Hamstring Stretch at stairs 4x20 sec  Standing marches to cones 3x1 minute - NP due to pt tolerance  Side steps air ex pad 5 laps - NP due to pt tolerance  Standing hip abduction 2x10 both sides - NP due to pt tolerance  Standing hamstring curl 10x each side - NP due to pt tolerance    Seated clams blue thera  band 30x    Patient Education and Home Exercises       Education provided:   - reviewed anatomy / physiology related to diagnosis  - role of PT, PT POC and goals  - attendance policy      Written Home Exercises Provided: yes.  Exercises were reviewed and TICO ZARATE was able to demonstrate them prior to the end of the session.  TICO ZARATE demonstrated good  understanding of the education provided.     Assessment     Patient with fatigue throughout treatment, however, with seated rest breaks patient able to complete exercises. Patient completed seated hamstring stretch today due to patient with increased fatigue from scifit warm up. Physical Therapist Assistant discussed with Physical Therapist and office staff patient getting into aquatic therapy to help increase mobility and overall function.     TICO ZARATE Is progressing well towards his goals.   Pt prognosis is Good.     Pt will continue to benefit from skilled outpatient physical therapy to address the deficits listed in the problem list box on initial evaluation, provide pt/family education and to maximize pt's level of independence in the home and community environment.     Pt's spiritual, cultural and educational needs considered and pt agreeable to plan of care and goals.     Anticipated barriers to physical therapy: chronicity of impairments, co-morbidities        Goals:   Short Term Goals: 4 weeks   Pt will be compliant with HEP in order to maximize PT benefits  Pt will improve BLE MMT grades by >/=1/2 grade in order to improve strength for ADL completion  Pt will complete TUG in </= 12 seconds with least restrictive assistive device in order to reduce risk for falls and improve safety with functional mobility  Pt will score >/= 11 repetitions on 30-Second Sit to  order to improve BLE endurance and muscular power for transfers   Pt will complete Six-Minute Walk Test indoors in order to improve endurance for home mobility      Long Term Goals: 8 weeks   Pt will score </=  TBD% on FOTO limitation survey in order to improve self-perception of functional mobility deficits  Pt will improve BLE MMT grades by >/=1 grade in order to improve strength for ADL completion  Pt will complete TUG in </= 10 seconds with least restrictive assistive device in order to reduce risk for falls and improve safety with functional mobility  Pt will demonstrate increase in single leg balance to at least 10 seconds bilaterally, demonstrating improved balance and decreased risk of falls.   Pt will score >/= 13 repetitions on 30-Second Sit to  order to improve BLE endurance and muscular power for transfers   Pt will walk >/= 1000 feet on Six-Minute Walk Test outdoors over multiple levels/surfaces in order to improve endurance for community mobility   Pt will perform 1 floor <> stand transfer with  UE support in order to demonstrate safe functional mobility in case of future fall  Pt will report 0 falls from initiation of PT management  Pt will begin some form of home/community fitness in order to sustain progress gained in Physical Therapy     Previous Short Term Goals Status:   progressing not met  New Short Term Goals Status:   progressing not met  Long Term Goal Status: continue per initial plan of care.  Reasons for Recertification of Therapy:   goals not met and functional mobility still limited    Plan     Continue with Physical Therapist Plan of Care.     Dallin Mayes, PTA

## 2023-11-10 ENCOUNTER — CLINICAL SUPPORT (OUTPATIENT)
Dept: REHABILITATION | Facility: HOSPITAL | Age: 73
End: 2023-11-10
Payer: MEDICARE

## 2023-11-10 DIAGNOSIS — Z74.09 IMPAIRED FUNCTIONAL MOBILITY, BALANCE, GAIT, AND ENDURANCE: Primary | ICD-10-CM

## 2023-11-10 PROCEDURE — 97110 THERAPEUTIC EXERCISES: CPT | Mod: CQ

## 2023-11-15 ENCOUNTER — CLINICAL SUPPORT (OUTPATIENT)
Dept: REHABILITATION | Facility: HOSPITAL | Age: 73
End: 2023-11-15
Payer: MEDICARE

## 2023-11-15 DIAGNOSIS — Z74.09 IMPAIRED FUNCTIONAL MOBILITY, BALANCE, GAIT, AND ENDURANCE: Primary | ICD-10-CM

## 2023-11-15 PROCEDURE — 97110 THERAPEUTIC EXERCISES: CPT | Mod: CQ

## 2023-11-15 NOTE — PROGRESS NOTES
[facility]   Physical Therapy Treatment Note      Name: Zeenat Mancia  Clinic Number: [id number]    Therapy Diagnosis:   Encounter Diagnosis   Name Primary?    Impaired functional mobility, balance, gait, and endurance Yes     Physician: Christina Mann NP    Visit Date: 11/15/2023    Physician Orders: PT Eval and Treat   Medical Diagnosis from Referral: G62.9 (ICD-10-CM) - Neuropathy R29.898 (ICD-10-CM) - Weakness of both lower extremities   Evaluation Date: 8/24/2023  Authorization Period Expiration: 07/25/2024   Plan of Care Expiration: 12/13/2023  Progress Note Due: 11/23/2023    Visit # / Visits authorized: 9/20    FOTO: 1/ 3     Precautions: Standard and Fall      PTA Visit #: 2/5     Time In: 2:45 PM   Time Out: 3:30 PM   Total Billable Time: 45 minutes    Subjective     Pt reports: no phone call from aquatic therapy. Patient notes some decreased discomfort throughout left hip upon entering Physical Therapy treatment.     He was compliant with home exercise program.  Response to previous treatment: moderate fatigue   Functional change: none      Pain: 5-6/10  Location: Left hip     Objective      Objective Measures updated at progress report unless specified.     Treatment     C J received the treatments listed below:      therapeutic exercises to develop strength and flexibility for  45 minutes including:     NuStep 5 minutes   Hamstring stretch Seated in chair 4 x 30 second hold each   Modified piriformis stretch in chair 4 x 30 sec both sides  Sit to stand from chair with arms - B UE assist 3 x 5  Side steps air ex pad 5 x each foot  Stair tapping x20 each foot      Education on aquatic therapy, benefits, and reasons pt may be more appropriate for aquatic PT for 10 minutes      NT:  Standing Hamstring Stretch at stairs 4x20 sec  Standing marches to cones 3x1 minute - NP due to pt tolerance   - NP due to pt tolerance  Standing hip abduction 2x10 both sides - NP due to pt  tolerance  Standing hamstring curl 10x each side - NP due to pt tolerance    Seated clams blue thera band 30x    Patient Education and Home Exercises       Education provided:   - reviewed anatomy / physiology related to diagnosis  - role of PT, PT POC and goals  - attendance policy      Written Home Exercises Provided: yes.  Exercises were reviewed and TICO ZARATE was able to demonstrate them prior to the end of the session.  TICO ZARATE demonstrated good  understanding of the education provided.     Assessment     Patient was able to complete a couple of more exercises today than previous visit, however,patient still required rest breaks throughout session due to patient with increased fatigue. Patient with slight improved mobility throughout sit to stands and gait into and around clinic, however, upon leaving clinic patient with slow matteo due to patient with increased fatigue from therapeutic exercise.  Will continue to monitor and progress patient as tolerated.     TICO ZARATE Is progressing well towards his goals.   Pt prognosis is Good.     Pt will continue to benefit from skilled outpatient physical therapy to address the deficits listed in the problem list box on initial evaluation, provide pt/family education and to maximize pt's level of independence in the home and community environment.     Pt's spiritual, cultural and educational needs considered and pt agreeable to plan of care and goals.     Anticipated barriers to physical therapy: chronicity of impairments, co-morbidities        Goals:   Short Term Goals: 4 weeks   Pt will be compliant with HEP in order to maximize PT benefits  Pt will improve BLE MMT grades by >/=1/2 grade in order to improve strength for ADL completion  Pt will complete TUG in </= 12 seconds with least restrictive assistive device in order to reduce risk for falls and improve safety with functional mobility  Pt will score >/= 11 repetitions on 30-Second Sit to  order to improve BLE endurance  and muscular power for transfers   Pt will complete Six-Minute Walk Test indoors in order to improve endurance for home mobility      Long Term Goals: 8 weeks   Pt will score </= TBD% on FOTO limitation survey in order to improve self-perception of functional mobility deficits  Pt will improve BLE MMT grades by >/=1 grade in order to improve strength for ADL completion  Pt will complete TUG in </= 10 seconds with least restrictive assistive device in order to reduce risk for falls and improve safety with functional mobility  Pt will demonstrate increase in single leg balance to at least 10 seconds bilaterally, demonstrating improved balance and decreased risk of falls.   Pt will score >/= 13 repetitions on 30-Second Sit to  order to improve BLE endurance and muscular power for transfers   Pt will walk >/= 1000 feet on Six-Minute Walk Test outdoors over multiple levels/surfaces in order to improve endurance for community mobility   Pt will perform 1 floor <> stand transfer with  UE support in order to demonstrate safe functional mobility in case of future fall  Pt will report 0 falls from initiation of PT management  Pt will begin some form of home/community fitness in order to sustain progress gained in Physical Therapy     Previous Short Term Goals Status:   progressing not met  New Short Term Goals Status:   progressing not met  Long Term Goal Status: continue per initial plan of care.  Reasons for Recertification of Therapy:   goals not met and functional mobility still limited    Plan     Continue with Physical Therapist Plan of Care.     Dallin Mayes, PTA

## 2023-11-17 ENCOUNTER — CLINICAL SUPPORT (OUTPATIENT)
Dept: REHABILITATION | Facility: HOSPITAL | Age: 73
End: 2023-11-17
Payer: MEDICARE

## 2023-11-17 DIAGNOSIS — Z74.09 IMPAIRED FUNCTIONAL MOBILITY, BALANCE, GAIT, AND ENDURANCE: Primary | ICD-10-CM

## 2023-11-17 PROCEDURE — 97110 THERAPEUTIC EXERCISES: CPT

## 2023-11-17 NOTE — PROGRESS NOTES
[facility]   Physical Therapy Treatment Note      Name: Zeenat Mancia  Clinic Number: [id number]    Therapy Diagnosis:   Encounter Diagnosis   Name Primary?    Impaired functional mobility, balance, gait, and endurance Yes     Physician: Christina Mann NP    Visit Date: 11/17/2023    Physician Orders: PT Eval and Treat   Medical Diagnosis from Referral: G62.9 (ICD-10-CM) - Neuropathy R29.898 (ICD-10-CM) - Weakness of both lower extremities   Evaluation Date: 8/24/2023  Authorization Period Expiration: 07/25/2024   Plan of Care Expiration: 12/13/2023  Progress Note Due: 11/23/2023    Visit # / Visits authorized: 10/20    FOTO: 1/ 3     Precautions: Standard and Fall      PTA Visit #: 2/5     Time In: 1:45 PM   Time Out: 2:25 PM   Total Billable Time: 40 minutes    Subjective     Pt reports: no phone call from aquatic therapy. He reports he is unable to do any exercise outside of Physical Therapy and he is very sore following each session for 3-4 days and he knows it is very little that he is doing. He reports his son has a pool and he can possible begin walking in the pool at his sons house.    He was compliant with home exercise program.  Response to previous treatment: moderate fatigue   Functional change: none      Pain: 5-6/10  Location: Left hip     Objective      Objective Measures updated at progress report unless specified.     Treatment     C J received the treatments listed below:      therapeutic exercises to develop strength and flexibility for 40 minutes including:     NuStep 7 minutes   Hamstring stretch Seated in chair 2 x 30 second hold each   Modified piriformis stretch in chair 2 x 30 sec both sides  Sit to stand from chair with arms - B UE assist 3 x 5  Side steps air ex pad 5 x each foot  Stair tapping x20 each foot      Education on aquatic therapy, benefits, and reasons pt may be more appropriate for aquatic PT for 15 minutes      NT:  Standing Hamstring Stretch at  stairs 4x20 sec  Standing marches to cones 3x1 minute - NP due to pt tolerance   - NP due to pt tolerance  Standing hip abduction 2x10 both sides - NP due to pt tolerance  Standing hamstring curl 10x each side - NP due to pt tolerance    Seated clams blue thera band 30x    Patient Education and Home Exercises       Education provided:   - reviewed anatomy / physiology related to diagnosis  - role of PT, PT POC and goals  - attendance policy      Written Home Exercises Provided: yes.  Exercises were reviewed and TICO ZARATE was able to demonstrate them prior to the end of the session.  TICO ZARATE demonstrated good  understanding of the education provided.     Assessment     Patient lacking progress with Physical Therapy and is unable to tolerate any progression session to session. Patient with poor adherence to home exercise program contributing to lack of progression. Patient motivation poor to improve physical condition. Patient most appropriate for aquatic therapy and will utilize his sons pool for a walking program prior to begging aquatic therapy. Patient discharged from Physical Therapy today with home exercise program.    TICO ZARATE Is progressing well towards his goals.   Pt prognosis is Good.     Pt will continue to benefit from skilled outpatient physical therapy to address the deficits listed in the problem list box on initial evaluation, provide pt/family education and to maximize pt's level of independence in the home and community environment.     Pt's spiritual, cultural and educational needs considered and pt agreeable to plan of care and goals.     Anticipated barriers to physical therapy: chronicity of impairments, co-morbidities        Goals:   Short Term Goals: 4 weeks   Pt will be compliant with HEP in order to maximize PT benefits  Pt will improve BLE MMT grades by >/=1/2 grade in order to improve strength for ADL completion  Pt will complete TUG in </= 12 seconds with least restrictive assistive device in order to  reduce risk for falls and improve safety with functional mobility  Pt will score >/= 11 repetitions on 30-Second Sit to  order to improve BLE endurance and muscular power for transfers   Pt will complete Six-Minute Walk Test indoors in order to improve endurance for home mobility      Long Term Goals: 8 weeks   Pt will score </= TBD% on FOTO limitation survey in order to improve self-perception of functional mobility deficits  Pt will improve BLE MMT grades by >/=1 grade in order to improve strength for ADL completion  Pt will complete TUG in </= 10 seconds with least restrictive assistive device in order to reduce risk for falls and improve safety with functional mobility  Pt will demonstrate increase in single leg balance to at least 10 seconds bilaterally, demonstrating improved balance and decreased risk of falls.   Pt will score >/= 13 repetitions on 30-Second Sit to  order to improve BLE endurance and muscular power for transfers   Pt will walk >/= 1000 feet on Six-Minute Walk Test outdoors over multiple levels/surfaces in order to improve endurance for community mobility   Pt will perform 1 floor <> stand transfer with  UE support in order to demonstrate safe functional mobility in case of future fall  Pt will report 0 falls from initiation of PT management  Pt will begin some form of home/community fitness in order to sustain progress gained in Physical Therapy    Plan     Patient discharged from Physical Therapy today    Dez Echols, PT

## 2023-11-20 ENCOUNTER — PATIENT MESSAGE (OUTPATIENT)
Dept: PSYCHIATRY | Facility: CLINIC | Age: 73
End: 2023-11-20
Payer: MEDICARE

## 2023-11-24 RX ORDER — DULOXETIN HYDROCHLORIDE 60 MG/1
120 CAPSULE, DELAYED RELEASE ORAL
Qty: 180 CAPSULE | Refills: 0 | Status: SHIPPED | OUTPATIENT
Start: 2023-11-24 | End: 2024-02-23

## 2023-11-24 NOTE — TELEPHONE ENCOUNTER
No care due was identified.  [facility] Embedded Care Due Messages. Reference number: [id number].   11/24/2023 3:49:55 AM CST

## 2023-11-29 ENCOUNTER — TELEPHONE (OUTPATIENT)
Dept: PAIN MEDICINE | Facility: CLINIC | Age: 73
End: 2023-11-29
Payer: MEDICARE

## 2023-11-30 RX ORDER — PREGABALIN 75 MG/1
75 CAPSULE ORAL 2 TIMES DAILY
Qty: 60 CAPSULE | Refills: 0 | Status: SHIPPED | OUTPATIENT
Start: 2023-11-30 | End: 2023-12-05

## 2023-11-30 RX ORDER — PREGABALIN 75 MG/1
75 CAPSULE ORAL 2 TIMES DAILY
Qty: 60 CAPSULE | Refills: 0 | OUTPATIENT
Start: 2023-11-30 | End: 2023-12-30

## 2023-12-01 NOTE — PROGRESS NOTES
Subjective:     Patient ID: Zeenat Mancia is a 73 y.o. male.    Chief Complaint: No chief complaint on file.    HPI    Patient is a 73 year old male who presents to clinic with chief complaint of left leg pain. She stated that he gets some stiffness and discomfort to the left thigh. He also reports fatigue and weakness in his leg. Has baseline neuropathy.     Review of Systems   Constitutional: Negative for chills and fever.   Cardiovascular:  Negative for chest pain.   Respiratory:  Negative for cough and shortness of breath.    Skin:  Negative for color change, dry skin, itching, nail changes, poor wound healing and rash.   Musculoskeletal:  Positive for muscle weakness and stiffness.   Neurological:  Negative for dizziness.   Psychiatric/Behavioral:  Negative for altered mental status. The patient is not nervous/anxious.    All other systems reviewed and are negative.      Objective:       General    Constitutional: He is oriented to person, place, and time. He appears well-developed and well-nourished. No distress.   HENT:   Head: Atraumatic.   Eyes: Conjunctivae are normal.   Cardiovascular:  Normal rate.            Pulmonary/Chest: Effort normal.   Neurological: He is alert and oriented to person, place, and time.   Psychiatric: He has a normal mood and affect. His behavior is normal.         Left Hip Exam     Range of Motion   The patient has normal left hip ROM.    Tests   Pain w/ forced internal rotation (NATALIA): absent  Pain w/ forced external rotation (FADIR): absent  Log Roll: negative          Muscle Strength   Left Lower Extremity   Hip Abduction: 4/5   Hip Adduction: 4/5   Hip Flexion: 4/5   Ankle Dorsiflexion:  5/5       Physical Exam  Constitutional:       General: He is not in acute distress.     Appearance: He is well-developed and well-nourished. He is not diaphoretic.   HENT:      Head: Atraumatic.   Eyes:      Conjunctiva/sclera: Conjunctivae normal.   Cardiovascular:      Rate and Rhythm:  Normal rate.   Pulmonary:      Effort: Pulmonary effort is normal.   Neurological:      Mental Status: He is alert and oriented to person, place, and time.   Psychiatric:         Mood and Affect: Mood and affect normal.         Behavior: Behavior normal.         RADS:   Bones of the pelvis and left hip are intact.  No bony abnormalities seen no evidence of metastatic disease.  Surgical clips are seen in the area of the prostate.     Assessment:     Encounter Diagnoses   Name Primary?    Left hip pain Yes    Neuropathy     Physical deconditioning        Plan:      Discussed plan with the patient. At this time will refer to PT for deconditioning. At this time return to clinic as needed.

## 2023-12-05 RX ORDER — PREGABALIN 150 MG/1
150 CAPSULE ORAL 2 TIMES DAILY
Qty: 60 CAPSULE | Refills: 0 | Status: SHIPPED | OUTPATIENT
Start: 2023-12-05 | End: 2024-01-15 | Stop reason: SDUPTHER

## 2023-12-14 ENCOUNTER — PATIENT MESSAGE (OUTPATIENT)
Dept: ADMINISTRATIVE | Facility: HOSPITAL | Age: 73
End: 2023-12-14
Payer: MEDICARE

## 2023-12-14 ENCOUNTER — TELEPHONE (OUTPATIENT)
Dept: ADMINISTRATIVE | Facility: HOSPITAL | Age: 73
End: 2023-12-14
Payer: MEDICARE

## 2023-12-14 ENCOUNTER — PATIENT MESSAGE (OUTPATIENT)
Dept: PRIMARY CARE CLINIC | Facility: CLINIC | Age: 73
End: 2023-12-14
Payer: MEDICARE

## 2023-12-14 NOTE — TELEPHONE ENCOUNTER
[employer] Advantage Gap report.  Outreach to patient for hypertension management.     RE: Need to find out if patient is monitoring blood pressure at home.  If so, the most recent blood pressure is needed to update the chart.      Attempted to call patient.  No answer, could not leave voice mail.  Message sent to patient's portal.

## 2023-12-15 ENCOUNTER — OFFICE VISIT (OUTPATIENT)
Dept: PAIN MEDICINE | Facility: CLINIC | Age: 73
End: 2023-12-15
Payer: MEDICARE

## 2023-12-15 ENCOUNTER — PATIENT MESSAGE (OUTPATIENT)
Dept: PRIMARY CARE CLINIC | Facility: CLINIC | Age: 73
End: 2023-12-15
Payer: MEDICARE

## 2023-12-15 ENCOUNTER — TELEPHONE (OUTPATIENT)
Dept: PAIN MEDICINE | Facility: CLINIC | Age: 73
End: 2023-12-15

## 2023-12-15 VITALS
HEART RATE: 103 BPM | WEIGHT: 315 LBS | DIASTOLIC BLOOD PRESSURE: 84 MMHG | SYSTOLIC BLOOD PRESSURE: 129 MMHG | BODY MASS INDEX: 40.43 KG/M2 | HEIGHT: 74 IN

## 2023-12-15 DIAGNOSIS — G60.9 IDIOPATHIC PERIPHERAL NEUROPATHY: Primary | ICD-10-CM

## 2023-12-15 DIAGNOSIS — M79.671 BILATERAL FOOT PAIN: ICD-10-CM

## 2023-12-15 DIAGNOSIS — R29.898 MUSCULAR DECONDITIONING: ICD-10-CM

## 2023-12-15 DIAGNOSIS — M54.16 LUMBAR RADICULITIS: ICD-10-CM

## 2023-12-15 DIAGNOSIS — M79.652 LEFT THIGH PAIN: ICD-10-CM

## 2023-12-15 DIAGNOSIS — G89.4 CHRONIC PAIN SYNDROME: ICD-10-CM

## 2023-12-15 DIAGNOSIS — M79.672 BILATERAL FOOT PAIN: ICD-10-CM

## 2023-12-15 PROCEDURE — 1159F MED LIST DOCD IN RCRD: CPT | Mod: CPTII,S$GLB,, | Performed by: NURSE PRACTITIONER

## 2023-12-15 PROCEDURE — 1101F PT FALLS ASSESS-DOCD LE1/YR: CPT | Mod: CPTII,S$GLB,, | Performed by: NURSE PRACTITIONER

## 2023-12-15 PROCEDURE — 3079F DIAST BP 80-89 MM HG: CPT | Mod: CPTII,S$GLB,, | Performed by: NURSE PRACTITIONER

## 2023-12-15 PROCEDURE — 3008F PR BODY MASS INDEX (BMI) DOCUMENTED: ICD-10-PCS | Mod: CPTII,S$GLB,, | Performed by: NURSE PRACTITIONER

## 2023-12-15 PROCEDURE — 3074F SYST BP LT 130 MM HG: CPT | Mod: CPTII,S$GLB,, | Performed by: NURSE PRACTITIONER

## 2023-12-15 PROCEDURE — 1157F PR ADVANCE CARE PLAN OR EQUIV PRESENT IN MEDICAL RECORD: ICD-10-PCS | Mod: CPTII,S$GLB,, | Performed by: NURSE PRACTITIONER

## 2023-12-15 PROCEDURE — 4010F PR ACE/ARB THEARPY RXD/TAKEN: ICD-10-PCS | Mod: CPTII,S$GLB,, | Performed by: NURSE PRACTITIONER

## 2023-12-15 PROCEDURE — 1159F PR MEDICATION LIST DOCUMENTED IN MEDICAL RECORD: ICD-10-PCS | Mod: CPTII,S$GLB,, | Performed by: NURSE PRACTITIONER

## 2023-12-15 PROCEDURE — 99214 PR OFFICE/OUTPT VISIT, EST, LEVL IV, 30-39 MIN: ICD-10-PCS | Mod: S$GLB,,, | Performed by: NURSE PRACTITIONER

## 2023-12-15 PROCEDURE — 3074F PR MOST RECENT SYSTOLIC BLOOD PRESSURE < 130 MM HG: ICD-10-PCS | Mod: CPTII,S$GLB,, | Performed by: NURSE PRACTITIONER

## 2023-12-15 PROCEDURE — 99999 PR PBB SHADOW E&M-EST. PATIENT-LVL IV: ICD-10-PCS | Mod: PBBFAC,,, | Performed by: NURSE PRACTITIONER

## 2023-12-15 PROCEDURE — 99214 OFFICE O/P EST MOD 30 MIN: CPT | Mod: S$GLB,,, | Performed by: NURSE PRACTITIONER

## 2023-12-15 PROCEDURE — 1101F PR PT FALLS ASSESS DOC 0-1 FALLS W/OUT INJ PAST YR: ICD-10-PCS | Mod: CPTII,S$GLB,, | Performed by: NURSE PRACTITIONER

## 2023-12-15 PROCEDURE — 3008F BODY MASS INDEX DOCD: CPT | Mod: CPTII,S$GLB,, | Performed by: NURSE PRACTITIONER

## 2023-12-15 PROCEDURE — 3079F PR MOST RECENT DIASTOLIC BLOOD PRESSURE 80-89 MM HG: ICD-10-PCS | Mod: CPTII,S$GLB,, | Performed by: NURSE PRACTITIONER

## 2023-12-15 PROCEDURE — 4010F ACE/ARB THERAPY RXD/TAKEN: CPT | Mod: CPTII,S$GLB,, | Performed by: NURSE PRACTITIONER

## 2023-12-15 PROCEDURE — 1160F RVW MEDS BY RX/DR IN RCRD: CPT | Mod: CPTII,S$GLB,, | Performed by: NURSE PRACTITIONER

## 2023-12-15 PROCEDURE — 1160F PR REVIEW ALL MEDS BY PRESCRIBER/CLIN PHARMACIST DOCUMENTED: ICD-10-PCS | Mod: CPTII,S$GLB,, | Performed by: NURSE PRACTITIONER

## 2023-12-15 PROCEDURE — 1125F AMNT PAIN NOTED PAIN PRSNT: CPT | Mod: CPTII,S$GLB,, | Performed by: NURSE PRACTITIONER

## 2023-12-15 PROCEDURE — 3288F PR FALLS RISK ASSESSMENT DOCUMENTED: ICD-10-PCS | Mod: CPTII,S$GLB,, | Performed by: NURSE PRACTITIONER

## 2023-12-15 PROCEDURE — 1157F ADVNC CARE PLAN IN RCRD: CPT | Mod: CPTII,S$GLB,, | Performed by: NURSE PRACTITIONER

## 2023-12-15 PROCEDURE — 3288F FALL RISK ASSESSMENT DOCD: CPT | Mod: CPTII,S$GLB,, | Performed by: NURSE PRACTITIONER

## 2023-12-15 PROCEDURE — 1125F PR PAIN SEVERITY QUANTIFIED, PAIN PRESENT: ICD-10-PCS | Mod: CPTII,S$GLB,, | Performed by: NURSE PRACTITIONER

## 2023-12-15 PROCEDURE — 99999 PR PBB SHADOW E&M-EST. PATIENT-LVL IV: CPT | Mod: PBBFAC,,, | Performed by: NURSE PRACTITIONER

## 2023-12-15 NOTE — TELEPHONE ENCOUNTER
----- Message from CHESTER Campos sent at 12/15/2023 12:26 PM CST -----  Regarding: link qutenza  order  Ordered QTZ for this  patient please link/attach order     ty

## 2023-12-15 NOTE — PROGRESS NOTES
[facility] Established Patient Evaluation    Referred by: No ref. provider found   Reason for referral: * No diagnoses found *     CC:   Chief Complaint   Patient presents with    Leg Pain     Interval update 12/15/23:  73-year-old male that presents for follow up appointment for continued bilateral foot pain he has been provided the Q 10s the patches bilaterally which she states did help his symptoms mildly but would like to be considered for repeat application.  He is currently 150 mg b.i.d. which she states helps but it does create somnolence.  Of a new pain that began months ago left lateral and posterior thigh pain that specifically in 1 spot.  He reports that his left thigh pain increase his when he places pressure on his left foot.  He feels a sharp pain in the left thigh.  Denies any radiating symptoms denies any paresthesia like symptoms.  He just completed physical therapy and was recommended for aqua therapy.  He is severely deconditioned and overweight which prohibits him from certain movements and I think increases his overall pain.  He denies any recent falls denies any bowel bladder dysfunction denies any saddle anesthesia at this time.  Of note he takes Eliquis for AFib.  He did report seeing orthopedics and an x-ray of his hips were taken and found to be unremarkable.    Interval updates 10/03/2023  73-year-old male presents with his wife continued to complain of bilateral foot pain.  He was switched to Lyrica 50 mg b.i.d. by Dr. Childers which she does state is helping and he denies any adverse side effects.  Last clinic visit Dr. Childers discussed providing him with Qutenza for bilateral foot pain.  He continues to follow up with Podiatry and Neurology and was told that his pain is likely related to peripheral neuropathy.  He is not been seen since July he would like to move forward with the Qutenza therapy.         Interval History 6/1/23:  Zeenat LEXX Mancia presents with continued bilateral  feet pain. He feels the medications (gabapentin and cymbalta) make him groggy in the mornings, so he decided to stop taking the medications. He states he has been doing acupuncture, and has done 7-8 visits, however he has not felt it was particularly beneficial; he notes some improvement, but it is not long lasting.. He has also seen a podiatrist and neurology and was told that his pain is likely related to peripheral neuropathy.  He feels his toes are burning at night and finds difficulty getting to sleep.  He felt the lidocaine ointment helped a little, but not a great deal.     Initial HPI 2/13/23  Location: both feet   Onset: 2 years ago  Radiation: N/A  Timing: persistent  Current Pain Score: 6/10  Weekly Pain Range: 6-7/10  Quality: Burning  Worsened by: standing and walking for more than several minutes  Improved by: medications, rest, and sitting    Pt reports 2 years of burning, stinging, tingling foot pain that is worse at night and with walking and standing. He reports previous history of low back pain, however this resolved with PT.  He denies back pain today.  His foot pain is bilateral and covers the entirety of the foot including both dorsal and plantar surfaces, with symptoms most pronounced over the toes.  He has been seen by Podiatry who performed diagnostic tarsal tunnel injections with some temporary relief.  He was offered tarsal tunnel decompression surgery, but declined. EMG performed 1/2022 no definitive evidence of tarsal tunnel syndrome and possible evidence of peripheral polyneuropathy. He previously followed with Dr. Poon who started the patient on gabapentin; he is prescribed 600 mg TID, however only takes 600 mg BID as he often forgets his 3rd dose. He is prescribed Cymbalta by his PCP for anxiety. He reports he has recently gained weight due to decreased activity. He denies hx of diabetes or  chemotherapy. He denies symptoms in the hands.       Previous Therapies:  PT/OT: Previously  completed PT for low back pain  HEP: No  TENS: No  Interventions:   10/12/2023 Qutenza patches bilaterally   Diagnostic Tarsal Tunnel Injections with Podiatry   Surgery: Right Carpal Tunnel Release  Opioids: None  Adjuvants:     Current Pain Medications:  Gabapentin 600 mg BID  Cymbalta 20 mg QD   Lyrica 150 mg b.i.d.    Assessment & Plan:  Problem List Items Addressed This Visit    None      10/03/2023-Zeenat Mancia is a 73 y.o. male who  has a past medical history of A-fib, Acute blood loss anemia, Arthritis, Atrial flutter, Colon polyp, Diverticulosis, Dyslipidemia, Dyspnea on exertion (5/31/2021), GERD (gastroesophageal reflux disease), Hepatic steatosis, Hyperlipidemia, Hypertension, Irregular heart beat, Lower GI bleed (5/31/2021), Multiple gastric ulcers (05/16/2018), Obesity, Sleep apnea, and Unspecified disorder of kidney and ureter.  By history and examination this patient has chronic pain likely related to idiopathic neuropathy.  We discussed the underlying diagnoses and multiple treatment options including non-opioid medications, physical therapy, home exercise, core muscle enhancement, and activity modification.  The risks and benefits of each treatment option were discussed and all questions were answered.      12/15/2023 Zeenat Mancia is a 73 y.o. male who  has a past medical history of A-fib, Acute blood loss anemia, Arthritis, Atrial flutter, Colon polyp, Diverticulosis, Dyslipidemia, Dyspnea on exertion (5/31/2021), GERD (gastroesophageal reflux disease), Hepatic steatosis, Hyperlipidemia, Hypertension, Irregular heart beat, Lower GI bleed (5/31/2021), Multiple gastric ulcers (05/16/2018), Obesity, Sleep apnea, and Unspecified disorder of kidney and ureter.  By history and examination this patient has chronic bilateral foot pain likely related to idiopathic neuropathy.  He is also complaining of right lateral and posterior thigh pain that is specific in 1 spot this pain is unclear at this time I  have reviewed his lumbar MRI he does have multilevel degenerative changes that result in mild left-sided L5-S1 foraminal encroachment I do not believe this is the cause of his left lateral and posterior thigh pain.  I do believe that this pain is more musculoskeletal.  We discussed the underlying diagnoses and multiple treatment options including non-opioid medications, interventional procedures, physical therapy, home exercise, core muscle enhancement, activity modification, and weight loss.  The risks and benefits of each treatment option were discussed and all questions were answered.   See plan agreed upon below.              Multilevel degenerative changes resulting in mild left-sided L5 S1 foraminal encroachment     Treatment Plan:   Procedures:  Re-schedule  for Qutenza  patch ordered today,   PT/OT/HEP: I encouraged the patient to start a home exercise regimen that includes daily cardiovascular exercise lasting at least 30 minutes.  He is also scheduled for pending aqua therapy, he is severely deconditioned and this generates a large portion of his pain. We also  discussed optimizing his diet.   Medications:    - order compound cream this consists of probably cane, lidocaine and gabapentin   - continue pregabalin 150 mg b.i.d. which seems to be working we can titrate up as tolerated    Reviewed and consistent with medication use as prescribed.  Imaging: Reviewed.   Follow Up: for qutenza procedure    FARHANA Rainey  Interventional Pain Management    10/03/2023        Disclaimer: This note was partly generated using dictation software which may occasionally result in transcription errors.    Imaging:  Reviewed.     EXAMINATION:  MRI LUMBAR SPINE WITHOUT CONTRAST     CLINICAL HISTORY:  Radiculopathy, lumbar regionLumbar radiculopathy, symptoms persist with conservative treatment;     TECHNIQUE:  Sagittal T1, sagittal T2, sagittal STIR, axial T1 and axial T2 weighted images of the lumbar spine obtained  without contrast.     COMPARISON:  2022 x-ray     FINDINGS:  Lumbar spine alignment is within normal limits. The vertebral body heights are well maintained, with no fracture.  No marrow signal abnormality suspicious for an infiltrative process.     The conus is normal in appearance.  The adjacent soft tissue structures show no significant abnormalities.     L1-L2: Circumferential disc bulge and spondylitic spurring.No significant central canal or neural foraminal narrowing.     L2-L3: Circumferential disc bulge and spondylitic spurring.  No significant central canal or neural foraminal narrowing.     L3-L4: Circumferential disc bulge and minimal facet arthritis.  Asymmetric left-sided spondylitic spurring.  No significant central canal or neural foraminal narrowing.     L4-L5: Circumferential disc bulge and asymmetric spondylitic spurring.  Moderate facet arthritis.  No significant central canal or neural foraminal narrowing.     L5-S1: Circumferential disc bulge and small central disc protrusion.  Mild facet arthritis.  Mild left-sided neural foraminal narrowing.     Impression:     Multilevel degenerative changes resulting in mild left-sided L5 S1 foraminal encroachment.  Small superimposed central disc protrusion.        Electronically signed by: Marlo Camargo Jr    EMG:  Test Date:  2022     Patient: Zeenat Mancia : 1950 Physician: Jase Vernon D.O.   ID#:   SEX: Male Ref. Phys: Leonel Perez, VALORIE      HPI: Zeenat Mancia is a 71 y.o.male who presents for NCS/EMG to evaluate tarsal tunnel bilaterally.  Pt with tingling and burning pain in the bilateral feet- tips of toes and soles of feet.       NCV & EMG Findings:  Evaluation of the right median sensory nerve showed prolonged distal peak latency and decreased conduction velocity.  The left Superficial Fibular sensory, the right Superficial Fibular sensory, the left sural sensory, and the right sural sensory nerves showed no  response.  All remaining nerves (as indicated in the following tables) were within normal limits.  Needle evaluation of the left Abductor Hallucis muscle showed increased insertional activity, moderately increased spontaneous activity, and slightly increased polyphasic potentials.  The right Abductor Hallucis muscle showed increased insertional activity, moderately increased spontaneous activity, and diminished recruitment.  All remaining muscles (as indicated in the following table) showed no evidence of electrical instability.     Impression:  1. Technically, somewhat limited study due to peripheral edema of legs  2. The sensory responses were absent in the bilateral lower extremities.  This can either be due to the technical issues above, or could be a sensory peripheral polyneuropathy.  I favor the latter as his symptoms correlate with this.   3. Incidentally, there is evidence of a median mononeuropathy across the right wrist (I.e. carpal tunnel syndrome).  Upper extremity sensory studies performed due to lack of responses in the legs.  If a more detailed study is desired for the carpal tunnel syndrome, a separate upper extremity study can be ordered.  Note:  He has no significant hand symptoms.     4. No definitive evidence of tarsal tunnel syndrome on either side.  The bilateral abductor hallicus muscles showed active denervation, but this could be due to the peripheral polyneuropathy described above, and can also be seen in normal feet, especially with advancing age.         Review of Systems:  Review of Systems   Constitutional:  Negative for chills and fever.        +weight gain   HENT:  Negative for congestion and ear pain.    Eyes:  Negative for discharge and redness.   Respiratory:  Negative for cough and wheezing.    Cardiovascular:  Negative for chest pain.   Gastrointestinal:  Negative for abdominal pain and vomiting.   Musculoskeletal:  Negative for back pain and neck pain.        +Bilateral foot  pain   Skin:  Negative for itching and rash.   Neurological:  Positive for tingling (b/l feet) and sensory change (Burning, tingling over b/l feet). Negative for weakness.       Physical Exam:  There were no vitals filed for this visit.      General    Vitals reviewed.  Constitutional: He is oriented to person, place, and time. He appears well-developed and well-nourished. No distress.   HENT:   Head: Normocephalic and atraumatic.   Nose: Nose normal.   Eyes: Conjunctivae and EOM are normal. Pupils are equal, round, and reactive to light.   Neck: Neck supple.   Cardiovascular:  Normal rate and regular rhythm.            Pulmonary/Chest: Effort normal and breath sounds normal.   Abdominal: Soft. He exhibits no distension.   Neurological: He is alert and oriented to person, place, and time.   Psychiatric: He has a normal mood and affect. His behavior is normal. Thought content normal.     General Musculoskeletal Exam   Gait: normal     Right Ankle/Foot Exam     Inspection   Deformity: absent  Erythema: absent  Bruising:  Foot - absent  Effusion:  Foot - absent    Muscle Strength   The patient has normal right ankle strength.    Other   Sensation: decreased (over plantar and dorsal aspect)    Comments:  No clubbing of the nails. Normal capillary refill.     Left Ankle/Foot Exam     Inspection  Deformity: absent  Erythema: absent  Bruising:  Foot - absent  Effusion:  Foot - absent    Muscle Strength   The patient has normal left ankle strength.    Other   Sensation: decreased (over plantar and dorsal foot)    Comments:  No clubbing of the nails. Normal capillary refill.       Vascular Exam     Right Pulses  Dorsalis Pedis:      2+          Left Pulses  Dorsalis Pedis:      2+          Edema  Right Lower Leg: absent

## 2023-12-15 NOTE — PROGRESS NOTES
Staff Note:     Pt interviewed and case discussed.  I agree with Resident's findings and treatment plan.  Initial visit to this clinic of Pt with obsessional vs. Delusional thoughts and associated distress.  S/violent thoughts negative.  Lab ordered.  Current medications continued for now, while Pt was strongly encouraged to begin CBT for OCD.    Risks of medications including priapism and orthostatic hypotension were discussed with Pt and questions were answered.    JOSE A

## 2023-12-16 ENCOUNTER — PATIENT MESSAGE (OUTPATIENT)
Dept: PRIMARY CARE CLINIC | Facility: CLINIC | Age: 73
End: 2023-12-16
Payer: MEDICARE

## 2023-12-16 DIAGNOSIS — Z74.09 IMPAIRED FUNCTIONAL MOBILITY, BALANCE, GAIT, AND ENDURANCE: ICD-10-CM

## 2023-12-16 DIAGNOSIS — G62.9 NEUROPATHY: Primary | ICD-10-CM

## 2023-12-18 VITALS — DIASTOLIC BLOOD PRESSURE: 84 MMHG | SYSTOLIC BLOOD PRESSURE: 129 MMHG

## 2023-12-29 ENCOUNTER — PROCEDURE VISIT (OUTPATIENT)
Dept: PAIN MEDICINE | Facility: CLINIC | Age: 73
End: 2023-12-29
Payer: MEDICARE

## 2023-12-29 VITALS
SYSTOLIC BLOOD PRESSURE: 122 MMHG | BODY MASS INDEX: 40.43 KG/M2 | DIASTOLIC BLOOD PRESSURE: 72 MMHG | HEART RATE: 101 BPM | WEIGHT: 315 LBS | HEIGHT: 74 IN

## 2023-12-29 DIAGNOSIS — M70.72 BURSITIS OF OTHER BURSA OF LEFT HIP: Primary | ICD-10-CM

## 2023-12-29 DIAGNOSIS — M79.652 LEFT THIGH PAIN: ICD-10-CM

## 2023-12-29 PROCEDURE — 20605 LEFT GREATER TROCHANTERIC BURSA INJECTION: ICD-10-PCS | Mod: LT,S$GLB,, | Performed by: NURSE PRACTITIONER

## 2023-12-29 PROCEDURE — 99214 OFFICE O/P EST MOD 30 MIN: CPT | Mod: 25,S$GLB,, | Performed by: NURSE PRACTITIONER

## 2023-12-29 PROCEDURE — 99214 PR OFFICE/OUTPT VISIT, EST, LEVL IV, 30-39 MIN: ICD-10-PCS | Mod: 25,S$GLB,, | Performed by: NURSE PRACTITIONER

## 2023-12-29 PROCEDURE — 20605 DRAIN/INJ JOINT/BURSA W/O US: CPT | Mod: LT,S$GLB,, | Performed by: NURSE PRACTITIONER

## 2023-12-29 RX ORDER — TRIAMCINOLONE ACETONIDE 40 MG/ML
40 INJECTION, SUSPENSION INTRA-ARTICULAR; INTRAMUSCULAR
Status: COMPLETED | OUTPATIENT
Start: 2023-12-29 | End: 2023-12-29

## 2023-12-29 RX ADMIN — TRIAMCINOLONE ACETONIDE 40 MG: 40 INJECTION, SUSPENSION INTRA-ARTICULAR; INTRAMUSCULAR at 01:12

## 2023-12-29 NOTE — PROGRESS NOTES
[facility] Established Patient Evaluation    Referred by: Rafael Read   Reason for referral: * No diagnoses found *     CC:   Chief Complaint   Patient presents with    Leg Pain    Foot Pain     Interval update 12/29/23           Interval update 12/15/23:  73-year-old male that presents for follow up appointment for continued bilateral foot pain he has been provided the Q 10s the patches bilaterally which she states did help his symptoms mildly but would like to be considered for repeat application.  He is currently 150 mg b.i.d. which she states helps but it does create somnolence.  Of a new pain that began months ago left lateral and posterior thigh pain that specifically in 1 spot.  He reports that his left thigh pain increase his when he places pressure on his left foot.  He feels a sharp pain in the left thigh.  Denies any radiating symptoms denies any paresthesia like symptoms.  He just completed physical therapy and was recommended for aqua therapy.  He is severely deconditioned and overweight which prohibits him from certain movements and I think increases his overall pain.  He denies any recent falls denies any bowel bladder dysfunction denies any saddle anesthesia at this time.  Of note he takes Eliquis for AFib.  He did report seeing orthopedics and an x-ray of his hips were taken and found to be unremarkable.    Interval updates 10/03/2023  73-year-old male presents with his wife continued to complain of bilateral foot pain.  He was switched to Lyrica 50 mg b.i.d. by Dr. Childers which she does state is helping and he denies any adverse side effects.  Last clinic visit Dr. Childers discussed providing him with Qutenza for bilateral foot pain.  He continues to follow up with Podiatry and Neurology and was told that his pain is likely related to peripheral neuropathy.  He is not been seen since July he would like to move forward with the Qutenza therapy.         Interval History 6/1/23:  Zeenat HALLMAN  Gavino presents with continued bilateral feet pain. He feels the medications (gabapentin and cymbalta) make him groggy in the mornings, so he decided to stop taking the medications. He states he has been doing acupuncture, and has done 7-8 visits, however he has not felt it was particularly beneficial; he notes some improvement, but it is not long lasting.. He has also seen a podiatrist and neurology and was told that his pain is likely related to peripheral neuropathy.  He feels his toes are burning at night and finds difficulty getting to sleep.  He felt the lidocaine ointment helped a little, but not a great deal.     Initial HPI 2/13/23  Location: both feet   Onset: 2 years ago  Radiation: N/A  Timing: persistent  Current Pain Score: 6/10  Weekly Pain Range: 6-7/10  Quality: Burning  Worsened by: standing and walking for more than several minutes  Improved by: medications, rest, and sitting    Pt reports 2 years of burning, stinging, tingling foot pain that is worse at night and with walking and standing. He reports previous history of low back pain, however this resolved with PT.  He denies back pain today.  His foot pain is bilateral and covers the entirety of the foot including both dorsal and plantar surfaces, with symptoms most pronounced over the toes.  He has been seen by Podiatry who performed diagnostic tarsal tunnel injections with some temporary relief.  He was offered tarsal tunnel decompression surgery, but declined. EMG performed 1/2022 no definitive evidence of tarsal tunnel syndrome and possible evidence of peripheral polyneuropathy. He previously followed with Dr. Poon who started the patient on gabapentin; he is prescribed 600 mg TID, however only takes 600 mg BID as he often forgets his 3rd dose. He is prescribed Cymbalta by his PCP for anxiety. He reports he has recently gained weight due to decreased activity. He denies hx of diabetes or  chemotherapy. He denies symptoms in the hands.        Previous Therapies:  PT/OT: Previously completed PT for low back pain  HEP: No  TENS: No  Interventions:   10/12/2023 Qutenza patches bilaterally   Diagnostic Tarsal Tunnel Injections with Podiatry   Surgery: Right Carpal Tunnel Release  Opioids: None  Adjuvants:     Current Pain Medications:  Gabapentin 600 mg BID  Cymbalta 20 mg QD   Lyrica 150 mg b.i.d.    Assessment & Plan:  Problem List Items Addressed This Visit    None      10/03/2023-Zeenat Mancia is a 73 y.o. male who  has a past medical history of A-fib, Acute blood loss anemia, Arthritis, Atrial flutter, Colon polyp, Diverticulosis, Dyslipidemia, Dyspnea on exertion (5/31/2021), GERD (gastroesophageal reflux disease), Hepatic steatosis, Hyperlipidemia, Hypertension, Irregular heart beat, Lower GI bleed (5/31/2021), Multiple gastric ulcers (05/16/2018), Obesity, Sleep apnea, and Unspecified disorder of kidney and ureter.  By history and examination this patient has chronic pain likely related to idiopathic neuropathy.  We discussed the underlying diagnoses and multiple treatment options including non-opioid medications, physical therapy, home exercise, core muscle enhancement, and activity modification.  The risks and benefits of each treatment option were discussed and all questions were answered.      12/15/2023 Zeenat Mancia is a 73 y.o. male who  has a past medical history of A-fib, Acute blood loss anemia, Arthritis, Atrial flutter, Colon polyp, Diverticulosis, Dyslipidemia, Dyspnea on exertion (5/31/2021), GERD (gastroesophageal reflux disease), Hepatic steatosis, Hyperlipidemia, Hypertension, Irregular heart beat, Lower GI bleed (5/31/2021), Multiple gastric ulcers (05/16/2018), Obesity, Sleep apnea, and Unspecified disorder of kidney and ureter.  By history and examination this patient has chronic bilateral foot pain likely related to idiopathic neuropathy.  He is also complaining of right lateral and posterior thigh pain that is specific in  1 spot this pain is unclear at this time I have reviewed his lumbar MRI he does have multilevel degenerative changes that result in mild left-sided L5-S1 foraminal encroachment I do not believe this is the cause of his left lateral and posterior thigh pain.  I do believe that this pain is more musculoskeletal.  We discussed the underlying diagnoses and multiple treatment options including non-opioid medications, interventional procedures, physical therapy, home exercise, core muscle enhancement, activity modification, and weight loss.  The risks and benefits of each treatment option were discussed and all questions were answered.   See plan agreed upon below.              Multilevel degenerative changes resulting in mild left-sided L5 S1 foraminal encroachment     Treatment Plan:   Procedures:  Re-schedule  for Qutenza  patch ordered today,   PT/OT/HEP: I encouraged the patient to start a home exercise regimen that includes daily cardiovascular exercise lasting at least 30 minutes.  He is also scheduled for pending aqua therapy, he is severely deconditioned and this generates a large portion of his pain. We also  discussed optimizing his diet.   Medications:    - order compound cream this consists of probably cane, lidocaine and gabapentin   - continue pregabalin 150 mg b.i.d. which seems to be working we can titrate up as tolerated    Reviewed and consistent with medication use as prescribed.  Imaging: Reviewed.   Follow Up: for qutenza procedure    FARHANA Rainey  Interventional Pain Management    10/03/2023        Disclaimer: This note was partly generated using dictation software which may occasionally result in transcription errors.    Imaging:  Reviewed.     EXAMINATION:  MRI LUMBAR SPINE WITHOUT CONTRAST     CLINICAL HISTORY:  Radiculopathy, lumbar regionLumbar radiculopathy, symptoms persist with conservative treatment;     TECHNIQUE:  Sagittal T1, sagittal T2, sagittal STIR, axial T1 and axial T2  weighted images of the lumbar spine obtained without contrast.     COMPARISON:  2022 x-ray     FINDINGS:  Lumbar spine alignment is within normal limits. The vertebral body heights are well maintained, with no fracture.  No marrow signal abnormality suspicious for an infiltrative process.     The conus is normal in appearance.  The adjacent soft tissue structures show no significant abnormalities.     L1-L2: Circumferential disc bulge and spondylitic spurring.No significant central canal or neural foraminal narrowing.     L2-L3: Circumferential disc bulge and spondylitic spurring.  No significant central canal or neural foraminal narrowing.     L3-L4: Circumferential disc bulge and minimal facet arthritis.  Asymmetric left-sided spondylitic spurring.  No significant central canal or neural foraminal narrowing.     L4-L5: Circumferential disc bulge and asymmetric spondylitic spurring.  Moderate facet arthritis.  No significant central canal or neural foraminal narrowing.     L5-S1: Circumferential disc bulge and small central disc protrusion.  Mild facet arthritis.  Mild left-sided neural foraminal narrowing.     Impression:     Multilevel degenerative changes resulting in mild left-sided L5 S1 foraminal encroachment.  Small superimposed central disc protrusion.        Electronically signed by: Marlo Camargo Jr    EMG:  Test Date:  2022     Patient: Zeenat Macnia : 1950 Physician: Jase Vernon D.O.   ID#:   SEX: Male Ref. Phys: Leonel Perez DPM      HPI: Zeenat Mancia is a 71 y.o.male who presents for NCS/EMG to evaluate tarsal tunnel bilaterally.  Pt with tingling and burning pain in the bilateral feet- tips of toes and soles of feet.       NCV & EMG Findings:  Evaluation of the right median sensory nerve showed prolonged distal peak latency and decreased conduction velocity.  The left Superficial Fibular sensory, the right Superficial Fibular sensory, the left sural sensory, and the  right sural sensory nerves showed no response.  All remaining nerves (as indicated in the following tables) were within normal limits.  Needle evaluation of the left Abductor Hallucis muscle showed increased insertional activity, moderately increased spontaneous activity, and slightly increased polyphasic potentials.  The right Abductor Hallucis muscle showed increased insertional activity, moderately increased spontaneous activity, and diminished recruitment.  All remaining muscles (as indicated in the following table) showed no evidence of electrical instability.     Impression:  1. Technically, somewhat limited study due to peripheral edema of legs  2. The sensory responses were absent in the bilateral lower extremities.  This can either be due to the technical issues above, or could be a sensory peripheral polyneuropathy.  I favor the latter as his symptoms correlate with this.   3. Incidentally, there is evidence of a median mononeuropathy across the right wrist (I.e. carpal tunnel syndrome).  Upper extremity sensory studies performed due to lack of responses in the legs.  If a more detailed study is desired for the carpal tunnel syndrome, a separate upper extremity study can be ordered.  Note:  He has no significant hand symptoms.     4. No definitive evidence of tarsal tunnel syndrome on either side.  The bilateral abductor hallicus muscles showed active denervation, but this could be due to the peripheral polyneuropathy described above, and can also be seen in normal feet, especially with advancing age.         Review of Systems:  Review of Systems   Constitutional:  Negative for chills and fever.        +weight gain   HENT:  Negative for congestion and ear pain.    Eyes:  Negative for discharge and redness.   Respiratory:  Negative for cough and wheezing.    Cardiovascular:  Negative for chest pain.   Gastrointestinal:  Negative for abdominal pain and vomiting.   Musculoskeletal:  Negative for back pain and  "neck pain.        +Bilateral foot pain   Skin:  Negative for itching and rash.   Neurological:  Positive for tingling (b/l feet) and sensory change (Burning, tingling over b/l feet). Negative for weakness.       Physical Exam:  Vitals:    12/29/23 1318   BP: 122/72   Pulse: 101   Weight: (!) 156.4 kg (344 lb 14.6 oz)   Height: 6' 2" (1.88 m)   PainSc:   6   PainLoc: Foot         General    Vitals reviewed.  Constitutional: He is oriented to person, place, and time. He appears well-developed and well-nourished. No distress.   HENT:   Head: Normocephalic and atraumatic.   Nose: Nose normal.   Eyes: Conjunctivae and EOM are normal. Pupils are equal, round, and reactive to light.   Neck: Neck supple.   Cardiovascular:  Normal rate and regular rhythm.            Pulmonary/Chest: Effort normal and breath sounds normal.   Abdominal: Soft. He exhibits no distension.   Neurological: He is alert and oriented to person, place, and time.   Psychiatric: He has a normal mood and affect. His behavior is normal. Thought content normal.     General Musculoskeletal Exam   Gait: normal     Right Ankle/Foot Exam     Inspection   Deformity: absent  Erythema: absent  Bruising:  Foot - absent  Effusion:  Foot - absent    Muscle Strength   The patient has normal right ankle strength.    Other   Sensation: decreased (over plantar and dorsal aspect)    Comments:  No clubbing of the nails. Normal capillary refill.     Left Ankle/Foot Exam     Inspection  Deformity: absent  Erythema: absent  Bruising:  Foot - absent  Effusion:  Foot - absent    Muscle Strength   The patient has normal left ankle strength.    Other   Sensation: decreased (over plantar and dorsal foot)    Comments:  No clubbing of the nails. Normal capillary refill.       Vascular Exam     Right Pulses  Dorsalis Pedis:      2+          Left Pulses  Dorsalis Pedis:      2+          Edema  Right Lower Leg: absent         "

## 2023-12-29 NOTE — PROCEDURES
Left greater trochanteric bursa injection    Date/Time: 12/29/2023 1:00 PM    Performed by: Rafael Read FNP  Authorized by: Rafael Read FNP    Consent Done?:  Yes (Written)  Indications:  Pain  Needle size:  25 G  Approach:  Anterolateral  Medications:  40 mg triamcinolone acetonide (KENALOG-40) injection 40 mg/mL  Patient tolerance:  Patient tolerated the procedure well with no immediate complications

## 2023-12-29 NOTE — PROGRESS NOTES
[facility] Established Patient Evaluation    Referred by: Rafael Read   Reason for referral: * No diagnoses found *     CC:   Chief Complaint   Patient presents with    Leg Pain     Left     Foot Pain     Interval update 12/29/23   73-year-old male that presents today complaining of left lateral leg pain in a specific spot the pain is located just below his left hip.  Pain has been ongoing, pain is described as sharp, pain is intermittent they are times where he does not have pain at all.  Patient reports pain is intensified with certain movements, ADLs as well as laying on his left side.  He denies any radicular symptoms denies any numbness tingling or burning anywhere in his legs or lower extremities.  Pain score 6/10 today.  He denies any bowel bladder dysfunction, denies any saddle anesthesia denies any profound weakness patient appears sometimes to be weak and deconditioned severely.      Interval update 12/15/23:  73-year-old male that presents for follow up appointment for continued bilateral foot pain he has been provided the Q 10s the patches bilaterally which she states did help his symptoms mildly but would like to be considered for repeat application.  He is currently 150 mg b.i.d. which she states helps but it does create somnolence.  Of a new pain that began months ago left lateral and posterior thigh pain that specifically in 1 spot.  He reports that his left thigh pain increase his when he places pressure on his left foot.  He feels a sharp pain in the left thigh.  Denies any radiating symptoms denies any paresthesia like symptoms.  He just completed physical therapy and was recommended for aqua therapy.  He is severely deconditioned and overweight which prohibits him from certain movements and I think increases his overall pain.  He denies any recent falls denies any bowel bladder dysfunction denies any saddle anesthesia at this time.  Of note he takes Eliquis for AFib.  He did report  seeing orthopedics and an x-ray of his hips were taken and found to be unremarkable.    Interval updates 10/03/2023  73-year-old male presents with his wife continued to complain of bilateral foot pain.  He was switched to Lyrica 50 mg b.i.d. by Dr. Childers which she does state is helping and he denies any adverse side effects.  Last clinic visit Dr. Childers discussed providing him with Qutenza for bilateral foot pain.  He continues to follow up with Podiatry and Neurology and was told that his pain is likely related to peripheral neuropathy.  He is not been seen since July he would like to move forward with the Qutenza therapy.         Interval History 6/1/23:  Zeenat Mancia presents with continued bilateral feet pain. He feels the medications (gabapentin and cymbalta) make him groggy in the mornings, so he decided to stop taking the medications. He states he has been doing acupuncture, and has done 7-8 visits, however he has not felt it was particularly beneficial; he notes some improvement, but it is not long lasting.. He has also seen a podiatrist and neurology and was told that his pain is likely related to peripheral neuropathy.  He feels his toes are burning at night and finds difficulty getting to sleep.  He felt the lidocaine ointment helped a little, but not a great deal.     Initial HPI 2/13/23  Location:  Left hip, left lateral leg  Onset:  Months  Radiation: N/A  Timing: persistent  Current Pain Score: 6/10  Weekly Pain Range: 6-7/10  Quality: Burning  Worsened by: standing and walking for more than several minutes  Improved by: medications, rest, and sitting    Pt reports 2 years of burning, stinging, tingling foot pain that is worse at night and with walking and standing. He reports previous history of low back pain, however this resolved with PT.  He denies back pain today.  His foot pain is bilateral and covers the entirety of the foot including both dorsal and plantar surfaces, with symptoms most  pronounced over the toes.  He has been seen by Podiatry who performed diagnostic tarsal tunnel injections with some temporary relief.  He was offered tarsal tunnel decompression surgery, but declined. EMG performed 1/2022 no definitive evidence of tarsal tunnel syndrome and possible evidence of peripheral polyneuropathy. He previously followed with Dr. Poon who started the patient on gabapentin; he is prescribed 600 mg TID, however only takes 600 mg BID as he often forgets his 3rd dose. He is prescribed Cymbalta by his PCP for anxiety. He reports he has recently gained weight due to decreased activity. He denies hx of diabetes or  chemotherapy. He denies symptoms in the hands.       Previous Therapies:  PT/OT: Previously completed PT for low back pain  HEP: No  TENS: No  Interventions:   10/12/2023 Qutenza patches bilaterally   Diagnostic Tarsal Tunnel Injections with Podiatry   Surgery: Right Carpal Tunnel Release  Opioids: None  Adjuvants:     Current Pain Medications:  Gabapentin 600 mg BID  Cymbalta 20 mg QD   Lyrica 150 mg b.i.d.    Assessment & Plan:  Problem List Items Addressed This Visit    None      10/03/2023-Zeenat Mancia is a 73 y.o. male who  has a past medical history of A-fib, Acute blood loss anemia, Arthritis, Atrial flutter, Colon polyp, Diverticulosis, Dyslipidemia, Dyspnea on exertion (5/31/2021), GERD (gastroesophageal reflux disease), Hepatic steatosis, Hyperlipidemia, Hypertension, Irregular heart beat, Lower GI bleed (5/31/2021), Multiple gastric ulcers (05/16/2018), Obesity, Sleep apnea, and Unspecified disorder of kidney and ureter.  By history and examination this patient has chronic pain likely related to idiopathic neuropathy.  We discussed the underlying diagnoses and multiple treatment options including non-opioid medications, physical therapy, home exercise, core muscle enhancement, and activity modification.  The risks and benefits of each treatment option were discussed and all  questions were answered.      12/15/2023 Zeenat Mancia is a 73 y.o. male who  has a past medical history of A-fib, Acute blood loss anemia, Arthritis, Atrial flutter, Colon polyp, Diverticulosis, Dyslipidemia, Dyspnea on exertion (5/31/2021), GERD (gastroesophageal reflux disease), Hepatic steatosis, Hyperlipidemia, Hypertension, Irregular heart beat, Lower GI bleed (5/31/2021), Multiple gastric ulcers (05/16/2018), Obesity, Sleep apnea, and Unspecified disorder of kidney and ureter.  By history and examination this patient has chronic bilateral foot pain likely related to idiopathic neuropathy.  He is also complaining of right lateral and posterior thigh pain that is specific in 1 spot this pain is unclear at this time I have reviewed his lumbar MRI he does have multilevel degenerative changes that result in mild left-sided L5-S1 foraminal encroachment I do not believe this is the cause of his left lateral and posterior thigh pain.  I do believe that this pain is more musculoskeletal.  We discussed the underlying diagnoses and multiple treatment options including non-opioid medications, interventional procedures, physical therapy, home exercise, core muscle enhancement, activity modification, and weight loss.  The risks and benefits of each treatment option were discussed and all questions were answered.   See plan agreed upon below.      12/29/[id number]-pwci-old male with multiple comorbidities.  By history and exam the patient has subacute left lateral leg pain that appears to be in 1 spot just below his left lateral hip.  He denies any previous falls he and I have discussed this pain previously etiology is unclear at this point.  Do believe he may be suffering from left GT bursitis with referred pain.  I have recommended a left GTB injection in the area of concern.  Patient verbalized understanding.  Patient myself and his wife discussed underlying diagnosis and treatment options including nonopioid medications,  physical therapy, home exercise, core muscle has been, activity modification and weight loss.  They have agreed to the plan below.    Treatment Plan:   Procedures:  Scheduled for a left GTB injection today  PT/OT/HEP: I encouraged the patient to start a home exercise regimen that includes daily cardiovascular exercise lasting at least 30 minutes.  He is also scheduled for pending aqua therapy, he is severely deconditioned and this generates a large portion of his pain. We also  discussed optimizing his diet.   Medications:    - Re-ordered compound cream this consists of prilocaine, lidocaine and gabapentin   - continue pregabalin 150 mg QHS BID dosing causes somnolence.    Reviewed and consistent with medication use as prescribed.  Imaging:  All previous imaging reviewed and discussed with patient  Consider ultrasound in the left lateral hip area if left GTB is not effective.  Follow Up:  4-6 weeks on my patient is scheduled for repeat Qutenza  the for bilateral foot pain on 01/12/2024    FARHANA Rainey  Interventional Pain Management    10/03/2023        Disclaimer: This note was partly generated using dictation software which may occasionally result in transcription errors.    Imaging:  Reviewed.     EXAMINATION:  MRI LUMBAR SPINE WITHOUT CONTRAST     CLINICAL HISTORY:  Radiculopathy, lumbar regionLumbar radiculopathy, symptoms persist with conservative treatment;     TECHNIQUE:  Sagittal T1, sagittal T2, sagittal STIR, axial T1 and axial T2 weighted images of the lumbar spine obtained without contrast.     COMPARISON:  06/30/2022 x-ray     FINDINGS:  Lumbar spine alignment is within normal limits. The vertebral body heights are well maintained, with no fracture.  No marrow signal abnormality suspicious for an infiltrative process.     The conus is normal in appearance.  The adjacent soft tissue structures show no significant abnormalities.     L1-L2: Circumferential disc bulge and spondylitic spurring.No  significant central canal or neural foraminal narrowing.     L2-L3: Circumferential disc bulge and spondylitic spurring.  No significant central canal or neural foraminal narrowing.     L3-L4: Circumferential disc bulge and minimal facet arthritis.  Asymmetric left-sided spondylitic spurring.  No significant central canal or neural foraminal narrowing.     L4-L5: Circumferential disc bulge and asymmetric spondylitic spurring.  Moderate facet arthritis.  No significant central canal or neural foraminal narrowing.     L5-S1: Circumferential disc bulge and small central disc protrusion.  Mild facet arthritis.  Mild left-sided neural foraminal narrowing.     Impression:     Multilevel degenerative changes resulting in mild left-sided L5 S1 foraminal encroachment.  Small superimposed central disc protrusion.        Electronically signed by: Marlo Camargo Jr    EMG:  Test Date:  2022     Patient: Zeenat Mancia : 1950 Physician: Jase Vernon D.O.   ID#:   SEX: Male Ref. Phys: Leonel Perez, DPM      HPI: Zeenat Mancia is a 71 y.o.male who presents for NCS/EMG to evaluate tarsal tunnel bilaterally.  Pt with tingling and burning pain in the bilateral feet- tips of toes and soles of feet.       NCV & EMG Findings:  Evaluation of the right median sensory nerve showed prolonged distal peak latency and decreased conduction velocity.  The left Superficial Fibular sensory, the right Superficial Fibular sensory, the left sural sensory, and the right sural sensory nerves showed no response.  All remaining nerves (as indicated in the following tables) were within normal limits.  Needle evaluation of the left Abductor Hallucis muscle showed increased insertional activity, moderately increased spontaneous activity, and slightly increased polyphasic potentials.  The right Abductor Hallucis muscle showed increased insertional activity, moderately increased spontaneous activity, and diminished recruitment.  All  "remaining muscles (as indicated in the following table) showed no evidence of electrical instability.     Impression:  1. Technically, somewhat limited study due to peripheral edema of legs  2. The sensory responses were absent in the bilateral lower extremities.  This can either be due to the technical issues above, or could be a sensory peripheral polyneuropathy.  I favor the latter as his symptoms correlate with this.   3. Incidentally, there is evidence of a median mononeuropathy across the right wrist (I.e. carpal tunnel syndrome).  Upper extremity sensory studies performed due to lack of responses in the legs.  If a more detailed study is desired for the carpal tunnel syndrome, a separate upper extremity study can be ordered.  Note:  He has no significant hand symptoms.     4. No definitive evidence of tarsal tunnel syndrome on either side.  The bilateral abductor hallicus muscles showed active denervation, but this could be due to the peripheral polyneuropathy described above, and can also be seen in normal feet, especially with advancing age.         Review of Systems:  Review of Systems   Constitutional:  Negative for chills and fever.        +weight gain   HENT:  Negative for congestion and ear pain.    Eyes:  Negative for discharge and redness.   Respiratory:  Negative for cough and wheezing.    Cardiovascular:  Negative for chest pain.   Gastrointestinal:  Negative for abdominal pain and vomiting.   Musculoskeletal:  Negative for back pain and neck pain.        +Bilateral foot pain   Skin:  Negative for itching and rash.   Neurological:  Positive for tingling (b/l feet) and sensory change (Burning, tingling over b/l feet). Negative for weakness.       Physical Exam:  Vitals:    12/29/23 1318   BP: 122/72   Pulse: 101   Weight: (!) 156.4 kg (344 lb 14.6 oz)   Height: 6' 2" (1.88 m)   PainSc:   6   PainLoc: Foot         General    Vitals reviewed.  Constitutional: He is oriented to person, place, and time. " He appears well-developed and well-nourished. No distress.   HENT:   Head: Normocephalic and atraumatic.   Nose: Nose normal.   Eyes: Conjunctivae and EOM are normal. Pupils are equal, round, and reactive to light.   Neck: Neck supple.   Cardiovascular:  Normal rate and regular rhythm.            Pulmonary/Chest: Effort normal and breath sounds normal.   Abdominal: Soft. He exhibits no distension.   Neurological: He is alert and oriented to person, place, and time.   Psychiatric: He has a normal mood and affect. His behavior is normal. Thought content normal.     General Musculoskeletal Exam   Gait: normal     Right Ankle/Foot Exam     Inspection   Deformity: absent  Erythema: absent  Bruising:  Foot - absent  Effusion:  Foot - absent    Muscle Strength   The patient has normal right ankle strength.    Other   Sensation: decreased (over plantar and dorsal aspect)    Comments:  No clubbing of the nails. Normal capillary refill.     Left Ankle/Foot Exam     Inspection  Deformity: absent  Erythema: absent  Bruising:  Foot - absent  Effusion:  Foot - absent    Muscle Strength   The patient has normal left ankle strength.    Other   Sensation: decreased (over plantar and dorsal foot)    Comments:  No clubbing of the nails. Normal capillary refill.       Vascular Exam     Right Pulses  Dorsalis Pedis:      2+          Left Pulses  Dorsalis Pedis:      2+          Edema  Right Lower Leg: absent

## 2024-01-12 ENCOUNTER — PROCEDURE VISIT (OUTPATIENT)
Dept: PAIN MEDICINE | Facility: CLINIC | Age: 74
End: 2024-01-12
Payer: MEDICARE

## 2024-01-12 DIAGNOSIS — M79.671 CHRONIC PAIN IN RIGHT FOOT: ICD-10-CM

## 2024-01-12 DIAGNOSIS — M79.671 BILATERAL FOOT PAIN: ICD-10-CM

## 2024-01-12 DIAGNOSIS — M79.672 BILATERAL FOOT PAIN: ICD-10-CM

## 2024-01-12 DIAGNOSIS — R29.898 MUSCULAR DECONDITIONING: ICD-10-CM

## 2024-01-12 DIAGNOSIS — G89.29 CHRONIC PAIN IN RIGHT FOOT: ICD-10-CM

## 2024-01-12 DIAGNOSIS — G89.4 CHRONIC PAIN SYNDROME: ICD-10-CM

## 2024-01-12 DIAGNOSIS — G89.29 CHRONIC PAIN IN LEFT FOOT: ICD-10-CM

## 2024-01-12 DIAGNOSIS — G60.9 IDIOPATHIC PERIPHERAL NEUROPATHY: Primary | ICD-10-CM

## 2024-01-12 DIAGNOSIS — M79.672 CHRONIC PAIN IN LEFT FOOT: ICD-10-CM

## 2024-01-12 PROCEDURE — 99215 OFFICE O/P EST HI 40 MIN: CPT | Mod: S$GLB,,, | Performed by: NURSE PRACTITIONER

## 2024-01-12 PROCEDURE — 64999 UNLISTED PX NERVOUS SYSTEM: CPT | Mod: S$GLB,,, | Performed by: NURSE PRACTITIONER

## 2024-01-12 NOTE — PROGRESS NOTES
[facility] Established Patient Evaluation    Referred by: No ref. provider found   Reason for referral: * No diagnoses found *     CC:   No chief complaint on file.    Interval update 12/29/23   73-year-old male that presents today complaining of left lateral leg pain in a specific spot the pain is located just below his left hip.  Pain has been ongoing, pain is described as sharp, pain is intermittent they are times where he does not have pain at all.  Patient reports pain is intensified with certain movements, ADLs as well as laying on his left side.  He denies any radicular symptoms denies any numbness tingling or burning anywhere in his legs or lower extremities.  Pain score 6/10 today.  He denies any bowel bladder dysfunction, denies any saddle anesthesia denies any profound weakness patient appears sometimes to be weak and deconditioned severely.      Interval update 12/15/23:  73-year-old male that presents for follow up appointment for continued bilateral foot pain he has been provided the Q 10s the patches bilaterally which she states did help his symptoms mildly but would like to be considered for repeat application.  He is currently 150 mg b.i.d. which she states helps but it does create somnolence.  Of a new pain that began months ago left lateral and posterior thigh pain that specifically in 1 spot.  He reports that his left thigh pain increase his when he places pressure on his left foot.  He feels a sharp pain in the left thigh.  Denies any radiating symptoms denies any paresthesia like symptoms.  He just completed physical therapy and was recommended for aqua therapy.  He is severely deconditioned and overweight which prohibits him from certain movements and I think increases his overall pain.  He denies any recent falls denies any bowel bladder dysfunction denies any saddle anesthesia at this time.  Of note he takes Eliquis for AFib.  He did report seeing orthopedics and an x-ray of his  hips were taken and found to be unremarkable.    Interval updates 10/03/2023  73-year-old male presents with his wife continued to complain of bilateral foot pain.  He was switched to Lyrica 50 mg b.i.d. by Dr. Childers which she does state is helping and he denies any adverse side effects.  Last clinic visit Dr. Childers discussed providing him with Qutenza for bilateral foot pain.  He continues to follow up with Podiatry and Neurology and was told that his pain is likely related to peripheral neuropathy.  He is not been seen since July he would like to move forward with the Qutenza therapy.         Interval History 6/1/23:  Zeenat Mancia presents with continued bilateral feet pain. He feels the medications (gabapentin and cymbalta) make him groggy in the mornings, so he decided to stop taking the medications. He states he has been doing acupuncture, and has done 7-8 visits, however he has not felt it was particularly beneficial; he notes some improvement, but it is not long lasting.. He has also seen a podiatrist and neurology and was told that his pain is likely related to peripheral neuropathy.  He feels his toes are burning at night and finds difficulty getting to sleep.  He felt the lidocaine ointment helped a little, but not a great deal.     Initial HPI 2/13/23  Location:  Left hip, left lateral leg  Onset:  Months  Radiation: N/A  Timing: persistent  Current Pain Score: 6/10  Weekly Pain Range: 6-7/10  Quality: Burning  Worsened by: standing and walking for more than several minutes  Improved by: medications, rest, and sitting    Pt reports 2 years of burning, stinging, tingling foot pain that is worse at night and with walking and standing. He reports previous history of low back pain, however this resolved with PT.  He denies back pain today.  His foot pain is bilateral and covers the entirety of the foot including both dorsal and plantar surfaces, with symptoms most pronounced over the toes.  He has been  seen by Podiatry who performed diagnostic tarsal tunnel injections with some temporary relief.  He was offered tarsal tunnel decompression surgery, but declined. EMG performed 1/2022 no definitive evidence of tarsal tunnel syndrome and possible evidence of peripheral polyneuropathy. He previously followed with Dr. Poon who started the patient on gabapentin; he is prescribed 600 mg TID, however only takes 600 mg BID as he often forgets his 3rd dose. He is prescribed Cymbalta by his PCP for anxiety. He reports he has recently gained weight due to decreased activity. He denies hx of diabetes or  chemotherapy. He denies symptoms in the hands.       Previous Therapies:  PT/OT: Previously completed PT for low back pain  HEP: No  TENS: No  Interventions:   10/12/2023 Qutenza patches bilaterally   Diagnostic Tarsal Tunnel Injections with Podiatry   Surgery: Right Carpal Tunnel Release  Opioids: None  Adjuvants:     Current Pain Medications:  Gabapentin 600 mg BID  Cymbalta 20 mg QD   Lyrica 150 mg b.i.d.    Assessment & Plan:  Problem List Items Addressed This Visit    None      10/03/2023-Zeenatkarli Mancia is a 73 y.o. male who  has a past medical history of A-fib, Acute blood loss anemia, Arthritis, Atrial flutter, Colon polyp, Diverticulosis, Dyslipidemia, Dyspnea on exertion (5/31/2021), GERD (gastroesophageal reflux disease), Hepatic steatosis, Hyperlipidemia, Hypertension, Irregular heart beat, Lower GI bleed (5/31/2021), Multiple gastric ulcers (05/16/2018), Obesity, Sleep apnea, and Unspecified disorder of kidney and ureter.  By history and examination this patient has chronic pain likely related to idiopathic neuropathy.  We discussed the underlying diagnoses and multiple treatment options including non-opioid medications, physical therapy, home exercise, core muscle enhancement, and activity modification.  The risks and benefits of each treatment option were discussed and all questions were answered.      12/15/2023  Zeenat Mancia is a 73 y.o. male who  has a past medical history of A-fib, Acute blood loss anemia, Arthritis, Atrial flutter, Colon polyp, Diverticulosis, Dyslipidemia, Dyspnea on exertion (5/31/2021), GERD (gastroesophageal reflux disease), Hepatic steatosis, Hyperlipidemia, Hypertension, Irregular heart beat, Lower GI bleed (5/31/2021), Multiple gastric ulcers (05/16/2018), Obesity, Sleep apnea, and Unspecified disorder of kidney and ureter.  By history and examination this patient has chronic bilateral foot pain likely related to idiopathic neuropathy.  He is also complaining of right lateral and posterior thigh pain that is specific in 1 spot this pain is unclear at this time I have reviewed his lumbar MRI he does have multilevel degenerative changes that result in mild left-sided L5-S1 foraminal encroachment I do not believe this is the cause of his left lateral and posterior thigh pain.  I do believe that this pain is more musculoskeletal.  We discussed the underlying diagnoses and multiple treatment options including non-opioid medications, interventional procedures, physical therapy, home exercise, core muscle enhancement, activity modification, and weight loss.  The risks and benefits of each treatment option were discussed and all questions were answered.   See plan agreed upon below.      12/29/[id number]-jiob-old male with multiple comorbidities.  By history and exam the patient has subacute left lateral leg pain that appears to be in 1 spot just below his left lateral hip.  He denies any previous falls he and I have discussed this pain previously etiology is unclear at this point.  Do believe he may be suffering from left GT bursitis with referred pain.  I have recommended a left GTB injection in the area of concern.  Patient verbalized understanding.  Patient myself and his wife discussed underlying diagnosis and treatment options including nonopioid medications, physical therapy, home exercise, core  muscle has been, activity modification and weight loss.  They have agreed to the plan below.    Treatment Plan:   Procedures:  Scheduled for a left GTB injection today  PT/OT/HEP: I encouraged the patient to start a home exercise regimen that includes daily cardiovascular exercise lasting at least 30 minutes.  He is also scheduled for pending aqua therapy, he is severely deconditioned and this generates a large portion of his pain. We also  discussed optimizing his diet.   Medications:    - Re-ordered compound cream this consists of prilocaine, lidocaine and gabapentin   - continue pregabalin 150 mg QHS BID dosing causes somnolence.    Reviewed and consistent with medication use as prescribed.  Imaging:  All previous imaging reviewed and discussed with patient  Consider ultrasound in the left lateral hip area if left GTB is not effective.  Follow Up:  4-6 weeks on my patient is scheduled for repeat Qutenza  the for bilateral foot pain on 01/12/2024    FARHANA Rainey  Interventional Pain Management    10/03/2023        Disclaimer: This note was partly generated using dictation software which may occasionally result in transcription errors.    Imaging:  Reviewed.     EXAMINATION:  MRI LUMBAR SPINE WITHOUT CONTRAST     CLINICAL HISTORY:  Radiculopathy, lumbar regionLumbar radiculopathy, symptoms persist with conservative treatment;     TECHNIQUE:  Sagittal T1, sagittal T2, sagittal STIR, axial T1 and axial T2 weighted images of the lumbar spine obtained without contrast.     COMPARISON:  06/30/2022 x-ray     FINDINGS:  Lumbar spine alignment is within normal limits. The vertebral body heights are well maintained, with no fracture.  No marrow signal abnormality suspicious for an infiltrative process.     The conus is normal in appearance.  The adjacent soft tissue structures show no significant abnormalities.     L1-L2: Circumferential disc bulge and spondylitic spurring.No significant central canal or neural  foraminal narrowing.     L2-L3: Circumferential disc bulge and spondylitic spurring.  No significant central canal or neural foraminal narrowing.     L3-L4: Circumferential disc bulge and minimal facet arthritis.  Asymmetric left-sided spondylitic spurring.  No significant central canal or neural foraminal narrowing.     L4-L5: Circumferential disc bulge and asymmetric spondylitic spurring.  Moderate facet arthritis.  No significant central canal or neural foraminal narrowing.     L5-S1: Circumferential disc bulge and small central disc protrusion.  Mild facet arthritis.  Mild left-sided neural foraminal narrowing.     Impression:     Multilevel degenerative changes resulting in mild left-sided L5 S1 foraminal encroachment.  Small superimposed central disc protrusion.        Electronically signed by: Marlo Camargo Jr    EMG:  Test Date:  2022     Patient: Zeenat Mancia : 1950 Physician: Jase Vernon D.O.   ID#:   SEX: Male Ref. Phys: Leonel Perez, DPM      HPI: Zeenat Mancia is a 71 y.o.male who presents for NCS/EMG to evaluate tarsal tunnel bilaterally.  Pt with tingling and burning pain in the bilateral feet- tips of toes and soles of feet.       NCV & EMG Findings:  Evaluation of the right median sensory nerve showed prolonged distal peak latency and decreased conduction velocity.  The left Superficial Fibular sensory, the right Superficial Fibular sensory, the left sural sensory, and the right sural sensory nerves showed no response.  All remaining nerves (as indicated in the following tables) were within normal limits.  Needle evaluation of the left Abductor Hallucis muscle showed increased insertional activity, moderately increased spontaneous activity, and slightly increased polyphasic potentials.  The right Abductor Hallucis muscle showed increased insertional activity, moderately increased spontaneous activity, and diminished recruitment.  All remaining muscles (as indicated in the  following table) showed no evidence of electrical instability.     Impression:  1. Technically, somewhat limited study due to peripheral edema of legs  2. The sensory responses were absent in the bilateral lower extremities.  This can either be due to the technical issues above, or could be a sensory peripheral polyneuropathy.  I favor the latter as his symptoms correlate with this.   3. Incidentally, there is evidence of a median mononeuropathy across the right wrist (I.e. carpal tunnel syndrome).  Upper extremity sensory studies performed due to lack of responses in the legs.  If a more detailed study is desired for the carpal tunnel syndrome, a separate upper extremity study can be ordered.  Note:  He has no significant hand symptoms.     4. No definitive evidence of tarsal tunnel syndrome on either side.  The bilateral abductor hallicus muscles showed active denervation, but this could be due to the peripheral polyneuropathy described above, and can also be seen in normal feet, especially with advancing age.         Review of Systems:  Review of Systems   Constitutional:  Negative for chills and fever.        +weight gain   HENT:  Negative for congestion and ear pain.    Eyes:  Negative for discharge and redness.   Respiratory:  Negative for cough and wheezing.    Cardiovascular:  Negative for chest pain.   Gastrointestinal:  Negative for abdominal pain and vomiting.   Musculoskeletal:  Negative for back pain and neck pain.        +Bilateral foot pain   Skin:  Negative for itching and rash.   Neurological:  Positive for tingling (b/l feet) and sensory change (Burning, tingling over b/l feet). Negative for weakness.       Physical Exam:  There were no vitals filed for this visit.        General    Vitals reviewed.  Constitutional: He is oriented to person, place, and time. He appears well-developed and well-nourished. No distress.   HENT:   Head: Normocephalic and atraumatic.   Nose: Nose normal.   Eyes:  Conjunctivae and EOM are normal. Pupils are equal, round, and reactive to light.   Neck: Neck supple.   Cardiovascular:  Normal rate and regular rhythm.            Pulmonary/Chest: Effort normal and breath sounds normal.   Abdominal: Soft. He exhibits no distension.   Neurological: He is alert and oriented to person, place, and time.   Psychiatric: He has a normal mood and affect. His behavior is normal. Thought content normal.     General Musculoskeletal Exam   Gait: normal     Right Ankle/Foot Exam     Inspection   Deformity: absent  Erythema: absent  Bruising:  Foot - absent  Effusion:  Foot - absent    Muscle Strength   The patient has normal right ankle strength.    Other   Sensation: decreased (over plantar and dorsal aspect)    Comments:  No clubbing of the nails. Normal capillary refill.     Left Ankle/Foot Exam     Inspection  Deformity: absent  Erythema: absent  Bruising:  Foot - absent  Effusion:  Foot - absent    Muscle Strength   The patient has normal left ankle strength.    Other   Sensation: decreased (over plantar and dorsal foot)    Comments:  No clubbing of the nails. Normal capillary refill.       Vascular Exam     Right Pulses  Dorsalis Pedis:      2+          Left Pulses  Dorsalis Pedis:      2+          Edema  Right Lower Leg: absent

## 2024-01-12 NOTE — PROCEDURES
Procedures  Procedure: Chemical Neurotomy (Qutenza)     Anesthesia: Local     Indication for procedure: Idiopathetic neuropathy, Chronic pain syndrome, Chronic Bilateral Foot pain/numbness   Complications: None      Procedure in Detail: The area to be treated with traced on the skin using a skin marker.  The area was cleaned with soap and water, then Lidocaine 2% gel was applied liberally and allowed to sit for 30 minutes.  The gel was then cleaned off of the skin.  x2  Qutenza patches eacg were applied to his foot bilaterally targeting the area of concern.   The backing was removed and the patches were applied to the skin.  The patient noted a feeling of warmth in the target area after 5 minutes without significant pain.     The patches were left on the skin for 30 minutes.  He rested comfortably  in the room and was check on every 10-15 minutes to ensure his comfort and safety.     At the end of 30 minutes, the patches were removed and the cleansing/neutralizing gel from the kit was applied liberally and allowed to sit on the skin for 3 minutes.  It was then wiped off and the skin was gently cleaned with soap and water.     Estimated blood loss: NA      Disposition: The patient tolerated the procedure without complaint.  He  was given post-procedure care instructions and will contact the clinic with any concerns.     Follow-up: RTC 91 days      FARHANA Rainey  Interventional Pain Management

## 2024-01-16 RX ORDER — PREGABALIN 150 MG/1
150 CAPSULE ORAL 2 TIMES DAILY
Qty: 60 CAPSULE | Refills: 0 | Status: SHIPPED | OUTPATIENT
Start: 2024-01-16 | End: 2024-02-26

## 2024-02-05 ENCOUNTER — TELEPHONE (OUTPATIENT)
Dept: PAIN MEDICINE | Facility: CLINIC | Age: 74
End: 2024-02-05
Payer: MEDICARE

## 2024-02-05 DIAGNOSIS — M79.671 BILATERAL FOOT PAIN: ICD-10-CM

## 2024-02-05 DIAGNOSIS — G60.9 IDIOPATHIC PERIPHERAL NEUROPATHY: Primary | ICD-10-CM

## 2024-02-05 DIAGNOSIS — G89.4 CHRONIC PAIN SYNDROME: ICD-10-CM

## 2024-02-05 DIAGNOSIS — M79.672 BILATERAL FOOT PAIN: ICD-10-CM

## 2024-02-06 ENCOUNTER — TELEPHONE (OUTPATIENT)
Dept: PAIN MEDICINE | Facility: CLINIC | Age: 74
End: 2024-02-06
Payer: MEDICARE

## 2024-02-06 DIAGNOSIS — G89.4 CHRONIC PAIN SYNDROME: ICD-10-CM

## 2024-02-06 DIAGNOSIS — M79.671 BILATERAL FOOT PAIN: ICD-10-CM

## 2024-02-06 DIAGNOSIS — M79.672 BILATERAL FOOT PAIN: ICD-10-CM

## 2024-02-06 DIAGNOSIS — G60.9 IDIOPATHIC PERIPHERAL NEUROPATHY: Primary | ICD-10-CM

## 2024-02-08 ENCOUNTER — PATIENT MESSAGE (OUTPATIENT)
Dept: PRIMARY CARE CLINIC | Facility: CLINIC | Age: 74
End: 2024-02-08
Payer: MEDICARE

## 2024-02-08 ENCOUNTER — PATIENT MESSAGE (OUTPATIENT)
Dept: CARDIOLOGY | Facility: CLINIC | Age: 74
End: 2024-02-08
Payer: MEDICARE

## 2024-02-08 DIAGNOSIS — N40.1 BENIGN NON-NODULAR PROSTATIC HYPERPLASIA WITH LOWER URINARY TRACT SYMPTOMS: Primary | ICD-10-CM

## 2024-02-11 ENCOUNTER — PATIENT MESSAGE (OUTPATIENT)
Dept: PAIN MEDICINE | Facility: CLINIC | Age: 74
End: 2024-02-11
Payer: MEDICARE

## 2024-02-12 ENCOUNTER — TELEPHONE (OUTPATIENT)
Dept: PAIN MEDICINE | Facility: CLINIC | Age: 74
End: 2024-02-12
Payer: MEDICARE

## 2024-02-12 ENCOUNTER — HOSPITAL ENCOUNTER (OUTPATIENT)
Dept: RADIOLOGY | Facility: HOSPITAL | Age: 74
Discharge: HOME | End: 2024-02-12
Attending: NURSE PRACTITIONER
Payer: MEDICARE

## 2024-02-12 ENCOUNTER — OFFICE VISIT (OUTPATIENT)
Dept: PAIN MEDICINE | Facility: CLINIC | Age: 74
End: 2024-02-12
Payer: MEDICARE

## 2024-02-12 VITALS
HEART RATE: 86 BPM | SYSTOLIC BLOOD PRESSURE: 106 MMHG | DIASTOLIC BLOOD PRESSURE: 76 MMHG | HEIGHT: 74 IN | BODY MASS INDEX: 44.28 KG/M2

## 2024-02-12 DIAGNOSIS — M25.551 ACUTE RIGHT HIP PAIN: ICD-10-CM

## 2024-02-12 DIAGNOSIS — M79.604 RIGHT LEG PAIN: ICD-10-CM

## 2024-02-12 DIAGNOSIS — M79.604 RIGHT LEG PAIN: Primary | ICD-10-CM

## 2024-02-12 PROCEDURE — 99214 OFFICE O/P EST MOD 30 MIN: CPT | Mod: S$GLB,,, | Performed by: NURSE PRACTITIONER

## 2024-02-12 PROCEDURE — 72100 X-RAY EXAM L-S SPINE 2/3 VWS: CPT | Mod: TC,FY

## 2024-02-12 PROCEDURE — 72100 X-RAY EXAM L-S SPINE 2/3 VWS: CPT | Mod: 26,,, | Performed by: RADIOLOGY

## 2024-02-12 PROCEDURE — 73502 X-RAY EXAM HIP UNI 2-3 VIEWS: CPT | Mod: 26,RT,, | Performed by: STUDENT IN AN ORGANIZED HEALTH CARE EDUCATION/TRAINING PROGRAM

## 2024-02-12 PROCEDURE — 73502 X-RAY EXAM HIP UNI 2-3 VIEWS: CPT | Mod: TC,FY,RT

## 2024-02-12 PROCEDURE — 99999 PR PBB SHADOW E&M-EST. PATIENT-LVL IV: CPT | Mod: PBBFAC,,, | Performed by: NURSE PRACTITIONER

## 2024-02-12 NOTE — PROGRESS NOTES
[facility] Established Patient Evaluation    Referred by: No ref. provider found   Reason for referral: Right leg pain     CC:   Chief Complaint   Patient presents with    Leg Pain     Right thigh        Interval History 2/12/2024:  73-year-old male that presents with his wife, Mr. Mancia is reporting pain in the right upper lateral leg near his right hip pain began 3 weeks ago pain is constant pain described as sharp, pain is exacerbated with walking standing and even sleeping at night.  He denies any paresthesia like symptoms.  Pain does not radiate past his right knee.  Pain score is 9/10.  He denies any bowel bladder dysfunction, denies any saddle anesthesia at this time.       Interval update 12/29/23   73-year-old male that presents today complaining of left lateral leg pain in a specific spot the pain is located just below his left hip.  Pain has been ongoing, pain is described as sharp, pain is intermittent they are times where he does not have pain at all.  Patient reports pain is intensified with certain movements, ADLs as well as laying on his left side.  He denies any radicular symptoms denies any numbness tingling or burning anywhere in his legs or lower extremities.  Pain score 6/10 today.  He denies any bowel bladder dysfunction, denies any saddle anesthesia denies any profound weakness patient appears sometimes to be weak and deconditioned severely.      Interval update 12/15/23:  73-year-old male that presents for follow up appointment for continued bilateral foot pain he has been provided the Q 10s the patches bilaterally which she states did help his symptoms mildly but would like to be considered for repeat application.  He is currently 150 mg b.i.d. which she states helps but it does create somnolence.  Of a new pain that began months ago left lateral and posterior thigh pain that specifically in 1 spot.  He reports that his left thigh pain increase his when he places pressure on his  left foot.  He feels a sharp pain in the left thigh.  Denies any radiating symptoms denies any paresthesia like symptoms.  He just completed physical therapy and was recommended for aqua therapy.  He is severely deconditioned and overweight which prohibits him from certain movements and I think increases his overall pain.  He denies any recent falls denies any bowel bladder dysfunction denies any saddle anesthesia at this time.  Of note he takes Eliquis for AFib.  He did report seeing orthopedics and an x-ray of his hips were taken and found to be unremarkable.    Interval updates 10/03/2023  73-year-old male presents with his wife continued to complain of bilateral foot pain.  He was switched to Lyrica 50 mg b.i.d. by Dr. Childers which she does state is helping and he denies any adverse side effects.  Last clinic visit Dr. Childers discussed providing him with Qutenza for bilateral foot pain.  He continues to follow up with Podiatry and Neurology and was told that his pain is likely related to peripheral neuropathy.  He is not been seen since July he would like to move forward with the Qutenza therapy.         Interval History 6/1/23:  Zeenat Mancia presents with continued bilateral feet pain. He feels the medications (gabapentin and cymbalta) make him groggy in the mornings, so he decided to stop taking the medications. He states he has been doing acupuncture, and has done 7-8 visits, however he has not felt it was particularly beneficial; he notes some improvement, but it is not long lasting.. He has also seen a podiatrist and neurology and was told that his pain is likely related to peripheral neuropathy.  He feels his toes are burning at night and finds difficulty getting to sleep.  He felt the lidocaine ointment helped a little, but not a great deal.     Initial HPI 2/13/23  Location:  Left hip, left lateral leg  Onset:  Months  Radiation: N/A  Timing: persistent  Current Pain Score: 6/10  Weekly Pain Range:  6-7/10  Quality: Burning  Worsened by: standing and walking for more than several minutes  Improved by: medications, rest, and sitting    Pt reports 2 years of burning, stinging, tingling foot pain that is worse at night and with walking and standing. He reports previous history of low back pain, however this resolved with PT.  He denies back pain today.  His foot pain is bilateral and covers the entirety of the foot including both dorsal and plantar surfaces, with symptoms most pronounced over the toes.  He has been seen by Podiatry who performed diagnostic tarsal tunnel injections with some temporary relief.  He was offered tarsal tunnel decompression surgery, but declined. EMG performed 1/2022 no definitive evidence of tarsal tunnel syndrome and possible evidence of peripheral polyneuropathy. He previously followed with Dr. Poon who started the patient on gabapentin; he is prescribed 600 mg TID, however only takes 600 mg BID as he often forgets his 3rd dose. He is prescribed Cymbalta by his PCP for anxiety. He reports he has recently gained weight due to decreased activity. He denies hx of diabetes or  chemotherapy. He denies symptoms in the hands.       Previous Therapies:  PT/OT: Previously completed PT for low back pain  HEP: No  TENS: No  Interventions:   10/12/2023 Qutenza patches bilaterally   Diagnostic Tarsal Tunnel Injections with Podiatry   Surgery: Right Carpal Tunnel Release  Opioids: None  Adjuvants:     Current Pain Medications:  Gabapentin 600 mg BID  Cymbalta 20 mg QD   Lyrica 150 mg b.i.d.    Assessment & Plan:  Problem List Items Addressed This Visit    None  Visit Diagnoses       Right leg pain    -  Primary    Relevant Orders    X-Ray Lumbar Spine AP And Lateral    X-Ray Hip 2 or 3 views Right (with Pelvis when performed)    Acute right hip pain        Relevant Orders    X-Ray Lumbar Spine AP And Lateral    X-Ray Hip 2 or 3 views Right (with Pelvis when performed)            10/03/2023Tulio  LEXX Mancia is a 73 y.o. male who  has a past medical history of A-fib, Acute blood loss anemia, Arthritis, Atrial flutter, Colon polyp, Diverticulosis, Dyslipidemia, Dyspnea on exertion (5/31/2021), GERD (gastroesophageal reflux disease), Hepatic steatosis, Hyperlipidemia, Hypertension, Irregular heart beat, Lower GI bleed (5/31/2021), Multiple gastric ulcers (05/16/2018), Obesity, Sleep apnea, and Unspecified disorder of kidney and ureter.  By history and examination this patient has chronic pain likely related to idiopathic neuropathy.  We discussed the underlying diagnoses and multiple treatment options including non-opioid medications, physical therapy, home exercise, core muscle enhancement, and activity modification.  The risks and benefits of each treatment option were discussed and all questions were answered.      12/15/2023 Zeenat Mancia is a 73 y.o. male who  has a past medical history of A-fib, Acute blood loss anemia, Arthritis, Atrial flutter, Colon polyp, Diverticulosis, Dyslipidemia, Dyspnea on exertion (5/31/2021), GERD (gastroesophageal reflux disease), Hepatic steatosis, Hyperlipidemia, Hypertension, Irregular heart beat, Lower GI bleed (5/31/2021), Multiple gastric ulcers (05/16/2018), Obesity, Sleep apnea, and Unspecified disorder of kidney and ureter.  By history and examination this patient has chronic bilateral foot pain likely related to idiopathic neuropathy.  He is also complaining of right lateral and posterior thigh pain that is specific in 1 spot this pain is unclear at this time I have reviewed his lumbar MRI he does have multilevel degenerative changes that result in mild left-sided L5-S1 foraminal encroachment I do not believe this is the cause of his left lateral and posterior thigh pain.  I do believe that this pain is more musculoskeletal.  We discussed the underlying diagnoses and multiple treatment options including non-opioid medications, interventional procedures, physical  therapy, home exercise, core muscle enhancement, activity modification, and weight loss.  The risks and benefits of each treatment option were discussed and all questions were answered.   See plan agreed upon below.      12/29/[id number]-eagv-old male with multiple comorbidities.  By history and exam the patient has subacute left lateral leg pain that appears to be in 1 spot just below his left lateral hip.  He denies any previous falls he and I have discussed this pain previously etiology is unclear at this point.  Do believe he may be suffering from left GT bursitis with referred pain.  I have recommended a left GTB injection in the area of concern.  Patient verbalized understanding.  Patient myself and his wife discussed underlying diagnosis and treatment options including nonopioid medications, physical therapy, home exercise, core muscle has been, activity modification and weight loss.  They have agreed to the plan below.      02/12/2024-Zeenat Mancia is a 73 y.o. male who  has a past medical history of A-fib, Acute blood loss anemia, Arthritis, Atrial flutter, Colon polyp, Diverticulosis, Dyslipidemia, Dyspnea on exertion (5/31/2021), GERD (gastroesophageal reflux disease), Hepatic steatosis, Hyperlipidemia, Hypertension, Irregular heart beat, Lower GI bleed (5/31/2021), Multiple gastric ulcers (05/16/2018), Obesity, Sleep apnea, and Unspecified disorder of kidney and ureter.  By history and examination this patient has acute/subacute right lateral and right hip pain. Etiology is uncertain at this time.   We discussed the underlying diagnoses and multiple treatment options including interventional procedures, physical therapy, home exercise, activity modification, and weight loss.  The risks and benefits of each treatment option were discussed and all questions were answered.        Treatment Plan:   Procedures: none at this time.   PT/OT/HEP: I encouraged the patient to start a home exercise regimen that  includes daily cardiovascular exercise lasting at least 30 minutes.  He is also scheduled for pending aqua therapy, he is severely deconditioned and this generates a large portion of his pain. We also  discussed optimizing his diet.   Medications:    - Re-ordered compound cream this consists of prilocaine, lidocaine and gabapentin   - continue pregabalin 150 mg QHS BID dosing causes somnolence.    Reviewed and consistent with medication use as prescribed.  Imaging:  Ordered right hip and pelvis x-rays along with lumbar x-rays to rule out pathology  Consider ultrasound in the right  lateral hip area if left GTB is not effective.  Follow Up:  We will call patient with results    FARHANA Rainey  Interventional Pain Management    02/12/2024        Disclaimer: This note was partly generated using dictation software which may occasionally result in transcription errors.    Imaging:  Reviewed.     EXAMINATION:  MRI LUMBAR SPINE WITHOUT CONTRAST     CLINICAL HISTORY:  Radiculopathy, lumbar regionLumbar radiculopathy, symptoms persist with conservative treatment;     TECHNIQUE:  Sagittal T1, sagittal T2, sagittal STIR, axial T1 and axial T2 weighted images of the lumbar spine obtained without contrast.     COMPARISON:  06/30/2022 x-ray     FINDINGS:  Lumbar spine alignment is within normal limits. The vertebral body heights are well maintained, with no fracture.  No marrow signal abnormality suspicious for an infiltrative process.     The conus is normal in appearance.  The adjacent soft tissue structures show no significant abnormalities.     L1-L2: Circumferential disc bulge and spondylitic spurring.No significant central canal or neural foraminal narrowing.     L2-L3: Circumferential disc bulge and spondylitic spurring.  No significant central canal or neural foraminal narrowing.     L3-L4: Circumferential disc bulge and minimal facet arthritis.  Asymmetric left-sided spondylitic spurring.  No significant central  canal or neural foraminal narrowing.     L4-L5: Circumferential disc bulge and asymmetric spondylitic spurring.  Moderate facet arthritis.  No significant central canal or neural foraminal narrowing.     L5-S1: Circumferential disc bulge and small central disc protrusion.  Mild facet arthritis.  Mild left-sided neural foraminal narrowing.     Impression:     Multilevel degenerative changes resulting in mild left-sided L5 S1 foraminal encroachment.  Small superimposed central disc protrusion.        Electronically signed by: Marlo Camargo Jr    EMG:  Test Date:  2022     Patient: Zeenat Mancia : 1950 Physician: Jase Vernon D.O.   ID#:   SEX: Male Ref. Phys: Leonel Perez, DPM      HPI: Zeenat Mancia is a 71 y.o.male who presents for NCS/EMG to evaluate tarsal tunnel bilaterally.  Pt with tingling and burning pain in the bilateral feet- tips of toes and soles of feet.       NCV & EMG Findings:  Evaluation of the right median sensory nerve showed prolonged distal peak latency and decreased conduction velocity.  The left Superficial Fibular sensory, the right Superficial Fibular sensory, the left sural sensory, and the right sural sensory nerves showed no response.  All remaining nerves (as indicated in the following tables) were within normal limits.  Needle evaluation of the left Abductor Hallucis muscle showed increased insertional activity, moderately increased spontaneous activity, and slightly increased polyphasic potentials.  The right Abductor Hallucis muscle showed increased insertional activity, moderately increased spontaneous activity, and diminished recruitment.  All remaining muscles (as indicated in the following table) showed no evidence of electrical instability.     Impression:  1. Technically, somewhat limited study due to peripheral edema of legs  2. The sensory responses were absent in the bilateral lower extremities.  This can either be due to the technical issues above,  "or could be a sensory peripheral polyneuropathy.  I favor the latter as his symptoms correlate with this.   3. Incidentally, there is evidence of a median mononeuropathy across the right wrist (I.e. carpal tunnel syndrome).  Upper extremity sensory studies performed due to lack of responses in the legs.  If a more detailed study is desired for the carpal tunnel syndrome, a separate upper extremity study can be ordered.  Note:  He has no significant hand symptoms.     4. No definitive evidence of tarsal tunnel syndrome on either side.  The bilateral abductor hallicus muscles showed active denervation, but this could be due to the peripheral polyneuropathy described above, and can also be seen in normal feet, especially with advancing age.         Review of Systems:  Review of Systems   Constitutional:  Negative for chills and fever.        +weight gain   HENT:  Negative for congestion and ear pain.    Eyes:  Negative for discharge and redness.   Respiratory:  Negative for cough and wheezing.    Cardiovascular:  Negative for chest pain.   Gastrointestinal:  Negative for abdominal pain and vomiting.   Musculoskeletal:  Negative for back pain and neck pain.        +Bilateral foot pain   Skin:  Negative for itching and rash.   Neurological:  Positive for tingling (b/l feet) and sensory change (Burning, tingling over b/l feet). Negative for weakness.       Physical Exam:  Vitals:    02/12/24 1149   BP: 106/76   Pulse: 86   Height: 6' 2" (1.88 m)   PainSc:   9   PainLoc: Leg         General    Vitals reviewed.  Constitutional: He is oriented to person, place, and time. He appears well-developed and well-nourished. No distress.   HENT:   Head: Normocephalic and atraumatic.   Nose: Nose normal.   Eyes: Conjunctivae and EOM are normal. Pupils are equal, round, and reactive to light.   Neck: Neck supple.   Cardiovascular:  Normal rate and regular rhythm.            Pulmonary/Chest: Effort normal and breath sounds normal. "   Abdominal: Soft. He exhibits no distension.   Neurological: He is alert and oriented to person, place, and time.   Psychiatric: He has a normal mood and affect. His behavior is normal. Thought content normal.     General Musculoskeletal Exam   Gait: antalgic     Right Ankle/Foot Exam     Inspection   Deformity: absent  Erythema: absent  Bruising:  Foot - absent  Effusion:  Foot - absent    Muscle Strength   The patient has normal right ankle strength.    Other   Sensation: decreased (over plantar and dorsal aspect)    Comments:  No clubbing of the nails. Normal capillary refill.     Left Ankle/Foot Exam     Inspection  Deformity: absent  Erythema: absent  Bruising:  Foot - absent  Effusion:  Foot - absent    Muscle Strength   The patient has normal left ankle strength.    Other   Sensation: decreased (over plantar and dorsal foot)    Comments:  No clubbing of the nails. Normal capillary refill.       Right Hip Exam     Tenderness   The patient tender to palpation of the trochanteric bursa.    Range of Motion   Abduction:  abnormal   Adduction:  abnormal   Extension:  abnormal   Flexion:  abnormal   External rotation:  abnormal   Internal rotation:  abnormal     Comments:  + log roll right         Muscle Strength   Right Lower Extremity   Hip Abduction: 5/5   Hip Adduction: 5/5     Vascular Exam     Right Pulses  Dorsalis Pedis:      2+          Left Pulses  Dorsalis Pedis:      2+          Edema  Right Lower Leg: absent

## 2024-02-14 ENCOUNTER — PATIENT MESSAGE (OUTPATIENT)
Dept: PAIN MEDICINE | Facility: CLINIC | Age: 74
End: 2024-02-14
Payer: MEDICARE

## 2024-02-14 ENCOUNTER — HOSPITAL ENCOUNTER (OUTPATIENT)
Dept: RADIOLOGY | Facility: HOSPITAL | Age: 74
Discharge: HOME | End: 2024-02-14
Attending: NURSE PRACTITIONER
Payer: MEDICARE

## 2024-02-14 ENCOUNTER — TELEPHONE (OUTPATIENT)
Dept: PAIN MEDICINE | Facility: CLINIC | Age: 74
End: 2024-02-14
Payer: MEDICARE

## 2024-02-14 DIAGNOSIS — G57.11 MERALGIA PARESTHETICA OF RIGHT SIDE: Primary | ICD-10-CM

## 2024-02-14 DIAGNOSIS — M79.651 PAIN IN RIGHT THIGH: ICD-10-CM

## 2024-02-14 DIAGNOSIS — G57.11 MERALGIA PARESTHETICA OF RIGHT SIDE: ICD-10-CM

## 2024-02-14 PROCEDURE — 76881 US COMPL JOINT R-T W/IMG: CPT | Mod: TC,RT

## 2024-02-14 PROCEDURE — 76881 US COMPL JOINT R-T W/IMG: CPT | Mod: 26,RT,, | Performed by: RADIOLOGY

## 2024-02-14 NOTE — TELEPHONE ENCOUNTER
Discussed recent images with patient explain results in layman's terms patient continues to suffer from pain in his right lateral thigh in 1 spot still unclear of etiology going to order new test to rule out pathology.  Patient verbalized understanding and agreed.    Rafael Read, NP-C  Interventional Pain Management

## 2024-02-15 ENCOUNTER — PATIENT MESSAGE (OUTPATIENT)
Dept: PAIN MEDICINE | Facility: CLINIC | Age: 74
End: 2024-02-15
Payer: MEDICARE

## 2024-02-15 RX ORDER — TRAMADOL HYDROCHLORIDE 50 MG/1
50 TABLET ORAL EVERY 12 HOURS PRN
Qty: 28 TABLET | Refills: 0 | Status: SHIPPED | OUTPATIENT
Start: 2024-02-15 | End: 2024-02-29

## 2024-02-19 ENCOUNTER — TELEPHONE (OUTPATIENT)
Dept: NEUROLOGY | Facility: CLINIC | Age: 74
End: 2024-02-19
Payer: MEDICARE

## 2024-02-19 NOTE — TELEPHONE ENCOUNTER
Spoke to Mr. Mancia in regards to the messages left by his wife over the holiday weekend concerning the EMG procedure that was ordered. I explained to Mr. Mancia that I would like for him to be seen by the Neurologist, Dr. Moreno on tomorrow before moving forward with scheduling the EMG. Patient verbalized understanding. I will follow up with Dr. Moreno.

## 2024-02-20 ENCOUNTER — OFFICE VISIT (OUTPATIENT)
Dept: NEUROLOGY | Facility: CLINIC | Age: 74
End: 2024-02-20
Payer: MEDICARE

## 2024-02-20 ENCOUNTER — HOSPITAL ENCOUNTER (OUTPATIENT)
Dept: RADIOLOGY | Facility: OTHER | Age: 74
Discharge: HOME | End: 2024-02-20
Attending: STUDENT IN AN ORGANIZED HEALTH CARE EDUCATION/TRAINING PROGRAM
Payer: MEDICARE

## 2024-02-20 VITALS
HEIGHT: 74 IN | BODY MASS INDEX: 40.43 KG/M2 | OXYGEN SATURATION: 96 % | SYSTOLIC BLOOD PRESSURE: 124 MMHG | DIASTOLIC BLOOD PRESSURE: 86 MMHG | HEART RATE: 65 BPM | WEIGHT: 315 LBS

## 2024-02-20 DIAGNOSIS — E66.01 MORBID OBESITY DUE TO EXCESS CALORIES: ICD-10-CM

## 2024-02-20 DIAGNOSIS — G62.9 PERIPHERAL POLYNEUROPATHY: ICD-10-CM

## 2024-02-20 DIAGNOSIS — M79.651 RIGHT THIGH PAIN: ICD-10-CM

## 2024-02-20 DIAGNOSIS — M70.61 TROCHANTERIC BURSITIS OF RIGHT HIP: Primary | ICD-10-CM

## 2024-02-20 DIAGNOSIS — Z74.09 IMPAIRED FUNCTIONAL MOBILITY, BALANCE, GAIT, AND ENDURANCE: ICD-10-CM

## 2024-02-20 DIAGNOSIS — R25.1 TREMORS OF NERVOUS SYSTEM: ICD-10-CM

## 2024-02-20 DIAGNOSIS — G20.C PARKINSONISM, UNSPECIFIED PARKINSONISM TYPE: ICD-10-CM

## 2024-02-20 DIAGNOSIS — R41.9 COGNITIVE COMPLAINTS: ICD-10-CM

## 2024-02-20 PROCEDURE — 73552 X-RAY EXAM OF FEMUR 2/>: CPT | Mod: 26,RT,, | Performed by: RADIOLOGY

## 2024-02-20 PROCEDURE — 99999 PR PBB SHADOW E&M-EST. PATIENT-LVL V: CPT | Mod: PBBFAC,,, | Performed by: STUDENT IN AN ORGANIZED HEALTH CARE EDUCATION/TRAINING PROGRAM

## 2024-02-20 PROCEDURE — 99215 OFFICE O/P EST HI 40 MIN: CPT | Mod: S$GLB,,, | Performed by: STUDENT IN AN ORGANIZED HEALTH CARE EDUCATION/TRAINING PROGRAM

## 2024-02-20 PROCEDURE — 73552 X-RAY EXAM OF FEMUR 2/>: CPT | Mod: TC,FY,RT

## 2024-02-20 NOTE — Clinical Note
Jase Hall,  Just wanted to reach out after seeing Mr. Mancia. I really don't think this is meralgia paresthetica. His pain is much deeper and very focal on the lateral hip. This is not radicular pain from lumbar area either, to me it looked more like trochanteric bursitis. It seems that you had given him injection for the other side which worked, do you think it's reasonable to try for the right side too? EMG won't  lateral femoral nerve unfortunately and with his body habitus his EMG wouldn't be reliable anyway.   Horacio, Doris

## 2024-02-20 NOTE — PROGRESS NOTES
"        Patient ID: [id number]  Referring Physician: Elias Thomas*    Chief Complaint/Reason for Consult: pain in right thigh     Subjective:     HPI  Zeenat Mancia is a 73 y.o. RH male with multiple co morbidities including JACKELYN, A.fib, obesity, and HTN. he is presenting today for evaluation of pain in the right hip area. he is accompanied by his wife and daughter-in-law who is an NP.    He had left lateral thigh pain consistent with bursitis 2 months ago which improved after injection by pain management.  He is now having the same pain in the right thigh x 10 days. Describes it as a sharp deep pain.  The pain is in lateral upper 3rd of his thigh, occasionally radiating anteriorly to the groin, 8 out of 10.  When he straightens his leg the pain comes on but alleviated by mildly flexing the hip and knee. Not aggravated by touch or clothing.   He has also had neuropathy in feet x1 year. Additionally, they report occasional low back pain that is not related to the current pain.    On another note, per wife, his balance is bad, he shakes, he has difficulty walking/standing, and occasionally has difficulty with speech and drooling. He had an episode that his face felt it was "pulling in the middle".   He has B/B incontinence, 2 years of bladder incontinence and more recent with the bowels. He has nocturia x3 per night. He has been having some "visions" or "delusions" at nights. He is very depressed and also has a lot of anxiety, he was just started on Prozac.    Review of Systems   HENT:  Positive for drooling. Negative for tinnitus and trouble swallowing.    Eyes:  Negative for photophobia and visual disturbance.   Gastrointestinal:  Positive for constipation and fecal incontinence. Negative for nausea and vomiting.   Genitourinary:  Positive for bladder incontinence.   Musculoskeletal:  Positive for back pain, gait problem and leg pain.   Neurological:  Positive for tremors, speech difficulty, weakness, " numbness and memory loss.   Psychiatric/Behavioral:  Positive for dysphoric mood and sleep disturbance. Negative for agitation. The patient is nervous/anxious.    All other systems reviewed and are negative.    Past Medical History:  Active Ambulatory Problems     Diagnosis Date Noted    Erectile dysfunction 09/12/2012    JACKELYN (obstructive sleep apnea) 01/17/2013    Hypertensive heart disease without heart failure 02/16/2016    Morbid obesity due to excess calories 02/16/2016    Benign non-nodular prostatic hyperplasia with lower urinary tract symptoms 02/16/2016    Pseudophakia of both eyes 02/16/2016    Refractive error 02/16/2016    Epiretinal membrane, both eyes 03/06/2017    Posterior vitreous detachment, both eyes 03/06/2017    Tortuous aorta 07/26/2017    Colon cancer screening 10/10/2017    Chronic atrial fibrillation 10/11/2017    History of rectal bleeding     Iron deficiency anemia due to chronic blood loss 06/08/2018    Nonobstructive atherosclerosis of coronary artery 01/04/2021    Other insomnia 01/21/2021    Calculus of gallbladder without cholecystitis without obstruction 02/04/2021    Neuralgia 10/21/2022    Lumbar radiculitis 02/13/2023    Venous insufficiency 08/01/2023    Impaired functional mobility, balance, gait, and endurance 08/29/2023    Statin-induced myositis 09/18/2023    Cough 09/18/2023     Resolved Ambulatory Problems     Diagnosis Date Noted    Nuclear sclerosis of right eye 01/29/2015    Nephrolith 12/10/2015    Torticollis 02/16/2016    Pulmonary heart disease 07/26/2017    Gastrointestinal hemorrhage 05/14/2018    Hematochezia     Acute blood loss anemia     Dyspnea on exertion 05/31/2021    Lower GI bleed 05/31/2021    Back pain 08/09/2022    Rib cage dysfunction 08/09/2022    Low back pain 08/09/2022    Decreased strength, endurance, and mobility 08/09/2022     Past Medical History:   Diagnosis Date    A-fib     Arthritis     Atrial flutter     Colon polyp     Diverticulosis      Dyslipidemia     GERD (gastroesophageal reflux disease)     Hepatic steatosis     Hyperlipidemia     Hypertension     Irregular heart beat     Multiple gastric ulcers 2018    Obesity     Sleep apnea     Unspecified disorder of kidney and ureter      Allergies:  Review of patient's allergies indicates:   Allergen Reactions    Lipitor [atorvastatin] Itching     Pertinent Family History:  Family History   Problem Relation Age of Onset    Arthritis Mother     Heart disease Mother     Blindness Mother         OS due to injury    Coronary artery disease Father     Heart disease Father     Cataracts Father     Amblyopia Neg Hx     Cancer Neg Hx     Diabetes Neg Hx     Glaucoma Neg Hx     Hypertension Neg Hx     Macular degeneration Neg Hx     Retinal detachment Neg Hx     Strabismus Neg Hx     Stroke Neg Hx     Thyroid disease Neg Hx     Colon cancer Neg Hx     Colon polyps Neg Hx     Crohn's disease Neg Hx     Ulcerative colitis Neg Hx     Esophageal cancer Neg Hx     Stomach cancer Neg Hx        Pertinent Social History:  Social History     Tobacco Use    Smoking status: Former     Current packs/day: 0.00     Average packs/day: 2.0 packs/day for 11.0 years (22.0 ttl pk-yrs)     Types: Cigarettes     Start date: 1969     Quit date: 1980     Years since quittin.1    Smokeless tobacco: Never    Tobacco comments:     quit in     Substance Use Topics    Alcohol use: Not Currently     Alcohol/week: 2.0 standard drinks of alcohol     Types: 1 Glasses of wine, 1 Cans of beer per week     Comment: 1 beer every 2 month    Drug use: No       Medications:  Current Outpatient Medications   Medication Instructions    amLODIPine (NORVASC) 5 mg, Oral    apixaban (ELIQUIS) 5 mg, Oral, 2 times daily    atenoloL (TENORMIN) 50 MG tablet TAKE 1 TABLET(50 MG) BY MOUTH EVERY DAY    DULoxetine (CYMBALTA) 120 mg, Oral    furosemide (LASIX) 20 MG tablet TAKE 1 TABLET BY MOUTH DAILY AS NEEDED FOR SWELLING     hydroCHLOROthiazide (HYDRODIURIL) 25 mg, Oral    melatonin 10 mg, Oral, Nightly    multivit-min/FA/lycopen/lutein (CENTRUM SILVER MEN ORAL) Oral, Daily    pantoprazole (PROTONIX) 40 MG tablet TAKE 1 TABLET(40 MG) BY MOUTH EVERY DAY    pregabalin (LYRICA) 150 mg, Oral, 2 times daily    sildenafiL (VIAGRA) 100 mg, Oral, Daily PRN    tadalafiL (CIALIS) 20 mg, Oral, Every 72 hours PRN    tamsulosin (FLOMAX) 0.4 mg Cap TAKE 1 CAPSULE(0.4 MG) BY MOUTH EVERY DAY    traMADoL (ULTRAM) 50 mg, Oral, Every 12 hours PRN    traZODone (DESYREL) 100 mg, Oral, Nightly    valsartan (DIOVAN) 320 MG tablet TAKE 1 TABLET(320 MG) BY MOUTH EVERY DAY        Objective:     Vitals:    02/20/24 0913   BP: 124/86   Pulse: 65      General:  Well-appearing, well-nourished, NAD, cooperative  MSK: SLR exam is challenging due to body habitus, RLE can not be elevated more than 20 degrees off of the bed due to severe focal hip pain (no low back pain or radiating pain)  Severe tenderness on deep palpation of lateral hip joint    Neurologic Exam:   Awake, alert and oriented x3  Speech spontaneous and fluent, intact comprehension.   Adequate fund of knowledge, vocabulary.    CN II - CN XII:  PERRLA. No Nystagmus.   Some ocular motor apraxia. No ophthalmoplegia.   Facial sensation is normal to light touch.   Facial expression is full and symmetric.   Hearing is intact bilaterally.   Palate elevates symmetrically.   SCM and Trapezius full strength bilaterally.   Tongue is midline.     Motor:  Normal bulk in all four limbs. Very rigid in BUE.   Minimal resting tremor, more notable postural and action tremor. Mild tremor in jaw and head.     Shoulder  Abd Shoulder Add Elbow   Flex Elbow  Ext Wrist   Flex Wrist  Ext Finger  Flex Finger  Ext Finger  Abd Finger   Add IO Opposition   Right 5 5 5 5       5    Left 5 5 5 5       5       Hip  Flex Hip  Ext Thigh   Abd Thigh  Add Knee  Flex Knee  Ext Plantar  Flex Dorsiflex   Right 5 5   5 5 5 5   Left 5 5   5 5 5  5     Sensory:  Light touch: normal tactile sense throughout  Proprioception: proprioceptive sense present on RUE and on LUE  Romberg is positive even with open eyes.    DTRs:   Biceps Brachioradialis Triceps Delia Patellar Ankle Plantar   Right 2+ 2+  - 2+ 2+    Left 2+ 2+  - 2+ 2+      Coordination:  Finger to nose with action tremor bilaterally.  slowed fine finger movements and rapid alternating movements.    Gait:  Shuffling gait, decreased arm swing and multiple steps on turns  Also is holding his hand on the right hip due to pain      Pertinent lab results  Lab Results   Component Value Date    NZRPEGQF47 788 11/07/2023    TZLOPBED42ET 27 (L) 10/20/2022     Lab Results   Component Value Date    RPR Non-reactive 11/07/2023     Lab Results   Component Value Date    TSH 1.182 11/07/2023    FREET4 0.98 11/07/2023    WBC 8.19 05/26/2023    LYMPH 1.3 05/26/2023    LYMPH 16.4 (L) 05/26/2023    RBC 6.20 05/26/2023    HGB 16.6 05/26/2023    HCT 55.3 (H) 05/26/2023    MCV 89 05/26/2023     05/26/2023     10/20/2022    K 4.0 06/22/2023    CO2 28 10/20/2022    BUN 15 10/20/2022    CREATININE 1.2 06/22/2023    CALCIUM 9.6 10/20/2022    AST 36 10/20/2022    ALT 44 10/20/2022     Pertinent imaging results    *Images personally reviewed and interpreted:    9/19/2022  MRI Lumbar Spine wo contrast:  Multilevel degenerative changes resulting in mild left-sided L5 S1 foraminal encroachment. Small superimposed central disc protrusion.     Other pertinent studies    12/15/2023:  EMG-NCS:  1. Technically, somewhat limited study due to peripheral edema of legs  2. The sensory responses were absent in the bilateral lower extremities.  This can either be due to the technical issues above, or could be a sensory peripheral polyneuropathy.  I favor the latter as his symptoms correlate with this.   3. Incidentally, there is evidence of a median mononeuropathy across the right wrist (I.e. carpal tunnel syndrome).  Upper  extremity sensory studies performed due to lack of responses in the legs.  If a more detailed study is desired for the carpal tunnel syndrome, a separate upper extremity study can be ordered.  Note:  He has no significant hand symptoms.     4. No definitive evidence of tarsal tunnel syndrome on either side.  The bilateral abductor hallicus muscles showed active denervation, but this could be due to the peripheral polyneuropathy described above, and can also be seen in normal feet, especially with advancing age.      Assessment:   Zeenat Mancia is a 73 y.o. right-handed male with multiple co morbidities including JACKELYN, A.fib, obesity, and HTN who is referred for evaluation of right lateral thigh pain. History and exam is notable for a deep focal pain within the lateral hip, most consistent with bursitis and less likely to be meralgia paresthetica since it is not going further down and it is not superficial. EMG is not sensitive to  lateral femoral cutaneous nerve anyway. Previous hip x-ray did not have good penetration and did not capture much of the proximal femur, we ill obtain x-ray of femur to ensure there is no bone pathology. He can not do NSAIDs since he is on a blood thinner, he is already on Lyrica and Cymbalta and topicals. Best option would be another injection by pain management.     Family is bringing up many more concerns unrelated to the above issue. Complaints and clinical exam is suggestive of parkinsonism. We will obtain MRI of brain to investigate vascular PDism, also serum work up to rule out metabolic and toxic etiologies. He would greatly benefit from PT with focus on gait training, strengthening, and fall prevention. We will discuss treatment options after reviewing the results.     1. Trochanteric bursitis of right hip    2. Peripheral polyneuropathy    3. Parkinsonism, unspecified Parkinsonism type    4. Cognitive complaints    5. Impaired functional mobility, balance, gait, and  endurance    6. Tremors of nervous system    7. Morbid obesity due to excess calories       Plan:     - X-Ray Femur 2 View Right; Future  - Ambulatory referral/consult to Neurology  - Copper, Serum; Future  - Zinc; Future  - Vitamin B6; Future  - Vitamin B12; Future  - Protein Electrophoresis, Serum; Future  - Immunofixation Electrophoresis; Future  - Heavy Metals Screen, Blood (Quantitative); Future  - Hemoglobin A1C; Future  - Ceruloplasmin; Future  - RPR; Future  - Ambulatory referral/consult to Physical/Occupational Therapy; Future  - MRI Brain Without Contrast; Future  - Follow up: RTC in 6-8 weeks     Plan was discussed in detail with the patient, who is in agreement.    Time spent on this encounter: 53 minutes. This includes face to face time (obtaining history, documenting clinical information in the EMR, physical exam, discussing the plan with patient) and non-face to face time (such as preparing to see the patient (ie. Chart review, reviewing and interpreting previous labs and imaging), further EMR documentation, ordering tests, independently interpreting results and communicating results to the patient/family/caregiver, or care coordinator).     Disclaimer: This note was partly generated using dictation software which may occasionally result in transcription errors that are missed on review.        Doris Moreno MD    [facility]  02/20/2024

## 2024-02-21 ENCOUNTER — PATIENT MESSAGE (OUTPATIENT)
Dept: NEUROLOGY | Facility: CLINIC | Age: 74
End: 2024-02-21
Payer: MEDICARE

## 2024-02-21 DIAGNOSIS — I10 ESSENTIAL HYPERTENSION: ICD-10-CM

## 2024-02-21 RX ORDER — VALSARTAN 320 MG/1
TABLET ORAL
Qty: 90 TABLET | Refills: 3 | Status: SHIPPED | OUTPATIENT
Start: 2024-02-21 | End: 2024-03-19

## 2024-02-21 NOTE — TELEPHONE ENCOUNTER
Care Due:                  Date            Visit Type   Department     Provider  --------------------------------------------------------------------------------                                EP -                              PRIMARY      [facility] PRIMARY  Last Visit: 09-      CARE ([facility])   DARRYN Thomas                              EP -                              PRIMARY      [facility] PRIMARY  Next Visit: 03-      CARE ([facility])   DARRYN Thomas                                                            Last  Test          Frequency    Reason                     Performed    Due Date  --------------------------------------------------------------------------------    Na..........  12 months..  hydroCHLOROthiazide......  10-   10-    [facility] Embedded Care Due Messages. Reference number: [id number].   2/21/2024 3:42:40 AM CST

## 2024-02-21 NOTE — TELEPHONE ENCOUNTER
Refill Routing Note   Medication(s) are not appropriate for processing by [facility] for the following reason(s):        No active prescription written by provider    [facility] action(s):  Defer     Requires labs : Yes      Medication Therapy Plan: FOVS      Appointments  past 12m or future 3m with PCP    Date Provider   Last Visit   9/18/2023 Elias Thomas MD   Next Visit   3/19/2024 Elias Thomas MD   ED visits in past 90 days: 0        Note composed:6:33 AM 02/21/2024

## 2024-02-22 ENCOUNTER — TELEPHONE (OUTPATIENT)
Dept: PAIN MEDICINE | Facility: CLINIC | Age: 74
End: 2024-02-22
Payer: MEDICARE

## 2024-02-22 ENCOUNTER — PATIENT MESSAGE (OUTPATIENT)
Dept: PAIN MEDICINE | Facility: CLINIC | Age: 74
End: 2024-02-22
Payer: MEDICARE

## 2024-02-22 NOTE — TELEPHONE ENCOUNTER
Called Mr. Mancia to schedule a sooner appt. With Dr. Childers. No answer, lvm     ----- Message from CHESTER Campos sent at 2/22/2024  2:20 PM CST -----  Regarding: RE: Clinic appt with Dr. Childers  Please make him a sooner appointment with Dr. Childers.  As soon as you can.    Ty        ----- Message -----  From: Lore Howell MA  Sent: 2/22/2024   2:01 PM CST  To: CHESTER Campos  Subject: RE: Clinic appt with Dr. Liseth Hall, Mr. Mancia has an appt. With you in April. My question is do you want me to set the April appt. Up with Liseth or keep that one with you and make a sooner appt. For this month with Dr. Childers??  ----- Message -----  From: Rafael Read FNP  Sent: 2/22/2024   1:19 PM CST  To: Lore Howell MA  Subject: Clinic appt with Dr. Childers                      Can you please make this pt an appt with Dr. Childers in clinic once he gets here we may consider him for an injection under U/S.    Ty     CL

## 2024-02-23 ENCOUNTER — OFFICE VISIT (OUTPATIENT)
Dept: PAIN MEDICINE | Facility: CLINIC | Age: 74
End: 2024-02-23
Payer: MEDICARE

## 2024-02-23 VITALS
SYSTOLIC BLOOD PRESSURE: 105 MMHG | BODY MASS INDEX: 40.43 KG/M2 | HEIGHT: 74 IN | HEART RATE: 89 BPM | DIASTOLIC BLOOD PRESSURE: 69 MMHG | WEIGHT: 315 LBS

## 2024-02-23 DIAGNOSIS — M25.551 RIGHT HIP PAIN: ICD-10-CM

## 2024-02-23 DIAGNOSIS — M16.0 PRIMARY OSTEOARTHRITIS OF BOTH HIPS: ICD-10-CM

## 2024-02-23 DIAGNOSIS — M79.604 RIGHT LEG PAIN: ICD-10-CM

## 2024-02-23 DIAGNOSIS — M70.61 GREATER TROCHANTERIC BURSITIS OF RIGHT HIP: Primary | ICD-10-CM

## 2024-02-23 PROCEDURE — 99214 OFFICE O/P EST MOD 30 MIN: CPT | Mod: 25,S$GLB,, | Performed by: STUDENT IN AN ORGANIZED HEALTH CARE EDUCATION/TRAINING PROGRAM

## 2024-02-23 PROCEDURE — 20610 DRAIN/INJ JOINT/BURSA W/O US: CPT | Mod: RT,S$GLB,, | Performed by: STUDENT IN AN ORGANIZED HEALTH CARE EDUCATION/TRAINING PROGRAM

## 2024-02-23 PROCEDURE — 99999 PR PBB SHADOW E&M-EST. PATIENT-LVL III: CPT | Mod: PBBFAC,,, | Performed by: STUDENT IN AN ORGANIZED HEALTH CARE EDUCATION/TRAINING PROGRAM

## 2024-02-23 RX ORDER — DULOXETIN HYDROCHLORIDE 60 MG/1
120 CAPSULE, DELAYED RELEASE ORAL
Qty: 180 CAPSULE | Refills: 0 | Status: SHIPPED | OUTPATIENT
Start: 2024-02-23

## 2024-02-23 RX ORDER — TRIAMCINOLONE ACETONIDE 40 MG/ML
40 INJECTION, SUSPENSION INTRA-ARTICULAR; INTRAMUSCULAR
Status: COMPLETED | OUTPATIENT
Start: 2024-02-23 | End: 2024-02-23

## 2024-02-23 RX ADMIN — TRIAMCINOLONE ACETONIDE 40 MG: 40 INJECTION, SUSPENSION INTRA-ARTICULAR; INTRAMUSCULAR at 09:02

## 2024-02-23 NOTE — PROCEDURES
Right GTB injection    Date/Time: 2/23/2024 9:45 AM    Performed by: Alethea Childers DO  Authorized by: Alethea Childers, DO    Consent Done?:  Yes (Written)  Indications:  Pain  Site marked: the procedure site was marked    Location:  Hip  Prep: patient was prepped and draped in usual sterile fashion    Needle size:  22 G  Approach: Lateral.  Medications:  40 mg triamcinolone acetonide (KENALOG-40) injection 40 mg/mL      Procedure Note    Pre-operative diagnosis: Greater Trochanter Bursitis  Post-operative diagnosis: Greater Trochanter Bursitis  Procedure Date: 02/23/2024  Procedure:  (1) Right Greater Trochanter Bursa Injection    Anesthesia: Local    Indication for procedure: Patient has a history of greater trochanter bursitis. The patient is here today for a greater trochanter bursa injection for diagnostic and therapeutic purposes. The patient was deemed an appropriate candidate to undergo the planned procedure. Consent for procedure was obtained.     Complications: None     Description of procedure in detail: Informed consent obtained after explaining the procedure and potential complications including local discomfort, infection, headache, temporary or permanent weakness and/or numbness of one or both legs, temporary or permanent paraplegia, heart attack and stroke. Patient was brought back to procedure room and placed in a prone position and head resting comfortably in head ring. Prior to the initiation of the procedure, the patient's identity, the site, and the nature of the procedure were verified.     The skin was prepped with chloroprep and the Right greater trochanter bursa was palpated. The area was anesthestized  with cooling spray.  A 22G 3.5 inch spinal needle was advanced until os was encountered. There was no paresthesia with needle placement, but there was reproduction of pain. Aspiration was negative for blood. Next, 4 mL containing 40 mg kenalog  and 3 mL of 0.25% Bupivacaine was  injected without difficulty or resistance. Needle was removed with flush and bandaged placed over site of needle insertion.     Estimated blood loss: None     Disposition: The patient tolerated the procedure without complaint and was transported to the recovery room in stable condition.     Follow-up: RTC as scheduled    Alethea Childers DO   Interventional Pain Management

## 2024-02-23 NOTE — PROGRESS NOTES
[facility] Established Patient Evaluation    Referred by: No ref. provider found   Reason for referral: Greater trochanteric bursitis of right hip     CC:   No chief complaint on file.    Interval update 02/23/24 73-year-old male that presents with his wife, Mr. Mancia is reporting pain in the right upper lateral leg near his right hip pain began 3 weeks ago pain is constant pain described as sharp, pain is exacerbated with walking standing and even sleeping at night.  On the lateral aspect of the upper thigh.  He reports similar pain on the left side which responded well to a GTB injection.  He also reports some hip pain that radiates into the groin on the right side that is worse with walking and weight-bearing.  X-rays of the bilateral hips showed degenerative joint disease.  X-ray of the right femur with unremarkable.  No recent falls or trauma.  Pain is rated 7/10 today.    Interval History 2/12/2024:  73-year-old male that presents with his wife, Mr. Mancia is reporting pain in the right upper lateral leg near his right hip pain began 3 weeks ago pain is constant pain described as sharp, pain is exacerbated with walking standing and even sleeping at night.  He denies any paresthesia like symptoms.  Pain does not radiate past his right knee.  Pain score is 9/10.  He denies any bowel bladder dysfunction, denies any saddle anesthesia at this time.       Interval update 12/29/23   73-year-old male that presents today complaining of left lateral leg pain in a specific spot the pain is located just below his left hip.  Pain has been ongoing, pain is described as sharp, pain is intermittent they are times where he does not have pain at all.  Patient reports pain is intensified with certain movements, ADLs as well as laying on his left side.  He denies any radicular symptoms denies any numbness tingling or burning anywhere in his legs or lower extremities.  Pain score 6/10 today.  He denies any bowel  bladder dysfunction, denies any saddle anesthesia denies any profound weakness patient appears sometimes to be weak and deconditioned severely.      Interval update 12/15/23:  73-year-old male that presents for follow up appointment for continued bilateral foot pain he has been provided the Q 10s the patches bilaterally which she states did help his symptoms mildly but would like to be considered for repeat application.  He is currently 150 mg b.i.d. which she states helps but it does create somnolence.  Of a new pain that began months ago left lateral and posterior thigh pain that specifically in 1 spot.  He reports that his left thigh pain increase his when he places pressure on his left foot.  He feels a sharp pain in the left thigh.  Denies any radiating symptoms denies any paresthesia like symptoms.  He just completed physical therapy and was recommended for aqua therapy.  He is severely deconditioned and overweight which prohibits him from certain movements and I think increases his overall pain.  He denies any recent falls denies any bowel bladder dysfunction denies any saddle anesthesia at this time.  Of note he takes Eliquis for AFib.  He did report seeing orthopedics and an x-ray of his hips were taken and found to be unremarkable.    Interval updates 10/03/2023  73-year-old male presents with his wife continued to complain of bilateral foot pain.  He was switched to Lyrica 50 mg b.i.d. by Dr. Childers which she does state is helping and he denies any adverse side effects.  Last clinic visit Dr. Childers discussed providing him with Qutenza for bilateral foot pain.  He continues to follow up with Podiatry and Neurology and was told that his pain is likely related to peripheral neuropathy.  He is not been seen since July he would like to move forward with the Qutenza therapy.         Interval History 6/1/23:  Zeenat Mancia presents with continued bilateral feet pain. He feels the medications (gabapentin and  cymbalta) make him groggy in the mornings, so he decided to stop taking the medications. He states he has been doing acupuncture, and has done 7-8 visits, however he has not felt it was particularly beneficial; he notes some improvement, but it is not long lasting.. He has also seen a podiatrist and neurology and was told that his pain is likely related to peripheral neuropathy.  He feels his toes are burning at night and finds difficulty getting to sleep.  He felt the lidocaine ointment helped a little, but not a great deal.     Initial HPI 2/13/23  Location:  Left hip, left lateral leg  Onset:  Months  Radiation: N/A  Timing: persistent  Current Pain Score: 6/10  Weekly Pain Range: 6-7/10  Quality: Burning  Worsened by: standing and walking for more than several minutes  Improved by: medications, rest, and sitting    Pt reports 2 years of burning, stinging, tingling foot pain that is worse at night and with walking and standing. He reports previous history of low back pain, however this resolved with PT.  He denies back pain today.  His foot pain is bilateral and covers the entirety of the foot including both dorsal and plantar surfaces, with symptoms most pronounced over the toes.  He has been seen by Podiatry who performed diagnostic tarsal tunnel injections with some temporary relief.  He was offered tarsal tunnel decompression surgery, but declined. EMG performed 1/2022 no definitive evidence of tarsal tunnel syndrome and possible evidence of peripheral polyneuropathy. He previously followed with Dr. Poon who started the patient on gabapentin; he is prescribed 600 mg TID, however only takes 600 mg BID as he often forgets his 3rd dose. He is prescribed Cymbalta by his PCP for anxiety. He reports he has recently gained weight due to decreased activity. He denies hx of diabetes or  chemotherapy. He denies symptoms in the hands.       Previous Therapies:  PT/OT: Previously completed PT for low back pain  HEP:  No  TENS: No  Interventions:   10/12/2023 Qutenza patches bilaterally   Diagnostic Tarsal Tunnel Injections with Podiatry   Surgery: Right Carpal Tunnel Release  Opioids: None  Adjuvants:     Current Pain Medications:  Gabapentin 600 mg BID  Cymbalta 20 mg QD   Lyrica 150 mg b.i.d.    Assessment & Plan:  Problem List Items Addressed This Visit    None  Visit Diagnoses       Greater trochanteric bursitis of right hip    -  Primary    Relevant Orders    Right GTB injection    Right leg pain        Right hip pain                  10/03/2023-Zeenat Mancia is a 73 y.o. male who  has a past medical history of A-fib, Acute blood loss anemia, Arthritis, Atrial flutter, Colon polyp, Diverticulosis, Dyslipidemia, Dyspnea on exertion (5/31/2021), GERD (gastroesophageal reflux disease), Hepatic steatosis, Hyperlipidemia, Hypertension, Irregular heart beat, Lower GI bleed (5/31/2021), Multiple gastric ulcers (05/16/2018), Obesity, Sleep apnea, and Unspecified disorder of kidney and ureter.  By history and examination this patient has chronic pain likely related to idiopathic neuropathy.  We discussed the underlying diagnoses and multiple treatment options including non-opioid medications, physical therapy, home exercise, core muscle enhancement, and activity modification.  The risks and benefits of each treatment option were discussed and all questions were answered.      12/15/2023 Zeenat Mancia is a 73 y.o. male who  has a past medical history of A-fib, Acute blood loss anemia, Arthritis, Atrial flutter, Colon polyp, Diverticulosis, Dyslipidemia, Dyspnea on exertion (5/31/2021), GERD (gastroesophageal reflux disease), Hepatic steatosis, Hyperlipidemia, Hypertension, Irregular heart beat, Lower GI bleed (5/31/2021), Multiple gastric ulcers (05/16/2018), Obesity, Sleep apnea, and Unspecified disorder of kidney and ureter.  By history and examination this patient has chronic bilateral foot pain likely related to idiopathic  neuropathy.  He is also complaining of right lateral and posterior thigh pain that is specific in 1 spot this pain is unclear at this time I have reviewed his lumbar MRI he does have multilevel degenerative changes that result in mild left-sided L5-S1 foraminal encroachment I do not believe this is the cause of his left lateral and posterior thigh pain.  I do believe that this pain is more musculoskeletal.  We discussed the underlying diagnoses and multiple treatment options including non-opioid medications, interventional procedures, physical therapy, home exercise, core muscle enhancement, activity modification, and weight loss.  The risks and benefits of each treatment option were discussed and all questions were answered.   See plan agreed upon below.      12/29/[id number]-old male with multiple comorbidities.  By history and exam the patient has subacute left lateral leg pain that appears to be in 1 spot just below his left lateral hip.  He denies any previous falls he and I have discussed this pain previously etiology is unclear at this point.  Do believe he may be suffering from left GT bursitis with referred pain.  I have recommended a left GTB injection in the area of concern.  Patient verbalized understanding.  Patient myself and his wife discussed underlying diagnosis and treatment options including nonopioid medications, physical therapy, home exercise, core muscle has been, activity modification and weight loss.  They have agreed to the plan below.      02/12/2024-Zeenat Mancia is a 73 y.o. male who  has a past medical history of A-fib, Acute blood loss anemia, Arthritis, Atrial flutter, Colon polyp, Diverticulosis, Dyslipidemia, Dyspnea on exertion (5/31/2021), GERD (gastroesophageal reflux disease), Hepatic steatosis, Hyperlipidemia, Hypertension, Irregular heart beat, Lower GI bleed (5/31/2021), Multiple gastric ulcers (05/16/2018), Obesity, Sleep apnea, and Unspecified disorder of kidney and  ureter.  By history and examination this patient has acute/subacute right lateral and right hip pain. Etiology is uncertain at this time.   We discussed the underlying diagnoses and multiple treatment options including interventional procedures, physical therapy, home exercise, activity modification, and weight loss.  The risks and benefits of each treatment option were discussed and all questions were answered.        02/23/2024 - Zeenat Mancia for follow up hip pain.  He recently underwent a left GTB injection which gave him significant pain relief.  Today he reports similar pain on the right lateral upper thigh/hip.  This pain is similar to his GTB pain.  He also reports pain of the right hip that radiates to the groin.  I will perform right GTB injection in clinic today.  I would also consider right intra-articular hip injection should his hip pain continue.  X-rays of the hip show DJD bilaterally.    Treatment Plan:   Procedures:  Right GTB injection performed today in clinic.  See procedure note for additional details.  Consider right intra-articular hip injection should her pain continue.  PT/OT/HEP: I encouraged the patient to start a home exercise regimen that includes daily cardiovascular exercise lasting at least 30 minutes.  He is also scheduled for pending aqua therapy but there is a long wait time to start, he is severely deconditioned and this generates a large portion of his pain. We also  discussed optimizing his diet.  I have offered to send him back to traditional physical therapy as well.  Medications:    - continue with compound cream this consists of prilocaine, lidocaine and gabapentin   - continue pregabalin 150 mg QHS BID dosing causes somnolence.    Reviewed and consistent with medication use as prescribed.  Imaging:  X-rays reviewed.  Follow Up:  Return to clinic in 2 weeks     Alethea Childers DO   [facility]      Disclaimer: This note was partly generated using  dictation software which may occasionally result in transcription errors.    Imaging:  Reviewed.     EXAMINATION:  MRI LUMBAR SPINE WITHOUT CONTRAST     CLINICAL HISTORY:  Radiculopathy, lumbar regionLumbar radiculopathy, symptoms persist with conservative treatment;     TECHNIQUE:  Sagittal T1, sagittal T2, sagittal STIR, axial T1 and axial T2 weighted images of the lumbar spine obtained without contrast.     COMPARISON:  06/30/2022 x-ray     FINDINGS:  Lumbar spine alignment is within normal limits. The vertebral body heights are well maintained, with no fracture.  No marrow signal abnormality suspicious for an infiltrative process.     The conus is normal in appearance.  The adjacent soft tissue structures show no significant abnormalities.     L1-L2: Circumferential disc bulge and spondylitic spurring.No significant central canal or neural foraminal narrowing.     L2-L3: Circumferential disc bulge and spondylitic spurring.  No significant central canal or neural foraminal narrowing.     L3-L4: Circumferential disc bulge and minimal facet arthritis.  Asymmetric left-sided spondylitic spurring.  No significant central canal or neural foraminal narrowing.     L4-L5: Circumferential disc bulge and asymmetric spondylitic spurring.  Moderate facet arthritis.  No significant central canal or neural foraminal narrowing.     L5-S1: Circumferential disc bulge and small central disc protrusion.  Mild facet arthritis.  Mild left-sided neural foraminal narrowing.     Impression:     Multilevel degenerative changes resulting in mild left-sided L5 S1 foraminal encroachment.  Small superimposed central disc protrusion.        Electronically signed by: Marlo Camargo Jr      XR HIP WITH PELVIS WHEN PERFORMED, 2 OR 3  VIEWS RIGHT     CLINICAL HISTORY:  Pain in right leg     TECHNIQUE:  AP view of the pelvis and frog leg lateral view of the right hip were performed.     COMPARISON:  10/30/2023     FINDINGS:  Image quality is  limited by suboptimal penetration.  No evidence of acute fracture, dislocation, or osseous destruction.  Mild bilateral hip DJD.  Surgical clips project over the pelvis.     Impression:     As above.        Electronically signed by: Bobby Parra  Date:                                            2024      XR FEMUR 2 VIEW RIGHT     CLINICAL HISTORY:  Pain in right thigh     FINDINGS:  Two views right femur: No fracture, dislocation, or bone destruction seen.        Electronically signed by: Jeronimo Ghotra MD  Date:                                            2024    EMG:  Test Date:  2022     Patient: Zeenat Mancia : 1950 Physician: Jase Vernon D.O.   ID#:   SEX: Male Ref. Phys: Leonel Perez, DPM      HPI: Zeenat Mancia is a 71 y.o.male who presents for NCS/EMG to evaluate tarsal tunnel bilaterally.  Pt with tingling and burning pain in the bilateral feet- tips of toes and soles of feet.       NCV & EMG Findings:  Evaluation of the right median sensory nerve showed prolonged distal peak latency and decreased conduction velocity.  The left Superficial Fibular sensory, the right Superficial Fibular sensory, the left sural sensory, and the right sural sensory nerves showed no response.  All remaining nerves (as indicated in the following tables) were within normal limits.  Needle evaluation of the left Abductor Hallucis muscle showed increased insertional activity, moderately increased spontaneous activity, and slightly increased polyphasic potentials.  The right Abductor Hallucis muscle showed increased insertional activity, moderately increased spontaneous activity, and diminished recruitment.  All remaining muscles (as indicated in the following table) showed no evidence of electrical instability.     Impression:  1. Technically, somewhat limited study due to peripheral edema of legs  2. The sensory responses were absent in the bilateral lower extremities.  This can either be due to  "the technical issues above, or could be a sensory peripheral polyneuropathy.  I favor the latter as his symptoms correlate with this.   3. Incidentally, there is evidence of a median mononeuropathy across the right wrist (I.e. carpal tunnel syndrome).  Upper extremity sensory studies performed due to lack of responses in the legs.  If a more detailed study is desired for the carpal tunnel syndrome, a separate upper extremity study can be ordered.  Note:  He has no significant hand symptoms.     4. No definitive evidence of tarsal tunnel syndrome on either side.  The bilateral abductor hallicus muscles showed active denervation, but this could be due to the peripheral polyneuropathy described above, and can also be seen in normal feet, especially with advancing age.         Review of Systems:  Review of Systems   Constitutional:  Negative for chills and fever.        +weight gain   HENT:  Negative for congestion and ear pain.    Eyes:  Negative for discharge and redness.   Respiratory:  Negative for cough and wheezing.    Cardiovascular:  Negative for chest pain.   Gastrointestinal:  Negative for abdominal pain and vomiting.   Musculoskeletal:  Negative for back pain and neck pain.        +Bilateral foot pain   Skin:  Negative for itching and rash.   Neurological:  Positive for tingling (b/l feet) and sensory change (Burning, tingling over b/l feet). Negative for weakness.       Physical Exam:  Vitals:    02/23/24 1015   BP: 105/69   Pulse: 89   Weight: (!) 150.6 kg (332 lb)   Height: 6' 2" (1.88 m)   PainSc:   7   PainLoc: Foot           General    Vitals reviewed.  Constitutional: He is oriented to person, place, and time. He appears well-developed and well-nourished. No distress.   HENT:   Head: Normocephalic and atraumatic.   Nose: Nose normal.   Eyes: Conjunctivae and EOM are normal. Pupils are equal, round, and reactive to light.   Neck: Neck supple.   Cardiovascular:  Normal rate and regular rhythm.          "   Pulmonary/Chest: Effort normal and breath sounds normal.   Abdominal: Soft. He exhibits no distension.   Neurological: He is alert and oriented to person, place, and time.   Psychiatric: He has a normal mood and affect. His behavior is normal. Thought content normal.     General Musculoskeletal Exam   Gait: antalgic     Right Ankle/Foot Exam     Inspection   Deformity: absent  Erythema: absent  Bruising:  Foot - absent  Effusion:  Foot - absent    Muscle Strength   The patient has normal right ankle strength.    Other   Sensation: decreased (over plantar and dorsal aspect)    Comments:  No clubbing of the nails. Normal capillary refill.     Left Ankle/Foot Exam     Inspection  Deformity: absent  Erythema: absent  Bruising:  Foot - absent  Effusion:  Foot - absent    Muscle Strength   The patient has normal left ankle strength.    Other   Sensation: decreased (over plantar and dorsal foot)    Comments:  No clubbing of the nails. Normal capillary refill.       Right Hip Exam     Tenderness   The patient tender to palpation of the trochanteric bursa.    Range of Motion   Abduction:  abnormal   Adduction:  abnormal   Extension:  abnormal   Flexion:  abnormal   External rotation:  abnormal   Internal rotation:  abnormal     Comments:  + log roll right       + pain with range of motion of the right hip particularly with flexion      Muscle Strength   Right Lower Extremity   Hip Abduction: 5/5   Hip Adduction: 5/5     Vascular Exam     Right Pulses  Dorsalis Pedis:      2+          Left Pulses  Dorsalis Pedis:      2+          Edema  Right Lower Leg: absent

## 2024-02-26 RX ORDER — PREGABALIN 150 MG/1
CAPSULE ORAL
Qty: 60 CAPSULE | Refills: 1 | Status: SHIPPED | OUTPATIENT
Start: 2024-02-26 | End: 2024-04-26

## 2024-02-28 ENCOUNTER — HOSPITAL ENCOUNTER (OUTPATIENT)
Dept: RADIOLOGY | Facility: HOSPITAL | Age: 74
Discharge: HOME | End: 2024-02-28
Attending: STUDENT IN AN ORGANIZED HEALTH CARE EDUCATION/TRAINING PROGRAM
Payer: MEDICARE

## 2024-02-28 DIAGNOSIS — G20.C PARKINSONISM, UNSPECIFIED PARKINSONISM TYPE: ICD-10-CM

## 2024-02-28 PROCEDURE — 70551 MRI BRAIN STEM W/O DYE: CPT | Mod: TC

## 2024-02-28 PROCEDURE — 70551 MRI BRAIN STEM W/O DYE: CPT | Mod: 26,,, | Performed by: RADIOLOGY

## 2024-02-29 ENCOUNTER — PATIENT MESSAGE (OUTPATIENT)
Dept: NEUROLOGY | Facility: CLINIC | Age: 74
End: 2024-02-29
Payer: MEDICARE

## 2024-02-29 ENCOUNTER — CLINICAL SUPPORT (OUTPATIENT)
Dept: REHABILITATION | Facility: HOSPITAL | Age: 74
End: 2024-02-29
Attending: STUDENT IN AN ORGANIZED HEALTH CARE EDUCATION/TRAINING PROGRAM
Payer: MEDICARE

## 2024-02-29 DIAGNOSIS — Z74.09 IMPAIRED FUNCTIONAL MOBILITY, BALANCE, GAIT, AND ENDURANCE: ICD-10-CM

## 2024-02-29 DIAGNOSIS — G20.C PARKINSONISM, UNSPECIFIED PARKINSONISM TYPE: ICD-10-CM

## 2024-02-29 PROCEDURE — 97162 PT EVAL MOD COMPLEX 30 MIN: CPT | Mod: PN

## 2024-02-29 NOTE — PLAN OF CARE
[facility]  Physical Therapy Neurological Rehabilitation Initial Evaluation     Name: Zeenat Mancia  Clinic Number: [id number]    Therapy Diagnosis:   Encounter Diagnoses   Name Primary?    Impaired functional mobility, balance, gait, and endurance     Parkinsonism, unspecified Parkinsonism type      Physician: Doris Moreno MD    Physician Orders: PT Eval and Treat   Medical Diagnosis from Referral:   Diagnosis   Z74.09 (ICD-10-CM) - Impaired functional mobility, balance, gait, and endurance   G20.C (ICD-10-CM) - Parkinsonism, unspecified Parkinsonism type     Evaluation Date: 2/29/2024  Authorization Period Expiration: 12/31/2024  Plan of Care Expiration: 5/3/2024  Progress Note Due: 4/5/204  Visit # / Visits authorized: 1/ 1  FOTO: 1/5    Precautions: Fall    Time In: 1:05  Time Out: 1:45  Total Billable Time: 40 minutes (1 mod eval)    Subjective      Date of onset: a little over a year, has been using a SPC for the past 6 months.     History of current condition - C J reports: Complaints and clinical exam is suggestive of parkinsonism.      Imaging, MRI studies: 2/28/2024  Impression:  No acute process seen.  Remote infarct of the left corona radiata.  Minimal small vessel ischemic change on left.     Prior Therapy: Fall 2023  Social History:  lives with their spouse  Falls: 0   DME: Straight cane   Home Environment: Single story home, no stairs to enter   Exercise Routine / History: no   Family Present at time of Eval: wife drove but did not come into clinic   Occupation: retired from [employer]  Prior Level of Function: see evals from 2023  Current Level of Function: patient does not drive very often due to impairments.    Hobbies: grandkids and caring for the dogs    Pain:  Current 3/10, worst 4/10, best 0/10   Location: right hip  Description: stabbing  Aggravating Factors: Walking, bending  Easing Factors: injection  Complains of dizziness upon standing     Patient's goals:  to get to wear I can walk a block with my wife to walk the dog, to felisha more mobility    Medical History:   Past Medical History:   Diagnosis Date    A-fib     Acute blood loss anemia     Arthritis     Atrial flutter     Colon polyp     Diverticulosis     Dyslipidemia     Dyspnea on exertion 5/31/2021    GERD (gastroesophageal reflux disease)     on Protonix    Hepatic steatosis     Hyperlipidemia     Hypertension     Irregular heart beat     Lower GI bleed 5/31/2021    Multiple gastric ulcers 05/16/2018    gi scope per Dr. Houston    Obesity     Sleep apnea     uses CPAP    Unspecified disorder of kidney and ureter     kidney stones     Surgical History:   Zeenat Mancia  has a past surgical history that includes Eye surgery; Tonsillectomy; Appendectomy; Cataract extraction (2009); Cataract extraction w/  intraocular lens implant (Right, 06/03/2015); Upper gastrointestinal endoscopy (05/16/2018); Colonoscopy (N/A, 10/10/2017); Colonoscopy (N/A, 05/16/2018); Esophagogastroduodenoscopy (N/A, 07/02/2018); Left heart catheterization (N/A, 01/04/2021); Coronary angiography (N/A, 01/04/2021); Robot-assisted cholecystectomy (N/A, 02/04/2021); Treatment of cardiac arrhythmia (N/A, 04/19/2021); Treatment of cardiac arrhythmia (N/A, 05/14/2021); Transesophageal echocardiography (N/A, 05/14/2021); Esophagogastroduodenoscopy (N/A, 06/02/2021); Colonoscopy (N/A, 06/02/2021); Cystoscopy with insertion of minimally invasive implant to enlarge prostatic urethra (N/A, 06/29/2021); Colonoscopy (N/A, 10/11/2022); Adenoidectomy (1958); and Cholecystectomy (2021).    Medications:   Zeenat has a current medication list which includes the following prescription(s): amlodipine, apixaban, atenolol, duloxetine, furosemide, hydrochlorothiazide, melatonin, mv-min/folic/k1/lycopen/lutein, pantoprazole, pregabalin, sildenafil, tadalafil, tamsulosin, tramadol, trazodone, and valsartan, and the following Facility-Administered Medications:  capsaicin-skin cleanser and perflutren protein-a microsphr.    Allergies:   Review of patient's allergies indicates:   Allergen Reactions    Lipitor [atorvastatin] Itching      Objective      Medication Information   Medications for Parkinson disease:  not yet  Do you experience any tremors?yes If yes, please describe mild and B  Also has mouth tremor    Surgical Information   Have you had neurosurgery? no  Orthopedic surgery? no    Motor Symptoms   Functional Deficits Leading to Referral/Chief Complaint:   Lower body dressing  Getting up to stand  Walking more than 5 minutes  Going up and down curbs    Dominant hand:right    Have you noticed any changes in your ability to:  Button: Yes  Dial the phone: Yes  Open containers: No   Manipulate money: Yes  Tie shoes: Yes  Write: Yes  Type or use a computer: Yes  Have you noticed if your hand movements are smaller than they used to be? No  Have you noticed if your hands feel any weaker than they used to? Yes    ROM:   UPPER EXTREMITY--AROM/PASSIVE RANGE OF MOTION  WFL         RANGE OF MOTION--LOWER EXTREMITIES  (R) LE WFL but hips limited by grith    (L) LE: WFL but hips limited by grith    Strength: manual muscle test grades below   Upper Extremity Strength    Lower Extremity Strength  All major ms groups observed at 3-/5 or greater    Speech: non-fluency, word finding problems, delayed processing, reports issues with STM     Mental status: alert, oriented to person, place, and time  Would benefit from PT eval   Appearance: Casually dressed  Behavior:  calm and cooperative  Attention Span and Concentration:  Normal  Posture Alignment :slouched posture  Sensation: Light Touch: Impaired: B foot neuropathy    Visual/Auditory: denies changes, post cataract sx    LE strength and endurance  5 times sit-stand 27 seconds  with arms     Gait Assessment:   - AD used: SPC   - Assistance: supervision  - Distance: ~400 feet    -Deviations with ambulation: shuffling gait, decreased B  heel strike  -Impairments contributing to deviations: impaired motor control    Endurance Deficit:       Evaluation   Timed Up and Go 21.5 sec       Intake Outcome Measure for FOTO degenerative disease Survey    Therapist reviewed FOTO scores for Zeenat Mancia on 2/29/2024.   FOTO report - see Media section or FOTO account episode details.    Intake Score: 41%       Treatment     Therapeutic exercise to be initiated at follow-up visit:  SSWS  Balance assessment (mini-best or FULLER)  2 minute walk test  Stepper up to 6 mintues  Seated TE with focus on sit to stand  At least 10 minutes of flexibility training    Patient Education and Home Exercises     Education provided:   - benefit of neuro PT  - need to increase activity with supervision with tolerance    HEP to be issued in 1st 2 weeks.   Assessment     Zeenat is a 73 y.o. male referred to outpatient Physical Therapy with a medical diagnosis of prior CEREBROVASCULAR ACCIDENT and Parkinsonism or Parkinson like session. Patient presents with impaired transfer, functional endurance, decreased balance and balance confidence, pain with walking due to neuropathy, decreased flexibility, shuffling gait, decreased cognition, impaired activity tolerance and signs of a possible movement disorder.     Patient prognosis is Good.   Patient will benefit from skilled outpatient Physical Therapy to address the deficits stated above and in the chart below, provide patient /family education, and to maximize patient's level of independence.     Plan of care discussed with patient: Yes  Patient's spiritual, cultural and educational needs considered and patient is agreeable to the plan of care and goals as stated below:     Anticipated Barriers for therapy: decreased activity tolerance, neuropathic pain, fear of falling    Medical Necessity is demonstrated by the following  History  Co-morbidities and personal factors that may impact the plan of care [] LOW: no personal factors /  co-morbidities  [] MODERATE: 1-2 personal factors / co-morbidities  [x] HIGH: 3+ personal factors / co-morbidities    Moderate / High Support Documentation:   Co-morbidities affecting plan of care: see history above    Personal Factors:   age  coping style  lifestyle  obesity   Examination  Body Structures and Functions, activity limitations and participation restrictions that may impact the plan of care [] LOW: addressing 1-2 elements  [] MODERATE: 3+ elements  [] HIGH: 4+ elements (please support below)    Moderate / High Support Documentation:   Transfers, endurance, gross symmetry, functional transitions.    Clinical Presentation [] LOW: stable  [x] MODERATE: Evolving  [] HIGH: Unstable     Decision Making/ Complexity Score: moderate       Functional Component Tasks plan/ideas (to be finalized first follow up visit):   Sit to stand  Curbs, thresholds    GOALS:   Short term goals: Pt will accomplish the following in 4 weeks; agrees to goals set:  1. Pt will perform LSVT BIG daily maximal exercises with supervision to improve carryover of BIG movements into daily life.  2. Pt will perform nu-step at level 4 x 7 minutes without rests breaks going at least 50 step/min or greater.  3. Pt will performed sit to stand x 5 B UE support in <23 seconds without LOB backward or posterior LE hooking for functional and safe transfers.     Long term goals: 8 weeks, pt agrees to goals set  1. Pt will be able to perform TUG in 16 secs without use of AD demonstrating overall improved functional mobility.   2. Pt will performed sit to stand x 5 B UE support in  <20 seconds without LOB backward or posterior LE hooking for functional and safe transfers.   3.  Pt will score greater than or equal to 41/56 on the Gordon test/assessment without use of AD demonstrating overall improved functional mobility and balance and reduce fall risk.   4.  Pt will walk < than or equal to 0.7 faster on the SSWS test for increased gait speed to keep up  with wife when walking in community.  5. Pt will have 0 falls from start of PT sessions.  6. Pt will perform floor to stand f/b stand to floor transfer B UE support with stand by assist and no cues for improved dynamic transfer, core stability and fall safety in home and community.  7. Pt will begin some form of community fitness to begin regular and consistent performance of exercise to continue maintenance of gains made in PT.    Plan     Plan of care Certification: 2/29/2024 to 5/3/2024.    Outpatient Physical Therapy 2 times weekly for 8 weeks to include the following interventions: Gait Training, Manual Therapy, Moist Heat/ Ice, Neuromuscular Re-ed, Patient Education, Self Care, Therapeutic Activities, and Therapeutic Exercise.     Kaylee Salmeron, PT        Physician's Signature: _________________________________________ Date: ________________

## 2024-03-04 ENCOUNTER — CLINICAL SUPPORT (OUTPATIENT)
Dept: REHABILITATION | Facility: HOSPITAL | Age: 74
End: 2024-03-04
Payer: MEDICARE

## 2024-03-04 DIAGNOSIS — G20.C PARKINSONISM, UNSPECIFIED PARKINSONISM TYPE: Primary | ICD-10-CM

## 2024-03-04 DIAGNOSIS — Z74.09 IMPAIRED FUNCTIONAL MOBILITY, BALANCE, GAIT, AND ENDURANCE: Primary | ICD-10-CM

## 2024-03-04 PROCEDURE — 97530 THERAPEUTIC ACTIVITIES: CPT | Mod: PN

## 2024-03-04 PROCEDURE — 97110 THERAPEUTIC EXERCISES: CPT | Mod: PN

## 2024-03-04 NOTE — PROGRESS NOTES
[facility]   Physical Therapy Treatment Note      Name: Zeenat Mancia  Clinic Number: [id number]    Therapy Diagnosis:   Encounter Diagnosis   Name Primary?    Impaired functional mobility, balance, gait, and endurance Yes     Physician: Doris Moreno MD    Visit Date: 3/4/2024    Physician Orders: PT Eval and Treat   Medical Diagnosis from Referral:   Diagnosis   Z74.09 (ICD-10-CM) - Impaired functional mobility, balance, gait, and endurance   G20.C (ICD-10-CM) - Parkinsonism, unspecified Parkinsonism type      Evaluation Date: 2/29/2024  Authorization Period Expiration: 12/31/2024  Plan of Care Expiration: 5/3/2024  Progress Note Due: 4/5/204  Visit # / Visits authorized: 1/20  FOTO: 2/5     Precautions: Standard, Fall     Time In: 10:35AM  Time Out: 11:15AM  Total Billable Time: 40 minutes (2TA + 1TE)    PTA Visit #: 0/5     Subjective     Patient reports: No major changes since initial evaluation. Has status quo right anterior thigh pain that is moderate today. Notes soreness in legs that lasted 3 days after evaluation.  He  will be  compliant with home exercise program.  Response to previous treatment: Last session was initial evaluation  Functional change: Last session was initial evaluation    Pain: 4/10  Location: right thigh      Objective      Objective Measures updated at progress report unless specified.     Two-Minute Walk Test: 130 feet with large based quad cane    FULLER  BALANCE ASSESSMENT TOOL  1. Sitting to Standing   3 - able to stand independely using hands  2. Standing Unsupported   4 - able to stand safely 2 minutes without hold  3. Sitting Unsupported   4 - able to sit safely and securely 2 minutes  4. Standing to Sitting   2 - uses back of legs against chair to control descent  5. Pivot Transfer   3 - able to transfer safely with definite use of hands  6. Standing with Eyes Closed   3 - able to stand 10 seconds with supervision  7. Standing with Feet Together   2  - able to place feet together independently but unable to hold for 30 seconds  8. Reaching Forward with Outstretched Arm   2 - can reach forward 5 cm/2 inches safely  9. Retrieving Object from Floor   4 - able to  slipper safely and easily  10. Turning to Look Behind   3 - looks behind one side only, other side less weight shift  11. Turning 360 Degrees   2 - able to turn 360 safely but slowly  12. Placing Alternate Foot on Step   1 - able to complete > 2 steps needs min assist  13. Standing with One Foot in Front   1 - need help to step but can hold 15 seconds  14. Standing on One Foot   0 - unable to try or needs assistance to prevent fall    TOTAL SCORE: 34  Maximum: 56    Score interpretation:   0-20 = wheelchair bound  21-40 = walking with assistance  41-56 = independent    Fall risk cutoff scores:   Elderly and history of falls: <51/56   Elderly and no history of falls: <42/56   CVA: <45/56    MDC:  Parkinson's = 5 points  Acute CVA = 6.9 points  Chronic CVA = 4.7 points  Pulmonary Disease = 5.9 points  Progressive Dementia = 1.9 points      Treatment     C J received the treatments listed below:      therapeutic exercises to develop strength and endurance for 10 minutes including:  - stepper bike x 6 minutes level 2.5 single peak  - standing calf raises 2x10     neuromuscular re-education activities to improve: Balance, Coordination, Kinesthetic, Sense, and Proprioception for 00 minutes. The following activities were included:  Not performed today    therapeutic activities to improve functional performance for 30 minutes, including:  - Gordon Balance Assessment (see above)  - Two Minute Walk Test (see above)  - Patient education on benefits of very basic walking and standing tolerance program (patient instructed to walk for at least every two minutes each half hour he is awake)  - Patient education on least restrictive assistive device  - Lateral steps at / bar x 4 lengths x 10 feet each    Patient  Education and Home Exercises       Education provided:   - Increase activity level in home setting    Written Home Exercises Provided: No formal program given thus far; patient instructed on basic walking program to improve standing tolerance     Assessment     CJ tolerated first follow up fairly. He appears very deconditioned and cannot tolerate activities in standing >5 minutes without need for seated rest. Moderate-high levels of fatigue after Two-Minute Walk Test which indicating both decreased cardiovascular endurance and reduced gait speed over household distances. Compared distance traversed in walk test performed today to initial evaluation at [facility] ~7 months ago with significant decline in distance traveled. Patient receptive to benefits of increasing activity in home setting to maximize therapeutic outcomes. He is at moderate risk for falls per Gordon, and strength and endurance/standing tolerance appeared to be greatest limiting factors in achieving higher score. He would benefit from continued PT services to achieve functional goals.    TICO ZARATE Is progressing well towards his goals.   Patient prognosis is Fair.     Patient will continue to benefit from skilled outpatient physical therapy to address the deficits listed in the problem list box on initial evaluation, provide pt/family education and to maximize pt's level of independence in the home and community environment.     Patient's spiritual, cultural and educational needs considered and pt agreeable to plan of care and goals.     Anticipated barriers to physical therapy: decreased activity tolerance, neuropathic pain, fear of falling     Goals:     Short term goals: Pt will accomplish the following in 4 weeks; agrees to goals set:  1. Pt will perform LSVT BIG daily maximal exercises with supervision to improve carryover of BIG movements into daily life. (Progressing, not met)  2. Pt will perform nu-step at level 4 x 7 minutes without rests breaks  going at least 50 step/min or greater. (Progressing, not met)  3. Pt will performed sit to stand x 5 B UE support in <23 seconds without LOB backward or posterior LE hooking for functional and safe transfers. (Progressing, not met)     Long term goals: 8 weeks, pt agrees to goals set  1. Pt will be able to perform TUG in 16 secs without use of AD demonstrating overall improved functional mobility. (Progressing, not met)  2. Pt will performed sit to stand x 5 B UE support in  <20 seconds without LOB backward or posterior LE hooking for functional and safe transfers. (Progressing, not met)  3.  Pt will score greater than or equal to 41/56 on the Gordon test/assessment without use of AD demonstrating overall improved functional mobility and balance and reduce fall risk. (Progressing, not met)  4.  Pt will walk < than or equal to 0.7 faster on the SSWS test for increased gait speed to keep up with wife when walking in community [Two-Minute Walk Test at first follow up = 130 feet which = 0.33 meters/second]. (Progressing, not met)  5. Pt will have 0 falls from start of PT sessions. (Progressing, not met)  6. Pt will perform floor to stand f/b stand to floor transfer B UE support with stand by assist and no cues for improved dynamic transfer, core stability and fall safety in home and community. (Progressing, not met)  7. Pt will begin some form of community fitness to begin regular and consistent performance of exercise to continue maintenance of gains made in PT. (Progressing, not met)       Plan     Standing tolerance  Stepper up to 6 mintues  Seated, supine, and standing strengthening  Flexibility training  Low-mod level balance     JENISE CLAYTON, PT

## 2024-03-06 ENCOUNTER — OFFICE VISIT (OUTPATIENT)
Dept: NEUROLOGY | Facility: CLINIC | Age: 74
End: 2024-03-06
Payer: MEDICARE

## 2024-03-06 DIAGNOSIS — G20.A1 PARKINSON'S DISEASE WITHOUT DYSKINESIA OR FLUCTUATING MANIFESTATIONS: Primary | ICD-10-CM

## 2024-03-06 DIAGNOSIS — M70.61 TROCHANTERIC BURSITIS OF RIGHT HIP: ICD-10-CM

## 2024-03-06 DIAGNOSIS — G62.9 PERIPHERAL POLYNEUROPATHY: ICD-10-CM

## 2024-03-06 PROCEDURE — 99213 OFFICE O/P EST LOW 20 MIN: CPT | Mod: 95,,, | Performed by: STUDENT IN AN ORGANIZED HEALTH CARE EDUCATION/TRAINING PROGRAM

## 2024-03-06 RX ORDER — PREGABALIN 50 MG/1
CAPSULE ORAL
Qty: 60 CAPSULE | Refills: 6 | Status: SHIPPED | OUTPATIENT
Start: 2024-03-06

## 2024-03-06 RX ORDER — CARBIDOPA AND LEVODOPA 25; 100 MG/1; MG/1
TABLET ORAL
Qty: 90 TABLET | Refills: 11 | Status: SHIPPED | OUTPATIENT
Start: 2024-03-06 | End: 2024-05-23 | Stop reason: SDUPTHER

## 2024-03-06 NOTE — PROGRESS NOTES
Patient ID: [id number]  The patient location is: he is at home, [facility]   The chief complaint leading to consultation is: f/u on Parkinsonism    Visit type: we had to transition to audio due to technical difficulties with video part    Time with patient: 19  26 minutes of total time spent on the encounter, which includes face to face time and non-face to face time preparing to see the patient (eg, review of tests), Obtaining and/or reviewing separately obtained history, Documenting clinical information in the electronic or other health record, Independently interpreting results (not separately reported) and communicating results to the patient/family/caregiver, or Care coordination (not separately reported).     Each patient to whom he or she provides medical services by telemedicine is:  (1) informed of the relationship between the physician and patient and the respective role of any other health care provider with respect to management of the patient; and (2) notified that he or she may decline to receive medical services by telemedicine and may withdraw from such care at any time.    Notes:     Subjective:     HPI  Zeenat Mancia is a 73 y.o. RH male with multiple co morbidities including JACKELYN, A.fib, obesity, and HTN. he is presenting today for f/u on parkinsonism.     Interval history (03/06/2024):  Serum work up was unremarkable. MRI showed chronic microvascular changes and one lacunar infarct in corona radiata, no specific involvement of basal ganglia to be causing vascular PDism.   He has started PT already, very exhausted but is trying to push through.  Received an injection for trochanteric bursitis last week which has improved his pain some but not completely.    Initial HPI (2/20/2024):  He had left lateral thigh pain consistent with bursitis 2 months ago which improved after injection by pain management.  He is now having the same pain in the right thigh x 10 days. Describes it as a sharp deep  "pain.  The pain is in lateral upper 3rd of his thigh, occasionally radiating anteriorly to the groin, 8 out of 10.  When he straightens his leg the pain comes on but alleviated by mildly flexing the hip and knee. Not aggravated by touch or clothing.   He has also had neuropathy in feet x1 year. Additionally, they report occasional low back pain that is not related to the current pain.    On another note, per wife, his balance is bad, he shakes, he has difficulty walking/standing, and occasionally has difficulty with speech and drooling. He had an episode that his face felt it was "pulling in the middle".   He has B/B incontinence, 2 years of bladder incontinence and more recent with the bowels. He has nocturia x3 per night. He has been having some "visions" or "delusions" at nights. He is very depressed and also has a lot of anxiety, he was just started on Prozac.    Review of Systems   HENT:  Positive for drooling. Negative for tinnitus and trouble swallowing.    Eyes:  Negative for photophobia and visual disturbance.   Gastrointestinal:  Positive for constipation and fecal incontinence. Negative for nausea and vomiting.   Genitourinary:  Positive for bladder incontinence.   Musculoskeletal:  Positive for back pain, gait problem and leg pain.   Neurological:  Positive for tremors, speech difficulty, weakness, numbness and memory loss.   Psychiatric/Behavioral:  Positive for dysphoric mood and sleep disturbance. Negative for agitation. The patient is nervous/anxious.    All other systems reviewed and are negative.    Past Medical History:  Active Ambulatory Problems     Diagnosis Date Noted    Erectile dysfunction 09/12/2012    JACKELYN (obstructive sleep apnea) 01/17/2013    Hypertensive heart disease without heart failure 02/16/2016    Morbid obesity due to excess calories 02/16/2016    Benign non-nodular prostatic hyperplasia with lower urinary tract symptoms 02/16/2016    Pseudophakia of both eyes 02/16/2016    " Refractive error 02/16/2016    Epiretinal membrane, both eyes 03/06/2017    Posterior vitreous detachment, both eyes 03/06/2017    Tortuous aorta 07/26/2017    Colon cancer screening 10/10/2017    Chronic atrial fibrillation 10/11/2017    History of rectal bleeding     Iron deficiency anemia due to chronic blood loss 06/08/2018    Nonobstructive atherosclerosis of coronary artery 01/04/2021    Other insomnia 01/21/2021    Calculus of gallbladder without cholecystitis without obstruction 02/04/2021    Neuralgia 10/21/2022    Lumbar radiculitis 02/13/2023    Venous insufficiency 08/01/2023    Impaired functional mobility, balance, gait, and endurance 08/29/2023    Statin-induced myositis 09/18/2023    Cough 09/18/2023     Resolved Ambulatory Problems     Diagnosis Date Noted    Nuclear sclerosis of right eye 01/29/2015    Nephrolith 12/10/2015    Torticollis 02/16/2016    Pulmonary heart disease 07/26/2017    Gastrointestinal hemorrhage 05/14/2018    Hematochezia     Acute blood loss anemia     Dyspnea on exertion 05/31/2021    Lower GI bleed 05/31/2021    Back pain 08/09/2022    Rib cage dysfunction 08/09/2022    Low back pain 08/09/2022    Decreased strength, endurance, and mobility 08/09/2022     Past Medical History:   Diagnosis Date    A-fib     Arthritis     Atrial flutter     Colon polyp     Diverticulosis     Dyslipidemia     GERD (gastroesophageal reflux disease)     Hepatic steatosis     Hyperlipidemia     Hypertension     Irregular heart beat     Multiple gastric ulcers 05/16/2018    Obesity     Sleep apnea     Unspecified disorder of kidney and ureter      Allergies:  Review of patient's allergies indicates:   Allergen Reactions    Lipitor [atorvastatin] Itching     Pertinent Family History:  Family History   Problem Relation Age of Onset    Arthritis Mother     Heart disease Mother     Blindness Mother         OS due to injury    Coronary artery disease Father     Heart disease Father     Cataracts Father      Amblyopia Neg Hx     Cancer Neg Hx     Diabetes Neg Hx     Glaucoma Neg Hx     Hypertension Neg Hx     Macular degeneration Neg Hx     Retinal detachment Neg Hx     Strabismus Neg Hx     Stroke Neg Hx     Thyroid disease Neg Hx     Colon cancer Neg Hx     Colon polyps Neg Hx     Crohn's disease Neg Hx     Ulcerative colitis Neg Hx     Esophageal cancer Neg Hx     Stomach cancer Neg Hx        Pertinent Social History:  Social History     Tobacco Use    Smoking status: Former     Current packs/day: 0.00     Average packs/day: 2.0 packs/day for 11.0 years (22.0 ttl pk-yrs)     Types: Cigarettes     Start date: 1969     Quit date: 1980     Years since quittin.2    Smokeless tobacco: Never    Tobacco comments:     quit in     Substance Use Topics    Alcohol use: Not Currently     Alcohol/week: 2.0 standard drinks of alcohol     Types: 1 Glasses of wine, 1 Cans of beer per week     Comment: 1 beer every 2 month    Drug use: No       Medications:  Current Outpatient Medications   Medication Instructions    amLODIPine (NORVASC) 5 mg, Oral    apixaban (ELIQUIS) 5 mg, Oral, 2 times daily    atenoloL (TENORMIN) 50 MG tablet TAKE 1 TABLET(50 MG) BY MOUTH EVERY DAY    DULoxetine (CYMBALTA) 120 mg, Oral    furosemide (LASIX) 20 MG tablet TAKE 1 TABLET BY MOUTH DAILY AS NEEDED FOR SWELLING    hydroCHLOROthiazide (HYDRODIURIL) 25 mg, Oral    melatonin 10 mg, Oral, Nightly    multivit-min/FA/lycopen/lutein (CENTRUM SILVER MEN ORAL) Oral, Daily    pantoprazole (PROTONIX) 40 MG tablet TAKE 1 TABLET(40 MG) BY MOUTH EVERY DAY    pregabalin (LYRICA) 150 MG capsule TAKE 1 CAPSULE(150 MG) BY MOUTH TWICE DAILY    sildenafiL (VIAGRA) 100 mg, Oral, Daily PRN    tadalafiL (CIALIS) 20 mg, Oral, Every 72 hours PRN    tamsulosin (FLOMAX) 0.4 mg Cap TAKE 1 CAPSULE(0.4 MG) BY MOUTH EVERY DAY    traZODone (DESYREL) 100 mg, Oral, Nightly    valsartan (DIOVAN) 320 MG tablet TAKE 1 TABLET(320 MG) BY MOUTH EVERY DAY         Objective:     *exam was limited due to the technical difficulties    Pertinent lab results  Lab Results   Component Value Date    VHLOHKZE31 529 02/20/2024    VITAMINB6 20 02/20/2024    KHXQUKQZ90TW 27 (L) 10/20/2022    ZINC 85 02/20/2024    COPPER 1042 02/20/2024     Lab Results   Component Value Date    ARSENICBLD <1 02/20/2024    LEADBLOOD <1.0 02/20/2024    CADMIUM 0.6 02/20/2024    MERCURYBLOOD <1 02/20/2024     Lab Results   Component Value Date    PATHINTPSPE REVIEWED 02/20/2024    PATHINTPSIF REVIEWED 02/20/2024     Lab Results   Component Value Date    RPR Non-reactive 02/20/2024     Lab Results   Component Value Date    TSH 1.182 11/07/2023    FREET4 0.98 11/07/2023    WBC 8.19 05/26/2023    LYMPH 1.3 05/26/2023    LYMPH 16.4 (L) 05/26/2023    RBC 6.20 05/26/2023    HGB 16.6 05/26/2023    HCT 55.3 (H) 05/26/2023    MCV 89 05/26/2023     05/26/2023     10/20/2022    K 4.0 06/22/2023    CO2 28 10/20/2022    BUN 15 10/20/2022    CREATININE 1.2 06/22/2023    CALCIUM 9.6 10/20/2022    AST 36 10/20/2022    ALT 44 10/20/2022     Pertinent imaging results    *Images personally reviewed and interpreted:    Results for orders placed or performed during the hospital encounter of 02/28/24 (from the past 2160 hour(s))   MRI Brain Without Contrast    Impression    No acute process seen.    Remote infarct of the left corona radiata.    Minimal small vessel ischemic change on left.      Electronically signed by: Jeronimo Ghotra MD  Date:    02/28/2024  Time:    15:51     *Note: Due to a large number of results and/or encounters for the requested time period, some results have not been displayed. A complete set of results can be found in Results Review.     9/19/2022  MRI Lumbar Spine wo contrast:  Multilevel degenerative changes resulting in mild left-sided L5 S1 foraminal encroachment. Small superimposed central disc protrusion.     Other pertinent studies    12/15/2023:  EMG-NCS:  1. Technically,  somewhat limited study due to peripheral edema of legs  2. The sensory responses were absent in the bilateral lower extremities.  This can either be due to the technical issues above, or could be a sensory peripheral polyneuropathy.  I favor the latter as his symptoms correlate with this.   3. Incidentally, there is evidence of a median mononeuropathy across the right wrist (I.e. carpal tunnel syndrome).  Upper extremity sensory studies performed due to lack of responses in the legs.  If a more detailed study is desired for the carpal tunnel syndrome, a separate upper extremity study can be ordered.  Note:  He has no significant hand symptoms.     4. No definitive evidence of tarsal tunnel syndrome on either side.  The bilateral abductor hallicus muscles showed active denervation, but this could be due to the peripheral polyneuropathy described above, and can also be seen in normal feet, especially with advancing age.      Assessment:   Zeenat Mancia is a 73 y.o. right-handed male with multiple co morbidities including JACKELYN, A.fib, obesity, and HTN who was referred for evaluation of right lateral thigh pain. History and exam was notable for a deep focal pain within the lateral hip, most consistent with bursitis. He has had a partial response to injection that was completed last week, we will increase the Lyrica (pregabalin) dose to 200 mg BID for better pain management. Warned him to monitor for a skin rash. Regarding anxiety, he is following with psychiatry and counseling, was recently started on Prozac and Abilify. Regarding parkinsonism, serum work up was negative for mimicking disorders, MRI wasn't really consistent with vascular Pdism but showed age advanced atrophy especially in insular cortices. We will start a trial of sinemet at low dose and titrate up to tolerance. Side effects including but not limited to nausea, orthostatic hypotension, and hallucinations wee discussed.       1. Parkinson's disease  without dyskinesia or fluctuating manifestations    2. Trochanteric bursitis of right hip    3. Peripheral polyneuropathy      Plan:     - carbidopa-levodopa  mg (SINEMET)  mg per tablet; Start taking one tablet in the mornings for 1 week, then if tolerated increase to once in the morning and once in the evening for the 2nd week, if tolerating well you can increase to three times daily.  Dispense: 90 tablet; Refill: 11  - pregabalin (LYRICA) 50 MG capsule; Take one capsule (50 mg) in addition to the 150 mg capsule for total dose of 200 mg twice daily  Dispense: 60 capsule; Refill: 6  - Continue PT  - Follow up: RTC in 3 months     Plan was discussed in detail with the patient, who is in agreement.      Disclaimer: This note was partly generated using dictation software which may occasionally result in transcription errors that are missed on review.        Doris Moreno MD    [facility]  03/06/2024

## 2024-03-11 ENCOUNTER — CLINICAL SUPPORT (OUTPATIENT)
Dept: REHABILITATION | Facility: HOSPITAL | Age: 74
End: 2024-03-11
Payer: MEDICARE

## 2024-03-11 DIAGNOSIS — Z74.09 IMPAIRED FUNCTIONAL MOBILITY, BALANCE, GAIT, AND ENDURANCE: Primary | ICD-10-CM

## 2024-03-11 PROCEDURE — 97530 THERAPEUTIC ACTIVITIES: CPT | Mod: PN

## 2024-03-11 PROCEDURE — 97110 THERAPEUTIC EXERCISES: CPT | Mod: PN

## 2024-03-11 RX ORDER — TAMSULOSIN HYDROCHLORIDE 0.4 MG/1
1 CAPSULE ORAL
Qty: 30 CAPSULE | Refills: 11 | Status: SHIPPED | OUTPATIENT
Start: 2024-03-11

## 2024-03-11 NOTE — PROGRESS NOTES
"[facility]   Physical Therapy Treatment Note      Name: Zeenat Mancia  Clinic Number: [id number]    Therapy Diagnosis:   Encounter Diagnosis   Name Primary?    Impaired functional mobility, balance, gait, and endurance Yes     Physician: Doris Moreno MD    Visit Date: 3/11/2024    Physician Orders: PT Eval and Treat   Medical Diagnosis from Referral:   Diagnosis   Z74.09 (ICD-10-CM) - Impaired functional mobility, balance, gait, and endurance   G20.C (ICD-10-CM) - Parkinsonism, unspecified Parkinsonism type      Evaluation Date: 2/29/2024  Authorization Period Expiration: 12/31/2024  Plan of Care Expiration: 5/3/2024  Progress Note Due: 4/5/204  Visit # / Visits authorized: 2/20 + eval  FOTO: 3/5     Precautions: Standard, Fall     Time In: 10:32AM  Time Out: 11:15AM  Total Billable Time: 43 minutes (2TA + 1TE)    PTA Visit #: 0/5     Subjective     Patient reports: Officially diagnosed with Parkinson's since last seen in PT. Not consistently performing walking program prescribed at last visit.  He  was not  compliant with home exercise program.  Response to previous treatment: Soreness   Functional change: Now taking Sinemet     Pain: 0/10  Location: left thigh    Objective      Objective Measures updated at progress report unless specified.     Treatment     C J received the treatments listed below:      therapeutic exercises to develop strength and endurance for 13 minutes including:  - lower trunk rotations with 5" holds x 3 minutes total  - open books x 2 minutes each side with 10" holds   - HEP reinforcement     neuromuscular re-education activities to improve: Balance, Coordination, Kinesthetic, Sense, and Proprioception for 00 minutes. The following activities were included:  Not performed today    therapeutic activities to improve functional performance for 30 minutes, including:  - Sit to stands from elevated mat 3x5  - Bed mobility technique with cues for limb management and " ergonomics for hooklying scooting and supine > seated transition (completed independently but needed extra time)  - Gym lap x 150' feet with rolling walker  - Standing chest press with 3.3# medicine ball 2x10    Patient Education and Home Exercises       Education provided:   - Increase activity level in home setting    Written Home Exercises Provided: No formal program given thus far; patient instructed on basic walking program to improve standing tolerance     Assessment     CJ completed visit without adverse event but general debility does limit overall exercise tolerance. Good response to cues to improve strategy for sit to stands and limit push off with posterior bilateral lower extremity. He does require frequent cuing for upright stance/posterior chain engagement during all standing activity. HEP heavily reinforced to improve functional carryover and to maximize PT benefits.    TICO ZARATE Is progressing well towards his goals.   Patient prognosis is Fair.     Patient will continue to benefit from skilled outpatient physical therapy to address the deficits listed in the problem list box on initial evaluation, provide pt/family education and to maximize pt's level of independence in the home and community environment.     Patient's spiritual, cultural and educational needs considered and pt agreeable to plan of care and goals.     Anticipated barriers to physical therapy: decreased activity tolerance, neuropathic pain, fear of falling     Goals:     Short term goals: Pt will accomplish the following in 4 weeks; agrees to goals set:  1. Pt will perform LSVT BIG daily maximal exercises with supervision to improve carryover of BIG movements into daily life. (Progressing, not met)  2. Pt will perform nu-step at level 4 x 7 minutes without rests breaks going at least 50 step/min or greater. (Progressing, not met)  3. Pt will performed sit to stand x 5 B UE support in <23 seconds without LOB backward or posterior LE hooking  for functional and safe transfers. (Progressing, not met)     Long term goals: 8 weeks, pt agrees to goals set  1. Pt will be able to perform TUG in 16 secs without use of AD demonstrating overall improved functional mobility. (Progressing, not met)  2. Pt will performed sit to stand x 5 B UE support in  <20 seconds without LOB backward or posterior LE hooking for functional and safe transfers. (Progressing, not met)  3.  Pt will score greater than or equal to 41/56 on the Gordon test/assessment without use of AD demonstrating overall improved functional mobility and balance and reduce fall risk. (Progressing, not met)  4.  Pt will walk < than or equal to 0.7 faster on the SSWS test for increased gait speed to keep up with wife when walking in community [Two-Minute Walk Test at first follow up = 130 feet which = 0.33 meters/second]. (Progressing, not met)  5. Pt will have 0 falls from start of PT sessions. (Progressing, not met)  6. Pt will perform floor to stand f/b stand to floor transfer B UE support with stand by assist and no cues for improved dynamic transfer, core stability and fall safety in home and community. (Progressing, not met)  7. Pt will begin some form of community fitness to begin regular and consistent performance of exercise to continue maintenance of gains made in PT. (Progressing, not met)       Plan     Standing tolerance  Stepper up to 6 mintues  Seated, supine, and standing strengthening  Flexibility training  Low-mod level balance     JENISE CLAYTON, PT

## 2024-03-14 ENCOUNTER — CLINICAL SUPPORT (OUTPATIENT)
Dept: REHABILITATION | Facility: HOSPITAL | Age: 74
End: 2024-03-14
Payer: MEDICARE

## 2024-03-14 DIAGNOSIS — Z74.09 IMPAIRED FUNCTIONAL MOBILITY, BALANCE, GAIT, AND ENDURANCE: Primary | ICD-10-CM

## 2024-03-14 PROCEDURE — 97530 THERAPEUTIC ACTIVITIES: CPT | Mod: PN,CQ

## 2024-03-14 PROCEDURE — 97110 THERAPEUTIC EXERCISES: CPT | Mod: PN,CQ

## 2024-03-14 NOTE — PROGRESS NOTES
"[facility]   Physical Therapy Treatment Note      Name: Zeenat Mancia  Clinic Number: [id number]    Therapy Diagnosis:   Encounter Diagnosis   Name Primary?    Impaired functional mobility, balance, gait, and endurance Yes     Physician: Doris Moreno MD    Visit Date: 3/14/2024    Physician Orders: PT Eval and Treat   Medical Diagnosis from Referral:   Diagnosis   Z74.09 (ICD-10-CM) - Impaired functional mobility, balance, gait, and endurance   G20.C (ICD-10-CM) - Parkinsonism, unspecified Parkinsonism type      Evaluation Date: 2/29/2024  Authorization Period Expiration: 12/31/2024  Plan of Care Expiration: 5/3/2024  Progress Note Due: 4/5/204  Visit # / Visits authorized: 3/20 + eval  FOTO: 3/5     Precautions: Standard, Fall     Time In: 10:30 AM  Time Out: 11:15 AM  Total Billable Time: 45 minutes (1TA + 2TE)    PTA Visit #: 1/5     Subjective     Patient reports: High levels of pain when he woke this morning and cannot tolerate much standing or walking today.  Wanted to cancel but figured "something is better than nothing"  He  was not  compliant with home exercise program.  Response to previous treatment: Soreness   Functional change: Now taking Sinemet     Pain: 7/10  Location: left thigh    Objective      Objective Measures updated at progress report unless specified.     Treatment     C J received the treatments listed below:      therapeutic exercises to develop strength and endurance for 35 minutes including:  - lower trunk rotations with 5" holds x 3 minutes total  - open books x 2 minutes each side with 10" holds  - hip adduction 5" squeezes for 2 minutes  - hip abduction with red theraband 2x10  - DKTC with exercise ball 2x10   - LAQ 3x10 with 3# ankle weights  - Seated ball roll outs x10 with 5" holds  - Seated hamstring/gastroc stretch on stool with EZ strap: 2x45" B      neuromuscular re-education activities to improve: Balance, Coordination, Kinesthetic, Sense, and " "Proprioception for 00 minutes. The following activities were included:  Not performed today    therapeutic activities to improve functional performance for 10 minutes, including:  - Sit to stands from elevated mat 3x5  - Standing marches with walker 4 trials of 15-20"  - Bed mobility technique with cues for limb management and ergonomics for hooklying scooting and supine > seated transition (completed independently but needed extra time)  - Gym lap x 150' feet with rolling walker NP today  - Standing chest press with 3.3# medicine ball 2x10 NP today    Patient Education and Home Exercises       Education provided:   - Increase activity level in home setting    Written Home Exercises Provided: No formal program given thus far; patient instructed on basic walking program to improve standing tolerance     Assessment     CJ arrived to session with high levels of resting left thigh pain and high levels of fatigue but was agreeable to a limited session.  He was unable to tolerate much standing this session so mat based general lower extremity stretching and strengthening completed instead.  Poor activity tolerance with patient unable to complete full sets of majority of exercises with need for multiple short breaks throughout session.  Declined any walking this session with patient only able to take a few steps at a time without having to take a standing rest break to catch his breath.  Still not completing walking program or suggestions for increased activity at home so progress in treatment will be limited. He may benefit from continued PT services with regular HEP compliance for better tolerance to treatment.       TICO ZARATE Is progressing well towards his goals.   Patient prognosis is Fair.     Patient will continue to benefit from skilled outpatient physical therapy to address the deficits listed in the problem list box on initial evaluation, provide pt/family education and to maximize pt's level of independence in the " home and community environment.     Patient's spiritual, cultural and educational needs considered and pt agreeable to plan of care and goals.     Anticipated barriers to physical therapy: decreased activity tolerance, neuropathic pain, fear of falling     Goals:     Short term goals: Pt will accomplish the following in 4 weeks; agrees to goals set:  1. Pt will perform LSVT BIG daily maximal exercises with supervision to improve carryover of BIG movements into daily life. (Progressing, not met)  2. Pt will perform nu-step at level 4 x 7 minutes without rests breaks going at least 50 step/min or greater. (Progressing, not met)  3. Pt will performed sit to stand x 5 B UE support in <23 seconds without LOB backward or posterior LE hooking for functional and safe transfers. (Progressing, not met)     Long term goals: 8 weeks, pt agrees to goals set  1. Pt will be able to perform TUG in 16 secs without use of AD demonstrating overall improved functional mobility. (Progressing, not met)  2. Pt will performed sit to stand x 5 B UE support in  <20 seconds without LOB backward or posterior LE hooking for functional and safe transfers. (Progressing, not met)  3.  Pt will score greater than or equal to 41/56 on the Gordon test/assessment without use of AD demonstrating overall improved functional mobility and balance and reduce fall risk. (Progressing, not met)  4.  Pt will walk < than or equal to 0.7 faster on the SSWS test for increased gait speed to keep up with wife when walking in community [Two-Minute Walk Test at first follow up = 130 feet which = 0.33 meters/second]. (Progressing, not met)  5. Pt will have 0 falls from start of PT sessions. (Progressing, not met)  6. Pt will perform floor to stand f/b stand to floor transfer B UE support with stand by assist and no cues for improved dynamic transfer, core stability and fall safety in home and community. (Progressing, not met)  7. Pt will begin some form of community  fitness to begin regular and consistent performance of exercise to continue maintenance of gains made in PT. (Progressing, not met)       Plan     Standing tolerance  Stepper up to 6 mintues  Seated, supine, and standing strengthening  Flexibility training  Low-mod level balance     Luciana Antonio, PTA

## 2024-03-19 ENCOUNTER — OFFICE VISIT (OUTPATIENT)
Dept: PRIMARY CARE CLINIC | Facility: CLINIC | Age: 74
End: 2024-03-19
Payer: MEDICARE

## 2024-03-19 VITALS
DIASTOLIC BLOOD PRESSURE: 80 MMHG | BODY MASS INDEX: 40.43 KG/M2 | HEIGHT: 74 IN | SYSTOLIC BLOOD PRESSURE: 120 MMHG | RESPIRATION RATE: 18 BRPM | WEIGHT: 315 LBS | TEMPERATURE: 98 F

## 2024-03-19 DIAGNOSIS — G47.33 OSA (OBSTRUCTIVE SLEEP APNEA): ICD-10-CM

## 2024-03-19 DIAGNOSIS — I48.11 LONGSTANDING PERSISTENT ATRIAL FIBRILLATION: ICD-10-CM

## 2024-03-19 DIAGNOSIS — T46.6X5A STATIN-INDUCED MYOSITIS: ICD-10-CM

## 2024-03-19 DIAGNOSIS — I11.9 HYPERTENSIVE HEART DISEASE WITHOUT HEART FAILURE: ICD-10-CM

## 2024-03-19 DIAGNOSIS — I70.0 AORTIC ATHEROSCLEROSIS: ICD-10-CM

## 2024-03-19 DIAGNOSIS — N40.1 BENIGN NON-NODULAR PROSTATIC HYPERPLASIA WITH LOWER URINARY TRACT SYMPTOMS: ICD-10-CM

## 2024-03-19 DIAGNOSIS — I25.10 NONOBSTRUCTIVE ATHEROSCLEROSIS OF CORONARY ARTERY: ICD-10-CM

## 2024-03-19 DIAGNOSIS — G62.9 NEUROPATHY: Primary | ICD-10-CM

## 2024-03-19 DIAGNOSIS — G47.09 OTHER INSOMNIA: ICD-10-CM

## 2024-03-19 DIAGNOSIS — M60.9 STATIN-INDUCED MYOSITIS: ICD-10-CM

## 2024-03-19 PROCEDURE — 99999 PR PBB SHADOW E&M-EST. PATIENT-LVL IV: CPT | Mod: PBBFAC,,, | Performed by: INTERNAL MEDICINE

## 2024-03-19 PROCEDURE — 99214 OFFICE O/P EST MOD 30 MIN: CPT | Mod: S$GLB,,, | Performed by: INTERNAL MEDICINE

## 2024-03-19 RX ORDER — ARIPIPRAZOLE 2 MG/1
2 TABLET ORAL EVERY MORNING
COMMUNITY
Start: 2024-02-21

## 2024-03-19 RX ORDER — FLUOXETINE 10 MG/1
10 TABLET ORAL
COMMUNITY
Start: 2024-02-13 | End: 2024-03-19

## 2024-03-19 NOTE — PROGRESS NOTES
[facility] Note    Chief Complaint      Chief Complaint   Patient presents with    Follow-up     6m       History of Present Illness      Zeenat Mancia is a 73 y.o. male who presents today for   Chief Complaint   Patient presents with    Follow-up     6m   .  Patient comes to appointment here for 6m checkup for chronic issues as below . He has been diagnosed recently with parkinson's disease  Is being managed with neurology . His main issue is with neuropathy and insomnia   HPI    No problem-specific Assessment & Plan notes found for this encounter.       Problem List Items Addressed This Visit          Neuro    Neuropathy - Primary    Overview     Stable on his current regimen of lyrica and duloxetine            Cardiac/Vascular    Hypertensive heart disease without heart failure    Overview     bp well controlled on current regimen .         Nonobstructive atherosclerosis of coronary artery    Overview     Cont b blocker , eliqus , and is not on statin due to intolerance         Longstanding persistent atrial fibrillation    Overview     Cont per cardiology          Aortic atherosclerosis    Overview     Stable on statin             Renal/    Benign non-nodular prostatic hyperplasia with lower urinary tract symptoms    Overview     Dr calhoun managing . Has had urolift . Flomax            Other    JACKELYN (obstructive sleep apnea)    Overview     Utilizing  cpap with good benefit          Other insomnia    Overview     Has been switched to trazadone and it seems to be working well . Will continue         Statin-induced myositis    Overview     Unable to tolerate statin              Past Medical History:  Past Medical History:   Diagnosis Date    A-fib     Acute blood loss anemia     Arthritis     Atrial flutter     Colon polyp     Diverticulosis     Dyslipidemia     Dyspnea on exertion 5/31/2021    GERD (gastroesophageal reflux disease)     on Protonix    Hepatic steatosis      Hyperlipidemia     Hypertension     Irregular heart beat     Lower GI bleed 5/31/2021    Multiple gastric ulcers 05/16/2018    gi scope per Dr. Houston    Obesity     Sleep apnea     uses CPAP    Unspecified disorder of kidney and ureter     kidney stones       Past Surgical History:  Past Surgical History:   Procedure Laterality Date    ADENOIDECTOMY  1958    APPENDECTOMY      CATARACT EXTRACTION  2009    OS    CATARACT EXTRACTION W/  INTRAOCULAR LENS IMPLANT Right 06/03/2015    [id number] 14.5d//    CHOLECYSTECTOMY  2021    COLONOSCOPY N/A 10/10/2017    Procedure: COLONOSCOPY;  Surgeon: Jameson Houston MD;  Location: [facility] ENDO;  Service: Endoscopy;  Laterality: N/A; repeat in 5 years for surveillance    COLONOSCOPY N/A 05/16/2018    Procedure: COLONOSCOPY;  Surgeon: Jameson Houston MD;  Location: [facility];  Service: Endoscopy;  Laterality: N/A;    COLONOSCOPY N/A 06/02/2021    Procedure: COLONOSCOPY;  Surgeon: Agustin Corona MD;  [facility]  Service: Endoscopy;  Laterality: N/A;    COLONOSCOPY N/A 10/11/2022    Procedure: COLONOSCOPY;  Surgeon: Edin Lara MD;  Location: [facility] ([facility] FLR);  Service: Endoscopy;  Laterality: N/A;  2nd floor-difficult intubation  ok to hold eliquis x2 days per Dr. Jerome, see telephone encounter 6/28-Kpvt  fully vaccinated, instructions sent to [facility] [place]  extended miralax prep  Clear liquids up to 2 hrs prior/ AM prep [id number]    CORONARY ANGIOGRAPHY N/A 01/04/2021    Procedure: ANGIOGRAM, CORONARY ARTERY;  Surgeon: Jase Moore MD;  Location: [facility] CATH LAB/EP;  Service: Cardiology;  Laterality: N/A;    CYSTOSCOPY WITH INSERTION OF MINIMALLY INVASIVE IMPLANT TO ENLARGE PROSTATIC URETHRA N/A 06/29/2021    Procedure: TRANSPROSTATIC TISSUE RETRACTION;  Surgeon: Agustin Clay MD;  Location: [facility] OR;  Service: Urology;  Laterality: N/A;    ESOPHAGOGASTRODUODENOSCOPY N/A 07/02/2018    Procedure: EGD (ESOPHAGOGASTRODUODENOSCOPY);   Surgeon: Jameson Houston MD;  Location: [facility] ENDO;  Service: Endoscopy;  Laterality: N/A;    ESOPHAGOGASTRODUODENOSCOPY N/A 2021    Procedure: EGD (ESOPHAGOGASTRODUODENOSCOPY);  Surgeon: Agustin Corona MD;  Location: [facility] ENDO (2ND FLR);  Service: Endoscopy;  Laterality: N/A;    EYE SURGERY      cataract    LEFT HEART CATHETERIZATION N/A 2021    Procedure: Left heart cath;  Surgeon: Jase Moore MD;  Location: [facility] CATH LAB/EP;  Service: Cardiology;  Laterality: N/A;    ROBOT-ASSISTED CHOLECYSTECTOMY N/A 2021    Procedure: ROBOTIC CHOLECYSTECTOMY;  Surgeon: Pal Cheney MD;  Location: [facility] OR;  Service: General;  Laterality: N/A;    TONSILLECTOMY      TRANSESOPHAGEAL ECHOCARDIOGRAPHY N/A 2021    Procedure: ECHOCARDIOGRAM, TRANSESOPHAGEAL;  Surgeon: Leo Reeves III, MD;  Location: [facility] EP LAB;  Service: Cardiology;  Laterality: N/A;    TREATMENT OF CARDIAC ARRHYTHMIA N/A 2021    Procedure: Cardioversion or Defibrillation;  Surgeon: Judy Jerome MD;  Location: [facility] CATH LAB/EP;  Service: Cardiology;  Laterality: N/A;    TREATMENT OF CARDIAC ARRHYTHMIA N/A 2021    Procedure: CARDIOVERSION;  Surgeon: PEMA Downs MD;  Location: [facility] EP LAB;  Service: Cardiology;  Laterality: N/A;  AF, PHUONG, DCCV, MAC, DM, 3 PREP    UPPER GASTROINTESTINAL ENDOSCOPY  2018    Dr. Houston       Family History:  family history includes Arthritis in his mother; Blindness in his mother; Cataracts in his father; Coronary artery disease in his father; Heart disease in his father and mother.     Social History:  Social History     Socioeconomic History    Marital status:     Number of children: 3   Tobacco Use    Smoking status: Former     Current packs/day: 0.00     Average packs/day: 2.0 packs/day for 11.0 years (22.0 ttl pk-yrs)     Types: Cigarettes     Start date: 1969     Quit date: 1980     Years since quittin.2    Smokeless tobacco: Never     Tobacco comments:     quit in 1980s    Substance and Sexual Activity    Alcohol use: Not Currently     Alcohol/week: 2.0 standard drinks of alcohol     Types: 1 Glasses of wine, 1 Cans of beer per week     Comment: 1 beer every 2 month    Drug use: No    Sexual activity: Not Currently     Partners: Female   Social History Narrative    Patient is originally from [place]         [employer] [place]        Working ; retired,  plants            47 years         Children    Son x3         Lives with  Kerri [employer]    Updates    9/8/20- moved from [facility]       Social Determinants of Health     Financial Resource Strain: Low Risk  (10/26/2023)    Overall Financial Resource Strain (CARDIA)     Difficulty of Paying Living Expenses: Not very hard   Food Insecurity: No Food Insecurity (10/26/2023)    Hunger Vital Sign     Worried About Running Out of Food in the Last Year: Never true     Ran Out of Food in the Last Year: Never true   Transportation Needs: Unmet Transportation Needs (10/26/2023)    PRAPARE - Transportation     Lack of Transportation (Medical): Yes     Lack of Transportation (Non-Medical): No   Physical Activity: Unknown (10/26/2023)    Exercise Vital Sign     Days of Exercise per Week: 0 days   Stress: Stress Concern Present (10/26/2023)    [employer] [employer] [employer] - Occupational Stress Questionnaire     Feeling of Stress : Rather much   Social Connections: Unknown (10/26/2023)    Social Connection and Isolation Panel [NHANES]     Frequency of Communication with Friends and Family: Once a week     Frequency of Social Gatherings with Friends and Family: Once a week     Active Member of Clubs or Organizations: No     Attends Club or Organization Meetings: Never     Marital Status:    Housing Stability: Unknown (10/26/2023)    Housing Stability Vital Sign     Unable to Pay for Housing in the Last Year: No     Unstable  Housing in the Last Year: No       Review of Systems:   Review of Systems   Constitutional:  Negative for fever and weight loss.   HENT:  Negative for congestion, hearing loss and sore throat.    Eyes:  Negative for blurred vision.   Respiratory:  Negative for cough and shortness of breath.    Cardiovascular:  Negative for chest pain, palpitations, claudication and leg swelling.   Gastrointestinal:  Negative for abdominal pain, constipation, diarrhea and heartburn.   Genitourinary:  Positive for frequency. Negative for dysuria.   Musculoskeletal:  Positive for back pain. Negative for myalgias.   Skin:  Negative for rash.   Neurological:  Positive for tingling and tremors. Negative for focal weakness.   Psychiatric/Behavioral:  Negative for depression and suicidal ideas. The patient has insomnia. The patient is not nervous/anxious.         Medications:  Outpatient Encounter Medications as of 3/19/2024   Medication Sig Dispense Refill    amLODIPine (NORVASC) 5 MG tablet TAKE 1 TABLET(5 MG) BY MOUTH EVERY DAY 90 tablet 3    apixaban (ELIQUIS) 5 mg Tab Take 1 tablet (5 mg total) by mouth 2 (two) times daily. 60 tablet 11    ARIPiprazole (ABILIFY) 2 MG Tab Take 2 mg by mouth every morning.      atenoloL (TENORMIN) 50 MG tablet TAKE 1 TABLET(50 MG) BY MOUTH EVERY DAY 90 tablet 3    carbidopa-levodopa  mg (SINEMET)  mg per tablet Start taking one tablet in the mornings for 1 week, then if tolerated increase to once in the morning and once in the evening for the 2nd week, if tolerating well you can increase to three times daily. 90 tablet 11    DULoxetine (CYMBALTA) 60 MG capsule TAKE 2 CAPSULES BY MOUTH ONCE DAILY 180 capsule 0    furosemide (LASIX) 20 MG tablet TAKE 1 TABLET BY MOUTH DAILY AS NEEDED FOR SWELLING 30 tablet 0    melatonin 10 mg Tab Take 10 mg by mouth every evening.      multivit-min/FA/lycopen/lutein (CENTRUM SILVER MEN ORAL) Take by mouth once daily.      pantoprazole (PROTONIX) 40 MG tablet  TAKE 1 TABLET(40 MG) BY MOUTH EVERY DAY 90 tablet 2    pregabalin (LYRICA) 150 MG capsule TAKE 1 CAPSULE(150 MG) BY MOUTH TWICE DAILY 60 capsule 1    pregabalin (LYRICA) 50 MG capsule Take one capsule (50 mg) in addition to the 150 mg capsule for total dose of 200 mg twice daily 60 capsule 6    sildenafiL (VIAGRA) 100 MG tablet Take 1 tablet (100 mg total) by mouth daily as needed for Erectile Dysfunction. 10 tablet 3    tadalafiL (CIALIS) 20 MG Tab Take 1 tablet (20 mg total) by mouth every 72 hours as needed (ED). 10 tablet 11    tamsulosin (FLOMAX) 0.4 mg Cap TAKE 1 CAPSULE BY MOUTH EVERY DAY 30 capsule 11    traZODone (DESYREL) 100 MG tablet Take 1 tablet (100 mg total) by mouth every evening. 30 tablet 2    [DISCONTINUED] FLUoxetine 10 MG Tab Take 10 mg by mouth.      [] traMADoL (ULTRAM) 50 mg tablet Take 1 tablet (50 mg total) by mouth every 12 (twelve) hours as needed for Pain. 28 tablet 0    [DISCONTINUED] DULoxetine (CYMBALTA) 60 MG capsule TAKE 2 CAPSULES BY MOUTH ONCE DAILY (Patient taking differently: Take 120 mg by mouth once daily. Takes it sometimes once a day, makes him really sleepy, takes it in the afternoon) 180 capsule 0    [DISCONTINUED] hydroCHLOROthiazide (HYDRODIURIL) 25 MG tablet TAKE 1 TABLET(25 MG) BY MOUTH EVERY DAY (Patient not taking: Reported on 3/19/2024) 90 tablet 3    [DISCONTINUED] pregabalin (LYRICA) 150 MG capsule Take 1 capsule (150 mg total) by mouth 2 (two) times daily. 60 capsule 0    [DISCONTINUED] tamsulosin (FLOMAX) 0.4 mg Cap TAKE 1 CAPSULE(0.4 MG) BY MOUTH EVERY DAY 30 capsule 11    [DISCONTINUED] valsartan (DIOVAN) 320 MG tablet TAKE 1 TABLET(320 MG) BY MOUTH EVERY DAY 90 tablet 3    [DISCONTINUED] valsartan (DIOVAN) 320 MG tablet TAKE 1 TABLET(320 MG) BY MOUTH EVERY DAY (Patient not taking: Reported on 3/19/2024) 90 tablet 3     Facility-Administered Encounter Medications as of 3/19/2024   Medication Dose Route Frequency Provider Last Rate Last Admin     "capsaicin-skin cleanser patch 2 patch  2 patch Topical (Top) 1 time in Clinic/HOD Maryemma Rafael G., FNP        perflutren protein-A microsphr 0.22 mg/mL IV susp  0.5 mL Intravenous Once Mauri Portillo MD           Allergies:  Review of patient's allergies indicates:   Allergen Reactions    Lipitor [atorvastatin] Itching         Physical Exam      Vitals:    03/19/24 1032   BP: 120/80   Resp: 18   Temp: 98 °F (36.7 °C)        Vital Signs  Temp: 98 °F (36.7 °C)  Temp Source: Oral  Resp: 18  BP: 120/80  BP Location: Right arm  Patient Position: Sitting  Pain Score: 0-No pain  Height and Weight  Height: 6' 2" (188 cm)  Weight: (!) 149.6 kg (329 lb 12.9 oz)  BSA (Calculated - sq m): 2.79 sq meters  BMI (Calculated): 42.3  Weight in (lb) to have BMI = 25: 194.3]     Body mass index is 42.34 kg/m².    Physical Exam  Constitutional:       Appearance: He is well-developed.   HENT:      Head: Normocephalic.   Eyes:      Pupils: Pupils are equal, round, and reactive to light.   Neck:      Thyroid: No thyromegaly.   Cardiovascular:      Rate and Rhythm: Normal rate. Rhythm irregularly irregular.      Heart sounds: No murmur heard.     No friction rub. No gallop.   Pulmonary:      Effort: Pulmonary effort is normal.      Breath sounds: Normal breath sounds.   Abdominal:      General: Bowel sounds are normal.      Palpations: Abdomen is soft.   Musculoskeletal:         General: Normal range of motion.      Cervical back: Normal range of motion.   Skin:     General: Skin is warm and dry.   Neurological:      Mental Status: He is alert and oriented to person, place, and time.      Sensory: No sensory deficit.   Psychiatric:         Behavior: Behavior normal.          Laboratory:  CBC:  No results for input(s): "WBC", "RBC", "HGB", "HCT", "PLT", "MCV", "MCH", "MCHC" in the last 2160 hours.  CMP:  No results for input(s): "GLU", "CALCIUM", "ALBUMIN", "PROT", "NA", "K", "CO2", "CL", "BUN", "ALKPHOS", "ALT", "AST", "BILITOT" in the " "last 2160 hours.    Invalid input(s): "CREATININ"  URINALYSIS:  No results for input(s): "COLORU", "CLARITYU", "SPECGRAV", "PHUR", "PROTEINUA", "GLUCOSEU", "BILIRUBINCON", "BLOODU", "WBCU", "RBCU", "BACTERIA", "MUCUS", "NITRITE", "LEUKOCYTESUR", "UROBILINOGEN", "HYALINECASTS" in the last 2160 hours.   LIPIDS:  No results for input(s): "TSH", "HDL", "CHOL", "TRIG", "LDLCALC", "CHOLHDL", "NONHDLCHOL", "TOTALCHOLEST" in the last 2160 hours.  TSH:  No results for input(s): "TSH" in the last 2160 hours.  A1C:  Recent Labs   Lab Result Units 02/20/24  1133   Hemoglobin A1C % 5.6       Radiology:        Assessment:     Zeenat Mancia is a 73 y.o.male with:    Neuropathy    Longstanding persistent atrial fibrillation    Aortic atherosclerosis    JACKELYN (obstructive sleep apnea)    Nonobstructive atherosclerosis of coronary artery    Statin-induced myositis    Other insomnia    Benign non-nodular prostatic hyperplasia with lower urinary tract symptoms    Hypertensive heart disease without heart failure                Plan:     Problem List Items Addressed This Visit          Neuro    Neuropathy - Primary    Overview     Stable on his current regimen of lyrica and duloxetine            Cardiac/Vascular    Hypertensive heart disease without heart failure    Overview     bp well controlled on current regimen .         Nonobstructive atherosclerosis of coronary artery    Overview     Cont b blocker , eliqus , and is not on statin due to intolerance         Longstanding persistent atrial fibrillation    Overview     Cont per cardiology          Aortic atherosclerosis    Overview     Stable on statin             Renal/    Benign non-nodular prostatic hyperplasia with lower urinary tract symptoms    Overview     Dr calhoun managing . Has had urolift . Flomax            Other    JACKELYN (obstructive sleep apnea)    Overview     Utilizing  cpap with good benefit          Other insomnia    Overview     Has been switched to trazadone and it " seems to be working well . Will continue         Statin-induced myositis    Overview     Unable to tolerate statin             As above, continue current medications and maintain follow up with specialists.  Return to clinic in 6 months.  '  Frederick W Dantagnan [facility] [place] [place]

## 2024-03-21 ENCOUNTER — CLINICAL SUPPORT (OUTPATIENT)
Dept: REHABILITATION | Facility: HOSPITAL | Age: 74
End: 2024-03-21
Payer: MEDICARE

## 2024-03-21 DIAGNOSIS — Z74.09 IMPAIRED FUNCTIONAL MOBILITY, BALANCE, GAIT, AND ENDURANCE: Primary | ICD-10-CM

## 2024-03-21 PROCEDURE — 97530 THERAPEUTIC ACTIVITIES: CPT | Mod: PN

## 2024-03-21 PROCEDURE — 97110 THERAPEUTIC EXERCISES: CPT | Mod: PN

## 2024-03-21 NOTE — PROGRESS NOTES
"[facility]   Physical Therapy Treatment Note      Name: Zeenat Mancia  Clinic Number: [id number]    Therapy Diagnosis:   Encounter Diagnosis   Name Primary?    Impaired functional mobility, balance, gait, and endurance Yes       Physician: Doris Moreno MD    Visit Date: 3/21/2024    Physician Orders: PT Eval and Treat   Medical Diagnosis from Referral:   Diagnosis   Z74.09 (ICD-10-CM) - Impaired functional mobility, balance, gait, and endurance   G20.C (ICD-10-CM) - Parkinsonism, unspecified Parkinsonism type      Evaluation Date: 2/29/2024  Authorization Period Expiration: 12/31/2024  Plan of Care Expiration: 5/3/2024  Progress Note Due: 4/5/204  Visit # / Visits authorized: 4/20 + eval  FOTO: 4/5     Precautions: Standard, Fall     Time In: 1300  Time Out: 1345  Total Billable Time: 45 minutes (1TA + 2TE)    PTA Visit #: 1/5     Subjective     Patient reports: States he has a hard time staying steady on his feet. Notes feeling dizzy/lightheaded at times and is scared to walk around too much. He reports he has discussed this with his physicians already and has cardiology appointment coming soon.   He  was not  compliant with home exercise program.   Response to previous treatment: Soreness   Functional change: Now taking Sinemet     Pain: 4-5/10    Location: bilateral feet related to neuropathy - stinging pins/numbness     Objective      Objective Measures updated at progress report unless specified.     Treatment     C J received the treatments listed below:      therapeutic exercises to develop strength and endurance for 25 minutes including:  -discussed and educated on techniques to minimize any potential hypotension including lower extremity activities x 1 min prior to standing  -seated hip flexion marches x 30"  -seated alternating LAQ x30"    - lower trunk rotations with 5" holds x 2 minutes total  - open books x 2 minutes each side with 10" holds  - hip abduction with red " "theraband 2x10    - Seated hamstring/gastroc stretch on stool with EZ strap: 2x30" B      neuromuscular re-education activities to improve: Balance, Coordination, Kinesthetic, Sense, and Proprioception for 00 minutes. The following activities were included:  Not performed today    therapeutic activities to improve functional performance for 20 minutes, including:  - Bed mobility technique with cues for limb management and ergonomics for hooklying scooting and supine <> seated transition (completed independently but needed extra time)  -car legs 2 x 10 bilateral (foot lift to step stool)   - Sit to stands from elevated mat 2x8 (progressed from 3x5/15 total last session - try to increase again next session)  - standing tolerance 4:50min unsupported static - then reported feeling lightheaded and needed to sit.     Patient Education and Home Exercises       Education provided:   - Increase activity level in home setting    Written Home Exercises Provided: No formal program given thus far; patient instructed on basic walking program to improve standing tolerance     Assessment     NAOMY arrived to session with no reported thigh pain like last session but reporting chronic neuropathic pain at feet. He entered with single point cane (SPC) today and required SBA/CGA entering and CGA/Daniel to leave due to fatigue. Discussed bringing his Rolling Walker (RW) next session for safety leaving sessions. He was able to increase consecutive reps and overall reps with sit<>stands but stated he could not complete a 3rd set today. Standing tolerance was 4:50min before he felt very lightheaded. We reviewed hypotension techniques to stabilize BP. If this continues, test BP in varied positions next session to confirm vs rule out suspicion of hypotension.  He continue to have very limited overall activity tolerance and is slow between tasks.  Consider recumbent stepper next session for endurance training. He would benefit from continued PT " services with regular HEP compliance for better tolerance to treatment.       TICO ZARATE Is progressing well towards his goals.   Patient prognosis is Fair.     Patient will continue to benefit from skilled outpatient physical therapy to address the deficits listed in the problem list box on initial evaluation, provide pt/family education and to maximize pt's level of independence in the home and community environment.     Patient's spiritual, cultural and educational needs considered and pt agreeable to plan of care and goals.     Anticipated barriers to physical therapy: decreased activity tolerance, neuropathic pain, fear of falling     Goals:     Short term goals: Pt will accomplish the following in 4 weeks; agrees to goals set:  1. Pt will perform LSVT BIG daily maximal exercises with supervision to improve carryover of BIG movements into daily life. (Progressing, not met)  2. Pt will perform nu-step at level 4 x 7 minutes without rests breaks going at least 50 step/min or greater. (Progressing, not met)  3. Pt will performed sit to stand x 5 B UE support in <23 seconds without LOB backward or posterior LE hooking for functional and safe transfers. (Progressing, not met)     Long term goals: 8 weeks, pt agrees to goals set  1. Pt will be able to perform TUG in 16 secs without use of AD demonstrating overall improved functional mobility. (Progressing, not met)  2. Pt will performed sit to stand x 5 B UE support in  <20 seconds without LOB backward or posterior LE hooking for functional and safe transfers. (Progressing, not met)  3.  Pt will score greater than or equal to 41/56 on the Gordon test/assessment without use of AD demonstrating overall improved functional mobility and balance and reduce fall risk. (Progressing, not met)  4.  Pt will walk < than or equal to 0.7 faster on the SSWS test for increased gait speed to keep up with wife when walking in community [Two-Minute Walk Test at first follow up = 130 feet which  = 0.33 meters/second]. (Progressing, not met)  5. Pt will have 0 falls from start of PT sessions. (Progressing, not met)  6. Pt will perform floor to stand f/b stand to floor transfer B UE support with stand by assist and no cues for improved dynamic transfer, core stability and fall safety in home and community. (Progressing, not met)  7. Pt will begin some form of community fitness to begin regular and consistent performance of exercise to continue maintenance of gains made in PT. (Progressing, not met)       Plan     Standing tolerance  Stepper up to 6 mintues  Seated, supine, and standing strengthening  Flexibility training  Low-mod level balance     Sangita Don, PT

## 2024-03-22 ENCOUNTER — PATIENT MESSAGE (OUTPATIENT)
Dept: NEUROLOGY | Facility: CLINIC | Age: 74
End: 2024-03-22
Payer: MEDICARE

## 2024-03-26 ENCOUNTER — CLINICAL SUPPORT (OUTPATIENT)
Dept: REHABILITATION | Facility: HOSPITAL | Age: 74
End: 2024-03-26
Payer: MEDICARE

## 2024-03-26 DIAGNOSIS — Z74.09 IMPAIRED FUNCTIONAL MOBILITY, BALANCE, GAIT, AND ENDURANCE: Primary | ICD-10-CM

## 2024-03-26 PROCEDURE — 97530 THERAPEUTIC ACTIVITIES: CPT | Mod: PN,CQ

## 2024-03-26 PROCEDURE — 97110 THERAPEUTIC EXERCISES: CPT | Mod: PN,CQ

## 2024-03-26 NOTE — PROGRESS NOTES
"[facility]   Physical Therapy Treatment Note      Name: Zeenat Mancia  Clinic Number: [id number]    Therapy Diagnosis:   Encounter Diagnosis   Name Primary?    Impaired functional mobility, balance, gait, and endurance Yes       Physician: Doris Moreno MD    Visit Date: 3/26/2024    Physician Orders: PT Eval and Treat   Medical Diagnosis from Referral:   Diagnosis   Z74.09 (ICD-10-CM) - Impaired functional mobility, balance, gait, and endurance   G20.C (ICD-10-CM) - Parkinsonism, unspecified Parkinsonism type      Evaluation Date: 2/29/2024  Authorization Period Expiration: 12/31/2024  Plan of Care Expiration: 5/3/2024  Progress Note Due: 4/5/204  Visit # / Visits authorized: 5/20 + eval  FOTO: 5/5     Precautions: Standard, Fall     Time In: 2:30 PM  Time Out: 3:15 PM  Total Billable Time: 45 minutes (1TA + 2TE)    PTA Visit #: 1/5     Subjective     Patient reports: feet are bothering him today, says it is his biggest limiting factor from standing and walking  He  was not  compliant with home exercise program.   Response to previous treatment: Soreness   Functional change: Now taking Sinemet     Pain: 4/10    Location: bilateral feet related to neuropathy - stinging pins/numbness     Objective      Objective Measures updated at progress report unless specified.     Patient reports dizziness mid way through session, vitals taken:  /89 mmHg  88 HR      Treatment     C J received the treatments listed below:      therapeutic exercises to develop strength and endurance for 25 minutes including:  -seated hip flexion marches 2 x 30"  -seated alternating LAQ 2 x 30"    - lower trunk rotations with 5" holds x 2 minutes total  - open books x 2 minutes each side with 10" holds  - hip abduction with green theraband 2x10    - Seated hamstring/gastroc stretch on stool with EZ strap: 2x30" B      - Nustep level 3.0 single peak 5 minutes (to end session)    neuromuscular re-education " activities to improve: Balance, Coordination, Kinesthetic, Sense, and Proprioception for 00 minutes. The following activities were included:  Not performed today    therapeutic activities to improve functional performance for 20 minutes, including:  - Bed mobility technique with cues for limb management and ergonomics for hooklying scooting and supine <> seated transition (completed independently but needed extra time)  - car legs 2 x 10 bilateral (foot lift to step stool)   - Sit to stands from elevated mat 2x10 (progressed from 2x8/16 total last session - try to increase again next session)  - standing tolerance 5:40 min unsupported static - then reported legs feeling wobbly and needed to sit (4:50min previous session)     Patient Education and Home Exercises       Education provided:   - Increase activity level in home setting    Written Home Exercises Provided: No formal program given thus far; patient instructed on basic walking program to improve standing tolerance     Assessment     NAOMY arrived to session with complaints of bilateral neuropathic foot pain and high levels of fatigue but was agreeable to treatment.  He arrived ambulating with rolling walker today and appeared much more steady when entering clinic though still visibly out of breath. He was able to increase consecutive reps and overall reps with sit<>stands but stated he could not complete a 3rd set today. Standing tolerance was 5:40 min before he felt his legs were buckling, though they appeared steady.  He continues to have very limited overall activity tolerance and is slow between tasks.  Blood pressure was checked mid way through when he reported feeling dizzy and light headed though within normal limits and deemed appropriate to continue treatment.  He would benefit from continued PT services with regular HEP compliance for better tolerance to treatment.       TICO ZARATE Is progressing well towards his goals.   Patient prognosis is Fair.      Patient will continue to benefit from skilled outpatient physical therapy to address the deficits listed in the problem list box on initial evaluation, provide pt/family education and to maximize pt's level of independence in the home and community environment.     Patient's spiritual, cultural and educational needs considered and pt agreeable to plan of care and goals.     Anticipated barriers to physical therapy: decreased activity tolerance, neuropathic pain, fear of falling     Goals:     Short term goals: Pt will accomplish the following in 4 weeks; agrees to goals set:  1. Pt will perform LSVT BIG daily maximal exercises with supervision to improve carryover of BIG movements into daily life. (Progressing, not met)  2. Pt will perform nu-step at level 4 x 7 minutes without rests breaks going at least 50 step/min or greater. (Progressing, not met)  3. Pt will performed sit to stand x 5 B UE support in <23 seconds without LOB backward or posterior LE hooking for functional and safe transfers. (Progressing, not met)     Long term goals: 8 weeks, pt agrees to goals set  1. Pt will be able to perform TUG in 16 secs without use of AD demonstrating overall improved functional mobility. (Progressing, not met)  2. Pt will performed sit to stand x 5 B UE support in  <20 seconds without LOB backward or posterior LE hooking for functional and safe transfers. (Progressing, not met)  3.  Pt will score greater than or equal to 41/56 on the Gordon test/assessment without use of AD demonstrating overall improved functional mobility and balance and reduce fall risk. (Progressing, not met)  4.  Pt will walk < than or equal to 0.7 faster on the SSWS test for increased gait speed to keep up with wife when walking in community [Two-Minute Walk Test at first follow up = 130 feet which = 0.33 meters/second]. (Progressing, not met)  5. Pt will have 0 falls from start of PT sessions. (Progressing, not met)  6. Pt will perform floor to  stand f/b stand to floor transfer B UE support with stand by assist and no cues for improved dynamic transfer, core stability and fall safety in home and community. (Progressing, not met)  7. Pt will begin some form of community fitness to begin regular and consistent performance of exercise to continue maintenance of gains made in PT. (Progressing, not met)       Plan     Standing tolerance  Stepper up to 6 mintues  Seated, supine, and standing strengthening  Flexibility training  Low-mod level balance     Luciana Antonio, PTA

## 2024-03-28 ENCOUNTER — PATIENT MESSAGE (OUTPATIENT)
Dept: PRIMARY CARE CLINIC | Facility: CLINIC | Age: 74
End: 2024-03-28
Payer: MEDICARE

## 2024-04-01 ENCOUNTER — CLINICAL SUPPORT (OUTPATIENT)
Dept: REHABILITATION | Facility: HOSPITAL | Age: 74
End: 2024-04-01
Payer: MEDICARE

## 2024-04-01 DIAGNOSIS — Z74.09 IMPAIRED FUNCTIONAL MOBILITY, BALANCE, GAIT, AND ENDURANCE: Primary | ICD-10-CM

## 2024-04-01 PROCEDURE — 97110 THERAPEUTIC EXERCISES: CPT | Mod: PN,CQ

## 2024-04-01 PROCEDURE — 97530 THERAPEUTIC ACTIVITIES: CPT | Mod: PN,CQ

## 2024-04-01 NOTE — PROGRESS NOTES
"[facility]   Physical Therapy Treatment Note      Name: Zeenat Mancia  Clinic Number: [id number]    Therapy Diagnosis:   Encounter Diagnosis   Name Primary?    Impaired functional mobility, balance, gait, and endurance Yes       Physician: Doris Moreno MD    Visit Date: 4/1/2024    Physician Orders: PT Eval and Treat   Medical Diagnosis from Referral:   Diagnosis   Z74.09 (ICD-10-CM) - Impaired functional mobility, balance, gait, and endurance   G20.C (ICD-10-CM) - Parkinsonism, unspecified Parkinsonism type      Evaluation Date: 2/29/2024  Authorization Period Expiration: 12/31/2024  Plan of Care Expiration: 5/3/2024  Progress Note Due: 4/5/204  Visit # / Visits authorized: 6/20 + eval  FOTO: 5/5     Precautions: Standard, Fall     Time In: 12:00 PM  Time Out: 12:45 PM  Total Billable Time: 45 minutes (1TA + 2TE)    PTA Visit #: 2/5     Subjective     Patient reports: left sided posterior hip and thigh pain today, no dizziness upon arrival  He  was not  compliant with home exercise program.   Response to previous treatment: Soreness   Functional change: Now taking Sinemet     Pain: 6/10    Location: left sided posterior leg    Objective      Objective Measures updated at progress report unless specified.     Treatment     C J received the treatments listed below:      therapeutic exercises to develop strength and endurance for 25 minutes including:  -seated hip flexion marches 2 x 30"  -seated alternating LAQ 2 x 30"    - lower trunk rotations with 5" holds x 2 minutes total  - open books x 2 minutes each side with 10" holds  - hip abduction with green theraband 2x10    - Seated hamstring/gastroc stretch on stool with EZ strap: 2x30" B      - Nustep level 3.0 single peak 5 minutes (to end session)    neuromuscular re-education activities to improve: Balance, Coordination, Kinesthetic, Sense, and Proprioception for 00 minutes. The following activities were included:  Not performed " today    therapeutic activities to improve functional performance for 20 minutes, including:  - Bed mobility technique with cues for limb management and ergonomics for hooklying scooting and supine <> seated transition (completed independently but needed extra time)  - car legs 2 x 10 bilateral (foot lift to step stool)   - Sit to stands from elevated mat 2x10 (progressed from 2x8/16 total last session - try to increase again next session)  - standing tolerance 7:06 unsupported static - then reported legs feeling wobbly and needed to sit (5:40min previous session)     Patient Education and Home Exercises       Education provided:   - Increase activity level in home setting    Written Home Exercises Provided: No formal program given thus far; patient instructed on basic walking program to improve standing tolerance     Assessment     CJ arrived to session with complaints of left sided posterior leg pain and high levels of fatigue but was agreeable to treatment.  He arrived ambulating with rolling walker today and appeared much more steady when entering clinic though still visibly out of breath. He was able to increase consecutive reps and overall reps with sit<>stands but stated he could not complete a 3rd set today. Standing tolerance was 7:06 min before left leg pain was exacerbated and caused him to sit.  He continues to have very limited overall activity tolerance and is slow between tasks.  No dizziness reported with any activity today including nustep.  He would benefit from continued PT services with regular HEP compliance for better tolerance to treatment.       TICO ZARATE Is progressing well towards his goals.   Patient prognosis is Fair.     Patient will continue to benefit from skilled outpatient physical therapy to address the deficits listed in the problem list box on initial evaluation, provide pt/family education and to maximize pt's level of independence in the home and community environment.     Patient's  spiritual, cultural and educational needs considered and pt agreeable to plan of care and goals.     Anticipated barriers to physical therapy: decreased activity tolerance, neuropathic pain, fear of falling     Goals:     Short term goals: Pt will accomplish the following in 4 weeks; agrees to goals set:  1. Pt will perform LSVT BIG daily maximal exercises with supervision to improve carryover of BIG movements into daily life. (Progressing, not met)  2. Pt will perform nu-step at level 4 x 7 minutes without rests breaks going at least 50 step/min or greater. (Progressing, not met)  3. Pt will performed sit to stand x 5 B UE support in <23 seconds without LOB backward or posterior LE hooking for functional and safe transfers. (Progressing, not met)     Long term goals: 8 weeks, pt agrees to goals set  1. Pt will be able to perform TUG in 16 secs without use of AD demonstrating overall improved functional mobility. (Progressing, not met)  2. Pt will performed sit to stand x 5 B UE support in  <20 seconds without LOB backward or posterior LE hooking for functional and safe transfers. (Progressing, not met)  3.  Pt will score greater than or equal to 41/56 on the Gordon test/assessment without use of AD demonstrating overall improved functional mobility and balance and reduce fall risk. (Progressing, not met)  4.  Pt will walk < than or equal to 0.7 faster on the SSWS test for increased gait speed to keep up with wife when walking in community [Two-Minute Walk Test at first follow up = 130 feet which = 0.33 meters/second]. (Progressing, not met)  5. Pt will have 0 falls from start of PT sessions. (Progressing, not met)  6. Pt will perform floor to stand f/b stand to floor transfer B UE support with stand by assist and no cues for improved dynamic transfer, core stability and fall safety in home and community. (Progressing, not met)  7. Pt will begin some form of community fitness to begin regular and consistent  performance of exercise to continue maintenance of gains made in PT. (Progressing, not met)       Plan     Standing tolerance  Stepper up to 6 mintues  Seated, supine, and standing strengthening  Flexibility training  Low-mod level balance     Luciana Antonio, PTA

## 2024-04-08 ENCOUNTER — CLINICAL SUPPORT (OUTPATIENT)
Dept: REHABILITATION | Facility: HOSPITAL | Age: 74
End: 2024-04-08
Payer: MEDICARE

## 2024-04-08 DIAGNOSIS — Z74.09 IMPAIRED FUNCTIONAL MOBILITY, BALANCE, GAIT, AND ENDURANCE: Primary | ICD-10-CM

## 2024-04-08 PROCEDURE — 97530 THERAPEUTIC ACTIVITIES: CPT | Mod: PN

## 2024-04-08 PROCEDURE — 97110 THERAPEUTIC EXERCISES: CPT | Mod: PN

## 2024-04-08 NOTE — PROGRESS NOTES
"[facility]   Physical Therapy Treatment Note      Name: Zeenat Mancia  Clinic Number: [id number]    Therapy Diagnosis:   Encounter Diagnosis   Name Primary?    Impaired functional mobility, balance, gait, and endurance Yes       Physician: Doris Moreno MD    Visit Date: 4/8/2024    Physician Orders: PT Eval and Treat   Medical Diagnosis from Referral:   Diagnosis   Z74.09 (ICD-10-CM) - Impaired functional mobility, balance, gait, and endurance   G20.C (ICD-10-CM) - Parkinsonism, unspecified Parkinsonism type      Evaluation Date: 2/29/2024  Authorization Period Expiration: 12/31/2024  Plan of Care Expiration: 5/3/2024  Progress Note Due: 4/5/204  Visit # / Visits authorized: 7/20 + eval  FOTO: 5/5     Precautions: Standard, Fall     Time In: 1:51 PM  Time Out: 2:30 PM  Total Billable Time: 39 minutes (2TA + 1TE)    PTA Visit #: 0/5     Subjective     Patient reports: Had a fall 2 days ago. Tripped over his dog when going into his restroom. Needed family's assistance to recover. Variable compliance with home exercise. Admits he should be doing more exercise at home. Still not using recumbent bike gifted to him from family member.  He  was not  compliant with home exercise program.   Response to previous treatment: No adverse response   Functional change: Fall 2 days ago    Pain: 3/10    Location: left sided posterior leg    Objective      Objective Measures updated at progress report unless specified.     Two-Minute Walk Test: 83 feet    Two-Minute Walk Test: 132 feet    Treatment     C J received the treatments listed below:      therapeutic exercises to develop strength and endurance for 14 minutes including:  - standing hip flexion marches 3x20"  - seated hamstring/gastroc stretch on stool with EZ strap: 2x45"B    - seated long arc quads 3x10B; 2#    Not performed today  - lower trunk rotations with 5" holds x 2 minutes total  - open books x 2 minutes each side with 10" holds  - hip " abduction with green theraband 2x10  - Nustep level 3.0 single peak 5 minutes (to end session)    neuromuscular re-education activities to improve: Balance, Coordination, Kinesthetic, Sense, and Proprioception for 00 minutes. The following activities were included:  Not performed today    therapeutic activities to improve functional performance for 25 minutes, including:  - Sit to stands from elevated mat 3x10  - Two-Minute Walk Test x 2 (see above); 150' lap completed after first 83' and 132' completing in each walk test  - Ample patient education provided on need to increase activity level in home environment if functional change is personal goal    Not performed today  - car legs 2 x 10 bilateral (foot lift to step stool)   - standing tolerance 7:06 unsupported static - then reported legs feeling wobbly and needed to sit (5:40min previous session)     Patient Education and Home Exercises       Education provided:   - Increase activity level in home setting    Written Home Exercises Provided: No formal program given thus far; patient instructed on basic walking program to improve standing tolerance     Assessment     CJ arrived to visit with reports of new fall, generalized fatigue, and low HEP compliance. No major change in gait speed with Two Minute Walk Test since last assessed a month ago. Patient heavily educated again today that low HEP compliance will lead to low responsiveness to therapy goals. Standing versus seated rest breaks encouraged throughout visit but patient limited due to both left leg pain and fatigue. He may benefit from additional PT services to achieve functional goals, but ultimate success in therapy contingent on better HEP compliance.    TICO ZARATE Is progressing well towards his goals.   Patient prognosis is Fair.     Patient will continue to benefit from skilled outpatient physical therapy to address the deficits listed in the problem list box on initial evaluation, provide pt/family education  and to maximize pt's level of independence in the home and community environment.     Patient's spiritual, cultural and educational needs considered and pt agreeable to plan of care and goals.     Anticipated barriers to physical therapy: decreased activity tolerance, neuropathic pain, fear of falling     Goals:     Short term goals: Pt will accomplish the following in 4 weeks; agrees to goals set:  1. Pt will perform LSVT BIG daily maximal exercises with supervision to improve carryover of BIG movements into daily life. (Progressing, not met)  2. Pt will perform nu-step at level 4 x 7 minutes without rests breaks going at least 50 step/min or greater. (Progressing, not met)  3. Pt will performed sit to stand x 5 B UE support in <23 seconds without LOB backward or posterior LE hooking for functional and safe transfers. (Progressing, not met)     Long term goals: 8 weeks, pt agrees to goals set  1. Pt will be able to perform TUG in 16 secs without use of AD demonstrating overall improved functional mobility. (Progressing, not met)  2. Pt will performed sit to stand x 5 B UE support in  <20 seconds without LOB backward or posterior LE hooking for functional and safe transfers. (Progressing, not met)  3.  Pt will score greater than or equal to 41/56 on the Gordon test/assessment without use of AD demonstrating overall improved functional mobility and balance and reduce fall risk. (Progressing, not met)  4.  Pt will walk < than or equal to 0.7 faster on the SSWS test for increased gait speed to keep up with wife when walking in community [Two-Minute Walk Test at first follow up = 130 feet which = 0.33 meters/second]. (Progressing, not met)  5. Pt will have 0 falls from start of PT sessions. (Progressing, not met)  6. Pt will perform floor to stand f/b stand to floor transfer B UE support with stand by assist and no cues for improved dynamic transfer, core stability and fall safety in home and community. (Progressing, not  met)  7. Pt will begin some form of community fitness to begin regular and consistent performance of exercise to continue maintenance of gains made in PT. (Progressing, not met)       Plan     Standing tolerance  Stepper up to 6 mintues  Seated, supine, and standing strengthening  Flexibility training  Low-mod level balance     JENISE CLAYTON, PT

## 2024-04-10 ENCOUNTER — CLINICAL SUPPORT (OUTPATIENT)
Dept: REHABILITATION | Facility: HOSPITAL | Age: 74
End: 2024-04-10
Payer: MEDICARE

## 2024-04-10 ENCOUNTER — TELEPHONE (OUTPATIENT)
Dept: PRIMARY CARE CLINIC | Facility: CLINIC | Age: 74
End: 2024-04-10
Payer: MEDICARE

## 2024-04-10 DIAGNOSIS — R41.841 COGNITIVE COMMUNICATION DISORDER: Primary | ICD-10-CM

## 2024-04-10 DIAGNOSIS — Z74.09 IMPAIRED FUNCTIONAL MOBILITY, BALANCE, GAIT, AND ENDURANCE: ICD-10-CM

## 2024-04-10 DIAGNOSIS — G62.9 NEUROPATHY: Primary | ICD-10-CM

## 2024-04-10 DIAGNOSIS — Z74.09 IMPAIRED FUNCTIONAL MOBILITY, BALANCE, GAIT, AND ENDURANCE: Primary | ICD-10-CM

## 2024-04-10 DIAGNOSIS — R29.898 WEAKNESS OF BOTH LOWER EXTREMITIES: ICD-10-CM

## 2024-04-10 DIAGNOSIS — M54.16 LUMBAR RADICULITIS: ICD-10-CM

## 2024-04-10 PROCEDURE — 96125 COGNITIVE TEST BY HC PRO: CPT | Mod: PN

## 2024-04-10 PROCEDURE — 97530 THERAPEUTIC ACTIVITIES: CPT | Mod: PN

## 2024-04-10 PROCEDURE — 97110 THERAPEUTIC EXERCISES: CPT | Mod: PN

## 2024-04-10 NOTE — PROGRESS NOTES
"[facility]  Speech Therapy Evaluation ONLY -Neurological Rehabilitation    Date: 4/10/2024     Name: Zeenat Mancia   MRN: [id number]    Therapy Diagnosis: No diagnosis found. Physician: Doris Moreno MD  Physician Orders: Ambulatory Referral to Speech Therapy   Medical Diagnosis: G20.C (ICD-10-CM) - Parkinsonism, unspecified Parkinsonism type    Visit # / Visits Authorized:  1/1  Date of Evaluation:  4/10/2024   Insurance Authorization Period: 4/11/24 to 12/31/24  Plan of Care Certification:    4/10/2024      Time In: 1014   Time Out: 1115   Total time: 61    Procedure   Cognitive Communication Evaluation including scoring and interpretation (Total time: 85 minutes)     Precautions: Fall, Cognition, and Communication  Subjective   Date of Onset: February 2024 when given parkinsonism diagnosis  History of Current Condition:  Zeenat Mancia is a 73 y.o. male who presents to [facility] for evaluation secondary to Parkinsonism. Patient was referred to therapy by Doris Moreno MD , which is the patient's neurologist. Patient reports "I feel like I'm getting progressively worse since they diagnosed me with Parkinson's." Feels physical therapy is making things harder for him. Feels his speech is getting better. Reports stuttering, word finding impairments. Usually worse in the morning time and when tired; mentions not sleeping well.  Patient was accompanied to the evaluation by his wife who remained in the lobby for the duration of evaluation.  Past Medical History: Zeenat Mancia  has a past medical history of A-fib, Acute blood loss anemia, Arthritis, Atrial flutter, Colon polyp, Diverticulosis, Dyslipidemia, Dyspnea on exertion (5/31/2021), GERD (gastroesophageal reflux disease), Hepatic steatosis, Hyperlipidemia, Hypertension, Irregular heart beat, Lower GI bleed (5/31/2021), Multiple gastric ulcers (05/16/2018), Obesity, Sleep apnea, and Unspecified " disorder of kidney and ureter.  Zeenat Mancia  has a past surgical history that includes Eye surgery; Tonsillectomy; Appendectomy; Cataract extraction (2009); Cataract extraction w/  intraocular lens implant (Right, 06/03/2015); Upper gastrointestinal endoscopy (05/16/2018); Colonoscopy (N/A, 10/10/2017); Colonoscopy (N/A, 05/16/2018); Esophagogastroduodenoscopy (N/A, 07/02/2018); Left heart catheterization (N/A, 01/04/2021); Coronary angiography (N/A, 01/04/2021); Robot-assisted cholecystectomy (N/A, 02/04/2021); Treatment of cardiac arrhythmia (N/A, 04/19/2021); Treatment of cardiac arrhythmia (N/A, 05/14/2021); Transesophageal echocardiography (N/A, 05/14/2021); Esophagogastroduodenoscopy (N/A, 06/02/2021); Colonoscopy (N/A, 06/02/2021); Cystoscopy with insertion of minimally invasive implant to enlarge prostatic urethra (N/A, 06/29/2021); Colonoscopy (N/A, 10/11/2022); Adenoidectomy (1958); and Cholecystectomy (2021).  Medical Hx and Allergies: Zeenat has a current medication list which includes the following prescription(s): amlodipine, apixaban, aripiprazole, atenolol, carbidopa-levodopa  mg, duloxetine, furosemide, melatonin, mv-min/folic/k1/lycopen/lutein, pantoprazole, pregabalin, pregabalin, sildenafil, tadalafil, tamsulosin, and trazodone, and the following Facility-Administered Medications: capsaicin-skin cleanser and perflutren protein-a microsphr.   Review of patient's allergies indicates:   Allergen Reactions    Lipitor [atorvastatin] Itching   Imaging:   MRI Brain 2/28/24: No acute process seen. Remote infarct of the left corona radiata. Minimal small vessel ischemic change on left.  Prior Therapy:  no speech therapy  Social History:  Patient lives with his wife in [place]. Patient is not currently driving.  Occupation:  Retired  Prior Level of Function: 100% independent with speaking, memory impairments   Current Level of Function: more noticeable word finding impairments and stuttering  Pain  "Scale: 3/10 on a Visual Analog Scale currently.  Pain Location: upper thigh and feet (neuropathy)  Patient's Therapy Goals:  "to see if this can help me"  Objective   Formal Assessment:  Scales of Cognitive and Communicative Ability for Neurorehabilitation (SCCAN) was administered to assess cognitive and communicative functioning.        Raw Scores  Percentage Score  Oral Expression (OE)  13/19   68   Orientation (OR)   12/12   100  Memory (ME)   12/19   63  Speech Comprehension (SP)  12/13   92  Reading Comprehension (RD)  10/12   83  Writing (WR)   6/7   86  Attention (AT)    8/16   50  Problem Solving (PS)   15/23   65    Total Raw Score 73 %ile Rank <1 SCCAN Index 50 Degree of Severity mild    Treatment   Total Treatment Time Separate from Evaluation: not applicable   No treatment performed secondary to time to complete evaluation.     Education provided:   -role of Speech Therapy, goals/plan of care, scheduling/cancellations, insurance limitations with patient  -Additional Education provided:   Memory strategies  Word finding strategies    Patient expressed understanding.     Assessment     TICO ZARATE presents to [facility] status post medical diagnosis of parkinsonism. His chief complaint is word finding difficulties, stutter-like tendencies, and frustration related to his decreased communication abilities.     Interpretation of objective assessment: Patient participated in the Scales of Cognitive and Communicative Ability for Neurorehabilitation (SCCAN) with mild cognitive-communicative impairment as characterized by impairments in oral expression, memory, attention, and problem solving. He demonstrates relative strengths in orientation, speech comprehension, reading comprehension, and writing. Despite review of these findings, patient mentioned that they do not bother him on a regular basis and does not seem to be affecting his daily activities. Only he notices his impairments currently, and he is " okay with that. Speech-language pathologist provided purpose of speech therapy services and guidelines for when to return to clinic, including any worsening of current symptoms or if he is withdrawing from typical social activities. He verbalized understanding.    Demonstrates impairments including limitations as described in the problem list.     Positive prognostic factors: interest  Negative prognostic factors: n/a  Barriers to therapy: No barriers to therapy identified.     Patient's spiritual, cultural, and educational needs considered and patient agreeable to plan of care and goals.    Patient would benefit from skilled therapy but he is uninterested at this time despite encouragement.  Plan   Return to clinic with any worsening of current symptoms - will need new order from physician    Therapist's Name:   Karla Mondragon MS, CCC-SLP, CBIS  Speech-Language Pathologist  Certified Brain Injury Specialist  4/10/2024

## 2024-04-10 NOTE — PATIENT INSTRUCTIONS
"WRAP Write, Repeat, Associate, Picture - group of strategies for recall   Mental Rehearsal For "thinking through" your plan for the next day   Lists Create a list each night for the next day   Sticky Notes Bright and with specific instructions   Alarms  For things you need to remember regularly (i.e. Meals)   Mental Review How did I do with my strategies today?   Goal  Plan  Action  Review Goal - what is my goal?  Plan - how will I do it?  Action - try to complete goal  Review- how did it go?      Memory Strategies:   Write: make a list or utilize a calendar   Repeat:  Repeat information out loud or internally   Associate:  Connect desired information with something you already recall. For example, when in the grocery, group items by meal or taste (sweet vs salty) or by category (vegetables, cold items, etc).  Picture: try to mentally picture what you are trying to remember  Mental Rehearsal: think through how you're going to complete a task before completing it.     BEULAH Gonzalez., ANKUR Sparrow., & ERIC Schultz (2012). Cognitive rehabilitation manual: Translating evidence-based recommendations into practice. [place], [place]: [employer].       "

## 2024-04-10 NOTE — PLAN OF CARE
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  Physician: Doris Moreno MD  Physician Orders: Ambulatory Referral to Speech Therapy   Medical Diagnosis: G20.C (ICD-10-CM) - Parkinsonism, unspecified Parkinsonism type    Visit # / Visits Authorized:  1/1  Date of Evaluation:  4/10/2024   Insurance Authorization Period: 4/11/24 to 12/31/24  Plan of Care Certification:    4/10/2024      Time In: 1014   Time Out: 1115   Total time: 61    Procedure   Cognitive Communication Evaluation including scoring and interpretation (Total time: 85 minutes)     Precautions: Fall, Cognition, and Communication  Subjective   Date of Onset: February 2024 when given parkinsonism diagnosis  History of Current Condition:  Zeenat Mancia is a 73 y.o. male who presents to [facility] for evaluation secondary to Parkinsonism. Patient was referred to therapy by Doris Moreno MD , which is the patient's neurologist. Patient reports "I feel like I'm getting progressively worse since they diagnosed me with Parkinson's." Feels physical therapy is making things harder for him. Feels his speech is getting better. Reports stuttering, word finding impairments. Usually worse in the morning time and when tired; mentions not sleeping well.  Patient was accompanied to the evaluation by his wife who remained in the lobby for the duration of evaluation.  Past Medical History: Zeenat Mancia  has a past medical history of A-fib, Acute blood loss anemia, Arthritis, Atrial flutter, Colon polyp, Diverticulosis, Dyslipidemia, Dyspnea on exertion (5/31/2021), GERD (gastroesophageal reflux disease), Hepatic steatosis, Hyperlipidemia, Hypertension, Irregular heart beat, Lower GI bleed (5/31/2021), Multiple gastric ulcers (05/16/2018), Obesity, Sleep " apnea, and Unspecified disorder of kidney and ureter.  Zeenat Mancia  has a past surgical history that includes Eye surgery; Tonsillectomy; Appendectomy; Cataract extraction (2009); Cataract extraction w/  intraocular lens implant (Right, 06/03/2015); Upper gastrointestinal endoscopy (05/16/2018); Colonoscopy (N/A, 10/10/2017); Colonoscopy (N/A, 05/16/2018); Esophagogastroduodenoscopy (N/A, 07/02/2018); Left heart catheterization (N/A, 01/04/2021); Coronary angiography (N/A, 01/04/2021); Robot-assisted cholecystectomy (N/A, 02/04/2021); Treatment of cardiac arrhythmia (N/A, 04/19/2021); Treatment of cardiac arrhythmia (N/A, 05/14/2021); Transesophageal echocardiography (N/A, 05/14/2021); Esophagogastroduodenoscopy (N/A, 06/02/2021); Colonoscopy (N/A, 06/02/2021); Cystoscopy with insertion of minimally invasive implant to enlarge prostatic urethra (N/A, 06/29/2021); Colonoscopy (N/A, 10/11/2022); Adenoidectomy (1958); and Cholecystectomy (2021).  Medical Hx and Allergies: Zeenat has a current medication list which includes the following prescription(s): amlodipine, apixaban, aripiprazole, atenolol, carbidopa-levodopa  mg, duloxetine, furosemide, melatonin, mv-min/folic/k1/lycopen/lutein, pantoprazole, pregabalin, pregabalin, sildenafil, tadalafil, tamsulosin, and trazodone, and the following Facility-Administered Medications: capsaicin-skin cleanser and perflutren protein-a microsphr.   Review of patient's allergies indicates:   Allergen Reactions    Lipitor [atorvastatin] Itching   Imaging:   MRI Brain 2/28/24: No acute process seen. Remote infarct of the left corona radiata. Minimal small vessel ischemic change on left.  Prior Therapy:  no speech therapy  Social History:  Patient lives with his wife in [place]. Patient is not currently driving.  Occupation:  Retired  Prior Level of Function: 100% independent with speaking, memory impairments   Current Level of Function: more noticeable word finding impairments  "and stuttering  Pain Scale: 3/10 on a Visual Analog Scale currently.  Pain Location: upper thigh and feet (neuropathy)  Patient's Therapy Goals:  "to see if this can help me"  Objective   Formal Assessment:  Scales of Cognitive and Communicative Ability for Neurorehabilitation (SCCAN) was administered to assess cognitive and communicative functioning.        Raw Scores  Percentage Score  Oral Expression (OE)  13/19   68   Orientation (OR)   12/12   100  Memory (ME)   12/19   63  Speech Comprehension (SP)  12/13   92  Reading Comprehension (RD)  10/12   83  Writing (WR)   6/7   86  Attention (AT)    8/16   50  Problem Solving (PS)   15/23   65    Total Raw Score 73 %ile Rank <1 SCCAN Index 50 Degree of Severity mild    Treatment   Total Treatment Time Separate from Evaluation: not applicable   No treatment performed secondary to time to complete evaluation.     Education provided:   -role of Speech Therapy, goals/plan of care, scheduling/cancellations, insurance limitations with patient  -Additional Education provided:   Memory strategies  Word finding strategies    Patient expressed understanding.     Assessment     TICO ZARATE presents to [facility] status post medical diagnosis of parkinsonism. His chief complaint is word finding difficulties, stutter-like tendencies, and frustration related to his decreased communication abilities.     Interpretation of objective assessment: Patient participated in the Scales of Cognitive and Communicative Ability for Neurorehabilitation (SCCAN) with mild cognitive-communicative impairment as characterized by impairments in oral expression, memory, attention, and problem solving. He demonstrates relative strengths in orientation, speech comprehension, reading comprehension, and writing. Despite review of these findings, patient mentioned that they do not bother him on a regular basis and does not seem to be affecting his daily activities. Only he notices his impairments " currently, and he is okay with that. Speech-language pathologist provided purpose of speech therapy services and guidelines for when to return to clinic, including any worsening of current symptoms or if he is withdrawing from typical social activities. He verbalized understanding.    Demonstrates impairments including limitations as described in the problem list.     Positive prognostic factors: interest  Negative prognostic factors: n/a  Barriers to therapy: No barriers to therapy identified.     Patient's spiritual, cultural, and educational needs considered and patient agreeable to plan of care and goals.    Patient would benefit from skilled therapy but he is uninterested at this time despite encouragement.  Plan   Return to clinic with any worsening of current symptoms - will need new order from physician    Therapist's Name:   Karla Mondragon MS, CCC-SLP, CBIS  Speech-Language Pathologist  Certified Brain Injury Specialist  4/10/2024

## 2024-04-10 NOTE — PROGRESS NOTES
"[facility]   Physical Therapy Treatment Note      Name: Zeenat Mancia  Clinic Number: [id number]    Therapy Diagnosis:   Encounter Diagnosis   Name Primary?    Impaired functional mobility, balance, gait, and endurance Yes     Physician: Doris Moreno MD    Visit Date: 4/10/2024    Physician Orders: PT Eval and Treat   Medical Diagnosis from Referral:   Diagnosis   Z74.09 (ICD-10-CM) - Impaired functional mobility, balance, gait, and endurance   G20.C (ICD-10-CM) - Parkinsonism, unspecified Parkinsonism type      Evaluation Date: 2/29/2024  Authorization Period Expiration: 12/31/2024  Plan of Care Expiration: 5/3/2024  Progress Note Due: 4/5/204  Visit # / Visits authorized: 9/20 + eval  FOTO: 5/5     Precautions: Standard, Fall     Time In: 11:25 PM  Time Out: 12:05 PM  Total Billable Time: 40 minutes (2 TE, 1TA )    PTA Visit #: 0/5     Subjective     Patient reports: sore and bruised from fall but it "has not really affected me much".   He  was not  compliant with home exercise program.   Response to previous treatment: No adverse response   Functional change: ongoing    Pain: 3/10    Location: left sided posterior leg    Objective      Objective Measures updated at progress report unless specified.     Two-Minute Walk Test: 130 feet    Two-Minute Walk Test: 100 feet    Treatment     C J received the treatments listed below:      therapeutic exercises to develop strength and endurance for 23 minutes including:  - standing hip flexion marches 2x45" with 2# cuff weights on  -  standing hip extension  2 x 10 with 2# cuff weights  -- standing HAMSTRING curls 2 x 10 with 2# cuff weights  - seated long arc quads 3x10B; 2#  - standing heel raises  3 x 10 B UE support    Not performed today  - seated hamstr- seated hamstring/gastroc stretch on stool with EZ strap: 2x45"B  ing/gastroc stretch on stool with EZ strap: 2x45"B    - lower trunk rotations with 5" holds x 2 minutes total  - open " "books x 2 minutes each side with 10" holds  - hip abduction with green theraband 2x10  - Nustep level 3.0 single peak 5 minutes (to end session)    neuromuscular re-education activities to improve: Balance, Coordination, Kinesthetic, Sense, and Proprioception for 00 minutes. The following activities were included:  Not performed today    therapeutic activities to improve functional performance for 17 minutes, including:  - Sit to stands from elevated mat 3x10  - step ups to bottom step x 10 each LE leading with 1 UE support  - Two-Minute Walk Test x 2 (see above)      Not performed today  - car legs 2 x 10 bilateral (foot lift to step stool)   - standing tolerance 7:06 unsupported static - then reported legs feeling wobbly and needed to sit (5:40min previous session)     Patient Education and Home Exercises       Education provided:   - Increase activity level in home setting    Written Home Exercises Provided: No formal program given thus far; patient instructed on basic walking program to improve standing tolerance     Assessment     CJ arrived from [facility] [place], reporting generalized fatigue, and low HEP compliance. He is still not walking much at home only to room to room. Standing versus seated rest breaks encouraged throughout visit and he only had 3 seated rests this session but only tolerated 40 minutes of PT today. He may benefit from additional PT services to achieve functional goals, but ultimate success in therapy contingent on better HEP compliance.    TICO ZARATE Is progressing well towards his goals.   Patient prognosis is Fair.     Patient will continue to benefit from skilled outpatient physical therapy to address the deficits listed in the problem list box on initial evaluation, provide pt/family education and to maximize pt's level of independence in the home and community environment.     Patient's spiritual, cultural and educational needs considered and pt agreeable to plan of care and goals.   "   Anticipated barriers to physical therapy: decreased activity tolerance, neuropathic pain, fear of falling     Goals:     Short term goals: Pt will accomplish the following in 4 weeks; agrees to goals set:  1. Pt will perform LSVT BIG daily maximal exercises with supervision to improve carryover of BIG movements into daily life. (Progressing, not met)  2. Pt will perform nu-step at level 4 x 7 minutes without rests breaks going at least 50 step/min or greater. (Progressing, not met)  3. Pt will performed sit to stand x 5 B UE support in <23 seconds without LOB backward or posterior LE hooking for functional and safe transfers. (Progressing, not met)     Long term goals: 8 weeks, pt agrees to goals set  1. Pt will be able to perform TUG in 16 secs without use of AD demonstrating overall improved functional mobility. (Progressing, not met)  2. Pt will performed sit to stand x 5 B UE support in  <20 seconds without LOB backward or posterior LE hooking for functional and safe transfers. (Progressing, not met)  3.  Pt will score greater than or equal to 41/56 on the Gordon test/assessment without use of AD demonstrating overall improved functional mobility and balance and reduce fall risk. (Progressing, not met)  4.  Pt will walk < than or equal to 0.7 faster on the SSWS test for increased gait speed to keep up with wife when walking in community [Two-Minute Walk Test at first follow up = 130 feet which = 0.33 meters/second]. (Progressing, not met)  5. Pt will have 0 falls from start of PT sessions. (Progressing, not met)  6. Pt will perform floor to stand f/b stand to floor transfer B UE support with stand by assist and no cues for improved dynamic transfer, core stability and fall safety in home and community. (Progressing, not met)  7. Pt will begin some form of community fitness to begin regular and consistent performance of exercise to continue maintenance of gains made in PT. (Progressing, not met)       Plan      Standing tolerance  Stepper up to 6 mintues  Seated, supine, and standing strengthening  Flexibility training  Low-mod level balance     Kaylee Salmeron, PT

## 2024-04-10 NOTE — TELEPHONE ENCOUNTER
----- Message from Daphne Meredith sent at 4/10/2024  2:41 PM CDT -----  Contact: [phone]  [email or web address]     Patient is calling for Medical Advice regarding: walker     How long has patient had these symptoms:    Pharmacy name and phone#:    Would like response via [employer]:  call back     Comments:    [facility] everything faxed over to fax # [phone]

## 2024-04-14 ENCOUNTER — PATIENT MESSAGE (OUTPATIENT)
Dept: PRIMARY CARE CLINIC | Facility: CLINIC | Age: 74
End: 2024-04-14
Payer: MEDICARE

## 2024-04-15 ENCOUNTER — PROCEDURE VISIT (OUTPATIENT)
Dept: PAIN MEDICINE | Facility: CLINIC | Age: 74
End: 2024-04-15
Payer: MEDICARE

## 2024-04-15 VITALS
HEART RATE: 156 BPM | HEIGHT: 74 IN | SYSTOLIC BLOOD PRESSURE: 184 MMHG | BODY MASS INDEX: 40.43 KG/M2 | DIASTOLIC BLOOD PRESSURE: 80 MMHG | WEIGHT: 315 LBS

## 2024-04-15 DIAGNOSIS — G89.4 CHRONIC PAIN SYNDROME: ICD-10-CM

## 2024-04-15 DIAGNOSIS — M79.672 BILATERAL FOOT PAIN: ICD-10-CM

## 2024-04-15 DIAGNOSIS — G60.9 IDIOPATHIC PERIPHERAL NEUROPATHY: Primary | ICD-10-CM

## 2024-04-15 DIAGNOSIS — M79.671 BILATERAL FOOT PAIN: ICD-10-CM

## 2024-04-15 PROCEDURE — 99215 OFFICE O/P EST HI 40 MIN: CPT | Mod: S$GLB,,, | Performed by: NURSE PRACTITIONER

## 2024-04-15 NOTE — PROCEDURES
Procedures  Procedure: Chemical Neurotomy (Qutenza)     Anesthesia: Local     Indication for procedure: Idiopathetic neuropathy, Chronic pain syndrome, Chronic Bilateral Foot pain/numbness   Complications: None      Procedure in Detail: The area to be treated with traced on the skin using a skin marker.  The area was cleaned with soap and water, then Lidocaine 2% gel was applied liberally and allowed to sit for 30 minutes.  The gel was then cleaned off of the skin.  x2  Qutenza patches each were applied to his foot bilaterally targeting the area of concern.   The backing was removed and the patches were applied to the skin.  The patient noted a feeling of warmth in the target area after 5 minutes without significant pain.     The patches were left on the skin for 30 minutes.  He rested comfortably  in the room and was check on every 10-15 minutes to ensure his comfort and safety.     At the end of 30 minutes, the patches were removed and the cleansing/neutralizing gel from the kit was applied liberally and allowed to sit on the skin for 3 minutes.  It was then wiped off and the skin was gently cleaned with soap and water.     Estimated blood loss: NA      Disposition: The patient tolerated the procedure without complaint.  He  was given post-procedure care instructions and will contact the clinic with any concerns.     Follow-up: RTC 91 days      FARHANA Rainey  Interventional Pain Management

## 2024-04-17 ENCOUNTER — TELEPHONE (OUTPATIENT)
Dept: PRIMARY CARE CLINIC | Facility: CLINIC | Age: 74
End: 2024-04-17
Payer: MEDICARE

## 2024-04-17 DIAGNOSIS — M54.16 LUMBAR RADICULITIS: Primary | ICD-10-CM

## 2024-04-17 DIAGNOSIS — Z74.09 IMPAIRED FUNCTIONAL MOBILITY, BALANCE, GAIT, AND ENDURANCE: ICD-10-CM

## 2024-04-17 DIAGNOSIS — R29.898 WEAKNESS OF BOTH LOWER EXTREMITIES: ICD-10-CM

## 2024-04-17 NOTE — TELEPHONE ENCOUNTER
----- Message from María Pete sent at 4/17/2024  1:08 PM CDT -----  Contact: Mobile  [phone] MsAnge Jessy Jiménez from [facility] said that an order was placed for a walker for the patient, and the patient have not received his walker yet.    Please give the patient a call.

## 2024-04-19 ENCOUNTER — CLINICAL SUPPORT (OUTPATIENT)
Dept: REHABILITATION | Facility: HOSPITAL | Age: 74
End: 2024-04-19
Payer: MEDICARE

## 2024-04-19 DIAGNOSIS — Z74.09 IMPAIRED FUNCTIONAL MOBILITY, BALANCE, GAIT, AND ENDURANCE: Primary | ICD-10-CM

## 2024-04-19 DIAGNOSIS — G47.33 OSA (OBSTRUCTIVE SLEEP APNEA): Primary | ICD-10-CM

## 2024-04-19 PROCEDURE — 97110 THERAPEUTIC EXERCISES: CPT | Mod: PN

## 2024-04-19 PROCEDURE — 97530 THERAPEUTIC ACTIVITIES: CPT | Mod: PN

## 2024-04-19 NOTE — PROGRESS NOTES
"[facility]   Physical Therapy Treatment Note      Name: Zeenat Mancia  Clinic Number: [id number]    Therapy Diagnosis:   Encounter Diagnosis   Name Primary?    Impaired functional mobility, balance, gait, and endurance Yes     Physician: Doris Moreno MD    Visit Date: 4/19/2024    Physician Orders: PT Eval and Treat   Medical Diagnosis from Referral:   Diagnosis   Z74.09 (ICD-10-CM) - Impaired functional mobility, balance, gait, and endurance   G20.C (ICD-10-CM) - Parkinsonism, unspecified Parkinsonism type      Evaluation Date: 2/29/2024  Authorization Period Expiration: 12/31/2024  Plan of Care Expiration: 5/3/2024  Progress Note Due: 4/5/204  Visit # / Visits authorized: 9/20 + eval  FOTO: 5/5     Precautions: Standard, Fall     Time In: 12:00 PM  Time Out: 12:45 PM  Total Billable Time: 45 minutes (2 TE, 1TA)    PTA Visit #: 0/5     Subjective     Patient reports: Going to grandson's baseball game tonight. Legs feel "like jelly" after exercise today. Not regularly completing home exercise but has been trying to walk a little more.  He  was not  compliant with home exercise program.   Response to previous treatment: No adverse response   Functional change: ongoing    Pain: 5/10    Location: left sided posterior leg    Objective      Objective Measures updated at progress report unless specified.     Two-Minute Walk Test: 178 feet    Two-Minute Walk Test: 122 feet    Treatment     C J received the treatments listed below:      therapeutic exercises to develop strength and endurance for 30 minutes including:  - seated hamstring stretch 2x30" each leg  - standing calf raises 3x10  - seated long arc quad with 3# ankle weights 2x12B  - standing hip flexion marches 2x45" with 3# ankle weights    Not performed today  - standing hip extension  2 x 10 with 2# cuff weights  - standing hamstring curls 2 x 10 with 2# cuff weights  - lower trunk rotations with 5" holds x 2 minutes total  - open " "books x 2 minutes each side with 10" holds  - hip abduction with green theraband 2x10  - Nustep level 3.0 single peak 5 minutes (to end session)    neuromuscular re-education activities to improve: Balance, Coordination, Kinesthetic, Sense, and Proprioception for 00 minutes. The following activities were included:  Not performed today    therapeutic activities to improve functional performance for 15 minutes, including:  - Began and ended session with Two-Minute Walk Test  - Sit to stands from elevated mat 3x10  - Reciprocal step ups to 4" step x10B    Not performed today  - car legs 2 x 10 bilateral (foot lift to step stool)   - standing tolerance 7:06 unsupported static - then reported legs feeling wobbly and needed to sit (5:40min previous session)     Patient Education and Home Exercises       Education provided:   - Increase activity level in home setting    Written Home Exercises Provided: No formal program given thus far; patient instructed on basic walking program to improve standing tolerance     Assessment     CJ arrived to PT visit fatigued and needing ample motivation to give full effort today. Encouraged standing versus seated rest breaks as much as possible. No loss of balance throughout, but patient does tend to have poor eccentric control to sit when he is fatigued. Slight improvement noted on Two-Minute Walk Test today. Again PT emphasized benefits of HEP compliance. Pending goal reassessment next week, he may be ready for discharge from PT.    TICO ZARATE Is progressing well towards his goals.   Patient prognosis is Fair.     Patient will continue to benefit from skilled outpatient physical therapy to address the deficits listed in the problem list box on initial evaluation, provide pt/family education and to maximize pt's level of independence in the home and community environment.     Patient's spiritual, cultural and educational needs considered and pt agreeable to plan of care and goals.   "   Anticipated barriers to physical therapy: decreased activity tolerance, neuropathic pain, fear of falling     Goals:     Short term goals: Pt will accomplish the following in 4 weeks; agrees to goals set:  1. Pt will perform LSVT BIG daily maximal exercises with supervision to improve carryover of BIG movements into daily life. (Progressing, not met)  2. Pt will perform nu-step at level 4 x 7 minutes without rests breaks going at least 50 step/min or greater. (Progressing, not met)  3. Pt will performed sit to stand x 5 B UE support in <23 seconds without LOB backward or posterior LE hooking for functional and safe transfers. (Progressing, not met)     Long term goals: 8 weeks, pt agrees to goals set  1. Pt will be able to perform TUG in 16 secs without use of AD demonstrating overall improved functional mobility. (Progressing, not met)  2. Pt will performed sit to stand x 5 B UE support in  <20 seconds without LOB backward or posterior LE hooking for functional and safe transfers. (Progressing, not met)  3.  Pt will score greater than or equal to 41/56 on the Gordon test/assessment without use of AD demonstrating overall improved functional mobility and balance and reduce fall risk. (Progressing, not met)  4.  Pt will walk < than or equal to 0.7 faster on the SSWS test for increased gait speed to keep up with wife when walking in community [Two-Minute Walk Test at first follow up = 130 feet which = 0.33 meters/second]. (Progressing, not met)  5. Pt will have 0 falls from start of PT sessions. (Progressing, not met)  6. Pt will perform floor to stand f/b stand to floor transfer B UE support with stand by assist and no cues for improved dynamic transfer, core stability and fall safety in home and community. (Progressing, not met)  7. Pt will begin some form of community fitness to begin regular and consistent performance of exercise to continue maintenance of gains made in PT. (Progressing, not met)       Plan      Standing tolerance  Stepper up to 6 mintues  Seated, supine, and standing strengthening  Flexibility training  Low-mod level balance     JENISE CLAYTON, PT

## 2024-04-22 ENCOUNTER — TELEPHONE (OUTPATIENT)
Dept: PRIMARY CARE CLINIC | Facility: CLINIC | Age: 74
End: 2024-04-22
Payer: MEDICARE

## 2024-04-22 ENCOUNTER — CLINICAL SUPPORT (OUTPATIENT)
Dept: REHABILITATION | Facility: HOSPITAL | Age: 74
End: 2024-04-22
Payer: MEDICARE

## 2024-04-22 DIAGNOSIS — Z74.09 IMPAIRED FUNCTIONAL MOBILITY, BALANCE, GAIT, AND ENDURANCE: Primary | ICD-10-CM

## 2024-04-22 PROCEDURE — 97530 THERAPEUTIC ACTIVITIES: CPT | Mod: PN,CQ

## 2024-04-22 PROCEDURE — 97110 THERAPEUTIC EXERCISES: CPT | Mod: PN,CQ

## 2024-04-22 NOTE — PROGRESS NOTES
"[facility]   Physical Therapy Treatment Note      Name: Zeenat Mancia  Clinic Number: [id number]    Therapy Diagnosis:   Encounter Diagnosis   Name Primary?    Impaired functional mobility, balance, gait, and endurance Yes     Physician: Doris Moreno MD    Visit Date: 4/22/2024    Physician Orders: PT Eval and Treat   Medical Diagnosis from Referral:   Diagnosis   Z74.09 (ICD-10-CM) - Impaired functional mobility, balance, gait, and endurance   G20.C (ICD-10-CM) - Parkinsonism, unspecified Parkinsonism type      Evaluation Date: 2/29/2024  Authorization Period Expiration: 12/31/2024  Plan of Care Expiration: 5/3/2024  Progress Note Due: 4/5/204  Visit # / Visits authorized: 10/20 + eval  FOTO: 5/5     Precautions: Standard, Fall     Time In: 11:15 AM  Time Out: 12:00 PM  Total Billable Time: 45 minutes (2 TE, 1TA)    PTA Visit #: 1/5     Subjective     Patient reports: He was exhausted after last session, legs felt like rubber, but was able to go to his grandson's baseball game.  They lost.  He  was not  compliant with home exercise program.   Response to previous treatment: No adverse response   Functional change: ongoing    Pain: 0/10    Location: left sided posterior leg    Objective      Objective Measures updated at progress report unless specified.     Two-Minute Walk Test: 178 feet    Two-Minute Walk Test: 122 feet        Treatment     C J received the treatments listed below:      therapeutic exercises to develop strength and endurance for 30 minutes including:  - seated hamstring stretch 2x30" each leg  - standing calf raises 3x10  - seated long arc quad with 3# ankle weights 2x12B (Active rest)  - standing hip flexion marches 2x45" with 3# ankle weights  - Nustep level 3.0 constant resistance 6 minutes with instruction to stay >75 spm (to end session)    Not performed today  - standing hip extension  2 x 10 with 2# cuff weights  - standing hamstring curls 2 x 10 with 2# cuff " "weights  - lower trunk rotations with 5" holds x 2 minutes total  - open books x 2 minutes each side with 10" holds  - hip abduction with green theraband 2x10      neuromuscular re-education activities to improve: Balance, Coordination, Kinesthetic, Sense, and Proprioception for 00 minutes. The following activities were included:  Not performed today    therapeutic activities to improve functional performance for 15 minutes, including:  - 4 trials of 150 foot in clinic ambulation with rolling walker (interspersed throughout treatment)  - Sit to stands from elevated mat 3x10 (standard chair and airex today)  - Reciprocal step ups to 4" step 2x10B  - Lateral steps along / bar: 6 lengths x 10 feet    Not performed today  - car legs 2 x 10 bilateral (foot lift to step stool)   - standing tolerance 7:06 unsupported static - then reported legs feeling wobbly and needed to sit (5:40min previous session)     Patient Education and Home Exercises       Education provided:   - Increase activity level in home setting    Written Home Exercises Provided: No formal program given thus far; patient instructed on basic walking program to improve standing tolerance     Assessment     CJ arrived to session without complaints of pain but status quo high levels of fatigue.  He was able to complete an increase in walking and standing activity this session though still with 8-10 seated rest breaks a session. Continues to be challenged with standing for more than 2-3 minutes and admits to non compliance of HEP and only standing when needing to use the restroom at home.  Heavy encouragement to limit seated rest breaks as much as possible and to instead attempt standing rest breaks. He continues to have poor tolerance to treatment with little ability to progress exercises.   He may benefit from continued PT services to improve strength and endurance deficits.    TICO TESSA Is progressing well towards his goals.   Patient prognosis is Fair. "     Patient will continue to benefit from skilled outpatient physical therapy to address the deficits listed in the problem list box on initial evaluation, provide pt/family education and to maximize pt's level of independence in the home and community environment.     Patient's spiritual, cultural and educational needs considered and pt agreeable to plan of care and goals.     Anticipated barriers to physical therapy: decreased activity tolerance, neuropathic pain, fear of falling     Goals:     Short term goals: Pt will accomplish the following in 4 weeks; agrees to goals set:  1. Pt will perform LSVT BIG daily maximal exercises with supervision to improve carryover of BIG movements into daily life. (Progressing, not met)  2. Pt will perform nu-step at level 4 x 7 minutes without rests breaks going at least 50 step/min or greater. (Progressing, not met)  3. Pt will performed sit to stand x 5 B UE support in <23 seconds without LOB backward or posterior LE hooking for functional and safe transfers. (Progressing, not met)     Long term goals: 8 weeks, pt agrees to goals set  1. Pt will be able to perform TUG in 16 secs without use of AD demonstrating overall improved functional mobility. (Progressing, not met)  2. Pt will performed sit to stand x 5 B UE support in  <20 seconds without LOB backward or posterior LE hooking for functional and safe transfers. (Progressing, not met)  3.  Pt will score greater than or equal to 41/56 on the Gordon test/assessment without use of AD demonstrating overall improved functional mobility and balance and reduce fall risk. (Progressing, not met)  4.  Pt will walk < than or equal to 0.7 faster on the SSWS test for increased gait speed to keep up with wife when walking in community [Two-Minute Walk Test at first follow up = 130 feet which = 0.33 meters/second]. (Progressing, not met)  5. Pt will have 0 falls from start of PT sessions. (Progressing, not met)  6. Pt will perform floor to  stand f/b stand to floor transfer B UE support with stand by assist and no cues for improved dynamic transfer, core stability and fall safety in home and community. (Progressing, not met)  7. Pt will begin some form of community fitness to begin regular and consistent performance of exercise to continue maintenance of gains made in PT. (Progressing, not met)       Plan     Standing tolerance  Stepper up to 6 mintues  Seated, supine, and standing strengthening  Flexibility training  Low-mod level balance     Luciana Antonio, PTA

## 2024-04-23 ENCOUNTER — PATIENT MESSAGE (OUTPATIENT)
Dept: PRIMARY CARE CLINIC | Facility: CLINIC | Age: 74
End: 2024-04-23
Payer: MEDICARE

## 2024-04-24 ENCOUNTER — CLINICAL SUPPORT (OUTPATIENT)
Dept: REHABILITATION | Facility: HOSPITAL | Age: 74
End: 2024-04-24
Payer: MEDICARE

## 2024-04-24 DIAGNOSIS — Z74.09 IMPAIRED FUNCTIONAL MOBILITY, BALANCE, GAIT, AND ENDURANCE: Primary | ICD-10-CM

## 2024-04-24 PROCEDURE — 97530 THERAPEUTIC ACTIVITIES: CPT | Mod: PN

## 2024-04-24 PROCEDURE — 97110 THERAPEUTIC EXERCISES: CPT | Mod: PN

## 2024-04-24 NOTE — PROGRESS NOTES
[facility]   Physical Therapy Treatment/Discharge Note      Name: Zeenat Mancia  Clinic Number: [id number]    Therapy Diagnosis:   Encounter Diagnosis   Name Primary?    Impaired functional mobility, balance, gait, and endurance Yes     Physician: Doris Moreno MD    Visit Date: 4/24/2024    Physician Orders: PT Eval and Treat   Medical Diagnosis from Referral:   Diagnosis   Z74.09 (ICD-10-CM) - Impaired functional mobility, balance, gait, and endurance   G20.C (ICD-10-CM) - Parkinsonism, unspecified Parkinsonism type      Evaluation Date: 2/29/2024  Authorization Period Expiration: 12/31/2024  Plan of Care Expiration: 5/3/2024  Progress Note Due: 4/5/204  Visit # / Visits authorized: 10/20 + eval  FOTO: 5/5     Precautions: Standard, Fall     Time In: 11:17 AM  Time Out: 12:00 PM  Total Billable Time: 45 minutes (1 TE, 2TA)    PTA Visit #: 0/5     Subjective     Patient reports: He stated that he plans to get in his son's pool to walk and exercise.   He  was not  compliant with home exercise program.   Response to previous treatment: No adverse response   Functional change: ongoing    Pain: 8/10    Location: left sided posterior leg    Objective      Objective Measures updated at progress report unless specified.      Evaluation   5 times sit-stand  (adults 18-63 y/o) 16 seconds from higher chair  20 seconds from standard chair  >12 sec= fall risk for general elderly  >16 sec= fall risk for Parkinson's disease  >10 sec= balance/vestibular dysfunction (<59 y/o)  >14.2 sec= balance/vestibular dysfunction (>59 y/o)  >12 sec= fall risk for CVA      Evaluation   Timed Up and Go 22.2 sec  < 20 sec safe for independent transfers, < 30 sec safe for dependent transfers/assist required   Self Selected Walking Speed 0.6 m/sec (6m/10s)   2 minute walk test 190 feet     FULLER  BALANCE ASSESSMENT TOOL  1. Sitting to Standing   3 - able to stand independely using hands  2. Standing Unsupported   3 -  able to stand 2 minutes with supervision  3. Sitting Unsupported   4 - able to sit safely and securely 2 minutes  4. Standing to Sitting   3 - controls descent by using hands  5. Pivot Transfer   3 - able to transfer safely with definite use of hands  6. Standing with Eyes Closed   3 - able to stand 10 seconds with supervision  7. Standing with Feet Together   1 - needs help to attain position but able to stand 15 seconds feet together  8. Reaching Forward with Outstretched Arm   3 - can reach forward 12 cm/5 inches safely  9. Retrieving Object from Floor   3 - able to pick slipper but needs supervision  10. Turning to Look Behind   3 - looks behind one side only, other side less weight shift  11. Turning 360 Degrees   0 - needs assistance while turning  12. Placing Alternate Foot on Step   1 - able to complete > 2 steps needs min assist  13. Standing with One Foot in Front   1 - need help to step but can hold 15 seconds  14. Standing on One Foot   0 - unable to try or needs assistance to prevent fall    TOTAL SCORE: 31  Maximum: 56  Score:   0-20= high fall risk   21-40 moderate fall risk   41-56 low fall risk     Fall risk cut-off scores:   Elderly and History of falls: <51/56   Elderly and No history of falls: <42/56   CVA: <45/56     Treatment     C J received the treatments listed below:      therapeutic exercises to develop strength and endurance for 10 minutes including:  - review of updated HEP x 10 minutes (see media)     neuromuscular re-education activities to improve: Balance, Coordination, Kinesthetic, Sense, and Proprioception for 00 minutes. The following activities were included:  Not performed today    therapeutic activities to improve functional performance for 30 minutes, including:  - FOTO assessment completed x 8 minutes  - objective measures completed (see above)    Patient Education and Home Exercises       Education provided:   - Increase activity level in home setting  - exercise in pool   -  updated HEP 2x/day  - continue walking program    Written Home Exercises Provided:  patient instructed on basic walking program to improve standing tolerance, Updated HEP issued today see media section.      Assessment     CJ arrived to session without complaints of pain but status quo high levels of fatigue and also left thigh pain.  He did well with assessments in 1st 1/2 of session but he was so fatigued by the end of the session that his FULLER balance score declined from eval.  His endurance is so limited and low that it impacts his performance from start to finish.  He is motivated to perform updated HEP and he is committed to walking program in pool or in home.     TICO ZARATE Is progressing well towards his goals.   Patient prognosis is Fair.     Patient will continue to benefit from skilled outpatient physical therapy to address the deficits listed in the problem list box on initial evaluation, provide pt/family education and to maximize pt's level of independence in the home and community environment.     Patient's spiritual, cultural and educational needs considered and pt agreeable to plan of care and goals.     Anticipated barriers to physical therapy: decreased activity tolerance, neuropathic pain, fear of falling     Goals:     Short term goals: Pt will accomplish the following in 4 weeks; agrees to goals set:  1. Pt will perform LSVT BIG daily maximal exercises with supervision to improve carryover of BIG movements into daily life. (Progressing)  2. Pt will perform nu-step at level 4 x 7 minutes without rests breaks going at least 50 step/min or greater. (Met)  3. Pt will performed sit to stand x 5 B UE support in <23 seconds without LOB backward or posterior LE hooking for functional and safe transfers. (Met)     Long term goals: 8 weeks, pt agrees to goals set  1. Pt will be able to perform TUG in 16 secs without use of AD demonstrating overall improved functional mobility. (Not met 22 secs)  2. Pt will  performed sit to stand x 5 B UE support in  <20 seconds without LOB backward or posterior LE hooking for functional and safe transfers. (Progressing, not met) 20 secs with standard chair and 16 secs in elevated seat  3.  Pt will score greater than or equal to 41/56 on the Gordon test/assessment without use of AD demonstrating overall improved functional mobility and balance and reduce fall risk. (Progressing, not met)  4.  Pt will walk < than or equal to 0.7 faster on the SSWS test for increased gait speed to keep up with wife when walking in community [Two-Minute Walk Test at first follow up = 130 feet which = 0.33 meters/second]. (Met 0.6)  5. Pt will have 0 falls from start of PT sessions. (Not met 2 falls)  6. Pt will perform floor to stand f/b stand to floor transfer B UE support with stand by assist and no cues for improved dynamic transfer, core stability and fall safety in home and community. (Not met patient can only get up with assist of 2 persons and UE support)  7. Pt will begin some form of community fitness to begin regular and consistent performance of exercise to continue maintenance of gains made in PT. (HEP issued today)       Plan     Discharge today due to inconsistency with compliance of HEP due to pain and lack of progression in endurance to tolerate progression of level of skilled PT     Kaylee Salmeron, PT

## 2024-04-25 ENCOUNTER — HOSPITAL ENCOUNTER (EMERGENCY)
Facility: HOSPITAL | Age: 74
Discharge: HOME | End: 2024-04-25
Attending: EMERGENCY MEDICINE
Payer: MEDICARE

## 2024-04-25 ENCOUNTER — PATIENT MESSAGE (OUTPATIENT)
Dept: PRIMARY CARE CLINIC | Facility: CLINIC | Age: 74
End: 2024-04-25
Payer: MEDICARE

## 2024-04-25 ENCOUNTER — TELEPHONE (OUTPATIENT)
Dept: PRIMARY CARE CLINIC | Facility: CLINIC | Age: 74
End: 2024-04-25
Payer: MEDICARE

## 2024-04-25 VITALS
BODY MASS INDEX: 40.43 KG/M2 | SYSTOLIC BLOOD PRESSURE: 125 MMHG | TEMPERATURE: 98 F | WEIGHT: 315 LBS | DIASTOLIC BLOOD PRESSURE: 88 MMHG | OXYGEN SATURATION: 94 % | RESPIRATION RATE: 20 BRPM | HEIGHT: 74 IN | HEART RATE: 93 BPM

## 2024-04-25 DIAGNOSIS — R06.02 SHORTNESS OF BREATH: ICD-10-CM

## 2024-04-25 LAB
ALBUMIN SERPL BCP-MCNC: 3.6 G/DL (ref 3.5–5.2)
ALP SERPL-CCNC: 79 U/L (ref 55–135)
ALT SERPL W/O P-5'-P-CCNC: 10 U/L (ref 10–44)
ANION GAP SERPL CALC-SCNC: 8 MMOL/L (ref 8–16)
AST SERPL-CCNC: 13 U/L (ref 10–40)
BASOPHILS # BLD AUTO: 0.05 K/UL (ref 0–0.2)
BASOPHILS NFR BLD: 0.5 % (ref 0–1.9)
BILIRUB SERPL-MCNC: 0.5 MG/DL (ref 0.1–1)
BNP SERPL-MCNC: 33 PG/ML (ref 0–99)
BUN SERPL-MCNC: 14 MG/DL (ref 8–23)
CALCIUM SERPL-MCNC: 9.3 MG/DL (ref 8.7–10.5)
CHLORIDE SERPL-SCNC: 102 MMOL/L (ref 95–110)
CO2 SERPL-SCNC: 29 MMOL/L (ref 23–29)
CREAT SERPL-MCNC: 0.9 MG/DL (ref 0.5–1.4)
DIFFERENTIAL METHOD BLD: ABNORMAL
EOSINOPHIL # BLD AUTO: 0.3 K/UL (ref 0–0.5)
EOSINOPHIL NFR BLD: 3.1 % (ref 0–8)
ERYTHROCYTE [DISTWIDTH] IN BLOOD BY AUTOMATED COUNT: 17.2 % (ref 11.5–14.5)
EST. GFR  (NO RACE VARIABLE): >60 ML/MIN/1.73 M^2
GLUCOSE SERPL-MCNC: 112 MG/DL (ref 70–110)
HCT VFR BLD AUTO: 52.3 % (ref 40–54)
HGB BLD-MCNC: 16.1 G/DL (ref 14–18)
IMM GRANULOCYTES # BLD AUTO: 0.02 K/UL (ref 0–0.04)
IMM GRANULOCYTES NFR BLD AUTO: 0.2 % (ref 0–0.5)
INFLUENZA A, MOLECULAR: NEGATIVE
INFLUENZA B, MOLECULAR: NEGATIVE
LYMPHOCYTES # BLD AUTO: 1 K/UL (ref 1–4.8)
LYMPHOCYTES NFR BLD: 10.7 % (ref 18–48)
MCH RBC QN AUTO: 26.5 PG (ref 27–31)
MCHC RBC AUTO-ENTMCNC: 30.8 G/DL (ref 32–36)
MCV RBC AUTO: 86 FL (ref 82–98)
MONOCYTES # BLD AUTO: 1 K/UL (ref 0.3–1)
MONOCYTES NFR BLD: 10.3 % (ref 4–15)
NEUTROPHILS # BLD AUTO: 7 K/UL (ref 1.8–7.7)
NEUTROPHILS NFR BLD: 75.2 % (ref 38–73)
NRBC BLD-RTO: 0 /100 WBC
OHS QRS DURATION: 110 MS
OHS QRS DURATION: 114 MS
OHS QTC CALCULATION: 447 MS
OHS QTC CALCULATION: 447 MS
PLATELET # BLD AUTO: 218 K/UL (ref 150–450)
PMV BLD AUTO: 9.2 FL (ref 9.2–12.9)
POTASSIUM SERPL-SCNC: 4.2 MMOL/L (ref 3.5–5.1)
PROT SERPL-MCNC: 7.2 G/DL (ref 6–8.4)
RBC # BLD AUTO: 6.08 M/UL (ref 4.6–6.2)
SARS-COV-2 RDRP RESP QL NAA+PROBE: NEGATIVE
SODIUM SERPL-SCNC: 139 MMOL/L (ref 136–145)
SPECIMEN SOURCE: NORMAL
TROPONIN I SERPL DL<=0.01 NG/ML-MCNC: <0.006 NG/ML (ref 0–0.03)
WBC # BLD AUTO: 9.28 K/UL (ref 3.9–12.7)

## 2024-04-25 PROCEDURE — 93010 ELECTROCARDIOGRAM REPORT: CPT | Mod: ,,, | Performed by: INTERNAL MEDICINE

## 2024-04-25 PROCEDURE — 87502 INFLUENZA DNA AMP PROBE: CPT | Performed by: PHYSICIAN ASSISTANT

## 2024-04-25 PROCEDURE — 93005 ELECTROCARDIOGRAM TRACING: CPT

## 2024-04-25 PROCEDURE — 83880 ASSAY OF NATRIURETIC PEPTIDE: CPT | Performed by: PHYSICIAN ASSISTANT

## 2024-04-25 PROCEDURE — 80053 COMPREHEN METABOLIC PANEL: CPT | Performed by: PHYSICIAN ASSISTANT

## 2024-04-25 PROCEDURE — 84484 ASSAY OF TROPONIN QUANT: CPT | Performed by: PHYSICIAN ASSISTANT

## 2024-04-25 PROCEDURE — 99285 EMERGENCY DEPT VISIT HI MDM: CPT | Mod: 25

## 2024-04-25 PROCEDURE — 25000003 PHARM REV CODE 250: Performed by: PHYSICIAN ASSISTANT

## 2024-04-25 PROCEDURE — 85025 COMPLETE CBC W/AUTO DIFF WBC: CPT | Performed by: PHYSICIAN ASSISTANT

## 2024-04-25 PROCEDURE — U0002 COVID-19 LAB TEST NON-CDC: HCPCS | Performed by: PHYSICIAN ASSISTANT

## 2024-04-25 RX ORDER — FUROSEMIDE 20 MG/1
20 TABLET ORAL
Status: COMPLETED | OUTPATIENT
Start: 2024-04-25 | End: 2024-04-25

## 2024-04-25 RX ADMIN — FUROSEMIDE 20 MG: 20 TABLET ORAL at 03:04

## 2024-04-25 NOTE — ED PROVIDER NOTES
Encounter Date: 4/25/2024       History     Chief Complaint   Patient presents with    Shortness of Breath     Patient presents for shortness of breath x 5 days that is worse when lying flat. Denies chest pain.      73-year-old male with history of atrial fibrillation (on Eliquis), hypertension, hyperlipidemia, GERD, JACKELYN who presents emergency department for shortness breath.  States for the past few weeks he has been having shortness of breath which is worse when he lays flat.  States over the past week it is worsened.  States it does improve when he sits up but also notes shortness of breath when he sits up.  Denies any chest pain or dyspnea on exertion.  Also notes 1 week of nasal congestion and postnasal drip.    The history is provided by the patient.     Review of patient's allergies indicates:   Allergen Reactions    Lipitor [atorvastatin] Itching     Past Medical History:   Diagnosis Date    A-fib     Acute blood loss anemia     Arthritis     Atrial flutter     Colon polyp     Diverticulosis     Dyslipidemia     Dyspnea on exertion 5/31/2021    GERD (gastroesophageal reflux disease)     on Protonix    Hepatic steatosis     Hyperlipidemia     Hypertension     Irregular heart beat     Lower GI bleed 5/31/2021    Multiple gastric ulcers 05/16/2018    gi scope per Dr. Houston    Obesity     Sleep apnea     uses CPAP    Unspecified disorder of kidney and ureter     kidney stones     Past Surgical History:   Procedure Laterality Date    ADENOIDECTOMY  1958    APPENDECTOMY      CATARACT EXTRACTION  2009    OS    CATARACT EXTRACTION W/  INTRAOCULAR LENS IMPLANT Right 06/03/2015    [id number] 14.5d//    CHOLECYSTECTOMY  2021    COLONOSCOPY N/A 10/10/2017    Procedure: COLONOSCOPY;  Surgeon: Jameson Houston MD;  Location: [facility];  Service: Endoscopy;  Laterality: N/A; repeat in 5 years for surveillance    COLONOSCOPY N/A 05/16/2018    Procedure: COLONOSCOPY;  Surgeon: Jameson Houston MD;  Location: [facility];   Service: Endoscopy;  Laterality: N/A;    COLONOSCOPY N/A 06/02/2021    Procedure: COLONOSCOPY;  Surgeon: Agustin Corona MD;  Location: [facility] (2ND FLR);  Service: Endoscopy;  Laterality: N/A;    COLONOSCOPY N/A 10/11/2022    Procedure: COLONOSCOPY;  Surgeon: Edin Lara MD;  Location: [facility] (2ND FLR);  Service: Endoscopy;  Laterality: N/A;  2nd floor-difficult intubation  ok to hold eliquis x2 days per Dr. Jerome, see telephone encounter 6/28-vt  fully vaccinated, instructions sent to [facility] [place]  extended miralax prep  Clear liquids up to 2 hrs prior/ AM prep [id number]    CORONARY ANGIOGRAPHY N/A 01/04/2021    Procedure: ANGIOGRAM, CORONARY ARTERY;  Surgeon: Jase Moore MD;  Location: [facility] CATH LAB/EP;  Service: Cardiology;  Laterality: N/A;    CYSTOSCOPY WITH INSERTION OF MINIMALLY INVASIVE IMPLANT TO ENLARGE PROSTATIC URETHRA N/A 06/29/2021    Procedure: TRANSPROSTATIC TISSUE RETRACTION;  Surgeon: Agustin Clay MD;  Location: [facility];  Service: Urology;  Laterality: N/A;    ESOPHAGOGASTRODUODENOSCOPY N/A 07/02/2018    Procedure: EGD (ESOPHAGOGASTRODUODENOSCOPY);  Surgeon: Jameson Houston MD;  Location: [facility];  Service: Endoscopy;  Laterality: N/A;    ESOPHAGOGASTRODUODENOSCOPY N/A 06/02/2021    Procedure: EGD (ESOPHAGOGASTRODUODENOSCOPY);  Surgeon: Agustin Corona MD;  Location: [facility] ([address] [place] [address]);  Service: Endoscopy;  Laterality: N/A;    EYE SURGERY      cataract    LEFT HEART CATHETERIZATION N/A 01/04/2021    Procedure: Left heart cath;  Surgeon: Jase Moore MD;  Location: [facility] CATH LAB/EP;  Service: Cardiology;  Laterality: N/A;    ROBOT-ASSISTED CHOLECYSTECTOMY N/A 02/04/2021    Procedure: ROBOTIC CHOLECYSTECTOMY;  Surgeon: Pal Cheney MD;  Location: [facility];  Service: General;  Laterality: N/A;    TONSILLECTOMY      TRANSESOPHAGEAL ECHOCARDIOGRAPHY N/A 05/14/2021    Procedure: ECHOCARDIOGRAM, TRANSESOPHAGEAL;  Surgeon: Leo Reeves III,  MD;  Location: [facility] EP LAB;  Service: Cardiology;  Laterality: N/A;    TREATMENT OF CARDIAC ARRHYTHMIA N/A 2021    Procedure: Cardioversion or Defibrillation;  Surgeon: Judy Jerome MD;  Location: [facility] CATH LAB/EP;  Service: Cardiology;  Laterality: N/A;    TREATMENT OF CARDIAC ARRHYTHMIA N/A 2021    Procedure: CARDIOVERSION;  Surgeon: PEMA Downs MD;  Location: [facility] EP LAB;  Service: Cardiology;  Laterality: N/A;  AF, PHUONG, DCCV, MAC, DM, 3 PREP    UPPER GASTROINTESTINAL ENDOSCOPY  2018    Dr. Houston     Family History   Problem Relation Name Age of Onset    Arthritis Mother Patrica     Heart disease Mother Patrica     Blindness Mother Patrica         OS due to injury    Coronary artery disease Father      Heart disease Father      Cataracts Father      Amblyopia Neg Hx      Cancer Neg Hx      Diabetes Neg Hx      Glaucoma Neg Hx      Hypertension Neg Hx      Macular degeneration Neg Hx      Retinal detachment Neg Hx      Strabismus Neg Hx      Stroke Neg Hx      Thyroid disease Neg Hx      Colon cancer Neg Hx      Colon polyps Neg Hx      Crohn's disease Neg Hx      Ulcerative colitis Neg Hx      Esophageal cancer Neg Hx      Stomach cancer Neg Hx       Social History     Tobacco Use    Smoking status: Former     Current packs/day: 0.00     Average packs/day: 2.0 packs/day for 11.0 years (22.0 ttl pk-yrs)     Types: Cigarettes     Start date: 1969     Quit date: 1980     Years since quittin.3    Smokeless tobacco: Never    Tobacco comments:     quit in     Substance Use Topics    Alcohol use: Not Currently     Alcohol/week: 2.0 standard drinks of alcohol     Types: 1 Glasses of wine, 1 Cans of beer per week     Comment: 1 beer every 2 month    Drug use: No     Review of Systems   Constitutional:  Negative for chills and fever.   HENT:  Positive for congestion and postnasal drip. Negative for sore throat.    Respiratory:  Positive for shortness of breath.     Cardiovascular:  Negative for chest pain.   Gastrointestinal:  Negative for abdominal pain.   Genitourinary:  Negative for difficulty urinating and dysuria.   Musculoskeletal: Negative.    Neurological:  Negative for weakness.       Physical Exam     Initial Vitals [04/25/24 1123]   BP Pulse Resp Temp SpO2   136/87 95 19 97.7 °F (36.5 °C) 95 %      MAP       --         Physical Exam    Nursing note and vitals reviewed.  Constitutional: He appears well-developed and well-nourished.   HENT:   Head: Normocephalic and atraumatic.   Eyes: Conjunctivae are normal.   Neck: Neck supple.   Normal range of motion.  Cardiovascular:  Normal rate.           Pulmonary/Chest: Breath sounds normal.   Abdominal: Abdomen is soft. There is no abdominal tenderness.   Musculoskeletal:         General: Edema (Bilateral trace pretibial edema) present. Normal range of motion.      Cervical back: Normal range of motion and neck supple.     Neurological: He is alert and oriented to person, place, and time. He has normal strength. GCS score is 15. GCS eye subscore is 4. GCS verbal subscore is 5. GCS motor subscore is 6.   Skin: Skin is warm and dry. Capillary refill takes less than 2 seconds.         ED Course   Procedures  Labs Reviewed   INFLUENZA A & B BY MOLECULAR   CBC W/ AUTO DIFFERENTIAL   COMPREHENSIVE METABOLIC PANEL   B-TYPE NATRIURETIC PEPTIDE   TROPONIN I   SARS-COV-2 RNA AMPLIFICATION, QUAL          Imaging Results    None          Medications - No data to display  Medical Decision Making  73-year-old male presents emergency department for shortness of breath.      Differential includes but not limited to CHF, pneumonia, COVID-19, influenza, viral syndrome, PE, ACS    Patient denies any chest pain.  No STEMI on EKG.  Troponin negative.  BNP normal.  Recent PHUONG 6 months ago she was EF of 60%.  Chest x-ray with mild attenuation with potential edema.  Mild trace lower extremity edema.  His vital signs are stable with no hypoxia.   Ambulatory sat 94% on room air.  No evidence of infectious processes viral screening was negative.  No tachycardic, tachypnea or hypoxia and he is compliant with his Eliquis low suspicion for PE.  He does endorse intermittent use of his CPAP and does have a large body habitus which may be contributing to his nighttime symptoms.  Recommend adhering to his CPAP dull he was fitted for a new mask.  Scheduled for PCP follow-up tomorrow.  Return ED precautions given    Amount and/or Complexity of Data Reviewed  Labs: ordered. Decision-making details documented in ED Course.  Radiology: ordered.               ED Course as of 04/25/24 1534   Thu Apr 25, 2024   1130 EKG 12-lead  No STEMI [AC]   1222 On my interpretation atrial fibrillation with controlled rate of 92.  Left axis deviation, similar to previous.  No STEMI.   [HJ]   1314 WBC: 9.28  No leukocytosis [HJ]      ED Course User Index  [AC] Norberto Sahu,   [HJ] Jesusita Moon PA-C                           Clinical Impression:  Final diagnoses:  [R06.02] Shortness of breath                 Jesusita Moon PA-C  04/27/24 0033

## 2024-04-25 NOTE — TELEPHONE ENCOUNTER
----- Message from María S Juan R sent at 4/25/2024  3:56 PM CDT -----  Contact: Mrs. Mancia Phone# [phone]  Caller is requesting an earlier appointment then we can schedule.  Caller is requesting a message be sent to the provider.  If this is for urgent care symptoms, did you offer other providers at this location, providers at other locations, or [facility]? (yes, no, n/a):  N/A  If this is for the patients physical, did you offer to schedule next available and put on wait list, or to see NP or PA for their physical?  (yes, no, n/a):  N/A  When is the next available appointment with their provider:  05/06/2024  Reason for the appointment:  HFU  Patient preference of timeframe to be scheduled:  As soon as possible.  Would the patient like a call back, or a response through their [facility] portal?:   Call

## 2024-04-25 NOTE — DISCHARGE INSTRUCTIONS
Your cardiac workup was normal today.  Recommend close follow-up with your PCP for further evaluation.  You will need to continue wearing your CPAP until you were fitted for a new mask.  If you develop any new or worsening symptoms you may return to ED sooner.

## 2024-04-25 NOTE — ED TRIAGE NOTES
Presents from home for sob x week. 95 % room air. Hx of A-Fib on Eliquis. Denies chest pain or palpitations or swelling to lower extremities. On Lasix. Pt Aox4. Pt denies abd pain, nausea, vomiting.

## 2024-04-26 ENCOUNTER — PATIENT MESSAGE (OUTPATIENT)
Dept: SLEEP MEDICINE | Facility: CLINIC | Age: 74
End: 2024-04-26
Payer: MEDICARE

## 2024-04-26 ENCOUNTER — PATIENT OUTREACH (OUTPATIENT)
Dept: EMERGENCY MEDICINE | Facility: HOSPITAL | Age: 74
End: 2024-04-26
Payer: MEDICARE

## 2024-04-26 ENCOUNTER — OFFICE VISIT (OUTPATIENT)
Dept: PRIMARY CARE CLINIC | Facility: CLINIC | Age: 74
End: 2024-04-26
Payer: MEDICARE

## 2024-04-26 VITALS
SYSTOLIC BLOOD PRESSURE: 142 MMHG | WEIGHT: 315 LBS | HEART RATE: 97 BPM | OXYGEN SATURATION: 99 % | DIASTOLIC BLOOD PRESSURE: 73 MMHG | BODY MASS INDEX: 40.43 KG/M2 | RESPIRATION RATE: 20 BRPM | HEIGHT: 74 IN

## 2024-04-26 DIAGNOSIS — F32.A ANXIETY AND DEPRESSION: ICD-10-CM

## 2024-04-26 DIAGNOSIS — G47.33 OSA (OBSTRUCTIVE SLEEP APNEA): ICD-10-CM

## 2024-04-26 DIAGNOSIS — I48.20 CHRONIC ATRIAL FIBRILLATION: ICD-10-CM

## 2024-04-26 DIAGNOSIS — R93.89 ABNORMAL X-RAY: ICD-10-CM

## 2024-04-26 DIAGNOSIS — G62.9 NEUROPATHY: ICD-10-CM

## 2024-04-26 DIAGNOSIS — F41.9 ANXIETY AND DEPRESSION: ICD-10-CM

## 2024-04-26 DIAGNOSIS — R05.9 COUGH, UNSPECIFIED TYPE: ICD-10-CM

## 2024-04-26 DIAGNOSIS — I11.9 HYPERTENSIVE HEART DISEASE WITHOUT HEART FAILURE: ICD-10-CM

## 2024-04-26 DIAGNOSIS — E66.01 MORBID OBESITY: Primary | ICD-10-CM

## 2024-04-26 DIAGNOSIS — I87.2 VENOUS INSUFFICIENCY: ICD-10-CM

## 2024-04-26 DIAGNOSIS — R06.09 DYSPNEA ON EXERTION: ICD-10-CM

## 2024-04-26 DIAGNOSIS — Z87.891 HISTORY OF TOBACCO USE: ICD-10-CM

## 2024-04-26 PROCEDURE — 3078F DIAST BP <80 MM HG: CPT | Mod: CPTII,S$GLB,, | Performed by: NURSE PRACTITIONER

## 2024-04-26 PROCEDURE — 1157F ADVNC CARE PLAN IN RCRD: CPT | Mod: CPTII,S$GLB,, | Performed by: NURSE PRACTITIONER

## 2024-04-26 PROCEDURE — 99999 PR PBB SHADOW E&M-EST. PATIENT-LVL V: CPT | Mod: PBBFAC,,, | Performed by: NURSE PRACTITIONER

## 2024-04-26 PROCEDURE — 3044F HG A1C LEVEL LT 7.0%: CPT | Mod: CPTII,S$GLB,, | Performed by: NURSE PRACTITIONER

## 2024-04-26 PROCEDURE — 3008F BODY MASS INDEX DOCD: CPT | Mod: CPTII,S$GLB,, | Performed by: NURSE PRACTITIONER

## 2024-04-26 PROCEDURE — 99214 OFFICE O/P EST MOD 30 MIN: CPT | Mod: S$GLB,,, | Performed by: NURSE PRACTITIONER

## 2024-04-26 PROCEDURE — 1159F MED LIST DOCD IN RCRD: CPT | Mod: CPTII,S$GLB,, | Performed by: NURSE PRACTITIONER

## 2024-04-26 PROCEDURE — 3077F SYST BP >= 140 MM HG: CPT | Mod: CPTII,S$GLB,, | Performed by: NURSE PRACTITIONER

## 2024-04-26 RX ORDER — FUROSEMIDE 20 MG/1
20 TABLET ORAL DAILY
Qty: 30 TABLET | Refills: 0 | Status: SHIPPED | OUTPATIENT
Start: 2024-04-26 | End: 2024-05-24 | Stop reason: SDUPTHER

## 2024-04-26 RX ORDER — FLUTICASONE PROPIONATE 50 MCG
1 SPRAY, SUSPENSION (ML) NASAL DAILY
Qty: 15.8 ML | Refills: 1 | Status: SHIPPED | OUTPATIENT
Start: 2024-04-26

## 2024-04-26 RX ORDER — FUROSEMIDE 20 MG/1
20 TABLET ORAL 2 TIMES DAILY
Qty: 60 TABLET | Refills: 11 | Status: CANCELLED | OUTPATIENT
Start: 2024-04-26 | End: 2025-04-26

## 2024-04-26 NOTE — PROGRESS NOTES
ED Navigator f/u with Pt regarding his recent ED visit for a SOB. Pt states he is still having some trouble with breathing. He tried to sleep with his CPAP machine last night but it was uncomfortable. He has sent a message to the sleep center for assist with getting a different mask. He will f/u with his PCP office this morning. Pt denies having any other concerns at this time. ED Navigator will continue to assist as needed.

## 2024-04-26 NOTE — PROGRESS NOTES
[facility] Note    Chief Complaint      Chief Complaint   Patient presents with    Follow-up       History of Present Illness      Zeenat Mancia is a 74 y.o. male with chronic conditions of afib, atrial flutter, hld, htn, who presents today for:  hospital follow up for shortness of breath X 5 days. EKG with no NSTEMI. BNP normal. PHUONG 6 months prior, showed EF 60%. CXR showed mild attenuation with potential edema. Sats 94% on RA.   Sats in office 99%. BMI 43. Sees pain management Luke (NP) due for steroid shot.    Hx of tobacco use: pt did smoke for 20 years, has quit for apprx 30 years. Did have abnormal CXR. Kris get CT of chest.   JACKELYN: pt does not like to wear CPAP due to being uncomfortable but got a new face mask and will try again. Reports can nap during the day easily.   Htn: bp slightly elevated today. Currently on amlodipine, BB.  Afib: sees Dr. Upton, on eliquis, BB.  Obesity: educated about weight loss. Interested in Bariatric referral.       Past Medical History:  Past Medical History:   Diagnosis Date    A-fib     Acute blood loss anemia     Arthritis     Atrial flutter     Colon polyp     Diverticulosis     Dyslipidemia     Dyspnea on exertion 5/31/2021    GERD (gastroesophageal reflux disease)     on Protonix    Hepatic steatosis     Hyperlipidemia     Hypertension     Irregular heart beat     Lower GI bleed 5/31/2021    Multiple gastric ulcers 05/16/2018    gi scope per Dr. Houston    Obesity     Sleep apnea     uses CPAP    Unspecified disorder of kidney and ureter     kidney stones       Past Surgical History:   has a past surgical history that includes Eye surgery; Tonsillectomy; Appendectomy; Cataract extraction (2009); Cataract extraction w/  intraocular lens implant (Right, 06/03/2015); Upper gastrointestinal endoscopy (05/16/2018); Colonoscopy (N/A, 10/10/2017); Colonoscopy (N/A, 05/16/2018); Esophagogastroduodenoscopy (N/A, 07/02/2018); Left heart catheterization (N/A,  2021); Coronary angiography (N/A, 2021); Robot-assisted cholecystectomy (N/A, 2021); Treatment of cardiac arrhythmia (N/A, 2021); Treatment of cardiac arrhythmia (N/A, 2021); Transesophageal echocardiography (N/A, 2021); Esophagogastroduodenoscopy (N/A, 2021); Colonoscopy (N/A, 2021); Cystoscopy with insertion of minimally invasive implant to enlarge prostatic urethra (N/A, 2021); Colonoscopy (N/A, 10/11/2022); Adenoidectomy (); and Cholecystectomy ().    Family History:  family history includes Arthritis in his mother; Blindness in his mother; Cataracts in his father; Coronary artery disease in his father; Heart disease in his father and mother.     Social History:  Social History     Tobacco Use    Smoking status: Former     Current packs/day: 0.00     Average packs/day: 2.0 packs/day for 11.0 years (22.0 ttl pk-yrs)     Types: Cigarettes     Start date: 1969     Quit date: 1980     Years since quittin.3    Smokeless tobacco: Never    Tobacco comments:     quit in     Substance Use Topics    Alcohol use: Not Currently     Alcohol/week: 2.0 standard drinks of alcohol     Types: 1 Glasses of wine, 1 Cans of beer per week     Comment: 1 beer every 2 month    Drug use: No       Review of Systems   Constitutional:  Negative for chills and fever.   Respiratory:  Positive for shortness of breath (chronic francisco). Negative for cough.    Cardiovascular:  Negative for chest pain, palpitations and orthopnea.   Gastrointestinal:  Negative for constipation, diarrhea, nausea and vomiting.   Genitourinary:  Negative for dysuria and hematuria.   Musculoskeletal:  Negative for falls.   Neurological:  Negative for headaches.        Medications:  Outpatient Encounter Medications as of 2024   Medication Sig Dispense Refill    amLODIPine (NORVASC) 5 MG tablet TAKE 1 TABLET(5 MG) BY MOUTH EVERY DAY 90 tablet 3    apixaban (ELIQUIS) 5 mg Tab Take 1 tablet  (5 mg total) by mouth 2 (two) times daily. 60 tablet 11    ARIPiprazole (ABILIFY) 2 MG Tab Take 2 mg by mouth every morning.      atenoloL (TENORMIN) 50 MG tablet TAKE 1 TABLET(50 MG) BY MOUTH EVERY DAY 90 tablet 3    carbidopa-levodopa  mg (SINEMET)  mg per tablet Start taking one tablet in the mornings for 1 week, then if tolerated increase to once in the morning and once in the evening for the 2nd week, if tolerating well you can increase to three times daily. 90 tablet 11    DULoxetine (CYMBALTA) 60 MG capsule TAKE 2 CAPSULES BY MOUTH ONCE DAILY 180 capsule 0    melatonin 10 mg Tab Take 10 mg by mouth every evening.      multivit-min/FA/lycopen/lutein (CENTRUM SILVER MEN ORAL) Take by mouth once daily.      pregabalin (LYRICA) 150 MG capsule TAKE 1 CAPSULE(150 MG) BY MOUTH TWICE DAILY 60 capsule 1    walker Misc 1 each by Misc.(Non-Drug; Combo Route) route once daily. 1 each 0    fluticasone propionate (FLONASE) 50 mcg/actuation nasal spray 1 spray (50 mcg total) by Each Nostril route once daily. 15.8 mL 1    furosemide (LASIX) 20 MG tablet Take 1 tablet (20 mg total) by mouth once daily. 30 tablet 0    pantoprazole (PROTONIX) 40 MG tablet TAKE 1 TABLET(40 MG) BY MOUTH EVERY DAY (Patient not taking: Reported on 4/26/2024) 90 tablet 2    pregabalin (LYRICA) 50 MG capsule Take one capsule (50 mg) in addition to the 150 mg capsule for total dose of 200 mg twice daily (Patient not taking: Reported on 4/26/2024) 60 capsule 6    sildenafiL (VIAGRA) 100 MG tablet Take 1 tablet (100 mg total) by mouth daily as needed for Erectile Dysfunction. (Patient not taking: Reported on 4/26/2024) 10 tablet 3    tadalafiL (CIALIS) 20 MG Tab Take 1 tablet (20 mg total) by mouth every 72 hours as needed (ED). 10 tablet 11    tamsulosin (FLOMAX) 0.4 mg Cap TAKE 1 CAPSULE BY MOUTH EVERY DAY (Patient not taking: Reported on 4/26/2024) 30 capsule 11    traZODone (DESYREL) 100 MG tablet Take 1 tablet (100 mg total) by mouth  every evening. 30 tablet 2    [DISCONTINUED] furosemide (LASIX) 20 MG tablet TAKE 1 TABLET BY MOUTH DAILY AS NEEDED FOR SWELLING (Patient not taking: Reported on 4/26/2024) 30 tablet 0     Facility-Administered Encounter Medications as of 4/26/2024   Medication Dose Route Frequency Provider Last Rate Last Admin    capsaicin-skin cleanser patch 2 patch  2 patch Topical (Top) 1 time in Clinic/HOD Rafael Read FNP        [COMPLETED] furosemide tablet 20 mg  20 mg Oral ED 1 Time Jesusita Moon PA-C   20 mg at 04/25/24 1546    perflutren protein-A microsphr 0.22 mg/mL IV susp  0.5 mL Intravenous Once Mauri Portillo MD           Allergies:  Review of patient's allergies indicates:   Allergen Reactions    Lipitor [atorvastatin] Itching       Health Maintenance:  Immunization History   Administered Date(s) Administered    COVID-19, MRNA, LN-S, PF (Pfizer) (Gray Cap) 08/11/2022    COVID-19, MRNA, LN-S, PF (Pfizer) (Purple Cap) 01/05/2021, 01/26/2021, 10/28/2021    Influenza 10/04/2011, 10/18/2018, 08/28/2019    Influenza (FLUAD) - Quadrivalent - Adjuvanted - PF *Preferred* (65+) 09/08/2020, 11/18/2021, 10/21/2022    Influenza (FLUAD) - Trivalent - Adjuvanted - PF (65+) 11/27/2018, 08/29/2019    Influenza - High Dose - PF (65 years and older) 11/18/2015, 01/05/2018, 11/27/2018    Influenza - Quadrivalent 10/13/2014    Influenza Split 10/04/2011    Pneumococcal Conjugate - 13 Valent 08/23/2016    Pneumococcal Polysaccharide - 23 Valent 08/17/2015    Tdap 08/29/2019    Zoster 08/17/2015    Zoster Recombinant 06/03/2022, 11/04/2022      Health Maintenance   Topic Date Due    Colorectal Cancer Screening  10/11/2027    Lipid Panel  10/20/2027    TETANUS VACCINE  08/29/2029    Hepatitis C Screening  Completed    Shingles Vaccine  Completed    Abdominal Aortic Aneurysm Screening  Completed        Physical Exam      Vital Signs  Pulse: 97  Resp: 20  SpO2: 99 %  BP: (!) 142/73  BP Location: Right arm  Patient Position:  "Sitting  Height and Weight  Height: 6' 2" (188 cm)  Weight: (!) 152.7 kg (336 lb 10.3 oz)  BSA (Calculated - sq m): 2.82 sq meters  BMI (Calculated): 43.2  Weight in (lb) to have BMI = 25: 194.3]    Physical Exam  Constitutional:       Appearance: He is well-developed.   HENT:      Head: Normocephalic and atraumatic.   Neck:      Thyroid: No thyromegaly.      Vascular: No carotid bruit.   Cardiovascular:      Rate and Rhythm: Normal rate and regular rhythm.      Pulses: Normal pulses.      Heart sounds: No murmur heard.  Pulmonary:      Effort: Pulmonary effort is normal. No respiratory distress.      Breath sounds: Normal breath sounds.   Abdominal:      General: There is no distension.      Palpations: Abdomen is soft.      Tenderness: There is no abdominal tenderness.   Musculoskeletal:         General: Normal range of motion.      Cervical back: Normal range of motion.      Right lower leg: Edema (trace) present.      Left lower leg: Edema (trace) present.   Skin:     General: Skin is warm and dry.      Capillary Refill: Capillary refill takes less than 2 seconds.   Neurological:      General: No focal deficit present.      Mental Status: He is alert and oriented to person, place, and time.   Psychiatric:         Behavior: Behavior normal.          Laboratory:  CBC:  Recent Labs   Lab 10/20/22  0716 05/26/23  1248 04/25/24  1258   WBC 7.70 8.19 9.28   RBC 6.15 6.20 6.08   Hemoglobin 18.1 H 16.6 16.1   Hematocrit 57.9 H 55.3 H 52.3   Platelets 265 234 218   MCV 94 89 86   MCH 29.4 26.8 L 26.5 L   MCHC 31.3 L 30.0 L 30.8 L     CMP:  Recent Labs   Lab 04/19/22  1235 05/05/22  1449 10/20/22  0716 06/22/23  1014 04/25/24  1258   Glucose 107  --  103  --  112 H   Calcium 9.9  --  9.6  --  9.3   Albumin 3.9  --  3.4 L  --  3.6   Total Protein 7.5  --  7.1  --  7.2   Sodium 144  --  139  --  139   Potassium 4.4  --  4.6   < > 4.2   CO2 31 H  --  28  --  29   Carbon Monoxide, Blood  --    < >  --   --   --    Chloride 98 "  --  100  --  102   BUN 15  --  15  --  14   Alkaline Phosphatase 65  --  148 H  --  79   ALT 16  --  44  --  10   AST 15  --  36  --  13   Total Bilirubin 1.0  --  1.8 H  --  0.5    < > = values in this interval not displayed.     URINALYSIS:  Recent Labs   Lab 04/28/22  1023 10/20/22  0701   Color, UA Yellow Yellow   Clarity, UA Clear  --    Spec Grav UA 1.02  --    Specific Gravity, UA  --  1.020   pH, UA 5 5.0   Protein, UA  --  Negative   Nitrite, UA Negative Negative   Leukocytes, UA  --  Negative   Urobilinogen, UA Normal  --       LIPIDS:  Recent Labs   Lab 09/17/21  0830 10/20/22  0716 11/07/23  1212   TSH 1.363 1.502 1.182   HDL 36 L 43  --    Cholesterol 133 157  --    Triglycerides 143 120  --    LDL Cholesterol 68.4 90.0  --    HDL/Cholesterol Ratio 27.1 27.4  --    Non-HDL Cholesterol 97 114  --    Total Cholesterol/HDL Ratio 3.7 3.7  --      TSH:  Recent Labs   Lab 09/17/21  0830 10/20/22  0716 11/07/23  1212   TSH 1.363 1.502 1.182     A1C:  Recent Labs   Lab 10/20/22  0716 02/20/24  1133   Hemoglobin A1C 5.5 5.6       Assessment/Plan     Zeenat Mancia is a 74 y.o.male with:    1. Morbid obesity  - Ambulatory referral/consult to Bariatric/Obesity Medicine; Future  - Ambulatory referral/consult to Bariatric Surgery; Future  -discussed diet and weight loss.     2. Dyspnea on exertion  - CT Chest Without Contrast; Future  - furosemide (LASIX) 20 MG tablet; Take 1 tablet (20 mg total) by mouth once daily.  Dispense: 30 tablet; Refill: 0    3. Chronic atrial fibrillation  Stable. Continue current meds.  Continue follow up with Cardiology.    4. BMI 40.0-44.9, adult  - Ambulatory referral/consult to Bariatric/Obesity Medicine; Future  - Ambulatory referral/consult to Bariatric Surgery; Future    5. History of tobacco use  - CT Chest Without Contrast; Future    6. Cough, unspecified type  - CT Chest Without Contrast; Future  - fluticasone propionate (FLONASE) 50 mcg/actuation nasal spray; 1 spray (50 mcg  total) by Each Nostril route once daily.  Dispense: 15.8 mL; Refill: 1    7. Abnormal x-ray  - CT Chest Without Contrast; Future    8. Hypertensive heart disease without heart failure  Stable. Continue current meds.      9. Neuropathy  Stable. Continue  follow up with specialists.     10. JACKELYN (obstructive sleep apnea)  Stable. Encouraged use of CPAP, will try different mask.    11. Anxiety and depression  Stable. Continue current meds.      12. Venous insufficiency  - furosemide (LASIX) 20 MG tablet; Take 1 tablet (20 mg total) by mouth once daily.  Dispense: 30 tablet; Refill: 0     Go to ER if have worsening sob or chest pain.    Chronic conditions status updated as per HPI.  Other than changes above, cont current medications and maintain follow up with specialists.  No follow-ups on file.    Future Appointments   Date Time Provider Department Center   5/10/2024  1:45 PM OC US2 [facility] ULTRA [place]   5/13/2024  1:00 PM Leonel Perez DPM [facility] POD [facility]   5/24/2024 10:00 AM Jackie Upton MD OC CARDIO [place]   6/3/2024 10:00 AM Doris Moreno MD [facility] NEURO [facility]   6/12/2024 11:00 AM [facility] BARIATRIC MED WT LOSS FIN COORD [facility] BARIAT [facility]   6/24/2024  1:00 PM Rafael Read FNP [facility] PAINMGT [facility]   6/27/2024 11:20 AM Lore Greer, NP [facility] SLEEP [facility]   7/23/2024 10:00 AM Steven Dejesus, OD [facility] OPTOMTY [place]   9/24/2024  1:00 PM Elias Thomas MD [facility] PRICRE [place]       Adreinne Cotaya, FNP [facility]

## 2024-04-30 ENCOUNTER — PATIENT MESSAGE (OUTPATIENT)
Dept: PAIN MEDICINE | Facility: CLINIC | Age: 74
End: 2024-04-30
Payer: MEDICARE

## 2024-04-30 ENCOUNTER — HOSPITAL ENCOUNTER (OUTPATIENT)
Dept: RADIOLOGY | Facility: HOSPITAL | Age: 74
Discharge: HOME | End: 2024-04-30
Attending: NURSE PRACTITIONER
Payer: MEDICARE

## 2024-04-30 ENCOUNTER — TELEPHONE (OUTPATIENT)
Dept: PAIN MEDICINE | Facility: CLINIC | Age: 74
End: 2024-04-30
Payer: MEDICARE

## 2024-04-30 DIAGNOSIS — R93.89 ABNORMAL X-RAY: ICD-10-CM

## 2024-04-30 DIAGNOSIS — R05.9 COUGH, UNSPECIFIED TYPE: ICD-10-CM

## 2024-04-30 DIAGNOSIS — R06.09 DYSPNEA ON EXERTION: ICD-10-CM

## 2024-04-30 DIAGNOSIS — Z87.891 HISTORY OF TOBACCO USE: ICD-10-CM

## 2024-04-30 PROCEDURE — 71250 CT THORAX DX C-: CPT | Mod: TC

## 2024-04-30 PROCEDURE — 71250 CT THORAX DX C-: CPT | Mod: 26,,, | Performed by: STUDENT IN AN ORGANIZED HEALTH CARE EDUCATION/TRAINING PROGRAM

## 2024-04-30 NOTE — TELEPHONE ENCOUNTER
Called the patient and left him a voice male about squeezing him in tomorrow for an injection in his left thigh.

## 2024-05-01 ENCOUNTER — PATIENT MESSAGE (OUTPATIENT)
Dept: PAIN MEDICINE | Facility: CLINIC | Age: 74
End: 2024-05-01
Payer: MEDICARE

## 2024-05-01 ENCOUNTER — TELEPHONE (OUTPATIENT)
Dept: PRIMARY CARE CLINIC | Facility: CLINIC | Age: 74
End: 2024-05-01
Payer: MEDICARE

## 2024-05-01 ENCOUNTER — TELEPHONE (OUTPATIENT)
Dept: PAIN MEDICINE | Facility: CLINIC | Age: 74
End: 2024-05-01
Payer: MEDICARE

## 2024-05-01 DIAGNOSIS — E66.01 MORBID OBESITY DUE TO EXCESS CALORIES: Primary | ICD-10-CM

## 2024-05-01 DIAGNOSIS — K76.0 FATTY LIVER: ICD-10-CM

## 2024-05-01 NOTE — TELEPHONE ENCOUNTER
----- Message from María Pete sent at 5/1/2024  8:24 AM CDT -----  Contact: [facility]  [phone] Mrs. Mancia  Patients wife Mrs. Mancia said that you have placed an order for a walker for the patient. Mrs. Mancia said that she would like for you to add to the order that the patient would like to get a Rolator with a seat, brakes and over three hundred pounds.

## 2024-05-01 NOTE — TELEPHONE ENCOUNTER
Called the patient and left a message on his wife's VM that his appointment for tomorrow 5/2 is cancelled. His appointment is scheduled for 6/24.

## 2024-05-02 ENCOUNTER — PATIENT MESSAGE (OUTPATIENT)
Dept: PAIN MEDICINE | Facility: CLINIC | Age: 74
End: 2024-05-02
Payer: MEDICARE

## 2024-05-02 NOTE — TELEPHONE ENCOUNTER
Neuropathy is very tough to treat if the could Qutenza patches are not effective does not make sense to continue them. I have no procedures to offer him.  Walking and exercise has been recommended not sure if this patient is willing to do this or if he even can.

## 2024-05-03 ENCOUNTER — TELEPHONE (OUTPATIENT)
Dept: PRIMARY CARE CLINIC | Facility: CLINIC | Age: 74
End: 2024-05-03
Payer: MEDICARE

## 2024-05-03 NOTE — TELEPHONE ENCOUNTER
Pt called earlier this week for DME orders for Walker Rolator with seat and brakes, weight is 336 lb so will need to have weight limit of at least 400 lbs.     [facility] calling about canceled walker order but could not locate previous order on file. Please advise

## 2024-05-03 NOTE — TELEPHONE ENCOUNTER
----- Message from Myrna Tellez sent at 5/3/2024 10:34 AM CDT -----  Contact: [facility] 1 [phone]  Pharmacy is calling to clarify an RX.  RX name: walker Misc  What do they need to clarify:  Leo is calling about the cancellation on the pt walker; needs the reason why it was cancelled   Comments:

## 2024-05-06 ENCOUNTER — PATIENT MESSAGE (OUTPATIENT)
Dept: SLEEP MEDICINE | Facility: CLINIC | Age: 74
End: 2024-05-06
Payer: MEDICARE

## 2024-05-06 ENCOUNTER — PATIENT MESSAGE (OUTPATIENT)
Dept: PODIATRY | Facility: CLINIC | Age: 74
End: 2024-05-06
Payer: MEDICARE

## 2024-05-08 ENCOUNTER — TELEPHONE (OUTPATIENT)
Dept: PRIMARY CARE CLINIC | Facility: CLINIC | Age: 74
End: 2024-05-08
Payer: MEDICARE

## 2024-05-08 NOTE — TELEPHONE ENCOUNTER
Good afternoon, I see you are scheduled for follow up with doctor Short. Please keep appointment as scheduled.     Have a good day.

## 2024-05-08 NOTE — TELEPHONE ENCOUNTER
----- Message from Adolfo Amezcua sent at 5/8/2024  2:22 PM CDT -----  Regarding: Walker for patient needs to be 300 lbs+  Contact: Kerri (wife of patient) [phone]  1MEDICALADVICE     Patient is calling for Medical Advice regarding: Patient's wife called to fufill an order for a walker. Ordered walker needs to be able to hold 300 lbs with seat and breaks.    How long has patient had these symptoms:    Pharmacy name and phone#:    Would like response via [employer]:  ##    Comments:    Call patient to discuss walker details

## 2024-05-10 ENCOUNTER — HOSPITAL ENCOUNTER (OUTPATIENT)
Dept: RADIOLOGY | Facility: HOSPITAL | Age: 74
Discharge: HOME | End: 2024-05-10
Attending: NURSE PRACTITIONER
Payer: MEDICARE

## 2024-05-10 ENCOUNTER — PATIENT MESSAGE (OUTPATIENT)
Dept: PRIMARY CARE CLINIC | Facility: CLINIC | Age: 74
End: 2024-05-10
Payer: MEDICARE

## 2024-05-10 DIAGNOSIS — Z74.09 IMPAIRED FUNCTIONAL MOBILITY, BALANCE, GAIT, AND ENDURANCE: ICD-10-CM

## 2024-05-10 DIAGNOSIS — R29.898 WEAKNESS OF BOTH LOWER EXTREMITIES: ICD-10-CM

## 2024-05-10 DIAGNOSIS — M54.16 LUMBAR RADICULITIS: Primary | ICD-10-CM

## 2024-05-10 DIAGNOSIS — E66.01 MORBID OBESITY DUE TO EXCESS CALORIES: ICD-10-CM

## 2024-05-10 DIAGNOSIS — K76.0 FATTY LIVER: ICD-10-CM

## 2024-05-10 PROCEDURE — 76705 ECHO EXAM OF ABDOMEN: CPT | Mod: TC

## 2024-05-10 PROCEDURE — 76705 ECHO EXAM OF ABDOMEN: CPT | Mod: 26,,, | Performed by: INTERNAL MEDICINE

## 2024-05-13 ENCOUNTER — TELEPHONE (OUTPATIENT)
Dept: PRIMARY CARE CLINIC | Facility: CLINIC | Age: 74
End: 2024-05-13
Payer: MEDICARE

## 2024-05-13 ENCOUNTER — OFFICE VISIT (OUTPATIENT)
Dept: PODIATRY | Facility: CLINIC | Age: 74
End: 2024-05-13
Payer: MEDICARE

## 2024-05-13 VITALS
BODY MASS INDEX: 43.22 KG/M2 | HEIGHT: 74 IN | HEART RATE: 90 BPM | SYSTOLIC BLOOD PRESSURE: 143 MMHG | DIASTOLIC BLOOD PRESSURE: 73 MMHG

## 2024-05-13 DIAGNOSIS — M79.2 NEURALGIA: ICD-10-CM

## 2024-05-13 DIAGNOSIS — G62.9 NEUROPATHY: ICD-10-CM

## 2024-05-13 DIAGNOSIS — G58.9 NERVE ENTRAPMENT SYNDROME: Primary | ICD-10-CM

## 2024-05-13 PROCEDURE — 3077F SYST BP >= 140 MM HG: CPT | Mod: CPTII,S$GLB,, | Performed by: PODIATRIST

## 2024-05-13 PROCEDURE — 3078F DIAST BP <80 MM HG: CPT | Mod: CPTII,S$GLB,, | Performed by: PODIATRIST

## 2024-05-13 PROCEDURE — 3044F HG A1C LEVEL LT 7.0%: CPT | Mod: CPTII,S$GLB,, | Performed by: PODIATRIST

## 2024-05-13 PROCEDURE — 1100F PTFALLS ASSESS-DOCD GE2>/YR: CPT | Mod: CPTII,S$GLB,, | Performed by: PODIATRIST

## 2024-05-13 PROCEDURE — 99999 PR PBB SHADOW E&M-EST. PATIENT-LVL IV: CPT | Mod: PBBFAC,,, | Performed by: PODIATRIST

## 2024-05-13 PROCEDURE — 1125F AMNT PAIN NOTED PAIN PRSNT: CPT | Mod: CPTII,S$GLB,, | Performed by: PODIATRIST

## 2024-05-13 PROCEDURE — 3288F FALL RISK ASSESSMENT DOCD: CPT | Mod: CPTII,S$GLB,, | Performed by: PODIATRIST

## 2024-05-13 PROCEDURE — 1159F MED LIST DOCD IN RCRD: CPT | Mod: CPTII,S$GLB,, | Performed by: PODIATRIST

## 2024-05-13 PROCEDURE — 1157F ADVNC CARE PLAN IN RCRD: CPT | Mod: CPTII,S$GLB,, | Performed by: PODIATRIST

## 2024-05-13 PROCEDURE — 99214 OFFICE O/P EST MOD 30 MIN: CPT | Mod: S$GLB,,, | Performed by: PODIATRIST

## 2024-05-13 RX ORDER — HYDROCHLOROTHIAZIDE 25 MG/1
TABLET ORAL
COMMUNITY
Start: 2024-04-29 | End: 2024-05-24

## 2024-05-13 RX ORDER — FLUOXETINE HYDROCHLORIDE 20 MG/1
20 CAPSULE ORAL NIGHTLY
COMMUNITY
Start: 2024-04-19

## 2024-05-13 RX ORDER — FLUOXETINE 10 MG/1
10 CAPSULE ORAL NIGHTLY
COMMUNITY
Start: 2024-04-19

## 2024-05-13 NOTE — TELEPHONE ENCOUNTER
----- Message from Betsey Alvarez MA sent at 5/13/2024 10:47 AM CDT -----  Danae from [facility] calling to get updated orders for heavy duty walker but the member want a heavy duty Rolator. Please fax orders to [phone] if any questions or concerns please call her back at [phone].

## 2024-05-13 NOTE — TELEPHONE ENCOUNTER
----- Message from Joanna Martinez sent at 4/25/2024 10:06 AM CDT -----  Good Morning..  The above pt is experiencing severe trouble breathing.. we strongly recommended he go to the nearest ER.. He and his wife voiced understanding..  Thank you,  Joanna Martinez - [facility] call

## 2024-05-14 ENCOUNTER — TELEPHONE (OUTPATIENT)
Dept: PRIMARY CARE CLINIC | Facility: CLINIC | Age: 74
End: 2024-05-14
Payer: MEDICARE

## 2024-05-14 DIAGNOSIS — E66.01 MORBID OBESITY: ICD-10-CM

## 2024-05-14 DIAGNOSIS — R29.898 WEAKNESS OF BOTH LOWER EXTREMITIES: Primary | ICD-10-CM

## 2024-05-14 DIAGNOSIS — Z74.09 IMPAIRED FUNCTIONAL MOBILITY, BALANCE, GAIT, AND ENDURANCE: ICD-10-CM

## 2024-05-14 DIAGNOSIS — M54.16 LUMBAR RADICULITIS: ICD-10-CM

## 2024-05-14 NOTE — TELEPHONE ENCOUNTER
Yadiel for Iesha, this will be the 3rd time we are sending the from and Rx. Re-faxing now. Not sure what they are NOT getting or needing

## 2024-05-14 NOTE — TELEPHONE ENCOUNTER
----- Message from Hilary Narvaez sent at 5/14/2024 11:20 AM CDT -----  Contact: [phone] iesha  1MEDICALADVICE     Patient is calling for Medical Advice regarding: Iesha with [facility] insurance [phone] calling for you to send a updated order for a heavy duty rollator.  Please fax to :[phone].     How long has patient had these symptoms:    Pharmacy name and phone#:    Would like response via [employer]:  call abck    Comments:

## 2024-05-15 NOTE — TELEPHONE ENCOUNTER
Brian, Dr. Upton is fully booked through second week of August. I have one spot at 9:40 am the day before on the 23rd I can add him on but nothing sooner. Please let me know if this will work. Otherwise ED is recommended if issues continue or worsen.   Thanks

## 2024-05-16 DIAGNOSIS — R16.0 HEPATOMEGALY: Primary | ICD-10-CM

## 2024-05-16 DIAGNOSIS — R06.09 DYSPNEA ON EXERTION: Primary | ICD-10-CM

## 2024-05-17 ENCOUNTER — PATIENT MESSAGE (OUTPATIENT)
Dept: CARDIOLOGY | Facility: CLINIC | Age: 74
End: 2024-05-17
Payer: MEDICARE

## 2024-05-17 RX ORDER — APIXABAN 5 MG/1
5 TABLET, FILM COATED ORAL 2 TIMES DAILY
Qty: 180 TABLET | Refills: 3 | Status: SHIPPED | OUTPATIENT
Start: 2024-05-17

## 2024-05-19 ENCOUNTER — PATIENT MESSAGE (OUTPATIENT)
Dept: PODIATRY | Facility: CLINIC | Age: 74
End: 2024-05-19
Payer: MEDICARE

## 2024-05-19 NOTE — PROGRESS NOTES
Subjective:      Patient ID: Zeenat Mancia is a 74 y.o. male.    Chief Complaint: Peripheral Neuropathy and Numbness (Bilateral )    Zeenat is a 74 y.o. male who presents to the podiatry clinic  with complaint of  bilateral foot pain. Onset of the symptoms was several months ago. Precipitating event: none known. Current symptoms include:  Burning tingling and numbness . Aggravating factors: inactivity. Symptoms have gradually worsened.   He did very well with the diagnostic nerve blocks.  He would like to discuss possible surgical intervention.    Review of Systems   Constitutional: Negative for chills, decreased appetite, fever and malaise/fatigue.   HENT:  Negative for congestion, hearing loss, nosebleeds and tinnitus.    Eyes:  Negative for double vision, pain, photophobia and visual disturbance.   Cardiovascular:  Negative for chest pain, claudication, cyanosis and leg swelling.   Respiratory:  Negative for cough, hemoptysis, shortness of breath and wheezing.    Endocrine: Negative for cold intolerance and heat intolerance.   Hematologic/Lymphatic: Negative for adenopathy and bleeding problem.   Skin:  Negative for color change, dry skin, itching, nail changes and suspicious lesions.   Musculoskeletal:  Positive for joint pain and myalgias. Negative for arthritis and stiffness.   Gastrointestinal:  Negative for abdominal pain, jaundice, nausea and vomiting.   Genitourinary:  Negative for dysuria, frequency and hematuria.   Neurological:  Positive for numbness, paresthesias and sensory change. Negative for difficulty with concentration and loss of balance.   Psychiatric/Behavioral:  Negative for altered mental status, hallucinations and suicidal ideas. The patient is not nervous/anxious.    Allergic/Immunologic: Negative for environmental allergies and persistent infections.           Objective:      Physical Exam  Constitutional:       Appearance: He is well-developed.   HENT:      Head: Normocephalic and  atraumatic.   Cardiovascular:      Pulses:           Dorsalis pedis pulses are 2+ on the right side and 2+ on the left side.        Posterior tibial pulses are 2+ on the right side and 2+ on the left side.   Pulmonary:      Effort: Pulmonary effort is normal.   Musculoskeletal:      Right foot: Normal range of motion. No deformity.      Left foot: Normal range of motion. No deformity.      Comments: Inspection and palpation of the muscles joints and bones of both lower extremities reveal that muscle strength for the anterior, lateral, and posterior muscle groups and intrinsic muscle groups of the foot are all 5 over 5 symmetrical. Ankle, subtalar, midtarsal, and digital joint range of motion  are within normal limits, nonpainful, without crepitus or effusion. Patient exhibits a normal angle and base of gait. Palpation of the tendons reveal no defects.     Feet:      Right foot:      Skin integrity: No skin breakdown or erythema.      Left foot:      Skin integrity: No skin breakdown or erythema.   Skin:     General: Skin is warm and dry.      Nails: There is no clubbing.      Comments: Skin turgor is normal bilaterally. Skin texture is well hydrated to both lower extremities. No lesions or rashes or wounds appreciated bilaterally. Nail plates 1 through 5 bilaterally are within normal limits for length and thickness. No nail clubbing or incurvation noted.   Neurological:      Mental Status: He is alert and oriented to person, place, and time.      Deep Tendon Reflexes:      Reflex Scores:       Patellar reflexes are 2+ on the right side and 2+ on the left side.       Achilles reflexes are 2+ on the right side and 2+ on the left side.     Comments: Sharp, dull, light touch, vibratory, and proprioceptive sensation are intact bilaterally. Deep tendon reflexes to patellar and Achilles tendon are symmetrical, 2/4 bilaterally. No ankle clonus or Babinski reflexes noted bilaterally. Coordination is normal to both feet and  "lower extremities.    Positive tarsal tunnel Tinel sign and tenderness with palpation overlying bilateral deep peroneal nerves.   Psychiatric:         Behavior: Behavior normal.               Assessment:       Encounter Diagnoses   Name Primary?    Nerve entrapment syndrome Yes    Neuralgia     Neuropathy          Plan:       Zeenat ZARATE" was seen today for peripheral neuropathy and numbness.    Diagnoses and all orders for this visit:    Nerve entrapment syndrome    Neuralgia    Neuropathy      I counseled the patient on his conditions, their implications and medical management.      The nature of the condition, options for management, as well as potential risks and complications were discussed in detail with patient. Patient was amenable to my recommendations and left my office fully informed and will follow up as instructed or sooner if necessary.      Discussed options for peripheral neuropathy/nerve entrapment syndrome including nerve block therapy, surgical nerve entrapment decompression procedures, and various vitamins and supplementation available shown to improve nerve function.    The patient has decided to forego any further conservative treatment and opted for surgical intervention.  Alternative treatments, and benefits of surgery were discussed with the patient.  Risks and complications, including but not limited to: pain, swelling, numbness, infection, failure to achieve the stated goals, recurrence of problem, nerve and blood vessel damage, scar tissue formation, further need of surgery, loss of limb or life, was discussed in detail with the patient.  All questions asked were answered, and written informed consent was sign and received. The patient will undergo tarsal tunnel release.                "

## 2024-05-22 ENCOUNTER — PATIENT OUTREACH (OUTPATIENT)
Dept: EMERGENCY MEDICINE | Facility: HOSPITAL | Age: 74
End: 2024-05-22
Payer: MEDICARE

## 2024-05-22 NOTE — PROGRESS NOTES
Call placed per ED Navigator to f/u from last encounter. No answer. V/m left. ED navigator will follow-up with patient and assist as needed.

## 2024-05-22 NOTE — PROGRESS NOTES
A call was placed to remind Pt of his upcoming appointments with [facility], Neurology on 5-23-24 at  11:40am and with [facility], Cardiology on 5-24-24 at 10am. No v/m. Detailed email sent.      Final         Treatment Summary has been discussed and given to patient: n/a        -------------------------------------------------------------------------------------------------------------------  Please call our office at [phone] if you have any  of the following symptoms:   Fever of 100.5 or greater  Chills  Shortness of breath  Swelling or pain in one leg    After office hours an answering service is available and will contact a provider for emergencies or if you are experiencing any of the above symptoms. Patient does express an interest in My Chart. My Chart log in information explained on the after visit summary printout at the [facility] desk.     Yrn June, MA

## 2024-05-23 ENCOUNTER — OFFICE VISIT (OUTPATIENT)
Dept: NEUROLOGY | Facility: CLINIC | Age: 74
End: 2024-05-23
Payer: MEDICARE

## 2024-05-23 VITALS
HEART RATE: 78 BPM | HEIGHT: 74 IN | WEIGHT: 315 LBS | SYSTOLIC BLOOD PRESSURE: 121 MMHG | DIASTOLIC BLOOD PRESSURE: 82 MMHG | BODY MASS INDEX: 40.43 KG/M2

## 2024-05-23 DIAGNOSIS — M70.61 TROCHANTERIC BURSITIS OF RIGHT HIP: ICD-10-CM

## 2024-05-23 DIAGNOSIS — G20.A1 PARKINSON'S DISEASE WITHOUT DYSKINESIA OR FLUCTUATING MANIFESTATIONS: Primary | ICD-10-CM

## 2024-05-23 DIAGNOSIS — R25.1 TREMORS OF NERVOUS SYSTEM: ICD-10-CM

## 2024-05-23 DIAGNOSIS — Z74.09 IMPAIRED FUNCTIONAL MOBILITY, BALANCE, GAIT, AND ENDURANCE: ICD-10-CM

## 2024-05-23 PROCEDURE — 1157F ADVNC CARE PLAN IN RCRD: CPT | Mod: CPTII,S$GLB,, | Performed by: STUDENT IN AN ORGANIZED HEALTH CARE EDUCATION/TRAINING PROGRAM

## 2024-05-23 PROCEDURE — 3044F HG A1C LEVEL LT 7.0%: CPT | Mod: CPTII,S$GLB,, | Performed by: STUDENT IN AN ORGANIZED HEALTH CARE EDUCATION/TRAINING PROGRAM

## 2024-05-23 PROCEDURE — 1100F PTFALLS ASSESS-DOCD GE2>/YR: CPT | Mod: CPTII,S$GLB,, | Performed by: STUDENT IN AN ORGANIZED HEALTH CARE EDUCATION/TRAINING PROGRAM

## 2024-05-23 PROCEDURE — 1125F AMNT PAIN NOTED PAIN PRSNT: CPT | Mod: CPTII,S$GLB,, | Performed by: STUDENT IN AN ORGANIZED HEALTH CARE EDUCATION/TRAINING PROGRAM

## 2024-05-23 PROCEDURE — 3288F FALL RISK ASSESSMENT DOCD: CPT | Mod: CPTII,S$GLB,, | Performed by: STUDENT IN AN ORGANIZED HEALTH CARE EDUCATION/TRAINING PROGRAM

## 2024-05-23 PROCEDURE — 1159F MED LIST DOCD IN RCRD: CPT | Mod: CPTII,S$GLB,, | Performed by: STUDENT IN AN ORGANIZED HEALTH CARE EDUCATION/TRAINING PROGRAM

## 2024-05-23 PROCEDURE — 99999 PR PBB SHADOW E&M-EST. PATIENT-LVL IV: CPT | Mod: PBBFAC,,, | Performed by: STUDENT IN AN ORGANIZED HEALTH CARE EDUCATION/TRAINING PROGRAM

## 2024-05-23 PROCEDURE — 99214 OFFICE O/P EST MOD 30 MIN: CPT | Mod: S$GLB,,, | Performed by: STUDENT IN AN ORGANIZED HEALTH CARE EDUCATION/TRAINING PROGRAM

## 2024-05-23 PROCEDURE — 3079F DIAST BP 80-89 MM HG: CPT | Mod: CPTII,S$GLB,, | Performed by: STUDENT IN AN ORGANIZED HEALTH CARE EDUCATION/TRAINING PROGRAM

## 2024-05-23 PROCEDURE — 3074F SYST BP LT 130 MM HG: CPT | Mod: CPTII,S$GLB,, | Performed by: STUDENT IN AN ORGANIZED HEALTH CARE EDUCATION/TRAINING PROGRAM

## 2024-05-23 RX ORDER — CARBIDOPA AND LEVODOPA 25; 100 MG/1; MG/1
1 TABLET ORAL 3 TIMES DAILY
Qty: 90 TABLET | Refills: 11 | Status: SHIPPED | OUTPATIENT
Start: 2024-05-23

## 2024-05-23 NOTE — PATIENT INSTRUCTIONS
- Please do not take gabapentin and pregabalin together  - continue taking pregabalin 150 mg twice daily, if you need additional help in the middle of the day, you may take one 50 mg tablet as needed  - Carbidopa-Levodopa timin-6am, 11am-12pm, 5pm

## 2024-05-23 NOTE — PROGRESS NOTES
Patient ID: [id number]  Chief Complaint/Reason for Consult: PD f/u     Subjective:     HPI  Zeenat Mancia is a 74 y.o. RH male with multiple co morbidities including JACKELYN, A.fib, obesity, and HTN. he is presenting today for f/u on PD.     Interval history (05/23/2024):  Tremors and speech is better  Completed PT afetr 6-8 weeks, he hasn't noticed significant improvement but wife thinks his legs are stronger.  Planned to get metatarsal tunnel surgery, this has been interfering with walking.  Has been trying to see bariatric clinic to be considered for GLP-1 inhibitors, hasn't been successful yet, seems to be a coverage issue.   Occasionally gets the lateral thigh pain in the middle of the day. Taking pregabalin 150 mg BID.  He is on prozac 40 mg, mood is better, less emotional.    Interval history (03/06/2024):  Serum work up was unremarkable. MRI showed chronic microvascular changes and one lacunar infarct in corona radiata, no specific involvement of basal ganglia to be causing vascular PDism.   He has started PT already, very exhausted but is trying to push through.  Received an injection for trochanteric bursitis last week which has improved his pain some but not completely.    Initial HPI (2/20/2024):  He had left lateral thigh pain consistent with bursitis 2 months ago which improved after injection by pain management.  He is now having the same pain in the right thigh x 10 days. Describes it as a sharp deep pain.  The pain is in lateral upper 3rd of his thigh, occasionally radiating anteriorly to the groin, 8 out of 10.  When he straightens his leg the pain comes on but alleviated by mildly flexing the hip and knee. Not aggravated by touch or clothing.   He has also had neuropathy in feet x1 year. Additionally, they report occasional low back pain that is not related to the current pain.    On another note, per wife, his balance is bad, he shakes, he has difficulty walking/standing, and occasionally has  "difficulty with speech and drooling. He had an episode that his face felt it was "pulling in the middle".   He has B/B incontinence, 2 years of bladder incontinence and more recent with the bowels. He has nocturia x3 per night. He has been having some "visions" or "delusions" at nights. He is very depressed and also has a lot of anxiety, he was just started on Prozac.    Review of Systems   HENT:  Negative for tinnitus and trouble swallowing.    Eyes:  Negative for photophobia and visual disturbance.   Gastrointestinal:  Positive for constipation and fecal incontinence. Negative for nausea and vomiting.   Musculoskeletal:  Positive for back pain, gait problem and leg pain.   Neurological:  Positive for tremors (improved), weakness and numbness. Negative for speech difficulty (improved).   Psychiatric/Behavioral:  Positive for dysphoric mood (improved) and sleep disturbance. Negative for agitation. The patient is nervous/anxious.    All other systems reviewed and are negative.    Past Medical History:  -------------------------------------    A-fib    Acute blood loss anemia    Arthritis    Atrial flutter    Colon polyp    Diverticulosis    Dyslipidemia    Dyspnea on exertion    GERD (gastroesophageal reflux disease)    on Protonix    Hepatic steatosis    Hyperlipidemia    Hypertension    Irregular heart beat    Lower GI bleed    Multiple gastric ulcers    gi scope per Dr. Houston    Obesity    Sleep apnea    uses CPAP    Unspecified disorder of kidney and ureter    kidney stones     Allergies:  Review of patient's allergies indicates:   Allergen Reactions    Lipitor [atorvastatin] Itching     Pertinent Family History:  Family History   Problem Relation Name Age of Onset    Arthritis Mother Patrica     Heart disease Mother Patrica     Blindness Mother Patrica         OS due to injury    Coronary artery disease Father      Heart disease Father      Cataracts Father      Amblyopia Neg Hx      Cancer Neg Hx      " Diabetes Neg Hx      Glaucoma Neg Hx      Hypertension Neg Hx      Macular degeneration Neg Hx      Retinal detachment Neg Hx      Strabismus Neg Hx      Stroke Neg Hx      Thyroid disease Neg Hx      Colon cancer Neg Hx      Colon polyps Neg Hx      Crohn's disease Neg Hx      Ulcerative colitis Neg Hx      Esophageal cancer Neg Hx      Stomach cancer Neg Hx         Pertinent Social History:  Social History     Tobacco Use    Smoking status: Former     Current packs/day: 0.00     Average packs/day: 2.0 packs/day for 11.0 years (22.0 ttl pk-yrs)     Types: Cigarettes     Start date: 1969     Quit date: 1980     Years since quittin.4    Smokeless tobacco: Never    Tobacco comments:     quit in     Substance Use Topics    Alcohol use: Not Currently     Alcohol/week: 2.0 standard drinks of alcohol     Types: 1 Glasses of wine, 1 Cans of beer per week     Comment: 1 beer every 2 month    Drug use: No       Medications:  Current Outpatient Medications   Medication Instructions    amLODIPine (NORVASC) 5 mg, Oral    apixaban (ELIQUIS) 5 mg, Oral, 2 times daily    ARIPiprazole (ABILIFY) 2 mg, Oral, Every morning    atenoloL (TENORMIN) 50 MG tablet TAKE 1 TABLET(50 MG) BY MOUTH EVERY DAY    carbidopa-levodopa  mg (SINEMET)  mg per tablet Start taking one tablet in the mornings for 1 week, then if tolerated increase to once in the morning and once in the evening for the 2nd week, if tolerating well you can increase to three times daily.    DULoxetine (CYMBALTA) 120 mg, Oral    ELIQUIS 5 mg, Oral, 2 times daily    FLUoxetine 10 mg, Oral, Nightly    FLUoxetine 20 mg, Oral, Nightly    fluticasone propionate (FLONASE) 50 mcg, Each Nostril, Daily    furosemide (LASIX) 20 mg, Oral, Daily    hydroCHLOROthiazide (HYDRODIURIL) 25 MG tablet     melatonin 10 mg, Oral, Nightly    multivit-min/FA/lycopen/lutein (CENTRUM SILVER MEN ORAL) Oral, Daily    pantoprazole (PROTONIX) 40 MG tablet TAKE 1 TABLET(40 MG)  BY MOUTH EVERY DAY    pregabalin (LYRICA) 50 MG capsule Take one capsule (50 mg) in addition to the 150 mg capsule for total dose of 200 mg twice daily    sildenafiL (VIAGRA) 100 mg, Oral, Daily PRN    tadalafiL (CIALIS) 20 mg, Oral, Every 72 hours PRN    tamsulosin (FLOMAX) 0.4 mg, Oral    traZODone (DESYREL) 100 mg, Oral, Nightly    walker Misc 1 each, Misc.(Non-Drug; Combo Route), Daily        Objective:     Vitals:    05/23/24 1143   BP: 121/82   Pulse: 78       Neurologic Exam:   Awake, alert and oriented x3  Speech spontaneous and fluent, intact comprehension.   Adequate fund of knowledge, vocabulary.    CN II - CN XII:  PERRLA. No Nystagmus.   Some ocular motor apraxia. No ophthalmoplegia.   Facial sensation is normal to light touch.   Facial expression is full and symmetric.     Motor:  Normal bulk in all four limbs. Improved rigidity.  No resting tremor in hands, jaw, or head. Minimal postural and action tremor.      Sensory:  Light touch: normal tactile sense throughout  Proprioception: proprioceptive sense present on RUE and on LUE    Coordination:  Finger to nose with mild action tremor bilaterally.  slowed fine finger movements and rapid alternating movements.    Gait:  Shuffling gait but longer stride, not as many steps on turns  Out of breath after a few 4-5 steps    Pertinent lab results  Lab Results   Component Value Date    SSPOOXPN45 529 02/20/2024    VITAMINB6 20 02/20/2024    HJXADSUT86UB 27 (L) 10/20/2022    ZINC 85 02/20/2024    COPPER 1042 02/20/2024     Lab Results   Component Value Date    ARSENICBLD <1 02/20/2024    LEADBLOOD <1.0 02/20/2024    CADMIUM 0.6 02/20/2024    MERCURYBLOOD <1 02/20/2024     Lab Results   Component Value Date    PATHINTPSPE REVIEWED 02/20/2024    PATHINTPSIF REVIEWED 02/20/2024     Lab Results   Component Value Date    RPR Non-reactive 02/20/2024     Lab Results   Component Value Date    TSH 1.182 11/07/2023    FREET4 0.98 11/07/2023    WBC 9.28 04/25/2024     LYMPH 1.0 04/25/2024    LYMPH 10.7 (L) 04/25/2024    RBC 6.08 04/25/2024    HGB 16.1 04/25/2024    HCT 52.3 04/25/2024    MCV 86 04/25/2024     04/25/2024     04/25/2024    K 4.2 04/25/2024    CO2 29 04/25/2024    BUN 14 04/25/2024    CREATININE 0.9 04/25/2024    CALCIUM 9.3 04/25/2024    AST 13 04/25/2024    ALT 10 04/25/2024     Pertinent imaging results    *Images personally reviewed and interpreted:  2/28/2024  MRI Brain wo contrast:  Scattered foci of T2/FLAIR hyperintense signal throughout bilateral subcortical white matter, most consistent with chronic ischemic microvascular changes.   Advanced cerebral atrophy more prominent in insular cortices    9/19/2022  MRI Lumbar Spine wo contrast:  Multilevel degenerative changes resulting in mild left-sided L5 S1 foraminal encroachment. Small superimposed central disc protrusion.     Other pertinent studies    12/15/2023:  EMG-NCS:  1. Technically, somewhat limited study due to peripheral edema of legs  2. The sensory responses were absent in the bilateral lower extremities.  This can either be due to the technical issues above, or could be a sensory peripheral polyneuropathy.  I favor the latter as his symptoms correlate with this.   3. Incidentally, there is evidence of a median mononeuropathy across the right wrist (I.e. carpal tunnel syndrome).  Upper extremity sensory studies performed due to lack of responses in the legs.  If a more detailed study is desired for the carpal tunnel syndrome, a separate upper extremity study can be ordered.  Note:  He has no significant hand symptoms.     4. No definitive evidence of tarsal tunnel syndrome on either side.  The bilateral abductor hallicus muscles showed active denervation, but this could be due to the peripheral polyneuropathy described above, and can also be seen in normal feet, especially with advancing age.      Assessment:   Zeenat Mancia is a 74 y.o. right-handed male with multiple co morbidities  including JACKELYN, A.fib, increased BMI, HTN, and PD who presents for follow up. Tremors and overall facility movements have responded to sinemet. Will optimize timing. Fatigue might be multifactorial; depression, low physical activity, and sinemet side effects. He will benefit from weight loss programs certainly, I will relay their concerns to PCP.  For lateral thigh pain, he may take an additional afternoon dose as needed.     1. Parkinson's disease without dyskinesia or fluctuating manifestations    2. Trochanteric bursitis of right hip    3. Tremors of nervous system    4. Impaired functional mobility, balance, gait, and endurance      Plan:     - carbidopa-levodopa  mg (SINEMET)  mg per tablet; Take 1 tablet by mouth 3 (three) times daily. (6am, 12pm, 5pm)  Dispense: 90 tablet; Refill: 11  - continue pregabalin 150 mg twice daily, additional 50 mg in the afternoon if needed  - Follow up: RTC in 6 months     Plan was discussed in detail with the patient, who is in agreement.      Disclaimer: This note was partly generated using dictation software which may occasionally result in transcription errors that are missed on review.    Based on our encounter today, my overall Medical Decision Making is a Level 4     Complexity of Problem: Moderate (2 or more stable chronic illnesses)  Complexity of Data: Moderate (Independent interpretation of tests)  Risk of Complications and/or morbidity/mortality of Management: Moderate risk (Prescription drug management)          Doris Moreno MD    [facility]  05/23/2024

## 2024-05-23 NOTE — Clinical Note
Hi Dr. Thomas,  Just wanted to relay their concern that they haven't been able to get an actual appointment with bariatric clinic. They might be even willing to pay out of pocket for weight loss injections if he is a candidate (medically).  Best, GL

## 2024-05-24 ENCOUNTER — OFFICE VISIT (OUTPATIENT)
Dept: CARDIOLOGY | Facility: CLINIC | Age: 74
End: 2024-05-24
Payer: MEDICARE

## 2024-05-24 ENCOUNTER — PATIENT MESSAGE (OUTPATIENT)
Dept: PODIATRY | Facility: CLINIC | Age: 74
End: 2024-05-24
Payer: MEDICARE

## 2024-05-24 VITALS
HEART RATE: 114 BPM | WEIGHT: 315 LBS | BODY MASS INDEX: 40.43 KG/M2 | HEIGHT: 74 IN | DIASTOLIC BLOOD PRESSURE: 88 MMHG | SYSTOLIC BLOOD PRESSURE: 148 MMHG

## 2024-05-24 DIAGNOSIS — I70.0 AORTIC ATHEROSCLEROSIS: ICD-10-CM

## 2024-05-24 DIAGNOSIS — I25.10 NONOBSTRUCTIVE ATHEROSCLEROSIS OF CORONARY ARTERY: ICD-10-CM

## 2024-05-24 DIAGNOSIS — R06.09 DYSPNEA ON EXERTION: ICD-10-CM

## 2024-05-24 DIAGNOSIS — I50.31 HYPERTENSIVE HEART DISEASE WITH ACUTE DIASTOLIC CONGESTIVE HEART FAILURE: ICD-10-CM

## 2024-05-24 DIAGNOSIS — I11.0 HYPERTENSIVE HEART DISEASE WITH ACUTE DIASTOLIC CONGESTIVE HEART FAILURE: ICD-10-CM

## 2024-05-24 DIAGNOSIS — I48.20 CHRONIC ATRIAL FIBRILLATION: Primary | ICD-10-CM

## 2024-05-24 DIAGNOSIS — I87.2 VENOUS INSUFFICIENCY: ICD-10-CM

## 2024-05-24 PROCEDURE — 1160F RVW MEDS BY RX/DR IN RCRD: CPT | Mod: CPTII,S$GLB,, | Performed by: INTERNAL MEDICINE

## 2024-05-24 PROCEDURE — 1157F ADVNC CARE PLAN IN RCRD: CPT | Mod: CPTII,S$GLB,, | Performed by: INTERNAL MEDICINE

## 2024-05-24 PROCEDURE — 1126F AMNT PAIN NOTED NONE PRSNT: CPT | Mod: CPTII,S$GLB,, | Performed by: INTERNAL MEDICINE

## 2024-05-24 PROCEDURE — 99999 PR PBB SHADOW E&M-EST. PATIENT-LVL IV: CPT | Mod: PBBFAC,,, | Performed by: INTERNAL MEDICINE

## 2024-05-24 PROCEDURE — 3079F DIAST BP 80-89 MM HG: CPT | Mod: CPTII,S$GLB,, | Performed by: INTERNAL MEDICINE

## 2024-05-24 PROCEDURE — 3077F SYST BP >= 140 MM HG: CPT | Mod: CPTII,S$GLB,, | Performed by: INTERNAL MEDICINE

## 2024-05-24 PROCEDURE — 3044F HG A1C LEVEL LT 7.0%: CPT | Mod: CPTII,S$GLB,, | Performed by: INTERNAL MEDICINE

## 2024-05-24 PROCEDURE — 1159F MED LIST DOCD IN RCRD: CPT | Mod: CPTII,S$GLB,, | Performed by: INTERNAL MEDICINE

## 2024-05-24 PROCEDURE — 3288F FALL RISK ASSESSMENT DOCD: CPT | Mod: CPTII,S$GLB,, | Performed by: INTERNAL MEDICINE

## 2024-05-24 PROCEDURE — 1101F PT FALLS ASSESS-DOCD LE1/YR: CPT | Mod: CPTII,S$GLB,, | Performed by: INTERNAL MEDICINE

## 2024-05-24 PROCEDURE — 99214 OFFICE O/P EST MOD 30 MIN: CPT | Mod: S$GLB,,, | Performed by: INTERNAL MEDICINE

## 2024-05-24 RX ORDER — FUROSEMIDE 20 MG/1
20 TABLET ORAL DAILY
Qty: 90 TABLET | Refills: 3 | Status: SHIPPED | OUTPATIENT
Start: 2024-05-24 | End: 2024-05-27

## 2024-05-24 RX ORDER — SPIRONOLACTONE 25 MG/1
25 TABLET ORAL DAILY
Qty: 90 TABLET | Refills: 3 | Status: SHIPPED | OUTPATIENT
Start: 2024-05-24 | End: 2025-05-24

## 2024-05-24 NOTE — PROGRESS NOTES
HISTORY:    74-year-old male with a history of CAD-nonobs on cath, chronic atrial fibrillation, hypertension, aortic atherosclerosis, JACKELYN on CPAP, and B LE neuropathy presenting for  follow-up.    The patient denies any symptoms of chest pain. Chronic LUND that had been stable for years. Seems to be progressive since initial eval in late '23. Having difficulty sleeping at night. O2 levels okay.     Activity levels mild and limited by B LE neuropathy and maybe SOB as well.     The patient denies any previous history of myocardial infarction, peripheral arterial disease, stroke, congestive heart failure, or cardiomyopathy. Afib dx'd '17-'18 years ago incidentally. Pt has JACKELYN and Pickwickian type symptoms.     The patient currently tolerates atenolol 50 x 1, amlodipine 5 x 1, hydrochlorothiazide 25 x 1, valsartan 320 x 1, and apixaban 5 x 2. Bps controlled. No bleeding.     PHYSICAL EXAM:    Vitals:    05/24/24 1204   BP: (!) 148/88   Pulse: (!) 114         NAD, A+Ox3.  No jvd, no bruit.  Irreg, irreg nml s1,s2. No murmurs.  CTA B no wheezes or crackles.  No edema.    LABS/STUDIES (imaging reviewed during clinic visit):    April 20, 2024 hemoglobin 16.1/MCV 86.  Creatinine 0.9/BUN 14.  Albumin 3.6.  BNP and troponin normal.  2022 /HDL 43/ LDL 90/.  A1c and TSH normal.  ECG   April 2024 atrial fibrillation.  PRWP with no ST changes.  Right bundle branch block.    TTE July 2023 normal LV size and function with EF 60%.  Mild RV enlargement with reduced function.  CVP 8.    Cardiac catheterization 2021 nonobstructive coronary artery disease.    CTA Chest July 2023 No e/o PE. Aortic and coronary calcification.   Venous us July 2023 No e/o DVT bilaterally. R GSV reflux at the level of the knee.   Arterial duplex 2022 Nml DIANNA bilaterally with no e/o significant stenosis.    ASSESSMENT & PLAN:    1. Chronic atrial fibrillation    2. Dyspnea on exertion    3. Venous insufficiency    4. Aortic atherosclerosis    5.  Nonobstructive atherosclerosis of coronary artery    6. Hypertensive heart disease with acute diastolic congestive heart failure          Orders Placed This Encounter    NT-Pro Natriuretic Peptide    Basic Metabolic Panel    Holter monitor - 24 hour    Echo    furosemide (LASIX) 20 MG tablet    spironolactone (ALDACTONE) 25 MG tablet        SOB that is multi-factorial. May have some orthopnea at this time and HF. Nml BNP, but overweight with JACKELYN.    Also w cAfib. Rates previously controlled on atenolol. Tolerating apixiban without issue. Check a holter. If rates up will consider digoxin.     Cont atenolol 50 x 1, amlodipine 5 x 1, hydrochlorothiazide 25 x 1, valsartan 320 x 1. Start spironolactone 25x1. Stop hctz and start furosemide 20x1.     RV dilation, chronic.  Likely related to JACKELYN. Pt has Pickwickian symptoms. Weight loss is a must.       Follow up in about 3 months (around 8/24/2024).      Jackie Upton MD

## 2024-05-25 DIAGNOSIS — I11.0 HYPERTENSIVE HEART DISEASE WITH ACUTE DIASTOLIC CONGESTIVE HEART FAILURE: ICD-10-CM

## 2024-05-25 DIAGNOSIS — I48.20 CHRONIC ATRIAL FIBRILLATION: ICD-10-CM

## 2024-05-25 DIAGNOSIS — R06.09 DYSPNEA ON EXERTION: ICD-10-CM

## 2024-05-25 DIAGNOSIS — I50.31 HYPERTENSIVE HEART DISEASE WITH ACUTE DIASTOLIC CONGESTIVE HEART FAILURE: ICD-10-CM

## 2024-05-27 ENCOUNTER — PATIENT MESSAGE (OUTPATIENT)
Dept: PODIATRY | Facility: CLINIC | Age: 74
End: 2024-05-27
Payer: MEDICARE

## 2024-05-27 ENCOUNTER — TELEPHONE (OUTPATIENT)
Dept: PODIATRY | Facility: CLINIC | Age: 74
End: 2024-05-27
Payer: MEDICARE

## 2024-05-27 ENCOUNTER — TELEPHONE (OUTPATIENT)
Dept: CARDIOLOGY | Facility: CLINIC | Age: 74
End: 2024-05-27
Payer: MEDICARE

## 2024-05-27 RX ORDER — FUROSEMIDE 20 MG/1
TABLET ORAL
Qty: 30 TABLET | Refills: 1 | Status: SHIPPED | OUTPATIENT
Start: 2024-05-27

## 2024-05-27 NOTE — TELEPHONE ENCOUNTER
Spoke to pt and discussed 7/5 surgery date. Also advised pt to contact their PCP to schedule an appointment to be cleared for surgery. Then advised pt that someone will call them the day before surgery between 3p-5p with the surgery arrival time and instructions. Pt verbalized understanding and call ended.

## 2024-05-27 NOTE — TELEPHONE ENCOUNTER
Spoke with patient states it has been a week since provider has told him someone would reach out to schedule his surgery and he has not heard back from anyone as of yet. Staff informed patient message will be given to surgery scheduler and someone will be reaching out to you soon.

## 2024-05-27 NOTE — TELEPHONE ENCOUNTER
----- Message from Jackie Upton MD sent at 5/27/2024  2:34 PM CDT -----  Regarding: RE: surgery scheduler  Please setup labs in 1 week and TTE and holter.  ----- Message -----  From: Simin Garcia MA  Sent: 5/27/2024   2:18 PM CDT  To: Jackie Upton MD  Subject: FW: surgery scheduler                            Pt was seen 5/24/2024  ----- Message -----  From: Laurie Wong MA  Sent: 5/27/2024   2:01 PM CDT  To: [facility] Staff  Subject: surgery scheduler                                Zeenat Mancia is scheduled to have a RELEASE, TARSAL TUNNEL Right Local MAC with Dr. Leonel Perez  on 7/5.  We request surgical clearance.  Please assist in scheduling no sooner than 6/6. We request a  EKG, CBC, BMP, and any other testing deemed necessary by your office. When completed note your chart stating whether the patient is cleared., along with any results to attention Laurie Wong.  Please contact us if you have any questions.  Our office number is [phone].    Sincerely,      Laurie Wong MA  Foot and Ankle Surgery  [facility], Department of Podiatry

## 2024-05-27 NOTE — TELEPHONE ENCOUNTER
Attempted to reach out to patient in reference to questions and concerns about someone reaching out to discuss surgery. Patient voicemail is not setup unable to reach patient to discuss further.

## 2024-05-27 NOTE — TELEPHONE ENCOUNTER
----- Message from Jackie Upton MD sent at 5/27/2024  2:35 PM CDT -----  Regarding: RE: surgery scheduler  Please setup follow-up for mid July.  ----- Message -----  From: Simin Garcia MA  Sent: 5/27/2024   2:18 PM CDT  To: Jackie Upton MD  Subject: FW: surgery scheduler                            Pt was seen 5/24/2024  ----- Message -----  From: Laurie Wong MA  Sent: 5/27/2024   2:01 PM CDT  To: [facility] Staff  Subject: surgery scheduler                                Zeenat Mancia is scheduled to have a RELEASE, TARSAL TUNNEL Right Local MAC with Dr. Leonel Perez  on 7/5.  We request surgical clearance.  Please assist in scheduling no sooner than 6/6. We request a  EKG, CBC, BMP, and any other testing deemed necessary by your office. When completed note your chart stating whether the patient is cleared., along with any results to attention Laurie Wong.  Please contact us if you have any questions.  Our office number is [phone].    Sincerely,      Laurie Wong MA  Foot and Ankle Surgery  [facility], Department of Podiatry

## 2024-06-21 ENCOUNTER — ANESTHESIA EVENT (OUTPATIENT)
Dept: SURGERY | Facility: HOSPITAL | Age: 74
End: 2024-06-21
Payer: MEDICARE

## 2024-06-21 ENCOUNTER — TELEPHONE (OUTPATIENT)
Dept: BARIATRICS | Facility: CLINIC | Age: 74
End: 2024-06-21
Payer: MEDICARE

## 2024-06-21 NOTE — PRE-PROCEDURE INSTRUCTIONS
Spoke with Dr. Varela - Anesthesiologist, regarding patients upcoming procedure. Patient is not a candidate to have this procedure done at the [place] location due to patients BMI and additional co morbidities PAT and  informed.

## 2024-06-21 NOTE — TELEPHONE ENCOUNTER
----- Message from Kaila Jones sent at 6/21/2024  2:25 PM CDT -----  Regarding: appt  Contact: [phone]  Pt stated he has been waiting all week for a call to schedule with provider after having his financial counselor type appt already. Pt sent a message yesterday with no response. Pls call to discuss.

## 2024-06-21 NOTE — TELEPHONE ENCOUNTER
Spoke with pt in regard to scheduling consult appt. Pt stated that he would like to proceed with medical before surgical. Pt has been scheduled and added to waiting list. Call was disconnected.

## 2024-06-24 ENCOUNTER — PATIENT MESSAGE (OUTPATIENT)
Dept: SLEEP MEDICINE | Facility: CLINIC | Age: 74
End: 2024-06-24
Payer: MEDICARE

## 2024-06-24 ENCOUNTER — LAB VISIT (OUTPATIENT)
Dept: LAB | Facility: HOSPITAL | Age: 74
End: 2024-06-24
Attending: INTERNAL MEDICINE
Payer: MEDICARE

## 2024-06-24 DIAGNOSIS — I50.31 HYPERTENSIVE HEART DISEASE WITH ACUTE DIASTOLIC CONGESTIVE HEART FAILURE: ICD-10-CM

## 2024-06-24 DIAGNOSIS — I48.20 CHRONIC ATRIAL FIBRILLATION: ICD-10-CM

## 2024-06-24 DIAGNOSIS — R06.09 DYSPNEA ON EXERTION: ICD-10-CM

## 2024-06-24 DIAGNOSIS — I11.0 HYPERTENSIVE HEART DISEASE WITH ACUTE DIASTOLIC CONGESTIVE HEART FAILURE: ICD-10-CM

## 2024-06-24 LAB
ANION GAP SERPL CALC-SCNC: 8 MMOL/L (ref 8–16)
BUN SERPL-MCNC: 16 MG/DL (ref 8–23)
CALCIUM SERPL-MCNC: 9.7 MG/DL (ref 8.7–10.5)
CHLORIDE SERPL-SCNC: 101 MMOL/L (ref 95–110)
CO2 SERPL-SCNC: 30 MMOL/L (ref 23–29)
CREAT SERPL-MCNC: 1.1 MG/DL (ref 0.5–1.4)
EST. GFR  (NO RACE VARIABLE): >60 ML/MIN/1.73 M^2
GLUCOSE SERPL-MCNC: 121 MG/DL (ref 70–110)
POTASSIUM SERPL-SCNC: 4.6 MMOL/L (ref 3.5–5.1)
SODIUM SERPL-SCNC: 139 MMOL/L (ref 136–145)

## 2024-06-24 PROCEDURE — 83880 ASSAY OF NATRIURETIC PEPTIDE: CPT | Performed by: INTERNAL MEDICINE

## 2024-06-24 PROCEDURE — 36415 COLL VENOUS BLD VENIPUNCTURE: CPT | Performed by: INTERNAL MEDICINE

## 2024-06-24 PROCEDURE — 80048 BASIC METABOLIC PNL TOTAL CA: CPT | Performed by: INTERNAL MEDICINE

## 2024-06-25 LAB — NT-PROBNP SERPL IA-MCNC: 147 PG/ML

## 2024-06-27 ENCOUNTER — OFFICE VISIT (OUTPATIENT)
Dept: SLEEP MEDICINE | Facility: CLINIC | Age: 74
End: 2024-06-27
Payer: MEDICARE

## 2024-06-27 ENCOUNTER — TELEPHONE (OUTPATIENT)
Dept: PODIATRY | Facility: CLINIC | Age: 74
End: 2024-06-27
Payer: MEDICARE

## 2024-06-27 ENCOUNTER — HOSPITAL ENCOUNTER (OUTPATIENT)
Dept: CARDIOLOGY | Facility: HOSPITAL | Age: 74
Discharge: HOME | End: 2024-06-27
Attending: INTERNAL MEDICINE
Payer: MEDICARE

## 2024-06-27 VITALS
WEIGHT: 315 LBS | SYSTOLIC BLOOD PRESSURE: 180 MMHG | HEART RATE: 90 BPM | HEIGHT: 74 IN | BODY MASS INDEX: 40.43 KG/M2 | DIASTOLIC BLOOD PRESSURE: 78 MMHG

## 2024-06-27 DIAGNOSIS — R06.09 DYSPNEA ON EXERTION: ICD-10-CM

## 2024-06-27 DIAGNOSIS — I11.0 HYPERTENSIVE HEART DISEASE WITH ACUTE DIASTOLIC CONGESTIVE HEART FAILURE: ICD-10-CM

## 2024-06-27 DIAGNOSIS — G47.33 OSA (OBSTRUCTIVE SLEEP APNEA): Primary | ICD-10-CM

## 2024-06-27 DIAGNOSIS — I48.20 CHRONIC ATRIAL FIBRILLATION: ICD-10-CM

## 2024-06-27 DIAGNOSIS — I50.31 HYPERTENSIVE HEART DISEASE WITH ACUTE DIASTOLIC CONGESTIVE HEART FAILURE: ICD-10-CM

## 2024-06-27 LAB
ASCENDING AORTA: 3.69 CM
AV INDEX (PROSTH): 0.7
AV MEAN GRADIENT: 8 MMHG
AV PEAK GRADIENT: 15 MMHG
AV VALVE AREA BY VELOCITY RATIO: 2.42 CM²
AV VALVE AREA: 2.58 CM²
AV VELOCITY RATIO: 0.65
BSA FOR ECHO PROCEDURE: 2.84 M2
CV ECHO LV RWT: 0.54 CM
DOP CALC AO PEAK VEL: 1.91 M/S
DOP CALC AO VTI: 31.06 CM
DOP CALC LVOT AREA: 3.7 CM2
DOP CALC LVOT DIAMETER: 2.17 CM
DOP CALC LVOT PEAK VEL: 1.25 M/S
DOP CALC LVOT STROKE VOLUME: 80.1 CM3
DOP CALC MV VTI: 16.45 CM
DOP CALCLVOT PEAK VEL VTI: 21.67 CM
E/E' RATIO: 9.7 M/S
ECHO LV POSTERIOR WALL: 1.15 CM (ref 0.6–1.1)
FRACTIONAL SHORTENING: 27 % (ref 28–44)
HR MV ECHO: 90 BPM
INTERVENTRICULAR SEPTUM: 1.21 CM (ref 0.6–1.1)
LA MAJOR: 5.91 CM
LA MINOR: 5.44 CM
LA WIDTH: 4.02 CM
LEFT ATRIUM SIZE: 4.76 CM
LEFT ATRIUM VOLUME INDEX MOD: 20.3 ML/M2
LEFT ATRIUM VOLUME INDEX: 33.8 ML/M2
LEFT ATRIUM VOLUME MOD: 55.48 CM3
LEFT ATRIUM VOLUME: 92.15 CM3
LEFT INTERNAL DIMENSION IN SYSTOLE: 3.12 CM (ref 2.1–4)
LEFT VENTRICLE DIASTOLIC VOLUME INDEX: 30.05 ML/M2
LEFT VENTRICLE DIASTOLIC VOLUME: 82.03 ML
LEFT VENTRICLE MASS INDEX: 66 G/M2
LEFT VENTRICLE SYSTOLIC VOLUME INDEX: 14.1 ML/M2
LEFT VENTRICLE SYSTOLIC VOLUME: 38.37 ML
LEFT VENTRICULAR INTERNAL DIMENSION IN DIASTOLE: 4.28 CM (ref 3.5–6)
LEFT VENTRICULAR MASS: 178.94 G
LV LATERAL E/E' RATIO: 7.46 M/S
LV SEPTAL E/E' RATIO: 13.86 M/S
MV A" WAVE DURATION": 10.28 MSEC
MV MEAN GRADIENT: 1 MMHG
MV PEAK E VEL: 0.97 M/S
MV PEAK GRADIENT: 3 MMHG
MV VALVE AREA BY CONTINUITY EQUATION: 4.87 CM2
PISA TR MAX VEL: 2.25 M/S
PULM VEIN S/D RATIO: 0.88
PV PEAK D VEL: 0.43 M/S
PV PEAK S VEL: 0.38 M/S
RA MAJOR: 4.97 CM
RA PRESSURE ESTIMATED: 8 MMHG
RA WIDTH: 3.88 CM
RIGHT VENTRICLE DIASTOLIC BASEL DIMENSION: 3.5 CM
RV TB RVSP: 10 MMHG
SINUS: 3.16 CM
STJ: 3.42 CM
TDI LATERAL: 0.13 M/S
TDI SEPTAL: 0.07 M/S
TDI: 0.1 M/S
TR MAX PG: 20 MMHG
TRICUSPID ANNULAR PLANE SYSTOLIC EXCURSION: 2.11 CM
TV REST PULMONARY ARTERY PRESSURE: 28 MMHG
Z-SCORE OF LEFT VENTRICULAR DIMENSION IN END DIASTOLE: -18.95
Z-SCORE OF LEFT VENTRICULAR DIMENSION IN END SYSTOLE: -13.14

## 2024-06-27 PROCEDURE — 93306 TTE W/DOPPLER COMPLETE: CPT | Mod: 26,,, | Performed by: INTERNAL MEDICINE

## 2024-06-27 PROCEDURE — 93306 TTE W/DOPPLER COMPLETE: CPT

## 2024-06-27 RX ORDER — MIRTAZAPINE 7.5 MG/1
7.5 TABLET, FILM COATED ORAL NIGHTLY
COMMUNITY

## 2024-06-27 NOTE — PROGRESS NOTES
The patient location is: home  The chief complaint leading to consultation is: JACKELYN    Visit type: audiovisual    Face to Face time with patient:25 minutes of total time spent on the encounter, which includes face to face time and non-face to face time preparing to see the patient (eg, review of tests), Obtaining and/or reviewing separately obtained history, Documenting clinical information in the electronic or other health record, Independently interpreting results (not separately reported) and communicating results to the patient/family/caregiver, or Care coordination (not separately reported). Each patient to whom he or she provides medical services by telemedicine is:  (1) informed of the relationship between the physician and patient and the respective role of any other health care provider with respect to management of the patient; and (2) notified that he or she may decline to receive medical services by telemedicine and may withdraw from such care at any time.      Since seen he has been using autobipap qhs but excessive mask leaks remains troublesome. Has tried nose and triangular FFM. ESS=13. Needs higher pressures to correct sleep apnea but wary of more leaks. Snoring resolved. Psychiatrist prescribed Remeron 7.5mg for him to trial. Continues to take trazadone 100mg qhs.     Remote:6/26/2024 - 06/26/2024 Usage days 1/1 days (100%)   >= 4 hours 1 days (100%)  Average usage (days used) 6 hours 11 minutes   Mode VAuto Max IPAP 14 cmH2O Min EPAP 5 cmH2O Pressure Support 4 cm   Leaks - L/min Median: 36.0 95th percentile: 50.4   AHI: 9.0  90% tile 13.2/8cm  Reviewed previous titration 2015 Cpap/bipap used and titration fast    HX VB 9/2020 (former seen by Dr. Bo kunz)  HISTORY OF PRESENT ILLNESS:Zeenat Mancia a 74 y.o. male presents for the management of obstructive sleep apnea. Last seen by Dr. Busby [facility] 2013.  He was diagnosed with sleep apnea by study done 2011. After bipap titration study 2015  he has been using therapy better. Actually not suing bipap or autobipap at all but rather cpap 16cm. Mask leaks so has to cinch mask/disrupts his sleep. Been taking lunesta 2mg past several mos but still waking up.Tired/fatigue. has gained 50# this year/more sedentary. Using F20  mask Using qhs. ESS=15  Interrogation- filter dirty dk blue on, ag 8.7h/nigth. 30/30d>4hr. 99% mask fit. AHI 3.1    REv'd his sleep studies and other doc sleep records with him today  AFib- on eliquis  BP stable    1/2021 vb: Since seen he has been using autobipap mode but still having frequent disrupted sleep due to mask leaks/cinching mask. Got mask liner but his F20 cushion is torn and mask liner very worn. Now using F30 but leaks. ESS=1. Sleep early evening 7-8p and out of bed (wife too) ` 5am daily. Takes lunesta 2mg but not helping, may need refills soon. Never tried remfresh. Snoring resolved.   No encore data except 2019  interrogation- ahi 3-5, 88-99% mask fit. PB 8-14%, avg mask on time 8hr/n. 29/30d>4h. 90% tile 14.6-`15/12cm      PSG 8/6/11: +JACKELYN, AHI 58.9, low sat 88%, split, titrated to improvement at 11 cm in side position (incl REM), but through 16 cm while supine without effective pressure determined; wt 299 lbs  Titration 9/19/11: Titrated through 17 cm, mouth leak, required full mask, ongoing events at all pressures, but improvement at 17 cm.  Titration study 2015: 24/18cm effective/FFM (302#)      ASSESSMENT:   JACKELYN, severe. Remains adherent with PAP but its wrong therapy, he had pressure intolerability prior needing very high cpap therapy and underwent BIPAP titration study 2015 and was using autobipap. NEEDS bipap machine, eligible via ins for new machine.  Insomnia  He has medical comorbidities of permanent atrial fibrillation, morbid obesity, hypertension    PLAN:   1. Continue autobipap max ipap 14cm/min epap 5cm, ps 4cm until FULL night BIPAP (also switch to hybrid FFM), will notify of results and adjust settings  accordingly on his machine.,[employer] supplies.

## 2024-06-27 NOTE — TELEPHONE ENCOUNTER
----- Message from Delma Malave LPN sent at 6/27/2024 11:21 AM CDT -----  Regarding: Patient meets exclusionary criteria for [place]  Spoke with Dr. Varela - Anesthesiologist on 6/21 regarding patients upcoming procedure. Patient is not a candidate to have this procedure done at the [place] location due to patients BMI and additional co morbidities PAT and  informed.

## 2024-07-01 ENCOUNTER — OFFICE VISIT (OUTPATIENT)
Dept: CARDIOLOGY | Facility: CLINIC | Age: 74
End: 2024-07-01
Payer: MEDICARE

## 2024-07-01 VITALS
HEART RATE: 91 BPM | HEIGHT: 74 IN | SYSTOLIC BLOOD PRESSURE: 109 MMHG | WEIGHT: 315 LBS | BODY MASS INDEX: 40.43 KG/M2 | DIASTOLIC BLOOD PRESSURE: 46 MMHG

## 2024-07-01 DIAGNOSIS — I87.2 VENOUS INSUFFICIENCY: ICD-10-CM

## 2024-07-01 DIAGNOSIS — I11.0 HYPERTENSIVE HEART DISEASE WITH CHRONIC DIASTOLIC CONGESTIVE HEART FAILURE: ICD-10-CM

## 2024-07-01 DIAGNOSIS — I70.0 AORTIC ATHEROSCLEROSIS: ICD-10-CM

## 2024-07-01 DIAGNOSIS — Z01.810 PREOPERATIVE CARDIOVASCULAR EXAMINATION: ICD-10-CM

## 2024-07-01 DIAGNOSIS — I50.32 HYPERTENSIVE HEART DISEASE WITH CHRONIC DIASTOLIC CONGESTIVE HEART FAILURE: ICD-10-CM

## 2024-07-01 DIAGNOSIS — I48.20 CHRONIC ATRIAL FIBRILLATION: Primary | ICD-10-CM

## 2024-07-01 DIAGNOSIS — I25.10 NONOBSTRUCTIVE ATHEROSCLEROSIS OF CORONARY ARTERY: ICD-10-CM

## 2024-07-01 PROCEDURE — 99999 PR PBB SHADOW E&M-EST. PATIENT-LVL III: CPT | Mod: PBBFAC,,, | Performed by: INTERNAL MEDICINE

## 2024-07-01 RX ORDER — GABAPENTIN 600 MG/1
600 TABLET ORAL 3 TIMES DAILY
COMMUNITY

## 2024-07-01 NOTE — PROGRESS NOTES
HISTORY:    74-year-old male with a history of CAD-nonobs on cath, chronic atrial fibrillation, hypertension, aortic atherosclerosis, HFpEF, JACKELYN on CPAP, and B LE neuropathy presenting for  follow-up.    The patient denies any symptoms of chest pain. Chronic LUND that had been stable for years. Having difficulty sleeping at night. O2 levels okay.     Activity levels mild and limited by B LE neuropathy and maybe SOB as well.     At last visit we stopped HCTZ and prescribed spironolactone and furosemide. The patient is taking spironolactone, but is not sure about furosemide.     The patient denies any previous history of myocardial infarction, peripheral arterial disease, stroke, congestive heart failure, or cardiomyopathy. Afib dx'd '17-'18 years ago incidentally. Pt has JACKELYN and Pickwickian type symptoms.     The patient currently tolerates atenolol 50 x 1, amlodipine 5 x 1, spironolactone 25 x 1, valsartan 320 x 1, and apixaban 5 x 2. Bps controlled. No bleeding. Not sure if he is taking furosemide 20 x 1.     PHYSICAL EXAM:    Vitals:    07/01/24 0806   BP: (!) 109/46   Pulse: 91         NAD, A+Ox3.  No jvd, no bruit.  Irreg, irreg nml s1,s2. No murmurs.  CTA B no wheezes or crackles.  No edema.    LABS/STUDIES (imaging reviewed during clinic visit):    June 2024 creatinine 1.1/BUN 16.  NT proBNP 147.  April 2024 hemoglobin 16.1/MCV 86.  Creatinine 0.9/BUN 14.  Albumin 3.6.  BNP and troponin normal.  2022 /HDL 43/ LDL 90/.  A1c and TSH normal.  ECG June and  April 2024 atrial fibrillation.  PRWP with no ST changes.  Right bundle branch block.    Holter June 2024 pending.  TTE June 2024 normal LV size with mildly increased wall thickness.  Normal systolic function with an ejection fraction of 55-65% depending on the R to R interval.  Normal RV size and function.  CVP 8 with estimated PASP of 28.  July 2023 normal LV size and function with EF 60%.  Mild RV enlargement with reduced function.  CVP 8.     Cardiac catheterization 2021 nonobstructive coronary artery disease.    CTA Chest July 2023 No e/o PE. Aortic and coronary calcification.   Venous us July 2023 No e/o DVT bilaterally. R GSV reflux at the level of the knee.   Arterial duplex 2022 Nml DIANNA bilaterally with no e/o significant stenosis.    ASSESSMENT & PLAN:    1. Chronic atrial fibrillation    2. Aortic atherosclerosis    3. Hypertensive heart disease with chronic diastolic congestive heart failure    4. Nonobstructive atherosclerosis of coronary artery    5. Preoperative cardiovascular examination    6. Venous insufficiency                  SOB that is multi-factorial. In large part related to JACKELYN and obesity w Pickwickian type symptoms. HF seems to be less of an issue with nml BNP, NT-pBNP, and LVEF w reasonable PASPs.    Regardless we have him on a HF regimen to optimize fluid status including spironolactone 25x1 and furosemide 20x1. Breathing improved slightly on this regimen. Can consider SGLT2i in the future. Likely has venous insufficiency.     Also w cAfib. Rates previously controlled on atenolol. Tolerating apixiban without issue. Holter pending.     Cont atenolol 50 x 1, amlodipine 5 x 1, valsartan 320 x 1. Bps controlled.    We discussed the importance of diet modifications and instituting an exercise regimen with weight loss in mind. Agree w bariatrics assessment.     No further CV testing required prior to tarsal tunnel release. Okay to hold apixiban 48 hours pre-op and restart after.     Follow up in about 6 months (around 1/1/2025).      Jackie Upton MD

## 2024-07-02 ENCOUNTER — OFFICE VISIT (OUTPATIENT)
Dept: BARIATRICS | Facility: CLINIC | Age: 74
End: 2024-07-02
Payer: MEDICARE

## 2024-07-02 ENCOUNTER — TELEPHONE (OUTPATIENT)
Dept: PODIATRY | Facility: CLINIC | Age: 74
End: 2024-07-02
Payer: MEDICARE

## 2024-07-02 VITALS
WEIGHT: 315 LBS | OXYGEN SATURATION: 94 % | HEART RATE: 81 BPM | BODY MASS INDEX: 42.66 KG/M2 | SYSTOLIC BLOOD PRESSURE: 126 MMHG | DIASTOLIC BLOOD PRESSURE: 84 MMHG | HEIGHT: 72 IN

## 2024-07-02 DIAGNOSIS — I48.20 CHRONIC ATRIAL FIBRILLATION: ICD-10-CM

## 2024-07-02 DIAGNOSIS — I11.0 HYPERTENSIVE HEART DISEASE WITH CHRONIC DIASTOLIC CONGESTIVE HEART FAILURE: ICD-10-CM

## 2024-07-02 DIAGNOSIS — Z71.3 ENCOUNTER FOR WEIGHT LOSS COUNSELING: ICD-10-CM

## 2024-07-02 DIAGNOSIS — I50.32 HYPERTENSIVE HEART DISEASE WITH CHRONIC DIASTOLIC CONGESTIVE HEART FAILURE: ICD-10-CM

## 2024-07-02 DIAGNOSIS — I25.10 NONOBSTRUCTIVE ATHEROSCLEROSIS OF CORONARY ARTERY: ICD-10-CM

## 2024-07-02 DIAGNOSIS — G47.33 OSA (OBSTRUCTIVE SLEEP APNEA): ICD-10-CM

## 2024-07-02 DIAGNOSIS — E66.01 CLASS 3 SEVERE OBESITY DUE TO EXCESS CALORIES WITH SERIOUS COMORBIDITY AND BODY MASS INDEX (BMI) OF 45.0 TO 49.9 IN ADULT: Primary | ICD-10-CM

## 2024-07-02 PROCEDURE — 3044F HG A1C LEVEL LT 7.0%: CPT | Mod: CPTII,S$GLB,, | Performed by: STUDENT IN AN ORGANIZED HEALTH CARE EDUCATION/TRAINING PROGRAM

## 2024-07-02 PROCEDURE — 1125F AMNT PAIN NOTED PAIN PRSNT: CPT | Mod: CPTII,S$GLB,, | Performed by: STUDENT IN AN ORGANIZED HEALTH CARE EDUCATION/TRAINING PROGRAM

## 2024-07-02 PROCEDURE — 1157F ADVNC CARE PLAN IN RCRD: CPT | Mod: CPTII,S$GLB,, | Performed by: STUDENT IN AN ORGANIZED HEALTH CARE EDUCATION/TRAINING PROGRAM

## 2024-07-02 PROCEDURE — 3074F SYST BP LT 130 MM HG: CPT | Mod: CPTII,S$GLB,, | Performed by: STUDENT IN AN ORGANIZED HEALTH CARE EDUCATION/TRAINING PROGRAM

## 2024-07-02 PROCEDURE — 3288F FALL RISK ASSESSMENT DOCD: CPT | Mod: CPTII,S$GLB,, | Performed by: STUDENT IN AN ORGANIZED HEALTH CARE EDUCATION/TRAINING PROGRAM

## 2024-07-02 PROCEDURE — 99999 PR PBB SHADOW E&M-EST. PATIENT-LVL V: CPT | Mod: PBBFAC,,, | Performed by: STUDENT IN AN ORGANIZED HEALTH CARE EDUCATION/TRAINING PROGRAM

## 2024-07-02 PROCEDURE — 1159F MED LIST DOCD IN RCRD: CPT | Mod: CPTII,S$GLB,, | Performed by: STUDENT IN AN ORGANIZED HEALTH CARE EDUCATION/TRAINING PROGRAM

## 2024-07-02 PROCEDURE — 3079F DIAST BP 80-89 MM HG: CPT | Mod: CPTII,S$GLB,, | Performed by: STUDENT IN AN ORGANIZED HEALTH CARE EDUCATION/TRAINING PROGRAM

## 2024-07-02 PROCEDURE — 3008F BODY MASS INDEX DOCD: CPT | Mod: CPTII,S$GLB,, | Performed by: STUDENT IN AN ORGANIZED HEALTH CARE EDUCATION/TRAINING PROGRAM

## 2024-07-02 PROCEDURE — 99204 OFFICE O/P NEW MOD 45 MIN: CPT | Mod: S$GLB,,, | Performed by: STUDENT IN AN ORGANIZED HEALTH CARE EDUCATION/TRAINING PROGRAM

## 2024-07-02 PROCEDURE — 1160F RVW MEDS BY RX/DR IN RCRD: CPT | Mod: CPTII,S$GLB,, | Performed by: STUDENT IN AN ORGANIZED HEALTH CARE EDUCATION/TRAINING PROGRAM

## 2024-07-02 PROCEDURE — 1101F PT FALLS ASSESS-DOCD LE1/YR: CPT | Mod: CPTII,S$GLB,, | Performed by: STUDENT IN AN ORGANIZED HEALTH CARE EDUCATION/TRAINING PROGRAM

## 2024-07-02 RX ORDER — SEMAGLUTIDE 1 MG/.5ML
1 INJECTION, SOLUTION SUBCUTANEOUS
Qty: 2 ML | Refills: 0 | Status: SHIPPED | OUTPATIENT
Start: 2024-08-27 | End: 2024-09-18

## 2024-07-02 RX ORDER — SEMAGLUTIDE 0.5 MG/.5ML
0.5 INJECTION, SOLUTION SUBCUTANEOUS
Qty: 2 ML | Refills: 0 | Status: SHIPPED | OUTPATIENT
Start: 2024-07-30 | End: 2024-08-21

## 2024-07-02 RX ORDER — SEMAGLUTIDE 1 MG/.5ML
1 INJECTION, SOLUTION SUBCUTANEOUS
Qty: 2 ML | Refills: 0 | Status: SHIPPED | OUTPATIENT
Start: 2024-08-27 | End: 2024-07-02

## 2024-07-02 RX ORDER — SEMAGLUTIDE 0.25 MG/.5ML
0.25 INJECTION, SOLUTION SUBCUTANEOUS
Qty: 2 ML | Refills: 0 | Status: SHIPPED | OUTPATIENT
Start: 2024-07-02 | End: 2024-07-02

## 2024-07-02 RX ORDER — SEMAGLUTIDE 0.25 MG/.5ML
0.25 INJECTION, SOLUTION SUBCUTANEOUS
Qty: 2 ML | Refills: 0 | Status: SHIPPED | OUTPATIENT
Start: 2024-07-02 | End: 2024-07-31

## 2024-07-02 RX ORDER — SEMAGLUTIDE 0.5 MG/.5ML
0.5 INJECTION, SOLUTION SUBCUTANEOUS
Qty: 2 ML | Refills: 0 | Status: SHIPPED | OUTPATIENT
Start: 2024-07-30 | End: 2024-07-02

## 2024-07-02 NOTE — PROGRESS NOTES
Subjective     Patient ID: Zeenat Mancia is a 74 y.o. male.    Chief Complaint: Consult and Obesity    Patient presents for treatment of obesity.     Co-morbidities   HTN  JACKELYN  A-fib  CHF, diastolic  Parkinsons    Negative for thyroid cancer    Weight History  Lowest adult weight:   Highest adult weight:      History of Weight Loss Efforts  Nutrisystem  WW    Current Physical Activity  Home exercises    Current Eating Habits  Breakfast - eggs, toast, sausage, waffles, cereal, coffee  Lunch - chips, sandwich  Dinner - beef, chicken, corn, broccoli, beans  Snacks - ice cream sandwich, fruit, cookies  Beverages - water, diet Sprite    Medical Weight Loss (unable to use [employer])  7/2/2024: 331 lbs 12.7 oz, BMI 45      Review of Systems   Constitutional:  Negative for chills and fever.   Respiratory:  Negative for shortness of breath.    Cardiovascular:  Negative for chest pain.   Gastrointestinal:  Negative for abdominal pain, nausea and vomiting.   Musculoskeletal:  Positive for arthralgias.   Neurological:  Negative for dizziness and light-headedness.          Objective    Latest Reference Range & Units 04/25/24 12:58 06/24/24 11:33   WBC 3.90 - 12.70 K/uL 9.28    RBC 4.60 - 6.20 M/uL 6.08    Hemoglobin 14.0 - 18.0 g/dL 16.1    Hematocrit 40.0 - 54.0 % 52.3    MCV 82 - 98 fL 86    MCH 27.0 - 31.0 pg 26.5 (L)    MCHC 32.0 - 36.0 g/dL 30.8 (L)    RDW 11.5 - 14.5 % 17.2 (H)    Platelet Count 150 - 450 K/uL 218    MPV 9.2 - 12.9 fL 9.2    Gran % 38.0 - 73.0 % 75.2 (H)    Lymph % 18.0 - 48.0 % 10.7 (L)    Mono % 4.0 - 15.0 % 10.3    Eos % 0.0 - 8.0 % 3.1    Basophil % 0.0 - 1.9 % 0.5    Immature Granulocytes 0.0 - 0.5 % 0.2    Gran # (ANC) 1.8 - 7.7 K/uL 7.0    Lymph # 1.0 - 4.8 K/uL 1.0    Mono # 0.3 - 1.0 K/uL 1.0    Eos # 0.0 - 0.5 K/uL 0.3    Baso # 0.00 - 0.20 K/uL 0.05    Immature Grans (Abs) 0.00 - 0.04 K/uL 0.02    nRBC 0 /100 WBC 0    Differential Method  Automated    Sodium 136 - 145 mmol/L 139 139   Potassium 3.5  - 5.1 mmol/L 4.2 4.6   Chloride 95 - 110 mmol/L 102 101   CO2 23 - 29 mmol/L 29 30 (H)   Anion Gap 8 - 16 mmol/L 8 8   BUN 8 - 23 mg/dL 14 16   Creatinine 0.5 - 1.4 mg/dL 0.9 1.1   eGFR >60 mL/min/1.73 m^2 >60.0 >60.0   Glucose 70 - 110 mg/dL 112 (H) 121 (H)   Calcium 8.7 - 10.5 mg/dL 9.3 9.7   ALP 55 - 135 U/L 79    PROTEIN TOTAL 6.0 - 8.4 g/dL 7.2    Albumin 3.5 - 5.2 g/dL 3.6    BILIRUBIN TOTAL 0.1 - 1.0 mg/dL 0.5    AST 10 - 40 U/L 13    ALT 10 - 44 U/L 10    (L): Data is abnormally low  (H): Data is abnormally high    MRI Brain 2/20/2024  Remote infarct of the left corona radiata.  Minimal small vessel ischemic change on left.    ECHO 5/24/2024     Left Ventricle: The left ventricle is normal in size. Mildly increased wall thickness. There is normal systolic function with a visually estimated ejection fraction of 55 - 65% depending on the R-R interval. Unable to assess diastolic function due to atrial fibrillation.    Right Ventricle: Normal right ventricular cavity size. Systolic function is normal.    Left Atrium: Left atrium is mildly dilated.    IVC/SVC: Intermediate venous pressure at 8 mmHg.    Pulmonary Artery: The estimated pulmonary artery systolic pressure is 28 mmHg.    EKG 4/25/2024  Atrial fibrillation   Left axis deviation   Low voltage QRS   Incomplete right bundle branch block   Inferior infarct (cited on or before 31-MAY-2021)   Cannot rule out Anterior infarct (cited on or before 31-MAY-2021)     Vitals:    07/02/24 1412   BP: 126/84   Pulse: 81       Physical Exam  Vitals reviewed.   Constitutional:       General: He is not in acute distress.     Appearance: Normal appearance. He is obese. He is not ill-appearing, toxic-appearing or diaphoretic.   HENT:      Head: Normocephalic and atraumatic.   Cardiovascular:      Rate and Rhythm: Normal rate.   Pulmonary:      Effort: Pulmonary effort is normal. No respiratory distress.   Skin:     General: Skin is warm and dry.   Neurological:      Mental  Status: He is alert and oriented to person, place, and time.            Assessment and Plan     1. Class 3 severe obesity due to excess calories with serious comorbidity and body mass index (BMI) of 45.0 to 49.9 in adult    2. Hypertensive heart disease with chronic diastolic congestive heart failure  Overview:  bp well controlled on current regimen .    Orders:  -     Discontinue: semaglutide, weight loss, (WEGOVY) 0.25 mg/0.5 mL PnIj; Inject 0.25 mg into the skin every 7 days. for 4 doses  Dispense: 2 mL; Refill: 0  -     Discontinue: semaglutide, weight loss, (WEGOVY) 0.5 mg/0.5 mL PnIj; Inject 0.5 mg into the skin every 7 days. for 4 doses  Dispense: 2 mL; Refill: 0  -     Discontinue: semaglutide, weight loss, (WEGOVY) 1 mg/0.5 mL PnIj; Inject 1 mg into the skin every 7 days. for 4 doses  Dispense: 2 mL; Refill: 0  -     semaglutide, weight loss, (WEGOVY) 0.5 mg/0.5 mL PnIj; Inject 0.5 mg into the skin every 7 days. for 4 doses  Dispense: 2 mL; Refill: 0  -     semaglutide, weight loss, (WEGOVY) 1 mg/0.5 mL PnIj; Inject 1 mg into the skin every 7 days. for 4 doses  Dispense: 2 mL; Refill: 0  -     semaglutide, weight loss, (WEGOVY) 0.25 mg/0.5 mL PnIj; Inject 0.25 mg into the skin every 7 days. for 4 doses  Dispense: 2 mL; Refill: 0    3. Chronic atrial fibrillation  Overview:  Dr prado cards managing . Cont current     Orders:  -     Discontinue: semaglutide, weight loss, (WEGOVY) 0.25 mg/0.5 mL PnIj; Inject 0.25 mg into the skin every 7 days. for 4 doses  Dispense: 2 mL; Refill: 0  -     Discontinue: semaglutide, weight loss, (WEGOVY) 0.5 mg/0.5 mL PnIj; Inject 0.5 mg into the skin every 7 days. for 4 doses  Dispense: 2 mL; Refill: 0  -     Discontinue: semaglutide, weight loss, (WEGOVY) 1 mg/0.5 mL PnIj; Inject 1 mg into the skin every 7 days. for 4 doses  Dispense: 2 mL; Refill: 0  -     semaglutide, weight loss, (WEGOVY) 0.5 mg/0.5 mL PnIj; Inject 0.5 mg into the skin every 7 days. for 4 doses  Dispense:  2 mL; Refill: 0  -     semaglutide, weight loss, (WEGOVY) 1 mg/0.5 mL PnIj; Inject 1 mg into the skin every 7 days. for 4 doses  Dispense: 2 mL; Refill: 0  -     semaglutide, weight loss, (WEGOVY) 0.25 mg/0.5 mL PnIj; Inject 0.25 mg into the skin every 7 days. for 4 doses  Dispense: 2 mL; Refill: 0    4. Nonobstructive atherosclerosis of coronary artery  Overview:  Cont b blocker , eliqus , and is not on statin due to intolerance    Orders:  -     Discontinue: semaglutide, weight loss, (WEGOVY) 0.25 mg/0.5 mL PnIj; Inject 0.25 mg into the skin every 7 days. for 4 doses  Dispense: 2 mL; Refill: 0  -     Discontinue: semaglutide, weight loss, (WEGOVY) 0.5 mg/0.5 mL PnIj; Inject 0.5 mg into the skin every 7 days. for 4 doses  Dispense: 2 mL; Refill: 0  -     Discontinue: semaglutide, weight loss, (WEGOVY) 1 mg/0.5 mL PnIj; Inject 1 mg into the skin every 7 days. for 4 doses  Dispense: 2 mL; Refill: 0  -     semaglutide, weight loss, (WEGOVY) 0.5 mg/0.5 mL PnIj; Inject 0.5 mg into the skin every 7 days. for 4 doses  Dispense: 2 mL; Refill: 0  -     semaglutide, weight loss, (WEGOVY) 1 mg/0.5 mL PnIj; Inject 1 mg into the skin every 7 days. for 4 doses  Dispense: 2 mL; Refill: 0  -     semaglutide, weight loss, (WEGOVY) 0.25 mg/0.5 mL PnIj; Inject 0.25 mg into the skin every 7 days. for 4 doses  Dispense: 2 mL; Refill: 0    5. JACKELYN (obstructive sleep apnea)  Overview:  Utilizing  cpap with good benefit     Orders:  -     Discontinue: semaglutide, weight loss, (WEGOVY) 0.25 mg/0.5 mL PnIj; Inject 0.25 mg into the skin every 7 days. for 4 doses  Dispense: 2 mL; Refill: 0  -     Discontinue: semaglutide, weight loss, (WEGOVY) 0.5 mg/0.5 mL PnIj; Inject 0.5 mg into the skin every 7 days. for 4 doses  Dispense: 2 mL; Refill: 0  -     Discontinue: semaglutide, weight loss, (WEGOVY) 1 mg/0.5 mL PnIj; Inject 1 mg into the skin every 7 days. for 4 doses  Dispense: 2 mL; Refill: 0  -     semaglutide, weight loss, (WEGOVY) 0.5  mg/0.5 mL PnIj; Inject 0.5 mg into the skin every 7 days. for 4 doses  Dispense: 2 mL; Refill: 0  -     semaglutide, weight loss, (WEGOVY) 1 mg/0.5 mL PnIj; Inject 1 mg into the skin every 7 days. for 4 doses  Dispense: 2 mL; Refill: 0  -     semaglutide, weight loss, (WEGOVY) 0.25 mg/0.5 mL PnIj; Inject 0.25 mg into the skin every 7 days. for 4 doses  Dispense: 2 mL; Refill: 0    6. Encounter for weight loss counseling

## 2024-07-02 NOTE — PATIENT INSTRUCTIONS
Start Wegovy 0.25 mg once a week x 4 weeks, then 0.5 mg once a week for 4 weeks, then 1 mg once a week x 4 weeks.    Decrease portions as soon as you start Wegovy. Avoid fried, greasy, fatty foods.     Some nausea in the first couple weeks is not unusual as your body adjusts to the medication.     If you experience persistent severe abdominal pain that radiates to your back, please call the office.               Copyright © 2011, [facility]. For more information about The Healthy Eating Plate, please see The Nutrition Source, Department of Nutrition, [employer], www.thenutritionsource.org, and [employer], www.health.[place].edu.      Meal Planning & Grocery Shopping    Meal planning builds the foundation for healthy eating. When you have structured ideas for healthy meals and foods available at home to prepare those meals, weight control becomes easier.  If only healthy foods are available at home, then you will be much more likely to eat healthy foods. And you will be less likely to go to a restaurant or  a fast food meal, which tend to be unhealthy and higher in calories than meals prepared at home.      Take 5-10 minutes each week to plan meals for the next 7 days.  Make a grocery list based on the meal plan.    Grocery Shopping Tips:  Shop on a full stomach.  Schedule your shopping for times when you are most motivated and able to be disciplined about your purchases. For example, after a stressful day at work it may be difficult to make the healthiest choices. Shopping at other times, such as early in the morning or after dinner, may be easier.  Focus your shopping on the outside aisles of the store, which tend to contain more fresh foods and lower calorie foods. The inside aisles tend to have more processed foods.  Stick to your list. Avoid buying unhealthy items just because they are on sale.   Compare nutrition labels to check the number of  calories and percentage of fat.      What to buy:    Vegetables  Fresh vegetables  Frozen vegetables with no sauce or added salt  Canned vegetables with no sauce or added salt    Protein  Lean meats, such as chicken and turkey  Limit red meats, such as beef to no more than 1x/week  Limit processed meats, such as cold cuts, foster, sausage, and hot dogs. Look for brands that have no nitrites and are minimally processed. Consider turkey sausage or turkey foster.  Fish and Shellfish  Eggs  Dried beans  Canned beans (reduced sodium)    Fat  Use healthy oils, such as olive oil or canola oil, for cooking, salad dressings, etc.  Unflavored nuts and seeds  Nut butters (no added sugar)    Dairy  Yogurt (no sugar added)  Cheese  Low-fat milk  Unsweetened nondairy milks (almond milk, soy milk, etc)    Fruit  Fresh Fruit  Frozen fruit with no added sugar  Canned fruit with no added sugar  Dried fruit with no added sugar  100% fruit juice    Whole Grains  Single ingredient grains, such as oats, quinoa, brown rice  Whole-wheat pasta  Sprouted whole-grain bread    What to avoid:  - Avoid fried foods  - Avoid foods with added sugar  - Avoid sugar-sweetened beverages  - Avoid ultra-processed foods

## 2024-07-03 ENCOUNTER — TELEPHONE (OUTPATIENT)
Dept: SLEEP MEDICINE | Facility: OTHER | Age: 74
End: 2024-07-03
Payer: MEDICARE

## 2024-07-05 ENCOUNTER — ANESTHESIA (OUTPATIENT)
Dept: SURGERY | Facility: HOSPITAL | Age: 74
End: 2024-07-05
Payer: MEDICARE

## 2024-07-05 LAB
OHS QRS DURATION: 90 MS
OHS QTC CALCULATION: 371 MS

## 2024-07-08 ENCOUNTER — TELEPHONE (OUTPATIENT)
Dept: PODIATRY | Facility: CLINIC | Age: 74
End: 2024-07-08
Payer: MEDICARE

## 2024-07-08 ENCOUNTER — HOSPITAL ENCOUNTER (OUTPATIENT)
Dept: SLEEP MEDICINE | Facility: HOSPITAL | Age: 74
Discharge: HOME | End: 2024-07-08
Attending: INTERNAL MEDICINE
Payer: MEDICARE

## 2024-07-08 ENCOUNTER — TELEPHONE (OUTPATIENT)
Dept: SLEEP MEDICINE | Facility: OTHER | Age: 74
End: 2024-07-08
Payer: MEDICARE

## 2024-07-08 DIAGNOSIS — G47.33 OSA (OBSTRUCTIVE SLEEP APNEA): ICD-10-CM

## 2024-07-08 PROCEDURE — 95811 POLYSOM 6/>YRS CPAP 4/> PARM: CPT

## 2024-07-09 NOTE — PROGRESS NOTES
Patient was educated about the purpose of the wires and what data was being collected. Patient was informed that the technician may need to enter the room during the night to fix leads or make adjustments to the equipment.       Large [employer] FFM in use              Follow up instructions were provided to the patient.

## 2024-07-11 ENCOUNTER — TELEPHONE (OUTPATIENT)
Dept: SLEEP MEDICINE | Facility: CLINIC | Age: 74
End: 2024-07-11
Payer: MEDICARE

## 2024-07-11 NOTE — TELEPHONE ENCOUNTER
Encouraged to make mask fitting appt and remotely adjusted bipap settings to ipap max 21, min epap 9 and PS 4. Keep in touch.

## 2024-07-11 NOTE — PROCEDURES
"Titration PSG Report  [facility]  [address], [place] [address]  Phone: [phone]  Fax: [phone]     Patient Name: DEONDRE PHAM Study Date: 7/8/2024   YOB: 1950 Patient MRN: [id number]   Age:  74 year     Sex: Male Referring Physician: JENISE LAGUNAS NP   Height: 6' 2" Recording Tech: Mitul Beach RRT RPSGT   Weight: 332.0 lbs Scoring Tech: Greg Moser RRT RPSGT   BMI:  42.6 AASM  1B   AHI: 18.2 Interpreting Physician Jocelyne Mckeon MD   RERA index: 1.9 Low oxygen saturation: 85.0%   RDI: 20.1 Location [facility]         Polysomnogram Data: A full night polysomnogram recorded the standard physiologic parameters including EEG, EOG, EMG, EKG, nasal and oral airflow.  Respiratory parameters of chest and abdominal movements were recorded with Pe[place]-Crystal motion transducers.  Oxygen saturation was recorded by pulse oximetry.    Sleep architecture: This is a CPAP titration study. At light's out, the patient fell asleep in 28.4 minutes. Sleep efficiency was 77.8%. Total sleep time (TST) was 284.0 minutes. REM latency was 191.5 minutes.    Limb Movement Activity: There were 121 limb movements recorded.  Of this total, 118 were classified as PLMs.  Of the PLMs, - were associated with arousals.  The Limb Movement index was 25.6 per hour while the PLM index was 24.9 per hour and PLM with arousals index was - per hour.    Cardiac: Cardiac monitoring revealed AFIB  with frequent PVC's. The average pulse rate was 77.2 bpm.  The minimum pulse rate was 28.0 bpm while the maximum pulse rate was 116.0 bpm.    Respiratory Summary: The polysomnogram revealed a presence of 17 obstructive, 10 central, and - mixed apneas resulting in Total Apnea index of 5.7 events per hour.  There were 58 hypopneas resulting in Total Hypopnea index of 12.3 events per hour.  The combined Apnea/Hypopnea index was 18.0 events per hour.  There were a total of 9 RERA events resulting in a " Respiratory Disturbance Index (RDI) of 19.9 events per hour.. Mean oxygen saturation was 93.8%.  The lowest oxygen saturation during sleep was 85.0%.  Time spent ?88% oxygen saturation was - minutes (-).    Head of bed at 30 degrees for patient comfort. Patient started the study on bilevel. Large mask leaks were observed throughout the study. Patient complained of mask discomfort when trying to adjust the mask for leaks. Mask was changed in attempt to eliminate patient discomfort when adjusting for leaks. Full face mask was used throughout the study. Patient ended the study early. Patient stated he could no longer tolerate wearing the cpap mask.    BPAP  titration:, BPAP  (bilevel positive airway pressure) was explored from 9/5cm/H20 up to 21/17/0* cm of water using a  Simplus Full Face mask  Large mask, chin strap, CFlex at 3, and heated humidification. CPAp was also attempted, but initial improvement was only observed at the pressure 16 cm H2O in supine NREM sleep, so the study was continued as BPAP. Initial improvement was observed on BPAP 19/15 cm in supine NREM sleep    Effective control of sleep disordered breathing  was seen during supine REM sleep on 21/17 cm H2O. Final AHI at this pressure was below 5/hour.    Long REM rebound was observed on BPAP 19/15-21/17 cm H2O with no EEG arousals.    Patient perception: Following the study, the patient indicated regarding BPAP, could not tolerate the mask long  .  On a post-sleep study questionnaire, the patient indicated that sleep was the same in the lab than compared to home.    IMPRESSION: JACKELYN (G47.33). Effective control of sleep disordered breathing was achieved with BPAP  at 21/17 cm of water.    RECOMMENDATION:    Auto BPAP  at 19/15-21/17 cm, mask of patient's choice, chin strap, and heated humidification, which is essential in conjunction with PAP to prevent airway drying and irritation.  DME referral for mask fitting is suggested.             I have  "reviewed the attached data report and the raw data tracings of this study epoch-by-epoch and have determined that the recording quality and scoring of events are sufficient to allow for interpretation and electronically signed by:      Jocelyne Mckeon MD 7/8/2024                              [facility]    Titration Report    Patient Name: DEONDRE PHAM Study Date: 7/8/2024   YOB: 1950 MRN #: [id number]   Age: 74 year ESE #: -   Sex: Male Referring Provider: JENISE LAGUNAS NP   Height: 6' 2" Recording Tech: Mitul Beach RRT RPSGT   Weight: 332.0 lbs Scoring Tech: Greg Moser RRT RPSGT   BMI: 42.6 Interpreting Physician: Jocelyne Mckeon MD   ESS: - Neck Circumference: -     Study Overview    Lights Off: 09:10:10 PM  Count Index   Lights On: 03:15:04 AM Awakenings: 9 1.9   Time in Bed: 364.9 min. Arousals: 35 7.4   Total Sleep Time: 284.0 min. Apneas & Hypopneas: 85 18.0    Sleep Efficiency: 77.8% Limb Movements: 121 25.6   Sleep Latency: 28.4 min. Snores: - -   Wake After Sleep Onset: 52.5 min. Desaturations: 96 20.3   REM Latency from Sleep Onset: 191.5 min. Minimum SpO2 TST: 85.0%     Sleep Architecture   % of Time in Bed  Stages Time (mins) % Sleep Time   Wake 81.0    Stage N1 10.5 3.7%   Stage N2 175.0 61.6%   Stage N3 1.0 0.4%   REM 97.5 34.3%         Arousal Summary     NREM REM Sleep Index   Respiratory Arousals 4 - 4 0.8   PLM Arousals - - - -   Isolated Limb Movement Arousals 1 - 1 0.2   Spontaneous Arousals 28 2 30 6.3   Total 33 2 35 7.4       Limb Movement Summary     Count Index   Isolated Limb Movements 3 0.6   Periodic Limb Movements (PLMs) 118 24.9   Total Limb Movements 121 25.6   Respiratory Summary     By Sleep Stage By Body Position Total    NREM REM Supine Non-Supine    Time (min) 186.5 97.5 284.0 - 284.0           Obstructive Apnea 17 - 17 - 17   Mixed Apnea - - - - -   Central Apnea 9 1 10 - 10   Total Apneas 26 1 27 - 27   Total Apnea Index 8.4 0.6 5.7 - " 5.7           Total Hypopnea 58 - 58 - 58   Total Hypopnea Index 18.7 - 12.3 - 12.3           Apnea & Hypopnea 84 1 85 - 85   Apnea & Hypopnea Index 27.0 0.6 18.0 - 18.0           RERAs 8 1 9 - 9   RERA Index 2.6 0.6 1.9 - 1.9           RDI 29.6 1.2 19.9 - 19.9     Scoring Criteria: Hypopneas scored at Choose an item.% desaturation criteria.    Respiratory Event Durations     Apnea Hypopnea    NREM REM NREM REM   Average (seconds) 14.9 11.3 22.3 -   Maximum (seconds) 21.0 11.3 38.7 -       Oxygen Saturation Summary     Wake NREM REM TST Total   Average SpO2 94.5% 93.7% 93.6% 93.7% 93.8%   Minimum SpO2 86.0% 85.0% 87.0% 85.0% 85.0%   Maximum SpO2 99.0% 99.0% 98.0% 99.0% 99.0%     Oxygen Saturation Distribution    Range (%) Time in range (min) Time in range (%)    90.0 - 100.0 349.8 97.3%   80.0 - 90.0 7.1 2.0%   70.0 - 80.0 - -   60.0 - 70.0 - -   50.0 - 60.0 - -   0.0 - 50.0 - -   Time Spent ?88% SpO2    Range (%) Time in range (min) Time in range (%)   0.0 - 88.0 1.0 0.3%          Count Index   Desaturations 96 20.3     Cardiac Summary     Wake NREM REM Sleep Total   Average Pulse Rate (BPM) 83.0 77.8 71.6 75.7 77.2   Minimum Pulse Rate (BPM) 28.0 44.0 48.0 44.0 28.0   Maximum Pulse Rate (BPM) 116.0 104.0 89.0 104.0 116.0     Pulse Rate Distribution    Range (bpm) Time in range (min) Time in range (%)   0.0 - 40.0 0.1 0.0%   40.0 - 60.0 3.5 1.0%   60.0 - 80.0 246.4 68.6%   80.0 - 100.0 108.1 30.1%   100.0 - 120.0 1.3 0.3%   120.0 - 140.0 - -   140.0 - 200.0 - -     EtCO2 Summary    Stage Min (mmHg) Average (mmHg) Max (mmHg)   Wake - - -   NREM(1+2+3) - - -   REM - - -     Range (mmHg) Time in range (min) Time in range (%)   20.0 - 40.0 - -   40.0 - 50.0 - -   50.0 - 55.0 - -   55.0 - 100.0 - -   Excluded data <20.0 & >65.0 365.0 100.0%     TcCO2 Summary    Stage Min (mmHg) Average (mmHg) Max (mmHg)   Wake - - -   NREM(1+2+3) - - -   REM - - -     Range (mmHg) Time in range (min) Time in range (%)   - - -   - - -    - - -   - - -   - - -     Comments    -    Titration Summary    PAP Device PAP Level O2 Level Time (min) TST (min) NREM (min) REM (min) Wake (min) Sleep Eff% OA# CA# MA# Hyp# AHI RERA RDI Min SpO2 SpO2 ?88% (min) Ar. Index   - Off - 0.5 0.0 0.0 0.0 0.5 0.0%             CPAP 0.5 - 4.5 0.0 0.0 0.0 4.5 0.0%             CPAP 5 - 9.0 2.5 2.5 0.0 6.5 27.8% 3 - - - 72.0 1 96.0 91.0  0.0 24.0   CPAP 8.5 - 0.5 0.0 0.0 0.0 0.5 0.0%             CPAP 9 - 0.5 0.5 0.5 0.0 0.0 100.0% - - - 1 120.0 - 120.0 92.0  0.0 120.0   BiLevel 9/5/0 - 20.0 0.0 0.0 0.0 20.0 0.0%             BiLevel 11/7/0 - 12.5 12.0 12.0 0.0 0.5 96.0% 4 - - 3 35.0 6 65.0 90.0  0.0 10.0   CPAP 13 - 3.0 2.5 2.5 0.0 0.5 83.3% - - - 1 24.0 - 24.0 87.0  0.0 72.0   BiLevel 13/9/0 - 9.5 9.5 9.5 0.0 0.0 100.0% 3 - - 9 75.8 - 75.8 89.0  0.0 44.2   CPAP 13.5 - 19.0 18.5 18.5 0.0 0.5 97.4% - - - 4 13.0 1 16.2 88.0  0.0 16.2   CPAP 14 - 4.0 4.0 4.0 0.0 0.0 100.0% - - - 1 15.0 - 15.0 89.0  0.0 15.0   CPAP 14.5 - 5.5 5.5 5.5 0.0 0.0 100.0% - - - 3 32.7 - 32.7 85.0  0.2 -   CPAP 15 - 6.0 6.0 6.0 0.0 0.0 100.0% - 1 - 2 30.0 - 30.0 91.0  0.0 -   BiLevel 15/11/0 - 7.5 7.0 7.0 0.0 0.5 93.3% 5 - - 3 68.6 - 68.6 89.0  0.0 34.3   CPAP 16 - 22.5 22.5 22.5 0.0 0.0 100.0% - - - - - - - 86.0  0.1 -   CPAP 17 - 36.5 1.0 1.0 0.0 35.5 2.7% - - - - - - - 93.0  0.0 60.0   BiLevel 17/13/0 - 6.5 6.5 6.5 0.0 0.0 100.0% 1 4 - 2 64.6 - 64.6 89.0  0.0 9.2   CPAP 19 - 6.0 5.5 5.5 0.0 0.5 91.7% 1 - - 4 54.5 - 54.5 88.0  0.1 32.7   BiLevel 19/15/0 - 54.0 50.5 50.5 0.0 3.5 93.5% - 4 - 21 29.7 - 29.7 88.0  0.4 2.4   BiLevel 20/16/0 - 114.5 114.5 32.5 82.0 0.0 100.0% - - - 4 2.1 1 2.6 87.0  0.1 1.6   CPAP 20.5 - 1.0 0.0 0.0 0.0 1.0 0.0%             BiLevel 21/17/0 - 22.0 15.5 0.0 15.5 6.5 70.5% - 1 - - 3.9 - 3.9 91.0  0.0 3.9

## 2024-07-16 ENCOUNTER — TELEPHONE (OUTPATIENT)
Dept: PODIATRY | Facility: CLINIC | Age: 74
End: 2024-07-16
Payer: MEDICARE

## 2024-07-17 ENCOUNTER — TELEPHONE (OUTPATIENT)
Dept: PODIATRY | Facility: CLINIC | Age: 74
End: 2024-07-17
Payer: MEDICARE

## 2024-07-17 NOTE — TELEPHONE ENCOUNTER
Spoke to pt and relayed their 5am  arrival time for surgery. Second floor Day of Surgery at [facility] . Also informed pt to not eat or drink after midnight including gum, hard candy or mints. Also that the pt must have a ride home from surgery, they will not be allowed to drive after anesthesia. Pt verbalized understanding and call ended.

## 2024-07-18 ENCOUNTER — HOSPITAL ENCOUNTER (OUTPATIENT)
Facility: HOSPITAL | Age: 74
Discharge: HOME | End: 2024-07-18
Attending: PODIATRIST | Admitting: PODIATRIST
Payer: MEDICARE

## 2024-07-18 VITALS
HEART RATE: 88 BPM | RESPIRATION RATE: 20 BRPM | SYSTOLIC BLOOD PRESSURE: 160 MMHG | WEIGHT: 315 LBS | BODY MASS INDEX: 42.66 KG/M2 | HEIGHT: 72 IN | OXYGEN SATURATION: 95 % | TEMPERATURE: 98 F | DIASTOLIC BLOOD PRESSURE: 95 MMHG

## 2024-07-18 DIAGNOSIS — G57.51 TARSAL TUNNEL SYNDROME OF RIGHT SIDE: Primary | ICD-10-CM

## 2024-07-18 DIAGNOSIS — G57.50 TARSAL TUNNEL SYNDROME: ICD-10-CM

## 2024-07-18 PROCEDURE — 25000003 PHARM REV CODE 250: Performed by: PODIATRIST

## 2024-07-18 PROCEDURE — 37000008 HC ANESTHESIA 1ST 15 MINUTES: Performed by: PODIATRIST

## 2024-07-18 PROCEDURE — 25000003 PHARM REV CODE 250: Performed by: STUDENT IN AN ORGANIZED HEALTH CARE EDUCATION/TRAINING PROGRAM

## 2024-07-18 PROCEDURE — 71000044 HC DOSC ROUTINE RECOVERY FIRST HOUR: Performed by: PODIATRIST

## 2024-07-18 PROCEDURE — 37000009 HC ANESTHESIA EA ADD 15 MINS: Performed by: PODIATRIST

## 2024-07-18 PROCEDURE — 63600175 PHARM REV CODE 636 W HCPCS: Performed by: STUDENT IN AN ORGANIZED HEALTH CARE EDUCATION/TRAINING PROGRAM

## 2024-07-18 PROCEDURE — 36000706: Performed by: PODIATRIST

## 2024-07-18 PROCEDURE — 71000016 HC POSTOP RECOV ADDL HR: Performed by: PODIATRIST

## 2024-07-18 PROCEDURE — 63600175 PHARM REV CODE 636 W HCPCS: Mod: JZ,JG | Performed by: PODIATRIST

## 2024-07-18 PROCEDURE — 71000015 HC POSTOP RECOV 1ST HR: Performed by: PODIATRIST

## 2024-07-18 PROCEDURE — 36000707: Performed by: PODIATRIST

## 2024-07-18 PROCEDURE — 28035 DECOMPRESSION OF TIBIA NERVE: CPT | Mod: RT,,, | Performed by: PODIATRIST

## 2024-07-18 DEVICE — TISSUE ALLOWRAP DS WET 4X4CM: Type: IMPLANTABLE DEVICE | Site: ANKLE | Status: FUNCTIONAL

## 2024-07-18 RX ORDER — BUPIVACAINE HYDROCHLORIDE 5 MG/ML
INJECTION, SOLUTION EPIDURAL; INTRACAUDAL
Status: DISCONTINUED | OUTPATIENT
Start: 2024-07-18 | End: 2024-07-18 | Stop reason: HOSPADM

## 2024-07-18 RX ORDER — SODIUM CHLORIDE 0.9 % (FLUSH) 0.9 %
10 SYRINGE (ML) INJECTION
Status: DISCONTINUED | OUTPATIENT
Start: 2024-07-18 | End: 2024-07-18 | Stop reason: HOSPADM

## 2024-07-18 RX ORDER — BUPIVACAINE HYDROCHLORIDE 2.5 MG/ML
INJECTION, SOLUTION EPIDURAL; INFILTRATION; INTRACAUDAL
Status: DISCONTINUED | OUTPATIENT
Start: 2024-07-18 | End: 2024-07-18 | Stop reason: HOSPADM

## 2024-07-18 RX ORDER — LIDOCAINE HYDROCHLORIDE 10 MG/ML
1 INJECTION, SOLUTION EPIDURAL; INFILTRATION; INTRACAUDAL; PERINEURAL ONCE AS NEEDED
Status: DISCONTINUED | OUTPATIENT
Start: 2024-07-18 | End: 2024-07-18 | Stop reason: HOSPADM

## 2024-07-18 RX ORDER — LIDOCAINE HYDROCHLORIDE 10 MG/ML
INJECTION INFILTRATION; PERINEURAL
Status: DISCONTINUED | OUTPATIENT
Start: 2024-07-18 | End: 2024-07-18 | Stop reason: HOSPADM

## 2024-07-18 RX ORDER — ONDANSETRON HYDROCHLORIDE 2 MG/ML
INJECTION, SOLUTION INTRAVENOUS
Status: DISCONTINUED | OUTPATIENT
Start: 2024-07-18 | End: 2024-07-18

## 2024-07-18 RX ORDER — DEXAMETHASONE SODIUM PHOSPHATE 4 MG/ML
INJECTION, SOLUTION INTRA-ARTICULAR; INTRALESIONAL; INTRAMUSCULAR; INTRAVENOUS; SOFT TISSUE
Status: DISCONTINUED | OUTPATIENT
Start: 2024-07-18 | End: 2024-07-18

## 2024-07-18 RX ORDER — DEXMEDETOMIDINE HYDROCHLORIDE 100 UG/ML
INJECTION, SOLUTION INTRAVENOUS
Status: DISCONTINUED | OUTPATIENT
Start: 2024-07-18 | End: 2024-07-18

## 2024-07-18 RX ORDER — ACETAMINOPHEN 10 MG/ML
INJECTION, SOLUTION INTRAVENOUS
Status: DISCONTINUED | OUTPATIENT
Start: 2024-07-18 | End: 2024-07-18

## 2024-07-18 RX ORDER — SODIUM CHLORIDE 9 MG/ML
INJECTION, SOLUTION INTRAVENOUS CONTINUOUS
Status: DISCONTINUED | OUTPATIENT
Start: 2024-07-18 | End: 2024-07-18 | Stop reason: HOSPADM

## 2024-07-18 RX ORDER — PROPOFOL 10 MG/ML
VIAL (ML) INTRAVENOUS CONTINUOUS PRN
Status: DISCONTINUED | OUTPATIENT
Start: 2024-07-18 | End: 2024-07-18

## 2024-07-18 RX ORDER — TRAMADOL HYDROCHLORIDE 50 MG/1
50 TABLET ORAL EVERY 8 HOURS PRN
Qty: 21 TABLET | Refills: 0 | Status: SHIPPED | OUTPATIENT
Start: 2024-07-18

## 2024-07-18 RX ORDER — LIDOCAINE HYDROCHLORIDE 20 MG/ML
INJECTION INTRAVENOUS
Status: DISCONTINUED | OUTPATIENT
Start: 2024-07-18 | End: 2024-07-18

## 2024-07-18 RX ORDER — CEFAZOLIN SODIUM 1 G/3ML
INJECTION, POWDER, FOR SOLUTION INTRAMUSCULAR; INTRAVENOUS
Status: DISCONTINUED | OUTPATIENT
Start: 2024-07-18 | End: 2024-07-18

## 2024-07-18 RX ORDER — PROMETHAZINE HYDROCHLORIDE 25 MG/1
25 TABLET ORAL EVERY 6 HOURS PRN
Qty: 28 TABLET | Refills: 0 | Status: SHIPPED | OUTPATIENT
Start: 2024-07-18

## 2024-07-18 RX ORDER — GLUCAGON 1 MG
1 KIT INJECTION
Status: DISCONTINUED | OUTPATIENT
Start: 2024-07-18 | End: 2024-07-18 | Stop reason: HOSPADM

## 2024-07-18 RX ORDER — SULFAMETHOXAZOLE AND TRIMETHOPRIM 400; 80 MG/1; MG/1
1 TABLET ORAL 2 TIMES DAILY
Qty: 14 TABLET | Refills: 0 | Status: SHIPPED | OUTPATIENT
Start: 2024-07-18

## 2024-07-18 RX ORDER — HYDROMORPHONE HYDROCHLORIDE 1 MG/ML
0.2 INJECTION, SOLUTION INTRAMUSCULAR; INTRAVENOUS; SUBCUTANEOUS EVERY 5 MIN PRN
Status: DISCONTINUED | OUTPATIENT
Start: 2024-07-18 | End: 2024-07-18 | Stop reason: HOSPADM

## 2024-07-18 RX ADMIN — CEFAZOLIN 3 G: 330 INJECTION, POWDER, FOR SOLUTION INTRAMUSCULAR; INTRAVENOUS at 07:07

## 2024-07-18 RX ADMIN — PROPOFOL 50 MCG/KG/MIN: 10 INJECTION, EMULSION INTRAVENOUS at 07:07

## 2024-07-18 RX ADMIN — SODIUM CHLORIDE: 9 INJECTION, SOLUTION INTRAVENOUS at 06:07

## 2024-07-18 RX ADMIN — DEXMEDETOMIDINE 8 MCG: 100 INJECTION, SOLUTION, CONCENTRATE INTRAVENOUS at 08:07

## 2024-07-18 RX ADMIN — ACETAMINOPHEN 1000 MG: 10 INJECTION, SOLUTION INTRAVENOUS at 08:07

## 2024-07-18 RX ADMIN — PROPOFOL 30 MG: 10 INJECTION, EMULSION INTRAVENOUS at 07:07

## 2024-07-18 RX ADMIN — LIDOCAINE HYDROCHLORIDE 60 MG: 20 INJECTION INTRAVENOUS at 07:07

## 2024-07-18 RX ADMIN — DEXMEDETOMIDINE 8 MCG: 100 INJECTION, SOLUTION, CONCENTRATE INTRAVENOUS at 07:07

## 2024-07-18 RX ADMIN — ONDANSETRON 4 MG: 2 INJECTION INTRAMUSCULAR; INTRAVENOUS at 08:07

## 2024-07-18 RX ADMIN — DEXAMETHASONE SODIUM PHOSPHATE 4 MG: 4 INJECTION INTRA-ARTICULAR; INTRALESIONAL; INTRAMUSCULAR; INTRAVENOUS; SOFT TISSUE at 07:07

## 2024-07-18 NOTE — PLAN OF CARE
DME delivered surgical shoe to patient. Discharge instructions given and explained to patient and family with verbalization of understanding all instructions. Prescription given and explained next time and doses of each medication. Patients v/s stable, denies n/v and tolerating po, rates pain level tolerable, IV removed, and family at bedside for patient discharge home.

## 2024-07-18 NOTE — TRANSFER OF CARE
Anesthesia Transfer of Care Note    Patient: Zeenat Mancia    Procedure(s) Performed: Procedure(s) (LRB):  RELEASE, TARSAL TUNNEL (Right)    Patient location: PACU    Anesthesia Type: general    Transport from OR: Transported from OR on 6-10 L/min O2 by face mask with adequate spontaneous ventilation    Post pain: adequate analgesia    Post assessment: no apparent anesthetic complications    Post vital signs: stable    Level of consciousness: responds to stimulation    Nausea/Vomiting: no nausea/vomiting    Complications: none    Transfer of care protocol was followed      Last vitals: Visit Vitals  /93   Pulse 92   Temp 36.6 °C (97.9 °F) (Temporal)   Resp 20   Ht 6' (1.829 m)   Wt (!) 150.5 kg (331 lb 12.7 oz)   SpO2 (!) 94%   BMI 45.00 kg/m²

## 2024-07-18 NOTE — PLAN OF CARE
Patient's /109 and patient is requesting if he can take amlodipine 5mg from home because he did not take BP meds this morning. Per Dr. Munguia, ok for patient to take BP meds

## 2024-07-18 NOTE — DISCHARGE INSTRUCTIONS
POST-OPERATIVE INSTRUCTIONS FOR PODIATRY PATIENTS      Keep your dressing dry. Do not remove or change the bandage.      Keep your foot elevated to the level of your heart. You should recline as far as possible (When your toes are at eye level you're in the right position).      You may apply an ice pack to the top of your foot or back of your knee. Replace as needed. Make sure this does not get the dressing wet.      Take your pain medication as instructed for the first 24 hours after surgery, then progress to using them only when you need it.      Stay off your foot as much as possible. You may get up to eat, use the bathroom, etc.    No long standing or walking on your  foot. Do not sit with your feet dangling.      When you do walk make sure you wear your surgical shoe/boot as provided     Eat your regular diet and drink plenty of fluids.      If any of the following conditions are present, call the Podiatry Clinic immediately, report to ED, or call 911 if emergent  - fever 101° or higher   - pain that is not controlled by pain medication   - red, warm, swollen feet with red streaks going up your legs   - cold, blue, numb toes, especially if you are wearing a cast

## 2024-07-18 NOTE — DISCHARGE SUMMARY
[facility] - [facility] (2nd Fl)  Discharge Note  Short Stay    Procedure(s) (LRB):  RELEASE, TARSAL TUNNEL (Right)      OUTCOME: Patient tolerated treatment/procedure well without complication and is now ready for discharge.    DISPOSITION: Home or Self Care    FINAL DIAGNOSIS:  <principal problem not specified>    FOLLOWUP: In clinic    DISCHARGE INSTRUCTIONS:  No discharge procedures on file.     TIME SPENT ON DISCHARGE: 60 minutes    POST-OPERATIVE INSTRUCTIONS FOR PODIATRY PATIENTS      Keep your dressing dry. Do not remove or change the bandage.      Keep your foot elevated to the level of your heart. You should recline as far as possible (When your toes are at eye level you're in the right position).      You may apply an ice pack to the top of your foot or back of your knee. Replace as needed. Make sure this does not get the dressing wet.      Take your pain medication as instructed for the first 24 hours after surgery, then progress to using them only when you need it.      Stay off your foot as much as possible. You may get up to eat, use the bathroom, etc.    No long standing or walking on your  foot. Do not sit with your feet dangling.      When you do walk make sure you wear your surgical shoe/boot as provided     Eat your regular diet and drink plenty of fluids.      If any of the following conditions are present, call the Podiatry Clinic immediately, report to ED, or call 911 if emergent  - fever 101° or higher   - pain that is not controlled by pain medication   - red, warm, swollen feet with red streaks going up your legs   - cold, blue, numb toes, especially if you are wearing a cast

## 2024-07-18 NOTE — H&P
The patient has been examined and the H&P has been reviewed.     I concur with the findings and no changes have occurred since H&P was written.     Anesthesia/Surgery risks, benefits and alternative options discussed and understood by patient/family.    Saturnino Cm DPM   Podiatric Medicine & Surgery  [facility]     Subjective:      Patient ID: Zeenat Mancia is a 74 y.o. male.    Chief Complaint: No chief complaint on file.    Zeenat is a 74 y.o. male who presents to the podiatry clinic  with complaint of  bilateral foot pain. Onset of the symptoms was several months ago. Precipitating event: none known. Current symptoms include:  Burning tingling and numbness . Aggravating factors: inactivity. Symptoms have gradually worsened.   He did very well with the diagnostic nerve blocks.  He would like to discuss possible surgical intervention.    Review of Systems   Constitutional: Negative for chills, decreased appetite, fever and malaise/fatigue.   HENT:  Negative for congestion, hearing loss, nosebleeds and tinnitus.    Eyes:  Negative for double vision, pain, photophobia and visual disturbance.   Cardiovascular:  Negative for chest pain, claudication, cyanosis and leg swelling.   Respiratory:  Negative for cough, hemoptysis, shortness of breath and wheezing.    Endocrine: Negative for cold intolerance and heat intolerance.   Hematologic/Lymphatic: Negative for adenopathy and bleeding problem.   Skin:  Negative for color change, dry skin, itching, nail changes and suspicious lesions.   Musculoskeletal:  Positive for joint pain and myalgias. Negative for arthritis and stiffness.   Gastrointestinal:  Negative for abdominal pain, jaundice, nausea and vomiting.   Genitourinary:  Negative for dysuria, frequency and hematuria.   Neurological:  Positive for numbness, paresthesias and sensory change. Negative for difficulty with concentration and loss of balance.   Psychiatric/Behavioral:  Negative for altered  mental status, hallucinations and suicidal ideas. The patient is not nervous/anxious.    Allergic/Immunologic: Negative for environmental allergies and persistent infections.           Objective:      Physical Exam  Constitutional:       Appearance: He is well-developed.   HENT:      Head: Normocephalic and atraumatic.   Cardiovascular:      Pulses:           Dorsalis pedis pulses are 2+ on the right side and 2+ on the left side.        Posterior tibial pulses are 2+ on the right side and 2+ on the left side.   Pulmonary:      Effort: Pulmonary effort is normal.   Musculoskeletal:      Right foot: Normal range of motion. No deformity.      Left foot: Normal range of motion. No deformity.      Comments: Inspection and palpation of the muscles joints and bones of both lower extremities reveal that muscle strength for the anterior, lateral, and posterior muscle groups and intrinsic muscle groups of the foot are all 5 over 5 symmetrical. Ankle, subtalar, midtarsal, and digital joint range of motion  are within normal limits, nonpainful, without crepitus or effusion. Patient exhibits a normal angle and base of gait. Palpation of the tendons reveal no defects.     Feet:      Right foot:      Skin integrity: No skin breakdown or erythema.      Left foot:      Skin integrity: No skin breakdown or erythema.   Skin:     General: Skin is warm and dry.      Nails: There is no clubbing.      Comments: Skin turgor is normal bilaterally. Skin texture is well hydrated to both lower extremities. No lesions or rashes or wounds appreciated bilaterally. Nail plates 1 through 5 bilaterally are within normal limits for length and thickness. No nail clubbing or incurvation noted.   Neurological:      Mental Status: He is alert and oriented to person, place, and time.      Deep Tendon Reflexes:      Reflex Scores:       Patellar reflexes are 2+ on the right side and 2+ on the left side.       Achilles reflexes are 2+ on the right side and  "2+ on the left side.     Comments: Sharp, dull, light touch, vibratory, and proprioceptive sensation are intact bilaterally. Deep tendon reflexes to patellar and Achilles tendon are symmetrical, 2/4 bilaterally. No ankle clonus or Babinski reflexes noted bilaterally. Coordination is normal to both feet and lower extremities.    Positive tarsal tunnel Tinel sign and tenderness with palpation overlying bilateral deep peroneal nerves.   Psychiatric:         Behavior: Behavior normal.               Assessment:       Encounter Diagnoses   Name Primary?    Nerve entrapment syndrome Yes    Neuralgia     Neuropathy          Plan:       Zeenat ZARATE" was seen today for peripheral neuropathy and numbness.    Diagnoses and all orders for this visit:    Nerve entrapment syndrome    Neuralgia    Neuropathy      I counseled the patient on his conditions, their implications and medical management.      The nature of the condition, options for management, as well as potential risks and complications were discussed in detail with patient. Patient was amenable to my recommendations and left my office fully informed and will follow up as instructed or sooner if necessary.      Discussed options for peripheral neuropathy/nerve entrapment syndrome including nerve block therapy, surgical nerve entrapment decompression procedures, and various vitamins and supplementation available shown to improve nerve function.    The patient has decided to forego any further conservative treatment and opted for surgical intervention.  Alternative treatments, and benefits of surgery were discussed with the patient.  Risks and complications, including but not limited to: pain, swelling, numbness, infection, failure to achieve the stated goals, recurrence of problem, nerve and blood vessel damage, scar tissue formation, further need of surgery, loss of limb or life, was discussed in detail with the patient.  All questions asked were answered, and written " informed consent was sign and received. The patient will undergo tarsal tunnel release.

## 2024-07-18 NOTE — ANESTHESIA PREPROCEDURE EVALUATION
[facility]  Anesthesia Pre-Operative Evaluation         Patient Name/: Zeenat Mancia, 1950  MRN: [id number]    SUBJECTIVE:     Pre-operative evaluation for Procedure(s) (LRB):  RELEASE, TARSAL TUNNEL (Right)     2024    Zeenat Mancia is a 74 y.o. male     Patient now presents for the above procedure(s).    ________________________________________  Results for orders placed during the hospital encounter of 24    Echo    Interpretation Summary    Left Ventricle: The left ventricle is normal in size. Mildly increased wall thickness. There is normal systolic function with a visually estimated ejection fraction of 55 - 65% depending on the R-R interval. Unable to assess diastolic function due to atrial fibrillation.    Right Ventricle: Normal right ventricular cavity size. Systolic function is normal.    Left Atrium: Left atrium is mildly dilated.    IVC/SVC: Intermediate venous pressure at 8 mmHg.    Pulmonary Artery: The estimated pulmonary artery systolic pressure is 28 mmHg.    ________________________________________    LDA:        Drips:    0.9% NaCl   Intravenous Continuous           Patient Active Problem List   Diagnosis    Erectile dysfunction    JACKELYN (obstructive sleep apnea)    Hypertensive heart disease with chronic diastolic congestive heart failure    Morbid obesity due to excess calories    Benign non-nodular prostatic hyperplasia with lower urinary tract symptoms    Pseudophakia of both eyes    Refractive error    Epiretinal membrane, both eyes    Posterior vitreous detachment, both eyes    Tortuous aorta    Colon cancer screening    Chronic atrial fibrillation    History of rectal bleeding    Iron deficiency anemia due to chronic blood loss    Nonobstructive atherosclerosis of coronary artery    Other insomnia    Calculus of gallbladder without cholecystitis without obstruction    Neuralgia    Lumbar radiculitis    Venous insufficiency    Impaired functional  mobility, balance, gait, and endurance    Statin-induced myositis    Cough    Longstanding persistent atrial fibrillation    Aortic atherosclerosis    Neuropathy    Cognitive communication disorder    Parkinson's disease without dyskinesia or fluctuating manifestations       Review of patient's allergies indicates:   Allergen Reactions    Lipitor [atorvastatin] Itching       Current Inpatient Medications:       No current facility-administered medications on file prior to encounter.     Current Outpatient Medications on File Prior to Encounter   Medication Sig Dispense Refill    amLODIPine (NORVASC) 5 MG tablet TAKE 1 TABLET(5 MG) BY MOUTH EVERY DAY 90 tablet 3    apixaban (ELIQUIS) 5 mg Tab Take 1 tablet (5 mg total) by mouth 2 (two) times daily. 60 tablet 11    ARIPiprazole (ABILIFY) 2 MG Tab Take 2 mg by mouth every morning.      FLUoxetine 20 MG capsule Take 20 mg by mouth every evening.      fluticasone propionate (FLONASE) 50 mcg/actuation nasal spray 1 spray (50 mcg total) by Each Nostril route once daily. 15.8 mL 1    melatonin 10 mg Tab Take 10 mg by mouth every evening.      multivit-min/FA/lycopen/lutein (CENTRUM SILVER MEN ORAL) Take by mouth once daily.      pregabalin (LYRICA) 50 MG capsule Take one capsule (50 mg) in addition to the 150 mg capsule for total dose of 200 mg twice daily 60 capsule 6    tamsulosin (FLOMAX) 0.4 mg Cap TAKE 1 CAPSULE BY MOUTH EVERY DAY 30 capsule 11    atenoloL (TENORMIN) 50 MG tablet TAKE 1 TABLET(50 MG) BY MOUTH EVERY DAY (Patient not taking: Reported on 7/2/2024) 90 tablet 3    sildenafiL (VIAGRA) 100 MG tablet Take 1 tablet (100 mg total) by mouth daily as needed for Erectile Dysfunction. 10 tablet 3    tadalafiL (CIALIS) 20 MG Tab Take 1 tablet (20 mg total) by mouth every 72 hours as needed (ED). 10 tablet 11    traZODone (DESYREL) 100 MG tablet Take 1 tablet (100 mg total) by mouth every evening. 30 tablet 2       Past Surgical History:   Procedure Laterality Date     ADENOIDECTOMY  1958    APPENDECTOMY      CATARACT EXTRACTION  2009    OS    CATARACT EXTRACTION W/  INTRAOCULAR LENS IMPLANT Right 06/03/2015    [id number] 14.5d//    CHOLECYSTECTOMY  2021    COLONOSCOPY N/A 10/10/2017    Procedure: COLONOSCOPY;  Surgeon: Jameson Houston MD;  Location: [facility];  Service: Endoscopy;  Laterality: N/A; repeat in 5 years for surveillance    COLONOSCOPY N/A 05/16/2018    Procedure: COLONOSCOPY;  Surgeon: Jameson Houston MD;  Location: [facility];  Service: Endoscopy;  Laterality: N/A;    COLONOSCOPY N/A 06/02/2021    Procedure: COLONOSCOPY;  Surgeon: Agustin Corona MD;  Location: [facility] (2ND FLR);  Service: Endoscopy;  Laterality: N/A;    COLONOSCOPY N/A 10/11/2022    Procedure: COLONOSCOPY;  Surgeon: Edin Lara MD;  Location: [facility] (2ND FLR);  Service: Endoscopy;  Laterality: N/A;  2nd floor-difficult intubation  ok to hold eliquis x2 days per Dr. Jerome, see telephone encounter 6/28-vt  fully vaccinated, [id number]  extended miralax prep  Clear liquids up to 2 hrs prior/ AM prep [id number]    CORONARY ANGIOGRAPHY N/A 01/04/2021    Procedure: ANGIOGRAM, CORONARY ARTERY;  Surgeon: Jase Moore MD;  Location: [facility] CATH LAB/EP;  Service: Cardiology;  Laterality: N/A;    CYSTOSCOPY WITH INSERTION OF MINIMALLY INVASIVE IMPLANT TO ENLARGE PROSTATIC URETHRA N/A 06/29/2021    Procedure: TRANSPROSTATIC TISSUE RETRACTION;  Surgeon: Agustin Clay MD;  Location: [facility];  Service: Urology;  Laterality: N/A;    ESOPHAGOGASTRODUODENOSCOPY N/A 07/02/2018    Procedure: EGD (ESOPHAGOGASTRODUODENOSCOPY);  Surgeon: Jameson Houston MD;  Location: [facility];  Service: Endoscopy;  Laterality: N/A;    ESOPHAGOGASTRODUODENOSCOPY N/A 06/02/2021    Procedure: EGD (ESOPHAGOGASTRODUODENOSCOPY);  Surgeon: Agustin Corona MD;  [facility]  Service: Endoscopy;  Laterality: N/A;    EYE SURGERY      cataract    JOINT REPLACEMENT       LEFT HEART CATHETERIZATION N/A 01/04/2021    Procedure: Left heart cath;  Surgeon: Jase Moore MD;  Location: [facility] CATH LAB/EP;  Service: Cardiology;  Laterality: N/A;    ROBOT-ASSISTED CHOLECYSTECTOMY N/A 02/04/2021    Procedure: ROBOTIC CHOLECYSTECTOMY;  Surgeon: Pal Cheney MD;  Location: [facility] OR;  Service: General;  Laterality: N/A;    TONSILLECTOMY      TRANSESOPHAGEAL ECHOCARDIOGRAPHY N/A 05/14/2021    Procedure: ECHOCARDIOGRAM, TRANSESOPHAGEAL;  Surgeon: Leo Reeves III, MD;  Location: [facility] EP LAB;  Service: Cardiology;  Laterality: N/A;    TREATMENT OF CARDIAC ARRHYTHMIA N/A 04/19/2021    Procedure: Cardioversion or Defibrillation;  Surgeon: Judy Jerome MD;  Location: [facility] CATH LAB/EP;  Service: Cardiology;  Laterality: N/A;    TREATMENT OF CARDIAC ARRHYTHMIA N/A 05/14/2021    Procedure: CARDIOVERSION;  Surgeon: PEMA Downs MD;  Location: [facility] EP LAB;  Service: Cardiology;  Laterality: N/A;  AF, PHUONG, DCCV, MAC, DM, 3 PREP    UPPER GASTROINTESTINAL ENDOSCOPY  05/16/2018    Dr. Houston       Social History:  Tobacco Use: Medium Risk (7/2/2024)    Patient History     Smoking Tobacco Use: Former     Smokeless Tobacco Use: Never     Passive Exposure: Not on file       Alcohol Use: Not At Risk (6/11/2024)    AUDIT-C     Frequency of Alcohol Consumption: Never     Average Number of Drinks: Patient does not drink     Frequency of Binge Drinking: Never       OBJECTIVE:     Vital Signs Range:  BMI Readings from Last 1 Encounters:   07/02/24 45.00 kg/m²               Significant Labs:        Component Value Date/Time    WBC 9.28 04/25/2024 1258    HGB 16.1 04/25/2024 1258    HCT 52.3 04/25/2024 1258    HCT 34 (L) 05/31/2021 1032     04/25/2024 1258     06/24/2024 1133    K 4.6 06/24/2024 1133     06/24/2024 1133    CO2 30 (H) 06/24/2024 1133    CO2 2 05/05/2022 1449     (H) 06/24/2024 1133    BUN 16 06/24/2024 1133    CREATININE 1.1 06/24/2024 1133    MG  2.0 06/02/2021 0516    PHOS 3.3 06/02/2021 0516    CALCIUM 9.7 06/24/2024 1133    ALBUMIN 3.6 04/25/2024 1258    PROT 7.2 04/25/2024 1258    ALKPHOS 79 04/25/2024 1258    BILITOT 0.5 04/25/2024 1258    AST 13 04/25/2024 1258    ALT 10 04/25/2024 1258    INR 1.2 05/11/2021 1025    HGBA1C 5.6 02/20/2024 1133        Please see Results Review for additional labs.     Diagnostic Studies: No relevant studies.    EKG:   Results for orders placed or performed in visit on 07/01/24   IN OFFICE EKG 12-LEAD (to [place])    Collection Time: 07/01/24  8:14 AM   Result Value Ref Range    QRS Duration 90 ms    [facility] QTC Calculation 371 ms    Narrative    Test Reason : I70.0,I48.20,I11.0,I50.32,I25.10,Z01.810,I87.2,    Vent. Rate : 107 BPM     Atrial Rate : 108 BPM     P-R Int : 000 ms          QRS Dur : 090 ms      QT Int : 278 ms       P-R-T Axes : 000 -40 117 degrees     QTc Int : 371 ms    Atrial fibrillation  Left axis deviation  Low voltage QRS  Inferior infarct (cited on or before 31-MAY-2021)  Abnormal ECG  Confirmed by Jackie Upton MD ([id number]) on 7/5/2024 12:52:03 PM    Referred By:             Confirmed By:Jackie Upton MD       ECHO:  See subjective, if available.   Left Ventricle: The left ventricle is normal in size. Mildly increased wall thickness. There is normal systolic function with a visually estimated ejection fraction of 55 - 65% depending on the R-R interval. Unable to assess diastolic function due to atrial fibrillation.    Right Ventricle: Normal right ventricular cavity size. Systolic function is normal.    Left Atrium: Left atrium is mildly dilated.    IVC/SVC: Intermediate venous pressure at 8 mmHg.    Pulmonary Artery: The estimated pulmonary artery systolic pressure is 28 mmHg    ASSESSMENT/PLAN:                                                                                                              07/18/2024  Zeenat Mancia is a 74 y.o., male.      Pre-op Assessment          Review of  Systems  Cardiovascular:     Hypertension   CAD       CHF    hyperlipidemia (Dyslipidemia)                             Pulmonary:      Shortness of breath  Sleep Apnea                Renal/:  Chronic Renal Disease                Hepatic/GI:   PUD,  GERD Liver Disease,            Neurological:           Parkinson's disease without dyskinesia                             Endocrine:        Morbid Obesity / BMI > 40      Physical Exam  General: Alert, Oriented and Cooperative    Airway:  Mallampati: III   Mouth Opening: Normal  TM Distance: Normal  Tongue: Normal  Neck ROM: Normal ROM    Dental:  Intact        Anesthesia Plan  Type of Anesthesia, risks & benefits discussed:    Anesthesia Type: Gen Natural Airway, MAC  Post Op Pain Control Plan: multimodal analgesia  Induction:  IV  Informed Consent: Informed consent signed with the Patient and all parties understand the risks and agree with anesthesia plan.  All questions answered.   ASA Score: 3  Day of Surgery Review of History & Physical: H&P Update referred to the surgeon/provider.    Ready For Surgery From Anesthesia Perspective.     .

## 2024-07-18 NOTE — BRIEF OP NOTE
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 Item Serial No.  Lot No. LRB No. Used Action   TISSUE ALLOWRAP DS WET 4X4CM - [id number]  TISSUE ALLOWRAP DS WET 4X4CM  [employer]. [id number] Right 1 Implanted       Operative Findings: R tarsal tunnel nerve entrapment, graft application    Estimated Blood Loss: * No values recorded between 7/18/2024  7:32 AM and 7/18/2024  8:18 AM *    Estimated Blood Loss has not been documented. EBL = 5.0mL.         Specimens:   Specimen (24h ago, onward)      None            [id number]

## 2024-07-18 NOTE — OP NOTE
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 Anesthesia was induced. Tourniquet was applied to the R ankle.  40 mL of one to one mixture of 0.5% bupivacaine plain and 1% lidocaine plain was locally infiltrated. The R foot was draped and prepped in a sterile manner. Tourniquet was inflated to 250 mmHg.     Attention was directed to the medial aspect of the Rankle where a curvilinear incision was performed over the tarsal tunnel extending proximally to the level of the ankle joint and distally to the abductor hallucis muscle. Meticulous sharp and blunt dissection utilized down through subcutaneous tissues to the level of deep fascia and flexor retinaculum. The deep fascia and flexor retinaculum were sharply released using tenotomy scissors. Distally, the abductor hallucis fascia was released using tenotomy scissors. Bipolar cautery used to control bleeding. The tibial nerve was identified. The nerve was noted to have pathological fibrous and fatty edematous changes. The operative site was lavaged with copious amounts of saline. Shilpi Allowrap graft was applied over the tarsal tunnel. Deep closure with 3-0 vicryl and  superficially with 3-0 nylon. 22 cc of .5% bupivacaine locally infiltrated around the suture site. R foot dressed with xeroform, gauze, Abd pad, cast padding, and ACE wrap.     The patient tolerated the procedure and anesthesia well. Patient was transferred to the recovery room with vital signs stable, vascular status intact, and capillary refill time <3 seconds to the distal R foot. Following a period of post-op monitoring, the patient will be discharged home on the following written and oral post-op instructions:      - Keep dressing dry and intact until clinic visit.  - Avoid excessive ambulation, ambulate with surgical shoe on at all times with partial weightbearing as tolerated  - Ice and elevate R foot when at rest.  - All prescriptions were given to patient pre and post-op.   - Contact Dr. Perez for all post op follow up care and if any problems arise.      Estimated Blood Loss (EBL): 3 ml           Implants:   Implant Name Type Inv. Item Serial No.  Lot No. LRB No. Used Action   TISSUE ALLOWRAP DS WET 4X4CM - [id number]  TISSUE ALLOWRAP DS WET 4X4CM  [employer]. [id number] Right 1 Implanted     Specimens:   Specimen (24h ago, onward)      None           Condition: Good    Disposition: PACU - hemodynamically stable.    Attestation: I was present and scrubbed for the entire procedure.    Discharge Note    OUTCOME: Patient tolerated treatment/procedure well without complication and is now ready for discharge.    DISPOSITION: Home or Self Care    FINAL DIAGNOSIS:  <principal problem not specified>    FOLLOWUP: In clinic    DISCHARGE INSTRUCTIONS:  No discharge procedures on file.

## 2024-07-19 NOTE — ANESTHESIA POSTPROCEDURE EVALUATION
Anesthesia Post Evaluation    Patient: Zeenat Mancia    Procedure(s) Performed: Procedure(s) (LRB):  RELEASE, TARSAL TUNNEL (Right)    Final Anesthesia Type: general      Patient location during evaluation: PACU  Patient participation: Yes- Able to Participate  Level of consciousness: awake and alert  Post-procedure vital signs: reviewed and stable  Pain management: adequate  Airway patency: patent    PONV status at discharge: No PONV  Anesthetic complications: no      Cardiovascular status: blood pressure returned to baseline  Respiratory status: unassisted  Hydration status: euvolemic  Follow-up not needed.              Vitals Value Taken Time   /95 07/18/24 1000   Temp 36.7 °C (98 °F) 07/18/24 1000   Pulse 88 07/18/24 1000   Resp 20 07/18/24 1000   SpO2 95 % 07/18/24 1000         No case tracking events are documented in the log.      Pain/Jose Score: Jose Score: 10 (7/18/2024 10:00 AM)

## 2024-07-20 PROBLEM — G57.51 TARSAL TUNNEL SYNDROME OF RIGHT SIDE: Status: ACTIVE | Noted: 2024-07-20

## 2024-07-25 ENCOUNTER — OFFICE VISIT (OUTPATIENT)
Dept: PODIATRY | Facility: CLINIC | Age: 74
End: 2024-07-25
Payer: MEDICARE

## 2024-07-25 VITALS
HEIGHT: 72 IN | HEART RATE: 76 BPM | BODY MASS INDEX: 45 KG/M2 | SYSTOLIC BLOOD PRESSURE: 118 MMHG | DIASTOLIC BLOOD PRESSURE: 85 MMHG

## 2024-07-25 DIAGNOSIS — G58.9 NERVE ENTRAPMENT SYNDROME: Primary | ICD-10-CM

## 2024-07-25 PROCEDURE — 99999 PR PBB SHADOW E&M-EST. PATIENT-LVL IV: CPT | Mod: PBBFAC,,, | Performed by: PODIATRIST

## 2024-07-25 PROCEDURE — 99024 POSTOP FOLLOW-UP VISIT: CPT | Mod: S$GLB,,, | Performed by: PODIATRIST

## 2024-07-25 PROCEDURE — 1157F ADVNC CARE PLAN IN RCRD: CPT | Mod: CPTII,S$GLB,, | Performed by: PODIATRIST

## 2024-07-25 PROCEDURE — 3079F DIAST BP 80-89 MM HG: CPT | Mod: CPTII,S$GLB,, | Performed by: PODIATRIST

## 2024-07-25 PROCEDURE — 1101F PT FALLS ASSESS-DOCD LE1/YR: CPT | Mod: CPTII,S$GLB,, | Performed by: PODIATRIST

## 2024-07-25 PROCEDURE — 1125F AMNT PAIN NOTED PAIN PRSNT: CPT | Mod: CPTII,S$GLB,, | Performed by: PODIATRIST

## 2024-07-25 PROCEDURE — 3044F HG A1C LEVEL LT 7.0%: CPT | Mod: CPTII,S$GLB,, | Performed by: PODIATRIST

## 2024-07-25 PROCEDURE — 3074F SYST BP LT 130 MM HG: CPT | Mod: CPTII,S$GLB,, | Performed by: PODIATRIST

## 2024-07-25 PROCEDURE — 3288F FALL RISK ASSESSMENT DOCD: CPT | Mod: CPTII,S$GLB,, | Performed by: PODIATRIST

## 2024-07-25 RX ORDER — DULOXETIN HYDROCHLORIDE 30 MG/1
30 CAPSULE, DELAYED RELEASE ORAL EVERY MORNING
COMMUNITY
Start: 2024-05-20

## 2024-07-27 ENCOUNTER — PATIENT MESSAGE (OUTPATIENT)
Dept: PODIATRY | Facility: CLINIC | Age: 74
End: 2024-07-27
Payer: MEDICARE

## 2024-07-29 ENCOUNTER — PATIENT MESSAGE (OUTPATIENT)
Dept: PODIATRY | Facility: CLINIC | Age: 74
End: 2024-07-29
Payer: MEDICARE

## 2024-07-31 ENCOUNTER — OFFICE VISIT (OUTPATIENT)
Dept: PODIATRY | Facility: CLINIC | Age: 74
End: 2024-07-31
Payer: MEDICARE

## 2024-07-31 VITALS
BODY MASS INDEX: 42.6 KG/M2 | HEART RATE: 84 BPM | HEIGHT: 74 IN | DIASTOLIC BLOOD PRESSURE: 84 MMHG | SYSTOLIC BLOOD PRESSURE: 123 MMHG

## 2024-07-31 DIAGNOSIS — G58.9 NERVE ENTRAPMENT SYNDROME: Primary | ICD-10-CM

## 2024-07-31 PROCEDURE — 3044F HG A1C LEVEL LT 7.0%: CPT | Mod: CPTII,S$GLB,, | Performed by: PODIATRIST

## 2024-07-31 PROCEDURE — 3074F SYST BP LT 130 MM HG: CPT | Mod: CPTII,S$GLB,, | Performed by: PODIATRIST

## 2024-07-31 PROCEDURE — 1157F ADVNC CARE PLAN IN RCRD: CPT | Mod: CPTII,S$GLB,, | Performed by: PODIATRIST

## 2024-07-31 PROCEDURE — 1126F AMNT PAIN NOTED NONE PRSNT: CPT | Mod: CPTII,S$GLB,, | Performed by: PODIATRIST

## 2024-07-31 PROCEDURE — 3079F DIAST BP 80-89 MM HG: CPT | Mod: CPTII,S$GLB,, | Performed by: PODIATRIST

## 2024-07-31 PROCEDURE — 99999 PR PBB SHADOW E&M-EST. PATIENT-LVL IV: CPT | Mod: PBBFAC,,, | Performed by: PODIATRIST

## 2024-07-31 PROCEDURE — 99024 POSTOP FOLLOW-UP VISIT: CPT | Mod: S$GLB,,, | Performed by: PODIATRIST

## 2024-07-31 PROCEDURE — 1159F MED LIST DOCD IN RCRD: CPT | Mod: CPTII,S$GLB,, | Performed by: PODIATRIST

## 2024-07-31 NOTE — PROGRESS NOTES
Patient presents approximately 5 days status post tarsal tunnel release right ankle.  He is doing well.  Minimal discomfort.  Tingling is subsiding.  Incision healing well no signs of infection or dehiscence.  Continue postoperative instructions and follow-up in 1 week.   Abnormal Lactate

## 2024-07-31 NOTE — PROGRESS NOTES
Patient presents approximately 2 weeks status post right tarsal tunnel release.  He is doing well.  Is having no discomfort.  Incision healing well.  Neurovascular status intact.  Patient has better sensation to the right side with less tingling.  Continue postop instructions and follow-up in 1 week.

## 2024-08-01 ENCOUNTER — PATIENT MESSAGE (OUTPATIENT)
Dept: PODIATRY | Facility: CLINIC | Age: 74
End: 2024-08-01
Payer: MEDICARE

## 2024-08-08 ENCOUNTER — OFFICE VISIT (OUTPATIENT)
Dept: PODIATRY | Facility: CLINIC | Age: 74
End: 2024-08-08
Payer: MEDICARE

## 2024-08-08 VITALS
BODY MASS INDEX: 42.6 KG/M2 | SYSTOLIC BLOOD PRESSURE: 128 MMHG | DIASTOLIC BLOOD PRESSURE: 83 MMHG | HEART RATE: 91 BPM | HEIGHT: 74 IN

## 2024-08-08 DIAGNOSIS — I10 ESSENTIAL HYPERTENSION: ICD-10-CM

## 2024-08-08 DIAGNOSIS — G58.9 NERVE ENTRAPMENT SYNDROME: Primary | ICD-10-CM

## 2024-08-08 PROCEDURE — 3044F HG A1C LEVEL LT 7.0%: CPT | Mod: CPTII,S$GLB,, | Performed by: PODIATRIST

## 2024-08-08 PROCEDURE — 1125F AMNT PAIN NOTED PAIN PRSNT: CPT | Mod: CPTII,S$GLB,, | Performed by: PODIATRIST

## 2024-08-08 PROCEDURE — 1157F ADVNC CARE PLAN IN RCRD: CPT | Mod: CPTII,S$GLB,, | Performed by: PODIATRIST

## 2024-08-08 PROCEDURE — 99024 POSTOP FOLLOW-UP VISIT: CPT | Mod: S$GLB,,, | Performed by: PODIATRIST

## 2024-08-08 PROCEDURE — 3079F DIAST BP 80-89 MM HG: CPT | Mod: CPTII,S$GLB,, | Performed by: PODIATRIST

## 2024-08-08 PROCEDURE — 3074F SYST BP LT 130 MM HG: CPT | Mod: CPTII,S$GLB,, | Performed by: PODIATRIST

## 2024-08-08 PROCEDURE — 99999 PR PBB SHADOW E&M-EST. PATIENT-LVL IV: CPT | Mod: PBBFAC,,, | Performed by: PODIATRIST

## 2024-08-08 PROCEDURE — 1159F MED LIST DOCD IN RCRD: CPT | Mod: CPTII,S$GLB,, | Performed by: PODIATRIST

## 2024-08-08 RX ORDER — FLUOXETINE HYDROCHLORIDE 40 MG/1
40 CAPSULE ORAL
COMMUNITY
Start: 2024-08-07

## 2024-08-08 RX ORDER — AMLODIPINE BESYLATE 5 MG/1
5 TABLET ORAL
Qty: 90 TABLET | Refills: 3 | Status: SHIPPED | OUTPATIENT
Start: 2024-08-08

## 2024-08-08 RX ORDER — MIRTAZAPINE 15 MG/1
15 TABLET, FILM COATED ORAL NIGHTLY
COMMUNITY
Start: 2024-08-07

## 2024-08-08 RX ORDER — HYDROCHLOROTHIAZIDE 25 MG/1
25 TABLET ORAL
COMMUNITY
Start: 2024-08-01

## 2024-08-08 RX ORDER — QUETIAPINE FUMARATE 100 MG/1
100 TABLET, FILM COATED ORAL NIGHTLY
COMMUNITY
Start: 2024-08-07

## 2024-08-08 RX ORDER — HYDROCHLOROTHIAZIDE 25 MG/1
25 TABLET ORAL
Qty: 90 TABLET | Refills: 3 | Status: SHIPPED | OUTPATIENT
Start: 2024-08-08

## 2024-08-08 RX ORDER — ARIPIPRAZOLE 5 MG/1
5 TABLET ORAL
COMMUNITY
Start: 2024-08-07

## 2024-08-08 NOTE — PROGRESS NOTES
Subjective:         Patient ID: Zeenat Mancia is a 74 y.o. male.    Chief Complaint: Post-op Evaluation (Post-op #3 right foot)    Zeenat is a 74 y.o. male who presents to the podiatry clinic  with complaint of  bilateral foot pain. Onset of the symptoms was several months ago. Precipitating event: none known. Current symptoms include:  Burning tingling and numbness . Aggravating factors: inactivity. Symptoms have gradually worsened.   He did very well with the diagnostic nerve blocks.  He would like to discuss possible surgical intervention.    8/8:  Patient presents to the clinic for follow up of R tarsal tunnel release.  He continues to report relief of pain.  Incisions have been intact.  Denies fevers, chills, nausea, or vomiting.  Denies SOB or chest pain.  Denies any new pedal complaints.    Review of Systems   Constitutional: Negative for chills, decreased appetite, fever and malaise/fatigue.   HENT:  Negative for congestion, hearing loss, nosebleeds and tinnitus.    Eyes:  Negative for double vision, pain, photophobia and visual disturbance.   Cardiovascular:  Negative for chest pain, claudication, cyanosis and leg swelling.   Respiratory:  Negative for cough, hemoptysis, shortness of breath and wheezing.    Endocrine: Negative for cold intolerance and heat intolerance.   Hematologic/Lymphatic: Negative for adenopathy and bleeding problem.   Skin:  Negative for color change, dry skin, itching, nail changes and suspicious lesions.   Musculoskeletal:  Positive for joint pain and myalgias. Negative for arthritis and stiffness.   Gastrointestinal:  Negative for abdominal pain, jaundice, nausea and vomiting.   Genitourinary:  Negative for dysuria, frequency and hematuria.   Neurological:  Positive for numbness, paresthesias and sensory change. Negative for difficulty with concentration and loss of balance.   Psychiatric/Behavioral:  Negative for altered mental status, hallucinations and suicidal ideas. The patient  is not nervous/anxious.    Allergic/Immunologic: Negative for environmental allergies and persistent infections.           Objective:      Physical Exam  Constitutional:       Appearance: He is well-developed.   HENT:      Head: Normocephalic and atraumatic.   Cardiovascular:      Pulses:           Dorsalis pedis pulses are 2+ on the right side and 2+ on the left side.        Posterior tibial pulses are 2+ on the right side and 2+ on the left side.   Pulmonary:      Effort: Pulmonary effort is normal.   Musculoskeletal:      Right foot: Normal range of motion. No deformity.      Left foot: Normal range of motion. No deformity.      Comments: Inspection and palpation of the muscles joints and bones of both lower extremities reveal that muscle strength for the anterior, lateral, and posterior muscle groups and intrinsic muscle groups of the foot are all 5 over 5 symmetrical. Ankle, subtalar, midtarsal, and digital joint range of motion  are within normal limits, nonpainful, without crepitus or effusion. Patient exhibits a normal angle and base of gait. Palpation of the tendons reveal no defects.     Feet:      Right foot:      Skin integrity: No skin breakdown or erythema.      Left foot:      Skin integrity: No skin breakdown or erythema.   Skin:     General: Skin is warm and dry.      Nails: There is no clubbing.      Comments: Skin turgor is normal bilaterally. Skin texture is well hydrated to both lower extremities. No lesions or rashes or wounds appreciated bilaterally. Nail plates 1 through 5 bilaterally are within normal limits for length and thickness. No nail clubbing or incurvation noted.    Incision site fully epithelialized, healed.  No surrounding erythema or edema noted.   Neurological:      Mental Status: He is alert and oriented to person, place, and time.      Deep Tendon Reflexes:      Reflex Scores:       Patellar reflexes are 2+ on the right side and 2+ on the left side.       Achilles reflexes are  2+ on the right side and 2+ on the left side.     Comments: No Sharp, dull, light touch, vibratory, and proprioceptive sensation are intact bilaterally. Deep tendon reflexes to patellar and Achilles tendon are symmetrical, 2/4 bilaterally. No ankle clonus or Babinski reflexes noted bilaterally. Coordination is normal to both feet and lower extremities.    No tenderness with palpation overlying bilateral deep peroneal nerves.   Psychiatric:         Behavior: Behavior normal.             Assessment:       No diagnosis found.        Plan:       There are no diagnoses linked to this encounter.    -I counseled the patient on his conditions, their implications and medical management.  -Discussed with the patient that he is healing accordingly  -RTC 1 month for post-op follow up    Leia Rao DPM   Podiatric Medicine & Surgery  [facility]

## 2024-08-16 ENCOUNTER — PATIENT MESSAGE (OUTPATIENT)
Dept: PODIATRY | Facility: CLINIC | Age: 74
End: 2024-08-16
Payer: MEDICARE

## 2024-08-19 ENCOUNTER — PATIENT MESSAGE (OUTPATIENT)
Dept: SLEEP MEDICINE | Facility: CLINIC | Age: 74
End: 2024-08-19
Payer: MEDICARE

## 2024-08-19 DIAGNOSIS — G47.33 OSA (OBSTRUCTIVE SLEEP APNEA): Primary | ICD-10-CM

## 2024-08-21 ENCOUNTER — OFFICE VISIT (OUTPATIENT)
Dept: PODIATRY | Facility: CLINIC | Age: 74
End: 2024-08-21
Payer: MEDICARE

## 2024-08-21 VITALS
SYSTOLIC BLOOD PRESSURE: 122 MMHG | HEART RATE: 71 BPM | BODY MASS INDEX: 42.6 KG/M2 | DIASTOLIC BLOOD PRESSURE: 69 MMHG | HEIGHT: 74 IN

## 2024-08-21 DIAGNOSIS — G58.9 NERVE ENTRAPMENT SYNDROME: Primary | ICD-10-CM

## 2024-08-21 PROCEDURE — 1157F ADVNC CARE PLAN IN RCRD: CPT | Mod: CPTII,S$GLB,, | Performed by: PODIATRIST

## 2024-08-21 PROCEDURE — 1159F MED LIST DOCD IN RCRD: CPT | Mod: CPTII,S$GLB,, | Performed by: PODIATRIST

## 2024-08-21 PROCEDURE — 3288F FALL RISK ASSESSMENT DOCD: CPT | Mod: CPTII,S$GLB,, | Performed by: PODIATRIST

## 2024-08-21 PROCEDURE — 99999 PR PBB SHADOW E&M-EST. PATIENT-LVL IV: CPT | Mod: PBBFAC,,, | Performed by: PODIATRIST

## 2024-08-21 PROCEDURE — 1126F AMNT PAIN NOTED NONE PRSNT: CPT | Mod: CPTII,S$GLB,, | Performed by: PODIATRIST

## 2024-08-21 PROCEDURE — 99024 POSTOP FOLLOW-UP VISIT: CPT | Mod: S$GLB,,, | Performed by: PODIATRIST

## 2024-08-21 PROCEDURE — 1101F PT FALLS ASSESS-DOCD LE1/YR: CPT | Mod: CPTII,S$GLB,, | Performed by: PODIATRIST

## 2024-08-21 PROCEDURE — 3044F HG A1C LEVEL LT 7.0%: CPT | Mod: CPTII,S$GLB,, | Performed by: PODIATRIST

## 2024-08-21 PROCEDURE — 3074F SYST BP LT 130 MM HG: CPT | Mod: CPTII,S$GLB,, | Performed by: PODIATRIST

## 2024-08-21 PROCEDURE — 1160F RVW MEDS BY RX/DR IN RCRD: CPT | Mod: CPTII,S$GLB,, | Performed by: PODIATRIST

## 2024-08-21 PROCEDURE — 3078F DIAST BP <80 MM HG: CPT | Mod: CPTII,S$GLB,, | Performed by: PODIATRIST

## 2024-08-26 ENCOUNTER — TELEPHONE (OUTPATIENT)
Dept: PODIATRY | Facility: CLINIC | Age: 74
End: 2024-08-26
Payer: MEDICARE

## 2024-08-26 ENCOUNTER — OFFICE VISIT (OUTPATIENT)
Dept: CARDIOLOGY | Facility: CLINIC | Age: 74
End: 2024-08-26
Payer: MEDICARE

## 2024-08-26 VITALS
HEART RATE: 91 BPM | WEIGHT: 315 LBS | SYSTOLIC BLOOD PRESSURE: 108 MMHG | DIASTOLIC BLOOD PRESSURE: 70 MMHG | HEIGHT: 74 IN | BODY MASS INDEX: 40.43 KG/M2

## 2024-08-26 DIAGNOSIS — I48.20 CHRONIC ATRIAL FIBRILLATION: ICD-10-CM

## 2024-08-26 DIAGNOSIS — Z01.818 PRE-OP EXAM: Primary | ICD-10-CM

## 2024-08-26 DIAGNOSIS — I50.32 HYPERTENSIVE HEART DISEASE WITH CHRONIC DIASTOLIC CONGESTIVE HEART FAILURE: ICD-10-CM

## 2024-08-26 DIAGNOSIS — I11.0 HYPERTENSIVE HEART DISEASE WITH CHRONIC DIASTOLIC CONGESTIVE HEART FAILURE: ICD-10-CM

## 2024-08-26 DIAGNOSIS — I70.0 AORTIC ATHEROSCLEROSIS: ICD-10-CM

## 2024-08-26 DIAGNOSIS — I48.11 LONGSTANDING PERSISTENT ATRIAL FIBRILLATION: ICD-10-CM

## 2024-08-26 DIAGNOSIS — I25.10 NONOBSTRUCTIVE ATHEROSCLEROSIS OF CORONARY ARTERY: ICD-10-CM

## 2024-08-26 DIAGNOSIS — I87.2 VENOUS INSUFFICIENCY: ICD-10-CM

## 2024-08-26 LAB
OHS QRS DURATION: 126 MS
OHS QTC CALCULATION: 464 MS

## 2024-08-26 PROCEDURE — 3044F HG A1C LEVEL LT 7.0%: CPT | Mod: CPTII,S$GLB,, | Performed by: INTERNAL MEDICINE

## 2024-08-26 PROCEDURE — 1159F MED LIST DOCD IN RCRD: CPT | Mod: CPTII,S$GLB,, | Performed by: INTERNAL MEDICINE

## 2024-08-26 PROCEDURE — 99214 OFFICE O/P EST MOD 30 MIN: CPT | Mod: 25,S$GLB,, | Performed by: INTERNAL MEDICINE

## 2024-08-26 PROCEDURE — 3008F BODY MASS INDEX DOCD: CPT | Mod: CPTII,S$GLB,, | Performed by: INTERNAL MEDICINE

## 2024-08-26 PROCEDURE — 3074F SYST BP LT 130 MM HG: CPT | Mod: CPTII,S$GLB,, | Performed by: INTERNAL MEDICINE

## 2024-08-26 PROCEDURE — 1101F PT FALLS ASSESS-DOCD LE1/YR: CPT | Mod: CPTII,S$GLB,, | Performed by: INTERNAL MEDICINE

## 2024-08-26 PROCEDURE — 3078F DIAST BP <80 MM HG: CPT | Mod: CPTII,S$GLB,, | Performed by: INTERNAL MEDICINE

## 2024-08-26 PROCEDURE — 99999 PR PBB SHADOW E&M-EST. PATIENT-LVL III: CPT | Mod: PBBFAC,,, | Performed by: INTERNAL MEDICINE

## 2024-08-26 PROCEDURE — 93000 ELECTROCARDIOGRAM COMPLETE: CPT | Mod: S$GLB,,, | Performed by: INTERNAL MEDICINE

## 2024-08-26 PROCEDURE — 1157F ADVNC CARE PLAN IN RCRD: CPT | Mod: CPTII,S$GLB,, | Performed by: INTERNAL MEDICINE

## 2024-08-26 PROCEDURE — 1126F AMNT PAIN NOTED NONE PRSNT: CPT | Mod: CPTII,S$GLB,, | Performed by: INTERNAL MEDICINE

## 2024-08-26 PROCEDURE — 1160F RVW MEDS BY RX/DR IN RCRD: CPT | Mod: CPTII,S$GLB,, | Performed by: INTERNAL MEDICINE

## 2024-08-26 PROCEDURE — 3288F FALL RISK ASSESSMENT DOCD: CPT | Mod: CPTII,S$GLB,, | Performed by: INTERNAL MEDICINE

## 2024-08-26 RX ORDER — ATENOLOL 50 MG/1
50 TABLET ORAL DAILY
Qty: 90 TABLET | Refills: 3 | Status: SHIPPED | OUTPATIENT
Start: 2024-08-26

## 2024-08-26 NOTE — TELEPHONE ENCOUNTER
Spoke to pt and discussed 9/18 surgery date. Also advised pt to contact their PCP to schedule an appointment to be cleared for surgery. Then advised pt that someone will call them the day before surgery between 3p-5p with the surgery arrival time and instructions. Pt verbalized understanding and call ended.

## 2024-08-26 NOTE — PROGRESS NOTES
HISTORY:    74-year-old male with a history of CAD-nonobs on cath, chronic atrial fibrillation, hypertension, aortic atherosclerosis, HFpEF, JACKELYN on CPAP, and B LE neuropathy presenting for  follow-up.    The patient denies any symptoms of chest pain. Chronic LUND that had been stable for years. Seemingly improved. Having difficulty sleeping at night. O2 levels okay.     Activity levels mild and limited by B LE neuropathy and maybe SOB as well.     In late '23 we stopped HCTZ and prescribed spironolactone and furosemide. No objective e/o HF. Pts bretahing is improving and he has lost weight on GLP 1 agonist.     The patient denies any previous history of myocardial infarction, peripheral arterial disease, stroke, congestive heart failure, or cardiomyopathy. Afib dx'd '17-'18 years ago incidentally. Pt has JACKELYN and Pickwickian type symptoms.     The patient currently tolerates atenolol 50 x 1 (question of whether he is taking), amlodipine 5 x 1, spironolactone 25 x 1, valsartan 320 x 1 (seemed to have stopped), furosemide 20 x 1, and apixaban 5 x 2. Bps controlled. No bleeding. On GLP agonist.     Since last visit the patient tolerated a foot surgery in mid July.  He is scheduled for a repeat surgery in mid September.    PHYSICAL EXAM:    Vitals:    08/26/24 1126   BP: 108/70   Pulse: 91     NAD, A+Ox3.  No jvd, no bruit.  Irreg, irreg nml s1,s2. No murmurs.  CTA B no wheezes or crackles.  No edema.    LABS/STUDIES (imaging reviewed during clinic visit):    June 2024 creatinine 1.1/BUN 16.  NT proBNP 147.  April 2024 hemoglobin 16.1/MCV 86.  Creatinine 0.9/BUN 14.  Albumin 3.6.  BNP and troponin normal.  2022 /HDL 43/ LDL 90/.  A1c and TSH normal.  ECG August, June and  April 2024 atrial fibrillation.  PRWP with no ST changes.  Right bundle branch block.    Holter July 2024 atrial fibrillation with average heart rate of 78 beats per minute.  TTE June 2024 normal LV size with mildly increased wall  Body Location Override (Optional - Billing Will Still Be Based On Selected Body Map Location If Applicable): left shin Detail Level: Zone thickness.  Normal systolic function with an ejection fraction of 55-65% depending on the R to R interval.  Normal RV size and function.  CVP 8 with estimated PASP of 28.  July 2023 normal LV size and function with EF 60%.  Mild RV enlargement with reduced function.  CVP 8.    Cardiac catheterization 2021 nonobstructive coronary artery disease.    CTA Chest July 2023 No e/o PE. Aortic and coronary calcification.   Venous us July 2023 No e/o DVT bilaterally. R GSV reflux at the level of the knee.   Arterial duplex 2022 Nml DIANNA bilaterally with no e/o significant stenosis.    ASSESSMENT & PLAN:    1. Pre-op exam    2. Hypertensive heart disease with chronic diastolic congestive heart failure    3. Chronic atrial fibrillation    4. Aortic atherosclerosis    5. Longstanding persistent atrial fibrillation    6. Nonobstructive atherosclerosis of coronary artery    7. Venous insufficiency              Orders Placed This Encounter    IN OFFICE EKG 12-LEAD (to [place])    atenoloL (TENORMIN) 50 MG tablet        SOB that is in large part related to JACKELYN and obesity w Pickwickian type symptoms. HF not an issue with nml BNP, NT-pBNP, and LVEF w reasonable PASPs.    Regardless we have him on a HF regimen to optimize fluid status including spironolactone 25x1 and furosemide 20x1. Likely has venous insufficiency.     cAfib. Rates controlled on atenolol. Last holter may have been off atenolol. Pt not certain about this med. Would restart if not taking daily. Tolerating apixiban without issue.     BP is controlled on amlodipine 5 x 1, spironolactone 25 x 1, and furosemide 20 x 1.  Okay to drop amlodipine from my perspective and restart atenolol 50x1. Can stay off valsartan for now.     We discussed the importance of diet modifications and instituting an exercise regimen with weight loss in mind. Following with bariatrics.     No further CV testing required prior to anticipated foot surgery. Okay to hold apixiban 48 hours pre-op and  Protocol: SRT restart after.     Follow up in about 6 months (around 2/26/2025).      Jackie Upton MD     Beginning Date: 7/5/23 Ending Date: 8/11/23 Current Treatment: 8 Total Planned Treatments: 12 Treatment Interval In Days: 2 Name Of Supervising Technician: Monique Cloud Kilovolts: 50 Milliamps: 5 Centigray: 350 Aluminum (In Mm): no Al Cone Type (In Cm): 2 cm Cone Xrt Protocol Text: The patient received XRT as outlined above. Electronic Brachytherapy Protocol Text: The patient received stereotactic electronic brachytherapy as outlined above. Target Treatment Time: see machine log Wound Care: Aquaphor Rationale: The various treatment options for skin cancer removal were reviewed with the patient in detail. These include MOHS surgery with it's high cure rate, excisional surgery, ED&C, radiation therapy, and various topical therapies. Given the indications, tumor type, patient preferences and location, the patient has agreed to proceed with XRT. Consent: Written consent obtained. The risks and benefits of XRT therapy were discussed in detail. Specifically, the risks of infection, scarring, bleeding, radiation dermatitis, prolonged wound healing, incomplete removal,nerve injury, inability to clear the tumor, and recurrence were addressed. The treatment site was clearly identified and confirmed by the patient. Post-Care Instructions: Continue antibacterial soap twice daily, dry well, and apply Polysporin Ointment or Biafine for two more weeks then discontinue. You will notice the treated areas will begin to heal. The skin will turn pink and eventually have a scar like appearance. If you notice any bleeding, scaling, crusting or a lesion that concerns you, please contact our office. Your physician will see you in two or three months to check this area. It is important to keep these follow up appointments. Please apply a broad spectrum sunscreen of SPF30 or higher every day. If you plan to be in the sun please cover the treated area with a bandaid to avoid any sun exposure for at least six months. Remember a broad brim hat and clothing protects from sun exposure.

## 2024-08-30 NOTE — PROGRESS NOTES
Subjective:         Patient ID: Zeenat Mancia is a 74 y.o. male.    Chief Complaint: Post-op Evaluation (Right foot # 5 )    Zeenat is a 74 y.o. male who presents to the podiatry clinic  with complaint of  bilateral foot pain. Onset of the symptoms was several months ago. Precipitating event: none known. Current symptoms include:  Burning tingling and numbness . Aggravating factors: inactivity. Symptoms have gradually worsened.   He did very well with the diagnostic nerve blocks.  He would like to discuss possible surgical intervention.    8/8:  Patient presents to the clinic for follow up of R tarsal tunnel release.  He continues to report relief of pain.  Incisions have been intact.  Denies fevers, chills, nausea, or vomiting.  Denies SOB or chest pain.  Denies any new pedal complaints.    He would like to discuss tarsal tunnel release left.  Review of Systems   Constitutional: Negative for chills, decreased appetite, fever and malaise/fatigue.   HENT:  Negative for congestion, hearing loss, nosebleeds and tinnitus.    Eyes:  Negative for double vision, pain, photophobia and visual disturbance.   Cardiovascular:  Negative for chest pain, claudication, cyanosis and leg swelling.   Respiratory:  Negative for cough, hemoptysis, shortness of breath and wheezing.    Endocrine: Negative for cold intolerance and heat intolerance.   Hematologic/Lymphatic: Negative for adenopathy and bleeding problem.   Skin:  Negative for color change, dry skin, itching, nail changes and suspicious lesions.   Musculoskeletal:  Positive for joint pain and myalgias. Negative for arthritis and stiffness.   Gastrointestinal:  Negative for abdominal pain, jaundice, nausea and vomiting.   Genitourinary:  Negative for dysuria, frequency and hematuria.   Neurological:  Positive for numbness, paresthesias and sensory change. Negative for difficulty with concentration and loss of balance.   Psychiatric/Behavioral:  Negative for altered mental  status, hallucinations and suicidal ideas. The patient is not nervous/anxious.    Allergic/Immunologic: Negative for environmental allergies and persistent infections.           Objective:      Physical Exam  Constitutional:       Appearance: He is well-developed.   HENT:      Head: Normocephalic and atraumatic.   Cardiovascular:      Pulses:           Dorsalis pedis pulses are 2+ on the right side and 2+ on the left side.        Posterior tibial pulses are 2+ on the right side and 2+ on the left side.   Pulmonary:      Effort: Pulmonary effort is normal.   Musculoskeletal:      Right foot: Normal range of motion. No deformity.      Left foot: Normal range of motion. No deformity.      Comments: Inspection and palpation of the muscles joints and bones of both lower extremities reveal that muscle strength for the anterior, lateral, and posterior muscle groups and intrinsic muscle groups of the foot are all 5 over 5 symmetrical. Ankle, subtalar, midtarsal, and digital joint range of motion  are within normal limits, nonpainful, without crepitus or effusion. Patient exhibits a normal angle and base of gait. Palpation of the tendons reveal no defects.     Feet:      Right foot:      Skin integrity: No skin breakdown or erythema.      Left foot:      Skin integrity: No skin breakdown or erythema.   Skin:     General: Skin is warm and dry.      Nails: There is no clubbing.      Comments: Skin turgor is normal bilaterally. Skin texture is well hydrated to both lower extremities. No lesions or rashes or wounds appreciated bilaterally. Nail plates 1 through 5 bilaterally are within normal limits for length and thickness. No nail clubbing or incurvation noted.    Incision site fully epithelialized, healed.  No surrounding erythema or edema noted.   Neurological:      Mental Status: He is alert and oriented to person, place, and time.      Deep Tendon Reflexes:      Reflex Scores:       Patellar reflexes are 2+ on the right  "side and 2+ on the left side.       Achilles reflexes are 2+ on the right side and 2+ on the left side.     Comments: No Sharp, dull, light touch, vibratory, and proprioceptive sensation are intact bilaterally. Deep tendon reflexes to patellar and Achilles tendon are symmetrical, 2/4 bilaterally. No ankle clonus or Babinski reflexes noted bilaterally. Coordination is normal to both feet and lower extremities.    No tenderness with palpation overlying bilateral deep peroneal nerves.   Psychiatric:         Behavior: Behavior normal.               Assessment:       Encounter Diagnosis   Name Primary?    Nerve entrapment syndrome Yes           Plan:       Zeenat ZARATE" was seen today for post-op evaluation.    Diagnoses and all orders for this visit:    Nerve entrapment syndrome        -I counseled the patient on his conditions, their implications and medical management.  -Discussed with the patient that he is healing accordingly  -RTC 1 month for post-op follow up    The patient has decided to forego any further conservative treatment and opted for surgical intervention.  Alternative treatments, and benefits of surgery were discussed with the patient.  Risks and complications, including but not limited to: pain, swelling, numbness, infection, failure to achieve the stated goals, recurrence of problem, nerve and blood vessel damage, scar tissue formation, further need of surgery, loss of limb or life, was discussed in detail with the patient.  All questions asked were answered, and written informed consent was sign and received. The patient will undergo tarsal tunnel release. Left.                    "

## 2024-09-04 ENCOUNTER — PATIENT MESSAGE (OUTPATIENT)
Dept: PODIATRY | Facility: CLINIC | Age: 74
End: 2024-09-04
Payer: MEDICARE

## 2024-09-05 ENCOUNTER — ANESTHESIA EVENT (OUTPATIENT)
Dept: SURGERY | Facility: HOSPITAL | Age: 74
End: 2024-09-05
Payer: MEDICARE

## 2024-09-13 ENCOUNTER — TELEPHONE (OUTPATIENT)
Dept: PODIATRY | Facility: CLINIC | Age: 74
End: 2024-09-13
Payer: MEDICARE

## 2024-09-17 ENCOUNTER — TELEPHONE (OUTPATIENT)
Dept: PODIATRY | Facility: CLINIC | Age: 74
End: 2024-09-17
Payer: MEDICARE

## 2024-09-17 NOTE — PRE-PROCEDURE INSTRUCTIONS
PreOp Instructions given:   - Verbal medication information (what to hold and what to take)   - NPO guidelines 2400   - Arrival place directions given; time to be given the day before procedure by the   Surgeon's Office [facility]  - Bathing with antibacterial soap   - Don't wear any jewelry or bring any valuables AM of surgery   - No makeup or moisturizer to face   - No perfume/cologne, powder, lotions or aftershave   Pt. verbalized understanding.   Pt denies any h/o Anesthesia/Sedation complications or side effects.  Patient does not know arrival time.  Explained that this information comes from the surgeon's office and if they haven't heard from them by 2 or 3 pm to call the office.  Patient stated an understanding.

## 2024-09-17 NOTE — DISCHARGE INSTRUCTIONS
POST-OPERATIVE INSTRUCTIONS FOR PODIATRY PATIENTS      Keep your dressing dry. Do not remove or change the bandage.      Keep your foot elevated to the level of your heart. You should recline as far as possible (When your toes are at eye level you're in the right position).      You may apply an ice pack to the top of your foot or back of your knee. Replace as needed. Make sure this does not get the dressing wet.      Take your pain medication as instructed for the first 24 hours after surgery, then progress to using them only when you need it.      Stay off your foot as much as possible. You may get up to eat, use the bathroom, etc.    Limit standing or walking on your foot. Do not sit with your feet dangling.      Make sure you wear your surgical shoe/boot as provided     Eat your regular diet and drink plenty of fluids.      If any of the following conditions are present, call the Podiatry Clinic immediately, report to ED, or call 911 if emergent  - fever 101° or higher   - pain that is not controlled by pain medication   - red, warm, swollen feet with red streaks going up your legs   - cold, blue, numb toes, especially if you are wearing a cast

## 2024-09-17 NOTE — ANESTHESIA PREPROCEDURE EVALUATION
[facility]  Anesthesia Pre-Operative Evaluation         Patient Name: Zeenat Mancia  YOB: 1950  MRN: [id number]    SUBJECTIVE:     Pre-operative evaluation for Procedure(s) (LRB):  RELEASE, TARSAL TUNNEL (Left)     09/17/2024    Zeenat Mancia is a 74 y.o. male w/ a significant PMHx of HTN, chronic Afib (on apixaban), JACKELYN on CPAP, non-obstructive CAD, HFpEF (EF 55-65%), Parkinson's w/o dyskinesia, obesity BMI 41 on GLP1 agonist, former smoker with 22py history, bilateral tarsal tunnel s/p R release 7/2024.     Now presenting for the above procedure(s).     *preOp evaluation by cardiology 8/26/24    2D ECHO:  TTE:  Results for orders placed during the hospital encounter of 06/27/24    Echo    Interpretation Summary    Left Ventricle: The left ventricle is normal in size. Mildly increased wall thickness. There is normal systolic function with a visually estimated ejection fraction of 55 - 65% depending on the R-R interval. Unable to assess diastolic function due to atrial fibrillation.    Right Ventricle: Normal right ventricular cavity size. Systolic function is normal.    Left Atrium: Left atrium is mildly dilated.    IVC/SVC: Intermediate venous pressure at 8 mmHg.    Pulmonary Artery: The estimated pulmonary artery systolic pressure is 28 mmHg.      PHUONG:  Results for orders placed or performed during the hospital encounter of 05/14/21   Transesophageal echo (PHUONG)   Result Value Ref Range    BSA 2.79 m2    EF 55 %    Narrative    · Limited echo due to airway instability.  · Normal appearing left atrial appendage. No thrombus is present in the   appendage. Abnormal appendage velocities of 0.42 m/s. No thrombus is   present in the left atrium.  · The left ventricle is normal in size with normal systolic function. The   estimated ejection fraction is 55%.  · Right ventricular enlargement with normal right ventricular systolic   function.  · Plaque present in the descending aorta.             Prev airway:     Intubation     Date/Time: 2/4/2021 7:04 AM  Performed by: Luís Yeboah CRNA  Authorized by: Rodolfo Woodard MD      Intubation:     Induction:  Rapid sequence induction    Intubated:  Postinduction    Mask Ventilation:  Easy with oral airway    Attempts:  2    Attempted By:  Student (lorena garsia)    Method of Intubation:  Bougie and direct    Blade:  Parish 4    Laryngeal View Grade: Grade III - only epiglottis visible      Attempted By (2nd Attempt):  CRNA    Method of Intubation (2nd Attempt):  Direct and bougie    Blade (2nd Attempt):  Parish 4    Laryngeal View Grade (2nd Attempt): Grade III - only epiglottis visible      Difficult Airway Encountered?: Yes      Future Airway Recommendations:  Video laryngoscope    Complications:  None    Airway Device:  Oral endotracheal tube    Airway Device Size:  8.0    Style/Cuff Inflation:  Cuffed (inflated to minimal occlusive pressure)    Inflation Amount (mL):  3    Tube secured:  23    Secured at:  The lips    Placement Verified By:  Capnometry    Complicating Factors:  Large/floppy epiglottis    Findings Post-Intubation:  BS equal bilateral and atraumatic/condition of teeth unchanged          Patient Active Problem List   Diagnosis    Erectile dysfunction    JACKELYN (obstructive sleep apnea)    Hypertensive heart disease with chronic diastolic congestive heart failure    Morbid obesity due to excess calories    Benign non-nodular prostatic hyperplasia with lower urinary tract symptoms    Pseudophakia of both eyes    Refractive error    Epiretinal membrane, both eyes    Posterior vitreous detachment, both eyes    Tortuous aorta    Colon cancer screening    Chronic atrial fibrillation    History of rectal bleeding    Iron deficiency anemia due to chronic blood loss    Nonobstructive atherosclerosis of coronary artery    Other insomnia    Calculus of gallbladder without cholecystitis without obstruction    Neuralgia    Lumbar  radiculitis    Venous insufficiency    Impaired functional mobility, balance, gait, and endurance    Statin-induced myositis    Cough    Longstanding persistent atrial fibrillation    Aortic atherosclerosis    Neuropathy    Cognitive communication disorder    Parkinson's disease without dyskinesia or fluctuating manifestations    Tarsal tunnel syndrome of right side       Review of patient's allergies indicates:   Allergen Reactions    Lipitor [atorvastatin] Itching       Current Medications:  Current Outpatient Medications   Medication Instructions    apixaban (ELIQUIS) 5 mg, Oral, 2 times daily    ARIPiprazole (ABILIFY) 2 mg, Every morning    ARIPiprazole (ABILIFY) 5 mg    atenoloL (TENORMIN) 50 mg, Oral, Daily    carbidopa-levodopa  mg (SINEMET)  mg per tablet 1 tablet, Oral, 3 times daily, (6am, 12pm, 5pm)    DULoxetine (CYMBALTA) 30 mg, Oral, Every morning    FLUoxetine 40 mg, Oral    fluticasone propionate (FLONASE) 50 mcg, Each Nostril, Daily    furosemide (LASIX) 20 MG tablet TAKE 1 TABLET(20 MG) BY MOUTH DAILY    gabapentin (NEURONTIN) 300 mg, Oral, 2 times daily    melatonin 10 mg, Oral, Nightly    mirtazapine (REMERON) 7.5 mg, Nightly    mirtazapine (REMERON) 15 mg, Nightly    multivit-min/FA/lycopen/lutein (CENTRUM SILVER MEN ORAL) Oral, Daily    pregabalin (LYRICA) 50 MG capsule Take one capsule (50 mg) in addition to the 150 mg capsule for total dose of 200 mg twice daily    promethazine (PHENERGAN) 25 mg, Oral, Every 6 hours PRN    QUEtiapine (SEROQUEL) 100 mg, Oral, Nightly    sildenafiL (VIAGRA) 100 mg, Oral, Daily PRN    spironolactone (ALDACTONE) 25 mg, Oral, Daily    sulfamethoxazole-trimethoprim 400-80mg (BACTRIM,SEPTRA) 400-80 mg per tablet 1 tablet, Oral, 2 times daily    tamsulosin (FLOMAX) 0.4 mg, Oral    WEGOVY 0.5 mg, Subcutaneous, Every 7 days    WEGOVY 1 mg, Subcutaneous, Every 7 days       Past Surgical History:   Procedure Laterality Date    ADENOIDECTOMY  1958     APPENDECTOMY      CATARACT EXTRACTION  2009    OS    CATARACT EXTRACTION W/  INTRAOCULAR LENS IMPLANT Right 06/03/2015    [id number] 14.5d//    CHOLECYSTECTOMY  2021    COLONOSCOPY N/A 10/10/2017    Procedure: COLONOSCOPY;  Surgeon: Jameson Houston MD;  Location: [facility];  Service: Endoscopy;  Laterality: N/A; repeat in 5 years for surveillance    COLONOSCOPY N/A 05/16/2018    Procedure: COLONOSCOPY;  Surgeon: Jameson Houston MD;  Location: [facility];  Service: Endoscopy;  Laterality: N/A;    COLONOSCOPY N/A 06/02/2021    Procedure: COLONOSCOPY;  Surgeon: Agustin Corona MD;  Location: [facility] (2ND FLR);  Service: Endoscopy;  Laterality: N/A;    COLONOSCOPY N/A 10/11/2022    Procedure: COLONOSCOPY;  Surgeon: Edin Lara MD;  Location: [facility] (2ND FLR);  Service: Endoscopy;  Laterality: N/A;  2nd floor-difficult intubation  ok to hold eliquis x2 days per Dr. Jerome, see telephone encounter 6/28-[facility]  fully vaccinated, instructions [facility] to [facility]  extended miralax prep  Clear liquids up to 2 hrs prior/ AM prep [id number]    CORONARY ANGIOGRAPHY N/A 01/04/2021    Procedure: ANGIOGRAM, CORONARY ARTERY;  Surgeon: Jase Moore MD;  Location: [facility] CATH LAB/EP;  Service: Cardiology;  Laterality: N/A;    CYSTOSCOPY WITH INSERTION OF MINIMALLY INVASIVE IMPLANT TO ENLARGE PROSTATIC URETHRA N/A 06/29/2021    Procedure: TRANSPROSTATIC TISSUE RETRACTION;  Surgeon: Agustin Clay MD;  Location: [facility];  Service: Urology;  Laterality: N/A;    ESOPHAGOGASTRODUODENOSCOPY N/A 07/02/2018    Procedure: EGD (ESOPHAGOGASTRODUODENOSCOPY);  Surgeon: Jameson Houston MD;  Location: [facility];  Service: Endoscopy;  Laterality: N/A;    ESOPHAGOGASTRODUODENOSCOPY N/A 06/02/2021    Procedure: EGD (ESOPHAGOGASTRODUODENOSCOPY);  Surgeon: Agustin Corona MD;  [facility]  Service: Endoscopy;  Laterality: N/A;    EYE SURGERY      cataract    JOINT REPLACEMENT      LEFT HEART  CATHETERIZATION N/A 01/04/2021    Procedure: Left heart cath;  Surgeon: Jase Moore MD;  Location: [facility] CATH LAB/EP;  Service: Cardiology;  Laterality: N/A;    ROBOT-ASSISTED CHOLECYSTECTOMY N/A 02/04/2021    Procedure: ROBOTIC CHOLECYSTECTOMY;  Surgeon: Pal Cheney MD;  Location: [facility] OR;  Service: General;  Laterality: N/A;    TARSAL TUNNEL RELEASE Right 7/18/2024    Procedure: RELEASE, TARSAL TUNNEL;  Surgeon: Leonel Perez DPM;  Location: [facility] OR 2ND FLR;  Service: Podiatry;  Laterality: Right;    TONSILLECTOMY      TRANSESOPHAGEAL ECHOCARDIOGRAPHY N/A 05/14/2021    Procedure: ECHOCARDIOGRAM, TRANSESOPHAGEAL;  Surgeon: Leo Reeves III, MD;  Location: [facility] EP LAB;  Service: Cardiology;  Laterality: N/A;    TREATMENT OF CARDIAC ARRHYTHMIA N/A 04/19/2021    Procedure: Cardioversion or Defibrillation;  Surgeon: Judy Jerome MD;  Location: [facility] CATH LAB/EP;  Service: Cardiology;  Laterality: N/A;    TREATMENT OF CARDIAC ARRHYTHMIA N/A 05/14/2021    Procedure: CARDIOVERSION;  Surgeon: PEMA Downs MD;  Location: [facility] EP LAB;  Service: Cardiology;  Laterality: N/A;  AF, PHUONG, DCCV, MAC, DM, 3 PREP    UPPER GASTROINTESTINAL ENDOSCOPY  05/16/2018    Dr. Houston         OBJECTIVE:     Vital Signs Range (Last 24H):         Significant Labs:  Lab Results   Component Value Date    WBC 9.28 04/25/2024    HGB 16.1 04/25/2024    HCT 52.3 04/25/2024     04/25/2024    INR 1.2 05/11/2021       Lab Results   Component Value Date     06/24/2024    K 4.6 06/24/2024     06/24/2024    CREATININE 1.1 06/24/2024    BUN 16 06/24/2024    CO2 30 (H) 06/24/2024       Lab Results   Component Value Date    TSH 1.182 11/07/2023    HGBA1C 5.6 02/20/2024       EKG:   Results for orders placed or performed in visit on 08/26/24   IN OFFICE EKG 12-LEAD (to [place])    Collection Time: 08/26/24 11:35 AM   Result Value Ref Range    QRS Duration 126 ms    [facility] QTC Calculation 464 ms    Narrative    Test  Reason : Z01.818,I11.0,I50.32,I48.20,    Vent. Rate : 092 BPM     Atrial Rate : 375 BPM     P-R Int : 000 ms          QRS Dur : 126 ms      QT Int : 376 ms       P-R-T Axes : 000 -57 049 degrees     QTc Int : 464 ms    Undetermined rhythm  Left axis deviation  Right bundle branch block  Abnormal ECG  Confirmed by Jackie Upton MD ([id number]) on 8/26/2024 12:00:06 PM    Referred By:             Confirmed By:Jackie Upton MD       ASSESSMENT/PLAN:          Pre-op Assessment    I have reviewed the Patient Summary Reports.     I have reviewed the Nursing Notes. I have reviewed the NPO Status.   I have reviewed the Medications.     Review of Systems  Anesthesia Hx:  No problems with previous Anesthesia               Denies Personal Hx of Anesthesia complications.                    Social:  Former Smoker       Hematology/Oncology:  Hematology Normal   Oncology Normal                                   Cardiovascular:     Hypertension   CAD    Dysrhythmias atrial fibrillation  CHF                                 Pulmonary:      Shortness of breath (chronic LUND, unchanged in the past few years per cards note)  Sleep Apnea, CPAP                Renal/:  Renal/ Normal                 Hepatic/GI:     GERD             Neurological:    Neuromuscular Disease, (lumbar radiculitis)       Parkinson's w/o dyskinesia                            Endocrine:        Obesity / BMI > 30  Psych:    depression                Physical Exam  General: Alert, Oriented, Cooperative and Well nourished    Airway:  Mallampati: III   Mouth Opening: Normal  TM Distance: Normal  Tongue: Normal  Neck ROM: Extension Decreased    Dental:  Intact        Anesthesia Plan  Type of Anesthesia, risks & benefits discussed:    Anesthesia Type: Gen Natural Airway  Intra-op Monitoring Plan: Standard ASA Monitors  Post Op Pain Control Plan: multimodal analgesia and IV/PO Opioids PRN  Induction:  IV  Informed Consent: Informed consent signed with the Patient and all parties  understand the risks and agree with anesthesia plan.  All questions answered.   ASA Score: 3  Day of Surgery Review of History & Physical: H&P Update referred to the surgeon/provider.    Ready For Surgery From Anesthesia Perspective.     .

## 2024-09-17 NOTE — TELEPHONE ENCOUNTER
Spoke to pt and relayed their 6am  arrival time for surgery. Also informed pt to not eat or drink after 11 pm including gum, hard candy or mints. Also that the pt must have a ride home from surgery, they will not be allowed to drive after anesthesia. Pt verbalized understanding and call ended.

## 2024-09-18 ENCOUNTER — PATIENT MESSAGE (OUTPATIENT)
Dept: PODIATRY | Facility: CLINIC | Age: 74
End: 2024-09-18
Payer: MEDICARE

## 2024-09-18 ENCOUNTER — HOSPITAL ENCOUNTER (OUTPATIENT)
Facility: HOSPITAL | Age: 74
Discharge: HOME | End: 2024-09-18
Attending: PODIATRIST | Admitting: PODIATRIST
Payer: MEDICARE

## 2024-09-18 ENCOUNTER — ANESTHESIA (OUTPATIENT)
Dept: SURGERY | Facility: HOSPITAL | Age: 74
End: 2024-09-18
Payer: MEDICARE

## 2024-09-18 VITALS
WEIGHT: 315 LBS | HEIGHT: 74 IN | OXYGEN SATURATION: 95 % | DIASTOLIC BLOOD PRESSURE: 75 MMHG | SYSTOLIC BLOOD PRESSURE: 129 MMHG | TEMPERATURE: 98 F | HEART RATE: 64 BPM | BODY MASS INDEX: 40.43 KG/M2 | RESPIRATION RATE: 12 BRPM

## 2024-09-18 DIAGNOSIS — G57.50 TARSAL TUNNEL SYNDROME: ICD-10-CM

## 2024-09-18 PROCEDURE — 63600175 PHARM REV CODE 636 W HCPCS: Mod: JZ,JG | Performed by: PODIATRIST

## 2024-09-18 PROCEDURE — 63600175 PHARM REV CODE 636 W HCPCS: Performed by: ANESTHESIOLOGY

## 2024-09-18 PROCEDURE — 37000009 HC ANESTHESIA EA ADD 15 MINS: Performed by: PODIATRIST

## 2024-09-18 PROCEDURE — 36000707: Performed by: PODIATRIST

## 2024-09-18 PROCEDURE — 71000015 HC POSTOP RECOV 1ST HR: Performed by: PODIATRIST

## 2024-09-18 PROCEDURE — D9220A PRA ANESTHESIA: Mod: ,,, | Performed by: ANESTHESIOLOGY

## 2024-09-18 PROCEDURE — 37000008 HC ANESTHESIA 1ST 15 MINUTES: Performed by: PODIATRIST

## 2024-09-18 PROCEDURE — 25000003 PHARM REV CODE 250

## 2024-09-18 PROCEDURE — 71000044 HC DOSC ROUTINE RECOVERY FIRST HOUR: Performed by: PODIATRIST

## 2024-09-18 PROCEDURE — 63600175 PHARM REV CODE 636 W HCPCS

## 2024-09-18 PROCEDURE — 25000003 PHARM REV CODE 250: Performed by: PODIATRIST

## 2024-09-18 PROCEDURE — 28035 DECOMPRESSION OF TIBIA NERVE: CPT | Mod: 79,LT,, | Performed by: PODIATRIST

## 2024-09-18 PROCEDURE — 36000706: Performed by: PODIATRIST

## 2024-09-18 RX ORDER — ONDANSETRON HYDROCHLORIDE 2 MG/ML
4 INJECTION, SOLUTION INTRAVENOUS DAILY PRN
Status: DISCONTINUED | OUTPATIENT
Start: 2024-09-18 | End: 2024-09-18 | Stop reason: HOSPADM

## 2024-09-18 RX ORDER — BUPIVACAINE HYDROCHLORIDE 5 MG/ML
INJECTION, SOLUTION EPIDURAL; INTRACAUDAL
Status: DISCONTINUED | OUTPATIENT
Start: 2024-09-18 | End: 2024-09-18 | Stop reason: HOSPADM

## 2024-09-18 RX ORDER — DEXAMETHASONE SODIUM PHOSPHATE 4 MG/ML
INJECTION, SOLUTION INTRA-ARTICULAR; INTRALESIONAL; INTRAMUSCULAR; INTRAVENOUS; SOFT TISSUE
Status: DISCONTINUED | OUTPATIENT
Start: 2024-09-18 | End: 2024-09-18

## 2024-09-18 RX ORDER — CEFAZOLIN SODIUM 1 G/3ML
INJECTION, POWDER, FOR SOLUTION INTRAMUSCULAR; INTRAVENOUS
Status: DISCONTINUED | OUTPATIENT
Start: 2024-09-18 | End: 2024-09-18

## 2024-09-18 RX ORDER — ONDANSETRON HYDROCHLORIDE 2 MG/ML
INJECTION, SOLUTION INTRAVENOUS
Status: DISCONTINUED | OUTPATIENT
Start: 2024-09-18 | End: 2024-09-18

## 2024-09-18 RX ORDER — PROPOFOL 10 MG/ML
VIAL (ML) INTRAVENOUS CONTINUOUS PRN
Status: DISCONTINUED | OUTPATIENT
Start: 2024-09-18 | End: 2024-09-18

## 2024-09-18 RX ORDER — SODIUM CHLORIDE 0.9 % (FLUSH) 0.9 %
10 SYRINGE (ML) INJECTION
Status: DISCONTINUED | OUTPATIENT
Start: 2024-09-18 | End: 2024-09-18 | Stop reason: HOSPADM

## 2024-09-18 RX ORDER — KETOROLAC TROMETHAMINE 15 MG/ML
15 INJECTION, SOLUTION INTRAMUSCULAR; INTRAVENOUS EVERY 8 HOURS PRN
Status: DISCONTINUED | OUTPATIENT
Start: 2024-09-18 | End: 2024-09-18 | Stop reason: HOSPADM

## 2024-09-18 RX ORDER — LIDOCAINE HYDROCHLORIDE 10 MG/ML
INJECTION, SOLUTION INFILTRATION; PERINEURAL
Status: DISCONTINUED | OUTPATIENT
Start: 2024-09-18 | End: 2024-09-18 | Stop reason: HOSPADM

## 2024-09-18 RX ORDER — GLUCAGON 1 MG
1 KIT INJECTION
Status: DISCONTINUED | OUTPATIENT
Start: 2024-09-18 | End: 2024-09-18 | Stop reason: HOSPADM

## 2024-09-18 RX ORDER — ACETAMINOPHEN 500 MG
1000 TABLET ORAL
Status: COMPLETED | OUTPATIENT
Start: 2024-09-18 | End: 2024-09-18

## 2024-09-18 RX ORDER — LIDOCAINE HYDROCHLORIDE 20 MG/ML
INJECTION INTRAVENOUS
Status: DISCONTINUED | OUTPATIENT
Start: 2024-09-18 | End: 2024-09-18

## 2024-09-18 RX ORDER — PROPOFOL 10 MG/ML
VIAL (ML) INTRAVENOUS
Status: DISCONTINUED | OUTPATIENT
Start: 2024-09-18 | End: 2024-09-18

## 2024-09-18 RX ORDER — HYDROMORPHONE HYDROCHLORIDE 1 MG/ML
0.2 INJECTION, SOLUTION INTRAMUSCULAR; INTRAVENOUS; SUBCUTANEOUS EVERY 5 MIN PRN
Status: DISCONTINUED | OUTPATIENT
Start: 2024-09-18 | End: 2024-09-18 | Stop reason: HOSPADM

## 2024-09-18 RX ORDER — FENTANYL CITRATE 50 UG/ML
INJECTION, SOLUTION INTRAMUSCULAR; INTRAVENOUS
Status: DISCONTINUED | OUTPATIENT
Start: 2024-09-18 | End: 2024-09-18

## 2024-09-18 RX ADMIN — PROPOFOL 120 MCG/KG/MIN: 10 INJECTION, EMULSION INTRAVENOUS at 08:09

## 2024-09-18 RX ADMIN — SODIUM CHLORIDE: 0.9 INJECTION, SOLUTION INTRAVENOUS at 08:09

## 2024-09-18 RX ADMIN — PROPOFOL 30 MG: 10 INJECTION, EMULSION INTRAVENOUS at 08:09

## 2024-09-18 RX ADMIN — DEXAMETHASONE SODIUM PHOSPHATE 4 MG: 4 INJECTION, SOLUTION INTRAMUSCULAR; INTRAVENOUS at 08:09

## 2024-09-18 RX ADMIN — LIDOCAINE HYDROCHLORIDE 60 MG: 20 INJECTION INTRAVENOUS at 08:09

## 2024-09-18 RX ADMIN — ACETAMINOPHEN 1000 MG: 500 TABLET ORAL at 07:09

## 2024-09-18 RX ADMIN — FENTANYL CITRATE 12.5 MCG: 50 INJECTION, SOLUTION INTRAMUSCULAR; INTRAVENOUS at 08:09

## 2024-09-18 RX ADMIN — ONDANSETRON 4 MG: 2 INJECTION INTRAMUSCULAR; INTRAVENOUS at 08:09

## 2024-09-18 RX ADMIN — KETOROLAC TROMETHAMINE 15 MG: 15 INJECTION, SOLUTION INTRAMUSCULAR; INTRAVENOUS at 09:09

## 2024-09-18 RX ADMIN — CEFAZOLIN 2 G: 330 INJECTION, POWDER, FOR SOLUTION INTRAMUSCULAR; INTRAVENOUS at 08:09

## 2024-09-18 NOTE — BRIEF OP NOTE
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 Closed 3-0 moncryl and 3-0 nylon. No complication. 30 ml .5% bupivicaine plain and 15 lidocaine plain administered.    Estimated Blood Loss: * No values recorded between 9/18/2024  8:26 AM and 9/18/2024  9:03 AM *    Estimated Blood Loss has not been documented. EBL = 2 ml.         Specimens:   Specimen (24h ago, onward)      None            [id number]

## 2024-09-18 NOTE — PLAN OF CARE
Patient has post-op shoe. Discharge instructions given and explained to patient and family with verbalization of understanding all instructions. Patients v/s stable, denies n/v and tolerating po, rates pain level tolerable, IV removed, and family at bedside for patient discharge home.

## 2024-09-18 NOTE — OP NOTE
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 Closed 3-0 moncryl and 3-0 nylon. No complication. 30 ml .5% bupivicaine plain and 15 lidocaine plain administered.     Description of Technical Procedures:   Patient was brought to the operating room on a stretcher and transferred to the OR table in supine position. Anesthesia was induced. Tourniquet was applied to the L ankle.  20 mL of one to one mixture of 0.5% bupivacaine plain and 1% lidocaine plain was locally infiltrated. The L foot was draped and prepped in a sterile manner. Tourniquet was inflated to 250 mmHg.     Attention was directed to the medial aspect of the L ankle where a curvilinear incision was performed over the tarsal tunnel extending proximally to the level of the ankle joint and distally to the abductor hallucis muscle. Meticulous sharp and blunt dissection utilized down through subcutaneous tissues to the level of deep fascia and flexor retinaculum. The deep fascia and flexor retinaculum were sharply released using tenotomy scissors. Distally, the abductor hallucis fascia was released using tenotomy scissors. Bipolar cautery used to control bleeding. The tibial nerve was identified. The nerve was noted to have pathological fibrous and fatty edematous changes. The operative site was lavaged with copious amounts of saline. Deep closure with 3-0 monocryl and superficially with 3-0 nylon.  The L foot was dressed with xeroform, gauze, Abd pad, cast padding, and ACE wrap.      The  patient tolerated the procedure and anesthesia well. Patient was transferred to the recovery room with vital signs stable, vascular status intact, and capillary refill time <3 seconds to the distal L foot. Following a period of post-op monitoring, the patient will be discharged home on the following written and oral post-op instructions:      - Keep dressing dry and intact until clinic visit.  - Avoid excessive ambulation, ambulate with surgical shoe on at all times with partial weightbearing as tolerated  - Ice and elevate L foot when at rest.  - All prescriptions were given to patient pre and post-op.   - Contact Dr. Perez for all post op follow up care and if any problems arise    Estimated Blood Loss (EBL): *2 ml           Implants: * No implants in log *    Specimens:   Specimen (24h ago, onward)      None                    Condition: Good    Disposition: PACU - hemodynamically stable.    Attestation: I was present and scrubbed for the entire procedure.    Discharge Note    OUTCOME: Patient tolerated treatment/procedure well without complication and is now ready for discharge.    DISPOSITION: Home or Self Care    FINAL DIAGNOSIS:  <principal problem not specified>    FOLLOWUP: In clinic    DISCHARGE INSTRUCTIONS:  No discharge procedures on file.

## 2024-09-18 NOTE — DISCHARGE SUMMARY
[facility] - [facility] (2nd Fl)  Discharge Note  Short Stay    Procedure(s) (LRB):  RELEASE, TARSAL TUNNEL (Left)      OUTCOME: Patient tolerated treatment/procedure well without complication and is now ready for discharge.    DISPOSITION: Home or Self Care    FINAL DIAGNOSIS:  <principal problem not specified>    FOLLOWUP: In clinic    DISCHARGE INSTRUCTIONS:  No discharge procedures on file.     TIME SPENT ON DISCHARGE: 60 minutes    POST-OPERATIVE INSTRUCTIONS FOR PODIATRY PATIENTS      Keep your dressing dry. Do not remove or change the bandage.      Keep your foot elevated to the level of your heart. You should recline as far as possible (When your toes are at eye level you're in the right position).      You may apply an ice pack to the top of your foot or back of your knee. Replace as needed. Make sure this does not get the dressing wet.      Take your pain medication as instructed for the first 24 hours after surgery, then progress to using them only when you need it.      Stay off your foot as much as possible. You may get up to eat, use the bathroom, etc.    Limit standing or walking on your foot. Do not sit with your feet dangling.      Make sure you wear your surgical shoe/boot as provided     Eat your regular diet and drink plenty of fluids.      If any of the following conditions are present, call the Podiatry Clinic immediately, report to ED, or call 911 if emergent  - fever 101° or higher   - pain that is not controlled by pain medication   - red, warm, swollen feet with red streaks going up your legs   - cold, blue, numb toes, especially if you are wearing a cast

## 2024-09-18 NOTE — TRANSFER OF CARE
"Anesthesia Transfer of Care Note    Patient: Zeenat Mancia    Procedure(s) Performed: Procedure(s) (LRB):  RELEASE, TARSAL TUNNEL (Left)    Patient location: PACU    Anesthesia Type: general (natural airway)    Transport from OR: Transported from OR on 6-10 L/min O2 by face mask with adequate spontaneous ventilation    Post pain: adequate analgesia    Post assessment: no apparent anesthetic complications    Post vital signs: stable    Level of consciousness: awake    Nausea/Vomiting: no nausea/vomiting    Complications: none    Transfer of care protocol was followed      Last vitals: Visit Vitals  /77   Pulse 64   Temp 36.7 °C (98.1 °F)   Resp 16   Ht 6' 2" (1.88 m)   Wt (!) 142.9 kg (315 lb)   SpO2 95%   BMI 40.44 kg/m²     "

## 2024-09-18 NOTE — H&P
The patient has been examined and the H&P has been reviewed.     I concur with the findings and no changes have occurred since H&P was written.     Anesthesia/Surgery risks, benefits and alternative options discussed and understood by patient/family.    Leia Rao DPM   Podiatric Medicine & Surgery  [facility]  Secure Chat Preferred  Mobile: [phone]  Pager: [phone]      Subjective:         Patient ID: Zeenat Mancia is a 74 y.o. male.    Chief Complaint: Post-op Evaluation (Right foot # 5 )    Zeenat is a 74 y.o. male who presents to the podiatry clinic  with complaint of  bilateral foot pain. Onset of the symptoms was several months ago. Precipitating event: none known. Current symptoms include: Burning tingling and numbness. Aggravating factors: inactivity. Symptoms have gradually worsened.   He did very well with the diagnostic nerve blocks.  He would like to discuss possible surgical intervention.    8/8:  Patient presents to the clinic for follow up of R tarsal tunnel release.  He continues to report relief of pain.  Incisions have been intact.  Denies fevers, chills, nausea, or vomiting.  Denies SOB or chest pain.  Denies any new pedal complaints.    He would like to discuss tarsal tunnel release left.  Review of Systems   Constitutional: Negative for chills, decreased appetite, fever and malaise/fatigue.   HENT:  Negative for congestion, hearing loss, nosebleeds and tinnitus.    Eyes:  Negative for double vision, pain, photophobia and visual disturbance.   Cardiovascular:  Negative for chest pain, claudication, cyanosis and leg swelling.   Respiratory:  Negative for cough, hemoptysis, shortness of breath and wheezing.    Endocrine: Negative for cold intolerance and heat intolerance.   Hematologic/Lymphatic: Negative for adenopathy and bleeding problem.   Skin:  Negative for color change, dry skin, itching, nail changes and suspicious lesions.   Musculoskeletal:  Positive for joint  pain and myalgias. Negative for arthritis and stiffness.   Gastrointestinal:  Negative for abdominal pain, jaundice, nausea and vomiting.   Genitourinary:  Negative for dysuria, frequency and hematuria.   Neurological:  Positive for numbness, paresthesias and sensory change. Negative for difficulty with concentration and loss of balance.   Psychiatric/Behavioral:  Negative for altered mental status, hallucinations and suicidal ideas. The patient is not nervous/anxious.    Allergic/Immunologic: Negative for environmental allergies and persistent infections.           Objective:      Physical Exam  Constitutional:       Appearance: He is well-developed.   HENT:      Head: Normocephalic and atraumatic.   Cardiovascular:      Pulses:           Dorsalis pedis pulses are 2+ on the right side and 2+ on the left side.        Posterior tibial pulses are 2+ on the right side and 2+ on the left side.   Pulmonary:      Effort: Pulmonary effort is normal.   Musculoskeletal:      Right foot: Normal range of motion. No deformity.      Left foot: Normal range of motion. No deformity.      Comments: Inspection and palpation of the muscles joints and bones of both lower extremities reveal that muscle strength for the anterior, lateral, and posterior muscle groups and intrinsic muscle groups of the foot are all 5 over 5 symmetrical. Ankle, subtalar, midtarsal, and digital joint range of motion  are within normal limits, nonpainful, without crepitus or effusion. Patient exhibits a normal angle and base of gait. Palpation of the tendons reveal no defects.     Feet:      Right foot:      Skin integrity: No skin breakdown or erythema.      Left foot:      Skin integrity: No skin breakdown or erythema.   Skin:     General: Skin is warm and dry.      Nails: There is no clubbing.      Comments: Skin turgor is normal bilaterally. Skin texture is well hydrated to both lower extremities. No lesions or rashes or wounds appreciated bilaterally.  "Nail plates 1 through 5 bilaterally are within normal limits for length and thickness. No nail clubbing or incurvation noted.    Incision site fully epithelialized, healed.  No surrounding erythema or edema noted.   Neurological:      Mental Status: He is alert and oriented to person, place, and time.      Deep Tendon Reflexes:      Reflex Scores:       Patellar reflexes are 2+ on the right side and 2+ on the left side.       Achilles reflexes are 2+ on the right side and 2+ on the left side.     Comments: No Sharp, dull, light touch, vibratory, and proprioceptive sensation are intact bilaterally. Deep tendon reflexes to patellar and Achilles tendon are symmetrical, 2/4 bilaterally. No ankle clonus or Babinski reflexes noted bilaterally. Coordination is normal to both feet and lower extremities.    No tenderness with palpation overlying bilateral deep peroneal nerves.   Psychiatric:         Behavior: Behavior normal.               Assessment:       Encounter Diagnosis   Name Primary?    Nerve entrapment syndrome Yes           Plan:       Zeenat ZARATE" was seen today for post-op evaluation.    Diagnoses and all orders for this visit:    Nerve entrapment syndrome        -I counseled the patient on his conditions, their implications and medical management.  -Discussed with the patient that he is healing accordingly  -RTC 1 month for post-op follow up    The patient has decided to forego any further conservative treatment and opted for surgical intervention.  Alternative treatments, and benefits of surgery were discussed with the patient.  Risks and complications, including but not limited to: pain, swelling, numbness, infection, failure to achieve the stated goals, recurrence of problem, nerve and blood vessel damage, scar tissue formation, further need of surgery, loss of limb or life, was discussed in detail with the patient.  All questions asked were answered, and written informed consent was sign and received. The " patient will undergo tarsal tunnel release. Left.

## 2024-09-23 ENCOUNTER — PATIENT MESSAGE (OUTPATIENT)
Dept: PODIATRY | Facility: CLINIC | Age: 74
End: 2024-09-23
Payer: MEDICARE

## 2024-09-23 DIAGNOSIS — I11.0 HYPERTENSIVE HEART DISEASE WITH CHRONIC DIASTOLIC CONGESTIVE HEART FAILURE: ICD-10-CM

## 2024-09-23 DIAGNOSIS — I25.10 NONOBSTRUCTIVE ATHEROSCLEROSIS OF CORONARY ARTERY: ICD-10-CM

## 2024-09-23 DIAGNOSIS — G47.33 OSA (OBSTRUCTIVE SLEEP APNEA): ICD-10-CM

## 2024-09-23 DIAGNOSIS — I48.20 CHRONIC ATRIAL FIBRILLATION: ICD-10-CM

## 2024-09-23 DIAGNOSIS — I50.32 HYPERTENSIVE HEART DISEASE WITH CHRONIC DIASTOLIC CONGESTIVE HEART FAILURE: ICD-10-CM

## 2024-09-24 ENCOUNTER — OFFICE VISIT (OUTPATIENT)
Dept: PODIATRY | Facility: CLINIC | Age: 74
End: 2024-09-24
Payer: MEDICARE

## 2024-09-24 ENCOUNTER — PATIENT MESSAGE (OUTPATIENT)
Dept: FAMILY MEDICINE | Facility: CLINIC | Age: 74
End: 2024-09-24
Payer: MEDICARE

## 2024-09-24 VITALS
HEIGHT: 74 IN | SYSTOLIC BLOOD PRESSURE: 113 MMHG | DIASTOLIC BLOOD PRESSURE: 78 MMHG | HEART RATE: 72 BPM | BODY MASS INDEX: 40.44 KG/M2

## 2024-09-24 DIAGNOSIS — G58.9 NERVE ENTRAPMENT SYNDROME: Primary | ICD-10-CM

## 2024-09-24 PROCEDURE — 3078F DIAST BP <80 MM HG: CPT | Mod: CPTII,S$GLB,, | Performed by: PODIATRIST

## 2024-09-24 PROCEDURE — 99024 POSTOP FOLLOW-UP VISIT: CPT | Mod: S$GLB,,, | Performed by: PODIATRIST

## 2024-09-24 PROCEDURE — 99999 PR PBB SHADOW E&M-EST. PATIENT-LVL III: CPT | Mod: PBBFAC,,, | Performed by: PODIATRIST

## 2024-09-24 PROCEDURE — 1101F PT FALLS ASSESS-DOCD LE1/YR: CPT | Mod: CPTII,S$GLB,, | Performed by: PODIATRIST

## 2024-09-24 PROCEDURE — 3288F FALL RISK ASSESSMENT DOCD: CPT | Mod: CPTII,S$GLB,, | Performed by: PODIATRIST

## 2024-09-24 PROCEDURE — 1159F MED LIST DOCD IN RCRD: CPT | Mod: CPTII,S$GLB,, | Performed by: PODIATRIST

## 2024-09-24 PROCEDURE — 1125F AMNT PAIN NOTED PAIN PRSNT: CPT | Mod: CPTII,S$GLB,, | Performed by: PODIATRIST

## 2024-09-24 PROCEDURE — 3044F HG A1C LEVEL LT 7.0%: CPT | Mod: CPTII,S$GLB,, | Performed by: PODIATRIST

## 2024-09-24 PROCEDURE — 3074F SYST BP LT 130 MM HG: CPT | Mod: CPTII,S$GLB,, | Performed by: PODIATRIST

## 2024-09-24 PROCEDURE — 1157F ADVNC CARE PLAN IN RCRD: CPT | Mod: CPTII,S$GLB,, | Performed by: PODIATRIST

## 2024-09-24 RX ORDER — TRAZODONE HYDROCHLORIDE 100 MG/1
100 TABLET ORAL NIGHTLY
COMMUNITY
Start: 2024-09-05

## 2024-09-24 RX ORDER — SEMAGLUTIDE 1 MG/.5ML
1 INJECTION, SOLUTION SUBCUTANEOUS
Qty: 2 ML | Refills: 2 | Status: SHIPPED | OUTPATIENT
Start: 2024-09-24

## 2024-09-24 RX ORDER — SEMAGLUTIDE 0.5 MG/.5ML
0.5 INJECTION, SOLUTION SUBCUTANEOUS
Qty: 2 ML | Refills: 0 | OUTPATIENT
Start: 2024-09-24 | End: 2024-10-16

## 2024-09-24 NOTE — PROGRESS NOTES
Patient presents 1 week status post tarsal tunnel release left ankle.  He is doing well.  Minimal discomfort.  No signs of infection.  Neurovascular status intact.  No signs of dehiscence.  Continue postoperative instructions and follow-up in 1 week.

## 2024-09-24 NOTE — ANESTHESIA POSTPROCEDURE EVALUATION
Anesthesia Post Evaluation    Patient: Zeenat Mancia    Procedure(s) Performed: Procedure(s) (LRB):  RELEASE, TARSAL TUNNEL (Left)    Final Anesthesia Type: general      Patient location during evaluation: PACU  Patient participation: Yes- Able to Participate  Level of consciousness: awake  Post-procedure vital signs: reviewed and stable  Pain management: adequate  Airway patency: patent    PONV status at discharge: No PONV  Anesthetic complications: no      Cardiovascular status: blood pressure returned to baseline and stable  Respiratory status: spontaneous ventilation and unassisted  Hydration status: euvolemic  Follow-up not needed.              Vitals Value Taken Time   /75 09/18/24 1015   Temp 36.8 °C (98.3 °F) 09/18/24 1015   Pulse 64 09/18/24 1015   Resp 12 09/18/24 1015   SpO2 95 % 09/18/24 1015         No case tracking events are documented in the log.      Pain/Jose Score: No data recorded

## 2024-09-25 ENCOUNTER — OFFICE VISIT (OUTPATIENT)
Dept: OPTOMETRY | Facility: CLINIC | Age: 74
End: 2024-09-25
Payer: MEDICARE

## 2024-09-25 ENCOUNTER — PATIENT MESSAGE (OUTPATIENT)
Dept: PRIMARY CARE CLINIC | Facility: CLINIC | Age: 74
End: 2024-09-25
Payer: MEDICARE

## 2024-09-25 DIAGNOSIS — H35.373 EPIRETINAL MEMBRANE, BOTH EYES: ICD-10-CM

## 2024-09-25 DIAGNOSIS — M35.01 KERATITIS SICCA, BILATERAL: Primary | ICD-10-CM

## 2024-09-25 DIAGNOSIS — H43.813 POSTERIOR VITREOUS DETACHMENT OF BOTH EYES: ICD-10-CM

## 2024-09-25 DIAGNOSIS — Z96.1 PSEUDOPHAKIA OF BOTH EYES: ICD-10-CM

## 2024-09-25 DIAGNOSIS — H52.4 MYOPIA WITH ASTIGMATISM AND PRESBYOPIA, BILATERAL: ICD-10-CM

## 2024-09-25 DIAGNOSIS — H52.13 MYOPIA WITH ASTIGMATISM AND PRESBYOPIA, BILATERAL: ICD-10-CM

## 2024-09-25 DIAGNOSIS — H52.203 MYOPIA WITH ASTIGMATISM AND PRESBYOPIA, BILATERAL: ICD-10-CM

## 2024-09-25 PROCEDURE — 1101F PT FALLS ASSESS-DOCD LE1/YR: CPT | Mod: CPTII,S$GLB,,

## 2024-09-25 PROCEDURE — 1126F AMNT PAIN NOTED NONE PRSNT: CPT | Mod: CPTII,S$GLB,,

## 2024-09-25 PROCEDURE — 92134 CPTRZ OPH DX IMG PST SGM RTA: CPT | Mod: S$GLB,,,

## 2024-09-25 PROCEDURE — 99999 PR PBB SHADOW E&M-EST. PATIENT-LVL III: CPT | Mod: PBBFAC,,,

## 2024-09-25 PROCEDURE — 3288F FALL RISK ASSESSMENT DOCD: CPT | Mod: CPTII,S$GLB,,

## 2024-09-25 PROCEDURE — 3044F HG A1C LEVEL LT 7.0%: CPT | Mod: CPTII,S$GLB,,

## 2024-09-25 PROCEDURE — 1157F ADVNC CARE PLAN IN RCRD: CPT | Mod: CPTII,S$GLB,,

## 2024-09-25 PROCEDURE — 1159F MED LIST DOCD IN RCRD: CPT | Mod: CPTII,S$GLB,,

## 2024-09-25 PROCEDURE — 99214 OFFICE O/P EST MOD 30 MIN: CPT | Mod: S$GLB,,,

## 2024-09-25 NOTE — PROGRESS NOTES
HPI    Pt here for eye exam. Last exam- 2022    DVA stable. NVA becoming blurry. Pt sees floaetrs OU when he closes his   eyes only. Pt denies flashes/pain. Pt using AT's PRN for dryness.       Last edited by Hailey Frausto, OD on 9/25/2024  3:43 PM.            Assessment /Plan     For exam results, see Encounter Report.    Keratitis sicca, bilateral  -     fluorometholone (EFLONE) 0.1 % DrpS; Place 1 drop into both eyes 4 (four) times daily. for 7 days  Dispense: 5 mL; Refill: 1    Posterior vitreous detachment of both eyes    Pseudophakia of both eyes    Epiretinal membrane, both eyes  -     Posterior Segment OCT Retina-Both eyes    Myopia with astigmatism and presbyopia, bilateral      Pt states that OS / near vision has recently been very blurry. 3+ SPK present on examination. Pt sleeps with CPAP. Ed pt on findings, on nature / chronicity of dry eye, and association with CPAP use. Rx'd FML drops to use QID OU x 7 days. Ok to use QD prn for flare-ups, but cautioned on chronic use. Gave OTC artificial tear recommendations to use BID-QID OU along with gel drop/rj QHS. Ed pt to RTC if any worsening signs or symptoms.   Ed pt on the nature and etiology of PVDs. Reviewed signs and symptoms of a retinal detachment thoroughly and ed pt to RTC asap if experienced.  PCIOL OU. Monovision. Stable. Monitor yearly for changes.  Epiretinal membrane OU. Pt largely asymptomatic. Baseline Mac OCT obtained today. Ed pt on findings and reviewed retinal detachment signs and symptoms thoroughly. Ed pt to RTC asap if experienced. Monitor yearly for changes, sooner prn.  Monovision with PCIOL. Pt noticing mild near blur, has to pull reading materials out. Advised low power OTC readers, +1.00-+1.25 to help with near. No refraction or spec rx given.    RTC: 1 year for comprehensive exam or sooner prn

## 2024-10-01 ENCOUNTER — OFFICE VISIT (OUTPATIENT)
Dept: PODIATRY | Facility: CLINIC | Age: 74
End: 2024-10-01
Payer: MEDICARE

## 2024-10-01 VITALS — DIASTOLIC BLOOD PRESSURE: 81 MMHG | SYSTOLIC BLOOD PRESSURE: 110 MMHG | HEART RATE: 73 BPM

## 2024-10-01 DIAGNOSIS — G58.9 NERVE ENTRAPMENT SYNDROME: Primary | ICD-10-CM

## 2024-10-01 PROCEDURE — 1101F PT FALLS ASSESS-DOCD LE1/YR: CPT | Mod: CPTII,S$GLB,, | Performed by: PODIATRIST

## 2024-10-01 PROCEDURE — 3079F DIAST BP 80-89 MM HG: CPT | Mod: CPTII,S$GLB,, | Performed by: PODIATRIST

## 2024-10-01 PROCEDURE — 1125F AMNT PAIN NOTED PAIN PRSNT: CPT | Mod: CPTII,S$GLB,, | Performed by: PODIATRIST

## 2024-10-01 PROCEDURE — 1157F ADVNC CARE PLAN IN RCRD: CPT | Mod: CPTII,S$GLB,, | Performed by: PODIATRIST

## 2024-10-01 PROCEDURE — 99999 PR PBB SHADOW E&M-EST. PATIENT-LVL IV: CPT | Mod: PBBFAC,,, | Performed by: PODIATRIST

## 2024-10-01 PROCEDURE — 3074F SYST BP LT 130 MM HG: CPT | Mod: CPTII,S$GLB,, | Performed by: PODIATRIST

## 2024-10-01 PROCEDURE — 3044F HG A1C LEVEL LT 7.0%: CPT | Mod: CPTII,S$GLB,, | Performed by: PODIATRIST

## 2024-10-01 PROCEDURE — 3288F FALL RISK ASSESSMENT DOCD: CPT | Mod: CPTII,S$GLB,, | Performed by: PODIATRIST

## 2024-10-01 PROCEDURE — 99024 POSTOP FOLLOW-UP VISIT: CPT | Mod: S$GLB,,, | Performed by: PODIATRIST

## 2024-10-04 ENCOUNTER — PATIENT MESSAGE (OUTPATIENT)
Dept: PODIATRY | Facility: CLINIC | Age: 74
End: 2024-10-04
Payer: MEDICARE

## 2024-10-07 ENCOUNTER — OFFICE VISIT (OUTPATIENT)
Dept: PRIMARY CARE CLINIC | Facility: CLINIC | Age: 74
End: 2024-10-07
Payer: MEDICARE

## 2024-10-07 VITALS
SYSTOLIC BLOOD PRESSURE: 118 MMHG | TEMPERATURE: 99 F | HEART RATE: 68 BPM | OXYGEN SATURATION: 95 % | BODY MASS INDEX: 40.43 KG/M2 | HEIGHT: 74 IN | WEIGHT: 315 LBS | RESPIRATION RATE: 18 BRPM | DIASTOLIC BLOOD PRESSURE: 80 MMHG

## 2024-10-07 DIAGNOSIS — I48.20 CHRONIC ATRIAL FIBRILLATION: ICD-10-CM

## 2024-10-07 DIAGNOSIS — Z23 NEED FOR INFLUENZA VACCINATION: ICD-10-CM

## 2024-10-07 DIAGNOSIS — I48.11 LONGSTANDING PERSISTENT ATRIAL FIBRILLATION: ICD-10-CM

## 2024-10-07 DIAGNOSIS — E66.01 MORBID OBESITY DUE TO EXCESS CALORIES: ICD-10-CM

## 2024-10-07 DIAGNOSIS — G20.A1 PARKINSON'S DISEASE WITHOUT DYSKINESIA OR FLUCTUATING MANIFESTATIONS: Primary | ICD-10-CM

## 2024-10-07 DIAGNOSIS — I70.0 AORTIC ATHEROSCLEROSIS: ICD-10-CM

## 2024-10-07 DIAGNOSIS — G47.33 OSA (OBSTRUCTIVE SLEEP APNEA): ICD-10-CM

## 2024-10-07 DIAGNOSIS — G62.9 NEUROPATHY: ICD-10-CM

## 2024-10-07 PROCEDURE — 3079F DIAST BP 80-89 MM HG: CPT | Mod: CPTII,S$GLB,, | Performed by: INTERNAL MEDICINE

## 2024-10-07 PROCEDURE — 1160F RVW MEDS BY RX/DR IN RCRD: CPT | Mod: CPTII,S$GLB,, | Performed by: INTERNAL MEDICINE

## 2024-10-07 PROCEDURE — 1157F ADVNC CARE PLAN IN RCRD: CPT | Mod: CPTII,S$GLB,, | Performed by: INTERNAL MEDICINE

## 2024-10-07 PROCEDURE — 1126F AMNT PAIN NOTED NONE PRSNT: CPT | Mod: CPTII,S$GLB,, | Performed by: INTERNAL MEDICINE

## 2024-10-07 PROCEDURE — 3044F HG A1C LEVEL LT 7.0%: CPT | Mod: CPTII,S$GLB,, | Performed by: INTERNAL MEDICINE

## 2024-10-07 PROCEDURE — 1159F MED LIST DOCD IN RCRD: CPT | Mod: CPTII,S$GLB,, | Performed by: INTERNAL MEDICINE

## 2024-10-07 PROCEDURE — 3008F BODY MASS INDEX DOCD: CPT | Mod: CPTII,S$GLB,, | Performed by: INTERNAL MEDICINE

## 2024-10-07 PROCEDURE — 99999 PR PBB SHADOW E&M-EST. PATIENT-LVL V: CPT | Mod: PBBFAC,,, | Performed by: INTERNAL MEDICINE

## 2024-10-07 PROCEDURE — 90653 IIV ADJUVANT VACCINE IM: CPT | Mod: S$GLB,,, | Performed by: INTERNAL MEDICINE

## 2024-10-07 PROCEDURE — 1101F PT FALLS ASSESS-DOCD LE1/YR: CPT | Mod: CPTII,S$GLB,, | Performed by: INTERNAL MEDICINE

## 2024-10-07 PROCEDURE — 99214 OFFICE O/P EST MOD 30 MIN: CPT | Mod: S$GLB,,, | Performed by: INTERNAL MEDICINE

## 2024-10-07 PROCEDURE — 3288F FALL RISK ASSESSMENT DOCD: CPT | Mod: CPTII,S$GLB,, | Performed by: INTERNAL MEDICINE

## 2024-10-07 PROCEDURE — 3074F SYST BP LT 130 MM HG: CPT | Mod: CPTII,S$GLB,, | Performed by: INTERNAL MEDICINE

## 2024-10-07 PROCEDURE — G0008 ADMIN INFLUENZA VIRUS VAC: HCPCS | Mod: S$GLB,,, | Performed by: INTERNAL MEDICINE

## 2024-10-07 RX ORDER — AMLODIPINE BESYLATE 5 MG/1
5 TABLET ORAL DAILY
COMMUNITY

## 2024-10-07 NOTE — PROGRESS NOTES
[facility] Note    Chief Complaint      Chief Complaint   Patient presents with    Follow-up     6m       History of Present Illness      Zeenat Mancia is a 74 y.o. male who presents today for   Chief Complaint   Patient presents with    Follow-up     6m   .  Patient comes to appointment here for 6 m checkup for chronic issues as outlined in detail below . He is compliant with all meds . C/o constipation secondary to wegivy but is doing ok with otc treatment .    HPI    No problem-specific Assessment & Plan notes found for this encounter.       Problem List Items Addressed This Visit       JACKELYN (obstructive sleep apnea)    Overview     Utilizing  cpap with good benefit          Morbid obesity due to excess calories    Overview     Cont to encourage diet and weight loss . Is on wegivy currently          Chronic atrial fibrillation    Overview     Dr prado cards managing . Cont current          Longstanding persistent atrial fibrillation    Overview     Cont per cardiology          Aortic atherosclerosis    Overview     Stable on statin          Neuropathy    Overview     Stable on his current regimen of gabapentin  and duloxetine         Parkinson's disease without dyskinesia or fluctuating manifestations - Primary    Overview     Stable on current regimen           Other Visit Diagnoses       Need for influenza vaccination                 Past Medical History:  Past Medical History:   Diagnosis Date    A-fib     Acute blood loss anemia     Anxiety ?    Arthritis     Atrial flutter     Cataract ,?    Colon polyp     Depression     ³³    Diverticulosis     Dyslipidemia     Dyspnea on exertion 05/31/2021    GERD (gastroesophageal reflux disease)     on Protonix    Hepatic steatosis     Hyperlipidemia     Hypertension     Irregular heart beat     Lower GI bleed 05/31/2021    Multiple gastric ulcers 05/16/2018    gi scope per Dr. Houston    Obesity     Sleep apnea     uses CPAP    Unspecified  disorder of kidney and ureter     kidney stones       Past Surgical History:  Past Surgical History:   Procedure Laterality Date    ADENOIDECTOMY  1958    APPENDECTOMY      CATARACT EXTRACTION  2009    OS    CATARACT EXTRACTION W/  INTRAOCULAR LENS IMPLANT Right 06/03/2015    [id number] 14.5d//    CHOLECYSTECTOMY  2021    COLONOSCOPY N/A 10/10/2017    Procedure: COLONOSCOPY;  Surgeon: Jameson Houston MD;  Location: [facility];  Service: Endoscopy;  Laterality: N/A; repeat in 5 years for surveillance    COLONOSCOPY N/A 05/16/2018    Procedure: COLONOSCOPY;  Surgeon: Jameson Houston MD;  Location: [facility];  Service: Endoscopy;  Laterality: N/A;    COLONOSCOPY N/A 06/02/2021    Procedure: COLONOSCOPY;  Surgeon: Agustin Corona MD;  Location: [facility] (2ND FLR);  Service: Endoscopy;  Laterality: N/A;    COLONOSCOPY N/A 10/11/2022    Procedure: COLONOSCOPY;  Surgeon: Edin Lara MD;  Location: [facility] (2ND FLR);  Service: Endoscopy;  Laterality: N/A;  2nd floor-difficult intubation  ok to hold eliquis x2 days per Dr. Jerome, see telephone encounter 6/28-Kpvt  fully vaccinated, [id number]  extended miralax prep  Clear liquids up to 2 hrs prior/ AM prep [id number]    CORONARY ANGIOGRAPHY N/A 01/04/2021    Procedure: ANGIOGRAM, CORONARY ARTERY;  Surgeon: Jase Moore MD;  Location: [facility] CATH LAB/EP;  Service: Cardiology;  Laterality: N/A;    CYSTOSCOPY WITH INSERTION OF MINIMALLY INVASIVE IMPLANT TO ENLARGE PROSTATIC URETHRA N/A 06/29/2021    Procedure: TRANSPROSTATIC TISSUE RETRACTION;  Surgeon: Agustin Clay MD;  Location: [facility] [place] OR;  Service: Urology;  Laterality: N/A;    ESOPHAGOGASTRODUODENOSCOPY N/A 07/02/2018    Procedure: EGD (ESOPHAGOGASTRODUODENOSCOPY);  Surgeon: Jameson Houston MD;  Location: [facility];  Service: Endoscopy;  Laterality: N/A;    ESOPHAGOGASTRODUODENOSCOPY N/A 06/02/2021    Procedure: EGD (ESOPHAGOGASTRODUODENOSCOPY);  Surgeon: Agustin GENAO  MD Cj;  Location: [facility] ENDO (2ND FLR);  Service: Endoscopy;  Laterality: N/A;    EYE SURGERY      cataract    JOINT REPLACEMENT      LEFT HEART CATHETERIZATION N/A 01/04/2021    Procedure: Left heart cath;  Surgeon: Jase Moore MD;  Location: [facility] CATH LAB/EP;  Service: Cardiology;  Laterality: N/A;    ROBOT-ASSISTED CHOLECYSTECTOMY N/A 02/04/2021    Procedure: ROBOTIC CHOLECYSTECTOMY;  Surgeon: Pal Cheney MD;  Location: [facility] OR;  Service: General;  Laterality: N/A;    TARSAL TUNNEL RELEASE Right 7/18/2024    Procedure: RELEASE, TARSAL TUNNEL;  Surgeon: Leonel Perez DPM;  Location: [facility] OR 2ND FLR;  Service: Podiatry;  Laterality: Right;    TARSAL TUNNEL RELEASE Left 9/18/2024    Procedure: RELEASE, TARSAL TUNNEL;  Surgeon: Leonel Perez DPM;  Location: [facility] OR 2ND FLR;  Service: Podiatry;  Laterality: Left;    TONSILLECTOMY      TRANSESOPHAGEAL ECHOCARDIOGRAPHY N/A 05/14/2021    Procedure: ECHOCARDIOGRAM, TRANSESOPHAGEAL;  Surgeon: Leo Reeves III, MD;  Location: [facility] EP LAB;  Service: Cardiology;  Laterality: N/A;    TREATMENT OF CARDIAC ARRHYTHMIA N/A 04/19/2021    Procedure: Cardioversion or Defibrillation;  Surgeon: Judy Jerome MD;  Location: [facility] CATH LAB/EP;  Service: Cardiology;  Laterality: N/A;    TREATMENT OF CARDIAC ARRHYTHMIA N/A 05/14/2021    Procedure: CARDIOVERSION;  Surgeon: PEMA Downs MD;  Location: [facility] EP LAB;  Service: Cardiology;  Laterality: N/A;  AF, PHUONG, DCCV, MAC, DM, 3 PREP    UPPER GASTROINTESTINAL ENDOSCOPY  05/16/2018    Dr. Houston       Family History:  family history includes Arthritis in his mother; Blindness in his mother; Cataracts in his father; Coronary artery disease in his father; Glaucoma in his father; Heart disease in his father and mother; Kidney failure in his father; Stroke in his maternal aunt.     Social History:  Social History     Socioeconomic History    Marital status:     Number of children: 3   Tobacco  Use    Smoking status: Former     Current packs/day: 0.00     Average packs/day: 2.0 packs/day for 11.0 years (22.0 ttl pk-yrs)     Types: Cigarettes     Start date: 1969     Quit date: 1980     Years since quittin.7    Smokeless tobacco: Never    Tobacco comments:     quit in     Substance and Sexual Activity    Alcohol use: Not Currently     Alcohol/week: 2.0 standard drinks of alcohol     Types: 1 Glasses of wine, 1 Cans of beer per week     Comment: 1 beer every 2 month    Drug use: No    Sexual activity: Not Currently     Partners: Female     Birth control/protection: Condom   Social History Narrative    Patient is originally from [email or web address] at         [employer] ; [place]        Working ; retired,  plants            47 years         Children    Son x3         Lives with  Kerri Ann/Mariano    Updates    20- moved from [facility]       Social Drivers of Health     Financial Resource Strain: Low Risk  (2024)    Overall Financial Resource Strain (CARDIA)     Difficulty of Paying Living Expenses: Not hard at all   Food Insecurity: No Food Insecurity (2024)    Hunger Vital Sign     Worried About Running Out of Food in the Last Year: Never true     Ran Out of Food in the Last Year: Never true   Transportation Needs: No Transportation Needs (2024)    PRAPARE - Transportation     Lack of Transportation (Medical): No     Lack of Transportation (Non-Medical): No   Physical Activity: Inactive (2024)    Exercise Vital Sign     Days of Exercise per Week: 0 days     Minutes of Exercise per Session: 20 min   Stress: Stress Concern Present (2024)    [employer] - Occupational Stress Questionnaire     Feeling of Stress : Rather much   Housing Stability: Unknown (10/26/2023)    Housing Stability Vital Sign     Unable to Pay for Housing in the Last Year: No     Unstable Housing in the Last Year:  No       Review of Systems:   Review of Systems   Constitutional:  Negative for fever and weight loss.   HENT:  Negative for congestion, hearing loss and sore throat.    Eyes:  Negative for blurred vision.   Respiratory:  Negative for cough and shortness of breath.    Cardiovascular:  Negative for chest pain, palpitations, claudication and leg swelling.   Gastrointestinal:  Negative for abdominal pain, constipation, diarrhea and heartburn.   Genitourinary:  Negative for dysuria.   Musculoskeletal:  Positive for joint pain. Negative for back pain and myalgias.   Skin:  Negative for rash.   Neurological:  Negative for focal weakness and headaches.   Psychiatric/Behavioral:  Negative for depression and suicidal ideas. The patient is not nervous/anxious.         Medications:  Outpatient Encounter Medications as of 10/7/2024   Medication Sig Note Dispense Refill    amLODIPine (NORVASC) 5 MG tablet Take 5 mg by mouth once daily.       apixaban (ELIQUIS) 5 mg Tab Take 1 tablet (5 mg total) by mouth 2 (two) times daily. 9/17/2024: Last dose Haris 9/15/24 PM 60 tablet 11    atenoloL (TENORMIN) 50 MG tablet Take 1 tablet (50 mg total) by mouth once daily. 9/17/2024: Take as prescribed am of procedure                     90 tablet 3    carbidopa-levodopa  mg (SINEMET)  mg per tablet Take 1 tablet by mouth 3 (three) times daily. (6am, 12pm, 5pm) 9/17/2024: Take as prescribed am of procedure                     90 tablet 11    FLUoxetine 40 MG capsule Take 40 mg by mouth. 9/17/2024: Take as prescribed am of procedure                          fluticasone propionate (FLONASE) 50 mcg/actuation nasal spray 1 spray (50 mcg total) by Each Nostril route once daily.  15.8 mL 1    furosemide (LASIX) 20 MG tablet TAKE 1 TABLET(20 MG) BY MOUTH DAILY 9/17/2024: Hold am of surgery 30 tablet 1    gabapentin (NEURONTIN) 600 MG tablet Take 300 mg by mouth 2 (two) times daily. 9/17/2024: Take as prescribed am of procedure                           melatonin 10 mg Tab Take 10 mg by mouth every evening. 2024: Take if needed PM      multivit-min/FA/lycopen/lutein (CENTRUM SILVER MEN ORAL) Take by mouth once daily. 2024: Continue to hold until after Surgery.        QUEtiapine (SEROQUEL) 100 MG Tab Take 100 mg by mouth every evening. 2024: Take as prescribed the PM prior to Surgery.      semaglutide, weight loss, (WEGOVY) 1 mg/0.5 mL PnIj Inject 1 mg into the skin every 7 days.  2 mL 2    sildenafiL (VIAGRA) 100 MG tablet Take 1 tablet (100 mg total) by mouth daily as needed for Erectile Dysfunction.  10 tablet 3    spironolactone (ALDACTONE) 25 MG tablet Take 1 tablet (25 mg total) by mouth once daily. 2024: Hold am of surgery 90 tablet 3    tamsulosin (FLOMAX) 0.4 mg Cap TAKE 1 CAPSULE BY MOUTH EVERY DAY 2024: Take as prescribed am of procedure                     30 capsule 11    [] fluorometholone (EFLONE) 0.1 % DrpS Place 1 drop into both eyes 4 (four) times daily. for 7 days  5 mL 1    [] semaglutide, weight loss, (WEGOVY) 0.5 mg/0.5 mL PnIj Inject 0.5 mg into the skin every 7 days. for 4 doses 2024: Last dose Tuesday 9/10/24. 2 mL 0    [] semaglutide, weight loss, (WEGOVY) 1 mg/0.5 mL PnIj Inject 1 mg into the skin every 7 days. for 4 doses 2024: Future  2 mL 0    [DISCONTINUED] ARIPiprazole (ABILIFY) 2 MG Tab Take 2 mg by mouth every morning. (Patient not taking: Reported on 2024)       [DISCONTINUED] ARIPiprazole (ABILIFY) 5 MG Tab Take 5 mg by mouth. (Patient not taking: Reported on 2024)       [DISCONTINUED] DULoxetine (CYMBALTA) 30 MG capsule Take 30 mg by mouth every morning. 2024: Take as prescribed am of procedure                          [DISCONTINUED] mirtazapine (REMERON) 15 MG tablet Take 15 mg by mouth every evening. (Patient not taking: Reported on 2024)       [DISCONTINUED] mirtazapine (REMERON) 7.5 MG Tab Take 7.5 mg by mouth every evening. (Patient not  "taking: Reported on 8/26/2024)       [DISCONTINUED] pregabalin (LYRICA) 50 MG capsule Take one capsule (50 mg) in addition to the 150 mg capsule for total dose of 200 mg twice daily (Patient not taking: Reported on 10/7/2024)  60 capsule 6    [DISCONTINUED] promethazine (PHENERGAN) 25 MG tablet Take 1 tablet (25 mg total) by mouth every 6 (six) hours as needed for Nausea. (Patient not taking: Reported on 8/21/2024)  28 tablet 0    [DISCONTINUED] sulfamethoxazole-trimethoprim 400-80mg (BACTRIM,SEPTRA) 400-80 mg per tablet Take 1 tablet by mouth 2 (two) times daily. (Patient not taking: Reported on 8/21/2024)  14 tablet 0    [DISCONTINUED] traZODone (DESYREL) 100 MG tablet Take 100 mg by mouth every evening. (Patient not taking: Reported on 10/7/2024)        Facility-Administered Encounter Medications as of 10/7/2024   Medication Dose Route Frequency Provider Last Rate Last Admin    capsaicin-skin cleanser patch 2 patch  2 patch Topical (Top) 1 time in Clinic/HOD Rafael Read FNP        influenza (adjuvanted) (Fluad) 45 mcg/0.5 mL IM vaccine (> or = 66 yo) 0.5 mL  0.5 mL Intramuscular 1 time in Clinic/HOD         perflutren protein-A microsphr 0.22 mg/mL IV susp  0.5 mL Intravenous Once Mauri Portillo MD           Allergies:  Review of patient's allergies indicates:   Allergen Reactions    Lipitor [atorvastatin] Itching         Physical Exam      Vitals:    10/07/24 1500   BP: 118/80   Pulse: 68   Resp: 18   Temp: 98.7 °F (37.1 °C)        Vital Signs  Temp: 98.7 °F (37.1 °C)  Temp Source: Oral  Pulse: 68  Resp: 18  SpO2: 95 %  BP: 118/80  BP Location: Left arm  Patient Position: Sitting  Pain Score: 0-No pain  Height and Weight  Height: 6' 2" (188 cm)  Weight: (!) 149.5 kg (329 lb 9.4 oz)  BSA (Calculated - sq m): 2.79 sq meters  BMI (Calculated): 42.3  Weight in (lb) to have BMI = 25: 194.3]     Body mass index is 42.32 kg/m².    Physical Exam  Constitutional:       Appearance: He is well-developed.   HENT:      " "Head: Normocephalic.   Eyes:      Pupils: Pupils are equal, round, and reactive to light.   Neck:      Thyroid: No thyromegaly.   Cardiovascular:      Rate and Rhythm: Normal rate. Rhythm irregularly irregular.      Heart sounds: No murmur heard.     No friction rub. No gallop.   Pulmonary:      Effort: Pulmonary effort is normal.      Breath sounds: Normal breath sounds.   Abdominal:      General: Bowel sounds are normal.      Palpations: Abdomen is soft.   Musculoskeletal:         General: Normal range of motion.      Cervical back: Normal range of motion.   Skin:     General: Skin is warm and dry.   Neurological:      Mental Status: He is alert and oriented to person, place, and time.      Sensory: No sensory deficit.   Psychiatric:         Behavior: Behavior normal.          Laboratory:  CBC:  No results for input(s): "WBC", "RBC", "HGB", "HCT", "PLT", "MCV", "MCH", "MCHC" in the last 2160 hours.  CMP:  No results for input(s): "GLU", "CALCIUM", "ALBUMIN", "PROT", "NA", "K", "CO2", "CL", "BUN", "ALKPHOS", "ALT", "AST", "BILITOT" in the last 2160 hours.    Invalid input(s): "CREATININ"  URINALYSIS:  No results for input(s): "COLORU", "CLARITYU", "SPECGRAV", "PHUR", "PROTEINUA", "GLUCOSEU", "BILIRUBINCON", "BLOODU", "WBCU", "RBCU", "BACTERIA", "MUCUS", "NITRITE", "LEUKOCYTESUR", "UROBILINOGEN", "HYALINECASTS" in the last 2160 hours.   LIPIDS:  No results for input(s): "TSH", "HDL", "CHOL", "TRIG", "LDLCALC", "CHOLHDL", "NONHDLCHOL", "TOTALCHOLEST" in the last 2160 hours.  TSH:  No results for input(s): "TSH" in the last 2160 hours.  A1C:  No results for input(s): "HGBA1C" in the last 2160 hours.    Radiology:        Assessment:     Zeenat Mancia is a 74 y.o.male with:    Parkinson's disease without dyskinesia or fluctuating manifestations    Aortic atherosclerosis    Neuropathy    JACKELYN (obstructive sleep apnea)    Morbid obesity due to excess calories    Longstanding persistent atrial fibrillation    Chronic atrial " fibrillation    Need for influenza vaccination  -     influenza (adjuvanted) (Fluad) 45 mcg/0.5 mL IM vaccine (> or = 64 yo) 0.5 mL                Plan:     Problem List Items Addressed This Visit       JACKELYN (obstructive sleep apnea)    Overview     Utilizing  cpap with good benefit          Morbid obesity due to excess calories    Overview     Cont to encourage diet and weight loss . Is on wegivy currently          Chronic atrial fibrillation    Overview     Dr prado cards managing . Cont current          Longstanding persistent atrial fibrillation    Overview     Cont per cardiology          Aortic atherosclerosis    Overview     Stable on statin          Neuropathy    Overview     Stable on his current regimen of gabapentin  and duloxetine         Parkinson's disease without dyskinesia or fluctuating manifestations - Primary    Overview     Stable on current regimen           Other Visit Diagnoses       Need for influenza vaccination                As above, continue current medications and maintain follow up with specialists.  Return to clinic in 6 months.      Frederick W Dantagnan [facility], [place]

## 2024-10-08 ENCOUNTER — OFFICE VISIT (OUTPATIENT)
Dept: PODIATRY | Facility: CLINIC | Age: 74
End: 2024-10-08
Payer: MEDICARE

## 2024-10-08 VITALS
WEIGHT: 315 LBS | DIASTOLIC BLOOD PRESSURE: 75 MMHG | BODY MASS INDEX: 40.43 KG/M2 | SYSTOLIC BLOOD PRESSURE: 112 MMHG | RESPIRATION RATE: 19 BRPM | HEIGHT: 74 IN | HEART RATE: 60 BPM

## 2024-10-08 DIAGNOSIS — G62.9 NEUROPATHY: ICD-10-CM

## 2024-10-08 DIAGNOSIS — G58.9 NERVE ENTRAPMENT SYNDROME: ICD-10-CM

## 2024-10-08 DIAGNOSIS — Z98.890 POST-OPERATIVE STATE: Primary | ICD-10-CM

## 2024-10-08 DIAGNOSIS — M79.2 NEURALGIA: ICD-10-CM

## 2024-10-08 PROCEDURE — 3074F SYST BP LT 130 MM HG: CPT | Mod: CPTII,S$GLB,, | Performed by: PODIATRIST

## 2024-10-08 PROCEDURE — 1101F PT FALLS ASSESS-DOCD LE1/YR: CPT | Mod: CPTII,S$GLB,, | Performed by: PODIATRIST

## 2024-10-08 PROCEDURE — 1157F ADVNC CARE PLAN IN RCRD: CPT | Mod: CPTII,S$GLB,, | Performed by: PODIATRIST

## 2024-10-08 PROCEDURE — 3078F DIAST BP <80 MM HG: CPT | Mod: CPTII,S$GLB,, | Performed by: PODIATRIST

## 2024-10-08 PROCEDURE — 99999 PR PBB SHADOW E&M-EST. PATIENT-LVL IV: CPT | Mod: PBBFAC,,, | Performed by: PODIATRIST

## 2024-10-08 PROCEDURE — 1159F MED LIST DOCD IN RCRD: CPT | Mod: CPTII,S$GLB,, | Performed by: PODIATRIST

## 2024-10-08 PROCEDURE — 1160F RVW MEDS BY RX/DR IN RCRD: CPT | Mod: CPTII,S$GLB,, | Performed by: PODIATRIST

## 2024-10-08 PROCEDURE — 99024 POSTOP FOLLOW-UP VISIT: CPT | Mod: S$GLB,,, | Performed by: PODIATRIST

## 2024-10-08 PROCEDURE — 1126F AMNT PAIN NOTED NONE PRSNT: CPT | Mod: CPTII,S$GLB,, | Performed by: PODIATRIST

## 2024-10-08 PROCEDURE — 3044F HG A1C LEVEL LT 7.0%: CPT | Mod: CPTII,S$GLB,, | Performed by: PODIATRIST

## 2024-10-08 PROCEDURE — 3288F FALL RISK ASSESSMENT DOCD: CPT | Mod: CPTII,S$GLB,, | Performed by: PODIATRIST

## 2024-10-08 NOTE — PROGRESS NOTES
Subjective:         Patient ID: Zeenat Mancia is a 74 y.o. male.    Chief Complaint: Post-op Evaluation (Left foot ) and Dressing Change    Zeenat is a 74 y.o. male who presents to the podiatry clinic  with complaint of  bilateral foot pain. Onset of the symptoms was several months ago. Precipitating event: none known. Current symptoms include:  Burning tingling and numbness . Aggravating factors: inactivity. Symptoms have gradually worsened.   He did very well with the diagnostic nerve blocks.  He would like to discuss possible surgical intervention.    10/8:  Patient presents to post-op visit #2 for L foot tarsal tunnel release.  Dressings clean, dry, and intact.  Patient reports improvement to pain on medial aspect of L foot.  Continues to report pain to dorsal and plantar aspect of digits.  Has been compliant with wearing Darco shoe at all times.  Denies fevers, chills, nausea, or vomiting.  Denies any new pedal complaints.    He would like to discuss tarsal tunnel release left.  Review of Systems   Constitutional: Negative for chills, decreased appetite, fever and malaise/fatigue.   HENT:  Negative for congestion, hearing loss, nosebleeds and tinnitus.    Eyes:  Negative for double vision, pain, photophobia and visual disturbance.   Cardiovascular:  Negative for chest pain, claudication, cyanosis and leg swelling.   Respiratory:  Negative for cough, hemoptysis, shortness of breath and wheezing.    Endocrine: Negative for cold intolerance and heat intolerance.   Hematologic/Lymphatic: Negative for adenopathy and bleeding problem.   Skin:  Negative for color change, dry skin, itching, nail changes and suspicious lesions.   Musculoskeletal:  Positive for joint pain and myalgias. Negative for arthritis and stiffness.   Gastrointestinal:  Negative for abdominal pain, jaundice, nausea and vomiting.   Genitourinary:  Negative for dysuria, frequency and hematuria.   Neurological:  Positive for numbness, paresthesias and  sensory change. Negative for difficulty with concentration and loss of balance.   Psychiatric/Behavioral:  Negative for altered mental status, hallucinations and suicidal ideas. The patient is not nervous/anxious.    Allergic/Immunologic: Negative for environmental allergies and persistent infections.         Objective:      Physical Exam  Constitutional:       Appearance: He is well-developed.   HENT:      Head: Normocephalic and atraumatic.   Cardiovascular:      Pulses:           Dorsalis pedis pulses are 2+ on the right side and 2+ on the left side.        Posterior tibial pulses are 2+ on the right side and 2+ on the left side.   Pulmonary:      Effort: Pulmonary effort is normal.   Musculoskeletal:      Right foot: Normal range of motion. No deformity.      Left foot: Normal range of motion. No deformity.      Comments: Inspection and palpation of the muscles joints and bones of both lower extremities reveal that muscle strength for the anterior, lateral, and posterior muscle groups and intrinsic muscle groups of the foot are all 5 over 5 symmetrical. Ankle, subtalar, midtarsal, and digital joint range of motion  are within normal limits, nonpainful, without crepitus or effusion. Patient exhibits a normal angle and base of gait. Palpation of the tendons reveal no defects.     Feet:      Right foot:      Skin integrity: No skin breakdown or erythema.      Left foot:      Skin integrity: No skin breakdown or erythema.   Skin:     General: Skin is warm and dry.      Nails: There is no clubbing.      Comments: Skin turgor is normal bilaterally. Skin texture is well hydrated to both lower extremities. No lesions or rashes or wounds appreciated bilaterally. Nail plates 1 through 5 bilaterally are within normal limits for length and thickness. No nail clubbing or incurvation noted.    Sutures intact without dehiscence to L foot.  Surrounding erythema or edema.  No tenderness to palpation.   Neurological:      Mental  "Status: He is alert and oriented to person, place, and time.      Deep Tendon Reflexes:      Reflex Scores:       Patellar reflexes are 2+ on the right side and 2+ on the left side.       Achilles reflexes are 2+ on the right side and 2+ on the left side.     Comments: No Sharp, dull, light touch, vibratory, and proprioceptive sensation are intact bilaterally. Deep tendon reflexes to patellar and Achilles tendon are symmetrical, 2/4 bilaterally. No ankle clonus or Babinski reflexes noted bilaterally. Coordination is normal to both feet and lower extremities.    No tenderness with palpation overlying bilateral deep peroneal nerves.   Psychiatric:         Behavior: Behavior normal.           Assessment:       Encounter Diagnoses   Name Primary?    Nerve entrapment syndrome     Neuralgia     Neuropathy     Post-operative state Yes         Plan:       Zeenat ZARATE" was seen today for post-op evaluation and dressing change.    Diagnoses and all orders for this visit:    Post-operative state    Nerve entrapment syndrome    Neuralgia    Neuropathy      -I counseled the patient on his conditions, their implications and medical management.  -Discussed with the patient that he is healing accordingly  -sutures were removed from L foot.  -WBAT in Darco shoe for 1 more week and may transition to tennis shoes  -RTC 1 month for post-op follow up    Leia Rao DPM   Podiatric Medicine & Surgery  [facility]                         "

## 2024-10-12 NOTE — PROGRESS NOTES
Patient presents  status post tarsal tunnel release left ankle.  He is doing well.  Minimal discomfort.  No signs of infection.  Neurovascular status intact.  No signs of dehiscence.  Continue postoperative instructions and follow-up in 1 week.

## 2024-10-15 ENCOUNTER — OFFICE VISIT (OUTPATIENT)
Dept: BARIATRICS | Facility: CLINIC | Age: 74
End: 2024-10-15
Payer: MEDICARE

## 2024-10-15 VITALS
OXYGEN SATURATION: 95 % | WEIGHT: 315 LBS | HEART RATE: 77 BPM | BODY MASS INDEX: 41.33 KG/M2 | SYSTOLIC BLOOD PRESSURE: 134 MMHG | DIASTOLIC BLOOD PRESSURE: 88 MMHG

## 2024-10-15 DIAGNOSIS — E66.813 CLASS 3 SEVERE OBESITY DUE TO EXCESS CALORIES WITH SERIOUS COMORBIDITY AND BODY MASS INDEX (BMI) OF 40.0 TO 44.9 IN ADULT: Primary | ICD-10-CM

## 2024-10-15 DIAGNOSIS — I25.10 NONOBSTRUCTIVE ATHEROSCLEROSIS OF CORONARY ARTERY: ICD-10-CM

## 2024-10-15 DIAGNOSIS — I50.32 HYPERTENSIVE HEART DISEASE WITH CHRONIC DIASTOLIC CONGESTIVE HEART FAILURE: ICD-10-CM

## 2024-10-15 DIAGNOSIS — I48.20 CHRONIC ATRIAL FIBRILLATION: ICD-10-CM

## 2024-10-15 DIAGNOSIS — I11.0 HYPERTENSIVE HEART DISEASE WITH CHRONIC DIASTOLIC CONGESTIVE HEART FAILURE: ICD-10-CM

## 2024-10-15 DIAGNOSIS — G47.33 OSA (OBSTRUCTIVE SLEEP APNEA): ICD-10-CM

## 2024-10-15 DIAGNOSIS — E66.01 CLASS 3 SEVERE OBESITY DUE TO EXCESS CALORIES WITH SERIOUS COMORBIDITY AND BODY MASS INDEX (BMI) OF 40.0 TO 44.9 IN ADULT: Primary | ICD-10-CM

## 2024-10-15 DIAGNOSIS — Z71.3 ENCOUNTER FOR WEIGHT LOSS COUNSELING: ICD-10-CM

## 2024-10-15 PROCEDURE — 3008F BODY MASS INDEX DOCD: CPT | Mod: CPTII,S$GLB,, | Performed by: STUDENT IN AN ORGANIZED HEALTH CARE EDUCATION/TRAINING PROGRAM

## 2024-10-15 PROCEDURE — 1157F ADVNC CARE PLAN IN RCRD: CPT | Mod: CPTII,S$GLB,, | Performed by: STUDENT IN AN ORGANIZED HEALTH CARE EDUCATION/TRAINING PROGRAM

## 2024-10-15 PROCEDURE — 1159F MED LIST DOCD IN RCRD: CPT | Mod: CPTII,S$GLB,, | Performed by: STUDENT IN AN ORGANIZED HEALTH CARE EDUCATION/TRAINING PROGRAM

## 2024-10-15 PROCEDURE — 3075F SYST BP GE 130 - 139MM HG: CPT | Mod: CPTII,S$GLB,, | Performed by: STUDENT IN AN ORGANIZED HEALTH CARE EDUCATION/TRAINING PROGRAM

## 2024-10-15 PROCEDURE — 1101F PT FALLS ASSESS-DOCD LE1/YR: CPT | Mod: CPTII,S$GLB,, | Performed by: STUDENT IN AN ORGANIZED HEALTH CARE EDUCATION/TRAINING PROGRAM

## 2024-10-15 PROCEDURE — 99213 OFFICE O/P EST LOW 20 MIN: CPT | Mod: S$GLB,,, | Performed by: STUDENT IN AN ORGANIZED HEALTH CARE EDUCATION/TRAINING PROGRAM

## 2024-10-15 PROCEDURE — 3288F FALL RISK ASSESSMENT DOCD: CPT | Mod: CPTII,S$GLB,, | Performed by: STUDENT IN AN ORGANIZED HEALTH CARE EDUCATION/TRAINING PROGRAM

## 2024-10-15 PROCEDURE — 99999 PR PBB SHADOW E&M-EST. PATIENT-LVL III: CPT | Mod: PBBFAC,,, | Performed by: STUDENT IN AN ORGANIZED HEALTH CARE EDUCATION/TRAINING PROGRAM

## 2024-10-15 PROCEDURE — 3079F DIAST BP 80-89 MM HG: CPT | Mod: CPTII,S$GLB,, | Performed by: STUDENT IN AN ORGANIZED HEALTH CARE EDUCATION/TRAINING PROGRAM

## 2024-10-15 PROCEDURE — 1160F RVW MEDS BY RX/DR IN RCRD: CPT | Mod: CPTII,S$GLB,, | Performed by: STUDENT IN AN ORGANIZED HEALTH CARE EDUCATION/TRAINING PROGRAM

## 2024-10-15 PROCEDURE — 3044F HG A1C LEVEL LT 7.0%: CPT | Mod: CPTII,S$GLB,, | Performed by: STUDENT IN AN ORGANIZED HEALTH CARE EDUCATION/TRAINING PROGRAM

## 2024-10-15 RX ORDER — SEMAGLUTIDE 1.7 MG/.75ML
1.7 INJECTION, SOLUTION SUBCUTANEOUS
Qty: 3 ML | Refills: 2 | Status: SHIPPED | OUTPATIENT
Start: 2024-10-15

## 2024-10-15 NOTE — PROGRESS NOTES
Subjective     Patient ID: Zeenat Mancia is a 74 y.o. male.    Chief Complaint: Follow-up, Obesity, and Weight Check    Patient presents for treatment of obesity.     Co-morbidities   HTN  JACKELYN  A-fib  CHF, diastolic  Parkinsons    Negative for thyroid cancer     History of Weight Loss Efforts  Nutrisystem  WW    Current Physical Activity  Home exercises    Current Eating Habits  Breakfast - eggs, toast, sausage, waffles, cereal, coffee  Lunch - chips, sandwich  Dinner - beef, chicken, corn, broccoli, beans  Snacks - ice cream sandwich, fruit, cookies  Beverages - water, diet Sprite    Eating 2 meals per day  Cutting back on portions    Medical Weight Loss (unable to use [employer])  7/2/2024: 331 lbs 12.7 oz, BMI 45  10/15/2024: 321 lbs 14  oz, BMI 41.33      Review of Systems   Constitutional:  Negative for chills and fever.   Respiratory:  Negative for shortness of breath.    Cardiovascular:  Negative for chest pain.   Gastrointestinal:  Negative for abdominal pain, nausea and vomiting.   Musculoskeletal:  Positive for arthralgias.   Neurological:  Negative for dizziness and light-headedness.          Objective    Latest Reference Range & Units 04/25/24 12:58 06/24/24 11:33   WBC 3.90 - 12.70 K/uL 9.28    RBC 4.60 - 6.20 M/uL 6.08    Hemoglobin 14.0 - 18.0 g/dL 16.1    Hematocrit 40.0 - 54.0 % 52.3    MCV 82 - 98 fL 86    MCH 27.0 - 31.0 pg 26.5 (L)    MCHC 32.0 - 36.0 g/dL 30.8 (L)    RDW 11.5 - 14.5 % 17.2 (H)    Platelet Count 150 - 450 K/uL 218    MPV 9.2 - 12.9 fL 9.2    Gran % 38.0 - 73.0 % 75.2 (H)    Lymph % 18.0 - 48.0 % 10.7 (L)    Mono % 4.0 - 15.0 % 10.3    Eos % 0.0 - 8.0 % 3.1    Basophil % 0.0 - 1.9 % 0.5    Immature Granulocytes 0.0 - 0.5 % 0.2    Gran # (ANC) 1.8 - 7.7 K/uL 7.0    Lymph # 1.0 - 4.8 K/uL 1.0    Mono # 0.3 - 1.0 K/uL 1.0    Eos # 0.0 - 0.5 K/uL 0.3    Baso # 0.00 - 0.20 K/uL 0.05    Immature Grans (Abs) 0.00 - 0.04 K/uL 0.02    nRBC 0 /100 WBC 0    Differential Method  Automated     Sodium 136 - 145 mmol/L 139 139   Potassium 3.5 - 5.1 mmol/L 4.2 4.6   Chloride 95 - 110 mmol/L 102 101   CO2 23 - 29 mmol/L 29 30 (H)   Anion Gap 8 - 16 mmol/L 8 8   BUN 8 - 23 mg/dL 14 16   Creatinine 0.5 - 1.4 mg/dL 0.9 1.1   eGFR >60 mL/min/1.73 m^2 >60.0 >60.0   Glucose 70 - 110 mg/dL 112 (H) 121 (H)   Calcium 8.7 - 10.5 mg/dL 9.3 9.7   ALP 55 - 135 U/L 79    PROTEIN TOTAL 6.0 - 8.4 g/dL 7.2    Albumin 3.5 - 5.2 g/dL 3.6    BILIRUBIN TOTAL 0.1 - 1.0 mg/dL 0.5    AST 10 - 40 U/L 13    ALT 10 - 44 U/L 10    (L): Data is abnormally low  (H): Data is abnormally high    MRI Brain 2/20/2024  Remote infarct of the left corona radiata.  Minimal small vessel ischemic change on left.    ECHO 5/24/2024     Left Ventricle: The left ventricle is normal in size. Mildly increased wall thickness. There is normal systolic function with a visually estimated ejection fraction of 55 - 65% depending on the R-R interval. Unable to assess diastolic function due to atrial fibrillation.    Right Ventricle: Normal right ventricular cavity size. Systolic function is normal.    Left Atrium: Left atrium is mildly dilated.    IVC/SVC: Intermediate venous pressure at 8 mmHg.    Pulmonary Artery: The estimated pulmonary artery systolic pressure is 28 mmHg.    EKG 4/25/2024  Atrial fibrillation   Left axis deviation   Low voltage QRS   Incomplete right bundle branch block   Inferior infarct (cited on or before 31-MAY-2021)   Cannot rule out Anterior infarct (cited on or before 31-MAY-2021)     Vitals:    10/15/24 1427   BP: 134/88   Pulse: 77         Physical Exam  Vitals reviewed.   Constitutional:       General: He is not in acute distress.     Appearance: Normal appearance. He is obese. He is not ill-appearing, toxic-appearing or diaphoretic.   HENT:      Head: Normocephalic and atraumatic.   Cardiovascular:      Rate and Rhythm: Normal rate.   Pulmonary:      Effort: Pulmonary effort is normal. No respiratory distress.   Skin:      General: Skin is warm and dry.   Neurological:      Mental Status: He is alert and oriented to person, place, and time.            Assessment and Plan     1. Class 3 severe obesity due to excess calories with serious comorbidity and body mass index (BMI) of 40.0 to 44.9 in adult  -     semaglutide, weight loss, (WEGOVY) 1.7 mg/0.75 mL PnIj; Inject 1.7 mg into the skin every 7 days.  Dispense: 3 mL; Refill: 2    2. Hypertensive heart disease with chronic diastolic congestive heart failure  Overview:  bp well controlled on current regimen .    Orders:  -     semaglutide, weight loss, (WEGOVY) 1.7 mg/0.75 mL PnIj; Inject 1.7 mg into the skin every 7 days.  Dispense: 3 mL; Refill: 2    3. Chronic atrial fibrillation  Overview:  Dr prado cards managing . Cont current     Orders:  -     semaglutide, weight loss, (WEGOVY) 1.7 mg/0.75 mL PnIj; Inject 1.7 mg into the skin every 7 days.  Dispense: 3 mL; Refill: 2    4. Nonobstructive atherosclerosis of coronary artery  Overview:  Cont b blocker , eliqus , and is not on statin due to intolerance    Orders:  -     semaglutide, weight loss, (WEGOVY) 1.7 mg/0.75 mL PnIj; Inject 1.7 mg into the skin every 7 days.  Dispense: 3 mL; Refill: 2    5. JACKELYN (obstructive sleep apnea)  Overview:  Utilizing  cpap with good benefit     Orders:  -     semaglutide, weight loss, (WEGOVY) 1.7 mg/0.75 mL PnIj; Inject 1.7 mg into the skin every 7 days.  Dispense: 3 mL; Refill: 2    6. Encounter for weight loss counseling  -     semaglutide, weight loss, (WEGOVY) 1.7 mg/0.75 mL PnIj; Inject 1.7 mg into the skin every 7 days.  Dispense: 3 mL; Refill: 2    - Wegovy 1.7 mg weekly    - Log all food and beverage intake with a daily calorie goal of 1800 calories per day    - Activity as tolerated

## 2024-10-18 ENCOUNTER — PATIENT MESSAGE (OUTPATIENT)
Dept: PODIATRY | Facility: CLINIC | Age: 74
End: 2024-10-18
Payer: MEDICARE

## 2024-10-28 ENCOUNTER — PATIENT MESSAGE (OUTPATIENT)
Dept: PRIMARY CARE CLINIC | Facility: CLINIC | Age: 74
End: 2024-10-28
Payer: MEDICARE

## 2024-10-28 DIAGNOSIS — G62.9 NEUROPATHY: Primary | ICD-10-CM

## 2024-10-29 RX ORDER — GABAPENTIN 600 MG/1
600 TABLET ORAL DAILY
Qty: 180 TABLET | Refills: 0 | OUTPATIENT
Start: 2024-10-29

## 2024-10-29 NOTE — TELEPHONE ENCOUNTER
Pt requesting a refill on gabapentin, seems to have been prescribed by neurologist and last dose prescribed by cardiologist    States that he take 600 mg once daily; pended in encounter for your review    chart said 600 mg twice daily

## 2024-11-02 RX ORDER — PREGABALIN 150 MG/1
CAPSULE ORAL
Qty: 60 CAPSULE | Refills: 2 | Status: SHIPPED | OUTPATIENT
Start: 2024-11-02 | End: 2025-01-01

## 2024-11-26 ENCOUNTER — OFFICE VISIT (OUTPATIENT)
Dept: NEUROLOGY | Facility: CLINIC | Age: 74
End: 2024-11-26
Payer: MEDICARE

## 2024-11-26 VITALS
BODY MASS INDEX: 40.43 KG/M2 | HEART RATE: 89 BPM | SYSTOLIC BLOOD PRESSURE: 125 MMHG | WEIGHT: 315 LBS | DIASTOLIC BLOOD PRESSURE: 87 MMHG | HEIGHT: 74 IN

## 2024-11-26 DIAGNOSIS — G20.A1 PARKINSON'S DISEASE WITHOUT DYSKINESIA OR FLUCTUATING MANIFESTATIONS: Primary | ICD-10-CM

## 2024-11-26 DIAGNOSIS — G62.9 PERIPHERAL POLYNEUROPATHY: ICD-10-CM

## 2024-11-26 DIAGNOSIS — Z79.899 MEDICATION MANAGEMENT: ICD-10-CM

## 2024-11-26 PROCEDURE — 1160F RVW MEDS BY RX/DR IN RCRD: CPT | Mod: CPTII,S$GLB,, | Performed by: STUDENT IN AN ORGANIZED HEALTH CARE EDUCATION/TRAINING PROGRAM

## 2024-11-26 PROCEDURE — 99999 PR PBB SHADOW E&M-EST. PATIENT-LVL III: CPT | Mod: PBBFAC,,, | Performed by: STUDENT IN AN ORGANIZED HEALTH CARE EDUCATION/TRAINING PROGRAM

## 2024-11-26 PROCEDURE — 3288F FALL RISK ASSESSMENT DOCD: CPT | Mod: CPTII,S$GLB,, | Performed by: STUDENT IN AN ORGANIZED HEALTH CARE EDUCATION/TRAINING PROGRAM

## 2024-11-26 PROCEDURE — 3008F BODY MASS INDEX DOCD: CPT | Mod: CPTII,S$GLB,, | Performed by: STUDENT IN AN ORGANIZED HEALTH CARE EDUCATION/TRAINING PROGRAM

## 2024-11-26 PROCEDURE — 1125F AMNT PAIN NOTED PAIN PRSNT: CPT | Mod: CPTII,S$GLB,, | Performed by: STUDENT IN AN ORGANIZED HEALTH CARE EDUCATION/TRAINING PROGRAM

## 2024-11-26 PROCEDURE — 99214 OFFICE O/P EST MOD 30 MIN: CPT | Mod: S$GLB,,, | Performed by: STUDENT IN AN ORGANIZED HEALTH CARE EDUCATION/TRAINING PROGRAM

## 2024-11-26 PROCEDURE — 1159F MED LIST DOCD IN RCRD: CPT | Mod: CPTII,S$GLB,, | Performed by: STUDENT IN AN ORGANIZED HEALTH CARE EDUCATION/TRAINING PROGRAM

## 2024-11-26 PROCEDURE — 3044F HG A1C LEVEL LT 7.0%: CPT | Mod: CPTII,S$GLB,, | Performed by: STUDENT IN AN ORGANIZED HEALTH CARE EDUCATION/TRAINING PROGRAM

## 2024-11-26 PROCEDURE — 1101F PT FALLS ASSESS-DOCD LE1/YR: CPT | Mod: CPTII,S$GLB,, | Performed by: STUDENT IN AN ORGANIZED HEALTH CARE EDUCATION/TRAINING PROGRAM

## 2024-11-26 PROCEDURE — 3074F SYST BP LT 130 MM HG: CPT | Mod: CPTII,S$GLB,, | Performed by: STUDENT IN AN ORGANIZED HEALTH CARE EDUCATION/TRAINING PROGRAM

## 2024-11-26 PROCEDURE — 1157F ADVNC CARE PLAN IN RCRD: CPT | Mod: CPTII,S$GLB,, | Performed by: STUDENT IN AN ORGANIZED HEALTH CARE EDUCATION/TRAINING PROGRAM

## 2024-11-26 PROCEDURE — 3079F DIAST BP 80-89 MM HG: CPT | Mod: CPTII,S$GLB,, | Performed by: STUDENT IN AN ORGANIZED HEALTH CARE EDUCATION/TRAINING PROGRAM

## 2024-11-26 RX ORDER — CARBIDOPA AND LEVODOPA 25; 100 MG/1; MG/1
1 TABLET ORAL 3 TIMES DAILY
Qty: 90 TABLET | Refills: 11 | Status: SHIPPED | OUTPATIENT
Start: 2024-11-26

## 2024-11-26 NOTE — PROGRESS NOTES
Patient ID: [id number]  Chief Complaint/Reason for Consult: PD f/u     Subjective:     HPI  Zeenat Mancia is a 74 y.o. RH male with multiple co morbidities including JACKELYN, A.fib, obesity, and HTN. he is presenting today for f/u on PD.     Interval history (11/26/2024):  He is responding to sinemet with no side effects.  He doesn't have morning tremors, tremors only occasionally emerge.   He is consistently using his CPAP. He was started on Wegovy 2 months ago, has lost 15 lb so far.  His mood including anxiety is significantly better on Prozac 40 mg.  He recently underwent surgery for bilateral metatarsal tunnel which has improved his tingling partially.   He is still taking lyrica but only once in the evening since it makes him drowsy.     Interval history (05/23/2024):  Tremors and speech is better  Completed PT afetr 6-8 weeks, he hasn't noticed significant improvement but wife thinks his legs are stronger.  Planned to get metatarsal tunnel surgery, this has been interfering with walking.  Has been trying to see bariatric clinic to be considered for GLP-1 inhibitors, hasn't been successful yet, seems to be a coverage issue.   Occasionally gets the lateral thigh pain in the middle of the day. Taking pregabalin 150 mg BID.  He is on prozac 40 mg, mood is better, less emotional.    Interval history (03/06/2024):  Serum work up was unremarkable. MRI showed chronic microvascular changes and one lacunar infarct in corona radiata, no specific involvement of basal ganglia to be causing vascular PDism.   He has started PT already, very exhausted but is trying to push through.  Received an injection for trochanteric bursitis last week which has improved his pain some but not completely.    Initial HPI (2/20/2024):  He had left lateral thigh pain consistent with bursitis 2 months ago which improved after injection by pain management.  He is now having the same pain in the right thigh x 10 days. Describes it as a sharp  "deep pain.  The pain is in lateral upper 3rd of his thigh, occasionally radiating anteriorly to the groin, 8 out of 10.  When he straightens his leg the pain comes on but alleviated by mildly flexing the hip and knee. Not aggravated by touch or clothing.   He has also had neuropathy in feet x1 year. Additionally, they report occasional low back pain that is not related to the current pain.    On another note, per wife, his balance is bad, he shakes, he has difficulty walking/standing, and occasionally has difficulty with speech and drooling. He had an episode that his face felt it was "pulling in the middle".   He has B/B incontinence, 2 years of bladder incontinence and more recent with the bowels. He has nocturia x3 per night. He has been having some "visions" or "delusions" at nights. He is very depressed and also has a lot of anxiety, he was just started on Prozac.    Review of Systems   HENT:  Negative for tinnitus and trouble swallowing.    Eyes:  Negative for photophobia and visual disturbance.   Gastrointestinal:  Negative for nausea and vomiting.   Musculoskeletal:  Positive for gait problem and leg pain.   Neurological:  Positive for weakness and numbness. Negative for tremors and speech difficulty (improved).   Psychiatric/Behavioral:  Negative for agitation, dysphoric mood (improved) and sleep disturbance (improved). The patient is not nervous/anxious.    All other systems reviewed and are negative.    Past Medical History:  -------------------------------------    A-fib    Acute blood loss anemia    Anxiety    Arthritis    Atrial flutter    Cataract    Colon polyp    Depression    ³³    Diverticulosis    Dyslipidemia    Dyspnea on exertion    GERD (gastroesophageal reflux disease)    on Protonix    Hepatic steatosis    Hyperlipidemia    Hypertension    Irregular heart beat    Lower GI bleed    Multiple gastric ulcers    gi scope per Dr. Houston    Obesity    Sleep apnea    uses CPAP    Unspecified " disorder of kidney and ureter    kidney stones     Allergies:  Review of patient's allergies indicates:   Allergen Reactions    Lipitor [atorvastatin] Itching     Pertinent Family History:  Family History   Problem Relation Name Age of Onset    Arthritis Mother Patrica     Heart disease Mother Patrica     Blindness Mother Patrica         OS due to injury    Glaucoma Father John     Coronary artery disease Father John     Heart disease Father John     Cataracts Father John     Kidney failure Father John     Stroke Maternal Aunt Patrica     Amblyopia Neg Hx      Cancer Neg Hx      Diabetes Neg Hx      Hypertension Neg Hx      Macular degeneration Neg Hx      Retinal detachment Neg Hx      Strabismus Neg Hx      Thyroid disease Neg Hx      Colon cancer Neg Hx      Colon polyps Neg Hx      Crohn's disease Neg Hx      Ulcerative colitis Neg Hx      Esophageal cancer Neg Hx      Stomach cancer Neg Hx         Pertinent Social History:  Social History     Tobacco Use    Smoking status: Former     Current packs/day: 0.00     Average packs/day: 2.0 packs/day for 11.0 years (22.0 ttl pk-yrs)     Types: Cigarettes     Start date: 1969     Quit date: 1980     Years since quittin.9    Smokeless tobacco: Never    Tobacco comments:     quit in     Substance Use Topics    Alcohol use: Not Currently     Alcohol/week: 2.0 standard drinks of alcohol     Types: 1 Glasses of wine, 1 Cans of beer per week     Comment: 1 beer every 2 month    Drug use: No       Medications:  Current Outpatient Medications   Medication Instructions    amLODIPine (NORVASC) 5 mg, Daily    apixaban (ELIQUIS) 5 mg, Oral, 2 times daily    atenoloL (TENORMIN) 50 mg, Oral, Daily    carbidopa-levodopa  mg (SINEMET)  mg per tablet 1 tablet, Oral, 3 times daily, (6am, 12pm, 5pm)    FLUoxetine 40 mg    fluticasone propionate (FLONASE) 50 mcg, Each Nostril, Daily    furosemide (LASIX) 20 MG tablet TAKE 1 TABLET(20 MG) BY  MOUTH DAILY    gabapentin (NEURONTIN) 300 mg, 2 times daily    melatonin 10 mg, Nightly    multivit-min/FA/lycopen/lutein (CENTRUM SILVER MEN ORAL) Daily    pregabalin (LYRICA) 150 MG capsule TAKE 1 CAPSULE(150 MG) BY MOUTH TWICE DAILY    QUEtiapine (SEROQUEL) 100 mg, Nightly    sildenafiL (VIAGRA) 100 mg, Oral, Daily PRN    spironolactone (ALDACTONE) 25 mg, Oral, Daily    tamsulosin (FLOMAX) 0.4 mg, Oral    WEGOVY 1.7 mg, Subcutaneous, Every 7 days        Objective:     Vitals:    11/26/24 1135   BP: 125/87   Pulse: 89     General:  Well-appearing, well-nourished, NAD, cooperative    Neurologic Exam:   Awake, alert and oriented x3  Speech spontaneous and fluent, intact comprehension.   Adequate fund of knowledge, vocabulary.  EOM intact. No ophthalmoplegia.   Facial expression is full and symmetric.   Hearing is intact.  Antigravity in BUE.   No resting tremor. Slight action tremor.   Gait is not shuffling, more steady on turns, still short of breath after a few steps    Pertinent lab results  Lab Results   Component Value Date     06/24/2024    K 4.6 06/24/2024    CO2 30 (H) 06/24/2024    BUN 16 06/24/2024    CREATININE 1.1 06/24/2024    CALCIUM 9.7 06/24/2024     Pertinent imaging results  *previously reviewed images:  2/28/2024  MRI Brain wo contrast:  Scattered foci of T2/FLAIR hyperintense signal throughout bilateral subcortical white matter, most consistent with chronic ischemic microvascular changes.   Advanced cerebral atrophy more prominent in insular cortices    9/19/2022  MRI Lumbar Spine wo contrast:  Multilevel degenerative changes resulting in mild left-sided L5 S1 foraminal encroachment. Small superimposed central disc protrusion.     Other pertinent studies  *previously reviewed results:  12/15/2023:  EMG-NCS:  1. Technically, somewhat limited study due to peripheral edema of legs  2. The sensory responses were absent in the bilateral lower extremities.  This can either be due to the technical  issues above, or could be a sensory peripheral polyneuropathy.  I favor the latter as his symptoms correlate with this.   3. Incidentally, there is evidence of a median mononeuropathy across the right wrist (I.e. carpal tunnel syndrome).  Upper extremity sensory studies performed due to lack of responses in the legs.  If a more detailed study is desired for the carpal tunnel syndrome, a separate upper extremity study can be ordered.  Note:  He has no significant hand symptoms.     4. No definitive evidence of tarsal tunnel syndrome on either side.  The bilateral abductor hallicus muscles showed active denervation, but this could be due to the peripheral polyneuropathy described above, and can also be seen in normal feet, especially with advancing age.      Assessment:   Zeenat Mancia is a 74 y.o. right-handed male with multiple co morbidities including JACKELYN, increased BMI, peripheral neuropathy, and PD who presents for follow up. Tremors and overall movements have responded to sinemet, he is tolerating with no side effects, we will continue this dose. He would benefit from a course of PT to focus on endurance and gait training. With ongoing weight loss and decrease in exertional dyspnea he would be able to exercise more.     1. Parkinson's disease without dyskinesia or fluctuating manifestations    2. Peripheral polyneuropathy    3. Medication management      Plan:     - Ambulatory referral/consult to Physical/Occupational Therapy; Future  - carbidopa-levodopa  mg (SINEMET)  mg per tablet; Take 1 tablet by mouth 3 (three) times daily. (6am, 12pm, 5pm)  Dispense: 90 tablet; Refill: 11  - continue pregabalin 150 mg once daily  - Follow up: RTC in 12 months if stable     Plan was discussed in detail with the patient, who is in agreement.      Disclaimer: This note was partly generated using dictation software which may occasionally result in transcription errors that are missed on review.    Based on our  encounter today, my overall Medical Decision Making is a Level 4     Complexity of Problem: Moderate (2 or more stable chronic illnesses)  Complexity of Data: Limited (Review of the results of each unit test)  Risk of Complications and/or morbidity/mortality of Management: Moderate risk (Prescription drug management)          Doris Moreno MD    [facility]  11/26/2024

## 2024-12-20 ENCOUNTER — ON-DEMAND VIRTUAL (OUTPATIENT)
Dept: URGENT CARE | Facility: CLINIC | Age: 74
End: 2024-12-20
Payer: MEDICARE

## 2024-12-20 DIAGNOSIS — H00.011 HORDEOLUM EXTERNUM OF RIGHT UPPER EYELID: Primary | ICD-10-CM

## 2024-12-20 RX ORDER — ERYTHROMYCIN 5 MG/G
OINTMENT OPHTHALMIC EVERY 8 HOURS
Qty: 3.5 G | Refills: 0 | Status: SHIPPED | OUTPATIENT
Start: 2024-12-20 | End: 2024-12-27

## 2024-12-20 NOTE — PROGRESS NOTES
Subjective:      Patient ID: Zeenat Mancia is a 74 y.o. male.    Vitals:  vitals were not taken for this visit.     Chief Complaint: Eye Problem (Swelling and bump x 3 day )      Visit Type: TELE AUDIOVISUAL    Present with the patient at the time of consultation: TELEMED PRESENT WITH PATIENT: Sidney Gabi Yanez.     Past Medical History:   Diagnosis Date    A-fib     Acute blood loss anemia     Anxiety ?    Arthritis     Atrial flutter     Cataract ,?    Colon polyp     Depression     ³³    Diverticulosis     Dyslipidemia     Dyspnea on exertion 05/31/2021    GERD (gastroesophageal reflux disease)     on Protonix    Hepatic steatosis     Hyperlipidemia     Hypertension     Irregular heart beat     Lower GI bleed 05/31/2021    Multiple gastric ulcers 05/16/2018    gi scope per Dr. Houston    Obesity     Sleep apnea     uses CPAP    Unspecified disorder of kidney and ureter     kidney stones     Past Surgical History:   Procedure Laterality Date    ADENOIDECTOMY  1958    APPENDECTOMY      CATARACT EXTRACTION  2009    OS    CATARACT EXTRACTION W/  INTRAOCULAR LENS IMPLANT Right 06/03/2015    [id number] 14.5d//    CHOLECYSTECTOMY  2021    COLONOSCOPY N/A 10/10/2017    Procedure: COLONOSCOPY;  Surgeon: Jameson Houston MD;  Location: [facility];  Service: Endoscopy;  Laterality: N/A; repeat in 5 years for surveillance    COLONOSCOPY N/A 05/16/2018    Procedure: COLONOSCOPY;  Surgeon: Jameson Houston MD;  Location[facility];  Service: Endoscopy;  Laterality: N/A;    COLONOSCOPY N/A 06/02/2021    Procedure: COLONOSCOPY;  Surgeon: Agustin Corona MD;  Location: [address]);  Service: Endoscopy;  Laterality: N/A;    COLONOSCOPY N/A 10/11/2022    Procedure: COLONOSCOPY;  Surgeon: Edin Lara MD;  Location: [address]);  Service: Endoscopy;  Laterality: N/A;  2nd floor-difficult intubation  ok to hold eliquis x2 days per Dr. Jerome, see telephone encounter 6/28-vt  fully vaccinated,  instructions [facility]  extended miralax prep  Clear liquids up to 2 hrs prior/ AM prep [id number]    CORONARY ANGIOGRAPHY N/A 01/04/2021    Procedure: ANGIOGRAM, CORONARY ARTERY;  Surgeon: Jase Moore MD;  Location: [facility] CATH LAB/EP;  Service: Cardiology;  Laterality: N/A;    CYSTOSCOPY WITH INSERTION OF MINIMALLY INVASIVE IMPLANT TO ENLARGE PROSTATIC URETHRA N/A 06/29/2021    Procedure: TRANSPROSTATIC TISSUE RETRACTION;  Surgeon: Agustin Clay MD;  Location: [facility];  Service: Urology;  Laterality: N/A;    ESOPHAGOGASTRODUODENOSCOPY N/A 07/02/2018    Procedure: EGD (ESOPHAGOGASTRODUODENOSCOPY);  Surgeon: Jameson Houston MD;  Location: [facility];  Service: Endoscopy;  Laterality: N/A;    ESOPHAGOGASTRODUODENOSCOPY N/A 06/02/2021    Procedure: EGD (ESOPHAGOGASTRODUODENOSCOPY);  Surgeon: Agustin Corona MD;  Location: [facility] (2ND FLR);  Service: Endoscopy;  Laterality: N/A;    EYE SURGERY      cataract    JOINT REPLACEMENT      LEFT HEART CATHETERIZATION N/A 01/04/2021    Procedure: Left heart cath;  Surgeon: Jase Moore MD;  Location: [facility] CATH LAB/EP;  Service: Cardiology;  Laterality: N/A;    ROBOT-ASSISTED CHOLECYSTECTOMY N/A 02/04/2021    Procedure: ROBOTIC CHOLECYSTECTOMY;  Surgeon: Pal Cheney MD;  Location: [facility];  Service: General;  Laterality: N/A;    TARSAL TUNNEL RELEASE Right 7/18/2024    Procedure: RELEASE, TARSAL TUNNEL;  Surgeon: Leonel Perez DPM;  Location: [facility] 2ND FLR;  Service: Podiatry;  Laterality: Right;    TARSAL TUNNEL RELEASE Left 9/18/2024    Procedure: RELEASE, TARSAL TUNNEL;  Surgeon: Leonel Perez DPM;  Location: [facility] OR 2ND FLR;  Service: Podiatry;  Laterality: Left;    TONSILLECTOMY      TRANSESOPHAGEAL ECHOCARDIOGRAPHY N/A 05/14/2021    Procedure: ECHOCARDIOGRAM, TRANSESOPHAGEAL;  Surgeon: Leo Reeves III, MD;  Location: [facility] EP LAB;  Service: Cardiology;  Laterality: N/A;    TREATMENT OF CARDIAC ARRHYTHMIA N/A  04/19/2021    Procedure: Cardioversion or Defibrillation;  Surgeon: Judy Jerome MD;  Location: [facility] CATH LAB/EP;  Service: Cardiology;  Laterality: N/A;    TREATMENT OF CARDIAC ARRHYTHMIA N/A 05/14/2021    Procedure: CARDIOVERSION;  Surgeon: PEMA Downs MD;  Location: [facility] EP LAB;  Service: Cardiology;  Laterality: N/A;  AF, PHUONG, DCCV, MAC, DM, 3 PREP    UPPER GASTROINTESTINAL ENDOSCOPY  05/16/2018    Dr. Houston     Review of patient's allergies indicates:   Allergen Reactions    Lipitor [atorvastatin] Itching     Current Outpatient Medications on File Prior to Visit   Medication Sig Dispense Refill    amLODIPine (NORVASC) 5 MG tablet Take 5 mg by mouth once daily.      apixaban (ELIQUIS) 5 mg Tab Take 1 tablet (5 mg total) by mouth 2 (two) times daily. 60 tablet 11    atenoloL (TENORMIN) 50 MG tablet Take 1 tablet (50 mg total) by mouth once daily. 90 tablet 3    carbidopa-levodopa  mg (SINEMET)  mg per tablet Take 1 tablet by mouth 3 (three) times daily. (6am, 12pm, 5pm) 90 tablet 11    FLUoxetine 40 MG capsule Take 40 mg by mouth.      fluticasone propionate (FLONASE) 50 mcg/actuation nasal spray 1 spray (50 mcg total) by Each Nostril route once daily. 15.8 mL 1    furosemide (LASIX) 20 MG tablet TAKE 1 TABLET(20 MG) BY MOUTH DAILY 30 tablet 1    gabapentin (NEURONTIN) 600 MG tablet Take 300 mg by mouth 2 (two) times daily.      melatonin 10 mg Tab Take 10 mg by mouth every evening.      multivit-min/FA/lycopen/lutein (CENTRUM SILVER MEN ORAL) Take by mouth once daily.      pregabalin (LYRICA) 150 MG capsule TAKE 1 CAPSULE(150 MG) BY MOUTH TWICE DAILY 60 capsule 2    QUEtiapine (SEROQUEL) 100 MG Tab Take 100 mg by mouth every evening.      semaglutide, weight loss, (WEGOVY) 1.7 mg/0.75 mL PnIj Inject 1.7 mg into the skin every 7 days. 3 mL 2    sildenafiL (VIAGRA) 100 MG tablet Take 1 tablet (100 mg total) by mouth daily as needed for Erectile Dysfunction. 10 tablet 3    spironolactone  (ALDACTONE) 25 MG tablet Take 1 tablet (25 mg total) by mouth once daily. 90 tablet 3    tamsulosin (FLOMAX) 0.4 mg Cap TAKE 1 CAPSULE BY MOUTH EVERY DAY 30 capsule 11     Current Facility-Administered Medications on File Prior to Visit   Medication Dose Route Frequency Provider Last Rate Last Admin    capsaicin-skin cleanser patch 2 patch  2 patch Topical (Top) 1 time in Clinic/HOD Rafael Read FNLAVONNE        perflutren protein-A microsphr 0.22 mg/mL IV susp  0.5 mL Intravenous Once Mauri Portillo MD         Family History   Problem Relation Name Age of Onset    Arthritis Mother Patrica     Heart disease Mother Patrica     Blindness Mother Patrica         OS due to injury    Glaucoma Father John     Coronary artery disease Father John     Heart disease Father John     Cataracts Father John     Kidney failure Father John     Stroke Maternal Aunt Patrica     Amblyopia Neg Hx      Cancer Neg Hx      Diabetes Neg Hx      Hypertension Neg Hx      Macular degeneration Neg Hx      Retinal detachment Neg Hx      Strabismus Neg Hx      Thyroid disease Neg Hx      Colon cancer Neg Hx      Colon polyps Neg Hx      Crohn's disease Neg Hx      Ulcerative colitis Neg Hx      Esophageal cancer Neg Hx      Stomach cancer Neg Hx         Medications Ordered                [facility] [place] [address]    Telephone: [phone]   Fax: [phone]   Hours: Mon-Fri, 8a-5:30p                         Internal Pharmacy (1 of 1)              erythromycin (ROMYCIN) ophthalmic ointment    Sig: Place into the right eye every 8 (eight) hours. for 7 days       Start: 12/20/24     Quantity: 3.5 g Refills: 0                           [facility] Peq Odvv Intake    12/20/2024  8:44 AM CST - Filed by Patient   What is your current physical address in the event of a medical emergency?    Are you able to take your vital signs? No   Please attach any relevant images or files    Is your employer  "contracted with [facility] [employer]? No         Pt presents with c/o  a "stye" to right eye with swelling, tenderness and pain  x 3 days. Reports he has tried warm compresses with no improvement. Denies any fever or vision changes.         Constitution: Negative for fever.   Eyes:  Positive for eye pain and eyelid swelling. Negative for eye trauma and eye discharge.   Respiratory:  Negative for cough.    Neurological:  Negative for headaches.        Objective:   The physical exam was conducted virtually.  Physical Exam   Constitutional: He is oriented to person, place, and time.   Eyes:          Comments: Redness with swelling    Neck: Neck supple.   Pulmonary/Chest: Effort normal.   Neurological: He is alert and oriented to person, place, and time.       Assessment:     1. Hordeolum externum of right upper eyelid        Plan:       Hordeolum externum of right upper eyelid  -     erythromycin (ROMYCIN) ophthalmic ointment; Place into the right eye every 8 (eight) hours. for 7 days  Dispense: 3.5 g; Refill: 0      We appreciate you trusting us with your medical care. We hope you feel better soon. We will be happy to take care of you for all of your future medical needs.     You must understand that you've received Virtual treatment only and that you may be released before all your medical problems are known or treated. You, the patient, will arrange for follow up care as instructed.     Follow up with your PCP or specialty clinic as directed in the next 1-2 weeks if not improved or as needed. You can call [phone] to schedule an appointment with the appropriate provider.     If your condition worsens we recommend that you receive another evaluation in person, with your primary care provider, urgent care or at the emergency room immediately or contact your primary medical clinics after hours call service to discuss your concerns.                 "

## 2024-12-20 NOTE — PATIENT INSTRUCTIONS
Discussed use of medication  Warm compresses to the area 3x day for 15 minutes     Discussed with pt to monitor improvement, may need to schedule a visit with Ophthalmology     If worsening symptoms follow up in office or Urgent Care.

## 2025-01-10 ENCOUNTER — CLINICAL SUPPORT (OUTPATIENT)
Dept: REHABILITATION | Facility: HOSPITAL | Age: 75
End: 2025-01-10
Attending: STUDENT IN AN ORGANIZED HEALTH CARE EDUCATION/TRAINING PROGRAM
Payer: MEDICARE

## 2025-01-10 DIAGNOSIS — Z74.09 IMPAIRED FUNCTIONAL MOBILITY, BALANCE, GAIT, AND ENDURANCE: Primary | ICD-10-CM

## 2025-01-10 DIAGNOSIS — G20.A1 PARKINSON'S DISEASE WITHOUT DYSKINESIA OR FLUCTUATING MANIFESTATIONS: ICD-10-CM

## 2025-01-10 PROCEDURE — 97162 PT EVAL MOD COMPLEX 30 MIN: CPT | Mod: PN

## 2025-01-10 NOTE — PLAN OF CARE
[facility]  Physical Therapy Neurological Rehabilitation Initial Evaluation     Name: Zeenat Mancia  Clinic Number: [id number]    Therapy Diagnosis:   Encounter Diagnoses   Name Primary?    Parkinson's disease without dyskinesia or fluctuating manifestations     Impaired functional mobility, balance, gait, and endurance Yes     Physician: Doris Moreno MD    Physician Orders: PT Eval and Treat   Medical Diagnosis from Referral: Parkinson's disease without dyskinesia or fluctuating manifestations [G20.A1]   Evaluation Date: 1/10/2025  Authorization Period Expiration: 11/26/2025  Plan of Care Expiration: 2/21/2025  Progress Note Due: 2/10/2025  Date of Surgery: right tarsal tunnel release (7/18/2024) and left tarsal tunnel release (9/18/2024)  Visit # / Visits authorized: 1/1  FOTO: 1/3    Precautions: Standard and Fall    Time In: 11:30AM  Time Out: 12:00PM  Total Billable Time: 30 minutes (1 mod eval)    Subjective      Date of onset: Officially diagnosed with Parkinson's Disease in March 2024    History of current condition - TICO ZARATE reports: Last seen in PT in April 2024 for same complaints. Notes prescribed Carbidopa-Levadopa has helped in terms of tremor control. He recently underwent surgery for bilateral tarsal tunnel release which has improved his tingling in his feet partially. Goals are to improve confidence with walking. Admits he is not currently exercising.     Imaging  - MRI Brain (2/28/2024): No acute process seen.     Remote infarct of the left corona radiata.     Minimal small vessel ischemic change on left.    Prior Therapy: at this facility for same complaints  Social History: lives with wife  Falls: 2 falls since last seen in PT (tripped on patio bárbara and tripped over dog)   DME: large based quad cane (currently using); single point cane; rolling walker that he no longer uses  Home Environment: Single story home, one threshold to enter  Exercise Routine / History: not  "currently   Family Present at time of Eval: no   Occupation: retired from [employer]   Prior Level of Function: mod I  Current Level of Function: min A from wife for dressing    Pain:  Current 6/10, worst 8/10, best 0/10   Location: bilateral feet  Description: tingling  Aggravating Factors: cold weather  Easing Factors: gabapentin    Patient's goals:  "feel more confident walking"    Medical History:   Past Medical History:   Diagnosis Date    A-fib     Acute blood loss anemia     Anxiety ?    Arthritis     Atrial flutter     Cataract ,?    Colon polyp     Depression     ³³    Diverticulosis     Dyslipidemia     Dyspnea on exertion 05/31/2021    GERD (gastroesophageal reflux disease)     on Protonix    Hepatic steatosis     Hyperlipidemia     Hypertension     Irregular heart beat     Lower GI bleed 05/31/2021    Multiple gastric ulcers 05/16/2018    gi scope per Dr. Houston    Obesity     Sleep apnea     uses CPAP    Unspecified disorder of kidney and ureter     kidney stones       Surgical History:   Zeenat Mancia  has a past surgical history that includes Eye surgery; Tonsillectomy; Appendectomy; Cataract extraction (2009); Cataract extraction w/  intraocular lens implant (Right, 06/03/2015); Upper gastrointestinal endoscopy (05/16/2018); Colonoscopy (N/A, 10/10/2017); Colonoscopy (N/A, 05/16/2018); Esophagogastroduodenoscopy (N/A, 07/02/2018); Left heart catheterization (N/A, 01/04/2021); Coronary angiography (N/A, 01/04/2021); Robot-assisted cholecystectomy (N/A, 02/04/2021); Treatment of cardiac arrhythmia (N/A, 04/19/2021); Treatment of cardiac arrhythmia (N/A, 05/14/2021); Transesophageal echocardiography (N/A, 05/14/2021); Esophagogastroduodenoscopy (N/A, 06/02/2021); Colonoscopy (N/A, 06/02/2021); Cystoscopy with insertion of minimally invasive implant to enlarge prostatic urethra (N/A, 06/29/2021); Colonoscopy (N/A, 10/11/2022); Adenoidectomy (1958); Cholecystectomy (2021); Joint replacement; Tarsal " tunnel release (Right, 2024); and Tarsal tunnel release (Left, 2024).    Medications:   Zeenat has a current medication list which includes the following prescription(s): amlodipine, apixaban, atenolol, carbidopa-levodopa  mg, fluoxetine, fluticasone propionate, furosemide, gabapentin, melatonin, mv-min/folic/k1/lycopen/lutein, pregabalin, quetiapine, wegovy, sildenafil, spironolactone, and tamsulosin, and the following Facility-Administered Medications: capsaicin-skin cleanser and perflutren protein-a microsphr.    Allergies:   Review of patient's allergies indicates:   Allergen Reactions    Lipitor [atorvastatin] Itching        Objective      - Command followin% simple and complex   - Speech: no overt deficits but short term memory deficits reported    Mental status: alert, oriented to person, place, and time  Behavior:  cooperative  Attention Span and Concentration:  Normal    Posture Alignment : rounded shoulders; reversal of lumbar lordosis in seated position    Coordination:   - fine motor: not tested  - UE coordination: decreased amplitude of rapid alternating movement  - LE coordination:  impaired isolation of dorsiflexion/plantarflexion during rapid alternating movement         RANGE OF MOTION--LOWER EXTREMITIES  (R) LE decreased hip flexor, hamstring, and gastroc-soleus length  (L) LE: decreased hip flexor, hamstring, and gastroc-soleus length    Lower Extremity Strength   RLE LLE   Hip Flexion: 4-/5 4/5   Hip Abduction: 4/5 4/5   Knee Extension: 4/5 5/5   Knee Flexion: 5/5 5/5   Ankle Dorsiflexion: 5/5 5/5     Five Time Sit to Stand: 15.9 seconds with bilateral upper extremity assist from blue chair    Two-Minute Walk: 261 feet with RPE 7/10    Gait Assessment:   - AD used: large based quad cane  - Assistance: mod I  - Distance: see 2MWT  - Stairs: not assessed    Gait Analysis:  Deviations noted: decreased step length bilaterally; decreased heel strike bilaterally; decreased hip  extension at terminal contact bilaterally; decreased trunk/pelvic rotation    Impairments contributing to deviations:  impaired motor control    Intake Outcome Measure for FOTO Degenerative CNS Disorders Survey    Therapist reviewed FOTO scores for Zeenat Mancia on 1/10/2025.   FOTO report - see Media section or FOTO account episode details.    Intake Score: 40%       Treatment     Total Treatment time separate from Evaluation: 8 minutes    TICO ZARATE received the treatments listed below:      therapeutic activities to improve functional performance for 8 minutes, including:  - Patient education on ACSM/CDC recommendations for weekly exercise and importance of reducing sedentary behavior  - Patient education on cardiovascular status and relation to Parkinson's Disease management  - Patient education on basic walking program (2 minutes every 30 minutes wake time) with self-progressions as able; daily log suggested    Patient Education and Home Exercises     Education provided:   - PT plan of care  - Initiate walking program as soon as possible  - Large based quad cane may not be best assistive device; suggest single point cane versus rolling walker    Written Home Exercises Provided: Yes.  Exercises were reviewed and TICO ZARATE was able to demonstrate them prior to the end of the session.  TICO ZARATE demonstrated good  understanding of the education provided.     Assessment     Zeenat is a 74 y.o. male referred to outpatient Physical Therapy with a medical diagnosis of Parkinson's disease without dyskinesia or fluctuating manifestations [G20.A1]. Patient presents with gait and posture abnormalities; impaired functional strength and endurance; history of falls; and impaired self-perception of functional mobility. While signs of movement disorder are present, patient's cardiovascular endurance is also a significant limiting factor in his current mobility status. He would benefit from skilled physical therapy services to address listed  deficits, decrease risk for falls/future injury, and improve overall quality of life.    Patient prognosis is Fair.   Patient will benefit from skilled outpatient Physical Therapy to address the deficits stated above and in the chart below, provide patient /family education, and to maximize patient's level of independence.     Plan of care discussed with patient: Yes  Patient's spiritual, cultural and educational needs considered and patient is agreeable to the plan of care and goals as stated below:     Anticipated Barriers for therapy: decreased cardiovascular endurance    Medical Necessity is demonstrated by the following  History  Co-morbidities and personal factors that may impact the plan of care [] LOW: no personal factors / co-morbidities  [] MODERATE: 1-2 personal factors / co-morbidities  [x] HIGH: 3+ personal factors / co-morbidities    Moderate / High Support Documentation:   Co-morbidities affecting plan of care: see above    Personal Factors:   age  lifestyle     Examination  Body Structures and Functions, activity limitations and participation restrictions that may impact the plan of care [] LOW: addressing 1-2 elements  [] MODERATE: 3+ elements  [x] HIGH: 4+ elements (please support below)    Moderate / High Support Documentation: see above     Clinical Presentation [] LOW: stable  [x] MODERATE: Evolving  [] HIGH: Unstable     Decision Making/ Complexity Score: moderate       Goals:  Short Term Goals: 3 weeks   Patient will be compliant with HEP in order to maximize PT benefits  Patient will walk >/= 300 feet on Two-Minute Walk Test indoors in order to improve endurance for home mobility   Patient will score >/= 45% on FOTO survey in order to improve self-perception of functional mobility deficits     Long Term Goals: 6 weeks   Patient will score >/= 50% on FOTO survey in order to improve self-perception of functional mobility deficits  Patient will improve bilateral lower extremity MMT grades by  >/=1/2 grade in order to improve strength for ADL completion  Patient will score </= 14 seconds on Five Time Sit to  order to improve bilateral lower extremity muscular power for transfers   Patient will complete Six-Minute Walk Test with or without standing rest breaks in order to improve endurance for community mobility   Patient will begin some form of home/community fitness in order to sustain progress gained in PT    Gordon and TUG goals to be added as needed once assessed    Plan     Plan of care Certification: 1/10/2025 to 2/21/2025.    Outpatient Physical Therapy 2 times weekly for 6 weeks to include the following interventions: Gait Training, Manual Therapy, Moist Heat/ Ice, Neuromuscular Re-ed, Patient Education, Self Care, Therapeutic Activities, and Therapeutic Exercise.     JENISE CLAYTON, PT        Physician's Signature: _________________________________________ Date: ________________

## 2025-01-10 NOTE — PATIENT INSTRUCTIONS
Please start trying to walk about 2 minutes daily every 30 minutes that you are awake/active. Use your cane or walker.

## 2025-01-12 PROBLEM — Z74.09 IMPAIRED FUNCTIONAL MOBILITY, BALANCE, GAIT, AND ENDURANCE: Status: RESOLVED | Noted: 2023-08-29 | Resolved: 2025-01-12

## 2025-01-12 PROBLEM — Z74.09 IMPAIRED FUNCTIONAL MOBILITY, BALANCE, GAIT, AND ENDURANCE: Status: ACTIVE | Noted: 2025-01-12

## 2025-01-13 DIAGNOSIS — Z00.00 ENCOUNTER FOR MEDICARE ANNUAL WELLNESS EXAM: ICD-10-CM

## 2025-01-14 ENCOUNTER — CLINICAL SUPPORT (OUTPATIENT)
Dept: REHABILITATION | Facility: HOSPITAL | Age: 75
End: 2025-01-14
Payer: MEDICARE

## 2025-01-14 DIAGNOSIS — Z74.09 IMPAIRED FUNCTIONAL MOBILITY, BALANCE, GAIT, AND ENDURANCE: Primary | ICD-10-CM

## 2025-01-14 PROCEDURE — 97110 THERAPEUTIC EXERCISES: CPT | Mod: PN,CQ

## 2025-01-14 PROCEDURE — 97530 THERAPEUTIC ACTIVITIES: CPT | Mod: PN,CQ

## 2025-01-14 NOTE — PROGRESS NOTES
"[facility]   Physical Therapy Treatment Note      Name: Zeenat Mancia  Clinic Number: [id number]    Therapy Diagnosis:   Encounter Diagnosis   Name Primary?    Impaired functional mobility, balance, gait, and endurance Yes     Physician: Doris Moreno MD    Visit Date: 1/14/2025    [copy and paste header from rondaal here including time in/out and billable time]    PTA Visit #: ***/5       Subjective     Patient reports: ***.  He {Actions; was/was not:27820} compliant with home exercise program.  Response to previous treatment: ***  Functional change: ***    Pain: {0-10:51167::"0"}/10  Location: {RIGHT/LEFT/BILATERAL:32891} {LOCATION ON BODY:19599}     Objective      Objective Measures updated at progress report unless specified.     Treatment     TICO ZARATE received the treatments listed below:      therapeutic exercises to develop strength, endurance, ROM, and flexibility for *** minutes including:  Hamstring stretch: 2x30" B  Gastroc stretch: 2x30"  Standing hip flexion/abduction: 2x10 B    neuromuscular re-education activities to improve: Balance, Coordination, Kinesthetic, and Proprioception for 00 minutes. The following activities were included:  Not completed today    therapeutic activities to improve functional performance for ***  minutes, including:  Circuit #1:  x2  Calf raises: x20  Standing marches: x20 with 2#ankle weights  Step up and over 4" step: x45"    Circuit #2: x2  Sit<>stand: x10  Lateral steps with red theraband: 4 lengths x 10 feet    In clinic ambulation: 2 trials x 150 feet with single point cane *following each circuit      Patient Education and Home Exercises       Education provided:   - ***    Written Home Exercises Provided: {Home Exercise Program:13597}. Exercises were reviewed and TICO ZARATE was able to demonstrate them prior to the end of the session.  TICO ZARATE demonstrated {Desc; good/fair/poor:76912} understanding of the education provided. See Electronic Medical Record " under Patient Instructions for exercises provided during therapy sessions    Assessment     ***    C J {IS/IS NOT:31993} progressing well towards his goals.   Patient prognosis is {REHAB PROGNOSIS OHS:57266}.     Patient will continue to benefit from skilled outpatient physical therapy to address the deficits listed in the problem list box on initial evaluation, provide pt/family education and to maximize pt's level of independence in the home and community environment.     Patient's spiritual, cultural and educational needs considered and pt agreeable to plan of care and goals.     Anticipated barriers to physical therapy: ***    Goals: ***    Plan     ***    Luciana Antonio, PTA

## 2025-01-14 NOTE — PROGRESS NOTES
"[facility]   Physical Therapy Treatment Note      Name: Zeenat Mancia  Clinic Number: [id number]    Therapy Diagnosis:   Encounter Diagnosis   Name Primary?    Impaired functional mobility, balance, gait, and endurance Yes     Physician: Doris Moreno MD    Visit Date: 1/14/2025    Physician Orders: PT Eval and Treat   Medical Diagnosis from Referral: Parkinson's disease without dyskinesia or fluctuating manifestations [G20.A1]   Evaluation Date: 1/10/2025  Authorization Period Expiration: 11/26/2025  Plan of Care Expiration: 2/21/2025  Progress Note Due: 2/10/2025  Date of Surgery: right tarsal tunnel release (7/18/2024) and left tarsal tunnel release (9/18/2024)  Visit # / Visits authorized: 2/20 (+eval)  FOTO: 1/3     Precautions: Standard and Fall     Time In: 1:00 PM  Time Out: 1:45 PM  Total Billable Time: 45 minutes (1 TE + 2 TA)    PTA Visit #: 1/5     Subjective     Patient reports: he knows he is supposed to be standing to walk for 2 minutes every 30 minutes though is yet to attempt.  He  will be  compliant with home exercise program.  Response to previous treatment: initial eval  Functional change: ongoing    Pain: 0/10  Location: bilateral feet      Objective      Objective Measures updated at progress report unless specified.     Treatment     C J received the treatments listed below:      therapeutic exercises to develop strength, endurance, ROM, and flexibility for 10 minutes including:  Standing hamstring stretch at stairs: 2x30" B  Gastroc stretch on incline board: 2x30"  Standing hip flexion/abduction: 2x10 B with yellow theraband    *two seated rest breaks    neuromuscular re-education activities to improve: Balance, Coordination, Kinesthetic, and Proprioception for 00 minutes. The following activities were included:  Not completed today    therapeutic activities to improve functional performance for 30 minutes, including:  Circuit #1:  x2    Calf raises: " "x20  Standing marches: x20 with 2#ankle weights  Step up and over 4" step: x45" with 2# ankle weights    *two seated rest breaks    Circuit #2: x2   Sit<>stand: x10  Lateral steps with green theraband: 4 lengths x 10 feet  Standing rows with green theraband: 2x10    *two seated rest breaks    In clinic ambulation: 2 trials x 150 feet with single point cane *following each circuit  *two seated rest breaks     Patient Education and Home Exercises       Education provided:   - increased activity at home  - daily HEP completion    Written Home Exercises Provided: Pt instructed to continue prior HEP. Exercises were reviewed and TICO ZARATE was able to demonstrate them prior to the end of the session.  TICO ZARATE demonstrated good  understanding of the education provided. See Electronic Medical Record under Patient Instructions for exercises provided during therapy sessions    Assessment     NAOMY arrived to session without complaints of pain and was agreeable to treatment.  He arrived ambulating with single point cane today but tended to leave it on the side when walking between stations.  Fair tolerance of first follow up session after initial evaluation.  Session focused on self stretching, hip strengthening, and endurance based activity.  Patient is very deconditioned, appearing out of breath and requesting multiple seated rest breaks despite heart rate never exceeding 80 bpm.  Circuit style training completed this session to maximize exercise volume and limit time spent seated.  Despite low activity tolerance and numerous breaks requested patient appeared motivated and was observed giving full effort throughout session.  He would likely most benefit from continued PT services with regular HEP compliance and increased activity at home.      TICO ZARATE Is progressing well towards his goals.   Patient prognosis is Fair.     Patient will continue to benefit from skilled outpatient physical therapy to address the deficits listed in the problem " list box on initial evaluation, provide pt/family education and to maximize pt's level of independence in the home and community environment.     Patient's spiritual, cultural and educational needs considered and pt agreeable to plan of care and goals.     Anticipated barriers to physical therapy: decreased cardiovascular endurance     Goals:   Short Term Goals: 3 weeks   Patient will be compliant with HEP in order to maximize PT benefits  Patient will walk >/= 300 feet on Two-Minute Walk Test indoors in order to improve endurance for home mobility   Patient will score >/= 45% on FOTO survey in order to improve self-perception of functional mobility deficits      Long Term Goals: 6 weeks   Patient will score >/= 50% on FOTO survey in order to improve self-perception of functional mobility deficits  Patient will improve bilateral lower extremity MMT grades by >/=1/2 grade in order to improve strength for ADL completion  Patient will score </= 14 seconds on Five Time Sit to  order to improve bilateral lower extremity muscular power for transfers   Patient will complete Six-Minute Walk Test with or without standing rest breaks in order to improve endurance for community mobility   Patient will begin some form of home/community fitness in order to sustain progress gained in PT    Plan     Plan to cont with progression of PT goals per POC.    Luciana Antonio, PTA

## 2025-01-15 ENCOUNTER — PATIENT MESSAGE (OUTPATIENT)
Dept: PRIMARY CARE CLINIC | Facility: CLINIC | Age: 75
End: 2025-01-15
Payer: MEDICARE

## 2025-01-15 NOTE — TELEPHONE ENCOUNTER
Pt states he changed Medicare Advantage plans to [facility]. He states he was seen by a psychiatric group not affiliated with [facility] and is requesting new rx for Fluoxetine 40mg, Quetiapine 200mg and Mirtazapine 15 mg.    Lov 10/7/24  Nov 4/7/25

## 2025-01-15 NOTE — TELEPHONE ENCOUNTER
No care due was identified.  [facility] Embedded Care Due Messages. Reference number: [id number].   1/15/2025 10:09:01 AM CST

## 2025-01-16 ENCOUNTER — CLINICAL SUPPORT (OUTPATIENT)
Dept: REHABILITATION | Facility: HOSPITAL | Age: 75
End: 2025-01-16
Payer: MEDICARE

## 2025-01-16 DIAGNOSIS — Z74.09 IMPAIRED FUNCTIONAL MOBILITY, BALANCE, GAIT, AND ENDURANCE: Primary | ICD-10-CM

## 2025-01-16 PROCEDURE — 97530 THERAPEUTIC ACTIVITIES: CPT

## 2025-01-16 PROCEDURE — 97110 THERAPEUTIC EXERCISES: CPT

## 2025-01-16 NOTE — PROGRESS NOTES
"[facility]   Physical Therapy Treatment Note      Name: Zeenat Mancia  Clinic Number: [id number]    Therapy Diagnosis:   No diagnosis found.    Physician: Doris Moreno MD    Visit Date: 2025    Physician Orders: PT Eval and Treat   Medical Diagnosis from Referral: Parkinson's disease without dyskinesia or fluctuating manifestations [G20.A1]   Evaluation Date: 1/10/2025  Authorization Period Expiration: 2025  Plan of Care Expiration: 2025  Progress Note Due: 2/10/2025  Date of Surgery: right tarsal tunnel release (2024) and left tarsal tunnel release (2024)  Visit # / Visits authorized:  (+eval)  FOTO: 1/3     Precautions: Standard and Fall     Time In: 1:00 PM  Time Out: 1:45 PM  Total Billable Time: 45 minutes (1 TE + 2 TA)    PTA Visit #: 0/     Subjective     Patient reports: he has been walking for longer when he gets up to go to the bathroom throughout the day  He  will be  compliant with home exercise program.  Response to previous treatment: initial eval  Functional change: ongoing    Pain: 0/10  Location: bilateral feet      Objective      Objective Measures updated at progress report unless specified.     Treatment     C J received the treatments listed below:      therapeutic exercises to develop strength, endurance, ROM, and flexibility for 15 minutes includin minute on reciprocal stepper level 2  3 way hip 2x10 B with yellow theraband    Standing hamstring stretch at stairs: 2x30" B  Gastroc stretch on incline board: 2x30"    *two seated rest breaks    neuromuscular re-education activities to improve: Balance, Coordination, Kinesthetic, and Proprioception for 00 minutes. The following activities were included:  Not completed today    therapeutic activities to improve functional performance for 30 minutes, including:  Circuit #1:  x2    Calf raises: x20  Sit<>stand: x10  Standing marches: x20   *two seated rest breaks    Standing rows with " green theraband: 2x10    In clinic ambulation: 2 trials x 150 feet with single point cane *following each circuit  *two seated rest breaks     Patient Education and Home Exercises       Education provided:   - increased activity at home  - daily HEP completion    Written Home Exercises Provided: Pt instructed to continue prior HEP. Exercises were reviewed and TICO ZAARTE was able to demonstrate them prior to the end of the session.  TICO ZARATE demonstrated good  understanding of the education provided. See Electronic Medical Record under Patient Instructions for exercises provided during therapy sessions    Assessment   CJ arrived to session with no complain. Patient attempted the recumbent stepper as a warm up this session and subsequently had episode of dizziness and mild LOB upon standing after 5 min. /98. Patient continued to report lightheadedness upon standing from a seated position.  Patient continues to be very deconditioned requiring frequent seated rest breaks throughout session. Continuing with circuit style of exercise to maximize volume throughout session. Patient continues to be motivated expressing the desire to be able to play catch with his grand kids. Patient expresses he's been walking more at home but reports he could do more. He would benefit from continued skilled interventions to improve impairments and overall quality of life.     TICO ZARATE Is progressing well towards his goals.   Patient prognosis is Fair.     Patient will continue to benefit from skilled outpatient physical therapy to address the deficits listed in the problem list box on initial evaluation, provide pt/family education and to maximize pt's level of independence in the home and community environment.     Patient's spiritual, cultural and educational needs considered and pt agreeable to plan of care and goals.     Anticipated barriers to physical therapy: decreased cardiovascular endurance     Goals:   Short Term Goals: 3 weeks   Patient  will be compliant with HEP in order to maximize PT benefits  Patient will walk >/= 300 feet on Two-Minute Walk Test indoors in order to improve endurance for home mobility   Patient will score >/= 45% on FOTO survey in order to improve self-perception of functional mobility deficits      Long Term Goals: 6 weeks   Patient will score >/= 50% on FOTO survey in order to improve self-perception of functional mobility deficits  Patient will improve bilateral lower extremity MMT grades by >/=1/2 grade in order to improve strength for ADL completion  Patient will score </= 14 seconds on Five Time Sit to  order to improve bilateral lower extremity muscular power for transfers   Patient will complete Six-Minute Walk Test with or without standing rest breaks in order to improve endurance for community mobility   Patient will begin some form of home/community fitness in order to sustain progress gained in PT    Plan     Plan to cont with progression of PT goals per POC.    Alice Starks, PT

## 2025-01-20 RX ORDER — QUETIAPINE FUMARATE 100 MG/1
100 TABLET, FILM COATED ORAL NIGHTLY
Qty: 30 TABLET | Refills: 0 | Status: SHIPPED | OUTPATIENT
Start: 2025-01-20

## 2025-01-20 RX ORDER — MIRTAZAPINE 15 MG/1
15 TABLET, FILM COATED ORAL NIGHTLY
Qty: 30 TABLET | Refills: 0 | Status: SHIPPED | OUTPATIENT
Start: 2025-01-20 | End: 2025-01-29

## 2025-01-20 RX ORDER — FLUOXETINE HYDROCHLORIDE 40 MG/1
40 CAPSULE ORAL DAILY
Qty: 30 CAPSULE | Refills: 0 | Status: SHIPPED | OUTPATIENT
Start: 2025-01-20

## 2025-01-22 ENCOUNTER — PATIENT MESSAGE (OUTPATIENT)
Dept: REHABILITATION | Facility: HOSPITAL | Age: 75
End: 2025-01-22
Payer: MEDICARE

## 2025-01-27 DIAGNOSIS — I11.0 HYPERTENSIVE HEART DISEASE WITH CHRONIC DIASTOLIC CONGESTIVE HEART FAILURE: ICD-10-CM

## 2025-01-27 DIAGNOSIS — I48.20 CHRONIC ATRIAL FIBRILLATION: ICD-10-CM

## 2025-01-27 DIAGNOSIS — I50.32 HYPERTENSIVE HEART DISEASE WITH CHRONIC DIASTOLIC CONGESTIVE HEART FAILURE: ICD-10-CM

## 2025-01-27 DIAGNOSIS — E66.01 CLASS 3 SEVERE OBESITY DUE TO EXCESS CALORIES WITH SERIOUS COMORBIDITY AND BODY MASS INDEX (BMI) OF 40.0 TO 44.9 IN ADULT: ICD-10-CM

## 2025-01-27 DIAGNOSIS — Z71.3 ENCOUNTER FOR WEIGHT LOSS COUNSELING: ICD-10-CM

## 2025-01-27 DIAGNOSIS — E66.813 CLASS 3 SEVERE OBESITY DUE TO EXCESS CALORIES WITH SERIOUS COMORBIDITY AND BODY MASS INDEX (BMI) OF 40.0 TO 44.9 IN ADULT: ICD-10-CM

## 2025-01-27 DIAGNOSIS — I25.10 NONOBSTRUCTIVE ATHEROSCLEROSIS OF CORONARY ARTERY: ICD-10-CM

## 2025-01-27 DIAGNOSIS — G47.33 OSA (OBSTRUCTIVE SLEEP APNEA): ICD-10-CM

## 2025-01-28 ENCOUNTER — CLINICAL SUPPORT (OUTPATIENT)
Dept: REHABILITATION | Facility: HOSPITAL | Age: 75
End: 2025-01-28
Payer: MEDICARE

## 2025-01-28 DIAGNOSIS — Z74.09 IMPAIRED FUNCTIONAL MOBILITY, BALANCE, GAIT, AND ENDURANCE: Primary | ICD-10-CM

## 2025-01-28 PROCEDURE — 97110 THERAPEUTIC EXERCISES: CPT | Mod: PN,CQ

## 2025-01-28 PROCEDURE — 97112 NEUROMUSCULAR REEDUCATION: CPT | Mod: PN,CQ

## 2025-01-28 RX ORDER — SEMAGLUTIDE 1.7 MG/.75ML
1.7 INJECTION, SOLUTION SUBCUTANEOUS
Qty: 3 ML | Refills: 2 | Status: SHIPPED | OUTPATIENT
Start: 2025-01-28 | End: 2025-01-31

## 2025-01-28 NOTE — PROGRESS NOTES
"[facility]   Physical Therapy Treatment Note      Name: Zeenat Mancia  Clinic Number: [id number]    Therapy Diagnosis:   Encounter Diagnosis   Name Primary?    Impaired functional mobility, balance, gait, and endurance Yes       Physician: Doris Moreno MD    Visit Date: 2025    Physician Orders: PT Eval and Treat   Medical Diagnosis from Referral: Parkinson's disease without dyskinesia or fluctuating manifestations [G20.A1]   Evaluation Date: 1/10/2025  Authorization Period Expiration: 2025  Plan of Care Expiration: 2025  Progress Note Due: 2/10/2025  Date of Surgery: right tarsal tunnel release (2024) and left tarsal tunnel release (2024)  Visit # / Visits authorized: 3/20 (+eval)  FOTO: 1/3     Precautions: Standard and Fall     Time In: 10:30 AM  Time Out: 11:15 AM  Total Billable Time: 45 minutes (1 TE + 2 TA)    PTA Visit #:      Subjective     Patient reports: had a lot of GI issues over the last week and has admittedly not been doing much.  He  will be  compliant with home exercise program.  Response to previous treatment: no adverse response  Functional change: ongoing    Pain: 0/10  Location: bilateral feet      Objective      Objective Measures updated at progress report unless specified.     Treatment     C J received the treatments listed below:      therapeutic exercises to develop strength, endurance, ROM, and flexibility for 15 minutes includin minute on reciprocal stepper level 2  Calf raises: x20  Sit<>stand: 2x10  *two seated rest breaks    Not completed today:  3 way hip 2x10 B with yellow theraband  Standing hamstring stretch at stairs: 2x30" B  Gastroc stretch on incline board: 2x30"    neuromuscular re-education activities to improve: Balance, Coordination, Kinesthetic, and Proprioception for 00 minutes. The following activities were included:  Not completed today    therapeutic activities to improve functional performance for 30 " "minutes, including:  Circuit #1: x2  Lateral up and over 4" step x10  Standing marches 2x30"  Lateral steps with green theraband  *two seated rest breaks    Standing rows with green theraband: 2x10    In clinic ambulation: 2 trials x 150 feet with single point cane *following each circuit  *two seated rest breaks     Patient Education and Home Exercises       Education provided:   - increased activity at home  - daily HEP completion    Written Home Exercises Provided: Pt instructed to continue prior HEP. Exercises were reviewed and TICO ZARATE was able to demonstrate them prior to the end of the session.  TICO ZARATE demonstrated good  understanding of the education provided. See Electronic Medical Record under Patient Instructions for exercises provided during therapy sessions    Assessment   CJ arrived to session with usual complaints of lower back pain and neuropathic bilateral foot pain but was agreeable to treatment.  Arrived ambulating with single point cane today.  Fair tolerance of treatment today with multiple rest breaks due to deconditioned state though patient was observed giving full effort throughout session.  Circuit style training completed to maximize volume of exercise and limit time spend seated.  High levels of fatigue at end of session.  He would benefit from continued skilled interventions to improve impairments and overall quality of life.     TICO ZARATE Is progressing well towards his goals.   Patient prognosis is Fair.     Patient will continue to benefit from skilled outpatient physical therapy to address the deficits listed in the problem list box on initial evaluation, provide pt/family education and to maximize pt's level of independence in the home and community environment.     Patient's spiritual, cultural and educational needs considered and pt agreeable to plan of care and goals.     Anticipated barriers to physical therapy: decreased cardiovascular endurance     Goals:   Short Term Goals: 3 weeks "   Patient will be compliant with HEP in order to maximize PT benefits  Patient will walk >/= 300 feet on Two-Minute Walk Test indoors in order to improve endurance for home mobility   Patient will score >/= 45% on FOTO survey in order to improve self-perception of functional mobility deficits      Long Term Goals: 6 weeks   Patient will score >/= 50% on FOTO survey in order to improve self-perception of functional mobility deficits  Patient will improve bilateral lower extremity MMT grades by >/=1/2 grade in order to improve strength for ADL completion  Patient will score </= 14 seconds on Five Time Sit to  order to improve bilateral lower extremity muscular power for transfers   Patient will complete Six-Minute Walk Test with or without standing rest breaks in order to improve endurance for community mobility   Patient will begin some form of home/community fitness in order to sustain progress gained in PT    Plan     Plan to cont with progression of PT goals per POC.    Luciana Antonio, PTA

## 2025-01-29 ENCOUNTER — OFFICE VISIT (OUTPATIENT)
Dept: FAMILY MEDICINE | Facility: CLINIC | Age: 75
End: 2025-01-29
Payer: MEDICARE

## 2025-01-29 DIAGNOSIS — I11.0 HYPERTENSIVE HEART DISEASE WITH CHRONIC DIASTOLIC CONGESTIVE HEART FAILURE: ICD-10-CM

## 2025-01-29 DIAGNOSIS — R26.9 ABNORMALITY OF GAIT AND MOBILITY: ICD-10-CM

## 2025-01-29 DIAGNOSIS — D69.2 SENILE PURPURA: ICD-10-CM

## 2025-01-29 DIAGNOSIS — E66.01 CLASS 3 SEVERE OBESITY DUE TO EXCESS CALORIES WITH SERIOUS COMORBIDITY AND BODY MASS INDEX (BMI) OF 40.0 TO 44.9 IN ADULT: ICD-10-CM

## 2025-01-29 DIAGNOSIS — G20.A1 PARKINSON'S DISEASE WITHOUT DYSKINESIA OR FLUCTUATING MANIFESTATIONS: ICD-10-CM

## 2025-01-29 DIAGNOSIS — Z00.00 ENCOUNTER FOR PREVENTIVE HEALTH EXAMINATION: Primary | ICD-10-CM

## 2025-01-29 DIAGNOSIS — I70.0 AORTIC ATHEROSCLEROSIS: ICD-10-CM

## 2025-01-29 DIAGNOSIS — E66.813 CLASS 3 SEVERE OBESITY DUE TO EXCESS CALORIES WITH SERIOUS COMORBIDITY AND BODY MASS INDEX (BMI) OF 40.0 TO 44.9 IN ADULT: ICD-10-CM

## 2025-01-29 DIAGNOSIS — Z99.89 DEPENDENCE ON OTHER ENABLING MACHINES AND DEVICES: ICD-10-CM

## 2025-01-29 DIAGNOSIS — I48.20 CHRONIC ATRIAL FIBRILLATION: ICD-10-CM

## 2025-01-29 DIAGNOSIS — I50.32 HYPERTENSIVE HEART DISEASE WITH CHRONIC DIASTOLIC CONGESTIVE HEART FAILURE: ICD-10-CM

## 2025-01-29 DIAGNOSIS — I77.1 TORTUOUS AORTA: ICD-10-CM

## 2025-01-29 DIAGNOSIS — N40.1 BENIGN NON-NODULAR PROSTATIC HYPERPLASIA WITH LOWER URINARY TRACT SYMPTOMS: ICD-10-CM

## 2025-01-29 DIAGNOSIS — Z00.00 ENCOUNTER FOR MEDICARE ANNUAL WELLNESS EXAM: ICD-10-CM

## 2025-01-29 DIAGNOSIS — G62.9 NEUROPATHY: ICD-10-CM

## 2025-01-29 RX ORDER — ARIPIPRAZOLE 2 MG/1
2 TABLET ORAL DAILY
COMMUNITY
Start: 2025-01-16

## 2025-01-29 NOTE — PROGRESS NOTES
The patient location is: [place]  The chief complaint leading to consultation is: ANNUAL WELLNESS    Visit type: audiovisual    Face to Face time with patient: 20 minutes  35 minutes of total time spent on the encounter, which includes face to face time and non-face to face time preparing to see the patient (eg, review of tests), Obtaining and/or reviewing separately obtained history, Documenting clinical information in the electronic or other health record, Independently interpreting results (not separately reported) and communicating results to the patient/family/caregiver, or Care coordination (not separately reported).     Each patient to whom he or she provides medical services by telemedicine is:  (1) informed of the relationship between the physician and patient and the respective role of any other health care provider with respect to management of the patient; and (2) notified that he or she may decline to receive medical services by telemedicine and may withdraw from such care at any time.    Notes:   Zeenat Mancia presented for an initial Medicare AWV today. The following components were reviewed and updated:    Medical history  Family History  Social history  Allergies and Current Medications  Health Risk Assessment  Health Maintenance  Care Team    **See Completed Assessments for Annual Wellness visit with in the encounter summary    The following assessments were completed:  Depression Screening  Cognitive function Screening  Timed Get Up Test  Whisper Test      Opioid documentation:  Patient does not have a current opioid prescription.        There were no vitals filed for this visit.  There is no height or weight on file to calculate BMI.     Physical Exam  Pulmonary:      Effort: Pulmonary effort is normal. No respiratory distress.   Neurological:      Mental Status: He is alert and oriented to person, place, and time.   Psychiatric:         Mood and Affect: Mood normal.         Behavior: Behavior  normal.         Thought Content: Thought content normal.         Judgment: Judgment normal.     Diagnoses and health risks identified today and associated recommendations/orders:  1. Encounter for preventive health examination  Reviewed and discussed health maintenance.      2. Encounter for Medicare annual wellness exam  - Ambulatory Referral/Consult to Enhanced Annual Wellness Visit (eAWV)    3. Class 3 severe obesity due to excess calories with serious comorbidity and body mass index (BMI) of 40.0 to 44.9 in adult  Reviewed and discussed health maintenance.      4. Parkinson's disease without dyskinesia or fluctuating manifestations  Stable- continue current treatment and follow up routinely with PCP and neurology ()  Currently in PT twice weekly  carbidopa-levodopa  mg (SINEMET)  mg per tablet; Take 1 tablet by mouth 3 (three) times daily.     5. Hypertensive heart disease with chronic diastolic congestive heart failure  Stable- continue current treatment and follow up routinely with PCP and cardiology ()  Atenolol 50mg daily, lasix 20mg daily, aldactone 25mg     6. Chronic atrial fibrillation  Stable- continue current treatment and follow up routinely with PCP and cardiology ()  Eliquis 5mg bid, Atenolol 50mg daily, lasix 20mg daily, aldactone 25mg     7. Dependence on other enabling machines and devices  Safety issues discussed and precautions reviewed    8. Abnormality of gait and mobility  Safety issues discussed and precautions reviewed    9. Senile purpura  Unchanged and Stable- continue current treatment and follow up routinely with PCP     10. Aortic atherosclerosis  Stable- continue current treatment and follow up routinely with PCP and cardiology ()  Htn and hld controlled    11. Tortuous aorta  Stable- continue current treatment and follow up routinely with PCP and cardiology ()  Htn and hld controlled    12. Benign non-nodular prostatic  hyperplasia with lower urinary tract symptoms  Stable- continue current treatment and follow up routinely with PCP   Was following with urology but his dr left [facility] last year. Has not est with another provider yet  Flomax 0.4mg daily    13. Neuropathy   Unchanged and stable. Followed by pcp  Taking gabapentin 600mg bid    Provided Zeenat with a 5-10 year written screening schedule and personal prevention plan. Recommendations were developed using the USPSTF age appropriate recommendations. Education, counseling, and referrals were provided as needed.  After Visit Summary printed and given to patient which includes a list of additional screenings\tests needed.    I offered to discuss advanced care planning, including how to pick a person who would make decisions for you if you were unable to make them for yourself, called a health care power of , and what kind of decisions you might make such as use of life sustaining treatments such as ventilators and tube feeding when faced with a life limiting illness recorded on a living will that they will need to know. (How you want to be cared for as you near the end of your natural life)   X  Patient has advanced directives on file, which we reviewed, and they do not wish to make changes.  Anna Murillo, NP

## 2025-01-29 NOTE — PATIENT INSTRUCTIONS
Counseling and Referral of Other Preventative  (Italic type indicates deductible and co-insurance are waived)    Patient Name: Zeenat Mancia  Today's Date: 1/29/2025    Health Maintenance       Date Due Completion Date    RSV Vaccine (Age 60+ and Pregnant patients) (1 - Risk 60-74 years 1-dose series) Never done ---    COVID-19 Vaccine (5 - 2024-25 season) 09/01/2024 8/11/2022    Colorectal Cancer Screening 10/11/2027 10/11/2022    Override on 7/15/2008: Done    Lipid Panel 10/20/2027 10/20/2022    TETANUS VACCINE 08/29/2029 8/29/2019        No orders of the defined types were placed in this encounter.      The following information is provided to all patients.  This information is to help you find resources for any of the problems found today that may be affecting your health:                  Living healthy guide: www.[facility].[place].gov      Understanding Diabetes: www.diabetes.org      Eating healthy: www.cdc.gov/healthyweight      [facility] home safety checklist: www.cdc.gov/steadi/patient.html      Agency on Aging: www.goea.[place].[facility]      Alcoholics anonymous (AA): www.aa.org      Physical Activity: www.krystian.nih.gov/gu7qhkg      Tobacco use: www.quitwithusla.org

## 2025-01-30 ENCOUNTER — CLINICAL SUPPORT (OUTPATIENT)
Dept: REHABILITATION | Facility: HOSPITAL | Age: 75
End: 2025-01-30
Payer: MEDICARE

## 2025-01-30 DIAGNOSIS — Z74.09 IMPAIRED FUNCTIONAL MOBILITY, BALANCE, GAIT, AND ENDURANCE: Primary | ICD-10-CM

## 2025-01-30 PROCEDURE — 97110 THERAPEUTIC EXERCISES: CPT

## 2025-01-30 PROCEDURE — 97530 THERAPEUTIC ACTIVITIES: CPT

## 2025-01-30 NOTE — PROGRESS NOTES
"[facility]   Physical Therapy Treatment Note      Name: Zeenat Mancia  Clinic Number: [id number]    Therapy Diagnosis:   Encounter Diagnosis   Name Primary?    Impaired functional mobility, balance, gait, and endurance Yes         Physician: Doris Moreno MD    Visit Date: 2025    Physician Orders: PT Eval and Treat   Medical Diagnosis from Referral: Parkinson's disease without dyskinesia or fluctuating manifestations [G20.A1]   Evaluation Date: 1/10/2025  Authorization Period Expiration: 2025  Plan of Care Expiration: 2025  Progress Note Due: 2/10/2025  Date of Surgery: right tarsal tunnel release (2024) and left tarsal tunnel release (2024)  Visit # / Visits authorized:  (+eval)  FOTO: 1/3     Precautions: Standard and Fall     Time In: 10:30 AM  Time Out: 11:15 AM  Total Billable Time: 45 minutes (2 TE + 1 TA)    PTA Visit #: 0/5     Subjective     Patient reports: feeling a little shakey today  He  will be  compliant with home exercise program.  Response to previous treatment: no adverse response  Functional change: ongoing    Pain: 0/10  Location: bilateral feet      Objective      Objective Measures updated at progress report unless specified.     Treatment     C J received the treatments listed below:      therapeutic exercises to develop strength, endurance, ROM, and flexibility for 30 minutes includin minute on reciprocal stepper level 2  Calf raises: 2x20  Sit<>stand: 2x10  *two seated rest breaks  3 way hip 2x10 B with yellow theraband    Not completed today:  Standing hamstring stretch at stairs: 2x30" B  Gastroc stretch on incline board: 2x30"  Lateral steps with green theraband      neuromuscular re-education activities to improve: Balance, Coordination, Kinesthetic, and Proprioception for 00 minutes. The following activities were included:  Not completed today    therapeutic activities to improve functional performance for 15 minutes, " "including:  Circuit #1: x2  Lateral up and over 4" step 2 x10  Standing marches 2x30"  *two seated rest breaks  In clinic ambulation: 2 trials x 150 feet with single point cane *following each circuit  *two seated rest breaks           Patient Education and Home Exercises       Education provided:   - increased activity at home  - daily HEP completion    Written Home Exercises Provided: Pt instructed to continue prior HEP. Exercises were reviewed and TICO ZARATE was able to demonstrate them prior to the end of the session.  TICO ZARATE demonstrated good  understanding of the education provided. See Electronic Medical Record under Patient Instructions for exercises provided during therapy sessions    Assessment   NAOMY arrived to session with reports of shakey feeling and slight dizziness he equates to his Parkinson's diagnosis. Despite this he tolerated the session okay  with multiple rest breaks required due to deconditioned state. He continues to give as much effort as possible despite fatigue. Circuit style training is continued to improve overall endurance. At end of session Patient reports legs feeling shot. He would benefit from continued skilled interventions to improve impairments and overall quality of life.     TICO ZARATE Is progressing well towards his goals.   Patient prognosis is Fair.     Patient will continue to benefit from skilled outpatient physical therapy to address the deficits listed in the problem list box on initial evaluation, provide pt/family education and to maximize pt's level of independence in the home and community environment.     Patient's spiritual, cultural and educational needs considered and pt agreeable to plan of care and goals.     Anticipated barriers to physical therapy: decreased cardiovascular endurance     Goals:   Short Term Goals: 3 weeks   Patient will be compliant with HEP in order to maximize PT benefits  Patient will walk >/= 300 feet on Two-Minute Walk Test indoors in order to improve " endurance for home mobility   Patient will score >/= 45% on FOTO survey in order to improve self-perception of functional mobility deficits      Long Term Goals: 6 weeks   Patient will score >/= 50% on FOTO survey in order to improve self-perception of functional mobility deficits  Patient will improve bilateral lower extremity MMT grades by >/=1/2 grade in order to improve strength for ADL completion  Patient will score </= 14 seconds on Five Time Sit to  order to improve bilateral lower extremity muscular power for transfers   Patient will complete Six-Minute Walk Test with or without standing rest breaks in order to improve endurance for community mobility   Patient will begin some form of home/community fitness in order to sustain progress gained in PT    Plan     Plan to cont with progression of PT goals per POC.    Alice Starks, PT

## 2025-01-31 ENCOUNTER — OFFICE VISIT (OUTPATIENT)
Dept: BARIATRICS | Facility: CLINIC | Age: 75
End: 2025-01-31
Payer: MEDICARE

## 2025-01-31 DIAGNOSIS — E66.01 CLASS 3 SEVERE OBESITY DUE TO EXCESS CALORIES WITH SERIOUS COMORBIDITY AND BODY MASS INDEX (BMI) OF 40.0 TO 44.9 IN ADULT: Primary | ICD-10-CM

## 2025-01-31 DIAGNOSIS — I11.0 HYPERTENSIVE HEART DISEASE WITH CHRONIC DIASTOLIC CONGESTIVE HEART FAILURE: ICD-10-CM

## 2025-01-31 DIAGNOSIS — Z71.3 ENCOUNTER FOR WEIGHT LOSS COUNSELING: ICD-10-CM

## 2025-01-31 DIAGNOSIS — I50.32 HYPERTENSIVE HEART DISEASE WITH CHRONIC DIASTOLIC CONGESTIVE HEART FAILURE: ICD-10-CM

## 2025-01-31 DIAGNOSIS — I25.10 NONOBSTRUCTIVE ATHEROSCLEROSIS OF CORONARY ARTERY: ICD-10-CM

## 2025-01-31 DIAGNOSIS — I48.20 CHRONIC ATRIAL FIBRILLATION: ICD-10-CM

## 2025-01-31 DIAGNOSIS — E66.813 CLASS 3 SEVERE OBESITY DUE TO EXCESS CALORIES WITH SERIOUS COMORBIDITY AND BODY MASS INDEX (BMI) OF 40.0 TO 44.9 IN ADULT: Primary | ICD-10-CM

## 2025-01-31 DIAGNOSIS — G47.33 OSA (OBSTRUCTIVE SLEEP APNEA): ICD-10-CM

## 2025-01-31 PROCEDURE — 1157F ADVNC CARE PLAN IN RCRD: CPT | Mod: CPTII,95,, | Performed by: STUDENT IN AN ORGANIZED HEALTH CARE EDUCATION/TRAINING PROGRAM

## 2025-01-31 PROCEDURE — 1160F RVW MEDS BY RX/DR IN RCRD: CPT | Mod: CPTII,95,, | Performed by: STUDENT IN AN ORGANIZED HEALTH CARE EDUCATION/TRAINING PROGRAM

## 2025-01-31 PROCEDURE — 98004 SYNCH AUDIO-VIDEO EST SF 10: CPT | Mod: 95,,, | Performed by: STUDENT IN AN ORGANIZED HEALTH CARE EDUCATION/TRAINING PROGRAM

## 2025-01-31 PROCEDURE — 1159F MED LIST DOCD IN RCRD: CPT | Mod: CPTII,95,, | Performed by: STUDENT IN AN ORGANIZED HEALTH CARE EDUCATION/TRAINING PROGRAM

## 2025-01-31 RX ORDER — SEMAGLUTIDE 2.4 MG/.75ML
2.4 INJECTION, SOLUTION SUBCUTANEOUS
Qty: 3 ML | Refills: 2 | Status: SHIPPED | OUTPATIENT
Start: 2025-01-31

## 2025-01-31 NOTE — PROGRESS NOTES
Subjective     Patient ID: Zeenat Mancia is a 74 y.o. male.    Chief Complaint: Follow-up, Obesity, and Weight Check    The patient location is: home in [place]  The chief complaint leading to consultation is: follow-up, obesity, weight check    Visit type: audiovisual    Face to Face time with patient: 10 minutes of total time spent on the encounter, which includes face to face time and non-face to face time preparing to see the patient (eg, review of tests), Obtaining and/or reviewing separately obtained history, Documenting clinical information in the electronic or other health record, Independently interpreting results (not separately reported) and communicating results to the patient/family/caregiver, or Care coordination (not separately reported).         Each patient to whom he or she provides medical services by telemedicine is:  (1) informed of the relationship between the physician and patient and the respective role of any other health care provider with respect to management of the patient; and (2) notified that he or she may decline to receive medical services by telemedicine and may withdraw from such care at any time.    Notes:     Patient presents for treatment of obesity.     Co-morbidities   HTN  JACKELYN  A-fib  CHF, diastolic  Parkinsons    Negative for thyroid cancer     History of Weight Loss Efforts  Nutrisystem  WW    Current Physical Activity  Home exercises    Current Eating Habits  Breakfast - eggs, toast, sausage, waffles, cereal, coffee  Lunch - chips, sandwich  Dinner - beef, chicken, corn, broccoli, beans  Snacks - ice cream sandwich, fruit, cookies  Beverages - water, diet Sprite    Eating 2 meals per day  Cutting back on portions    Medical Weight Loss (unable to use [employer])  7/2/2024: 331 lbs 12.7 oz, BMI 45  10/15/2024: 321 lbs 14  oz, BMI 41.33  1/31/2025 (virtual visit): 322 lbs      Review of Systems   Constitutional:  Negative for chills and fever.   Respiratory:  Negative for  shortness of breath.    Cardiovascular:  Negative for chest pain.   Gastrointestinal:  Negative for abdominal pain, nausea and vomiting.   Musculoskeletal:  Positive for arthralgias.   Neurological:  Negative for dizziness and light-headedness.          Objective    Latest Reference Range & Units 04/25/24 12:58 06/24/24 11:33   WBC 3.90 - 12.70 K/uL 9.28    RBC 4.60 - 6.20 M/uL 6.08    Hemoglobin 14.0 - 18.0 g/dL 16.1    Hematocrit 40.0 - 54.0 % 52.3    MCV 82 - 98 fL 86    MCH 27.0 - 31.0 pg 26.5 (L)    MCHC 32.0 - 36.0 g/dL 30.8 (L)    RDW 11.5 - 14.5 % 17.2 (H)    Platelet Count 150 - 450 K/uL 218    MPV 9.2 - 12.9 fL 9.2    Gran % 38.0 - 73.0 % 75.2 (H)    Lymph % 18.0 - 48.0 % 10.7 (L)    Mono % 4.0 - 15.0 % 10.3    Eos % 0.0 - 8.0 % 3.1    Basophil % 0.0 - 1.9 % 0.5    Immature Granulocytes 0.0 - 0.5 % 0.2    Gran # (ANC) 1.8 - 7.7 K/uL 7.0    Lymph # 1.0 - 4.8 K/uL 1.0    Mono # 0.3 - 1.0 K/uL 1.0    Eos # 0.0 - 0.5 K/uL 0.3    Baso # 0.00 - 0.20 K/uL 0.05    Immature Grans (Abs) 0.00 - 0.04 K/uL 0.02    nRBC 0 /100 WBC 0    Differential Method  Automated    Sodium 136 - 145 mmol/L 139 139   Potassium 3.5 - 5.1 mmol/L 4.2 4.6   Chloride 95 - 110 mmol/L 102 101   CO2 23 - 29 mmol/L 29 30 (H)   Anion Gap 8 - 16 mmol/L 8 8   BUN 8 - 23 mg/dL 14 16   Creatinine 0.5 - 1.4 mg/dL 0.9 1.1   eGFR >60 mL/min/1.73 m^2 >60.0 >60.0   Glucose 70 - 110 mg/dL 112 (H) 121 (H)   Calcium 8.7 - 10.5 mg/dL 9.3 9.7   ALP 55 - 135 U/L 79    PROTEIN TOTAL 6.0 - 8.4 g/dL 7.2    Albumin 3.5 - 5.2 g/dL 3.6    BILIRUBIN TOTAL 0.1 - 1.0 mg/dL 0.5    AST 10 - 40 U/L 13    ALT 10 - 44 U/L 10    (L): Data is abnormally low  (H): Data is abnormally high    MRI Brain 2/20/2024  Remote infarct of the left corona radiata.  Minimal small vessel ischemic change on left.    ECHO 5/24/2024     Left Ventricle: The left ventricle is normal in size. Mildly increased wall thickness. There is normal systolic function with a visually estimated ejection  fraction of 55 - 65% depending on the R-R interval. Unable to assess diastolic function due to atrial fibrillation.    Right Ventricle: Normal right ventricular cavity size. Systolic function is normal.    Left Atrium: Left atrium is mildly dilated.    IVC/SVC: Intermediate venous pressure at 8 mmHg.    Pulmonary Artery: The estimated pulmonary artery systolic pressure is 28 mmHg.    EKG 4/25/2024  Atrial fibrillation   Left axis deviation   Low voltage QRS   Incomplete right bundle branch block   Inferior infarct (cited on or before 31-MAY-2021)   Cannot rule out Anterior infarct (cited on or before 31-MAY-2021)     There were no vitals filed for this visit.      Physical Exam  Constitutional:       General: He is not in acute distress.     Appearance: Normal appearance. He is obese. He is not ill-appearing, toxic-appearing or diaphoretic.   HENT:      Head: Normocephalic and atraumatic.   Pulmonary:      Effort: Pulmonary effort is normal. No respiratory distress.   Neurological:      Mental Status: He is alert and oriented to person, place, and time.            Assessment and Plan     1. Class 3 severe obesity due to excess calories with serious comorbidity and body mass index (BMI) of 40.0 to 44.9 in adult  -     semaglutide, weight loss, (WEGOVY) 2.4 mg/0.75 mL PnIj; Inject 2.4 mg into the skin every 7 days.  Dispense: 3 mL; Refill: 2    2. Hypertensive heart disease with chronic diastolic congestive heart failure  Overview:  bp well controlled on current regimen .    Orders:  -     semaglutide, weight loss, (WEGOVY) 2.4 mg/0.75 mL PnIj; Inject 2.4 mg into the skin every 7 days.  Dispense: 3 mL; Refill: 2    3. Chronic atrial fibrillation  Overview:  Dr prado cards managing . Cont current     Orders:  -     semaglutide, weight loss, (WEGOVY) 2.4 mg/0.75 mL PnIj; Inject 2.4 mg into the skin every 7 days.  Dispense: 3 mL; Refill: 2    4. Nonobstructive atherosclerosis of coronary artery  Overview:  Cont b  blocker , eliqus , and is not on statin due to intolerance    Orders:  -     semaglutide, weight loss, (WEGOVY) 2.4 mg/0.75 mL PnIj; Inject 2.4 mg into the skin every 7 days.  Dispense: 3 mL; Refill: 2    5. JACKELYN (obstructive sleep apnea)  Overview:  Utilizing  cpap with good benefit     Orders:  -     semaglutide, weight loss, (WEGOVY) 2.4 mg/0.75 mL PnIj; Inject 2.4 mg into the skin every 7 days.  Dispense: 3 mL; Refill: 2    6. Encounter for weight loss counseling  -     semaglutide, weight loss, (WEGOVY) 2.4 mg/0.75 mL PnIj; Inject 2.4 mg into the skin every 7 days.  Dispense: 3 mL; Refill: 2          - Wegovy 2.4 mg weekly    - Log all food and beverage intake with a daily calorie goal of 1800 calories per day    - Activity as tolerated

## 2025-02-04 ENCOUNTER — CLINICAL SUPPORT (OUTPATIENT)
Dept: REHABILITATION | Facility: HOSPITAL | Age: 75
End: 2025-02-04
Payer: MEDICARE

## 2025-02-04 DIAGNOSIS — Z74.09 IMPAIRED FUNCTIONAL MOBILITY, BALANCE, GAIT, AND ENDURANCE: Primary | ICD-10-CM

## 2025-02-04 PROCEDURE — 97530 THERAPEUTIC ACTIVITIES: CPT | Mod: PN

## 2025-02-04 NOTE — PROGRESS NOTES
"[facility]   Physical Therapy Treatment Note      Name: Zeenat Mancia  Clinic Number: [id number]    Therapy Diagnosis:   Encounter Diagnosis   Name Primary?    Impaired functional mobility, balance, gait, and endurance Yes     Physician: Doris Moreno MD    Visit Date: 2/4/2025    Physician Orders: PT Eval and Treat   Medical Diagnosis from Referral: Parkinson's disease without dyskinesia or fluctuating manifestations [G20.A1]   Evaluation Date: 1/10/2025  Authorization Period Expiration: 11/26/2025  Plan of Care Expiration: 2/21/2025  Progress Note Due: last assessed 2/4/2025  Date of Surgery: right tarsal tunnel release (7/18/2024) and left tarsal tunnel release (9/18/2024)  Visit # / Visits authorized: 5/20 (+eval)  FOTO: 1/3     Precautions: Standard and Fall     Time In: 11:20 AM  Time Out: 12:00 PM  Total Billable Time: 40 minutes (3 TA)    PTA Visit #: 0/5     Subjective     Patient reports: Walking at home but not yet performing other exercises. Feels like PT going well thus far but feeling pretty exhausted after each visit.  He  was  compliant with home exercise program.  Response to previous treatment: no adverse response  Functional change: ongoing    Pain: 0/10  Location: bilateral feet      Objective      Objective Measures updated at progress report unless specified.     Two-Minute Walk Test: 308 feet with single point cane; RPE = 5/10    Treatment     C J received the treatments listed below:      therapeutic exercises to develop strength, endurance, ROM, and flexibility for 00 minutes including:    neuromuscular re-education activities to improve: Balance, Coordination, Kinesthetic, and Proprioception for 00 minutes. The following activities were included:  Not completed today    therapeutic activities to improve functional performance for 40 minutes, including:    Two-Minute Walk Test (see above)    Circuit #1:  Lateral up and over 4" step x30" >> standing calf raises " "x15 >> Lateral up and over 4" step x30" >> standing calf raises x15 >> seated long arc quads x15 each leg with 1.5# ankle weights    Circuit #2:  Standing marches with 1.5# ankle weights x30" >> standing hip abduction x10 each leg with 1.5# ankle weights >> Standing marches with 1.5# ankle weights x30" >> standing hip abduction x10 each leg with 1.5# ankle weights     Circuit #3:  Mini squats x10 >> seated hip adduction x 2 minutes total with 5" holds >> Mini squats x10 >> seated hip adduction with 5" holds    Patient Education and Home Exercises       Education provided:   - increased activity at home  - daily HEP completion    Written Home Exercises Provided: Pt instructed to continue prior HEP. Exercises were reviewed and TICO ZARATE was able to demonstrate them prior to the end of the session.  TICO ZARATE demonstrated good  understanding of the education provided. See Electronic Medical Record under Patient Instructions for exercises provided during therapy sessions    Assessment   CJ arrived to visit without new complaints and agreeable to PT. Improved gait speed/cardiovascular endurance noted with repeat. Two Minute Walk Test, but his overall standing tolerance is still quite limited. Still, he is showing good progress toward goals thus far. He benefits from circuit-structure of training to maximize volume of exercise accomplished within session. Patient educated on HEP expansion to include chair and standing exercise to supplement already-established walking program. He would benefit from continued PT services to achieve functional goals.    TICO ZARATE Is progressing well towards his goals.   Patient prognosis is Fair.     Patient will continue to benefit from skilled outpatient physical therapy to address the deficits listed in the problem list box on initial evaluation, provide pt/family education and to maximize pt's level of independence in the home and community environment.     Patient's spiritual, cultural and educational " needs considered and pt agreeable to plan of care and goals.     Anticipated barriers to physical therapy: decreased cardiovascular endurance     Goals:   Short Term Goals: 3 weeks   Patient will be compliant with HEP in order to maximize PT benefits (met)  Patient will walk >/= 300 feet on Two-Minute Walk Test indoors in order to improve endurance for home mobility (met)  Patient will score >/= 45% on FOTO survey in order to improve self-perception of functional mobility deficits (progressing, not met)     Long Term Goals: 6 weeks   Patient will score >/= 50% on FOTO survey in order to improve self-perception of functional mobility deficits (progressing, not met)  Patient will improve bilateral lower extremity MMT grades by >/=1/2 grade in order to improve strength for ADL completion (progressing, not met)  Patient will score </= 14 seconds on Five Time Sit to  order to improve bilateral lower extremity muscular power for transfers (progressing, not met)  Patient will complete Six-Minute Walk Test with or without standing rest breaks in order to improve endurance for community mobility (progressing, not met)  Patient will begin some form of home/community fitness in order to sustain progress gained in PT (progressing, not met)    Plan     Continue to progress as tolerated; follow up with additional HEP item compliance    JENISE CLAYTON, PT

## 2025-02-07 ENCOUNTER — CLINICAL SUPPORT (OUTPATIENT)
Dept: REHABILITATION | Facility: HOSPITAL | Age: 75
End: 2025-02-07
Payer: MEDICARE

## 2025-02-07 DIAGNOSIS — Z74.09 IMPAIRED FUNCTIONAL MOBILITY, BALANCE, GAIT, AND ENDURANCE: Primary | ICD-10-CM

## 2025-02-07 PROCEDURE — 97530 THERAPEUTIC ACTIVITIES: CPT | Mod: PN,CQ

## 2025-02-07 NOTE — PROGRESS NOTES
"[facility]   Physical Therapy Treatment Note      Name: Zeenat Mancia  Clinic Number: [id number]    Therapy Diagnosis:   Encounter Diagnosis   Name Primary?    Impaired functional mobility, balance, gait, and endurance Yes     Physician: Doris Moreno MD    Visit Date: 2/7/2025    Physician Orders: PT Eval and Treat   Medical Diagnosis from Referral: Parkinson's disease without dyskinesia or fluctuating manifestations [G20.A1]   Evaluation Date: 1/10/2025  Authorization Period Expiration: 11/26/2025  Plan of Care Expiration: 2/21/2025  Progress Note Due: last assessed 2/4/2025  Date of Surgery: right tarsal tunnel release (7/18/2024) and left tarsal tunnel release (9/18/2024)  Visit # / Visits authorized: 6/20 (+eval)  FOTO: 2/3, completed 6th visit     Precautions: Standard and Fall     Time In: 11:20 AM  Time Out: 12:00 PM  Total Billable Time: 40 minutes (3 TA)    PTA Visit #: 1/5     Subjective     Patient reports: Increased levels of fatigue today, did not sleep well last night and back is really bothering him  He  was  compliant with home exercise program.  Response to previous treatment: no adverse response  Functional change: ongoing    Pain: 6/10  Location: low back    Objective      Objective Measures updated at progress report unless specified.     Treatment     C J received the treatments listed below:      therapeutic exercises to develop strength, endurance, ROM, and flexibility for 00 minutes including:    neuromuscular re-education activities to improve: Balance, Coordination, Kinesthetic, and Proprioception for 00 minutes. The following activities were included:  Not completed today    therapeutic activities to improve functional performance for 40 minutes, including:    In clinic ambulation: 2 trials of 150 feet with single point cane    Circuit #1:  Lateral up and over 4" step x30" >> standing calf raises x15 >> Lateral up and over 4" step x30" >> standing calf raises " "x15 >> seated long arc quads x15 each leg with 1.5# ankle weights    Circuit #2:  Standing marches with 1.5# ankle weights x30" >> standing hip abduction x10 each leg with 1.5# ankle weights >> Standing marches with 1.5# ankle weights x30" >> standing hip abduction x10 each leg with 1.5# ankle weights     Circuit #3:  Mini squats x10 >> seated hip adduction x 2 minutes total with 5" holds >> Mini squats x10 >> seated hip adduction with 5" holds    Patient Education and Home Exercises       Education provided:   - increased activity at home  - daily HEP completion    Written Home Exercises Provided: Pt instructed to continue prior HEP. Exercises were reviewed and TICO ZARATE was able to demonstrate them prior to the end of the session.  TICO ZARATE demonstrated good  understanding of the education provided. See Electronic Medical Record under Patient Instructions for exercises provided during therapy sessions    Assessment   NAOMY arrived to session with reports of increased fatigue and back pain but was agreeable to treatment.  He was very slow to mobilize today and required increased rest breaks as compared to previous sessions.  Standing tolerance even more impaired than usual, requesting to sit following every exercise. No adverse response to treatment. He would benefit from continued PT services to achieve functional goals.    TICO ZARATE Is progressing well towards his goals.   Patient prognosis is Fair.     Patient will continue to benefit from skilled outpatient physical therapy to address the deficits listed in the problem list box on initial evaluation, provide pt/family education and to maximize pt's level of independence in the home and community environment.     Patient's spiritual, cultural and educational needs considered and pt agreeable to plan of care and goals.     Anticipated barriers to physical therapy: decreased cardiovascular endurance     Goals:   Short Term Goals: 3 weeks   Patient will be compliant with HEP in order " to maximize PT benefits (met)  Patient will walk >/= 300 feet on Two-Minute Walk Test indoors in order to improve endurance for home mobility (met)  Patient will score >/= 45% on FOTO survey in order to improve self-perception of functional mobility deficits (progressing, not met)     Long Term Goals: 6 weeks   Patient will score >/= 50% on FOTO survey in order to improve self-perception of functional mobility deficits (progressing, not met)  Patient will improve bilateral lower extremity MMT grades by >/=1/2 grade in order to improve strength for ADL completion (progressing, not met)  Patient will score </= 14 seconds on Five Time Sit to  order to improve bilateral lower extremity muscular power for transfers (progressing, not met)  Patient will complete Six-Minute Walk Test with or without standing rest breaks in order to improve endurance for community mobility (progressing, not met)  Patient will begin some form of home/community fitness in order to sustain progress gained in PT (progressing, not met)    Plan     Continue to progress as tolerated; follow up with additional HEP item compliance    Luciana Antonio, PTA

## 2025-02-11 ENCOUNTER — CLINICAL SUPPORT (OUTPATIENT)
Dept: REHABILITATION | Facility: HOSPITAL | Age: 75
End: 2025-02-11
Payer: MEDICARE

## 2025-02-11 DIAGNOSIS — Z74.09 IMPAIRED FUNCTIONAL MOBILITY, BALANCE, GAIT, AND ENDURANCE: Primary | ICD-10-CM

## 2025-02-11 PROCEDURE — 97530 THERAPEUTIC ACTIVITIES: CPT

## 2025-02-11 NOTE — PROGRESS NOTES
"[facility]   Physical Therapy Treatment Note      Name: Zeenat Mancia  Clinic Number: [id number]    Therapy Diagnosis:   Encounter Diagnosis   Name Primary?    Impaired functional mobility, balance, gait, and endurance Yes     Physician: Doris Moreno MD    Visit Date: 2/11/2025    Physician Orders: PT Eval and Treat   Medical Diagnosis from Referral: Parkinson's disease without dyskinesia or fluctuating manifestations [G20.A1]   Evaluation Date: 1/10/2025  Authorization Period Expiration: 11/26/2025  Plan of Care Expiration: 2/21/2025  Progress Note Due: last assessed 2/4/2025  Date of Surgery: right tarsal tunnel release (7/18/2024) and left tarsal tunnel release (9/18/2024)  Visit # / Visits authorized: 7/20 (+eval)  FOTO: 2/3, completed 6th visit     Precautions: Standard and Fall     Time In: 11:20 AM  Time Out: 12:00 PM  Total Billable Time: 40 minutes (3 TA)    PTA Visit #: 0/5     Subjective     Patient reports: neuropathy in his feet still bothering him and making him feel unsteady  He  was  compliant with home exercise program.  Response to previous treatment: no adverse response  Functional change: ongoing    Pain: 2/10  Location: low back on and off    Objective      Objective Measures updated at progress report unless specified.     Treatment     C J received the treatments listed below:      therapeutic exercises to develop strength, endurance, ROM, and flexibility for 00 minutes including:    neuromuscular re-education activities to improve: Balance, Coordination, Kinesthetic, and Proprioception for 00 minutes. The following activities were included:  Not completed today    therapeutic activities to improve functional performance for 40 minutes, including:    In clinic ambulation: 2 trials of 150 feet with single point cane    Circuit #1:  Lateral up and over 4" step x30" >> standing calf raises x15 >> Lateral up and over 4" step x30" >> standing calf raises x15 >> seated " "long arc quads x15 each leg with 1.5# ankle weights    Circuit #2:  Standing marches with 1.5# ankle weights x30" >> standing hip abduction x10 each leg with 1.5# ankle weights >> Standing marches with 1.5# ankle weights x30" >> standing hip abduction x10 each leg with 1.5# ankle weights     Circuit #3:  Mini squats x10 >> seated hip adduction x 2 minutes total with 5" holds >> Mini squats x10 >> seated hip adduction with 5" holds      Lateral Stepping 6 laps x10ft  Patient Education and Home Exercises       Education provided:   - increased activity at home  - daily HEP completion    Written Home Exercises Provided: Pt instructed to continue prior HEP. Exercises were reviewed and TICO ZARATE was able to demonstrate them prior to the end of the session.  TICO ZARATE demonstrated good  understanding of the education provided. See Electronic Medical Record under Patient Instructions for exercises provided during therapy sessions    Assessment   CJ arrived to session with reports of increased neuropathy in feet. He tolerated the session well with decreased sit breaks required between exercises during first 2 circuits with increased fatigue in the third circuit requiring more sit breaks. He continues to put forth maximum effort when able. One episode of difficulty returning to sittng in chair due shuffling.  He would benefit from continued PT services to achieve functional goals.    TICO ZARATE Is progressing well towards his goals.   Patient prognosis is Fair.     Patient will continue to benefit from skilled outpatient physical therapy to address the deficits listed in the problem list box on initial evaluation, provide pt/family education and to maximize pt's level of independence in the home and community environment.     Patient's spiritual, cultural and educational needs considered and pt agreeable to plan of care and goals.     Anticipated barriers to physical therapy: decreased cardiovascular endurance     Goals:   Short Term Goals: 3 " weeks   Patient will be compliant with HEP in order to maximize PT benefits (met)  Patient will walk >/= 300 feet on Two-Minute Walk Test indoors in order to improve endurance for home mobility (met)  Patient will score >/= 45% on FOTO survey in order to improve self-perception of functional mobility deficits (progressing, not met)     Long Term Goals: 6 weeks   Patient will score >/= 50% on FOTO survey in order to improve self-perception of functional mobility deficits (progressing, not met)  Patient will improve bilateral lower extremity MMT grades by >/=1/2 grade in order to improve strength for ADL completion (progressing, not met)  Patient will score </= 14 seconds on Five Time Sit to  order to improve bilateral lower extremity muscular power for transfers (progressing, not met)  Patient will complete Six-Minute Walk Test with or without standing rest breaks in order to improve endurance for community mobility (progressing, not met)  Patient will begin some form of home/community fitness in order to sustain progress gained in PT (progressing, not met)    Plan     Continue to progress as tolerated; follow up with additional HEP item compliance    Alice Starks, PT

## 2025-02-13 ENCOUNTER — CLINICAL SUPPORT (OUTPATIENT)
Dept: REHABILITATION | Facility: HOSPITAL | Age: 75
End: 2025-02-13
Payer: MEDICARE

## 2025-02-13 DIAGNOSIS — Z74.09 IMPAIRED FUNCTIONAL MOBILITY, BALANCE, GAIT, AND ENDURANCE: Primary | ICD-10-CM

## 2025-02-13 PROCEDURE — 97530 THERAPEUTIC ACTIVITIES: CPT | Mod: PN,CQ

## 2025-02-13 NOTE — PROGRESS NOTES
"[facility]   Physical Therapy Treatment Note      Name: Zeenat Mancia  Clinic Number: [id number]    Therapy Diagnosis:   Encounter Diagnosis   Name Primary?    Impaired functional mobility, balance, gait, and endurance Yes     Physician: Doris Moreno MD    Visit Date: 2/13/2025    Physician Orders: PT Eval and Treat   Medical Diagnosis from Referral: Parkinson's disease without dyskinesia or fluctuating manifestations [G20.A1]   Evaluation Date: 1/10/2025  Authorization Period Expiration: 11/26/2025  Plan of Care Expiration: 2/21/2025  Progress Note Due: last assessed 2/4/2025  Date of Surgery: right tarsal tunnel release (7/18/2024) and left tarsal tunnel release (9/18/2024)  Visit # / Visits authorized: 8/20 (+eval)  FOTO: 2/3, completed 6th visit     Precautions: Standard and Fall     Time In: 10:30 AM  Time Out: 11:15 AM  Total Billable Time: 45 minutes (3 TA)    PTA Visit #: 1/5     Subjective     Patient reports: neuropathy in his feet still bothering him and making him feel unsteady  He  was  compliant with home exercise program.  Response to previous treatment: no adverse response  Functional change: ongoing    Pain: 5/10  Location: low back    Objective      Objective Measures updated at progress report unless specified.     Treatment     C J received the treatments listed below:      therapeutic exercises to develop strength, endurance, ROM, and flexibility for 00 minutes including:    neuromuscular re-education activities to improve: Balance, Coordination, Kinesthetic, and Proprioception for 00 minutes. The following activities were included:  Not completed today    therapeutic activities to improve functional performance for 40 minutes, including:    In clinic ambulation: 2 trials of 150 feet with single point cane    Circuit #1:  Lateral up and over 4" step x30" >> standing calf raises x15 >> Lateral up and over 4" step x30" >> standing calf raises x15 >> seated long arc " "quads x15 each leg with 2# ankle weights                Circuit #2:  Standing marches with 1.5# ankle weights x30" >> standing hip abduction x10 each leg with 2# ankle weights >> Standing marches with 2# ankle weights x30" >> standing hip abduction x10 each leg with 2# ankle weights                   Circuit #3:  Mini squats x10 >> seated hip adduction x 2 minutes total with 5" holds >> Mini squats x10 >> seated hip adduction with 5" holds    Lateral Stepping 6 laps x10ft    Patient Education and Home Exercises       Education provided:   - increased activity at home  - daily HEP completion    Written Home Exercises Provided: Pt instructed to continue prior HEP. Exercises were reviewed and TICO ZARATE was able to demonstrate them prior to the end of the session.  TICO ZARATE demonstrated good  understanding of the education provided. See Electronic Medical Record under Patient Instructions for exercises provided during therapy sessions    Assessment   CJ arrived to session with reports of increased neuropathy in feet and mild low back pain but was agreeable to treatment. He tolerated the session well with less seated rest breaks required throughout treatment.  He is observed giving full effort during session though still deconditioned.  Verbal cuing to increase step length and heel strike with in clinic ambulation as patient tends to shuffle feet when fatiguing. He would benefit from continued PT services to achieve functional goals.    TICO ZARATE Is progressing well towards his goals.   Patient prognosis is Fair.     Patient will continue to benefit from skilled outpatient physical therapy to address the deficits listed in the problem list box on initial evaluation, provide pt/family education and to maximize pt's level of independence in the home and community environment.     Patient's spiritual, cultural and educational needs considered and pt agreeable to plan of care and goals.     Anticipated barriers to physical therapy: decreased " cardiovascular endurance     Goals:   Short Term Goals: 3 weeks   Patient will be compliant with HEP in order to maximize PT benefits (met)  Patient will walk >/= 300 feet on Two-Minute Walk Test indoors in order to improve endurance for home mobility (met)  Patient will score >/= 45% on FOTO survey in order to improve self-perception of functional mobility deficits (progressing, not met)     Long Term Goals: 6 weeks   Patient will score >/= 50% on FOTO survey in order to improve self-perception of functional mobility deficits (progressing, not met)  Patient will improve bilateral lower extremity MMT grades by >/=1/2 grade in order to improve strength for ADL completion (progressing, not met)  Patient will score </= 14 seconds on Five Time Sit to  order to improve bilateral lower extremity muscular power for transfers (progressing, not met)  Patient will complete Six-Minute Walk Test with or without standing rest breaks in order to improve endurance for community mobility (progressing, not met)  Patient will begin some form of home/community fitness in order to sustain progress gained in PT (progressing, not met)    Plan     Continue to progress as tolerated; follow up with additional HEP item compliance    Luciana Antonio, PTA

## 2025-02-16 DIAGNOSIS — G89.4 CHRONIC PAIN SYNDROME: ICD-10-CM

## 2025-02-16 DIAGNOSIS — G57.11 MERALGIA PARESTHETICA OF RIGHT SIDE: ICD-10-CM

## 2025-02-16 DIAGNOSIS — G60.9 IDIOPATHIC PERIPHERAL NEUROPATHY: Primary | ICD-10-CM

## 2025-02-16 DIAGNOSIS — M54.16 LUMBAR RADICULITIS: ICD-10-CM

## 2025-02-17 RX ORDER — PREGABALIN 150 MG/1
150 CAPSULE ORAL 2 TIMES DAILY
Qty: 60 CAPSULE | Refills: 2 | Status: SHIPPED | OUTPATIENT
Start: 2025-02-17 | End: 2025-05-18

## 2025-02-18 ENCOUNTER — CLINICAL SUPPORT (OUTPATIENT)
Dept: REHABILITATION | Facility: HOSPITAL | Age: 75
End: 2025-02-18
Payer: MEDICARE

## 2025-02-18 DIAGNOSIS — Z74.09 IMPAIRED FUNCTIONAL MOBILITY, BALANCE, GAIT, AND ENDURANCE: Primary | ICD-10-CM

## 2025-02-18 PROCEDURE — 97530 THERAPEUTIC ACTIVITIES: CPT | Mod: PN,CQ

## 2025-02-18 NOTE — PROGRESS NOTES
"[facility]   Physical Therapy Treatment Note      Name: Zeenat Mancia  Clinic Number: [id number]    Therapy Diagnosis:   Encounter Diagnosis   Name Primary?    Impaired functional mobility, balance, gait, and endurance Yes     Physician: Doris Moreno MD    Visit Date: 2/18/2025    Physician Orders: PT Eval and Treat   Medical Diagnosis from Referral: Parkinson's disease without dyskinesia or fluctuating manifestations [G20.A1]   Evaluation Date: 1/10/2025  Authorization Period Expiration: 11/26/2025  Plan of Care Expiration: 2/21/2025  Progress Note Due: last assessed 2/4/2025  Date of Surgery: right tarsal tunnel release (7/18/2024) and left tarsal tunnel release (9/18/2024)  Visit # / Visits authorized: 9/20 (+eval)  FOTO: 2/3, completed 6th visit     Precautions: Standard and Fall        Time In: 01:00 PM  Time Out: 01:45 PM  Total Billable Time: 45 minutes (3 TA)    PTA Visit #: 2/5     Subjective     Patient reports: neuropathy in his feet still bothering him and making him feel unsteady  He  was  compliant with home exercise program.  Response to previous treatment: no adverse response  Functional change: ongoing    Pain: 0/10  Location: low back    Objective      Objective Measures updated at progress report unless specified.     Treatment     C J received the treatments listed below:      therapeutic exercises to develop strength, endurance, ROM, and flexibility for 00 minutes including:    neuromuscular re-education activities to improve: Balance, Coordination, Kinesthetic, and Proprioception for 00 minutes. The following activities were included:  Not completed today    therapeutic activities to improve functional performance for 45 minutes, including:    In clinic ambulation: 2 trials of 150 feet with single point cane    Circuit #1:  Lateral up and over 4" step x30" >> standing calf raises x15 >> Lateral up and over 4" step x30" >> standing calf raises x15 >> seated long " "arc quads x15 each leg with 2# ankle weights                Circuit #2:  Standing marches with 1.5# ankle weights x30" >> standing hip abduction x10 each leg with 2# ankle weights >> Standing marches with 2# ankle weights x30" >> standing hip abduction x10 each leg with 2# ankle weights                   Circuit #3:  Mini squats x10 >> seated hip adduction x 2 minutes total with 5" holds >> Mini squats x10 >> seated hip adduction with 5" holds    Lateral Stepping 6 laps x10ft    Patient Education and Home Exercises       Education provided:   - increased activity at home  - daily HEP completion    Written Home Exercises Provided: Pt instructed to continue prior HEP. Exercises were reviewed and TICO ZARATE was able to demonstrate them prior to the end of the session.  TICO ZARATE demonstrated good  understanding of the education provided. See Electronic Medical Record under Patient Instructions for exercises provided during therapy sessions    Assessment   CJ arrived to session with reports of increased neuropathy in feet but was agreeable to treatment. He tolerated the session well with less seated rest breaks required throughout treatment.  He is observed giving full effort during session though still deconditioned.  Verbal cuing to increase step length and heel strike with in clinic ambulation as patient tends to shuffle feet when fatiguing. He would benefit from continued PT services to achieve functional goals.    TICO ZARATE Is progressing well towards his goals.   Patient prognosis is Fair.     Patient will continue to benefit from skilled outpatient physical therapy to address the deficits listed in the problem list box on initial evaluation, provide pt/family education and to maximize pt's level of independence in the home and community environment.     Patient's spiritual, cultural and educational needs considered and pt agreeable to plan of care and goals.     Anticipated barriers to physical therapy: decreased cardiovascular " endurance     Goals:   Short Term Goals: 3 weeks   Patient will be compliant with HEP in order to maximize PT benefits (met)  Patient will walk >/= 300 feet on Two-Minute Walk Test indoors in order to improve endurance for home mobility (met)  Patient will score >/= 45% on FOTO survey in order to improve self-perception of functional mobility deficits (progressing, not met)     Long Term Goals: 6 weeks   Patient will score >/= 50% on FOTO survey in order to improve self-perception of functional mobility deficits (progressing, not met)  Patient will improve bilateral lower extremity MMT grades by >/=1/2 grade in order to improve strength for ADL completion (progressing, not met)  Patient will score </= 14 seconds on Five Time Sit to  order to improve bilateral lower extremity muscular power for transfers (progressing, not met)  Patient will complete Six-Minute Walk Test with or without standing rest breaks in order to improve endurance for community mobility (progressing, not met)  Patient will begin some form of home/community fitness in order to sustain progress gained in PT (progressing, not met)    Plan     Continue to progress as tolerated; follow up with additional HEP item compliance    Luciana Antonio, PTA

## 2025-02-20 RX ORDER — QUETIAPINE FUMARATE 100 MG/1
100 TABLET, FILM COATED ORAL NIGHTLY
Qty: 30 TABLET | Refills: 0 | Status: SHIPPED | OUTPATIENT
Start: 2025-02-20

## 2025-02-25 DIAGNOSIS — G20.A1 PARKINSON'S DISEASE WITHOUT DYSKINESIA OR FLUCTUATING MANIFESTATIONS: ICD-10-CM

## 2025-02-25 RX ORDER — QUETIAPINE FUMARATE 100 MG/1
100 TABLET, FILM COATED ORAL NIGHTLY
Qty: 30 TABLET | Refills: 0 | Status: SHIPPED | OUTPATIENT
Start: 2025-02-25

## 2025-02-25 RX ORDER — CARBIDOPA AND LEVODOPA 25; 100 MG/1; MG/1
1 TABLET ORAL 3 TIMES DAILY
Qty: 90 TABLET | Refills: 11 | Status: SHIPPED | OUTPATIENT
Start: 2025-02-25

## 2025-02-25 NOTE — TELEPHONE ENCOUNTER
No care due was identified.  [facility] Embedded Care Due Messages. Reference number: [id number].   2/25/2025 8:07:27 AM CST

## 2025-02-26 ENCOUNTER — OFFICE VISIT (OUTPATIENT)
Dept: CARDIOLOGY | Facility: CLINIC | Age: 75
End: 2025-02-26
Payer: MEDICARE

## 2025-02-26 VITALS
WEIGHT: 315 LBS | BODY MASS INDEX: 41.84 KG/M2 | DIASTOLIC BLOOD PRESSURE: 78 MMHG | HEART RATE: 86 BPM | SYSTOLIC BLOOD PRESSURE: 128 MMHG

## 2025-02-26 DIAGNOSIS — I11.0 HYPERTENSIVE HEART DISEASE WITH CHRONIC DIASTOLIC CONGESTIVE HEART FAILURE: ICD-10-CM

## 2025-02-26 DIAGNOSIS — I50.31 HYPERTENSIVE HEART DISEASE WITH ACUTE DIASTOLIC CONGESTIVE HEART FAILURE: ICD-10-CM

## 2025-02-26 DIAGNOSIS — I11.0 HYPERTENSIVE HEART DISEASE WITH ACUTE DIASTOLIC CONGESTIVE HEART FAILURE: ICD-10-CM

## 2025-02-26 DIAGNOSIS — G47.33 OSA (OBSTRUCTIVE SLEEP APNEA): ICD-10-CM

## 2025-02-26 DIAGNOSIS — E66.01 MORBID OBESITY DUE TO EXCESS CALORIES: ICD-10-CM

## 2025-02-26 DIAGNOSIS — I48.20 CHRONIC ATRIAL FIBRILLATION: ICD-10-CM

## 2025-02-26 DIAGNOSIS — I25.10 NONOBSTRUCTIVE ATHEROSCLEROSIS OF CORONARY ARTERY: ICD-10-CM

## 2025-02-26 DIAGNOSIS — I87.2 VENOUS INSUFFICIENCY: ICD-10-CM

## 2025-02-26 DIAGNOSIS — R06.09 DYSPNEA ON EXERTION: ICD-10-CM

## 2025-02-26 DIAGNOSIS — I70.0 AORTIC ATHEROSCLEROSIS: Primary | ICD-10-CM

## 2025-02-26 DIAGNOSIS — I50.32 HYPERTENSIVE HEART DISEASE WITH CHRONIC DIASTOLIC CONGESTIVE HEART FAILURE: ICD-10-CM

## 2025-02-26 PROCEDURE — 3008F BODY MASS INDEX DOCD: CPT | Mod: CPTII,S$GLB,, | Performed by: INTERNAL MEDICINE

## 2025-02-26 PROCEDURE — 1101F PT FALLS ASSESS-DOCD LE1/YR: CPT | Mod: CPTII,S$GLB,, | Performed by: INTERNAL MEDICINE

## 2025-02-26 PROCEDURE — 1125F AMNT PAIN NOTED PAIN PRSNT: CPT | Mod: CPTII,S$GLB,, | Performed by: INTERNAL MEDICINE

## 2025-02-26 PROCEDURE — 3078F DIAST BP <80 MM HG: CPT | Mod: CPTII,S$GLB,, | Performed by: INTERNAL MEDICINE

## 2025-02-26 PROCEDURE — 1160F RVW MEDS BY RX/DR IN RCRD: CPT | Mod: CPTII,S$GLB,, | Performed by: INTERNAL MEDICINE

## 2025-02-26 PROCEDURE — 1159F MED LIST DOCD IN RCRD: CPT | Mod: CPTII,S$GLB,, | Performed by: INTERNAL MEDICINE

## 2025-02-26 PROCEDURE — 1157F ADVNC CARE PLAN IN RCRD: CPT | Mod: CPTII,S$GLB,, | Performed by: INTERNAL MEDICINE

## 2025-02-26 PROCEDURE — 3288F FALL RISK ASSESSMENT DOCD: CPT | Mod: CPTII,S$GLB,, | Performed by: INTERNAL MEDICINE

## 2025-02-26 PROCEDURE — 3074F SYST BP LT 130 MM HG: CPT | Mod: CPTII,S$GLB,, | Performed by: INTERNAL MEDICINE

## 2025-02-26 PROCEDURE — 99214 OFFICE O/P EST MOD 30 MIN: CPT | Mod: S$GLB,,, | Performed by: INTERNAL MEDICINE

## 2025-02-26 PROCEDURE — 99999 PR PBB SHADOW E&M-EST. PATIENT-LVL III: CPT | Mod: PBBFAC,,, | Performed by: INTERNAL MEDICINE

## 2025-02-26 RX ORDER — FUROSEMIDE 20 MG/1
20 TABLET ORAL DAILY
Qty: 90 TABLET | Refills: 3 | Status: SHIPPED | OUTPATIENT
Start: 2025-02-26

## 2025-02-26 RX ORDER — ATENOLOL 50 MG/1
50 TABLET ORAL DAILY
Qty: 90 TABLET | Refills: 3 | Status: SHIPPED | OUTPATIENT
Start: 2025-02-26

## 2025-02-26 RX ORDER — SPIRONOLACTONE 25 MG/1
25 TABLET ORAL DAILY
Qty: 90 TABLET | Refills: 3 | Status: SHIPPED | OUTPATIENT
Start: 2025-02-26 | End: 2026-02-26

## 2025-02-26 NOTE — PROGRESS NOTES
HISTORY:    74-year-old male with a history of CAD-nonobs on cath, chronic atrial fibrillation, hypertension, aortic atherosclerosis, HFpEF, JACKELYN on CPAP, and B LE neuropathy presenting for  follow-up.    The patient denies any symptoms of chest pain. Chronic LUND that had been present for years. Pt thinks it is improved or even resolved.     Activity levels mild and limited by B LE neuropathy.     In late '23 we stopped HCTZ and prescribed spironolactone and furosemide. No objective e/o HF.     The patient denies any previous history of myocardial infarction, peripheral arterial disease, stroke, congestive heart failure, or cardiomyopathy. Afib dx'd '17-'18 years ago incidentally. Pt has JACKELYN and some Pickwickian type symptoms.     The patient currently tolerates atenolol 50 x 1, spironolactone 25 x 1, furosemide 20 x 1, and apixaban 5 x 2. Bps controlled. No bleeding. On GLP agonist.     PHYSICAL EXAM:    Vitals:    02/26/25 1119   BP: 128/78   Pulse: 86       NAD, A+Ox3.  No jvd, no bruit.  Irreg, irreg nml s1,s2. No murmurs.  CTA B no wheezes or crackles.  No edema.    LABS/STUDIES (imaging reviewed during clinic visit):    June 2024 creatinine 1.1/BUN 16.  NT proBNP 147.  April 2024 hemoglobin 16.1/MCV 86.  Creatinine 0.9/BUN 14.  Albumin 3.6.  BNP and troponin normal.  2022 /HDL 43/ LDL 90/.  A1c and TSH normal.  ECG August, June and  April 2024 atrial fibrillation.  PRWP with no ST changes.  Right bundle branch block.    Holter July 2024 atrial fibrillation with average heart rate of 78 beats per minute.  TTE June 2024 normal LV size with mildly increased wall thickness.  Normal systolic function with an ejection fraction of 55-65% depending on the R to R interval.  Normal RV size and function.  CVP 8 with estimated PASP of 28.  July 2023 normal LV size and function with EF 60%.  Mild RV enlargement with reduced function.  CVP 8.    Cardiac catheterization 2021 nonobstructive coronary artery  disease.    CTA Chest July 2023 No e/o PE. Aortic and coronary calcification.   Venous us July 2023 No e/o DVT bilaterally. R GSV reflux at the level of the knee.   Arterial duplex 2022 Nml DIANNA bilaterally with no e/o significant stenosis.    ASSESSMENT & PLAN:    1. Aortic atherosclerosis    2. Chronic atrial fibrillation    3. Hypertensive heart disease with chronic diastolic congestive heart failure    4. Venous insufficiency    5. Nonobstructive atherosclerosis of coronary artery    6. Morbid obesity due to excess calories    7. JACKELYN (obstructive sleep apnea)    8. Dyspnea on exertion    9. Hypertensive heart disease with acute diastolic congestive heart failure                Orders Placed This Encounter    atenoloL (TENORMIN) 50 MG tablet    spironolactone (ALDACTONE) 25 MG tablet    furosemide (LASIX) 20 MG tablet          SOB that was in large part related to JACKELYN and obesity w Pickwickian type symptoms. This  has resolved. Possibly 2/2 better management of JACKELYN. HF not an issue with nml BNP, NT-pBNP, and LVEF w reasonable PASPs. Regardless we have him on a HF regimen to optimize fluid status including spironolactone 25x1 and furosemide 20x1. Likely has venous insufficiency.     cAfib. Rates controlled on atenolol. Tolerating apixiban without issue.     BP is controlled on spironolactone 25 x 1, and furosemide 20 x 1.      We discussed the importance of diet modifications and instituting an exercise regimen with weight loss in mind. Following with bariatrics. On GLP agonist.       Follow up in about 1 year (around 2/26/2026).      Jackie Upton MD

## 2025-03-01 DIAGNOSIS — I50.31 HYPERTENSIVE HEART DISEASE WITH ACUTE DIASTOLIC CONGESTIVE HEART FAILURE: ICD-10-CM

## 2025-03-01 DIAGNOSIS — I11.0 HYPERTENSIVE HEART DISEASE WITH ACUTE DIASTOLIC CONGESTIVE HEART FAILURE: ICD-10-CM

## 2025-03-01 DIAGNOSIS — I48.20 CHRONIC ATRIAL FIBRILLATION: ICD-10-CM

## 2025-03-01 DIAGNOSIS — R06.09 DYSPNEA ON EXERTION: ICD-10-CM

## 2025-03-03 RX ORDER — SPIRONOLACTONE 25 MG/1
TABLET ORAL
Qty: 90 TABLET | Refills: 3 | Status: SHIPPED | OUTPATIENT
Start: 2025-03-03

## 2025-03-09 ENCOUNTER — PATIENT MESSAGE (OUTPATIENT)
Dept: PRIMARY CARE CLINIC | Facility: CLINIC | Age: 75
End: 2025-03-09
Payer: MEDICARE

## 2025-03-19 ENCOUNTER — PATIENT MESSAGE (OUTPATIENT)
Dept: NEUROLOGY | Facility: CLINIC | Age: 75
End: 2025-03-19
Payer: MEDICARE

## 2025-03-25 RX ORDER — ARIPIPRAZOLE 2 MG/1
2 TABLET ORAL DAILY
Qty: 30 TABLET | Refills: 3 | Status: SHIPPED | OUTPATIENT
Start: 2025-03-25

## 2025-03-25 RX ORDER — TAMSULOSIN HYDROCHLORIDE 0.4 MG/1
0.4 CAPSULE ORAL DAILY
Qty: 30 CAPSULE | Refills: 11 | Status: SHIPPED | OUTPATIENT
Start: 2025-03-25

## 2025-03-25 NOTE — TELEPHONE ENCOUNTER
No care due was identified.  [facility] Embedded Care Due Messages. Reference number: [id number].   3/25/2025 10:50:11 AM CDT

## 2025-03-25 NOTE — TELEPHONE ENCOUNTER
No care due was identified.  [facility] Embedded Care Due Messages. Reference number: [id number].   3/25/2025 11:36:46 AM CDT

## 2025-04-01 ENCOUNTER — OFFICE VISIT (OUTPATIENT)
Dept: NEUROLOGY | Facility: CLINIC | Age: 75
End: 2025-04-01
Payer: MEDICARE

## 2025-04-01 DIAGNOSIS — M54.50 ACUTE MIDLINE LOW BACK PAIN WITHOUT SCIATICA: Primary | ICD-10-CM

## 2025-04-01 DIAGNOSIS — G62.9 PERIPHERAL POLYNEUROPATHY: ICD-10-CM

## 2025-04-01 DIAGNOSIS — G20.A1 PARKINSON'S DISEASE WITHOUT DYSKINESIA OR FLUCTUATING MANIFESTATIONS: ICD-10-CM

## 2025-04-01 RX ORDER — CYCLOBENZAPRINE HCL 5 MG
TABLET ORAL
Qty: 20 TABLET | Refills: 0 | Status: SHIPPED | OUTPATIENT
Start: 2025-04-01

## 2025-04-01 RX ORDER — PREDNISONE 5 MG/1
TABLET ORAL
Qty: 10 TABLET | Refills: 0 | Status: SHIPPED | OUTPATIENT
Start: 2025-04-01 | End: 2025-04-05

## 2025-04-01 NOTE — PROGRESS NOTES
Patient ID: [id number]  The patient location is: [facility]   The chief complaint leading to consultation is: PD f/u and new back pain     Visit type: audiovisual    Face to Face time with patient: 17    Each patient to whom he or she provides medical services by telemedicine is:  (1) informed of the relationship between the physician and patient and the respective role of any other health care provider with respect to management of the patient; and (2) notified that he or she may decline to receive medical services by telemedicine and may withdraw from such care at any time.    Notes:        Subjective:     GUMARO Mancia is a 74 y.o. RH male with multiple co morbidities including JACKELYN, A.fib, obesity, and HTN. he is presenting today for f/u on PD and new back pain.     Interval history (04/01/2025):  He reports new back pain x2-3 weeks and subsequently worse gait and walking.  This came up when he was bending over, the pain doesn't shoot down.   Balance, speech, tremors, and cognitive issues are worsening.   Doesn't remember what he just watched or what was just said.  Reports vivid dreams. Unsure about mood, seems that he is not as anxious.  Things were better for a while but have been worsening x2 months.    Interval history (11/26/2024):  He is responding to sinemet with no side effects.  He doesn't have morning tremors, tremors only occasionally emerge.   He is consistently using his CPAP. He was started on Wegovy 2 months ago, has lost 15 lb so far.  His mood including anxiety is significantly better on Prozac 40 mg.  He recently underwent surgery for bilateral metatarsal tunnel which has improved his tingling partially.   He is still taking lyrica but only once in the evening since it makes him drowsy.     Interval history (05/23/2024):  Tremors and speech is better  Completed PT afetr 6-8 weeks, he hasn't noticed significant improvement but wife thinks his legs are stronger.  Planned to get  "metatarsal tunnel surgery, this has been interfering with walking.  Has been trying to see bariatric clinic to be considered for GLP-1 inhibitors, hasn't been successful yet, seems to be a coverage issue.   Occasionally gets the lateral thigh pain in the middle of the day. Taking pregabalin 150 mg BID.  He is on prozac 40 mg, mood is better, less emotional.    Interval history (03/06/2024):  Serum work up was unremarkable. MRI showed chronic microvascular changes and one lacunar infarct in corona radiata, no specific involvement of basal ganglia to be causing vascular PDism.   He has started PT already, very exhausted but is trying to push through.  Received an injection for trochanteric bursitis last week which has improved his pain some but not completely.    Initial HPI (2/20/2024):  He had left lateral thigh pain consistent with bursitis 2 months ago which improved after injection by pain management.  He is now having the same pain in the right thigh x 10 days. Describes it as a sharp deep pain.  The pain is in lateral upper 3rd of his thigh, occasionally radiating anteriorly to the groin, 8 out of 10.  When he straightens his leg the pain comes on but alleviated by mildly flexing the hip and knee. Not aggravated by touch or clothing.   He has also had neuropathy in feet x1 year. Additionally, they report occasional low back pain that is not related to the current pain.    On another note, per wife, his balance is bad, he shakes, he has difficulty walking/standing, and occasionally has difficulty with speech and drooling. He had an episode that his face felt it was "pulling in the middle".   He has B/B incontinence, 2 years of bladder incontinence and more recent with the bowels. He has nocturia x3 per night. He has been having some "visions" or "delusions" at nights. He is very depressed and also has a lot of anxiety, he was just started on Prozac.    Review of Systems   Constitutional:  Negative for unexpected " weight change.   HENT:  Negative for tinnitus and trouble swallowing.    Eyes:  Negative for photophobia and visual disturbance.   Gastrointestinal:  Negative for nausea and vomiting.   Musculoskeletal:  Positive for back pain, gait problem and leg pain.   Neurological:  Positive for numbness. Negative for tremors and speech difficulty.   Psychiatric/Behavioral:  Positive for dysphoric mood. Negative for agitation and sleep disturbance. The patient is not nervous/anxious.    All other systems reviewed and are negative.    Past Medical History:  -------------------------------------    A-fib    Acute blood loss anemia    Anxiety    Arthritis    Atrial flutter    Cataract    Colon polyp    Depression    ³³    Diverticulosis    Dyslipidemia    Dyspnea on exertion    GERD (gastroesophageal reflux disease)    on Protonix    Hepatic steatosis    Hyperlipidemia    Hypertension    Irregular heart beat    Lower GI bleed    Multiple gastric ulcers    gi scope per Dr. Houston    Obesity    Sleep apnea    uses CPAP    Unspecified disorder of kidney and ureter    kidney stones     Allergies:  Review of patient's allergies indicates:   Allergen Reactions    Lipitor [atorvastatin] Itching     Pertinent Family History:  Family History   Problem Relation Name Age of Onset    Arthritis Mother Patrica     Heart disease Mother Patrica     Blindness Mother Patrica         OS due to injury    Glaucoma Father John     Coronary artery disease Father John     Heart disease Father John     Cataracts Father John     Kidney failure Father John     Stroke Maternal Aunt Patrica     Amblyopia Neg Hx      Cancer Neg Hx      Diabetes Neg Hx      Hypertension Neg Hx      Macular degeneration Neg Hx      Retinal detachment Neg Hx      Strabismus Neg Hx      Thyroid disease Neg Hx      Colon cancer Neg Hx      Colon polyps Neg Hx      Crohn's disease Neg Hx      Ulcerative colitis Neg Hx      Esophageal cancer Neg Hx      Stomach  cancer Neg Hx         Pertinent Social History:  Social History     Tobacco Use    Smoking status: Former     Current packs/day: 0.00     Average packs/day: 2.0 packs/day for 11.0 years (22.0 ttl pk-yrs)     Types: Cigarettes     Start date: 1969     Quit date: 1980     Years since quittin.2    Smokeless tobacco: Never    Tobacco comments:     quit in     Substance Use Topics    Alcohol use: Not Currently     Alcohol/week: 2.0 standard drinks of alcohol     Types: 1 Glasses of wine, 1 Cans of beer per week     Comment: 1 beer every 2 month    Drug use: No       Medications:  Current Outpatient Medications   Medication Instructions    apixaban (ELIQUIS) 5 mg, Oral, 2 times daily    ARIPiprazole (ABILIFY) 2 mg, Oral, Daily    atenoloL (TENORMIN) 50 mg, Oral, Daily    carbidopa-levodopa  mg (SINEMET)  mg per tablet 1 tablet, Oral, 3 times daily    FLUoxetine 40 mg, Oral, Daily    furosemide (LASIX) 20 mg, Oral, Daily    gabapentin (NEURONTIN) 300 mg, 2 times daily    melatonin 10 mg, Nightly    multivit-min/FA/lycopen/lutein (CENTRUM SILVER MEN ORAL) Daily    pregabalin (LYRICA) 150 mg, Oral, 2 times daily    QUEtiapine (SEROQUEL) 100 mg, Oral, Nightly    sildenafiL (VIAGRA) 100 mg, Oral, Daily PRN    spironolactone (ALDACTONE) 25 MG tablet TAKE 1 TABLET(25 MG) BY MOUTH DAILY    tamsulosin (FLOMAX) 0.4 mg, Oral, Daily    WEGOVY 2.4 mg, Subcutaneous, Every 7 days        Objective:     *exam is limited due to the virtual nature of this visit    General:  Well-appearing, well-nourished, NAD, cooperative    Neurologic Exam:   Awake, alert and oriented x3  Dysphoric mood, intact comprehension.   EOM intact. No ophthalmoplegia.   Facial expression is full and symmetric.   Hearing is intact.  Antigravity in BUE.   Slight hand tremor.      Pertinent lab results  *No relevant imaging available to review     Pertinent imaging results  *previously reviewed images:  2024  MRI Brain wo  contrast:  Scattered foci of T2/FLAIR hyperintense signal throughout bilateral subcortical white matter, most consistent with chronic ischemic microvascular changes.   Advanced cerebral atrophy more prominent in insular cortices    9/19/2022  MRI Lumbar Spine wo contrast:  Multilevel degenerative changes resulting in mild left-sided L5 S1 foraminal encroachment. Small superimposed central disc protrusion.     Other pertinent studies  *previously reviewed results:  12/15/2023:  EMG-NCS:  1. Technically, somewhat limited study due to peripheral edema of legs  2. The sensory responses were absent in the bilateral lower extremities.  This can either be due to the technical issues above, or could be a sensory peripheral polyneuropathy.  I favor the latter as his symptoms correlate with this.   3. Incidentally, there is evidence of a median mononeuropathy across the right wrist (I.e. carpal tunnel syndrome).  Upper extremity sensory studies performed due to lack of responses in the legs.  If a more detailed study is desired for the carpal tunnel syndrome, a separate upper extremity study can be ordered.  Note:  He has no significant hand symptoms.     4. No definitive evidence of tarsal tunnel syndrome on either side.  The bilateral abductor hallicus muscles showed active denervation, but this could be due to the peripheral polyneuropathy described above, and can also be seen in normal feet, especially with advancing age.      Assessment:   Zeenat Mancia is a 74 y.o. right-handed male with multiple co morbidities including JACKELYN, increased BMI, peripheral neuropathy, and PD who presents for follow up on worsening PD symptoms and new low back pain. Plan is detailed below.    1. Acute midline low back pain without sciatica    2. Parkinson's disease without dyskinesia or fluctuating manifestations    3. Peripheral polyneuropathy      Plan:     - Ambulatory referral/consult to Physical/Occupational Therapy; Future  - predniSONE  (DELTASONE) 5 MG tablet; Take 4 tablets (20 mg total) by mouth once daily for 1 day, THEN 3 tablets (15 mg total) once daily for 1 day, THEN 2 tablets (10 mg total) once daily for 1 day, THEN 1 tablet (5 mg total) once daily for 1 day.  Dispense: 10 tablet; Refill: 0  - cyclobenzaprine (FLEXERIL) 5 MG tablet; Take 1 to 2 tablets (as tolerated) before bedtime  Dispense: 20 tablet; Refill: 0  - continue carbidopa-levodopa  mg (SINEMET)  mg per tablet 3 (three) times daily. will consider increasing sinemet once the current issue is resolved  - Follow up: feedback through the portal in 2 weeks, if not improved with conservative management we will obtain imaging   - will refer to multidisciplinary PD clinic       Plan was discussed in detail with the patient, who is in agreement.      Disclaimer: This note was partly generated using dictation software which may occasionally result in transcription errors that are missed on review.    Based on our encounter today, my overall Medical Decision Making is a Level 4     Complexity of Problem: Moderate (1 or more chronic illnesses with exacerbation, progression or treatment side effects)  Complexity of Data: Limited (Review of the results of each unit test)  Risk of Complications and/or morbidity/mortality of Management: Moderate risk (Prescription drug management)          Doris Moreno MD    [facility]  04/01/2025

## 2025-04-03 ENCOUNTER — PATIENT MESSAGE (OUTPATIENT)
Dept: SLEEP MEDICINE | Facility: CLINIC | Age: 75
End: 2025-04-03
Payer: MEDICARE

## 2025-04-07 ENCOUNTER — TELEPHONE (OUTPATIENT)
Facility: CLINIC | Age: 75
End: 2025-04-07
Payer: MEDICARE

## 2025-04-07 ENCOUNTER — OFFICE VISIT (OUTPATIENT)
Dept: PRIMARY CARE CLINIC | Facility: CLINIC | Age: 75
End: 2025-04-07
Payer: MEDICARE

## 2025-04-07 VITALS
OXYGEN SATURATION: 96 % | RESPIRATION RATE: 18 BRPM | WEIGHT: 315 LBS | HEIGHT: 74 IN | BODY MASS INDEX: 40.43 KG/M2 | SYSTOLIC BLOOD PRESSURE: 118 MMHG | DIASTOLIC BLOOD PRESSURE: 80 MMHG

## 2025-04-07 DIAGNOSIS — M60.9 STATIN-INDUCED MYOSITIS: ICD-10-CM

## 2025-04-07 DIAGNOSIS — I48.20 CHRONIC ATRIAL FIBRILLATION: ICD-10-CM

## 2025-04-07 DIAGNOSIS — G47.09 OTHER INSOMNIA: ICD-10-CM

## 2025-04-07 DIAGNOSIS — E66.01 MORBID OBESITY DUE TO EXCESS CALORIES: ICD-10-CM

## 2025-04-07 DIAGNOSIS — I11.0 HYPERTENSIVE HEART DISEASE WITH CHRONIC DIASTOLIC CONGESTIVE HEART FAILURE: ICD-10-CM

## 2025-04-07 DIAGNOSIS — G20.A1 PARKINSON'S DISEASE WITHOUT DYSKINESIA OR FLUCTUATING MANIFESTATIONS: ICD-10-CM

## 2025-04-07 DIAGNOSIS — G47.33 OSA (OBSTRUCTIVE SLEEP APNEA): Primary | ICD-10-CM

## 2025-04-07 DIAGNOSIS — I50.32 HYPERTENSIVE HEART DISEASE WITH CHRONIC DIASTOLIC CONGESTIVE HEART FAILURE: ICD-10-CM

## 2025-04-07 DIAGNOSIS — T46.6X5A STATIN-INDUCED MYOSITIS: ICD-10-CM

## 2025-04-07 PROCEDURE — 3079F DIAST BP 80-89 MM HG: CPT | Mod: CPTII,S$GLB,, | Performed by: INTERNAL MEDICINE

## 2025-04-07 PROCEDURE — 99999 PR PBB SHADOW E&M-EST. PATIENT-LVL IV: CPT | Mod: PBBFAC,,, | Performed by: INTERNAL MEDICINE

## 2025-04-07 PROCEDURE — 1157F ADVNC CARE PLAN IN RCRD: CPT | Mod: CPTII,S$GLB,, | Performed by: INTERNAL MEDICINE

## 2025-04-07 PROCEDURE — 1126F AMNT PAIN NOTED NONE PRSNT: CPT | Mod: CPTII,S$GLB,, | Performed by: INTERNAL MEDICINE

## 2025-04-07 PROCEDURE — 1160F RVW MEDS BY RX/DR IN RCRD: CPT | Mod: CPTII,S$GLB,, | Performed by: INTERNAL MEDICINE

## 2025-04-07 PROCEDURE — 3008F BODY MASS INDEX DOCD: CPT | Mod: CPTII,S$GLB,, | Performed by: INTERNAL MEDICINE

## 2025-04-07 PROCEDURE — 3074F SYST BP LT 130 MM HG: CPT | Mod: CPTII,S$GLB,, | Performed by: INTERNAL MEDICINE

## 2025-04-07 PROCEDURE — 99214 OFFICE O/P EST MOD 30 MIN: CPT | Mod: S$GLB,,, | Performed by: INTERNAL MEDICINE

## 2025-04-07 PROCEDURE — 1159F MED LIST DOCD IN RCRD: CPT | Mod: CPTII,S$GLB,, | Performed by: INTERNAL MEDICINE

## 2025-04-07 RX ORDER — MIRTAZAPINE 15 MG/1
15 TABLET, FILM COATED ORAL NIGHTLY
COMMUNITY

## 2025-04-07 RX ORDER — ROSUVASTATIN CALCIUM 10 MG/1
10 TABLET, COATED ORAL DAILY
COMMUNITY

## 2025-04-07 RX ORDER — GABAPENTIN 300 MG/1
300 CAPSULE ORAL 3 TIMES DAILY
COMMUNITY

## 2025-04-07 RX ORDER — AMLODIPINE BESYLATE 5 MG/1
5 TABLET ORAL DAILY
COMMUNITY

## 2025-04-07 NOTE — TELEPHONE ENCOUNTER
----- Message from Sangita Kimbrough MD sent at 4/2/2025  9:57 AM CDT -----  Regarding: Multi-D PD  Hi Dom - Please get this patient scheduled for multi-D PD clinic next available with me or Ella.  ----- Message -----  From: Doris Moreno MD  Sent: 4/1/2025  11:57 AM CDT  To: MD Whit Mayfield, this is a good patient for multi-D PD clinic. Thanks

## 2025-04-07 NOTE — PROGRESS NOTES
[facility] Note    Chief Complaint      Chief Complaint   Patient presents with    Follow-up     6m       History of Present Illness      Zeenat Mancia is a 74 y.o. male who presents today for   Chief Complaint   Patient presents with    Follow-up     6m   .  Patient comes to appointment here for 6 m checkup for chronic issues as outlined in detail below . He is seeing neuroliogy for his parkinsons dis , dr luna cardiology , dr fay sleep med , he is at his baseline currently per his wife and patient . He is due for labs with next visit     HPI    No problem-specific Assessment & Plan notes found for this encounter.       Problem List Items Addressed This Visit       JACKELYN (obstructive sleep apnea) - Primary    Overview   Utilizing  cpap with good benefit          Hypertensive heart disease with chronic diastolic congestive heart failure    Overview   bp well controlled on current regimen .         Morbid obesity due to excess calories    Overview   Cont to encourage diet and weight loss . Is on wegivy currently          Chronic atrial fibrillation    Overview   Dr prado cards managing . Cont current          Other insomnia    Overview   Cont current regimen          Statin-induced myositis    Overview   Unable to tolerate statin          Parkinson's disease without dyskinesia or fluctuating manifestations    Overview   Stable on current regimen              Past Medical History:  Past Medical History:   Diagnosis Date    A-fib     Acute blood loss anemia     Anxiety ?    Arthritis     Atrial flutter     Cataract ,?    Colon polyp     Depression     ³³    Diverticulosis     Dyslipidemia     Dyspnea on exertion 05/31/2021    GERD (gastroesophageal reflux disease)     on Protonix    Hepatic steatosis     Hyperlipidemia     Hypertension     Irregular heart beat     Lower GI bleed 05/31/2021    Multiple gastric ulcers 05/16/2018    gi scope per Dr. Houston    Obesity     Sleep apnea      uses CPAP    Unspecified disorder of kidney and ureter     kidney stones       Past Surgical History:  Past Surgical History:   Procedure Laterality Date    ADENOIDECTOMY  1958    APPENDECTOMY      CATARACT EXTRACTION  2009    OS    CATARACT EXTRACTION W/  INTRAOCULAR LENS IMPLANT Right 06/03/2015    [id number] 14.5d//    CHOLECYSTECTOMY  2021    COLONOSCOPY N/A 10/10/2017    Procedure: COLONOSCOPY;  Surgeon: Jameson Houston MD;  Location: [facility];  Service: Endoscopy;  Laterality: N/A; repeat in 5 years for surveillance    COLONOSCOPY N/A 05/16/2018    Procedure: COLONOSCOPY;  Surgeon: Jameson Houston MD;  Location: [facility];  Service: Endoscopy;  Laterality: N/A;    COLONOSCOPY N/A 06/02/2021    Procedure: COLONOSCOPY;  Surgeon: Agustin Corona MD;  Location: [facility] (2ND FLR);  Service: Endoscopy;  Laterality: N/A;    COLONOSCOPY N/A 10/11/2022    Procedure: COLONOSCOPY;  Surgeon: Edin Lara MD;  Location: [facility] (2ND FLR);  Service: Endoscopy;  Laterality: N/A;  2nd floor-difficult intubation  ok to hold eliquis x2 days per Dr. Jerome, see telephone encounter 6/28-[facility]  fully vaccinated, instructions [facility] to [facility]  extended miralax prep  Clear liquids up to 2 hrs prior/ AM prep [id number]    CORONARY ANGIOGRAPHY N/A 01/04/2021    Procedure: ANGIOGRAM, CORONARY ARTERY;  Surgeon: Jase Moore MD;  Location: [facility] CATH LAB/EP;  Service: Cardiology;  Laterality: N/A;    CYSTOSCOPY WITH INSERTION OF MINIMALLY INVASIVE IMPLANT TO ENLARGE PROSTATIC URETHRA N/A 06/29/2021    Procedure: TRANSPROSTATIC TISSUE RETRACTION;  Surgeon: Agustin Clay MD;  Location: [facility] OR;  Service: Urology;  Laterality: N/A;    ESOPHAGOGASTRODUODENOSCOPY N/A 07/02/2018    Procedure: EGD (ESOPHAGOGASTRODUODENOSCOPY);  Surgeon: Jameson Houston MD;  Location: [facility];  Service: Endoscopy;  Laterality: N/A;    ESOPHAGOGASTRODUODENOSCOPY N/A 06/02/2021    Procedure: EGD  (ESOPHAGOGASTRODUODENOSCOPY);  Surgeon: Agustin Cornoa MD;  Location: [facility] ENDO (2ND FLR);  Service: Endoscopy;  Laterality: N/A;    EYE SURGERY      cataract    LEFT HEART CATHETERIZATION N/A 01/04/2021    Procedure: Left heart cath;  Surgeon: Jase Moore MD;  Location: [facility] CATH LAB/EP;  Service: Cardiology;  Laterality: N/A;    ROBOT-ASSISTED CHOLECYSTECTOMY N/A 02/04/2021    Procedure: ROBOTIC CHOLECYSTECTOMY;  Surgeon: Pal Cheney MD;  Location: [facility] OR;  Service: General;  Laterality: N/A;    TARSAL TUNNEL RELEASE Right 07/18/2024    Procedure: RELEASE, TARSAL TUNNEL;  Surgeon: Leonel Perez DPM;  Location: [facility] OR 2ND FLR;  Service: Podiatry;  Laterality: Right;    TARSAL TUNNEL RELEASE Left 09/18/2024    Procedure: RELEASE, TARSAL TUNNEL;  Surgeon: Leonel Perez DPM;  Location: [facility] OR 2ND FLR;  Service: Podiatry;  Laterality: Left;    TONSILLECTOMY      TRANSESOPHAGEAL ECHOCARDIOGRAPHY N/A 05/14/2021    Procedure: ECHOCARDIOGRAM, TRANSESOPHAGEAL;  Surgeon: Leo Reeves III, MD;  Location: [facility] EP LAB;  Service: Cardiology;  Laterality: N/A;    TREATMENT OF CARDIAC ARRHYTHMIA N/A 04/19/2021    Procedure: Cardioversion or Defibrillation;  Surgeon: Judy Jerome MD;  Location: [facility] CATH LAB/EP;  Service: Cardiology;  Laterality: N/A;    TREATMENT OF CARDIAC ARRHYTHMIA N/A 05/14/2021    Procedure: CARDIOVERSION;  Surgeon: PEMA Downs MD;  Location: [facility] EP LAB;  Service: Cardiology;  Laterality: N/A;  AF, PHUONG, DCCV, MAC, DM, 3 PREP    UPPER GASTROINTESTINAL ENDOSCOPY  05/16/2018    Dr. Houston       Family History:  family history includes Arthritis in his mother; Blindness in his mother; Cataracts in his father; Coronary artery disease in his father; Glaucoma in his father; Heart disease in his father and mother; Heart failure in his father and mother; Kidney failure in his father; Stroke in his maternal aunt.     Social History:  Social History[1]    Review of  "Systems:   Review of Systems   Constitutional:  Positive for malaise/fatigue. Negative for fever and weight loss.   HENT:  Negative for congestion, hearing loss and sore throat.    Eyes:  Negative for blurred vision.   Respiratory:  Negative for cough and shortness of breath.    Cardiovascular:  Negative for chest pain, palpitations, claudication and leg swelling.   Gastrointestinal:  Negative for abdominal pain, constipation, diarrhea and heartburn.   Genitourinary:  Negative for dysuria.   Musculoskeletal:  Negative for back pain and myalgias.   Skin:  Negative for rash.   Neurological:  Positive for tremors and weakness. Negative for focal weakness and headaches.   Psychiatric/Behavioral:  Positive for depression. Negative for memory loss and suicidal ideas. The patient has insomnia. The patient is not nervous/anxious.         Medications:  Encounter Medications[2]    Allergies:  Review of patient's allergies indicates:   Allergen Reactions    Lipitor [atorvastatin] Itching         Physical Exam      Vitals:    04/07/25 1442   BP: 118/80   Resp: 18        Vital Signs  Resp: 18  SpO2: 96 %  BP: 118/80  BP Location: Right arm  Patient Position: Sitting  Pain Score: 0-No pain  Height and Weight  Height: 6' 2" (188 cm)  Weight: (!) 148.9 kg (328 lb 4.2 oz)  BSA (Calculated - sq m): 2.79 sq meters  BMI (Calculated): 42.1  Weight in (lb) to have BMI = 25: 194.3]     Body mass index is 42.15 kg/m².    Physical Exam  Constitutional:       Appearance: He is well-developed.   HENT:      Head: Normocephalic.   Eyes:      Pupils: Pupils are equal, round, and reactive to light.   Neck:      Thyroid: No thyromegaly.   Cardiovascular:      Rate and Rhythm: Normal rate and regular rhythm.      Heart sounds: No murmur heard.     No friction rub. No gallop.   Pulmonary:      Effort: Pulmonary effort is normal.      Breath sounds: Normal breath sounds.   Abdominal:      General: Bowel sounds are normal.      Palpations: Abdomen is " "soft.   Musculoskeletal:         General: Normal range of motion.      Cervical back: Normal range of motion.   Skin:     General: Skin is warm and dry.   Neurological:      Mental Status: He is alert and oriented to person, place, and time.      Sensory: No sensory deficit.   Psychiatric:         Behavior: Behavior normal.          Laboratory:  CBC:  No results for input(s): "WBC", "RBC", "HGB", "HCT", "PLT", "MCV", "MCH", "MCHC" in the last 2160 hours.  CMP:  No results for input(s): "GLU", "CALCIUM", "ALBUMIN", "PROT", "NA", "K", "CO2", "CL", "BUN", "ALKPHOS", "ALT", "AST", "BILITOT" in the last 2160 hours.    Invalid input(s): "CREATININ"  URINALYSIS:  No results for input(s): "COLORU", "CLARITYU", "SPECGRAV", "PHUR", "PROTEINUA", "GLUCOSEU", "BILIRUBINCON", "BLOODU", "WBCU", "RBCU", "BACTERIA", "MUCUS", "NITRITE", "LEUKOCYTESUR", "UROBILINOGEN", "HYALINECASTS" in the last 2160 hours.   LIPIDS:  No results for input(s): "TSH", "HDL", "CHOL", "TRIG", "LDLCALC", "CHOLHDL", "NONHDLCHOL", "TOTALCHOLEST" in the last 2160 hours.  TSH:  No results for input(s): "TSH" in the last 2160 hours.  A1C:  No results for input(s): "HGBA1C" in the last 2160 hours.    Radiology:        Assessment:     Zeenat Mancia is a 74 y.o.male with:    JACKELYN (obstructive sleep apnea)    Hypertensive heart disease with chronic diastolic congestive heart failure    Morbid obesity due to excess calories    Chronic atrial fibrillation    Other insomnia    Statin-induced myositis    Parkinson's disease without dyskinesia or fluctuating manifestations                Plan:     Problem List Items Addressed This Visit       JACKELYN (obstructive sleep apnea) - Primary    Overview   Utilizing  cpap with good benefit          Hypertensive heart disease with chronic diastolic congestive heart failure    Overview   bp well controlled on current regimen .         Morbid obesity due to excess calories    Overview   Cont to encourage diet and weight loss . Is on " jose currently          Chronic atrial fibrillation    Overview   Dr prado cards managing . Cont current          Other insomnia    Overview   Cont current regimen          Statin-induced myositis    Overview   Unable to tolerate statin          Parkinson's disease without dyskinesia or fluctuating manifestations    Overview   Stable on current regimen             As above, continue current medications and maintain follow up with specialists.  Return to clinic in 6 months.      [facility]                       [1]   Social History  Socioeconomic History    Marital status:     Number of children: 3   Tobacco Use    Smoking status: Former     Current packs/day: 0.00     Average packs/day: 2.0 packs/day for 11.0 years (22.0 ttl pk-yrs)     Types: Cigarettes     Start date: 1969     Quit date: 1980     Years since quittin.2    Smokeless tobacco: Never    Tobacco comments:     quit in     Substance and Sexual Activity    Alcohol use: Not Currently     Alcohol/week: 2.0 standard drinks of alcohol     Types: 1 Glasses of wine, 1 Cans of beer per week     Comment: 1 beer every 2 month    Drug use: No    Sexual activity: Not Currently     Partners: Female     Birth control/protection: Condom   Social History Narrative    Patient is originally from [place] [place]        Working ; retired,  plants            47 years         Children    Son x3         Lives with  Kerri         Diet/[employer]og[employer]    Updates    20- moved from [facility]       Social Drivers of Health     Financial Resource Strain: Low Risk  (2025)    Overall Financial Resource Strain (CARDIA)     Difficulty of Paying Living Expenses: Not hard at all   Food Insecurity: No Food Insecurity (2025)    Hunger Vital Sign     Worried About Running Out of Food in the Last Year: Never true     Ran  Out of Food in the Last Year: Never true   Transportation Needs: No Transportation Needs (1/29/2025)    PRAPARE - Transportation     Lack of Transportation (Medical): No     Lack of Transportation (Non-Medical): No   Physical Activity: Insufficiently Active (1/29/2025)    Exercise Vital Sign     Days of Exercise per Week: 2 days     Minutes of Exercise per Session: 60 min   Stress: Stress Concern Present (1/29/2025)    [employer] - Occupational Stress Questionnaire     Feeling of Stress : To some extent   Housing Stability: Unknown (10/26/2023)    Housing Stability Vital Sign     Unable to Pay for Housing in the Last Year: No     Unstable Housing in the Last Year: No   [2]   Outpatient Encounter Medications as of 4/7/2025   Medication Sig Note Dispense Refill    amLODIPine (NORVASC) 5 MG tablet Take 5 mg by mouth once daily.       apixaban (ELIQUIS) 5 mg Tab Take 1 tablet (5 mg total) by mouth 2 (two) times daily.  60 tablet 11    ARIPiprazole (ABILIFY) 2 MG Tab Take 1 tablet (2 mg total) by mouth once daily.  30 tablet 3    atenoloL (TENORMIN) 50 MG tablet Take 1 tablet (50 mg total) by mouth once daily.  90 tablet 3    carbidopa-levodopa  mg (SINEMET)  mg per tablet Take 1 tablet by mouth 3 (three) times daily.  90 tablet 11    cyclobenzaprine (FLEXERIL) 5 MG tablet Take 1 to 2 tablets (as tolerated) before bedtime  20 tablet 0    FLUoxetine 40 MG capsule Take 1 capsule (40 mg total) by mouth once daily.  30 capsule 0    furosemide (LASIX) 20 MG tablet Take 1 tablet (20 mg total) by mouth once daily.  90 tablet 3    gabapentin (NEURONTIN) 300 MG capsule Take 300 mg by mouth 3 (three) times daily.       melatonin 10 mg Tab Take 10 mg by mouth every evening. 9/17/2024: Take if needed PM      mirtazapine (REMERON) 15 MG tablet Take 15 mg by mouth every evening.       multivit-min/FA/lycopen/lutein (CENTRUM SILVER MEN ORAL) Take by mouth once daily.       pregabalin (LYRICA)  150 MG capsule Take 1 capsule (150 mg total) by mouth 2 (two) times daily.  60 capsule 2    QUEtiapine (SEROQUEL) 100 MG Tab TAKE 1 TABLET(100 MG) BY MOUTH EVERY EVENING (Patient taking differently: Take 200 mg by mouth every evening.)  30 tablet 0    rosuvastatin (CRESTOR) 10 MG tablet Take 10 mg by mouth once daily.       semaglutide, weight loss, (WEGOVY) 2.4 mg/0.75 mL PnIj Inject 2.4 mg into the skin every 7 days.  3 mL 2    sildenafiL (VIAGRA) 100 MG tablet Take 1 tablet (100 mg total) by mouth daily as needed for Erectile Dysfunction.  10 tablet 3    spironolactone (ALDACTONE) 25 MG tablet TAKE 1 TABLET(25 MG) BY MOUTH DAILY  90 tablet 3    tamsulosin (FLOMAX) 0.4 mg Cap Take 1 capsule (0.4 mg total) by mouth once daily.  30 capsule 11    [] predniSONE (DELTASONE) 5 MG tablet Take 4 tablets (20 mg total) by mouth once daily for 1 day, THEN 3 tablets (15 mg total) once daily for 1 day, THEN 2 tablets (10 mg total) once daily for 1 day, THEN 1 tablet (5 mg total) once daily for 1 day.  10 tablet 0    [DISCONTINUED] ARIPiprazole (ABILIFY) 2 MG Tab Take 2 mg by mouth once daily.       [DISCONTINUED] gabapentin (NEURONTIN) 600 MG tablet Take 300 mg by mouth 2 (two) times daily. (Patient not taking: Reported on 2025)       [DISCONTINUED] tamsulosin (FLOMAX) 0.4 mg Cap TAKE 1 CAPSULE BY MOUTH EVERY DAY  30 capsule 11     No facility-administered encounter medications on file as of 2025.

## 2025-04-10 ENCOUNTER — TELEPHONE (OUTPATIENT)
Dept: BARIATRICS | Facility: CLINIC | Age: 75
End: 2025-04-10
Payer: MEDICARE

## 2025-04-10 NOTE — TELEPHONE ENCOUNTER
----- Message from Viktoria sent at 4/10/2025 11:06 AM CDT -----  Regarding: Switching IN Person TO Virtual  Contact: Zeenat  CONSULT/ADVISORYName of Caller: David Preference:  [phone] of Call: Pt would like to know if his appt tomorrow can be switched from a in person to a virtual.States he was weighed yesterday with PCP  and also has hurt his back. Would like a call back to further discuss and be changed if possible.

## 2025-04-11 ENCOUNTER — OFFICE VISIT (OUTPATIENT)
Dept: BARIATRICS | Facility: CLINIC | Age: 75
End: 2025-04-11
Payer: MEDICARE

## 2025-04-11 DIAGNOSIS — I25.10 NONOBSTRUCTIVE ATHEROSCLEROSIS OF CORONARY ARTERY: ICD-10-CM

## 2025-04-11 DIAGNOSIS — E66.01 CLASS 3 SEVERE OBESITY DUE TO EXCESS CALORIES WITH SERIOUS COMORBIDITY AND BODY MASS INDEX (BMI) OF 40.0 TO 44.9 IN ADULT: ICD-10-CM

## 2025-04-11 DIAGNOSIS — G47.33 OSA (OBSTRUCTIVE SLEEP APNEA): Primary | ICD-10-CM

## 2025-04-11 DIAGNOSIS — I50.32 HYPERTENSIVE HEART DISEASE WITH CHRONIC DIASTOLIC CONGESTIVE HEART FAILURE: ICD-10-CM

## 2025-04-11 DIAGNOSIS — Z71.3 ENCOUNTER FOR WEIGHT LOSS COUNSELING: ICD-10-CM

## 2025-04-11 DIAGNOSIS — E66.813 CLASS 3 SEVERE OBESITY DUE TO EXCESS CALORIES WITH SERIOUS COMORBIDITY AND BODY MASS INDEX (BMI) OF 40.0 TO 44.9 IN ADULT: ICD-10-CM

## 2025-04-11 DIAGNOSIS — I48.20 CHRONIC ATRIAL FIBRILLATION: ICD-10-CM

## 2025-04-11 DIAGNOSIS — I11.0 HYPERTENSIVE HEART DISEASE WITH CHRONIC DIASTOLIC CONGESTIVE HEART FAILURE: ICD-10-CM

## 2025-04-11 RX ORDER — TIRZEPATIDE 10 MG/.5ML
10 INJECTION, SOLUTION SUBCUTANEOUS
Qty: 2 ML | Refills: 2 | Status: SHIPPED | OUTPATIENT
Start: 2025-06-06

## 2025-04-11 RX ORDER — TIRZEPATIDE 7.5 MG/.5ML
7.5 INJECTION, SOLUTION SUBCUTANEOUS
Qty: 2 ML | Refills: 0 | Status: SHIPPED | OUTPATIENT
Start: 2025-05-09

## 2025-04-11 RX ORDER — TIRZEPATIDE 5 MG/.5ML
5 INJECTION, SOLUTION SUBCUTANEOUS
Qty: 2 ML | Refills: 0 | Status: SHIPPED | OUTPATIENT
Start: 2025-04-11

## 2025-04-11 NOTE — PATIENT INSTRUCTIONS
Start Zepbound 5 mg once a week for 4 weeks, then increase to 7.5 mg once a week for 4 weeks, then increase to 10 mg once a week for 4 weeks.    Decrease portions as soon as you start Zepbound. Avoid fried, greasy, fatty foods.     Some nausea in the first 2 weeks is not unusual.     If you get pain across the upper abdomen and around to your back, please call the office.

## 2025-04-11 NOTE — PROGRESS NOTES
Subjective     Patient ID: Zeenat Mancia is a 74 y.o. male.    Chief Complaint: Follow-up, Obesity, and Weight Check    The patient location is: home in [place]  The chief complaint leading to consultation is: follow-up, obesity, weight check    Visit type: audiovisual    Face to Face time with patient: 10 minutes of total time spent on the encounter, which includes face to face time and non-face to face time preparing to see the patient (eg, review of tests), Obtaining and/or reviewing separately obtained history, Documenting clinical information in the electronic or other health record, Independently interpreting results (not separately reported) and communicating results to the patient/family/caregiver, or Care coordination (not separately reported).         Each patient to whom he or she provides medical services by telemedicine is:  (1) informed of the relationship between the physician and patient and the respective role of any other health care provider with respect to management of the patient; and (2) notified that he or she may decline to receive medical services by telemedicine and may withdraw from such care at any time.    Notes:     Patient presents for treatment of obesity.     Co-morbidities   HTN  JACKELYN - AHI 18.2 per PSG 7/2024  A-fib  CHF, diastolic  Parkinsons    Negative for thyroid cancer     History of Weight Loss Efforts  Nutrisystem  WW    Current Physical Activity  Home exercises    Current Eating Habits  Breakfast - eggs, toast, sausage, waffles, cereal, coffee  Lunch - chips, sandwich  Dinner - beef, chicken, corn, broccoli, beans  Snacks - ice cream sandwich, fruit, cookies  Beverages - water, diet Sprite    Eating 2 meals per day  Cutting back on portions    Medical Weight Loss (unable to use [employer])  7/2/2024: 331 lbs 12.7 oz, BMI 45  10/15/2024: 321 lbs 14  oz, BMI 41.33  1/31/2025 (virtual visit): 322 lbs  4/11/2025 (virtual visit): 328 lbs      Review of Systems   Constitutional:   Negative for chills and fever.   Respiratory:  Negative for shortness of breath.    Cardiovascular:  Negative for chest pain.   Gastrointestinal:  Negative for abdominal pain, nausea and vomiting.   Musculoskeletal:  Positive for arthralgias.   Neurological:  Negative for dizziness and light-headedness.          Objective    Latest Reference Range & Units 04/25/24 12:58 06/24/24 11:33   WBC 3.90 - 12.70 K/uL 9.28    RBC 4.60 - 6.20 M/uL 6.08    Hemoglobin 14.0 - 18.0 g/dL 16.1    Hematocrit 40.0 - 54.0 % 52.3    MCV 82 - 98 fL 86    MCH 27.0 - 31.0 pg 26.5 (L)    MCHC 32.0 - 36.0 g/dL 30.8 (L)    RDW 11.5 - 14.5 % 17.2 (H)    Platelet Count 150 - 450 K/uL 218    MPV 9.2 - 12.9 fL 9.2    Gran % 38.0 - 73.0 % 75.2 (H)    Lymph % 18.0 - 48.0 % 10.7 (L)    Mono % 4.0 - 15.0 % 10.3    Eos % 0.0 - 8.0 % 3.1    Basophil % 0.0 - 1.9 % 0.5    Immature Granulocytes 0.0 - 0.5 % 0.2    Gran # (ANC) 1.8 - 7.7 K/uL 7.0    Lymph # 1.0 - 4.8 K/uL 1.0    Mono # 0.3 - 1.0 K/uL 1.0    Eos # 0.0 - 0.5 K/uL 0.3    Baso # 0.00 - 0.20 K/uL 0.05    Immature Grans (Abs) 0.00 - 0.04 K/uL 0.02    nRBC 0 /100 WBC 0    Differential Method  Automated    Sodium 136 - 145 mmol/L 139 139   Potassium 3.5 - 5.1 mmol/L 4.2 4.6   Chloride 95 - 110 mmol/L 102 101   CO2 23 - 29 mmol/L 29 30 (H)   Anion Gap 8 - 16 mmol/L 8 8   BUN 8 - 23 mg/dL 14 16   Creatinine 0.5 - 1.4 mg/dL 0.9 1.1   eGFR >60 mL/min/1.73 m^2 >60.0 >60.0   Glucose 70 - 110 mg/dL 112 (H) 121 (H)   Calcium 8.7 - 10.5 mg/dL 9.3 9.7   ALP 55 - 135 U/L 79    PROTEIN TOTAL 6.0 - 8.4 g/dL 7.2    Albumin 3.5 - 5.2 g/dL 3.6    BILIRUBIN TOTAL 0.1 - 1.0 mg/dL 0.5    AST 10 - 40 U/L 13    ALT 10 - 44 U/L 10    (L): Data is abnormally low  (H): Data is abnormally high    MRI Brain 2/20/2024  Remote infarct of the left corona radiata.  Minimal small vessel ischemic change on left.    ECHO 5/24/2024     Left Ventricle: The left ventricle is normal in size. Mildly increased wall thickness. There is  normal systolic function with a visually estimated ejection fraction of 55 - 65% depending on the R-R interval. Unable to assess diastolic function due to atrial fibrillation.    Right Ventricle: Normal right ventricular cavity size. Systolic function is normal.    Left Atrium: Left atrium is mildly dilated.    IVC/SVC: Intermediate venous pressure at 8 mmHg.    Pulmonary Artery: The estimated pulmonary artery systolic pressure is 28 mmHg.    EKG 4/25/2024  Atrial fibrillation   Left axis deviation   Low voltage QRS   Incomplete right bundle branch block   Inferior infarct (cited on or before 31-MAY-2021)   Cannot rule out Anterior infarct (cited on or before 31-MAY-2021)     There were no vitals filed for this visit.      Physical Exam  Constitutional:       General: He is not in acute distress.     Appearance: Normal appearance. He is obese. He is not ill-appearing, toxic-appearing or diaphoretic.   HENT:      Head: Normocephalic and atraumatic.   Pulmonary:      Effort: Pulmonary effort is normal. No respiratory distress.   Neurological:      Mental Status: He is alert and oriented to person, place, and time.            Assessment and Plan     1. JACKELYN (obstructive sleep apnea)  Overview:  Utilizing  cpap with good benefit     Orders:  -     tirzepatide, weight loss, (ZEPBOUND) 5 mg/0.5 mL PnIj; Inject 5 mg into the skin every 7 days.  Dispense: 2 mL; Refill: 0  -     tirzepatide, weight loss, (ZEPBOUND) 7.5 mg/0.5 mL PnIj; Inject 7.5 mg into the skin every 7 days.  Dispense: 2 mL; Refill: 0  -     tirzepatide, weight loss, (ZEPBOUND) 10 mg/0.5 mL PnIj; Inject 10 mg into the skin every 7 days.  Dispense: 2 mL; Refill: 2    2. Class 3 severe obesity due to excess calories with serious comorbidity and body mass index (BMI) of 40.0 to 44.9 in adult  -     tirzepatide, weight loss, (ZEPBOUND) 5 mg/0.5 mL PnIj; Inject 5 mg into the skin every 7 days.  Dispense: 2 mL; Refill: 0  -     tirzepatide, weight loss, (ZEPBOUND)  7.5 mg/0.5 mL PnIj; Inject 7.5 mg into the skin every 7 days.  Dispense: 2 mL; Refill: 0  -     tirzepatide, weight loss, (ZEPBOUND) 10 mg/0.5 mL PnIj; Inject 10 mg into the skin every 7 days.  Dispense: 2 mL; Refill: 2    3. Encounter for weight loss counseling    4. Nonobstructive atherosclerosis of coronary artery  Overview:  Cont b blocker , eliqus , and is not on statin due to intolerance      5. Chronic atrial fibrillation  Overview:  Dr prado cards managing . Cont current       6. Hypertensive heart disease with chronic diastolic congestive heart failure  Overview:  bp well controlled on current regimen .        - Start Zepbound weekly injections.  Discontinue Wegovy once Zepbound is started.    - Log all food and beverage intake with a daily calorie goal of 1800 calories per day    - Activity as tolerated

## 2025-04-14 ENCOUNTER — PATIENT MESSAGE (OUTPATIENT)
Dept: NEUROLOGY | Facility: CLINIC | Age: 75
End: 2025-04-14
Payer: MEDICARE

## 2025-04-16 RX ORDER — APIXABAN 5 MG/1
5 TABLET, FILM COATED ORAL 2 TIMES DAILY
Qty: 60 TABLET | Refills: 11 | Status: SHIPPED | OUTPATIENT
Start: 2025-04-16

## 2025-04-27 NOTE — TELEPHONE ENCOUNTER
No care due was identified.  [facility] Embedded Care Due Messages. Reference number: [id number].   4/27/2025 6:44:31 AM CDT

## 2025-04-28 RX ORDER — QUETIAPINE FUMARATE 100 MG/1
100 TABLET, FILM COATED ORAL NIGHTLY
Qty: 30 TABLET | Refills: 0 | Status: SHIPPED | OUTPATIENT
Start: 2025-04-28

## 2025-04-29 ENCOUNTER — PATIENT MESSAGE (OUTPATIENT)
Dept: PRIMARY CARE CLINIC | Facility: CLINIC | Age: 75
End: 2025-04-29
Payer: MEDICARE

## 2025-04-29 DIAGNOSIS — R09.82 POST-NASAL DRIP: Primary | ICD-10-CM

## 2025-04-30 ENCOUNTER — CLINICAL SUPPORT (OUTPATIENT)
Dept: REHABILITATION | Facility: HOSPITAL | Age: 75
End: 2025-04-30
Attending: STUDENT IN AN ORGANIZED HEALTH CARE EDUCATION/TRAINING PROGRAM
Payer: MEDICARE

## 2025-04-30 DIAGNOSIS — G20.A1 PARKINSON'S DISEASE WITHOUT DYSKINESIA OR FLUCTUATING MANIFESTATIONS: ICD-10-CM

## 2025-04-30 DIAGNOSIS — G62.9 PERIPHERAL POLYNEUROPATHY: ICD-10-CM

## 2025-04-30 DIAGNOSIS — M54.50 ACUTE MIDLINE LOW BACK PAIN WITHOUT SCIATICA: ICD-10-CM

## 2025-04-30 PROCEDURE — 97161 PT EVAL LOW COMPLEX 20 MIN: CPT | Mod: PO

## 2025-04-30 NOTE — PROGRESS NOTES
Outpatient Rehab    Physical Therapy Evaluation (only)    Patient Name: TICO Mancia  MRN: [id number]  YOB: 1950  Encounter Date: 4/30/2025    Therapy Diagnosis:   Encounter Diagnoses   Name Primary?    Acute midline low back pain without sciatica     Parkinson's disease without dyskinesia or fluctuating manifestations     Peripheral polyneuropathy      Physician: Doris Moreno MD    Physician Orders: Eval and Treat  Medical Diagnosis: Acute midline low back pain without sciatica  Parkinson's disease without dyskinesia or fluctuating manifestations  Peripheral polyneuropathy    Visit # / Visits Authorized:  1 / 1  Insurance Authorization Period: 4/1/2025 to 4/1/2026  Date of Evaluation: 4/30/2025  Plan of Care Certification: 4/30/2025 to 6/11/25     Time In: 1300   Time Out: 1329  Total Time: 29   Total Billable Time: 29    Intake Outcome Measure for FOTO Survey    Therapist reviewed FOTO scores for TICO Mancia on 4/30/2025.   FOTO report - see Media section or FOTO account episode details.     Intake Score: 35%         Subjective   History of Present Illness  TICO ZARATE is a 74 y.o. male      The patient reports a medical diagnosis of Acute midline low back pain without sciatica (M54.50), Parkinson's disease without dyskinesia or fluctuating manifestations (G20.A1), Peripheral polyneuropathy (G62.9).            History of Present Condition/Illness: Back pain started a few months ago but lately it is not as bad as it was. His chief complaint is his feet. He had surgery on both feet (tarsal tunnel surgery on both feet) in July and September of 2024. He has a cane and a walker. He uses the cane more, but does not normally use anything at home for ambulation. He feels he has difficulty walking mostly attributes this to numbness in his feet. He still has numbness in his feet even after the tarsal tunnel surgeries but it is moreso in the toes. He endorses falls, but reports it has been at least 6 months since  his last fall. He also has Parkinson's, he takes medication for PD. He feels he cannot stand or walk for a while, unsure what he can attribute this to.     Pain     Patient reports a current pain level of 2/10. Pain at best is reported as 0/10. Pain at worst is reported as 7/10.   Location: lower back  Clinical Progression (since onset): Improved  Pain Qualities: Dull  Pain-Relieving Factors: Medications - prescription, Rest  Pain-Aggravating Factors: Walking, Standing, Stair climbing, Bending         Living Arrangements  Living Situation  Housing: Home independently  Living Arrangements: Spouse/significant other    Home Setup  Type of Structure: House        Employment  Employment Status: Retired          Past Medical History/Physical Systems Review:   Zeenat Mancia  has a past medical history of A-fib, Acute blood loss anemia, Anxiety, Arthritis, Atrial flutter, Cataract, Colon polyp, Depression, Diverticulosis, Dyslipidemia, Dyspnea on exertion, GERD (gastroesophageal reflux disease), Hepatic steatosis, Hyperlipidemia, Hypertension, Irregular heart beat, Lower GI bleed, Multiple gastric ulcers, Obesity, Sleep apnea, and Unspecified disorder of kidney and ureter.    Zeenat Mancia  has a past surgical history that includes Eye surgery; Tonsillectomy; Appendectomy; Cataract extraction (2009); Cataract extraction w/  intraocular lens implant (Right, 06/03/2015); Upper gastrointestinal endoscopy (05/16/2018); Colonoscopy (N/A, 10/10/2017); Colonoscopy (N/A, 05/16/2018); Esophagogastroduodenoscopy (N/A, 07/02/2018); Left heart catheterization (N/A, 01/04/2021); Coronary angiography (N/A, 01/04/2021); Robot-assisted cholecystectomy (N/A, 02/04/2021); Treatment of cardiac arrhythmia (N/A, 04/19/2021); Treatment of cardiac arrhythmia (N/A, 05/14/2021); Transesophageal echocardiography (N/A, 05/14/2021); Esophagogastroduodenoscopy (N/A, 06/02/2021); Colonoscopy (N/A, 06/02/2021); Cystoscopy with insertion of minimally  invasive implant to enlarge prostatic urethra (N/A, 06/29/2021); Colonoscopy (N/A, 10/11/2022); Adenoidectomy (1958); Cholecystectomy (2021); Tarsal tunnel release (Right, 07/18/2024); and Tarsal tunnel release (Left, 09/18/2024).    Zeenat has a current medication list which includes the following prescription(s): amlodipine, aripiprazole, atenolol, carbidopa-levodopa  mg, cyclobenzaprine, eliquis, fluoxetine, furosemide, gabapentin, melatonin, mirtazapine, mv-min/folic/k1/lycopen/lutein, pregabalin, quetiapine, rosuvastatin, sildenafil, spironolactone, tamsulosin, [START ON 6/6/2025] zepbound, zepbound, and [START ON 5/9/2025] zepbound.    Review of patient's allergies indicates:   Allergen Reactions    Lipitor [atorvastatin] Itching        Objective            Hip Strength - Planes of Motion   Right Strength Right Pain Left Strength Left  Pain   Flexion (L2) 4+   4+     Extension           ABduction           ADduction 3+   3+     Internal Rotation 4+   4+     External Rotation 4+   4+         Knee Strength   Right Strength Right Pain Left Strength Left  Pain   Flexion (S2) 4+   4+     Prone Flexion           Extension (L3) 4+   4+            Ankle/Foot Strength - Planes of Motion   Right Strength Right Pain Left Strength Left  Pain   Dorsiflexion (L4) 4+   4     Plantar Flexion (S1)           Inversion 4+   4     Eversion 4+   4+     Great Toe Flexion           Great Toe Extension (L5)           Lesser Toes Flexion           Lesser Toes Extension                     Fall Risk  Functional mobility test results suggest the patient is: At Risk for Falls  Timed Up & Go (TUG)  Time: 15.5 seconds     An older adult who takes >=12 seconds to complete the TUG is at risk for falling.    Uses quad cane  Sit to Stand Testing      The patient completed 5 repetitions of a sit to stand transfer in 30 seconds. requires UE assist, poor eccentric control             Time Entry(in minutes):  PT Evaluation (Low) Time Entry:  29    Assessment & Plan   Assessment  C TESSA presents with a condition of Low complexity.   Presentation of Symptoms: Stable  Will Comorbidities Impact Care: Yes  Obesity, parkinsons, Past medical hx    Functional Limitations: Activity tolerance, Decreased ambulation distance/endurance, Functional mobility, Increased risk of fall, Painful locomotion/ambulation, Standing tolerance  Impairments: Abnormal gait, Abnormal muscle firing, Abnormal muscle tone, Activity intolerance, Impaired physical strength, Lack of appropriate home exercise program, Pain with functional activity    Patient Goal for Therapy (PT): I'd like to be able to walk without a cane  Prognosis: Fair  Assessment Details: Mr. MORALEZ presents to PT with low back pain that has improved since onset, Parkinson's, impaired strength, impaired functional mobility, and overall impaired endurance affecting QoL. He reports he can only stand for a few minutes before onset of pain requiring him to sit and take a break.  He will benefit from skilled outpatient PT to improve strength, functional mobility, endurance, and balance.  Will bring in SPC to next visit as he does not properly use quad cane today.    Plan  From a physical therapy perspective, the patient would benefit from: Skilled Rehab Services    Planned therapy interventions include: Therapeutic exercise, Therapeutic activities, Neuromuscular re-education, Manual therapy, and Gait training.            Visit Frequency: 2 times Per Week for 6 Weeks.       This plan was discussed with Patient.   Discussion participants: Agreed Upon Plan of Care             Patient's spiritual, cultural, and educational needs considered and patient agreeable to plan of care and goals.           Goals:   Active       Goals               Patient will perform sit <> stand with or without UE with no VC for proper technique from PT in order to encourage independence in transfers.       Start:  04/30/25    Expected End:  06/11/25             Pt will improve TUG score to </=12 seconds with cane in order to improve fall risk.         Start:  04/30/25    Expected End:  06/11/25            Patient will perform >/=8 sit to stands in 30 seconds in order to improve safety, transfers, and functional independence.       Start:  04/30/25    Expected End:  06/11/25            Patient to improve LE Mmts by 1 muscle grade to improve strength for functional tasks, standing, and walking       Start:  04/30/25    Expected End:  06/11/25            Patient to report ability to stand or walk for >/=15 minutes before having to take a break to improve functional capacity and endurance       Start:  04/30/25    Expected End:  06/11/25                Nelia Ponce, PT, DPT

## 2025-04-30 NOTE — TELEPHONE ENCOUNTER
LOV with Elias Thomas MD , 4/7/2025  Requesting a referral to ENT for post nasal drip despite taking Coricidin

## 2025-05-06 ENCOUNTER — PATIENT MESSAGE (OUTPATIENT)
Dept: PRIMARY CARE CLINIC | Facility: CLINIC | Age: 75
End: 2025-05-06
Payer: MEDICARE

## 2025-05-06 RX ORDER — FLUOXETINE HYDROCHLORIDE 40 MG/1
40 CAPSULE ORAL DAILY
Qty: 30 CAPSULE | Refills: 0 | Status: SHIPPED | OUTPATIENT
Start: 2025-05-06

## 2025-05-06 NOTE — PROGRESS NOTES
Subjective:     HPI: Zeenat Mancia is a 75 y.o. male with JACKELYN on CPAP, PD, afib, obesity who was self-referred for post-nasal drip.    Patient reports daily, perennial throat clearing with associated globus sensation, postnasal drip.  Patient feels phlegm in his throat normally worse in the evening.  He denies any chronic nasal obstruction, significant sneezing, rhinorrhea, or dysphagia.  Patient does report wheezing at times with dyspnea on exertion.  He wears a nasal CPAP nightly and does not wake up with throat clearing.  He has tried Claritin for this without relief.  Patient does report intermittent xerostomia.    Triggers for the cough include the following:   - voice use:  no  - breathing heavily:  no  - laughing:  no  - eating:  no  - drinking cold liquids:  no  - strong odors:  no    - post-prandial:  no  - lying down:  yes    Taking an ARB/ACEI: no    Prior workup includes the following:  - pulmonary:  no  - GI:  no  - allergy:  no  - ENT:  no    Current sinonasal medications include claritin daily.     He does not recall previously having allergy testing.  He denies a history of asthma.  He denies a history of reflux symptoms.    He denies a diagnosis of obstructive sleep apnea with regular nasal CPAP use.   He does not recall a prior history of nasal trauma.  He has not had sinonasal surgery.    He has had a tonsillectomy.  He is not a tobacco smoker.  Remote tobacco use history    Past Medical/Past Surgical History  Past Medical History:   Diagnosis Date    A-fib     Acute blood loss anemia     Anxiety ?    Arthritis     Atrial flutter     Cataract ,?    Colon polyp     Depression     ³³    Diverticulosis     Dyslipidemia     Dyspnea on exertion 05/31/2021    GERD (gastroesophageal reflux disease)     on Protonix    Hepatic steatosis     Hyperlipidemia     Hypertension     Irregular heart beat     Lower GI bleed 05/31/2021    Multiple gastric ulcers 05/16/2018    gi scope per Dr. Houston    Obesity      Sleep apnea     uses CPAP    Unspecified disorder of kidney and ureter     kidney stones     He has a past surgical history that includes Eye surgery; Tonsillectomy; Appendectomy; Cataract extraction (2009); Cataract extraction w/  intraocular lens implant (Right, 06/03/2015); Upper gastrointestinal endoscopy (05/16/2018); Colonoscopy (N/A, 10/10/2017); Colonoscopy (N/A, 05/16/2018); Esophagogastroduodenoscopy (N/A, 07/02/2018); Left heart catheterization (N/A, 01/04/2021); Coronary angiography (N/A, 01/04/2021); Robot-assisted cholecystectomy (N/A, 02/04/2021); Treatment of cardiac arrhythmia (N/A, 04/19/2021); Treatment of cardiac arrhythmia (N/A, 05/14/2021); Transesophageal echocardiography (N/A, 05/14/2021); Esophagogastroduodenoscopy (N/A, 06/02/2021); Colonoscopy (N/A, 06/02/2021); Cystoscopy with insertion of minimally invasive implant to enlarge prostatic urethra (N/A, 06/29/2021); Colonoscopy (N/A, 10/11/2022); Adenoidectomy (1958); Cholecystectomy (2021); Tarsal tunnel release (Right, 07/18/2024); and Tarsal tunnel release (Left, 09/18/2024).    Family History/Social History  His family history includes Arthritis in his mother; Blindness in his mother; Cataracts in his father; Coronary artery disease in his father; Glaucoma in his father; Heart disease in his father and mother; Heart failure in his father and mother; Kidney failure in his father; Stroke in his maternal aunt.  He reports that he quit smoking about 45 years ago. His smoking use included cigarettes. He started smoking about 56 years ago. He has a 22 pack-year smoking history. He has never used smokeless tobacco. He reports that he does not currently use alcohol after a past usage of about 2.0 standard drinks of alcohol per week. He reports that he does not use drugs.    Allergies/Immunizations  He is allergic to lipitor [atorvastatin].  Immunization History   Administered Date(s) Administered    COVID-19, MRNA, LN-S, PF (Pfizer) (Gray Cap)  "08/11/2022    COVID-19, MRNA, LN-S, PF (Pfizer) (Purple Cap) 01/05/2021, 01/26/2021, 10/28/2021    Influenza 10/04/2011, 10/18/2018, 08/28/2019    Influenza (FLUAD) - Quadrivalent - Adjuvanted - PF *Preferred* (65+) 09/08/2020, 11/18/2021, 10/21/2022    Influenza - Quadrivalent 10/13/2014    Influenza - Trivalent - Fluad - Adjuvanted - PF (65 years and older 11/27/2018, 08/29/2019, 10/07/2024    Influenza - Trivalent - Fluzone High Dose - PF (65 years and older) 11/18/2015, 01/05/2018, 11/27/2018    Influenza Split 10/04/2011    Pneumococcal Conjugate - 13 Valent 08/23/2016    Pneumococcal Polysaccharide - 23 Valent 08/17/2015    Tdap 08/29/2019    Zoster 08/17/2015    Zoster Recombinant 06/03/2022, 11/04/2022        Medications   amLODIPine  ARIPiprazole Tab  atenoloL  carbidopa-levodopa  mg  CENTRUM SILVER MEN ORAL  cyclobenzaprine  ELIQUIS Tab  FLUoxetine  furosemide  gabapentin  melatonin Tab  mirtazapine  pregabalin  QUEtiapine Tab  rosuvastatin  sildenafiL  spironolactone  tamsulosin Cap  ZEPBOUND Pnij     Review of Systems     Constitutional: Positive for fatigue.      HENT: Positive for ear discharge and postnasal drip.      Eyes:  Positive for eye itching.     Respiratory:  Positive for sleep apnea and wheezing.      Cardiovascular:  Positive for foot swelling and irregular heartbeat.     Gastrointestinal:  Positive for constipation.     Genitourinary: Positive for frequent urination.     Musc: Positive for back pain.     Skin: Negative for rash.     Allergy: Positive for seasonal allergies.     Endocrine: Negative for cold intolerance and heat intolerance.      Neurological: Positive for dizziness, light-headedness and tremors.     Hematologic: Positive for bruises/bleeds easily.     Psychiatric: Positive for depression, nervous/anxious and sleep disturbance.           Objective:     /68 (BP Location: Left arm, Patient Position: Sitting)   Pulse 82   Ht 6' 2" (1.88 m)   Wt (!) 150.6 kg (332 " lb 0.2 oz)   BMI 42.63 kg/m²      Physical Exam  Vitals reviewed.   Constitutional:       Appearance: Normal appearance.   HENT:      Head: Normocephalic and atraumatic.      Right Ear: External ear normal.      Left Ear: External ear normal.      Nose: Rhinorrhea present. No septal deviation or mucosal edema. Rhinorrhea is clear.      Right Nostril: No epistaxis.      Left Nostril: No epistaxis.      Right Turbinates: Enlarged.      Left Turbinates: Enlarged.      Right Sinus: No maxillary sinus tenderness or frontal sinus tenderness.      Left Sinus: No maxillary sinus tenderness or frontal sinus tenderness.      Mouth/Throat:      Lips: Pink.      Mouth: Mucous membranes are moist.      Dentition: Normal dentition.      Tongue: No lesions. Tongue does not deviate from midline.      Palate: No mass and lesions.      Pharynx: Oropharynx is clear. Uvula midline. No uvula swelling.      Tonsils: No tonsillar exudate or tonsillar abscesses. 0 on the right. 0 on the left.   Eyes:      Extraocular Movements: Extraocular movements intact.      Conjunctiva/sclera: Conjunctivae normal.   Neck:      Thyroid: No thyromegaly or thyroid tenderness.   Musculoskeletal:      Cervical back: No tenderness.   Lymphadenopathy:      Cervical: No cervical adenopathy.   Neurological:      Mental Status: He is alert.   Psychiatric:         Mood and Affect: Mood normal.         Behavior: Behavior normal.         Procedure    Flexible laryngoscopy performed.  See procedure note.     L NV with mild septal deviation     L MT     L ET with aerated secretions     R NV     R MT     R ET     Oropharynx with white residue and thick mucus     Hypopharynx/larynx     VF mobility intact      Data Reviewed  I personally reviewed the chart, including any outside records, and pertinent data below:    I reviewed the following notes Internal Medicine and ENT     WBC (K/uL)   Date Value   04/25/2024 9.28     Eosinophil % (%)   Date Value   04/25/2024 3.1  "    Eos # (K/uL)   Date Value   04/25/2024 0.3     Platelets (K/uL)   Date Value   04/25/2024 218     Glucose (mg/dL)   Date Value   06/24/2024 121 (H)     No results found for: "IGE"    I independently reviewed the images of the MRI Brain dated 2/28/24. Pertinent findings include no gross sinus abnormality.  Sinus eval limited based on images. .    Assessment & Plan:     1. Chronic throat clearing  Overview:  Chronic throat clearing >1 year; as far back as 2023 per chart review  CT chest 2024 without etiology   Laryngoscopy 5/7/25 with mild LPR changes, rhinitis, and oropharyngeal secretions  Likely multifactorial from xerostomia, rhinitis, PD/age related laryngeal changes, and LPR    2. Chronic rhinitis  - START daily saline rinse    3. Laryngopharyngeal reflux (LPR)  - Discussed the etiology of LPR and management strategies including nonpharmacologic treatments: eating smaller meals, eating at least 3 hours before bed, elevation of the head of bed at night, avoidance of caffeine, chocolate, nicotine and peppermint, and avoiding tight fitting clothing.    - Will trial PPI if symptoms pesist  - May consider GI referral if symptoms are refractory to medication management.    4. Parkinson's disease without dyskinesia or fluctuating manifestations  Overview:  Stable on current regimen     5. Xerostomia due to mouth breathing  - Xylimelts QHS; Biotene mouth wash during the day  - Increase your water intake to 64-80 oz daily  - consider room humidifier     6. JACKELYN (obstructive sleep apnea)  Overview:  Utilizing nasal cpap with good benefit   May be contributing to xerostomia from mouth breathing    7. Wheezing  - may be related to weight; Last CT chest unrevealing  - will refer to pulm if not improve by next appointment    8. Class 3 severe obesity with body mass index (BMI) of 40.0 to 44.9 in adult, unspecified obesity type, unspecified whether serious comorbidity present  - likely has hypoventilation syndrome    He " will Follow up in about 6 weeks (around 6/18/2025) for re-evaluate post treatment.  I had a discussion with the patient and his wife regarding his condition and the further workup and management options.    All questions were answered, and the patient is in agreement with the above.     Disclaimer:  This note may have been prepared utilizing voice recognition software which may result in occasional typographical errors in the text such as sound alike words.   If further clarification is needed, please contact the ENT department of [facility].

## 2025-05-06 NOTE — TELEPHONE ENCOUNTER
Pt requesting 90 days supply on medications instead of 30 days    LOV with Elias Thomas MD , 4/7/2025

## 2025-05-07 ENCOUNTER — OFFICE VISIT (OUTPATIENT)
Dept: OTOLARYNGOLOGY | Facility: CLINIC | Age: 75
End: 2025-05-07
Payer: MEDICARE

## 2025-05-07 VITALS
SYSTOLIC BLOOD PRESSURE: 116 MMHG | WEIGHT: 315 LBS | DIASTOLIC BLOOD PRESSURE: 68 MMHG | HEART RATE: 82 BPM | BODY MASS INDEX: 40.43 KG/M2 | HEIGHT: 74 IN

## 2025-05-07 DIAGNOSIS — G47.33 OSA ON CPAP: ICD-10-CM

## 2025-05-07 DIAGNOSIS — J31.0 CHRONIC RHINITIS: ICD-10-CM

## 2025-05-07 DIAGNOSIS — E66.813 CLASS 3 SEVERE OBESITY WITH BODY MASS INDEX (BMI) OF 40.0 TO 44.9 IN ADULT, UNSPECIFIED OBESITY TYPE, UNSPECIFIED WHETHER SERIOUS COMORBIDITY PRESENT: ICD-10-CM

## 2025-05-07 DIAGNOSIS — R06.2 WHEEZING: ICD-10-CM

## 2025-05-07 DIAGNOSIS — R06.5 XEROSTOMIA DUE TO MOUTH BREATHING: ICD-10-CM

## 2025-05-07 DIAGNOSIS — R09.89 CHRONIC THROAT CLEARING: Primary | ICD-10-CM

## 2025-05-07 DIAGNOSIS — K11.7 XEROSTOMIA DUE TO MOUTH BREATHING: ICD-10-CM

## 2025-05-07 DIAGNOSIS — E66.01 CLASS 3 SEVERE OBESITY WITH BODY MASS INDEX (BMI) OF 40.0 TO 44.9 IN ADULT, UNSPECIFIED OBESITY TYPE, UNSPECIFIED WHETHER SERIOUS COMORBIDITY PRESENT: ICD-10-CM

## 2025-05-07 DIAGNOSIS — K21.9 LARYNGOPHARYNGEAL REFLUX (LPR): ICD-10-CM

## 2025-05-07 DIAGNOSIS — G20.A1 PARKINSON'S DISEASE WITHOUT DYSKINESIA OR FLUCTUATING MANIFESTATIONS: ICD-10-CM

## 2025-05-07 PROCEDURE — 1126F AMNT PAIN NOTED NONE PRSNT: CPT | Mod: CPTII,S$GLB,, | Performed by: PHYSICIAN ASSISTANT

## 2025-05-07 PROCEDURE — 3078F DIAST BP <80 MM HG: CPT | Mod: CPTII,S$GLB,, | Performed by: PHYSICIAN ASSISTANT

## 2025-05-07 PROCEDURE — 1157F ADVNC CARE PLAN IN RCRD: CPT | Mod: CPTII,S$GLB,, | Performed by: PHYSICIAN ASSISTANT

## 2025-05-07 PROCEDURE — 3074F SYST BP LT 130 MM HG: CPT | Mod: CPTII,S$GLB,, | Performed by: PHYSICIAN ASSISTANT

## 2025-05-07 PROCEDURE — 99214 OFFICE O/P EST MOD 30 MIN: CPT | Mod: 25,S$GLB,, | Performed by: PHYSICIAN ASSISTANT

## 2025-05-07 PROCEDURE — 1101F PT FALLS ASSESS-DOCD LE1/YR: CPT | Mod: CPTII,S$GLB,, | Performed by: PHYSICIAN ASSISTANT

## 2025-05-07 PROCEDURE — 99999 PR PBB SHADOW E&M-EST. PATIENT-LVL V: CPT | Mod: PBBFAC,,, | Performed by: PHYSICIAN ASSISTANT

## 2025-05-07 PROCEDURE — 31575 DIAGNOSTIC LARYNGOSCOPY: CPT | Mod: S$GLB,,, | Performed by: PHYSICIAN ASSISTANT

## 2025-05-07 PROCEDURE — 1159F MED LIST DOCD IN RCRD: CPT | Mod: CPTII,S$GLB,, | Performed by: PHYSICIAN ASSISTANT

## 2025-05-07 PROCEDURE — 3288F FALL RISK ASSESSMENT DOCD: CPT | Mod: CPTII,S$GLB,, | Performed by: PHYSICIAN ASSISTANT

## 2025-05-07 NOTE — PROCEDURES
Laryngoscopy    Date/Time: 5/7/2025 10:30 AM    Performed by: Nathaniel Ramos PA-C  Authorized by: Nathaniel Ramos PA-C    Consent Done?:  Yes (Verbal)  Anesthesia:     Local anesthetic:  Phenylephrine spray    Patient tolerance:  Patient tolerated the procedure well with no immediate complications  Laryngoscopy:     Areas examined:  Nasal cavities, nasopharynx, oropharynx, hypopharynx, larynx and vocal cords    Laryngoscope size:  4 mm  Nose External:      No external nasal deformity  Nose Intranasal:      Mucosa no polyps     Mucosa ulcers not present     No mucosa lesions present     Septum gross deformity (mild left deviation)     Enlarged turbinates  Nasopharynx:      No mucosa lesions     Adenoids not present (adenoidecomy scar)     Posterior choanae patent     Eustachian tube patent  Larynx/hypopharynx:      No epiglottis lesions     No epiglottis edema     No AE folds lesions     No vocal cord polyps     Equal and normal bilateral     No hypopharynx lesions     No piriform sinus pooling     No piriform sinus lesions     Post cricoid edema (mild)     No post cricoid erythema     Mild mucus in BL PCs  Aerated mucus in oropharynx without piriform sinus pooling  Mild left deviation

## 2025-05-07 NOTE — PATIENT INSTRUCTIONS
STOP the Claritin for now    Nasal saline rinse (Neti pot or [employer] sinus rinse kit) - use at least once daily  -- Rinse your sinuses once to twice daily to reduce the allergen burden in your nose.Use sterile water (boiled tap water which has cooled) or distilled water to fill to the line.  Add the mixture packet from the sinus kit ( or add 1/4 teaspoon sea salt and a pinch of baking soda).    -- You can use in the shower or over the sink, bending forward with head tilted down. Keep your mouth open, without holding your breath. Squeeze bottle gently until solution starts draining from the opposite nasal passage.   -- After the bottle is empty, blow your nose very gently, without pinching nose completely, to avoid pressure on eardrums.    -- The instructions in the box are detailed so please read them before using. You can also watch [employer] videos for a demonstration of how to do these properly.    -- Please use the nasal saline wash about 15 minutes before applying your medicated nasal sprays (like flonase or astelin).      DRY THROAT/MOUTH Remedies:  - Over the counter Lozenges that help with moisture:  [place] [employer] - sold with cough drops  Xylimelts - on toothpaste aisle at [facility] with dry mouth products or online, these are convenient for when you are talking and or sleeping - they stick to gumline and provide moisture so you won't be so dry overnight. You can even use them during the day when you are singing. They stay in place when you are sleeping, to reduce risk of choking on them  - Biotene mouth wash during the day for dry mouth  - Increase your water intake to 64-80 oz daily to help thin mucus  - Can consider humidifier while you sleep       LARYNGOPHARYNGEAL REFLUX  (SILENT OR ATYPICAL REFLUX)    LPR is a very challenging disease as it includes a vast range of symptoms and difficult to diagnose.      SYMPTOMS  If you have any of the following symptoms you MAY have laryngopharyngeal reflux (LPR):   "  1. Hoarseness  2. Sore throat  4. throat clearing, sometimes clearing thick mucous  5. chronic cough, especially cough that wakes you up from sleep  6.  "postnasal drip" without the need to blow your nose  7. Globus sensation, like the sensation of something being stuck in the throat  8.  Red, swollen or irritated larynx (voice box)    HOW  Many people with LPR do not have symptoms of heartburn. Compared to the esophagus, the voice box and the back of the throat are significantly more sensitive to the effects of acid and digestive enzymes on surrounding tissue. Acid passing quickly through the esophagus does not have a chance to irritate the area for too long.  However acid that pools in the throat or voice box can cause prolonged irritation resulting in the symptoms of LPR.    DIAGNOSIS  A common procedure performed by an ENT is called flexible laryngoscopy.  A thin tube is inserted through the nose in order to visualize the larynx (the voice box). This method can detect abnormalities in the voice box ranging from polyps to laryngeal cancer.  This can also reveal signs of LPR, but is not 100% reliable.      If symptoms persist despite medical treatment listed below, further testing is needed.  Three commonly used tests are: a swallowing study; a direct look at the stomach and esophagus through an endoscope, and; an esophageal pH test.  Further testing is usually coordinated with a gastroenterologist.     TREATMENT  Lifestyle changes  1. Do not smoke.  Smoking will worsen reflux.    2. Avoid eating at least 2-3 hours prior to bedtime.  Try to avoid very large meals at night.    4. Weight loss.  For patient's with recent weight gain, shedding a few pounds is all that is required to improve reflux.    5. Avoid reflux triggers. These can worsen acid in the stomach or even cause the esophagus muscles to relax: caffeine, soft drinks, acidic foods (tomato, lots of fruit) mints, alcoholic beverages, particularly at " night, cheese, fried foods, spicy foods, eggs, and chocolate.    6. Sleep with the head of bed elevated at least 6 inches.    Consider reading the book Dropping Acid by Jaime De Leon MD.  This has information to help choose meals with less acid.     Other Treatments:  All natural remedies include Reflux Gourmet (and Reflux Raft) which create a temporary protective barrier in your stomach to prevent reflux into your esophagus. This can be an effective therapy without the side effects of normal acid reducing medications and was also developed by laryngologists (LPR specialists).   Speech Therapy: education and techniques aimed at helping you control the cough     Medications  Take over-the-counter (OTC) medications, including antacids, such as Gaviscon Advance (others include Tums®, Maalox®, or Mylanta) as needed  Prescription and OTC stomach acid reducers: H2 blockers such as famotidine (Pepcid®), cimetidine (Tagamet®), ranitidine (Zantac®) OR proton pump inhibitors (PPIs), such as omeprazole (Prilosec®), pantoprazole (Protonix®), and esomeprazole (Nexium®).  Most patients will begin to notice some relief in their symptoms about 2-4 weeks after starting an acid reducing medication; however it is generally recommended the medication should be continued for at least 2 months. If the symptoms completely resolve, the medication can then be tapered.  Some people will remain symptom free while others may have relapses which required treatment again.

## 2025-05-08 RX ORDER — AMLODIPINE BESYLATE 5 MG/1
5 TABLET ORAL DAILY
Qty: 90 TABLET | Refills: 1 | OUTPATIENT
Start: 2025-05-08

## 2025-05-08 RX ORDER — TAMSULOSIN HYDROCHLORIDE 0.4 MG/1
0.4 CAPSULE ORAL DAILY
Qty: 90 CAPSULE | Refills: 1 | OUTPATIENT
Start: 2025-05-08

## 2025-05-08 RX ORDER — ACETAMINOPHEN, DIPHENHYDRAMINE HCL, PHENYLEPHRINE HCL 325; 25; 5 MG/1; MG/1; MG/1
10 TABLET ORAL NIGHTLY
Qty: 90 TABLET | Refills: 1 | OUTPATIENT
Start: 2025-05-08

## 2025-05-08 RX ORDER — ROSUVASTATIN CALCIUM 10 MG/1
10 TABLET, COATED ORAL DAILY
Qty: 90 TABLET | Refills: 1 | OUTPATIENT
Start: 2025-05-08

## 2025-05-10 NOTE — TELEPHONE ENCOUNTER
No care due was identified.  [facility] Embedded Care Due Messages. Reference number: [id number].   5/10/2025 6:55:13 PM CDT

## 2025-05-12 ENCOUNTER — CLINICAL SUPPORT (OUTPATIENT)
Dept: REHABILITATION | Facility: HOSPITAL | Age: 75
End: 2025-05-12
Payer: MEDICARE

## 2025-05-12 DIAGNOSIS — Z74.09 IMPAIRED FUNCTIONAL MOBILITY, BALANCE, GAIT, AND ENDURANCE: Primary | ICD-10-CM

## 2025-05-12 PROCEDURE — 97110 THERAPEUTIC EXERCISES: CPT | Mod: PO,CQ

## 2025-05-12 PROCEDURE — 97112 NEUROMUSCULAR REEDUCATION: CPT | Mod: PO,CQ

## 2025-05-12 PROCEDURE — 97530 THERAPEUTIC ACTIVITIES: CPT | Mod: PO,CQ

## 2025-05-12 RX ORDER — ARIPIPRAZOLE 2 MG/1
2 TABLET ORAL DAILY
Qty: 30 TABLET | Refills: 3 | Status: SHIPPED | OUTPATIENT
Start: 2025-05-12

## 2025-05-12 NOTE — PROGRESS NOTES
Outpatient Rehab    Physical Therapy Visit    Patient Name: TICO Mancia  MRN: [id number]  YOB: 1950  Encounter Date: 5/12/2025    Therapy Diagnosis:   Encounter Diagnosis   Name Primary?    Impaired functional mobility, balance, gait, and endurance Yes     Physician: Doris Moreno MD    Physician Orders: Eval and Treat  Medical Diagnosis: Parkinson's disease without dyskinesia or fluctuating manifestations  Acute midline low back pain without sciatica  Peripheral polyneuropathy    Visit # / Visits Authorized:  10 / 20  Insurance Authorization Period: 1/10/2025 to 12/31/2025  Date of Evaluation: 4/30/2025  Plan of Care Certification: 4/30/2025 to 6/11/25      PT/PTA: PTA   Number of PTA visits since last PT visit:1  Time In: 0945   Time Out: 1040  Total Time (in minutes): 55   Total Billable Time (in minutes): 55    FOTO:  Intake Score: 35%  Survey Score 2:  %  Survey Score 3:  %    Precautions:       Subjective   Pt reports to clinic reporting LBP and reported twisting it this morning.  Pain reported as 8/10.      Objective            Treatment:  Therapeutic Exercise  TE 1: Nustep: 10' L1  Manual Therapy  MT 1: long axis distraction  Balance/Neuromuscular Re-Education  NMR 1: bridges: 2 x 10  NMR 2: SLR: 2 x 10 beata  NMR 3: LAQ: 3 x 10  NMR 4: standing alt toe taps in // bars: 4 in step - 2x30 sec (UE assist at this time(  NMR 5: modified SLS: 2 x 30sec  NMR 6: Tandem walking in // bars: 3 laps  Therapeutic Activity  TA 1: STS from standard chair w/ UE assist: 3 x 5  TA 2: step ups: 2 x 10  TA 3: SPC training    Time Entry(in minutes):  Manual Therapy Time Entry: 5  Neuromuscular Re-Education Time Entry: 25  Therapeutic Activity Time Entry: 15  Therapeutic Exercise Time Entry: 10    Assessment & Plan   Assessment: Mr. MORALEZ presents to clinic with 8/10 LBP and reports he twisted it this morning. Manual therapy/long axis distraction performed to address low back pain which pt reported decreased pain post  intervention. Tx session focuses on functional LE strengthening, mobility, and balance. Pt required verbal cues in regards to improved NM quad activation during LAQ. Pt also notably challenged with balance activites and requires UE assist at this time. Also educated pt on correct ambulation with SPC and had pt demonstrate back to me. Pt had good tolerance to all activites peformed with no complaints of increased pain, however, pt notably fatigued and required several breaks. Will continue to progress as tolerated.  Evaluation/Treatment Tolerance: Patient tolerated treatment well    Patient will continue to benefit from skilled outpatient physical therapy to address the deficits listed in the problem list box on initial evaluation, provide pt/family education and to maximize pt's level of independence in the home and community environment.     Patient's spiritual, cultural, and educational needs considered and patient agreeable to plan of care and goals.           Plan: cont per PT POC    Goals:   Active       Goals               Patient will perform sit <> stand with or without UE with no VC for proper technique from PT in order to encourage independence in transfers. (Ongoing)       Start:  04/30/25    Expected End:  06/11/25            Pt will improve TUG score to </=12 seconds with cane in order to improve fall risk.   (Ongoing)       Start:  04/30/25    Expected End:  06/11/25            Patient will perform >/=8 sit to stands in 30 seconds in order to improve safety, transfers, and functional independence. (Ongoing)       Start:  04/30/25    Expected End:  06/11/25            Patient to improve LE Mmts by 1 muscle grade to improve strength for functional tasks, standing, and walking (Ongoing)       Start:  04/30/25    Expected End:  06/11/25            Patient to report ability to stand or walk for >/=15 minutes before having to take a break to improve functional capacity and endurance (Ongoing)       Start:   04/30/25    Expected End:  06/11/25                Delilah Griffin, PTA

## 2025-05-13 ENCOUNTER — PATIENT MESSAGE (OUTPATIENT)
Dept: OPTOMETRY | Facility: CLINIC | Age: 75
End: 2025-05-13
Payer: MEDICARE

## 2025-05-15 ENCOUNTER — CLINICAL SUPPORT (OUTPATIENT)
Dept: REHABILITATION | Facility: HOSPITAL | Age: 75
End: 2025-05-15
Payer: MEDICARE

## 2025-05-15 DIAGNOSIS — G89.29 CHRONIC BILATERAL LOW BACK PAIN WITHOUT SCIATICA: Primary | ICD-10-CM

## 2025-05-15 DIAGNOSIS — M54.50 CHRONIC BILATERAL LOW BACK PAIN WITHOUT SCIATICA: Primary | ICD-10-CM

## 2025-05-15 PROCEDURE — 97530 THERAPEUTIC ACTIVITIES: CPT | Mod: PO

## 2025-05-15 PROCEDURE — 97112 NEUROMUSCULAR REEDUCATION: CPT | Mod: PO

## 2025-05-20 ENCOUNTER — CLINICAL SUPPORT (OUTPATIENT)
Dept: REHABILITATION | Facility: HOSPITAL | Age: 75
End: 2025-05-20
Payer: MEDICARE

## 2025-05-20 DIAGNOSIS — Z74.09 IMPAIRED FUNCTIONAL MOBILITY, BALANCE, GAIT, AND ENDURANCE: Primary | ICD-10-CM

## 2025-05-20 PROCEDURE — 97112 NEUROMUSCULAR REEDUCATION: CPT | Mod: PO,CQ

## 2025-05-20 NOTE — PROGRESS NOTES
Outpatient Rehab    Physical Therapy Visit    Patient Name: TICO Mancia  MRN: [id number]  YOB: 1950  Encounter Date: 5/20/2025    Therapy Diagnosis:   Encounter Diagnosis   Name Primary?    Impaired functional mobility, balance, gait, and endurance Yes     Physician: Doris Moreno MD    Physician Orders: Eval and Treat  Medical Diagnosis: Parkinson's disease without dyskinesia or fluctuating manifestations  Acute midline low back pain without sciatica  Peripheral polyneuropathy    Visit # / Visits Authorized:  12 / 20  Insurance Authorization Period: 1/10/2025 to 12/31/2025  Date of Evaluation: 4/1/2025  Plan of Care Certification: 4/30/2025 to 6/11/2025      PT/PTA: PTA   Number of PTA visits since last PT visit:1  Time In: 1055   Time Out: 1130  Total Time (in minutes): 35   Total Billable Time (in minutes): 25    FOTO:  Intake Score: 35%  Survey Score 2:  %  Survey Score 3:  %    Precautions:       Subjective   Pt reports to clinic feeling dizzy and really fatigued.  Pain reported as 0/10.      Objective            Treatment:  Balance/Neuromuscular Re-Education  NMR 1: Bridges: 3x10  NMR 2: Assisted SLR w/strap: 2x10 ea  NMR 3: LAQ: 3 x 10  NMR 7: Sidelying clams: 3x10 ea  Therapeutic Activity  TA 1: STS from standard chair w/ BUE assist: 3x6    Time Entry(in minutes):  Neuromuscular Re-Education Time Entry: 30  Therapeutic Activity Time Entry: 5    Assessment & Plan   Assessment: Mr. DEMARCO presents to clinic reporting feeling dizzy and fatigued. Tx session kept light today due to increased fatigue. Pt was able to perform mat exercises okay, however, STS fatigued patient and he reported his legs were hurting as well as still feeling dizzy. Tx session cut short and pt was given a wheelchair and given assistance getting into his wife's car.  Evaluation/Treatment Tolerance: Patient limited by fatigue    Patient will continue to benefit from skilled outpatient physical therapy to address the deficits  listed in the problem list box on initial evaluation, provide pt/family education and to maximize pt's level of independence in the home and community environment.     Patient's spiritual, cultural, and educational needs considered and patient agreeable to plan of care and goals.           Plan: cont per PT POC    Goals:   Active       Goals               Patient will perform sit <> stand with or without UE with no VC for proper technique from PT in order to encourage independence in transfers. (Ongoing)       Start:  04/30/25    Expected End:  06/11/25            Pt will improve TUG score to </=12 seconds with cane in order to improve fall risk.   (Ongoing)       Start:  04/30/25    Expected End:  06/11/25            Patient will perform >/=8 sit to stands in 30 seconds in order to improve safety, transfers, and functional independence. (Ongoing)       Start:  04/30/25    Expected End:  06/11/25            Patient to improve LE Mmts by 1 muscle grade to improve strength for functional tasks, standing, and walking (Ongoing)       Start:  04/30/25    Expected End:  06/11/25            Patient to report ability to stand or walk for >/=15 minutes before having to take a break to improve functional capacity and endurance (Ongoing)       Start:  04/30/25    Expected End:  06/11/25                Delilah Griffin, PTA

## 2025-05-22 ENCOUNTER — CLINICAL SUPPORT (OUTPATIENT)
Dept: REHABILITATION | Facility: HOSPITAL | Age: 75
End: 2025-05-22
Payer: MEDICARE

## 2025-05-22 DIAGNOSIS — M54.50 CHRONIC BILATERAL LOW BACK PAIN WITHOUT SCIATICA: Primary | ICD-10-CM

## 2025-05-22 DIAGNOSIS — G89.29 CHRONIC BILATERAL LOW BACK PAIN WITHOUT SCIATICA: Primary | ICD-10-CM

## 2025-05-22 PROCEDURE — 97530 THERAPEUTIC ACTIVITIES: CPT | Mod: PO

## 2025-05-22 PROCEDURE — 97112 NEUROMUSCULAR REEDUCATION: CPT | Mod: PO

## 2025-05-27 ENCOUNTER — CLINICAL SUPPORT (OUTPATIENT)
Dept: REHABILITATION | Facility: HOSPITAL | Age: 75
End: 2025-05-27
Payer: MEDICARE

## 2025-05-27 DIAGNOSIS — G89.29 CHRONIC BILATERAL LOW BACK PAIN WITHOUT SCIATICA: Primary | ICD-10-CM

## 2025-05-27 DIAGNOSIS — M54.50 CHRONIC BILATERAL LOW BACK PAIN WITHOUT SCIATICA: Primary | ICD-10-CM

## 2025-05-27 PROCEDURE — 97530 THERAPEUTIC ACTIVITIES: CPT | Mod: PO,CQ

## 2025-05-27 PROCEDURE — 97112 NEUROMUSCULAR REEDUCATION: CPT | Mod: PO,CQ

## 2025-05-27 NOTE — PROGRESS NOTES
"  Outpatient Rehab    Physical Therapy Visit    Patient Name: TICO Mancia  MRN: [id number]  YOB: 1950  Encounter Date: 5/27/2025    Therapy Diagnosis:   Encounter Diagnosis   Name Primary?    Chronic bilateral low back pain without sciatica Yes     Physician: Doris Moreno MD    Physician Orders: Eval and Treat  Medical Diagnosis: Parkinson's disease without dyskinesia or fluctuating manifestations  Acute midline low back pain without sciatica  Peripheral polyneuropathy    Visit # / Visits Authorized:  14 / 20  Insurance Authorization Period: 1/10/2025 to 12/31/2025  Date of Evaluation: 4/1/2025  Plan of Care Certification: 4/30/2025 to 6/11/2025     PT/PTA: PTA   Number of PTA visits since last PT visit:1  Time In: 1055   Time Out: 1150  Total Time (in minutes): 55   Total Billable Time (in minutes): 25    FOTO:  Intake Score: 35%  Survey Score 2:  %  Survey Score 3:  %    Precautions:       Subjective   Pt reports mild low back pain today and states his foot neuropathy is what is bothering him at the moment..  Pain reported as 4/10.      Objective            Treatment:  Therapeutic Exercise  TE 1: Nustep: 10' L1  Manual Therapy  MT 1: Long axis hip distraction, grade III-IV leading into V bilateral  Balance/Neuromuscular Re-Education  NMR 1: Bridges: 3x10  NMR 3: LAQ: 5#, 3x10 ea  NMR 7: Sidelying clams: 3x10 ea YTB  Therapeutic Activity  TA 1: STS from standard chair w/ BUE assist: 3x6  TA 2: 6" step ups: 2x10 ea    Time Entry(in minutes):  Manual Therapy Time Entry: 5  Neuromuscular Re-Education Time Entry: 24  Therapeutic Activity Time Entry: 16  Therapeutic Exercise Time Entry: 10    Assessment & Plan   Assessment: Mr. MORALEZ presents to clinic with 4/10 LBP and reports dealing with increased neuropathy symptoms in his feet. Pt had good tolerance to activites performed including increasing weight for LAQ and adding resistance for clams. Pt remains limited in cardiovascular endurance/strrength and " required frequent breaks throughout session. Will continue to progress as tolerated.  Evaluation/Treatment Tolerance: Patient tolerated treatment well    The patient will continue to benefit from skilled outpatient physical therapy in order to address the deficits listed in the problem list on the initial evaluation, provide patient and family education, and maximize the patients level of independence in the home and community environments.     The patient's spiritual, cultural, and educational needs were considered, and the patient is agreeable to the plan of care and goals.           Plan: cont per PT POC    Goals:   Active       Goals               Patient will perform sit <> stand with or without UE with no VC for proper technique from PT in order to encourage independence in transfers. (Ongoing)       Start:  04/30/25    Expected End:  06/11/25            Pt will improve TUG score to </=12 seconds with cane in order to improve fall risk.   (Ongoing)       Start:  04/30/25    Expected End:  06/11/25            Patient will perform >/=8 sit to stands in 30 seconds in order to improve safety, transfers, and functional independence. (Ongoing)       Start:  04/30/25    Expected End:  06/11/25            Patient to improve LE Mmts by 1 muscle grade to improve strength for functional tasks, standing, and walking (Ongoing)       Start:  04/30/25    Expected End:  06/11/25            Patient to report ability to stand or walk for >/=15 minutes before having to take a break to improve functional capacity and endurance (Ongoing)       Start:  04/30/25    Expected End:  06/11/25                Delilah Griffin, PTA

## 2025-05-27 NOTE — PROGRESS NOTES
"Outpatient Rehab    Physical Therapy Visit    Patient Name: TICO Mancia  MRN: [id number]  YOB: 1950  Encounter Date: 5/22/2025    Therapy Diagnosis:   Encounter Diagnosis   Name Primary?    Chronic bilateral low back pain without sciatica Yes     Physician: Doris Moreno MD    Physician Orders: Eval and Treat  Medical Diagnosis: Parkinson's disease without dyskinesia or fluctuating manifestations  Acute midline low back pain without sciatica  Peripheral polyneuropathy    Visit # / Visits Authorized:  13 / 20  Insurance Authorization Period: 1/10/2025 to 12/31/2025  Date of Evaluation: 4/1/2025  Plan of Care Certification: 4/30/2025 to 6/11/2025      PT/PTA: PT   Number of PTA visits since last PT visit:0  Time In: 1100   Time Out: 1200  Total Time (in minutes): 60   Total Billable Time (in minutes): 30    FOTO:  Intake Score: 35%  Survey Score 2:  %  Survey Score 3:  %    Precautions:       Subjective   He is dealing with increased low back tightness and pain today..  Pain reported as 7/10.      Objective            Treatment:  Therapeutic Exercise  TE 1: Nustep: 10' L1  Manual Therapy  MT 1: Long axis hip distraction, grade III-IV leading into V bilateral  Balance/Neuromuscular Re-Education  NMR 1: Bridges: 3x10  NMR 3: LAQ: 4#, 3x10 ea  NMR 7: Sidelying clams: 3x10 ea  Therapeutic Activity  TA 1: STS from standard chair w/ BUE assist: 3x6  TA 2: 6" step ups: 2x10 ea    Time Entry(in minutes):  Manual Therapy Time Entry: 10  Neuromuscular Re-Education Time Entry: 20  Therapeutic Activity Time Entry: 20  Therapeutic Exercise Time Entry: 10    Assessment & Plan   Assessment: Mr. MORALEZ presents to session noting increased low back tightness and pain. Continued with manual interventions to improve hip mobility deficits. Treatment focus remains on working to improve functional LE strength in open and closed-chain positions to to help offload his low back. He remains quite limited by cardiovascular and " muscular fatigue with functional movements, requiring frequent rest breaks. Will continue to progress as tolerated.  Evaluation/Treatment Tolerance: Patient limited by fatigue    Patient will continue to benefit from skilled outpatient physical therapy to address the deficits listed in the problem list box on initial evaluation, provide pt/family education and to maximize pt's level of independence in the home and community environment.     Patient's spiritual, cultural, and educational needs considered and patient agreeable to plan of care and goals.           Plan: cont per PT POC    Goals:   Active       Goals               Patient will perform sit <> stand with or without UE with no VC for proper technique from PT in order to encourage independence in transfers. (Progressing)       Start:  04/30/25    Expected End:  06/11/25            Pt will improve TUG score to </=12 seconds with cane in order to improve fall risk.   (Progressing)       Start:  04/30/25    Expected End:  06/11/25            Patient will perform >/=8 sit to stands in 30 seconds in order to improve safety, transfers, and functional independence. (Progressing)       Start:  04/30/25    Expected End:  06/11/25            Patient to improve LE Mmts by 1 muscle grade to improve strength for functional tasks, standing, and walking (Progressing)       Start:  04/30/25    Expected End:  06/11/25            Patient to report ability to stand or walk for >/=15 minutes before having to take a break to improve functional capacity and endurance (Progressing)       Start:  04/30/25    Expected End:  06/11/25                Gerardo Barlow, PT

## 2025-05-27 NOTE — PROGRESS NOTES
"Outpatient Rehab    Physical Therapy Visit    Patient Name: TICO Mancia  MRN: [id number]  YOB: 1950  Encounter Date: 5/15/2025    Therapy Diagnosis:   Encounter Diagnosis   Name Primary?    Chronic bilateral low back pain without sciatica Yes     Physician: Doris Moreno MD    Physician Orders: Eval and Treat  Medical Diagnosis: Parkinson's disease without dyskinesia or fluctuating manifestations  Acute midline low back pain without sciatica  Peripheral polyneuropathy    Visit # / Visits Authorized:  11 / 20  Insurance Authorization Period: 1/10/2025 to 12/31/2025  Date of Evaluation: 4/30/2025  Plan of Care Certification: 4/30/2025 to 6/11/2025      PT/PTA: PT   Number of PTA visits since last PT visit:0  Time In: 1400   Time Out: 1500  Total Time (in minutes): 60   Total Billable Time (in minutes): 30    FOTO:  Intake Score: 35%  Survey Score 2:  %  Survey Score 3:  %    Precautions:       Subjective   Low back is still hurting but a bit better than at prior visit..  Pain reported as 7/10.      Objective            Treatment:  Therapeutic Exercise  TE 1: Nustep: 10' L1  Manual Therapy  MT 1: Long axis hip distraction, grade III-IV bilateral  Balance/Neuromuscular Re-Education  NMR 1: Bridges: 3x10  NMR 2: Assisted SLR w/strap: 2x10 ea  NMR 7: Sidelying clams: 3x10 ea  Therapeutic Activity  TA 1: STS from standard chair w/ BUE assist: 3x6  TA 2: 6" step ups: 2x10 ea    Time Entry(in minutes):  Manual Therapy Time Entry: 10  Neuromuscular Re-Education Time Entry: 25  Therapeutic Activity Time Entry: 15  Therapeutic Exercise Time Entry: 10    Assessment & Plan   Assessment: Mr. MORALEZ presents to therapy noting slight decrease in low back pain compared to prior visit. Manual interventions continued to be performed to improve bilateral hip hypomobility. Reinforced this with glute and functional closed-chain lower extremity strengthening. Will continue to progress as tolerated.  Evaluation/Treatment " Tolerance: Patient tolerated treatment well    Patient will continue to benefit from skilled outpatient physical therapy to address the deficits listed in the problem list box on initial evaluation, provide pt/family education and to maximize pt's level of independence in the home and community environment.     Patient's spiritual, cultural, and educational needs considered and patient agreeable to plan of care and goals.           Plan: cont per PT POC    Goals:   Active       Goals               Patient will perform sit <> stand with or without UE with no VC for proper technique from PT in order to encourage independence in transfers. (Ongoing)       Start:  04/30/25    Expected End:  06/11/25            Pt will improve TUG score to </=12 seconds with cane in order to improve fall risk.   (Ongoing)       Start:  04/30/25    Expected End:  06/11/25            Patient will perform >/=8 sit to stands in 30 seconds in order to improve safety, transfers, and functional independence. (Ongoing)       Start:  04/30/25    Expected End:  06/11/25            Patient to improve LE Mmts by 1 muscle grade to improve strength for functional tasks, standing, and walking (Ongoing)       Start:  04/30/25    Expected End:  06/11/25            Patient to report ability to stand or walk for >/=15 minutes before having to take a break to improve functional capacity and endurance (Ongoing)       Start:  04/30/25    Expected End:  06/11/25                Gerardo Barlow, PT

## 2025-05-29 DIAGNOSIS — G57.11 MERALGIA PARESTHETICA OF RIGHT SIDE: ICD-10-CM

## 2025-05-29 DIAGNOSIS — G89.4 CHRONIC PAIN SYNDROME: ICD-10-CM

## 2025-05-29 DIAGNOSIS — M54.16 LUMBAR RADICULITIS: ICD-10-CM

## 2025-05-29 DIAGNOSIS — G60.9 IDIOPATHIC PERIPHERAL NEUROPATHY: ICD-10-CM

## 2025-06-02 DIAGNOSIS — M70.61 TROCHANTERIC BURSITIS OF RIGHT HIP: ICD-10-CM

## 2025-06-02 DIAGNOSIS — G62.9 PERIPHERAL POLYNEUROPATHY: ICD-10-CM

## 2025-06-02 RX ORDER — PREGABALIN 50 MG/1
CAPSULE ORAL
Qty: 60 CAPSULE | Refills: 1 | OUTPATIENT
Start: 2025-06-02

## 2025-06-03 ENCOUNTER — CLINICAL SUPPORT (OUTPATIENT)
Dept: REHABILITATION | Facility: HOSPITAL | Age: 75
End: 2025-06-03
Payer: MEDICARE

## 2025-06-03 DIAGNOSIS — M54.50 CHRONIC BILATERAL LOW BACK PAIN WITHOUT SCIATICA: Primary | ICD-10-CM

## 2025-06-03 DIAGNOSIS — G89.29 CHRONIC BILATERAL LOW BACK PAIN WITHOUT SCIATICA: Primary | ICD-10-CM

## 2025-06-03 PROCEDURE — 97140 MANUAL THERAPY 1/> REGIONS: CPT | Mod: PO

## 2025-06-03 PROCEDURE — 97110 THERAPEUTIC EXERCISES: CPT | Mod: PO

## 2025-06-03 PROCEDURE — 97112 NEUROMUSCULAR REEDUCATION: CPT | Mod: PO

## 2025-06-03 PROCEDURE — 97530 THERAPEUTIC ACTIVITIES: CPT | Mod: PO

## 2025-06-03 RX ORDER — FLUOXETINE HYDROCHLORIDE 40 MG/1
40 CAPSULE ORAL DAILY
Qty: 30 CAPSULE | Refills: 0 | Status: SHIPPED | OUTPATIENT
Start: 2025-06-03

## 2025-06-04 RX ORDER — PREGABALIN 150 MG/1
150 CAPSULE ORAL 2 TIMES DAILY
Qty: 60 CAPSULE | Refills: 2 | Status: SHIPPED | OUTPATIENT
Start: 2025-06-04 | End: 2025-09-02

## 2025-06-05 ENCOUNTER — CLINICAL SUPPORT (OUTPATIENT)
Dept: REHABILITATION | Facility: HOSPITAL | Age: 75
End: 2025-06-05
Payer: MEDICARE

## 2025-06-05 DIAGNOSIS — G89.29 CHRONIC BILATERAL LOW BACK PAIN WITHOUT SCIATICA: Primary | ICD-10-CM

## 2025-06-05 DIAGNOSIS — M54.50 CHRONIC BILATERAL LOW BACK PAIN WITHOUT SCIATICA: Primary | ICD-10-CM

## 2025-06-05 PROCEDURE — 97112 NEUROMUSCULAR REEDUCATION: CPT | Mod: PO

## 2025-06-05 PROCEDURE — 97110 THERAPEUTIC EXERCISES: CPT | Mod: PO

## 2025-06-05 PROCEDURE — 97530 THERAPEUTIC ACTIVITIES: CPT | Mod: PO

## 2025-06-05 PROCEDURE — 97140 MANUAL THERAPY 1/> REGIONS: CPT | Mod: PO

## 2025-06-05 NOTE — PROGRESS NOTES
"Outpatient Rehab    Physical Therapy Visit    Patient Name: TICO Mancia  MRN: [id number]  YOB: 1950  Encounter Date: 6/5/2025    Therapy Diagnosis:   Encounter Diagnosis   Name Primary?    Chronic bilateral low back pain without sciatica Yes     Physician: Doris Moreno MD    Physician Orders: Eval and Treat  Medical Diagnosis: Parkinson's disease without dyskinesia or fluctuating manifestations  Acute midline low back pain without sciatica  Peripheral polyneuropathy  Surgical Diagnosis: Not applicable for this Episode   Surgical Date: Not applicable for this Episode    Visit # / Visits Authorized:  16 / 20 (Visit 7 + EVAL this POC)  Insurance Authorization Period: 1/10/2025 to 12/31/2025  Date of Evaluation: 4/30/2025  Plan of Care Certification: 4/30/2025 to 6/11/2025      PT/PTA: PT   Number of PTA visits since last PT visit:0  Time In: 1100   Time Out: 1200  Total Time (in minutes): 60   Total Billable Time (in minutes): 60    FOTO:  Intake Score: 35%  Survey Score 2:  %  Survey Score 3:  %    Precautions:       Subjective   He continues with moderate low back and feet pain. He was very worn out after last visit..  Pain reported as 5/10.      Objective            Treatment:  Therapeutic Exercise  TE 1: Nustep: 8'  TE 2: Seated swiss ball rollouts: 30x with 1" hold  TE 3: Seated thoracic extension: 20x with 3" hold  TE 4: LTR: 20x ea  Manual Therapy  MT 1: Long axis hip distraction, grade III-IV leading into V bilateral  Balance/Neuromuscular Re-Education  NMR 1: Bridges: 3x10  NMR 3: LAQ: 7#, 3x15 ea  NMR 7: Sidelying clams: YTB, 3x10 ea  Therapeutic Activity  TA 1: Sit<>stands from hi-lo mat w/ BUE assist: 3x6  TA 2: 6" step ups: 2x10 ea    Time Entry(in minutes):  Manual Therapy Time Entry: 10  Neuromuscular Re-Education Time Entry: 15  Therapeutic Activity Time Entry: 15  Therapeutic Exercise Time Entry: 20    Assessment & Plan   Assessment: Mr. MORALEZ present to session noting continued moderate " low back and feet pain. He also reports feeling very fatigued following prior session. Secondary to this, kept intensity of interventions similar. Focus remains on working to improve thoracic and lumbar mobility as well as functional LE endurance/strength. Will continue to progress as tolerated.  Evaluation/Treatment Tolerance: Patient limited by fatigue    The patient will continue to benefit from skilled outpatient physical therapy in order to address the deficits listed in the problem list on the initial evaluation, provide patient and family education, and maximize the patients level of independence in the home and community environments.     The patient's spiritual, cultural, and educational needs were considered, and the patient is agreeable to the plan of care and goals.           Plan: Cont per PT POC    Goals:   Active       Goals               Patient will perform sit <> stand with or without UE with no VC for proper technique from PT in order to encourage independence in transfers. (Progressing)       Start:  04/30/25    Expected End:  06/11/25            Pt will improve TUG score to </=12 seconds with cane in order to improve fall risk.   (Progressing)       Start:  04/30/25    Expected End:  06/11/25            Patient will perform >/=8 sit to stands in 30 seconds in order to improve safety, transfers, and functional independence. (Progressing)       Start:  04/30/25    Expected End:  06/11/25            Patient to improve LE Mmts by 1 muscle grade to improve strength for functional tasks, standing, and walking (Progressing)       Start:  04/30/25    Expected End:  06/11/25            Patient to report ability to stand or walk for >/=15 minutes before having to take a break to improve functional capacity and endurance (Progressing)       Start:  04/30/25    Expected End:  06/11/25                Gerardo Barlow, PT

## 2025-06-10 ENCOUNTER — CLINICAL SUPPORT (OUTPATIENT)
Dept: REHABILITATION | Facility: HOSPITAL | Age: 75
End: 2025-06-10
Payer: MEDICARE

## 2025-06-10 DIAGNOSIS — G89.29 CHRONIC BILATERAL LOW BACK PAIN WITHOUT SCIATICA: Primary | ICD-10-CM

## 2025-06-10 DIAGNOSIS — M54.50 CHRONIC BILATERAL LOW BACK PAIN WITHOUT SCIATICA: Primary | ICD-10-CM

## 2025-06-10 PROCEDURE — 97530 THERAPEUTIC ACTIVITIES: CPT | Mod: PO

## 2025-06-10 PROCEDURE — 97110 THERAPEUTIC EXERCISES: CPT | Mod: PO

## 2025-06-10 NOTE — PROGRESS NOTES
Outpatient Rehab    Physical Therapy Progress Note : Updated Plan of Care    Patient Name: TICO Mancia  MRN: [id number]  YOB: 1950  Encounter Date: 6/10/2025    Therapy Diagnosis:   Encounter Diagnosis   Name Primary?    Chronic bilateral low back pain without sciatica Yes     Physician: Doris Moreno MD    Physician Orders: Eval and Treat  Medical Diagnosis: Parkinson's disease without dyskinesia or fluctuating manifestations  Acute midline low back pain without sciatica  Peripheral polyneuropathy  Surgical Diagnosis: Not applicable for this Episode   Surgical Date: Not applicable for this Episode    Visit # / Visits Authorized:  17 / 20  Insurance Authorization Period: 1/10/2025 to 12/31/2025  Date of Evaluation: 4/30/2025   Plan of Care Certification: 6/10/2025 to 7/8/2025      PT/PTA: PT   Number of PTA visits since last PT visit:0  Time In: 1100   Time Out: 1200  Total Time (in minutes): 60   Total Billable Time (in minutes): 60    FOTO:  Intake Score: 35%  Survey Score 2:  %  Survey Score 3:  %    Precautions:       Subjective   Low back continues to hurt. Today is a better day for his foot neuropathy..  Pain reported as 5/10.      Objective      Lumbar Range of Motion   Active (deg) Passive (deg) Pain   Flexion 70   Yes   Extension 60   Yes   Right Lateral Flexion 50   Yes   Right Rotation         Left Lateral Flexion 50   Yes   Left Rotation                  Hip Range of Motion   Right Hip   Active (deg) Passive (deg) Pain   Flexion   85     Extension         ABduction         ADduction         External Rotation 90/90   45     External Rotation Prone         Internal Rotation 90/90   20     Internal Rotation Prone             Left Hip   Active (deg) Passive (deg) Pain   Flexion   85     Extension         ABduction         ADduction         External Rotation 90/90   45     External Rotation Prone         Internal Rotation 90/90   20     Internal Rotation Prone                            Hip  "Strength - Planes of Motion   Right Strength Right Pain Left Strength Left  Pain   Flexion (L2) 4+   4+     Extension 4   4     ABduction 4+   4+     ADduction 4+   4+     Internal Rotation           External Rotation               Hip Strength Details  Seated    Knee Strength   Right Strength Right Pain Left Strength Left  Pain   Flexion (S2) 4   4     Prone Flexion           Extension (L3) 4+   5                   Treatment:  Therapeutic Exercise  TE 1: Nustep: 10'  TE 3: Seated thoracic extension: 20x with 3" hold  TE 5: Reassessment  Manual Therapy  MT 1: Long axis hip distraction, grade III-IV leading into V bilateral  Balance/Neuromuscular Re-Education  NMR 1: Bridges: 3x10  NMR 7: Sidelying clams: YTB, 3x10 ea  Therapeutic Activity  TA 1: Sit<>stands from hi-lo mat w/ BUE assist: 3x8  TA 2: 6" step ups: 2x10 ea with BUE assist    Time Entry(in minutes):  Manual Therapy Time Entry: 10  Neuromuscular Re-Education Time Entry: 15  Therapeutic Activity Time Entry: 15  Therapeutic Exercise Time Entry: 20    Assessment & Plan   Assessment  C J                 Assessment Details: Mr. MORALEZ has been seen for 8 visits since initial evaluation. He continues to report moderate low back symptoms but does appear to be improving with functional lower extremity strength. Furthermore, he continues to have deficits in cardiovascular and muscular endurance. He remains a good candidate for PT to continue working to improve his thoracic, hip, and lumbar mobility as well as functional lower extremity endurance/strength.    Plan  From a physical therapy perspective, the patient would benefit from: Skilled Rehab Services    Planned therapy interventions include: Therapeutic exercise, Therapeutic activities, Neuromuscular re-education, Manual therapy, and Gait training.            Visit Frequency: 2 times Per Week for 4 Weeks.       This plan was discussed with Patient.   Discussion participants: Agreed Upon Plan of Care  Plan details: " Improve his thoracic, hip, and lumbar mobility as well as functional lower extremity endurance/strength          The patient will continue to benefit from skilled outpatient physical therapy in order to address the deficits listed in the problem list on the initial evaluation, provide patient and family education, and maximize the patients level of independence in the home and community environments.     The patient's spiritual, cultural, and educational needs were considered, and the patient is agreeable to the plan of care and goals.           Goals:   Active       Goals               Patient will perform sit <> stand with or without UE with no VC for proper technique from PT in order to encourage independence in transfers. (Progressing)       Start:  04/30/25    Expected End:  06/11/25            Pt will improve TUG score to </=12 seconds with cane in order to improve fall risk.   (Progressing)       Start:  04/30/25    Expected End:  06/11/25            Patient will perform >/=8 sit to stands in 30 seconds in order to improve safety, transfers, and functional independence. (Progressing)       Start:  04/30/25    Expected End:  06/11/25            Patient to improve LE Mmts by 1 muscle grade to improve strength for functional tasks, standing, and walking (Progressing)       Start:  04/30/25    Expected End:  06/11/25            Patient to report ability to stand or walk for >/=15 minutes before having to take a break to improve functional capacity and endurance (Progressing)       Start:  04/30/25    Expected End:  06/11/25                Gerardo Barlow, PT

## 2025-06-12 ENCOUNTER — CLINICAL SUPPORT (OUTPATIENT)
Dept: REHABILITATION | Facility: HOSPITAL | Age: 75
End: 2025-06-12
Payer: MEDICARE

## 2025-06-12 DIAGNOSIS — G89.29 CHRONIC BILATERAL LOW BACK PAIN WITHOUT SCIATICA: Primary | ICD-10-CM

## 2025-06-12 DIAGNOSIS — M54.50 CHRONIC BILATERAL LOW BACK PAIN WITHOUT SCIATICA: Primary | ICD-10-CM

## 2025-06-12 PROCEDURE — 97110 THERAPEUTIC EXERCISES: CPT | Mod: PO,CQ

## 2025-06-12 PROCEDURE — 97530 THERAPEUTIC ACTIVITIES: CPT | Mod: PO,CQ

## 2025-06-12 NOTE — PROGRESS NOTES
"  Outpatient Rehab    Physical Therapy Visit    Patient Name: TICO Mancia  MRN: [id number]  YOB: 1950  Encounter Date: 6/12/2025    Therapy Diagnosis:   Encounter Diagnosis   Name Primary?    Chronic bilateral low back pain without sciatica Yes     Physician: Doris Moreno MD    Physician Orders: Eval and Treat  Medical Diagnosis: Parkinson's disease without dyskinesia or fluctuating manifestations  Acute midline low back pain without sciatica  Peripheral polyneuropathy  Surgical Diagnosis: Not applicable for this Episode   Surgical Date: Not applicable for this Episode    Visit # / Visits Authorized:  18 / 20  Insurance Authorization Period: 1/10/2025 to 6/17/2025  Date of Evaluation: 1/10/2025  4/30/2025  Plan of Care Certification: 6/10/2025 to 7/8/2025      PT/PTA: PTA   Number of PTA visits since last PT visit:1  Time In: 1046   Time Out: 1136  Total Time (in minutes): 50   Total Billable Time (in minutes): 23    FOTO:  Intake Score: 35%  Survey Score 2:  %  Survey Score 3:  %    Precautions:       Subjective   Pt reports low back is feeling better but he states he is feeling tired this morning.  Pain reported as 0/10.      Objective            Treatment:  Therapeutic Exercise  TE 1: Nustep: 10'  TE 2: Seated swiss ball rollouts: 30x with 1" hold  TE 3: Seated thoracic extension: 20x with 3" hold  TE 4: LTR: 20x ea  Balance/Neuromuscular Re-Education  NMR 1: Bridges: 3x10  NMR 7: Sidelying clams: YTB, 3x10 ea  Therapeutic Activity  TA 1: Sit<>stands from hi-lo mat w/ BUE assist: 3x8  TA 2: 6" step ups: 2x10 ea with BUE assist    Time Entry(in minutes):  Neuromuscular Re-Education Time Entry: 16  Therapeutic Activity Time Entry: 14  Therapeutic Exercise Time Entry: 20    Assessment & Plan   Assessment: Mr. MORALEZ presents to clinic with no complaints of back pain today but reports feeling very fatigued. Pt performed all exercises with no complaints. He required several breaks throughout session due to " increased fatigue. Will continue to progress as tolerated.  Evaluation/Treatment Tolerance: Patient tolerated treatment well    The patient will continue to benefit from skilled outpatient physical therapy in order to address the deficits listed in the problem list on the initial evaluation, provide patient and family education, and maximize the patients level of independence in the home and community environments.     The patient's spiritual, cultural, and educational needs were considered, and the patient is agreeable to the plan of care and goals.           Plan: Cont per PT POC    Goals:   Active       Goals               Patient will perform sit <> stand with or without UE with no VC for proper technique from PT in order to encourage independence in transfers. (Ongoing)       Start:  04/30/25    Expected End:  07/08/25            Pt will improve TUG score to </=12 seconds with cane in order to improve fall risk.   (Ongoing)       Start:  04/30/25    Expected End:  07/08/25            Patient will perform >/=8 sit to stands in 30 seconds in order to improve safety, transfers, and functional independence. (Ongoing)       Start:  04/30/25    Expected End:  07/08/25            Patient to improve LE Mmts by 1 muscle grade to improve strength for functional tasks, standing, and walking (Ongoing)       Start:  04/30/25    Expected End:  07/08/25            Patient to report ability to stand or walk for >/=15 minutes before having to take a break to improve functional capacity and endurance (Ongoing)       Start:  04/30/25    Expected End:  07/08/25                Delilah Griffin, PTA

## 2025-06-18 DIAGNOSIS — G20.A1 PARKINSON'S DISEASE WITHOUT DYSKINESIA OR FLUCTUATING MANIFESTATIONS: Primary | ICD-10-CM

## 2025-06-19 ENCOUNTER — PATIENT MESSAGE (OUTPATIENT)
Dept: PRIMARY CARE CLINIC | Facility: CLINIC | Age: 75
End: 2025-06-19
Payer: MEDICARE

## 2025-06-29 ENCOUNTER — PATIENT MESSAGE (OUTPATIENT)
Dept: PRIMARY CARE CLINIC | Facility: CLINIC | Age: 75
End: 2025-06-29
Payer: MEDICARE

## 2025-06-29 DIAGNOSIS — M54.16 LUMBAR RADICULITIS: ICD-10-CM

## 2025-06-29 DIAGNOSIS — G57.11 MERALGIA PARESTHETICA OF RIGHT SIDE: ICD-10-CM

## 2025-06-29 DIAGNOSIS — G89.4 CHRONIC PAIN SYNDROME: ICD-10-CM

## 2025-06-29 DIAGNOSIS — G60.9 IDIOPATHIC PERIPHERAL NEUROPATHY: ICD-10-CM

## 2025-06-29 NOTE — TELEPHONE ENCOUNTER
No care due was identified.  [facility] Embedded Care Due Messages. Reference number: [id number].   6/29/2025 8:02:55 AM CDT

## 2025-06-30 RX ORDER — PREGABALIN 150 MG/1
150 CAPSULE ORAL 2 TIMES DAILY
Qty: 180 CAPSULE | Refills: 0 | Status: SHIPPED | OUTPATIENT
Start: 2025-06-30 | End: 2025-09-28

## 2025-06-30 RX ORDER — FLUOXETINE HYDROCHLORIDE 40 MG/1
40 CAPSULE ORAL DAILY
Qty: 30 CAPSULE | Refills: 0 | Status: SHIPPED | OUTPATIENT
Start: 2025-06-30

## 2025-06-30 RX ORDER — QUETIAPINE FUMARATE 100 MG/1
100 TABLET, FILM COATED ORAL NIGHTLY
Qty: 30 TABLET | Refills: 0 | Status: SHIPPED | OUTPATIENT
Start: 2025-06-30

## 2025-07-21 ENCOUNTER — PATIENT MESSAGE (OUTPATIENT)
Dept: BARIATRICS | Facility: CLINIC | Age: 75
End: 2025-07-21
Payer: MEDICARE

## 2025-07-21 DIAGNOSIS — E66.813 CLASS 3 SEVERE OBESITY DUE TO EXCESS CALORIES WITH SERIOUS COMORBIDITY AND BODY MASS INDEX (BMI) OF 40.0 TO 44.9 IN ADULT: ICD-10-CM

## 2025-07-21 DIAGNOSIS — G47.33 OSA (OBSTRUCTIVE SLEEP APNEA): Primary | ICD-10-CM

## 2025-07-22 RX ORDER — TIRZEPATIDE 2.5 MG/.5ML
2.5 INJECTION, SOLUTION SUBCUTANEOUS
Qty: 4 PEN | Refills: 0 | Status: SHIPPED | OUTPATIENT
Start: 2025-07-22

## 2025-07-22 RX ORDER — TIRZEPATIDE 7.5 MG/.5ML
7.5 INJECTION, SOLUTION SUBCUTANEOUS
Qty: 4 PEN | Refills: 0 | Status: SHIPPED | OUTPATIENT
Start: 2025-09-16

## 2025-07-22 RX ORDER — TIRZEPATIDE 5 MG/.5ML
5 INJECTION, SOLUTION SUBCUTANEOUS
Qty: 4 PEN | Refills: 0 | Status: SHIPPED | OUTPATIENT
Start: 2025-08-19

## 2025-07-31 RX ORDER — QUETIAPINE FUMARATE 100 MG/1
100 TABLET, FILM COATED ORAL NIGHTLY
Qty: 30 TABLET | Refills: 0 | Status: SHIPPED | OUTPATIENT
Start: 2025-07-31

## 2025-07-31 NOTE — TELEPHONE ENCOUNTER
No care due was identified.  [facility] Embedded Care Due Messages. Reference number: [id number].   7/31/2025 3:40:04 AM CDT

## 2025-08-04 NOTE — PROGRESS NOTES
[facility]   Physical Therapy Evaluation     at [facility]     Date: 8/5/2025    Name: Zeenat Mancia   MRN: [id number]    Therapy Diagnosis:   Encounter Diagnoses   Name Primary?    Parkinson's disease without dyskinesia or fluctuating manifestations     Impaired functional mobility, balance, gait, and endurance Yes      Physician: Dr. Sangita Kimbrough MD  Physician Orders: Ambulatory Referral for Physical Therapy at PD Clinic  Plan of Care Expiration: Evaluation Only  Medical Diagnosis: Parkinsonism     Time In: 09:08  Time Out: 10:04  Total Billable Time: 56 minutes    Precautions: Standard and Fall    SUBJECTIVE   History of Present Illness: Zeenat is a 75 y.o. male that presents to [facility] secondary to dx of Parkinson's.     Patient reports: he has tremors in his hands and balance difficulty, especially walking. He has ongoing problems with his feet but he denies this being due to Parkinsonism. He saw PT - originally at [facility] for the balance/coordination and then at [facility] for his back pain. He got a tarsal tunnel surgery on both feet and it helped some but his toes are still numb. He does have some trouble with holding onto items. His wife feels like he is not aware of his surroundings at times. She also notes that he can get off balance and dizzy when standing up and walking. He denies a room spinning feeling but can get light headed when standing up fast. He attributes this to his blood pressure. He is taking carbidopa/levodopa and his wife thinks he doesn't shuffle as much.     Family Present at time of Eval: wife, Kerri     Date of Initial Diagnosis: 2024  Prior Therapy: yes, PT at [facility] and [facility] 1st floor  What were your initial symptoms of Parkinson disease? Tremors     Falls: none recently, few over the last couple of years; last one was a mild in the bathroom   Near falls: sometimes (monthly)       Pain:  Current 0/10, aches and occasional pain when moving the wrong way  Location: low back     Pt stated goals: to be assessed for Parkinsonism-related needs    Medical History:   Past Medical History:   Diagnosis Date    A-fib     Acute blood loss anemia     Anxiety ?    Arthritis     Atrial flutter     Cataract ,?    Colon polyp     Depression     ³³    Diverticulosis     Dyslipidemia     Dyspnea on exertion 05/31/2021    GERD (gastroesophageal reflux disease)     on Protonix    Hepatic steatosis     Hyperlipidemia     Hypertension     Irregular heart beat     Lower GI bleed 05/31/2021    Multiple gastric ulcers 05/16/2018    gi scope per Dr. Houston    Obesity     Sleep apnea     uses CPAP    Unspecified disorder of kidney and ureter     kidney stones     Surgical History:   Zeenat Mancia  has a past surgical history that includes Eye surgery; Tonsillectomy; Appendectomy; Cataract extraction (2009); Cataract extraction w/  intraocular lens implant (Right, 06/03/2015); Upper gastrointestinal endoscopy (05/16/2018); Colonoscopy (N/A, 10/10/2017); Colonoscopy (N/A, 05/16/2018); Esophagogastroduodenoscopy (N/A, 07/02/2018); Left heart catheterization (N/A, 01/04/2021); Coronary angiography (N/A, 01/04/2021); Robot-assisted cholecystectomy (N/A, 02/04/2021); Treatment of cardiac arrhythmia (N/A, 04/19/2021); Treatment of cardiac arrhythmia (N/A, 05/14/2021); Transesophageal echocardiography (N/A, 05/14/2021); Esophagogastroduodenoscopy (N/A, 06/02/2021); Colonoscopy (N/A, 06/02/2021); Cystoscopy with insertion of minimally invasive implant to enlarge prostatic urethra (N/A, 06/29/2021); Colonoscopy (N/A, 10/11/2022); Adenoidectomy (1958); Cholecystectomy (2021); Tarsal tunnel release (Right, 07/18/2024); and Tarsal tunnel release (Left, 09/18/2024).  Medications:   Zeenat has a current medication list which includes the following prescription(s): amlodipine, aripiprazole, atenolol, carbidopa-levodopa  mg,  cyclobenzaprine, eliquis, fluoxetine, furosemide, melatonin, mirtazapine, mv-min/folic/k1/lycopen/lutein, pregabalin, quetiapine, rosuvastatin, sildenafil, spironolactone, tamsulosin, zepbound, [START ON 8/19/2025] zepbound, and [START ON 9/16/2025] zepbound.  Allergies:   Review of patient's allergies indicates:   Allergen Reactions    Lipitor [atorvastatin] Itching        Medication Information   Medication for Parkinson disease: Sinemet  Any issues: none, but notices less shuffling     Social Information   Place of Residence (Steps/Adaptations): one story home with 1 step to enter (threshold), walk-in shower   DME owned: rollator - used for longer distances; cane - used within the house; shower chair; grab bars in the bathroom  Work: retired; used to work in management          Hobbies: mainly at home, watches TV    OBJECTIVE   Speech: no change  Mental status: alert, oriented to person, place, and time, normal mood, behavior, speech, dress, motor activity, and thought processes  Appearance: Casually dressed  Behavior:  calm, cooperative, and adequate rapport can be established  Attention Span and Concentration: Normal  Posture Alignment: slouched posture  Sensation: Light Touch: Impaired: bilateral feet (toes)  Tone: WFL    Coordination:  - LE coordination: intact    Dominant hand: right     Lower Extremity ROM    AROM WFL except hip flexion    PROM WFL     Lower Extremity Strength  Right LE  Left LE    Hip flexion: 3-/5 Hip flexion: 3-/5   Hip extension:  5/5 Hip extension: 5/5   Hip abduction: 4+/5 Hip abduction: 4+/5   Hip adduction: 4+/5 Hip adduction 4+/5   Knee extension: 5/5 Knee extension: 5/5   Knee flexion: 5/5 Knee flexion: 5/5   Ankle dorsiflexion: 5/5 Ankle dorsiflexion: 4+/5   Ankle plantarflexion: 5/5 Ankle plantarflexion: 5/5       Sitting balance static: good  Sitting balance dynamic: good  Standing balance static: good  Standing balance dynamic:  fair    LE strength and endurance - done with  Exer[employer]  5 times sit-stand 18.3 seconds with arms; 361.8W  RPE = 7/10     Gait Assessment:   - AD used: rollator  - Assistance: none  -Deviations with ambulation: decreased gait speed, excessive anterior trunk lean onto rolling walker, mildly decreased foot clearance bilateral. No shuffling noted, no freezing of gait, no difficulty with turns.   -Impairments contributing to deviations: decreased activity tolerance, decreased strength, decreased endurance    Endurance Deficit: moderate to significant    Functional Mobility (Bed mobility, transfers)  Bed mobility: independence  Supine to sit: independence  Sit to supine: moderate assistance (wife assists getting out at times)  Transfers to bed: independence  Transfers to toilet: modified independence  Tub/shower transfers: modified independence  Sit to stand:  modified independence  Stand pivot: modified independence  Car transfers: modified independence  Wheelchair mobility: N/A    Treatment    No Treatment Provided today.     Education Provided     Education provided re: POC, Educated pt on purpose of PT services now and in the future to assist with mobility training and adaptation education as the PD disease process progresses. Zeenat verbalized good understanding of education provided. Pt has no cultural, educational or language barriers to learning provided.    Written Home Exercises Provided: none provided today    ASSESSMENT    Zeenat is a 75 y.o. male referred to outpatient Physical Therapy with a medical diagnosis of Parkinsonism. Pt presents with mildly decreased lower extremity strength but significantly decreased endurance and activity tolerance. Most notably, he is unable to ambulate past household distances without needing to take a rest break. He also had audible wheezing while performing simple mobility tasks. He would benefit greatly from outpatient PT services to address his debility and generally decreased mobility deficits due to endurance to  facilitate increased independence and QOL.       Equipment recommendations:    none     Order/referral recommendations:    outpatient physical therapy within local [facility] system - referral for debility, focusing on improving general activity tolerance and endurance         Pt prognosis is Fair.     Plan of care discussed with patient: Yes  Pt's spiritual, cultural and educational needs considered and patient is agreeable to the plan of care.    Anticipated Barriers for therapy: co-morbidities     Medical Necessity is demonstrated by the following  History  Co-morbidities and personal factors that may impact the plan of care Co-morbidities: see above     Personal Factors:   age  lifestyle     high   Examination  Body Structures and Functions, activity limitations and participation restrictions that may impact the plan of care Body Regions:   back  lower extremities  upper extremities  trunk    Body Systems:    strength  gross coordinated movement  balance  gait  transitions  heart rate  respiratory rate  blood pressure  edema    Activity limitations:   Learning and applying knowledge  no deficits    General Tasks and Commands  no deficits    Communication  no deficits    Mobility  fine hand use (grasping/picking up)  walking  driving (bike, car, motorcycle)    Self care  looking after one's health    Domestic Life  doing house work (cleaning house, washing dishes, laundry)  assisting others    Life Areas  no deficits    Community and Social Life  no deficits         moderate   Clinical Presentation evolving clinical presentation with changing clinical characteristics moderate   Decision Making/ Complexity Score: moderate       Plan   Plan of care Certification: 8/5/2025 Evaluation Only    Pt to continue to follow up with referring provider at physician recommended intervals.       Mickie Bai, PT

## 2025-08-04 NOTE — PROGRESS NOTES
"[facility]   Occupational Therapy Evaluation     at [facility]   Today's Date: 8/5/2025  Name: Zeenat Mancia "CJ"  MRN: [id number]      Therapy Diagnosis:   Encounter Diagnoses   Name Primary?    Parkinson's disease without dyskinesia or fluctuating manifestations     Physical debility Yes      Physician: Dr. Kimbrough  Physician Orders: Ambulatory Referral for Occupational Therapy at PD Clinic  Plan of Care Expiration: Evaluation Only  Medical Diagnosis:  G20.A1 (ICD-10-CM) - Parkinson's disease, unspecified whether dyskinesia present, unspecified whether manifestations fluctuate     Time In: 9:08  Time Out: 10:02  Total Billable Time: 54 minutes     Precautions: Standard    SUBJECTIVE   Patient reports: overall decline in physical activity      History of Present Illness: Zeenat is a 75 y.o. male that presents to [facility] secondary to dx of PD. Symptom onset was tremors (bilateral hands), balance difficulties. Pt reports likely drug induced PD vs Parkinson diagnosis. Pt from outside provider and transfer to PD multi-D clinic.     Medical History:   Past Medical History:   Diagnosis Date    A-fib     Acute blood loss anemia     Anxiety ?    Arthritis     Atrial flutter     Cataract ,?    Colon polyp     Depression     ³³    Diverticulosis     Dyslipidemia     Dyspnea on exertion 05/31/2021    GERD (gastroesophageal reflux disease)     on Protonix    Hepatic steatosis     Hyperlipidemia     Hypertension     Irregular heart beat     Lower GI bleed 05/31/2021    Multiple gastric ulcers 05/16/2018    gi scope per Dr. Houston    Obesity     Sleep apnea     uses CPAP    Unspecified disorder of kidney and ureter     kidney stones     Surgical History:   Zeenat Mancia  has a past surgical history that includes Eye surgery; Tonsillectomy; Appendectomy; Cataract extraction (2009); Cataract extraction w/  intraocular lens implant (Right, " "06/03/2015); Upper gastrointestinal endoscopy (05/16/2018); Colonoscopy (N/A, 10/10/2017); Colonoscopy (N/A, 05/16/2018); Esophagogastroduodenoscopy (N/A, 07/02/2018); Left heart catheterization (N/A, 01/04/2021); Coronary angiography (N/A, 01/04/2021); Robot-assisted cholecystectomy (N/A, 02/04/2021); Treatment of cardiac arrhythmia (N/A, 04/19/2021); Treatment of cardiac arrhythmia (N/A, 05/14/2021); Transesophageal echocardiography (N/A, 05/14/2021); Esophagogastroduodenoscopy (N/A, 06/02/2021); Colonoscopy (N/A, 06/02/2021); Cystoscopy with insertion of minimally invasive implant to enlarge prostatic urethra (N/A, 06/29/2021); Colonoscopy (N/A, 10/11/2022); Adenoidectomy (1958); Cholecystectomy (2021); Tarsal tunnel release (Right, 07/18/2024); and Tarsal tunnel release (Left, 09/18/2024).  Medications:   Zeenat has a current medication list which includes the following prescription(s): amlodipine, aripiprazole, atenolol, carbidopa-levodopa  mg, cyclobenzaprine, eliquis, fluoxetine, furosemide, melatonin, mirtazapine, mv-min/folic/k1/lycopen/lutein, pregabalin, quetiapine, rosuvastatin, sildenafil, spironolactone, tamsulosin, zepbound, [START ON 8/19/2025] zepbound, and [START ON 9/16/2025] zepbound.  Allergies:   Review of patient's allergies indicates:   Allergen Reactions    Lipitor [atorvastatin] Itching        Prior or current therapy: current with outpatient PT ('s location) for back pain    [x] denies current home health services or hospice care    Family Present at time of Eval: wife     Social History: Pt lives with wife   Home Access: [facility] with threshold to enter; walk in shower   DME: rollator (present today), straight cane (in the home) , shower chair and grab bars  ; CPAP    Occupation/hobbies/homemaking: retired; management   Leisure: TV, reading -- reported increased "dizziness" with mobility leading to more sedentary lifestyle   Driving status: occasionally - wife is mainly driving at " "this time. Children want him to stop driving.     Current Level of Function: wife is assisting with bed mobility (supine<>sit), wife assisting with donning socks, pants and under garments. Pt reports he does frequently drop and spill items. Pt reports increased sedentary lifestyle and weight gain over past few years. Moreover, he reports decline in endurance with aerobic activity (walking). Wife reports that she can see medication improvement with decreased shuffled gait.     Falls: multiple over past 2 years    Near falls: monthly      Pain:  Did not rate on numerical scale  Back and feet pain that comes and goes     Pt's goals: wants to know if he has PD or if it is from a drug side effect     OBJECTIVE   Patient presents to clinic: walking with rollator into clinic     Cognitive Exam:  The [place] Cognitive Assessment (MoCA) was designed as a rapid screening instrument for mild cognitive dysfunction. It assesses different cognitive domains: attention and concentration, executive functions, memory, language, visuoconstructional skills, conceptual thinking, calculations, and orientation. Time to administer the MoCA is approximately 10 minutes. Average score >/= 26/30.  Results reflected below:   Visuospatial/Executive: 4/5   Naming: 3/3   (Memory-not reflected in final score: 5/5; after 5 min: 0/5)   Attention: 5/6   Language: 2/3   Abstraction: 2/2   Delayed Recall: 0/5   Orientation 6/6  Pt total score: 22/30; Reflection completed in 12:53 minutes.   -can also be found in media     Dominant hand:  right     Visual/Perceptual:  Tracking: intact functionally in all directions    Saccades: impaired ; visual slippage to right with all directions    Acuity: cataracts repaired   Convergence: impaired; left eye shift at near range   Nystagmus: none noted   Comments: does report "Close up/Reading" vision is not great     Postural examination/scapula alignment: Rounded shoulder, Head forward, and Posterior pelvic " tilt  Joint integrity: Firm end feeling  Skin integrity: Dry    Coordination:   - fine motor: intact  - Opposition: intact  - Finger Tapping: intact    Gross motor coordination:   SONJA: impaired  FTN: intact but slowed     Fine motor coordination: 9 hole peg test  Date 8/5/2025   R hand(secs) 47.56 s   L hand (secs) 49.31 s (2 drops)   [norms for men aged 71+: R=25.79s +/-5.60s; L=25.95s +/-4.54s (Elizabeth et al, 2003)]    ExerAI: Tremor Assessment; Postural   Right: not detected   Left: not detected  Note: Pt reports his tremors are worse first thing in th morning before initial medication intake      Sensation:   Numbness in bilateral toes     Joint Evaluation: AROM for bilateral upper extremities WFL  Fist: normal   Strength: 4+/5 generally throughout bilateral upper extremities      Functional Mobility:  Bed mobility: Mod I; does have bed that HOB elevates   Roll to left: Mod I  Roll to right: Mod I  Supine to sit: Min-Mod A from spouse  Sit to supine: Mod I  Transfers to bed: Mod I  Transfers to toilet: Mod I  Car transfers: Mod I - added time     ADL's:  Feeding: I  Grooming: Mod I  Hygiene: Mod I  UB Dressing: Mod I  LB Dressing: Mod A  Toileting: grab bar to assist with sit<>stand    IADL's:  Decline; wife mainly completing        Note: Wheezing throughout seated assessment     Treatment    No Treatment Provided today.     Education Provided     Education provided re: POC; Purpose of OT services now and in the future to assist with maximizing independence, adaptation/modifications, and the PD disease process.  CJ verbalized good understanding of education provided. Pt has no cultural, educational or language barriers to learning provided.      Written Home Exercises Provided: none provided today    ASSESSMENT    Zeenat is a 75 y.o. male referred to Occupational Therapy in clinic for work up of PD diagnosis. At this time, pt presents with sedentary lifestyle with shortness of breathe with simple tasks and  activities. He would greatly benefit from walking program and assisted exercises and activities from physical therapy prior to more formal balance and ADL training. Moreover, pt with recent drug loss medication without follow up with dietician - he would greatly benefit from holistic approach for approach to diet as well.     Equipment recommendations:    none     Referral recommendations: Debility clinic for Physical therapy for general endurance training; Dietary consult            Plan of care discussed with patient: Yes  Pt's spiritual, cultural and educational needs considered and patient is agreeable to the plan of care.    Anticipated Barriers for therapy: multiple medical complexities     Medical Necessity is demonstrated by the following  Occupational Profile/History  Co-morbidities and personal factors that may impact the plan of care [] LOW: Brief chart review  [x] MODERATE: Expanded chart review   [] HIGH: Extensive chart review    Moderate / High Support Documentation: multi D clinic      Examination  Performance deficits relating to physical, cognitive or psychosocial skills that result in activity limitations and/or participation restrictions  [] LOW: addressing 1-3 Performance deficits  [] MODERATE: 3-5 Performance deficits  [] HIGH: 5+ Performance deficits (please support below)    Moderate / High Support Documentation:    Physical:  Muscle Power/Strength  Muscle Endurance  Fine Motor Coordination  Postural Control  Pain     Cognitive:  Attention  Memory    Psychosocial:    Social Interaction  Routines     Treatment Options [] LOW: Limited options  [] MODERATE: Several options  [] HIGH: Multiple options      Decision Making/ Complexity Score: low           Plan   Plan of care Certification: 8/5/2025 Evaluation Only    Pt to continue to follow up with referring provider and PD clinic at physician recommended intervals.       PETRA Vargas

## 2025-08-05 ENCOUNTER — CLINICAL SUPPORT (OUTPATIENT)
Dept: REHABILITATION | Facility: HOSPITAL | Age: 75
End: 2025-08-05
Attending: STUDENT IN AN ORGANIZED HEALTH CARE EDUCATION/TRAINING PROGRAM
Payer: MEDICARE

## 2025-08-05 ENCOUNTER — OFFICE VISIT (OUTPATIENT)
Facility: CLINIC | Age: 75
End: 2025-08-05
Payer: MEDICARE

## 2025-08-05 VITALS
SYSTOLIC BLOOD PRESSURE: 118 MMHG | DIASTOLIC BLOOD PRESSURE: 83 MMHG | WEIGHT: 315 LBS | HEIGHT: 74 IN | BODY MASS INDEX: 40.43 KG/M2 | HEART RATE: 66 BPM

## 2025-08-05 DIAGNOSIS — G20.A1 PARKINSON'S DISEASE WITHOUT DYSKINESIA OR FLUCTUATING MANIFESTATIONS: ICD-10-CM

## 2025-08-05 DIAGNOSIS — R53.81 PHYSICAL DEBILITY: Primary | ICD-10-CM

## 2025-08-05 DIAGNOSIS — Z74.09 IMPAIRED FUNCTIONAL MOBILITY, BALANCE, GAIT, AND ENDURANCE: Primary | ICD-10-CM

## 2025-08-05 DIAGNOSIS — G20.C PARKINSONISM, UNSPECIFIED PARKINSONISM TYPE: Primary | ICD-10-CM

## 2025-08-05 PROCEDURE — 1159F MED LIST DOCD IN RCRD: CPT | Mod: CPTII,S$GLB,, | Performed by: STUDENT IN AN ORGANIZED HEALTH CARE EDUCATION/TRAINING PROGRAM

## 2025-08-05 PROCEDURE — 99215 OFFICE O/P EST HI 40 MIN: CPT | Mod: S$GLB,,, | Performed by: STUDENT IN AN ORGANIZED HEALTH CARE EDUCATION/TRAINING PROGRAM

## 2025-08-05 PROCEDURE — 1157F ADVNC CARE PLAN IN RCRD: CPT | Mod: CPTII,S$GLB,, | Performed by: STUDENT IN AN ORGANIZED HEALTH CARE EDUCATION/TRAINING PROGRAM

## 2025-08-05 PROCEDURE — 1125F AMNT PAIN NOTED PAIN PRSNT: CPT | Mod: CPTII,S$GLB,, | Performed by: STUDENT IN AN ORGANIZED HEALTH CARE EDUCATION/TRAINING PROGRAM

## 2025-08-05 PROCEDURE — 92610 EVALUATE SWALLOWING FUNCTION: CPT

## 2025-08-05 PROCEDURE — 3074F SYST BP LT 130 MM HG: CPT | Mod: CPTII,S$GLB,, | Performed by: STUDENT IN AN ORGANIZED HEALTH CARE EDUCATION/TRAINING PROGRAM

## 2025-08-05 PROCEDURE — 97162 PT EVAL MOD COMPLEX 30 MIN: CPT

## 2025-08-05 PROCEDURE — G2211 COMPLEX E/M VISIT ADD ON: HCPCS | Mod: S$GLB,,, | Performed by: STUDENT IN AN ORGANIZED HEALTH CARE EDUCATION/TRAINING PROGRAM

## 2025-08-05 PROCEDURE — 99367 TEAM CONF W/O PAT BY PHYS: CPT | Mod: S$GLB,,, | Performed by: STUDENT IN AN ORGANIZED HEALTH CARE EDUCATION/TRAINING PROGRAM

## 2025-08-05 PROCEDURE — 92522 EVALUATE SPEECH PRODUCTION: CPT

## 2025-08-05 PROCEDURE — 97165 OT EVAL LOW COMPLEX 30 MIN: CPT

## 2025-08-05 PROCEDURE — 3288F FALL RISK ASSESSMENT DOCD: CPT | Mod: CPTII,S$GLB,, | Performed by: STUDENT IN AN ORGANIZED HEALTH CARE EDUCATION/TRAINING PROGRAM

## 2025-08-05 PROCEDURE — 1101F PT FALLS ASSESS-DOCD LE1/YR: CPT | Mod: CPTII,S$GLB,, | Performed by: STUDENT IN AN ORGANIZED HEALTH CARE EDUCATION/TRAINING PROGRAM

## 2025-08-05 PROCEDURE — 99999 PR PBB SHADOW E&M-EST. PATIENT-LVL IV: CPT | Mod: PBBFAC,,,

## 2025-08-05 PROCEDURE — 3079F DIAST BP 80-89 MM HG: CPT | Mod: CPTII,S$GLB,, | Performed by: STUDENT IN AN ORGANIZED HEALTH CARE EDUCATION/TRAINING PROGRAM

## 2025-08-05 NOTE — PROGRESS NOTES
[facility] Note      Name: Zeenat Mancia  MRN: [id number]  Date: 8/5/2025    Vitals:   Vitals:    08/05/25 0800   BP: 118/83   Pulse: 66       Assessment/Plan:  Mr. Mancia presents today for [facility]. We discussed in detail the diagnosis, progression and treatment of Parkinson's disease. We went though an educational powerpoint slide and ensured his questions were answered.     Mr. Mancia is a referral from Dr. Moreno. Of note, he has been on Abilify for years prior to his symptom onset. He has not had any ancillary testing. He does not feel that his parkinsonism has been progressive. He states that the abilify was started due to depression and hallucinations. His cognition has also declined.     There were no medication changes made today. Patient will continue to follow with their primary MDS provider. Patient was seen and evaluated by PT, ST, OT, Social Work, and research team.    The following recommendations were made by each team:    PT: Lots of debility. LUND. Has done multiple bouts of ortho PT. Order for debility. Referral placed.   - Check in with GLP[facility] provider to see if a nutrition consult is ordered.   OT: 22/30 in [facility]  ST: Swallow was ok. LSVT exercises weren't great for him. Recommended the breather and outpatient ST.   SW/Palliative: Did not see them today.   MD/SWETHA: Placed order for syn-one biopsy. Will ask for OK from cardiology to hold AC for this. If OOP is too high we will consider MICAH scan. If negative, will have him see psychiatry to discuss changing from Abilify.   - On exam today he has facial masking but no bradykinesia with hand or foot movements. Occasional chin tremor. Maybe mildly increased tone involving the RUE.  I have placed a VV with me to help confirm his diagnosis.  Research: Not interested.     Patient was not aware of this appointment and what it was. They asked to leave early.    Total time: 50 minutes spent  on the encounter, which includes face to face time and non-face to face time preparing to see the patient (eg, review of tests), Obtaining and/or reviewing separately obtained history, Documenting clinical information in the electronic or other health record, Independently interpreting results (not separately reported) and communicating results to the patient/family/caregiver, or Care coordination (not separately reported). There was also a 45 minute conversation amongst the multidisiplinary team.     CC: Tiffanie -- Needs referral for movement.

## 2025-08-05 NOTE — Clinical Note
Hey! If it's ok with you I'd like to see this patient to better nail down his diagnosis. Sounds like he was on neuroleptics prior to his symptom onset and had some pretty predominant hallucinations. Wondering about drug induced parkinsonism vs atypical parkinsonism in his case.

## 2025-08-05 NOTE — PROGRESS NOTES
[facility]      SPEECH THERAPY NEUROLOGICAL REHABILITATION EVALUATION    PARKINSON'S DISEASE MULTIDISCIPLINARY CLINIC     Date: 8/5/2025    Name: Zeenat Mancia   MRN: [id number]    Therapy Diagnosis:   Encounter Diagnosis   Name Primary?    Parkinson's disease without dyskinesia or fluctuating manifestations       Physician: Dr. Sangita Kimbrough  Physician Orders: Ambulatory Referral for Speech  Plan of Care Expiration: 8/5/2025 to 10/3/25  Medical Diagnosis: Parkinson's Disease     Initial Clinic Evaluation Date: 8/5/2025   PD Clinic Number: 1    Time In: 10:04  Time Out: 10:48  Procedure   Swallow and Oral Function Evaluation    Evaluation of Speech Sound Production     Precautions: progressive degenerative disease, Standard, Fall, and Cognition    Subjective     History of Current Condition: Zeenat Mancia  presents to the [facility] secondary to the diagnosis of Parkinson's Disease. Hoarseness; has gotten worse over time. Denies difficulty swallowing.  Chief Complaint: Hoarseness  Past Medical History:  has a past medical history of A-fib, Acute blood loss anemia, Anxiety (?), Arthritis, Atrial flutter, Cataract (,?), Colon polyp, Depression, Diverticulosis, Dyslipidemia, Dyspnea on exertion (05/31/2021), GERD (gastroesophageal reflux disease), Hepatic steatosis, Hyperlipidemia, Hypertension, Irregular heart beat, Lower GI bleed (05/31/2021), Multiple gastric ulcers (05/16/2018), Obesity, Sleep apnea, and Unspecified disorder of kidney and ureter. Zeenat Mancia  has a past surgical history that includes Eye surgery; Tonsillectomy; Appendectomy; Cataract extraction (2009); Cataract extraction w/  intraocular lens implant (Right, 06/03/2015); Upper gastrointestinal endoscopy (05/16/2018); Colonoscopy (N/A, 10/10/2017); Colonoscopy (N/A, 05/16/2018); Esophagogastroduodenoscopy (N/A, 07/02/2018); Left heart catheterization (N/A, 01/04/2021);  Coronary angiography (N/A, 01/04/2021); Robot-assisted cholecystectomy (N/A, 02/04/2021); Treatment of cardiac arrhythmia (N/A, 04/19/2021); Treatment of cardiac arrhythmia (N/A, 05/14/2021); Transesophageal echocardiography (N/A, 05/14/2021); Esophagogastroduodenoscopy (N/A, 06/02/2021); Colonoscopy (N/A, 06/02/2021); Cystoscopy with insertion of minimally invasive implant to enlarge prostatic urethra (N/A, 06/29/2021); Colonoscopy (N/A, 10/11/2022); Adenoidectomy (1958); Cholecystectomy (2021); Tarsal tunnel release (Right, 07/18/2024); and Tarsal tunnel release (Left, 09/18/2024).  Medical Hx and Allergies:  Zeenat has a current medication list which includes the following prescription(s): amlodipine, aripiprazole, atenolol, carbidopa-levodopa  mg, cyclobenzaprine, eliquis, fluoxetine, furosemide, melatonin, mirtazapine, mv-min/folic/k1/lycopen/lutein, pregabalin, quetiapine, rosuvastatin, sildenafil, spironolactone, tamsulosin, zepbound, [START ON 8/19/2025] zepbound, and [START ON 9/16/2025] zepbound.   Review of patient's allergies indicates:   Allergen Reactions    Lipitor [atorvastatin] Itching     Prior Therapy:  1 outpatient speech therapy visit.                         Home Health Therapy at present time: No  Social History:  Patient lives with wife in [place], Patient is currently driving occasionally   Occupation:  retired; management  Prior Level of Function: mild hoarseness   Current Level of Function: mild to moderate hoarseness  Nutrition: regular and thin liquids  Recent MBSS:  n/a  Pain Scale: 7/10 on a Visual Analog Scale currently.  Pain Location: toes on both feet  Respiratory Status: room air   Vision: cataract surgery; no glasses   Hearing: Appeared to be within functional limits in conversation. Will monitor and refer as necessary.     Laryngoscopy     Date/Time: 5/7/2025 10:30 AM     Performed by: Nathaniel Ramos PA-C  Authorized by: Nathaniel Ramos PA-C    Consent Done?:  Yes  (Verbal)  Anesthesia:     Local anesthetic:  Phenylephrine spray    Patient tolerance:  Patient tolerated the procedure well with no immediate complications  Laryngoscopy:     Areas examined:  Nasal cavities, nasopharynx, oropharynx, hypopharynx, larynx and vocal cords    Laryngoscope size:  4 mm  Nose External:      No external nasal deformity  Nose Intranasal:      Mucosa no polyps     Mucosa ulcers not present     No mucosa lesions present     Septum gross deformity (mild left deviation)     Enlarged turbinates  Nasopharynx:      No mucosa lesions     Adenoids not present (adenoidecomy scar)     Posterior choanae patent     Eustachian tube patent  Larynx/hypopharynx:      No epiglottis lesions     No epiglottis edema     No AE folds lesions     No vocal cord polyps     Equal and normal bilateral     No hypopharynx lesions     No piriform sinus pooling     No piriform sinus lesions     Post cricoid edema (mild)     No post cricoid erythema     Mild mucus in BL PCs  Aerated mucus in oropharynx without piriform sinus pooling  Mild left deviation     Objective     Patient's motor speech, fluency, and voice were informally assessed. OME performed within Cranial Nerve Assessment detailed below. Motor speech skills were assessed and were functional for daily communication with a variety of communication partners (i.e. Family, friends, medical personnel).     Respiration / Phonation:   # of reps/ 5s Norms/ # reps per second Maximum Phon. Time (ah) Words Per Minute:   P^ 23  5.0-7.1  Trial 1: 12.8  171  #words/  minute   T^ 22  4.8-7.1  Trial 2: 11.8 >125 = WFL    K^ 22  4.4-7.4   <125 = refer for AAC eval   P^T^K^ 7  3.6-7.5  Av.3       Norm: 160-170  (paragraph reading)       Norm (F 10-26, M 13-34.6) Norm: 150 to 250 (norm conversation)     Informal voice data: (Decibel meter angie)  Ah: 77 dB  Readin dB    Trialed stimulibility for LOUD/PHORTE without success.      Phonation Oral Reading Conversation    Stimulus Sustained ah:   The [place] Passage History and Conversation   Quality hoarsestrained at the end hoarse hoarse   Duration  12.3 seconds  1 minute  N/a   Loudness  WFL/WNL WFL/WNL Reduced   Steadiness WFL WFL WFL     Overall Speech Intelligibility: good; intelligible but moderate hoarseness   Awareness / Strategy Use:   Patient demonstrates limited awareness of motor speech impairment. Pt was able to use strategies to improve intelligibility with minimal cues. Strategies introduced included: breathe and then speak. Focus and slightly increased volume; decreases hoarseness      Communicative Effectiveness Survey: is a questionnaire given to the patient to  the effectiveness of his communicative function.       Communication Situation  Rating on a scale of 1 - 4  1= not at all effective   4= very effective   Having a conversation with a family member or friends at home  4    Participating in conversation with strangers in a quiet place 2    Conversing with a familiar person over the telephone  2    Conversing with a stranger over the telephone  4    Being part of a conversation in a noisy environment (social gathering) 3    Speaking to a friend when you are emotionally upset of you are angry 3    Having a conversation while traveling in a car 4    Having a conversation with someone at a distance (across a room) 4        Clinical Swallow Exam/ Cranial Nerve Exam:  Cranial Nerve 5: Trigeminal Nerve  Motor Jaw Posture at rest: Closed  Mandible Elevation/Depression: WFL  Mandible lateralization: WFL  Abnormal movement: absent Interpretation: WFL   Sensory Forehead: WFL  Cheek: WFL  Jaw: WFL  Facial Pain: None noted Interpretation: WFL     Cranial Nerve 7: Facial Nerve  Motor Facial Symmetry:  grossly symmetrical  Wrinkle Forehead: WFL  Close eyes tightly: WFL  Labial Protrusion: WFL  Labial Retraction: WFL  Labial alternating movement: WFL  Cheek puffing sustained: WFL  Cheek puffing against resistance:  WFL  Abnormal movement: present; mild labial quiver Interpretation: unable to rule out cranial nerve involvement   Sensory Formal testing not completed. Pt denied any changes in taste        Cranial Nerves IX and X: Glossopharyngeal and Vagus Nerves  Motor Palatal Symmetry (Rest): WFL  Palatal Symmetry: (Movement) WFL  Palatal Elevation (Movement): WFL   Palatal Elevation (Sustained): WFL  Voice Prior to PO intake: Clear  Resonance: WFL  Abnormal movement: absent Interpretation: WFL     Cranial Nerve XII: Hypoglossal Nerve  Motor Tongue at rest: fasciculations  Lingual Protrusion: cause chin tremor  Lingual Protrusion against Resistance: WFL  Lingual Lateralization: WFL  Abnormal movement: present Interpretation: unable to rule out cranial nerve involvement     Other information:  Volitional Swallow: Able to palpate laryngeal rise  Mucosal Quality: No abnormal findings  Secretion Management: adequate  Dentition: adequate      EAT-10 Score:    Eating Assessment Tool (EAT-10) is a questionnaire given to the patient to  his swallowing function.     Key: 0= no problem; 4= severe problem  1. My swallowing problem has caused me to lose weight. - 0  2. My swallowing problem interferes with my ability to go out for meals. - 0  3. Swallowing liquids takes extra effort. - 0  4. Swallowing solids takes extra effort. - 0  5. Swallowing pills takes extra effort. - 0  6. Swallowing is painful. - 0  7. The pleasure of eating is affected by my swallowing. - 0  8. When I swallow food sticks in my throat. - 1  9. I cough when I eat. - 0  10. Swallowing is stressful. - 0    C J ranked his swallowing function as a 1/40     Interpretation: Score of greater than 3 is indicative of a swallowing disorder (Jerome et al., 2008); higher scores correlate with increased penetration-aspiration  scale scores, and scores greater than 15 results in the patient being 2.2 times more likely to aspirate (Cristóbal et al., 2015)    References:    CHADWICK Cano., DANAY Escobar., ROSOEVELT Bach., YUE Donohue., GARY Whittington., YUE Reza, & TIM Marin (2008). Validity and reliability of the Eating Assessment Tool (EAT-10). Annals of Otology, Rhinology & Laryngology, 117(12), 919-924. https://doi.org/10.1177/[email or web address]  DANAY Ambrocio., COLIN Turner, YUE Brown, COLIN Craig, & CHADWICK Cano (2015). The ability of the 10-item eating assessment tool (EAT-10) to predict aspiration risk in persons with dysphagia. Annals of Otology, Rhinology & Laryngology, 124(5), 351-354. https://doi.org/10.1177/7290306777824031      [place] Swallow Protocol:  PASSED  [place] Swallow Protocol dictates pt remain NPO if fail screener; (Juhir et al. 2014) however, as objective swallow assessment is not available for greater than a week, pt will remain on current diet until objective assessment is completed unless otherwise indicated.     Clinical Swallow Examination:   Pt presented with:   THIN:- 3 oz water test and self regulated thin liquid via cup    PUREE:- tsp bites of applesauce x 5    SOLID: -bite of sally cracker x 2     DESCRIPTION: Oral Phase: The patient had no anterior loss of the bolus with adequate closure of the lips around the spoon and edge of cup. Minimal oral residue remained in the oral cavity following the swallow cleared via liquid wash.   Pharyngeal:  Patient without overt clinical signs/symptoms of aspiration on any PO trials given.  Denies globus sensation.    Recommend Modified Barium Swallow Study (MBSS) or Fiberoptic Endoscopic Evaluation of Swallowing (FEES): No      Education   Patient and his family were educated on the recommendations and swallowing precautions. They verbalized understanding of all discussed.     Assessment   Impressions: Zeenat Mancia exhibited mild to moderate dysphonia c/b hoarseness, decreased breath support, strain and decreased volume due to PD. Demonstrates impairments including limitations as described in the problem  list. Positive prognostic factors include family support. Negative prognostic factors include progressive nature of disease. Barriers to progress include complex medical history and progressive nature of disease.     Patient's spiritual, cultural and educational needs considered and pt agreeable to plan of care and goals.     Rehab Potential: good         Short Term Goals: (4 weeks) Current Progress:   Pt will perform x10 inhalation and x10 exhalation reps using The Breather to improve breath support and overall perception of vocal quality.     Established this date   2. Patient will complete SOVT exercises and/or resonant-focused exercises with min A.    Established this date   3. Patient will improve the quality, efficiency, and ease of phonation through resonant and/or airflow exercises from the syllable to conversation level with 80% accuracy.    Established this date    4. Patient will demonstrate the ability to increase awareness of voicing behavior through self-monitoring to facilitate generalization in functional speaking situations with 80% accuracy.         Established this date          Long Term Goals: (8 weeks) Current Progress:   1. To provide pt/family with education regarding deficits and impact on communication, functional independence, and safety.    Established this date     2. He will demonstrate improved vocal quality and intonation at the conversation level to communicate basic medical and social needs in the functional living environment.      Established this date            Plan    Recommended Treatment Plan:   1.  Outpatient speech therapy; The B[facility]her and voice therapy (SOVT)    Plan of care expiration: 8/5/2025 to 10/3/25.  2x a week for 4 weeks. Increased time frame due to limited schedule availability.    XIMENA Hickman-SLP, CCC-SLP   8/5/2025

## 2025-08-05 NOTE — Clinical Note
Hey all - can we please get this patient scheduled for syn one biopsy with Peyton. Of note, he is on Eliquis so we can please send a letter to his cardiologist (within [facility]) to get the OK to hold his AC prior to the procedure. Thanks.

## 2025-08-07 ENCOUNTER — TELEPHONE (OUTPATIENT)
Facility: CLINIC | Age: 75
End: 2025-08-07
Payer: MEDICARE

## 2025-08-07 ENCOUNTER — CLINICAL SUPPORT (OUTPATIENT)
Dept: REHABILITATION | Facility: HOSPITAL | Age: 75
End: 2025-08-07
Attending: STUDENT IN AN ORGANIZED HEALTH CARE EDUCATION/TRAINING PROGRAM
Payer: MEDICARE

## 2025-08-07 VITALS — DIASTOLIC BLOOD PRESSURE: 93 MMHG | SYSTOLIC BLOOD PRESSURE: 119 MMHG | HEART RATE: 78 BPM | OXYGEN SATURATION: 96 %

## 2025-08-07 DIAGNOSIS — Z74.09 IMPAIRED FUNCTIONAL MOBILITY, BALANCE, GAIT, AND ENDURANCE: Primary | ICD-10-CM

## 2025-08-07 PROCEDURE — 97162 PT EVAL MOD COMPLEX 30 MIN: CPT | Mod: PN

## 2025-08-07 NOTE — PROGRESS NOTES
Outpatient Rehab    Physical Therapy Evaluation (only)    Patient Name: TICO Mancia  MRN: [id number]  YOB: 1950  Encounter Date: 8/7/2025    Therapy Diagnosis:   Encounter Diagnosis   Name Primary?    Impaired functional mobility, balance, gait, and endurance Yes     Physician: Sangita Kimbrough MD    Physician Orders: Eval and Treat  Medical Diagnosis: Parkinsonism, unspecified Parkinsonism type  Surgical Diagnosis: Not applicable for this Episode  Surgical Date: Not applicable for this Episode  Days Since Last Surgery: Not applicable for this Episode    Visit # / Visits Authorized:  1 / 1  Insurance Authorization Period: 8/5/2025 to 8/5/2026  Date of Evaluation: 8/7/2025  Plan of Care Certification: 8/7/2025 to 9/19/2025     Time In: 1605   Time Out: 1645  Total Time (in minutes): 40   Total Billable Time (in minutes): 40    Intake Outcome Measure for FOTO Survey    Therapist reviewed FOTO scores for TICO Mancia on 8/7/2025.   FOTO report - see Media section or FOTO account episode details.     Intake Score (%): 35    Precautions:  Additional Precautions and Protocol Details: Standard, fall, monitor vitals as needed    Subjective   History of Present Illness  TICO ZARATE is a 75 y.o. male who reports to physical therapy with a chief concern of Decreased functional endurance.     The patient reports a medical diagnosis of Parkinsonism, unspecified Parkinsonism type (G20.C).      History of Present Condition/Illness: Recently went to Parkinson's clinic and therapy recommended plan of care to address general deconditioning.  Does note some on/off symptoms.  Reiterates that he had a bilateral tarsal tunnel procedure that gave him some relief, but neuropathy in feet does persist to some extent. Wife notices that patient is walking better since he started taking Sinemet. He is now walking with rollator more in community but using quad cane in smaller spaces (like restaurants) and at home. Feeling some dizziness  "with standing and walking as well. Had one fall in the bathroom recently but none others since last seen at this facility. Goal is to be able to "walk a little bit more."         Activities of Daily Living      General Prior Level of Function Comments: Modified independent  General Current Level of Function Comments: Modified independent; min A for lower body dressing           Pain     Patient reports a current pain level of 0/10. Pain at best is reported as 0/10. Pain at worst is reported as 7/10.   Location: Bilateral low back         Living Arrangements  Living Situation  Housing: Home independently  Living Arrangements: Spouse/significant other    Equipment/Treatments  Other Adaptive Equipment Comment: Rollator, quad cane, rolling walker, CPAP machine    1-story home with 1 step to enter        Past Medical History/Physical Systems Review:   Zeenat Mancia  has a past medical history of A-fib, Acute blood loss anemia, Anxiety, Arthritis, Atrial flutter, Cataract, Colon polyp, Depression, Diverticulosis, Dyslipidemia, Dyspnea on exertion, GERD (gastroesophageal reflux disease), Hepatic steatosis, Hyperlipidemia, Hypertension, Irregular heart beat, Lower GI bleed, Multiple gastric ulcers, Obesity, Sleep apnea, and Unspecified disorder of kidney and ureter.    Zeenat Mancia  has a past surgical history that includes Eye surgery; Tonsillectomy; Appendectomy; Cataract extraction (2009); Cataract extraction w/  intraocular lens implant (Right, 06/03/2015); Upper gastrointestinal endoscopy (05/16/2018); Colonoscopy (N/A, 10/10/2017); Colonoscopy (N/A, 05/16/2018); Esophagogastroduodenoscopy (N/A, 07/02/2018); Left heart catheterization (N/A, 01/04/2021); Coronary angiography (N/A, 01/04/2021); Robot-assisted cholecystectomy (N/A, 02/04/2021); Treatment of cardiac arrhythmia (N/A, 04/19/2021); Treatment of cardiac arrhythmia (N/A, 05/14/2021); Transesophageal echocardiography (N/A, 05/14/2021); " Esophagogastroduodenoscopy (N/A, 06/02/2021); Colonoscopy (N/A, 06/02/2021); Cystoscopy with insertion of minimally invasive implant to enlarge prostatic urethra (N/A, 06/29/2021); Colonoscopy (N/A, 10/11/2022); Adenoidectomy (1958); Cholecystectomy (2021); Tarsal tunnel release (Right, 07/18/2024); and Tarsal tunnel release (Left, 09/18/2024).    Zeenat has a current medication list which includes the following prescription(s): amlodipine, aripiprazole, atenolol, carbidopa-levodopa  mg, cyclobenzaprine, eliquis, fluoxetine, furosemide, melatonin, mirtazapine, mv-min/folic/k1/lycopen/lutein, pregabalin, quetiapine, rosuvastatin, sildenafil, spironolactone, tamsulosin, zepbound, [START ON 8/19/2025] zepbound, and [START ON 9/16/2025] zepbound.    Review of patient's allergies indicates:   Allergen Reactions    Lipitor [atorvastatin] Itching        Objective   Vital Signs  BP (!) 119/93   Pulse 78   SpO2 96%   BP Location: Left arm  BP Position: Sitting            Hip Strength - Planes of Motion   Right Strength Right Pain Left Strength Left  Pain   Flexion (L2) 3   3     ABduction 4   4       Knee Strength   Right Strength Right Pain Left Strength Left  Pain   Flexion (S2) 4   4     Extension (L3) 4+   4+       Ankle/Foot Strength - Planes of Motion   Right Strength Right Pain Left Strength Left  Pain   Dorsiflexion (L4) 5   5        Fall Risk  Functional mobility test results suggest the patient is: At Risk for Falls  Timed Up & Go (TUG)  Time: 19 seconds     An older adult who takes >=12 seconds to complete the TUG is at risk for falling.    Sit to Stand Testing  The patient completed 5 sit to stand transfers in 16 sec. Desaturated to 92%              Two-Minute Walk Test: 237 feet / 96% / RPE = 6/10    Gait Analysis: Decreased hip extension at terminal contact bilaterally; decreased stride length; external rotation of bilateral hips throughout cycle    Time Entry(in minutes):  PT Evaluation (Moderate) Time  Entry: 40    Assessment & Plan   Assessment  C TESSA presents with a condition of Moderate complexity.   Presentation of Symptoms: Changing, Evolving  Will Comorbidities Impact Care: Yes  Obesity, parkinsons,  Anxiety, Arthritis,  Depression, Hypertension    Functional Limitations: Activity tolerance, Ambulating on uneven surfaces, Bed mobility, Completing self-care activities, Decreased ambulation distance/endurance, Increased risk of fall, Maintaining balance, Gross motor coordination, Gait limitations, Functional mobility  Impairments: Abnormal coordination, Abnormal gait, Impaired balance, Activity intolerance    Patient Goal for Therapy (PT): Improve walking capacity  Prognosis: Fair  Prognosis Details: Limited response to therapy in the past; low HEP compliance  Assessment Details: NAOMY is a 75-year-old male with medical diagnosis of Parkinsonism, unspecified Parkinsonism type (G20.C). He presents to outpatient physical therapy following recent recommendation to pursue plan of care to address general deconditioning/debility by therapy team at [facility]. He is well known to this clinic from past plans of care. He presents today with decreased cardiovascular endurance, impaired activity tolerance, decreased strength/muscular power of bilateral lower extremity, decreased self-perception of functional mobility, and objective risk for falls. He would benefit from a course of skilled physical therapy services to address listed impairments, decrease risk for falls/future injury, and improve overall activity tolerance/quality of life.    Plan  From a physical therapy perspective, the patient would benefit from: Skilled Rehab Services    Planned therapy interventions include: Therapeutic exercise, Therapeutic activities, Neuromuscular re-education, Manual therapy, ADLs/IADLs, and Gait training.            Visit Frequency: 2 times Per Week for 6 Weeks.  Other/tapered frequency details: Will begin therapy at a once a  week frequency and increase to twice a week frequency pending patient tolerance/HEP compliance    This plan was discussed with Patient and Family.   Discussion participants: Agreed Upon Plan of Care             The patient's spiritual, cultural, and educational needs were considered, and the patient is agreeable to the plan of care and goals.           Goals:   Active       1. Short-term goals       Patient will be 100% compliant with initial HEP in order to maximize PT benefit        Start:  08/08/25    Expected End:  08/29/25            Patient will complete TUG in </=16 seconds in order to decrease risk for falls and improve functional mobility        Start:  08/08/25    Expected End:  08/29/25            Patient will walk >/=275 feet on Two-Minute Walk Test in order to improve cardiovascular endurance and gait speed for home mobility       Start:  08/08/25    Expected End:  08/29/25               2. Long-term goals       Patient will complete TUG in </=13 seconds in order to decrease risk for falls and improve functional mobility        Start:  08/08/25    Expected End:  09/19/25            Patient will complete and walk >/=600 feet on Six-Minute Walk Test in order to improve cardiovascular endurance and gait speed for home mobility       Start:  08/08/25    Expected End:  09/19/25            Patient will improve bilateral hip flexion strength to >/=4/5 in order to improve strength for bed mobility and walking        Start:  08/08/25    Expected End:  09/19/25            Patient will score >/=50% on FOTO survey in order to improve self-perception of functional mobility        Start:  08/08/25    Expected End:  09/19/25                JENISE CLAYTON, PT

## 2025-08-08 ENCOUNTER — TELEPHONE (OUTPATIENT)
Facility: CLINIC | Age: 75
End: 2025-08-08
Payer: MEDICARE

## 2025-08-08 PROBLEM — Z74.09 IMPAIRED FUNCTIONAL MOBILITY, BALANCE, GAIT, AND ENDURANCE: Status: ACTIVE | Noted: 2025-08-08

## 2025-08-08 PROBLEM — Z74.09 IMPAIRED FUNCTIONAL MOBILITY, BALANCE, GAIT, AND ENDURANCE: Status: RESOLVED | Noted: 2025-01-12 | Resolved: 2025-08-08

## 2025-08-08 NOTE — TELEPHONE ENCOUNTER
----- Message from Sangita Kimbrough MD sent at 8/5/2025  8:52 AM CDT -----  Hey all - can we please get this patient scheduled for syn one biopsy with Peyton. Of note, he is on Eliquis so we can please send a letter to his cardiologist (within [facility]) to get the OK to hold his AC prior to the procedure. Thanks.

## 2025-08-12 ENCOUNTER — CLINICAL SUPPORT (OUTPATIENT)
Dept: REHABILITATION | Facility: HOSPITAL | Age: 75
End: 2025-08-12
Payer: MEDICARE

## 2025-08-12 DIAGNOSIS — Z74.09 IMPAIRED FUNCTIONAL MOBILITY, BALANCE, GAIT, AND ENDURANCE: ICD-10-CM

## 2025-08-12 DIAGNOSIS — R53.81 PHYSICAL DEBILITY: Primary | ICD-10-CM

## 2025-08-12 PROCEDURE — 97530 THERAPEUTIC ACTIVITIES: CPT | Mod: PN,CQ

## 2025-08-12 PROCEDURE — 97110 THERAPEUTIC EXERCISES: CPT | Mod: PN,CQ

## 2025-08-14 ENCOUNTER — PATIENT MESSAGE (OUTPATIENT)
Dept: PRIMARY CARE CLINIC | Facility: CLINIC | Age: 75
End: 2025-08-14
Payer: MEDICARE

## 2025-08-15 ENCOUNTER — OFFICE VISIT (OUTPATIENT)
Dept: PRIMARY CARE CLINIC | Facility: CLINIC | Age: 75
End: 2025-08-15
Payer: MEDICARE

## 2025-08-15 DIAGNOSIS — I50.32 HYPERTENSIVE HEART DISEASE WITH CHRONIC DIASTOLIC CONGESTIVE HEART FAILURE: ICD-10-CM

## 2025-08-15 DIAGNOSIS — R53.83 FATIGUE, UNSPECIFIED TYPE: Primary | ICD-10-CM

## 2025-08-15 DIAGNOSIS — I11.0 HYPERTENSIVE HEART DISEASE WITH CHRONIC DIASTOLIC CONGESTIVE HEART FAILURE: ICD-10-CM

## 2025-08-18 ENCOUNTER — LAB VISIT (OUTPATIENT)
Dept: LAB | Facility: HOSPITAL | Age: 75
End: 2025-08-18
Attending: INTERNAL MEDICINE
Payer: MEDICARE

## 2025-08-18 DIAGNOSIS — R53.83 FATIGUE, UNSPECIFIED TYPE: ICD-10-CM

## 2025-08-18 DIAGNOSIS — I50.32 HYPERTENSIVE HEART DISEASE WITH CHRONIC DIASTOLIC CONGESTIVE HEART FAILURE: ICD-10-CM

## 2025-08-18 DIAGNOSIS — I11.0 HYPERTENSIVE HEART DISEASE WITH CHRONIC DIASTOLIC CONGESTIVE HEART FAILURE: ICD-10-CM

## 2025-08-18 LAB
ABSOLUTE EOSINOPHIL (OHS): 0.32 K/UL
ABSOLUTE MONOCYTE (OHS): 0.86 K/UL (ref 0.3–1)
ABSOLUTE NEUTROPHIL COUNT (OHS): 5.95 K/UL (ref 1.8–7.7)
ALBUMIN SERPL BCP-MCNC: 3.7 G/DL (ref 3.5–5.2)
ALP SERPL-CCNC: 68 UNIT/L (ref 40–150)
ALT SERPL W/O P-5'-P-CCNC: <8 UNIT/L (ref 0–55)
ANION GAP (OHS): 11 MMOL/L (ref 8–16)
AST SERPL-CCNC: 19 UNIT/L (ref 0–50)
BASOPHILS # BLD AUTO: 0.04 K/UL
BASOPHILS NFR BLD AUTO: 0.5 %
BILIRUB SERPL-MCNC: 0.5 MG/DL (ref 0.1–1)
BILIRUB UR QL STRIP.AUTO: NEGATIVE
BUN SERPL-MCNC: 18 MG/DL (ref 8–23)
CALCIUM SERPL-MCNC: 9.7 MG/DL (ref 8.7–10.5)
CHLORIDE SERPL-SCNC: 102 MMOL/L (ref 95–110)
CLARITY UR: CLEAR
CO2 SERPL-SCNC: 29 MMOL/L (ref 23–29)
COLOR UR AUTO: YELLOW
CREAT SERPL-MCNC: 1.2 MG/DL (ref 0.5–1.4)
ERYTHROCYTE [DISTWIDTH] IN BLOOD BY AUTOMATED COUNT: 18.8 % (ref 11.5–14.5)
GFR SERPLBLD CREATININE-BSD FMLA CKD-EPI: >60 ML/MIN/1.73/M2
GLUCOSE SERPL-MCNC: 97 MG/DL (ref 70–110)
GLUCOSE UR QL STRIP: NEGATIVE
HCT VFR BLD AUTO: 57.1 % (ref 40–54)
HGB BLD-MCNC: 17 GM/DL (ref 14–18)
HGB UR QL STRIP: NEGATIVE
IMM GRANULOCYTES # BLD AUTO: 0.02 K/UL (ref 0–0.04)
IMM GRANULOCYTES NFR BLD AUTO: 0.2 % (ref 0–0.5)
KETONES UR QL STRIP: NEGATIVE
LEUKOCYTE ESTERASE UR QL STRIP: NEGATIVE
LYMPHOCYTES # BLD AUTO: 1.38 K/UL (ref 1–4.8)
MCH RBC QN AUTO: 26.6 PG (ref 27–31)
MCHC RBC AUTO-ENTMCNC: 29.8 G/DL (ref 32–36)
MCV RBC AUTO: 90 FL (ref 82–98)
NITRITE UR QL STRIP: NEGATIVE
NUCLEATED RBC (/100WBC) (OHS): 0 /100 WBC
PH UR STRIP: 5 [PH]
PLATELET # BLD AUTO: 237 K/UL (ref 150–450)
PMV BLD AUTO: 10.1 FL (ref 9.2–12.9)
POTASSIUM SERPL-SCNC: 4.8 MMOL/L (ref 3.5–5.1)
PROT SERPL-MCNC: 7.3 GM/DL (ref 6–8.4)
PROT UR QL STRIP: NEGATIVE
RBC # BLD AUTO: 6.38 M/UL (ref 4.6–6.2)
RELATIVE EOSINOPHIL (OHS): 3.7 %
RELATIVE LYMPHOCYTE (OHS): 16.1 % (ref 18–48)
RELATIVE MONOCYTE (OHS): 10 % (ref 4–15)
RELATIVE NEUTROPHIL (OHS): 69.5 % (ref 38–73)
SODIUM SERPL-SCNC: 142 MMOL/L (ref 136–145)
SP GR UR STRIP: 1.02
TSH SERPL-ACNC: 1.44 UIU/ML (ref 0.4–4)
UROBILINOGEN UR STRIP-ACNC: NEGATIVE EU/DL
WBC # BLD AUTO: 8.57 K/UL (ref 3.9–12.7)

## 2025-08-18 PROCEDURE — 36415 COLL VENOUS BLD VENIPUNCTURE: CPT

## 2025-08-18 PROCEDURE — 80053 COMPREHEN METABOLIC PANEL: CPT

## 2025-08-18 PROCEDURE — 81003 URINALYSIS AUTO W/O SCOPE: CPT

## 2025-08-18 PROCEDURE — 85025 COMPLETE CBC W/AUTO DIFF WBC: CPT

## 2025-08-18 PROCEDURE — 84443 ASSAY THYROID STIM HORMONE: CPT

## 2025-08-19 LAB — HOLD SPECIMEN: NORMAL

## 2025-08-21 ENCOUNTER — TELEPHONE (OUTPATIENT)
Facility: CLINIC | Age: 75
End: 2025-08-21
Payer: MEDICARE

## 2025-08-22 ENCOUNTER — PATIENT MESSAGE (OUTPATIENT)
Dept: PRIMARY CARE CLINIC | Facility: CLINIC | Age: 75
End: 2025-08-22
Payer: MEDICARE

## 2025-08-25 ENCOUNTER — PATIENT MESSAGE (OUTPATIENT)
Dept: PODIATRY | Facility: CLINIC | Age: 75
End: 2025-08-25
Payer: MEDICARE

## 2025-08-26 ENCOUNTER — PATIENT OUTREACH (OUTPATIENT)
Dept: ADMINISTRATIVE | Facility: HOSPITAL | Age: 75
End: 2025-08-26
Payer: MEDICARE

## 2025-08-26 ENCOUNTER — CLINICAL SUPPORT (OUTPATIENT)
Dept: REHABILITATION | Facility: HOSPITAL | Age: 75
End: 2025-08-26
Payer: MEDICARE

## 2025-08-26 DIAGNOSIS — Z74.09 IMPAIRED FUNCTIONAL MOBILITY, BALANCE, GAIT, AND ENDURANCE: ICD-10-CM

## 2025-08-26 DIAGNOSIS — R53.81 PHYSICAL DEBILITY: Primary | ICD-10-CM

## 2025-08-26 PROCEDURE — 97110 THERAPEUTIC EXERCISES: CPT | Mod: PN

## 2025-08-27 ENCOUNTER — OFFICE VISIT (OUTPATIENT)
Dept: PRIMARY CARE CLINIC | Facility: CLINIC | Age: 75
End: 2025-08-27
Payer: MEDICARE

## 2025-08-27 VITALS
OXYGEN SATURATION: 96 % | HEART RATE: 73 BPM | WEIGHT: 315 LBS | SYSTOLIC BLOOD PRESSURE: 130 MMHG | RESPIRATION RATE: 18 BRPM | HEIGHT: 74 IN | DIASTOLIC BLOOD PRESSURE: 80 MMHG | BODY MASS INDEX: 40.43 KG/M2

## 2025-08-27 DIAGNOSIS — E66.01 MORBID OBESITY DUE TO EXCESS CALORIES: Primary | ICD-10-CM

## 2025-08-27 DIAGNOSIS — G47.33 OSA (OBSTRUCTIVE SLEEP APNEA): ICD-10-CM

## 2025-08-27 PROCEDURE — 99999 PR PBB SHADOW E&M-EST. PATIENT-LVL IV: CPT | Mod: PBBFAC,,, | Performed by: INTERNAL MEDICINE

## 2025-08-29 ENCOUNTER — PATIENT MESSAGE (OUTPATIENT)
Dept: PRIMARY CARE CLINIC | Facility: CLINIC | Age: 75
End: 2025-08-29
Payer: MEDICARE

## 2025-08-29 RX ORDER — FLUOXETINE HYDROCHLORIDE 40 MG/1
40 CAPSULE ORAL DAILY
Qty: 90 CAPSULE | Refills: 3 | Status: SHIPPED | OUTPATIENT
Start: 2025-08-29

## 2025-09-02 ENCOUNTER — CLINICAL SUPPORT (OUTPATIENT)
Dept: REHABILITATION | Facility: HOSPITAL | Age: 75
End: 2025-09-02
Payer: MEDICARE

## 2025-09-02 DIAGNOSIS — Z74.09 IMPAIRED FUNCTIONAL MOBILITY, BALANCE, GAIT, AND ENDURANCE: ICD-10-CM

## 2025-09-02 DIAGNOSIS — R53.81 PHYSICAL DEBILITY: Primary | ICD-10-CM

## 2025-09-02 PROCEDURE — 97110 THERAPEUTIC EXERCISES: CPT | Mod: PN

## 2025-09-02 PROCEDURE — 97530 THERAPEUTIC ACTIVITIES: CPT | Mod: PN

## (undated) DEVICE — BANDAGE ESMARK ELASTIC ST 4X9

## (undated) DEVICE — DRAPE COLUMN DAVINCI XI

## (undated) DEVICE — SYR MARK 7 ARTERION 150ML

## (undated) DEVICE — GLOVE BIOGEL SKINSENSE PI 7.5

## (undated) DEVICE — ELECTRODE REM PLYHSV RETURN 9

## (undated) DEVICE — GAUZE CNFRM STRL 4INX4.1YD

## (undated) DEVICE — SEE MEDLINE ITEM 156952

## (undated) DEVICE — SYS CLSR WND ENDOSCP XL

## (undated) DEVICE — KIT LEFT HEART MANIFOLD CUSTOM

## (undated) DEVICE — DRAPE ARM DAVINCI XI

## (undated) DEVICE — SEE MEDLINE ITEM 157187

## (undated) DEVICE — SEAL UNIVERSAL 5MM-8MM XI

## (undated) DEVICE — SPONGE GAUZE 16PLY 4X4

## (undated) DEVICE — TOURNIQUET SB QC DP 18X4IN

## (undated) DEVICE — GLOVE SURGICAL LATEX SZ 7

## (undated) DEVICE — CATH JACKY RADIAL 3.5 110CM

## (undated) DEVICE — BAG TISSUE RETRIEVAL 5MM

## (undated) DEVICE — SEE MEDLINE ITEM 157116

## (undated) DEVICE — HEMOSTAT VASC BAND LONG 27CM

## (undated) DEVICE — NDL HYPO REG 25G X 1 1/2

## (undated) DEVICE — ADHESIVE DERMABOND ADVANCED

## (undated) DEVICE — DRESSING XEROFORM NONADH 1X8IN

## (undated) DEVICE — BRUSH SCRUB SURGICALW/BETADINE

## (undated) DEVICE — DRESSING AQUACEL SACRAL 9 X 9

## (undated) DEVICE — DRAPE T EXTRM SURG 121X128X90

## (undated) DEVICE — CONTRAST VISIPAQUE 150ML

## (undated) DEVICE — GOWN POLY REINF BRTH SLV XL

## (undated) DEVICE — COVER LIGHT HANDLE

## (undated) DEVICE — TAPE SILK 3IN

## (undated) DEVICE — SPONGE COTTON TRAY 4X4IN

## (undated) DEVICE — PAD CAST SPECIALIST STRL 4

## (undated) DEVICE — TRAY MINOR ORTHO OMC

## (undated) DEVICE — CATH IMPULSE 5FR PIGTAIL 125CM

## (undated) DEVICE — DRAPE THREE-QTR REINF 53X77IN

## (undated) DEVICE — STOCKINET 4INX48

## (undated) DEVICE — COVER PROBE US 5.5X58L NON LTX

## (undated) DEVICE — KIT WING PAD POSITIONING

## (undated) DEVICE — PAD DEFIB CADENCE ADULT R2

## (undated) DEVICE — COVER TIP CURVED SCISSORS XI

## (undated) DEVICE — IRRIGATOR ENDOSCOPY DISP.

## (undated) DEVICE — Device

## (undated) DEVICE — KIT COLLECTION E SWAB REGULAR

## (undated) DEVICE — TUBING HPCIL ROT M/F ADPT 48IN

## (undated) DEVICE — SOL 9P NACL IRR PIC IL

## (undated) DEVICE — SOL IRR WATER STRL 3000 ML

## (undated) DEVICE — BAG URINARY DRN 2000ML

## (undated) DEVICE — OBTURATOR BLADELESS 8MM XI CLR

## (undated) DEVICE — MANIFOLD 4 PORT

## (undated) DEVICE — CLIP HEMO-LOK ML

## (undated) DEVICE — BANDAGE ELAS SOFTWRAP ST 4X5YD

## (undated) DEVICE — TRAY SKIN SCRUB WET PREMIUM

## (undated) DEVICE — SYR 10CC LUER LOCK

## (undated) DEVICE — GLIDESHEATH SLENDER SS 5FR10CM

## (undated) DEVICE — SUT MCRYL PLUS 4-0 PS2 27IN

## (undated) DEVICE — CATH IMPULSE FL4 5FR 100CM

## (undated) DEVICE — SET CYSTO IRRIGATION UNIV SPIK

## (undated) DEVICE — SUT 0 VICRYL / UR6 (J603)